# Patient Record
Sex: MALE | Race: BLACK OR AFRICAN AMERICAN | NOT HISPANIC OR LATINO | Employment: OTHER | URBAN - METROPOLITAN AREA
[De-identification: names, ages, dates, MRNs, and addresses within clinical notes are randomized per-mention and may not be internally consistent; named-entity substitution may affect disease eponyms.]

---

## 2017-01-03 ENCOUNTER — HISTORICAL (OUTPATIENT)
Dept: WOUND CARE | Facility: HOSPITAL | Age: 42
End: 2017-01-03

## 2017-01-31 ENCOUNTER — HISTORICAL (OUTPATIENT)
Dept: WOUND CARE | Facility: HOSPITAL | Age: 42
End: 2017-01-31

## 2017-03-01 ENCOUNTER — HISTORICAL (OUTPATIENT)
Dept: WOUND CARE | Facility: HOSPITAL | Age: 42
End: 2017-03-01

## 2017-03-15 ENCOUNTER — HISTORICAL (OUTPATIENT)
Dept: WOUND CARE | Facility: HOSPITAL | Age: 42
End: 2017-03-15

## 2017-04-12 ENCOUNTER — HISTORICAL (OUTPATIENT)
Dept: WOUND CARE | Facility: HOSPITAL | Age: 42
End: 2017-04-12

## 2017-05-17 ENCOUNTER — HISTORICAL (OUTPATIENT)
Dept: WOUND CARE | Facility: HOSPITAL | Age: 42
End: 2017-05-17

## 2017-06-07 ENCOUNTER — HISTORICAL (OUTPATIENT)
Dept: WOUND CARE | Facility: HOSPITAL | Age: 42
End: 2017-06-07

## 2017-06-28 ENCOUNTER — HISTORICAL (OUTPATIENT)
Dept: WOUND CARE | Facility: HOSPITAL | Age: 42
End: 2017-06-28

## 2017-07-08 ENCOUNTER — HISTORICAL (OUTPATIENT)
Dept: LAB | Facility: HOSPITAL | Age: 42
End: 2017-07-08

## 2017-07-08 LAB
ABS NEUT (OLG): 6.56 X10(3)/MCL (ref 2.1–9.2)
ALBUMIN SERPL-MCNC: 3 GM/DL (ref 3.4–5)
ALBUMIN/GLOB SERPL: 0.8 RATIO (ref 1.1–2)
ALP SERPL-CCNC: 80 UNIT/L (ref 50–136)
ALT SERPL-CCNC: 16 UNIT/L (ref 12–78)
APPEARANCE, UA: ABNORMAL
AST SERPL-CCNC: 11 UNIT/L (ref 15–37)
BACTERIA #/AREA URNS AUTO: ABNORMAL /HPF
BASOPHILS # BLD AUTO: 0 X10(3)/MCL (ref 0–0.2)
BASOPHILS NFR BLD AUTO: 0 %
BILIRUB SERPL-MCNC: 0.5 MG/DL (ref 0.2–1)
BILIRUB UR QL STRIP: NEGATIVE
BILIRUBIN DIRECT+TOT PNL SERPL-MCNC: 0.2 MG/DL (ref 0–0.5)
BILIRUBIN DIRECT+TOT PNL SERPL-MCNC: 0.3 MG/DL (ref 0–0.8)
BUN SERPL-MCNC: 12 MG/DL (ref 7–18)
CALCIUM SERPL-MCNC: 8 MG/DL (ref 8.5–10.1)
CHLORIDE SERPL-SCNC: 105 MMOL/L (ref 98–107)
CHOLEST SERPL-MCNC: 109 MG/DL (ref 0–200)
CHOLEST/HDLC SERPL: 2.7 {RATIO} (ref 0–5)
CO2 SERPL-SCNC: 23 MMOL/L (ref 21–32)
COLOR UR: YELLOW
CREAT SERPL-MCNC: 0.62 MG/DL (ref 0.7–1.3)
DEPRECATED CALCIDIOL+CALCIFEROL SERPL-MC: 18.95 NG/ML (ref 30–80)
EOSINOPHIL # BLD AUTO: 0.2 X10(3)/MCL (ref 0–0.9)
EOSINOPHIL NFR BLD AUTO: 2 %
ERYTHROCYTE [DISTWIDTH] IN BLOOD BY AUTOMATED COUNT: 13.1 % (ref 11.5–17)
GLOBULIN SER-MCNC: 3.6 GM/DL (ref 2.4–3.5)
GLUCOSE (UA): NEGATIVE
GLUCOSE SERPL-MCNC: 109 MG/DL (ref 74–106)
HCT VFR BLD AUTO: 36.4 % (ref 42–52)
HDLC SERPL-MCNC: 41 MG/DL (ref 35–60)
HGB BLD-MCNC: 12 GM/DL (ref 14–18)
HGB UR QL STRIP: ABNORMAL
KETONES UR QL STRIP: NEGATIVE
LDLC SERPL CALC-MCNC: 47 MG/DL (ref 0–129)
LEUKOCYTE ESTERASE UR QL STRIP: ABNORMAL
LYMPHOCYTES # BLD AUTO: 1.7 X10(3)/MCL (ref 0.6–4.6)
LYMPHOCYTES NFR BLD AUTO: 18 %
MAGNESIUM SERPL-MCNC: 2.2 MG/DL (ref 1.8–2.4)
MCH RBC QN AUTO: 30.2 PG (ref 27–31)
MCHC RBC AUTO-ENTMCNC: 33 GM/DL (ref 33–36)
MCV RBC AUTO: 91.5 FL (ref 80–94)
MONOCYTES # BLD AUTO: 0.7 X10(3)/MCL (ref 0.1–1.3)
MONOCYTES NFR BLD AUTO: 8 %
NEUTROPHILS # BLD AUTO: 6.56 X10(3)/MCL (ref 2.1–9.2)
NEUTROPHILS NFR BLD AUTO: 71 %
NITRITE UR QL STRIP.AUTO: NEGATIVE
PH UR STRIP: 7.5 [PH] (ref 5–9)
PLATELET # BLD AUTO: 299 X10(3)/MCL (ref 130–400)
PMV BLD AUTO: 10.2 FL (ref 9.4–12.4)
POTASSIUM SERPL-SCNC: 4.1 MMOL/L (ref 3.5–5.1)
PROT SERPL-MCNC: 6.6 GM/DL (ref 6.4–8.2)
PROT UR QL STRIP: ABNORMAL
PSA SERPL-MCNC: 2.79 NG/ML (ref 0–4)
RBC # BLD AUTO: 3.98 X10(6)/MCL (ref 4.7–6.1)
RBC #/AREA URNS HPF: ABNORMAL /[HPF]
SODIUM SERPL-SCNC: 139 MMOL/L (ref 136–145)
SP GR UR STRIP: 1 (ref 1–1.03)
SQUAMOUS EPITHELIAL, UA: ABNORMAL
TRIGL SERPL-MCNC: 105 MG/DL (ref 30–150)
TSH SERPL-ACNC: 0.29 MIU/ML (ref 0.36–3.74)
UROBILINOGEN UR STRIP-ACNC: 2
VLDLC SERPL CALC-MCNC: 21 MG/DL
WBC # SPEC AUTO: 9.2 X10(3)/MCL (ref 4.5–11.5)
WBC #/AREA URNS AUTO: 131 /HPF (ref 0–3)

## 2017-07-12 ENCOUNTER — HISTORICAL (OUTPATIENT)
Dept: RADIOLOGY | Facility: HOSPITAL | Age: 42
End: 2017-07-12

## 2017-07-19 ENCOUNTER — HISTORICAL (OUTPATIENT)
Dept: WOUND CARE | Facility: HOSPITAL | Age: 42
End: 2017-07-19

## 2017-08-02 ENCOUNTER — HISTORICAL (OUTPATIENT)
Dept: WOUND CARE | Facility: HOSPITAL | Age: 42
End: 2017-08-02

## 2017-08-03 ENCOUNTER — HISTORICAL (OUTPATIENT)
Dept: RADIOLOGY | Facility: HOSPITAL | Age: 42
End: 2017-08-03

## 2017-08-03 LAB
BUN SERPL-MCNC: 11 MG/DL (ref 10–20)
CALCIUM SERPL-MCNC: 8.7 MG/DL (ref 8–10.5)
CHLORIDE SERPL-SCNC: 106 MMOL/L (ref 100–108)
CO2 SERPL-SCNC: 24 MMOL/L (ref 21–35)
CREAT SERPL-MCNC: 0.75 MG/DL (ref 0.7–1.3)
GLUCOSE SERPL-MCNC: 76 MG/DL (ref 75–116)
POTASSIUM SERPL-SCNC: 3.9 MMOL/L (ref 3.6–5.2)
PSA SERPL-MCNC: 1.08 NG/ML (ref 0–4)
SODIUM SERPL-SCNC: 139 MMOL/L (ref 135–145)

## 2017-08-16 ENCOUNTER — HISTORICAL (OUTPATIENT)
Dept: WOUND CARE | Facility: HOSPITAL | Age: 42
End: 2017-08-16

## 2017-09-06 ENCOUNTER — HISTORICAL (OUTPATIENT)
Dept: WOUND CARE | Facility: HOSPITAL | Age: 42
End: 2017-09-06

## 2017-09-12 ENCOUNTER — HISTORICAL (OUTPATIENT)
Dept: LAB | Facility: HOSPITAL | Age: 42
End: 2017-09-12

## 2017-09-12 LAB
APPEARANCE, UA: ABNORMAL
BACTERIA SPEC CULT: ABNORMAL /HPF
BILIRUB UR QL STRIP: NEGATIVE
COLOR UR: ABNORMAL
GLUCOSE (UA): NEGATIVE
HGB UR QL STRIP: ABNORMAL
KETONES UR QL STRIP: 15 MG/DL
LEUKOCYTE ESTERASE UR QL STRIP: ABNORMAL
NITRITE UR QL STRIP: POSITIVE
PH UR STRIP: 7 [PH]
PROT UR QL STRIP: ABNORMAL
RBC #/AREA URNS HPF: ABNORMAL /HPF
SP GR UR STRIP: 1.02
SQUAMOUS EPITHELIAL, UA: ABNORMAL
UROBILINOGEN UR STRIP-ACNC: 1 EU/DL
WBC #/AREA URNS HPF: ABNORMAL /HPF

## 2017-10-11 ENCOUNTER — HISTORICAL (OUTPATIENT)
Dept: WOUND CARE | Facility: HOSPITAL | Age: 42
End: 2017-10-11

## 2017-11-08 ENCOUNTER — HISTORICAL (OUTPATIENT)
Dept: WOUND CARE | Facility: HOSPITAL | Age: 42
End: 2017-11-08

## 2018-03-27 ENCOUNTER — HISTORICAL (OUTPATIENT)
Dept: WOUND CARE | Facility: HOSPITAL | Age: 43
End: 2018-03-27

## 2018-04-02 ENCOUNTER — HISTORICAL (OUTPATIENT)
Dept: LAB | Facility: HOSPITAL | Age: 43
End: 2018-04-02

## 2018-04-02 LAB
ABS NEUT (OLG): 7 X10(3)/MCL (ref 1.5–6.9)
ALBUMIN SERPL-MCNC: 1.8 GM/DL (ref 3.4–5)
ALBUMIN/GLOB SERPL: 0.5 RATIO
ALP SERPL-CCNC: 135 UNIT/L (ref 30–113)
ALT SERPL-CCNC: 32 UNIT/L (ref 10–45)
AST SERPL-CCNC: 25 UNIT/L (ref 15–37)
BASOPHILS # BLD AUTO: 0 X10(3)/MCL (ref 0–0.1)
BASOPHILS NFR BLD AUTO: 0 % (ref 0–1)
BILIRUB SERPL-MCNC: 0.6 MG/DL (ref 0.1–0.9)
BILIRUBIN DIRECT+TOT PNL SERPL-MCNC: 0.2 MG/DL (ref 0–0.3)
BILIRUBIN DIRECT+TOT PNL SERPL-MCNC: 0.4 MG/DL
BUN SERPL-MCNC: 8 MG/DL (ref 10–20)
CALCIUM SERPL-MCNC: 7.6 MG/DL (ref 8–10.5)
CHLORIDE SERPL-SCNC: 101 MMOL/L (ref 100–108)
CHOLEST SERPL-MCNC: 100 MG/DL (ref 140–200)
CHOLEST/HDLC SERPL: 4 MG/DL (ref 0–8)
CO2 SERPL-SCNC: 20 MMOL/L (ref 21–35)
CREAT SERPL-MCNC: 0.72 MG/DL (ref 0.7–1.3)
DEPRECATED CALCIDIOL+CALCIFEROL SERPL-MC: 13 NG/ML (ref 30–80)
EOSINOPHIL # BLD AUTO: 0.1 X10(3)/MCL (ref 0–0.6)
EOSINOPHIL NFR BLD AUTO: 1 % (ref 0–5)
ERYTHROCYTE [DISTWIDTH] IN BLOOD BY AUTOMATED COUNT: 14.1 % (ref 11.5–17)
GLOBULIN SER-MCNC: 3.8 GM/DL
GLUCOSE SERPL-MCNC: 67 MG/DL (ref 75–116)
HCT VFR BLD AUTO: 31.3 % (ref 42–52)
HDLC SERPL-MCNC: 25 MG/DL (ref 35–59)
HGB BLD-MCNC: 10.8 GM/DL (ref 14–18)
LDLC SERPL CALC-MCNC: 62 MG/DL (ref 100–129)
LYMPHOCYTES # BLD AUTO: 1.7 X10(3)/MCL (ref 0.5–4.1)
LYMPHOCYTES NFR BLD AUTO: 17.5 % (ref 15–40)
MCH RBC QN AUTO: 31 PG (ref 27–34)
MCHC RBC AUTO-ENTMCNC: 34 GM/DL (ref 31–36)
MCV RBC AUTO: 89 FL (ref 80–99)
MONOCYTES # BLD AUTO: 1 X10(3)/MCL (ref 0–1.1)
MONOCYTES NFR BLD AUTO: 10 % (ref 4–12)
NEUTROPHILS # BLD AUTO: 7 X10(3)/MCL (ref 1.5–6.9)
NEUTROPHILS NFR BLD AUTO: 71 % (ref 43–75)
PLATELET # BLD AUTO: 365 X10(3)/MCL (ref 140–400)
PMV BLD AUTO: 9.6 FL (ref 6.8–10)
POTASSIUM SERPL-SCNC: 2.7 MMOL/L (ref 3.6–5.2)
PROT SERPL-MCNC: 5.6 GM/DL (ref 6.4–8.2)
RBC # BLD AUTO: 3.5 X10(6)/MCL (ref 4.7–6.1)
SODIUM SERPL-SCNC: 138 MMOL/L (ref 135–145)
TRIGL SERPL-MCNC: 66 MG/DL (ref 35–150)
TSH SERPL-ACNC: 0.79 MIU/ML (ref 0.36–3.74)
VLDLC SERPL CALC-MCNC: 13 MG/DL
WBC # SPEC AUTO: 9.8 X10(3)/MCL (ref 4.5–11.5)

## 2018-05-03 ENCOUNTER — HISTORICAL (OUTPATIENT)
Dept: WOUND CARE | Facility: HOSPITAL | Age: 43
End: 2018-05-03

## 2018-05-10 ENCOUNTER — HISTORICAL (OUTPATIENT)
Dept: WOUND CARE | Facility: HOSPITAL | Age: 43
End: 2018-05-10

## 2018-05-24 ENCOUNTER — HISTORICAL (OUTPATIENT)
Dept: WOUND CARE | Facility: HOSPITAL | Age: 43
End: 2018-05-24

## 2018-05-31 ENCOUNTER — HISTORICAL (OUTPATIENT)
Dept: WOUND CARE | Facility: HOSPITAL | Age: 43
End: 2018-05-31

## 2018-06-04 ENCOUNTER — HISTORICAL (OUTPATIENT)
Dept: LAB | Facility: HOSPITAL | Age: 43
End: 2018-06-04

## 2018-06-04 LAB
APPEARANCE, UA: CLEAR
BACTERIA SPEC CULT: ABNORMAL /HPF
BILIRUB UR QL STRIP: NEGATIVE
COLOR UR: ABNORMAL
GLUCOSE (UA): NEGATIVE
HGB UR QL STRIP: ABNORMAL
KETONES UR QL STRIP: NEGATIVE
LEUKOCYTE ESTERASE UR QL STRIP: ABNORMAL
NITRITE UR QL STRIP: POSITIVE
PH UR STRIP: 8.5 [PH]
PROT UR QL STRIP: NEGATIVE
RBC #/AREA URNS HPF: ABNORMAL /HPF
SP GR UR STRIP: <=1.005
SQUAMOUS EPITHELIAL, UA: ABNORMAL
UROBILINOGEN UR STRIP-ACNC: 0.2 EU/DL
WBC #/AREA URNS HPF: ABNORMAL /HPF

## 2018-06-07 ENCOUNTER — HISTORICAL (OUTPATIENT)
Dept: WOUND CARE | Facility: HOSPITAL | Age: 43
End: 2018-06-07

## 2018-06-14 ENCOUNTER — HISTORICAL (OUTPATIENT)
Dept: WOUND CARE | Facility: HOSPITAL | Age: 43
End: 2018-06-14

## 2018-06-22 ENCOUNTER — HISTORICAL (OUTPATIENT)
Dept: WOUND CARE | Facility: HOSPITAL | Age: 43
End: 2018-06-22

## 2018-06-29 ENCOUNTER — HISTORICAL (OUTPATIENT)
Dept: WOUND CARE | Facility: HOSPITAL | Age: 43
End: 2018-06-29

## 2018-07-06 ENCOUNTER — HISTORICAL (OUTPATIENT)
Dept: WOUND CARE | Facility: HOSPITAL | Age: 43
End: 2018-07-06

## 2018-07-13 ENCOUNTER — HISTORICAL (OUTPATIENT)
Dept: WOUND CARE | Facility: HOSPITAL | Age: 43
End: 2018-07-13

## 2018-07-18 ENCOUNTER — HISTORICAL (OUTPATIENT)
Dept: LAB | Facility: HOSPITAL | Age: 43
End: 2018-07-18

## 2018-07-18 LAB
APPEARANCE, UA: ABNORMAL
BACTERIA SPEC CULT: ABNORMAL /HPF
BILIRUB UR QL STRIP: NEGATIVE
COLOR UR: YELLOW
GLUCOSE (UA): NEGATIVE
HGB UR QL STRIP: ABNORMAL
KETONES UR QL STRIP: NEGATIVE
LEUKOCYTE ESTERASE UR QL STRIP: ABNORMAL
NITRITE UR QL STRIP: NEGATIVE
PH UR STRIP: 6.5 [PH]
PROT UR QL STRIP: ABNORMAL
RBC #/AREA URNS HPF: ABNORMAL /HPF
SP GR UR STRIP: <=1.005
SQUAMOUS EPITHELIAL, UA: ABNORMAL
UROBILINOGEN UR STRIP-ACNC: 0.2 EU/DL
WBC #/AREA URNS HPF: ABNORMAL /HPF

## 2018-07-20 ENCOUNTER — HISTORICAL (OUTPATIENT)
Dept: WOUND CARE | Facility: HOSPITAL | Age: 43
End: 2018-07-20

## 2018-08-03 ENCOUNTER — HISTORICAL (OUTPATIENT)
Dept: WOUND CARE | Facility: HOSPITAL | Age: 43
End: 2018-08-03

## 2018-08-10 ENCOUNTER — HISTORICAL (OUTPATIENT)
Dept: WOUND CARE | Facility: HOSPITAL | Age: 43
End: 2018-08-10

## 2018-08-17 ENCOUNTER — HISTORICAL (OUTPATIENT)
Dept: WOUND CARE | Facility: HOSPITAL | Age: 43
End: 2018-08-17

## 2018-08-24 ENCOUNTER — HISTORICAL (OUTPATIENT)
Dept: WOUND CARE | Facility: HOSPITAL | Age: 43
End: 2018-08-24

## 2018-08-31 ENCOUNTER — HISTORICAL (OUTPATIENT)
Dept: WOUND CARE | Facility: HOSPITAL | Age: 43
End: 2018-08-31

## 2018-09-04 ENCOUNTER — HISTORICAL (OUTPATIENT)
Dept: ADMINISTRATIVE | Facility: HOSPITAL | Age: 43
End: 2018-09-04

## 2018-09-04 LAB
APPEARANCE, UA: ABNORMAL
BACTERIA SPEC CULT: ABNORMAL /HPF
BILIRUB UR QL STRIP: NEGATIVE
COLOR UR: YELLOW
GLUCOSE (UA): NEGATIVE
HGB UR QL STRIP: ABNORMAL
KETONES UR QL STRIP: NEGATIVE
LEUKOCYTE ESTERASE UR QL STRIP: ABNORMAL
NITRITE UR QL STRIP: NEGATIVE
PH UR STRIP: >=9 [PH]
PROT UR QL STRIP: 100 MG/DL
RBC #/AREA URNS HPF: ABNORMAL /HPF
SP GR UR STRIP: <=1.005
SQUAMOUS EPITHELIAL, UA: ABNORMAL /LPF
UROBILINOGEN UR STRIP-ACNC: 1 EU/DL
WBC #/AREA URNS HPF: ABNORMAL /HPF

## 2018-09-07 ENCOUNTER — HISTORICAL (OUTPATIENT)
Dept: WOUND CARE | Facility: HOSPITAL | Age: 43
End: 2018-09-07

## 2018-09-14 ENCOUNTER — HISTORICAL (OUTPATIENT)
Dept: WOUND CARE | Facility: HOSPITAL | Age: 43
End: 2018-09-14

## 2018-10-05 ENCOUNTER — HISTORICAL (OUTPATIENT)
Dept: WOUND CARE | Facility: HOSPITAL | Age: 43
End: 2018-10-05

## 2018-10-10 ENCOUNTER — HISTORICAL (OUTPATIENT)
Dept: WOUND CARE | Facility: HOSPITAL | Age: 43
End: 2018-10-10

## 2018-10-14 LAB — FINAL CULTURE: NORMAL

## 2018-10-24 ENCOUNTER — HISTORICAL (OUTPATIENT)
Dept: WOUND CARE | Facility: HOSPITAL | Age: 43
End: 2018-10-24

## 2018-11-14 ENCOUNTER — HISTORICAL (OUTPATIENT)
Dept: WOUND CARE | Facility: HOSPITAL | Age: 43
End: 2018-11-14

## 2018-11-21 ENCOUNTER — HISTORICAL (OUTPATIENT)
Dept: WOUND CARE | Facility: HOSPITAL | Age: 43
End: 2018-11-21

## 2018-11-28 ENCOUNTER — HISTORICAL (OUTPATIENT)
Dept: WOUND CARE | Facility: HOSPITAL | Age: 43
End: 2018-11-28

## 2018-12-05 ENCOUNTER — HISTORICAL (OUTPATIENT)
Dept: WOUND CARE | Facility: HOSPITAL | Age: 43
End: 2018-12-05

## 2018-12-07 LAB — FINAL CULTURE: NORMAL

## 2018-12-12 ENCOUNTER — HISTORICAL (OUTPATIENT)
Dept: WOUND CARE | Facility: HOSPITAL | Age: 43
End: 2018-12-12

## 2018-12-19 ENCOUNTER — HISTORICAL (OUTPATIENT)
Dept: WOUND CARE | Facility: HOSPITAL | Age: 43
End: 2018-12-19

## 2018-12-26 ENCOUNTER — HISTORICAL (OUTPATIENT)
Dept: WOUND CARE | Facility: HOSPITAL | Age: 43
End: 2018-12-26

## 2019-01-02 ENCOUNTER — HISTORICAL (OUTPATIENT)
Dept: WOUND CARE | Facility: HOSPITAL | Age: 44
End: 2019-01-02

## 2019-01-08 ENCOUNTER — HISTORICAL (OUTPATIENT)
Dept: ADMINISTRATIVE | Facility: HOSPITAL | Age: 44
End: 2019-01-08

## 2019-01-08 LAB
APPEARANCE, UA: ABNORMAL
BACTERIA SPEC CULT: ABNORMAL /HPF
BILIRUB UR QL STRIP: NEGATIVE
COLOR UR: YELLOW
GLUCOSE (UA): NEGATIVE
HGB UR QL STRIP: ABNORMAL
KETONES UR QL STRIP: NEGATIVE
LEUKOCYTE ESTERASE UR QL STRIP: ABNORMAL
MUCOUS THREADS URNS QL MICRO: ABNORMAL
NITRITE UR QL STRIP: POSITIVE
PH UR STRIP: 6 [PH]
PROT UR QL STRIP: ABNORMAL
RBC #/AREA URNS HPF: ABNORMAL /HPF
SP GR UR STRIP: 1.02
SQUAMOUS EPITHELIAL, UA: ABNORMAL
UROBILINOGEN UR STRIP-ACNC: 1 EU/DL
WBC #/AREA URNS HPF: ABNORMAL /HPF

## 2019-01-09 ENCOUNTER — HISTORICAL (OUTPATIENT)
Dept: WOUND CARE | Facility: HOSPITAL | Age: 44
End: 2019-01-09

## 2019-01-16 ENCOUNTER — HISTORICAL (OUTPATIENT)
Dept: WOUND CARE | Facility: HOSPITAL | Age: 44
End: 2019-01-16

## 2019-01-23 ENCOUNTER — HISTORICAL (OUTPATIENT)
Dept: WOUND CARE | Facility: HOSPITAL | Age: 44
End: 2019-01-23

## 2019-01-30 ENCOUNTER — HISTORICAL (OUTPATIENT)
Dept: WOUND CARE | Facility: HOSPITAL | Age: 44
End: 2019-01-30

## 2019-02-06 ENCOUNTER — HISTORICAL (OUTPATIENT)
Dept: WOUND CARE | Facility: HOSPITAL | Age: 44
End: 2019-02-06

## 2019-02-20 ENCOUNTER — HISTORICAL (OUTPATIENT)
Dept: WOUND CARE | Facility: HOSPITAL | Age: 44
End: 2019-02-20

## 2019-02-27 ENCOUNTER — HISTORICAL (OUTPATIENT)
Dept: WOUND CARE | Facility: HOSPITAL | Age: 44
End: 2019-02-27

## 2019-03-06 ENCOUNTER — HISTORICAL (OUTPATIENT)
Dept: WOUND CARE | Facility: HOSPITAL | Age: 44
End: 2019-03-06

## 2019-03-08 ENCOUNTER — HISTORICAL (OUTPATIENT)
Dept: LAB | Facility: HOSPITAL | Age: 44
End: 2019-03-08

## 2019-03-08 LAB
ALBUMIN SERPL-MCNC: 2.8 GM/DL (ref 3.4–5)
ALBUMIN/GLOB SERPL: 0.7 RATIO
ALP SERPL-CCNC: 141 UNIT/L (ref 30–113)
ALT SERPL-CCNC: 21 UNIT/L (ref 10–45)
AST SERPL-CCNC: 22 UNIT/L (ref 15–37)
BILIRUB SERPL-MCNC: 0.6 MG/DL (ref 0.1–0.9)
BILIRUBIN DIRECT+TOT PNL SERPL-MCNC: 0.1 MG/DL (ref 0–0.3)
BILIRUBIN DIRECT+TOT PNL SERPL-MCNC: 0.5 MG/DL
BUN SERPL-MCNC: 16 MG/DL (ref 10–20)
CALCIUM SERPL-MCNC: 8.8 MG/DL (ref 8–10.5)
CHLORIDE SERPL-SCNC: 103 MMOL/L (ref 100–108)
CO2 SERPL-SCNC: 24 MMOL/L (ref 21–35)
CREAT SERPL-MCNC: 0.89 MG/DL (ref 0.7–1.3)
GLOBULIN SER-MCNC: 3.8 GM/DL
GLUCOSE SERPL-MCNC: 135 MG/DL (ref 75–116)
POTASSIUM SERPL-SCNC: 4.1 MMOL/L (ref 3.6–5.2)
PREALB SERPL-MCNC: 13.7 MG/DL (ref 18–38)
PROT SERPL-MCNC: 6.6 GM/DL (ref 6.4–8.2)
SODIUM SERPL-SCNC: 139 MMOL/L (ref 135–145)

## 2019-03-22 ENCOUNTER — HISTORICAL (OUTPATIENT)
Dept: ADMINISTRATIVE | Facility: HOSPITAL | Age: 44
End: 2019-03-22

## 2019-03-23 LAB
ABS NEUT (OLG): 6.29
ALBUMIN SERPL-MCNC: 2.5 GM/DL (ref 3.4–5)
ALBUMIN/GLOB SERPL: 0.5 RATIO (ref 1.1–2)
ALP SERPL-CCNC: 109 UNIT/L (ref 46–116)
ALT SERPL-CCNC: 12 UNIT/L (ref 12–78)
AST SERPL-CCNC: 16 UNIT/L (ref 10–37)
BASOPHILS # BLD AUTO: 0.03 X10(3)/MCL
BASOPHILS NFR BLD AUTO: 0.3 %
BILIRUB SERPL-MCNC: 0.3 MG/DL (ref 0.2–1)
BILIRUBIN DIRECT+TOT PNL SERPL-MCNC: 0.13 MG/DL (ref 0–0.2)
BILIRUBIN DIRECT+TOT PNL SERPL-MCNC: 0.17 MG/DL
BUN SERPL-MCNC: 16 MG/DL (ref 7–18)
CALCIUM SERPL-MCNC: 8.6 MG/DL (ref 8.5–10.1)
CHLORIDE SERPL-SCNC: 101 MMOL/L (ref 98–107)
CO2 SERPL-SCNC: 22.6 MMOL/L (ref 21–32)
CREAT SERPL-MCNC: 0.73 MG/DL (ref 0.7–1.3)
EOSINOPHIL # BLD AUTO: 0.33 X10(3)/MCL
EOSINOPHIL NFR BLD AUTO: 3.1 %
ERYTHROCYTE [DISTWIDTH] IN BLOOD BY AUTOMATED COUNT: 15 %
GLOBULIN SER-MCNC: 4.6 GM/DL (ref 2.4–3.5)
GLUCOSE SERPL-MCNC: 136 MG/DL (ref 74–106)
HCT VFR BLD AUTO: 33.6 % (ref 39–49)
HGB BLD-MCNC: 10.6 GM/DL (ref 12.6–16.6)
IMM GRANULOCYTES # BLD AUTO: 0.32 10*3/UL (ref 0–0.1)
IMM GRANULOCYTES NFR BLD AUTO: 3 % (ref 0–1)
LYMPHOCYTES # BLD AUTO: 2.46 X10(3)/MCL
LYMPHOCYTES NFR BLD AUTO: 23.3 %
MAGNESIUM SERPL-MCNC: 1.9 MG/DL (ref 1.8–2.4)
MCH RBC QN AUTO: 30.7 PG (ref 27–33)
MCHC RBC AUTO-ENTMCNC: 31.5 GM/DL (ref 32–35)
MCV RBC AUTO: 97.4 FL (ref 84–97)
MONOCYTES # BLD AUTO: 1.11 X10(3)/MCL
MONOCYTES NFR BLD AUTO: 10.5 %
NEUTROPHILS # BLD AUTO: 6.29 X10(3)/MCL
NEUTROPHILS NFR BLD AUTO: 59.8 %
PLATELET # BLD AUTO: 646 X10(3)/MCL (ref 151–368)
PMV BLD AUTO: 9 FL
POTASSIUM SERPL-SCNC: 3.9 MMOL/L (ref 3.5–5.1)
PROT SERPL-MCNC: 7.1 GM/DL (ref 6.4–8.2)
RBC # BLD AUTO: 3.45 X10(6)/MCL (ref 4.3–5.6)
SODIUM SERPL-SCNC: 134 MMOL/L (ref 136–145)
WBC # SPEC AUTO: 10.54 X10(3)/MCL (ref 3.4–9.2)

## 2019-03-24 LAB
ABS NEUT (OLG): 5.82
ALBUMIN SERPL-MCNC: 2.3 GM/DL (ref 3.4–5)
ALBUMIN/GLOB SERPL: 0.5 RATIO (ref 1.1–2)
ALP SERPL-CCNC: 92 UNIT/L (ref 46–116)
ALT SERPL-CCNC: 13 UNIT/L (ref 12–78)
AST SERPL-CCNC: 16 UNIT/L (ref 10–37)
BASOPHILS # BLD AUTO: 0.03 X10(3)/MCL
BASOPHILS NFR BLD AUTO: 0.3 %
BILIRUB SERPL-MCNC: 0.4 MG/DL (ref 0.2–1)
BILIRUBIN DIRECT+TOT PNL SERPL-MCNC: 0.13 MG/DL (ref 0–0.2)
BILIRUBIN DIRECT+TOT PNL SERPL-MCNC: 0.27 MG/DL
BUN SERPL-MCNC: 13 MG/DL (ref 7–18)
CALCIUM SERPL-MCNC: 8.3 MG/DL (ref 8.5–10.1)
CHLORIDE SERPL-SCNC: 105 MMOL/L (ref 98–107)
CO2 SERPL-SCNC: 23.8 MMOL/L (ref 21–32)
CREAT SERPL-MCNC: 0.64 MG/DL (ref 0.7–1.3)
EOSINOPHIL # BLD AUTO: 0.29 X10(3)/MCL
EOSINOPHIL NFR BLD AUTO: 2.8 %
ERYTHROCYTE [DISTWIDTH] IN BLOOD BY AUTOMATED COUNT: 14 %
GLOBULIN SER-MCNC: 4.2 GM/DL (ref 2.4–3.5)
GLUCOSE SERPL-MCNC: 73 MG/DL (ref 74–106)
HCT VFR BLD AUTO: 32.2 % (ref 39–49)
HGB BLD-MCNC: 10.1 GM/DL (ref 12.6–16.6)
IMM GRANULOCYTES # BLD AUTO: 0.28 10*3/UL (ref 0–0.1)
IMM GRANULOCYTES NFR BLD AUTO: 2.7 % (ref 0–1)
LYMPHOCYTES # BLD AUTO: 2.51 X10(3)/MCL
LYMPHOCYTES NFR BLD AUTO: 24.5 %
MAGNESIUM SERPL-MCNC: 1.8 MG/DL (ref 1.8–2.4)
MCH RBC QN AUTO: 30.7 PG (ref 27–33)
MCHC RBC AUTO-ENTMCNC: 31.4 GM/DL (ref 32–35)
MCV RBC AUTO: 97.9 FL (ref 84–97)
MONOCYTES # BLD AUTO: 1.33 X10(3)/MCL
MONOCYTES NFR BLD AUTO: 13 %
NEUTROPHILS # BLD AUTO: 5.82 X10(3)/MCL
NEUTROPHILS NFR BLD AUTO: 56.7 %
PLATELET # BLD AUTO: 644 X10(3)/MCL (ref 151–368)
PMV BLD AUTO: 9 FL
POTASSIUM SERPL-SCNC: 4.4 MMOL/L (ref 3.5–5.1)
PROT SERPL-MCNC: 6.5 GM/DL (ref 6.4–8.2)
RBC # BLD AUTO: 3.29 X10(6)/MCL (ref 4.3–5.6)
SODIUM SERPL-SCNC: 137 MMOL/L (ref 136–145)
WBC # SPEC AUTO: 10.26 X10(3)/MCL (ref 3.4–9.2)

## 2019-03-26 LAB
ABS NEUT (OLG): 4.66
BASOPHILS # BLD AUTO: 0.04 X10(3)/MCL
BASOPHILS NFR BLD AUTO: 0.5 %
BUN SERPL-MCNC: 13 MG/DL (ref 7–18)
CALCIUM SERPL-MCNC: 7.3 MG/DL (ref 8.5–10.1)
CHLORIDE SERPL-SCNC: 108 MMOL/L (ref 98–107)
CO2 SERPL-SCNC: 22.5 MMOL/L (ref 21–32)
CREAT SERPL-MCNC: 0.54 MG/DL (ref 0.7–1.3)
CREAT/UREA NIT SERPL: 24
EOSINOPHIL # BLD AUTO: 0.21 X10(3)/MCL
EOSINOPHIL NFR BLD AUTO: 2.5 %
ERYTHROCYTE [DISTWIDTH] IN BLOOD BY AUTOMATED COUNT: 14 %
GLUCOSE SERPL-MCNC: 67 MG/DL (ref 74–106)
HCT VFR BLD AUTO: 29.7 % (ref 39–49)
HGB BLD-MCNC: 9.3 GM/DL (ref 12.6–16.6)
IMM GRANULOCYTES # BLD AUTO: 0.1 10*3/UL (ref 0–0.1)
IMM GRANULOCYTES NFR BLD AUTO: 1.2 % (ref 0–1)
LYMPHOCYTES # BLD AUTO: 2.4 X10(3)/MCL
LYMPHOCYTES NFR BLD AUTO: 28.3 %
MAGNESIUM SERPL-MCNC: 1.6 MG/DL (ref 1.8–2.4)
MCH RBC QN AUTO: 30.7 PG (ref 27–33)
MCHC RBC AUTO-ENTMCNC: 31.3 GM/DL (ref 32–35)
MCV RBC AUTO: 98 FL (ref 84–97)
MONOCYTES # BLD AUTO: 1.06 X10(3)/MCL
MONOCYTES NFR BLD AUTO: 12.5 %
NEUTROPHILS # BLD AUTO: 4.66 X10(3)/MCL
NEUTROPHILS NFR BLD AUTO: 55 %
PLATELET # BLD AUTO: 525 X10(3)/MCL (ref 151–368)
PMV BLD AUTO: 10 FL
POTASSIUM SERPL-SCNC: 3.4 MMOL/L (ref 3.5–5.1)
RBC # BLD AUTO: 3.03 X10(6)/MCL (ref 4.3–5.6)
SODIUM SERPL-SCNC: 139 MMOL/L (ref 136–145)
WBC # SPEC AUTO: 8.47 X10(3)/MCL (ref 3.4–9.2)

## 2019-03-27 LAB
ABS NEUT (OLG): 4.12
BASOPHILS # BLD AUTO: 0.06 X10(3)/MCL
BASOPHILS NFR BLD AUTO: 0.7 %
BUN SERPL-MCNC: 17 MG/DL (ref 7–18)
CALCIUM SERPL-MCNC: 8.2 MG/DL (ref 8.5–10.1)
CHLORIDE SERPL-SCNC: 105 MMOL/L (ref 98–107)
CO2 SERPL-SCNC: 26.3 MMOL/L (ref 21–32)
CREAT SERPL-MCNC: 0.67 MG/DL (ref 0.7–1.3)
CREAT/UREA NIT SERPL: 25
EOSINOPHIL # BLD AUTO: 0.32 X10(3)/MCL
EOSINOPHIL NFR BLD AUTO: 3.9 %
ERYTHROCYTE [DISTWIDTH] IN BLOOD BY AUTOMATED COUNT: 14 %
FINAL CULTURE: NORMAL
GLUCOSE SERPL-MCNC: 71 MG/DL (ref 74–106)
HCT VFR BLD AUTO: 32.1 % (ref 39–49)
HGB BLD-MCNC: 10.1 GM/DL (ref 12.6–16.6)
IMM GRANULOCYTES # BLD AUTO: 0.08 10*3/UL (ref 0–0.1)
IMM GRANULOCYTES NFR BLD AUTO: 1 % (ref 0–1)
LYMPHOCYTES # BLD AUTO: 2.82 X10(3)/MCL
LYMPHOCYTES NFR BLD AUTO: 34 %
MAGNESIUM SERPL-MCNC: 2.1 MG/DL (ref 1.8–2.4)
MCH RBC QN AUTO: 30.7 PG (ref 27–33)
MCHC RBC AUTO-ENTMCNC: 31.5 GM/DL (ref 32–35)
MCV RBC AUTO: 97.6 FL (ref 84–97)
MONOCYTES # BLD AUTO: 0.89 X10(3)/MCL
MONOCYTES NFR BLD AUTO: 10.7 %
NEUTROPHILS # BLD AUTO: 4.12 X10(3)/MCL
NEUTROPHILS NFR BLD AUTO: 49.7 %
PLATELET # BLD AUTO: 521 X10(3)/MCL (ref 151–368)
PMV BLD AUTO: 9 FL
POTASSIUM SERPL-SCNC: 4.3 MMOL/L (ref 3.5–5.1)
RBC # BLD AUTO: 3.29 X10(6)/MCL (ref 4.3–5.6)
SODIUM SERPL-SCNC: 137 MMOL/L (ref 136–145)
WBC # SPEC AUTO: 8.29 X10(3)/MCL (ref 3.4–9.2)

## 2019-03-28 LAB — TOBRAMYCIN TROUGH SERPL-MCNC: 3.7 MCG/ML (ref 0–2)

## 2019-03-29 LAB
ABS NEUT (OLG): 3.94
BUN SERPL-MCNC: 26 MG/DL (ref 10–20)
CALCIUM SERPL-MCNC: 9 MG/DL (ref 8–10.5)
CHLORIDE SERPL-SCNC: 103 MMOL/L (ref 100–108)
CO2 SERPL-SCNC: 24 MMOL/L (ref 21–35)
CREAT SERPL-MCNC: 0.57 MG/DL (ref 0.7–1.3)
CREAT/UREA NIT SERPL: 46
EOSINOPHIL NFR BLD MANUAL: 3 % (ref 0–8)
ERYTHROCYTE [DISTWIDTH] IN BLOOD BY AUTOMATED COUNT: 14 %
GLUCOSE SERPL-MCNC: 77 MG/DL (ref 75–116)
GRANULOCYTES NFR BLD MANUAL: 54 % (ref 47–80)
HCT VFR BLD AUTO: 33.7 % (ref 39–49)
HGB BLD-MCNC: 10.5 GM/DL (ref 12.6–16.6)
HYPOCHROMIA BLD QL SMEAR: ABNORMAL
LYMPHOCYTES NFR BLD MANUAL: 32 % (ref 13–40)
MCH RBC QN AUTO: 30.6 PG (ref 27–33)
MCHC RBC AUTO-ENTMCNC: 31.2 GM/DL (ref 32–35)
MCV RBC AUTO: 98.3 FL (ref 84–97)
MONOCYTES NFR BLD MANUAL: 10 % (ref 2–11)
NEUTS BAND NFR BLD MANUAL: 1 % (ref 0–11)
PLATELET # BLD AUTO: 442 X10(3)/MCL (ref 151–368)
PLATELET # BLD EST: ABNORMAL 10*3/UL
PMV BLD AUTO: 10 FL
POTASSIUM SERPL-SCNC: 4.6 MMOL/L (ref 3.6–5.2)
RBC # BLD AUTO: 3.43 X10(6)/MCL (ref 4.3–5.6)
SODIUM SERPL-SCNC: 136 MMOL/L (ref 135–145)
TOBRAMYCIN TROUGH SERPL-MCNC: 0.3 MCG/ML (ref 0–2)
WBC # SPEC AUTO: 7.85 X10(3)/MCL (ref 3.4–9.2)

## 2019-03-30 LAB
ABS NEUT (OLG): 4.59
BASOPHILS # BLD AUTO: 0.05 X10(3)/MCL
BASOPHILS NFR BLD AUTO: 0.6 %
BUN SERPL-MCNC: 31 MG/DL (ref 7–18)
CALCIUM SERPL-MCNC: 8.4 MG/DL (ref 8.5–10.1)
CHLORIDE SERPL-SCNC: 104 MMOL/L (ref 98–107)
CO2 SERPL-SCNC: 26.1 MMOL/L (ref 21–32)
CREAT SERPL-MCNC: 0.69 MG/DL (ref 0.7–1.3)
CREAT/UREA NIT SERPL: 45
EOSINOPHIL # BLD AUTO: 0.26 X10(3)/MCL
EOSINOPHIL NFR BLD AUTO: 3.3 %
ERYTHROCYTE [DISTWIDTH] IN BLOOD BY AUTOMATED COUNT: 14 %
GLUCOSE SERPL-MCNC: 114 MG/DL (ref 74–106)
HCT VFR BLD AUTO: 34.2 % (ref 39–49)
HGB BLD-MCNC: 10.8 GM/DL (ref 12.6–16.6)
IMM GRANULOCYTES # BLD AUTO: 0.05 10*3/UL (ref 0–0.1)
IMM GRANULOCYTES NFR BLD AUTO: 0.6 % (ref 0–1)
LYMPHOCYTES # BLD AUTO: 2.45 X10(3)/MCL
LYMPHOCYTES NFR BLD AUTO: 30.7 %
MAGNESIUM SERPL-MCNC: 2 MG/DL (ref 1.8–2.4)
MCH RBC QN AUTO: 31 PG (ref 27–33)
MCHC RBC AUTO-ENTMCNC: 31.6 GM/DL (ref 32–35)
MCV RBC AUTO: 98.3 FL (ref 84–97)
MONOCYTES # BLD AUTO: 0.59 X10(3)/MCL
MONOCYTES NFR BLD AUTO: 7.4 %
NEUTROPHILS # BLD AUTO: 4.59 X10(3)/MCL
NEUTROPHILS NFR BLD AUTO: 57.4 %
PLATELET # BLD AUTO: 378 X10(3)/MCL (ref 151–368)
PMV BLD AUTO: 10 FL
POTASSIUM SERPL-SCNC: 4.2 MMOL/L (ref 3.5–5.1)
RBC # BLD AUTO: 3.48 X10(6)/MCL (ref 4.3–5.6)
SODIUM SERPL-SCNC: 136 MMOL/L (ref 136–145)
WBC # SPEC AUTO: 7.99 X10(3)/MCL (ref 3.4–9.2)

## 2019-04-02 ENCOUNTER — HISTORICAL (OUTPATIENT)
Dept: ADMINISTRATIVE | Facility: HOSPITAL | Age: 44
End: 2019-04-02

## 2019-04-03 LAB — TOBRAMYCIN TROUGH SERPL-MCNC: 0.9 MCG/ML (ref 0–2)

## 2019-04-04 LAB
ABS NEUT (OLG): 3.64
ALBUMIN SERPL-MCNC: 2.5 GM/DL (ref 3.4–5)
ALBUMIN/GLOB SERPL: 0.7 RATIO (ref 1.1–2)
ALP SERPL-CCNC: 107 UNIT/L (ref 46–116)
ALT SERPL-CCNC: 21 UNIT/L (ref 12–78)
AST SERPL-CCNC: 24 UNIT/L (ref 10–37)
BASOPHILS # BLD AUTO: 0.05 X10(3)/MCL
BASOPHILS NFR BLD AUTO: 0.7 %
BILIRUB SERPL-MCNC: 0.5 MG/DL (ref 0.2–1)
BILIRUBIN DIRECT+TOT PNL SERPL-MCNC: 0.15 MG/DL (ref 0–0.2)
BILIRUBIN DIRECT+TOT PNL SERPL-MCNC: 0.35 MG/DL
BUN SERPL-MCNC: 19 MG/DL (ref 7–18)
CALCIUM SERPL-MCNC: 8.2 MG/DL (ref 8.5–10.1)
CHLORIDE SERPL-SCNC: 107 MMOL/L (ref 98–107)
CO2 SERPL-SCNC: 23.8 MMOL/L (ref 21–32)
CREAT SERPL-MCNC: 0.57 MG/DL (ref 0.7–1.3)
EOSINOPHIL # BLD AUTO: 0.28 X10(3)/MCL
EOSINOPHIL NFR BLD AUTO: 3.9 %
ERYTHROCYTE [DISTWIDTH] IN BLOOD BY AUTOMATED COUNT: 14 %
GLOBULIN SER-MCNC: 3.7 GM/DL (ref 2.4–3.5)
GLUCOSE SERPL-MCNC: 72 MG/DL (ref 74–106)
HCT VFR BLD AUTO: 32.1 % (ref 39–49)
HGB BLD-MCNC: 9.9 GM/DL (ref 12.6–16.6)
IMM GRANULOCYTES # BLD AUTO: 0.03 10*3/UL (ref 0–0.1)
IMM GRANULOCYTES NFR BLD AUTO: 0.4 % (ref 0–1)
LYMPHOCYTES # BLD AUTO: 2.48 X10(3)/MCL
LYMPHOCYTES NFR BLD AUTO: 34.8 %
MAGNESIUM SERPL-MCNC: 1.8 MG/DL (ref 1.8–2.4)
MCH RBC QN AUTO: 30.1 PG (ref 27–33)
MCHC RBC AUTO-ENTMCNC: 30.8 GM/DL (ref 32–35)
MCV RBC AUTO: 97.6 FL (ref 84–97)
MONOCYTES # BLD AUTO: 0.65 X10(3)/MCL
MONOCYTES NFR BLD AUTO: 9.1 %
NEUTROPHILS # BLD AUTO: 3.64 X10(3)/MCL
NEUTROPHILS NFR BLD AUTO: 51.1 %
PLATELET # BLD AUTO: 232 X10(3)/MCL (ref 151–368)
PMV BLD AUTO: 10 FL
POTASSIUM SERPL-SCNC: 4.4 MMOL/L (ref 3.5–5.1)
PROT SERPL-MCNC: 6.2 GM/DL (ref 6.4–8.2)
RBC # BLD AUTO: 3.29 X10(6)/MCL (ref 4.3–5.6)
SODIUM SERPL-SCNC: 137 MMOL/L (ref 136–145)
WBC # SPEC AUTO: 7.13 X10(3)/MCL (ref 3.4–9.2)

## 2019-04-06 LAB
ALBUMIN SERPL-MCNC: 2.7 GM/DL (ref 3.4–5)
ALBUMIN/GLOB SERPL: 0.8 RATIO (ref 1.1–2)
ALP SERPL-CCNC: 110 UNIT/L (ref 46–116)
ALT SERPL-CCNC: 21 UNIT/L (ref 12–78)
AST SERPL-CCNC: 25 UNIT/L (ref 10–37)
BILIRUB SERPL-MCNC: 0.5 MG/DL (ref 0.2–1)
BILIRUBIN DIRECT+TOT PNL SERPL-MCNC: 0.15 MG/DL (ref 0–0.2)
BILIRUBIN DIRECT+TOT PNL SERPL-MCNC: 0.35 MG/DL
BUN SERPL-MCNC: 19 MG/DL (ref 7–18)
CALCIUM SERPL-MCNC: 8.3 MG/DL (ref 8.5–10.1)
CHLORIDE SERPL-SCNC: 105 MMOL/L (ref 98–107)
CO2 SERPL-SCNC: 23.8 MMOL/L (ref 21–32)
CREAT SERPL-MCNC: 0.73 MG/DL (ref 0.7–1.3)
GLOBULIN SER-MCNC: 3.3 GM/DL (ref 2.4–3.5)
GLUCOSE SERPL-MCNC: 94 MG/DL (ref 74–106)
POTASSIUM SERPL-SCNC: 4.4 MMOL/L (ref 3.5–5.1)
PROT SERPL-MCNC: 6 GM/DL (ref 6.4–8.2)
SODIUM SERPL-SCNC: 136 MMOL/L (ref 136–145)
TOBRAMYCIN TROUGH SERPL-MCNC: 1 MCG/ML (ref 0–2)

## 2019-04-08 LAB
FINAL CULTURE: NORMAL
FINAL CULTURE: NORMAL
TOBRAMYCIN TROUGH SERPL-MCNC: 0.5 MCG/ML (ref 0–2)

## 2019-04-10 LAB
ABS NEUT (OLG): 2.96
BASOPHILS # BLD AUTO: 0.03 X10(3)/MCL
BASOPHILS NFR BLD AUTO: 0.4 %
BUN SERPL-MCNC: 12 MG/DL (ref 7–18)
CALCIUM SERPL-MCNC: 8.6 MG/DL (ref 8.5–10.1)
CHLORIDE SERPL-SCNC: 107 MMOL/L (ref 98–107)
CO2 SERPL-SCNC: 22.9 MMOL/L (ref 21–32)
CREAT SERPL-MCNC: 0.61 MG/DL (ref 0.7–1.3)
CREAT/UREA NIT SERPL: 20
EOSINOPHIL # BLD AUTO: 0.58 X10(3)/MCL
EOSINOPHIL NFR BLD AUTO: 8.3 %
ERYTHROCYTE [DISTWIDTH] IN BLOOD BY AUTOMATED COUNT: 14 %
GLUCOSE SERPL-MCNC: 73 MG/DL (ref 74–106)
HCT VFR BLD AUTO: 36 % (ref 39–49)
HGB BLD-MCNC: 11.4 GM/DL (ref 12.6–16.6)
IMM GRANULOCYTES # BLD AUTO: 0.01 10*3/UL (ref 0–0.1)
IMM GRANULOCYTES NFR BLD AUTO: 0.1 % (ref 0–1)
LYMPHOCYTES # BLD AUTO: 2.77 X10(3)/MCL
LYMPHOCYTES NFR BLD AUTO: 39.6 %
MAGNESIUM SERPL-MCNC: 1.9 MG/DL (ref 1.8–2.4)
MCH RBC QN AUTO: 30.7 PG (ref 27–33)
MCHC RBC AUTO-ENTMCNC: 31.7 GM/DL (ref 32–35)
MCV RBC AUTO: 97 FL (ref 84–97)
MONOCYTES # BLD AUTO: 0.64 X10(3)/MCL
MONOCYTES NFR BLD AUTO: 9.2 %
NEUTROPHILS # BLD AUTO: 2.96 X10(3)/MCL
NEUTROPHILS NFR BLD AUTO: 42.4 %
PLATELET # BLD AUTO: 185 X10(3)/MCL (ref 151–368)
PMV BLD AUTO: 11 FL
POTASSIUM SERPL-SCNC: 4.6 MMOL/L (ref 3.5–5.1)
RBC # BLD AUTO: 3.71 X10(6)/MCL (ref 4.3–5.6)
SODIUM SERPL-SCNC: 137 MMOL/L (ref 136–145)
WBC # SPEC AUTO: 6.99 X10(3)/MCL (ref 3.4–9.2)

## 2019-04-17 ENCOUNTER — HISTORICAL (OUTPATIENT)
Dept: WOUND CARE | Facility: HOSPITAL | Age: 44
End: 2019-04-17

## 2019-04-17 ENCOUNTER — HISTORICAL (OUTPATIENT)
Dept: RADIOLOGY | Facility: HOSPITAL | Age: 44
End: 2019-04-17

## 2019-04-24 ENCOUNTER — HISTORICAL (OUTPATIENT)
Dept: WOUND CARE | Facility: HOSPITAL | Age: 44
End: 2019-04-24

## 2019-05-01 ENCOUNTER — HISTORICAL (OUTPATIENT)
Dept: WOUND CARE | Facility: HOSPITAL | Age: 44
End: 2019-05-01

## 2019-05-13 ENCOUNTER — HISTORICAL (OUTPATIENT)
Dept: WOUND CARE | Facility: HOSPITAL | Age: 44
End: 2019-05-13

## 2019-05-20 ENCOUNTER — HISTORICAL (OUTPATIENT)
Dept: WOUND CARE | Facility: HOSPITAL | Age: 44
End: 2019-05-20

## 2019-05-28 ENCOUNTER — HISTORICAL (OUTPATIENT)
Dept: WOUND CARE | Facility: HOSPITAL | Age: 44
End: 2019-05-28

## 2019-06-03 ENCOUNTER — HISTORICAL (OUTPATIENT)
Dept: WOUND CARE | Facility: HOSPITAL | Age: 44
End: 2019-06-03

## 2019-06-10 ENCOUNTER — HISTORICAL (OUTPATIENT)
Dept: WOUND CARE | Facility: HOSPITAL | Age: 44
End: 2019-06-10

## 2019-06-17 ENCOUNTER — HISTORICAL (OUTPATIENT)
Dept: WOUND CARE | Facility: HOSPITAL | Age: 44
End: 2019-06-17

## 2019-06-26 ENCOUNTER — HISTORICAL (OUTPATIENT)
Dept: WOUND CARE | Facility: HOSPITAL | Age: 44
End: 2019-06-26

## 2019-06-27 LAB — GRAM STN SPEC: NORMAL

## 2019-06-30 ENCOUNTER — HISTORICAL (OUTPATIENT)
Dept: LAB | Facility: HOSPITAL | Age: 44
End: 2019-06-30

## 2019-06-30 LAB
APPEARANCE, UA: ABNORMAL
BACTERIA SPEC CULT: ABNORMAL /HPF
BILIRUB UR QL STRIP: NEGATIVE
COLOR UR: YELLOW
GLUCOSE (UA): NEGATIVE
HGB UR QL STRIP: ABNORMAL
KETONES UR QL STRIP: 15 MG/DL
LEUKOCYTE ESTERASE UR QL STRIP: ABNORMAL
NITRITE UR QL STRIP: POSITIVE
PH UR STRIP: 8 [PH]
PROT UR QL STRIP: 30 MG/DL
RBC #/AREA URNS HPF: ABNORMAL /HPF
SP GR UR STRIP: 1.01
SQUAMOUS EPITHELIAL, UA: ABNORMAL /LPF
UROBILINOGEN UR STRIP-ACNC: >=8 EU/DL
WBC #/AREA URNS HPF: ABNORMAL /HPF

## 2019-07-31 ENCOUNTER — HISTORICAL (OUTPATIENT)
Dept: WOUND CARE | Facility: HOSPITAL | Age: 44
End: 2019-07-31

## 2019-08-27 LAB — GRAM STN SPEC: NORMAL

## 2019-08-30 LAB — FINAL CULTURE: NORMAL

## 2019-09-09 ENCOUNTER — HISTORICAL (OUTPATIENT)
Dept: RADIOLOGY | Facility: HOSPITAL | Age: 44
End: 2019-09-09

## 2019-11-04 ENCOUNTER — HISTORICAL (OUTPATIENT)
Dept: ADMINISTRATIVE | Facility: HOSPITAL | Age: 44
End: 2019-11-04

## 2019-11-04 LAB
ABS NEUT (OLG): 6.3 X10(3)/MCL (ref 1.5–6.9)
ALBUMIN SERPL-MCNC: 3.3 GM/DL (ref 3.4–5)
ALBUMIN/GLOB SERPL: 0.8 RATIO
ALP SERPL-CCNC: 147 UNIT/L (ref 30–113)
ALT SERPL-CCNC: 46 UNIT/L (ref 10–45)
APPEARANCE, UA: ABNORMAL
AST SERPL-CCNC: 27 UNIT/L (ref 15–37)
BACTERIA SPEC CULT: ABNORMAL /HPF
BASOPHILS # BLD AUTO: 0 X10(3)/MCL (ref 0–0.1)
BASOPHILS NFR BLD AUTO: 0 % (ref 0–1)
BILIRUB SERPL-MCNC: 0.3 MG/DL (ref 0.1–0.9)
BILIRUB UR QL STRIP: NEGATIVE
BILIRUBIN DIRECT+TOT PNL SERPL-MCNC: 0.1 MG/DL (ref 0–0.3)
BILIRUBIN DIRECT+TOT PNL SERPL-MCNC: 0.2 MG/DL
BUN SERPL-MCNC: 18 MG/DL (ref 10–20)
CALCIUM SERPL-MCNC: 8.7 MG/DL (ref 8–10.5)
CHLORIDE SERPL-SCNC: 105 MMOL/L (ref 100–108)
CO2 SERPL-SCNC: 23 MMOL/L (ref 21–35)
COLOR UR: ABNORMAL
CREAT SERPL-MCNC: 0.99 MG/DL (ref 0.7–1.3)
EOSINOPHIL # BLD AUTO: 0.1 X10(3)/MCL (ref 0–0.6)
EOSINOPHIL NFR BLD AUTO: 1 % (ref 0–5)
ERYTHROCYTE [DISTWIDTH] IN BLOOD BY AUTOMATED COUNT: 13 % (ref 11.5–17)
GLOBULIN SER-MCNC: 3.9 GM/DL
GLUCOSE (UA): NEGATIVE
GLUCOSE SERPL-MCNC: 104 MG/DL (ref 75–116)
HCT VFR BLD AUTO: 39.4 % (ref 42–52)
HGB BLD-MCNC: 12.7 GM/DL (ref 14–18)
HGB UR QL STRIP: ABNORMAL
IMM GRANULOCYTES # BLD AUTO: 0 10*3/UL (ref 0–0.02)
IMM GRANULOCYTES NFR BLD AUTO: 0 % (ref 0–0.43)
KETONES UR QL STRIP: NEGATIVE
LEUKOCYTE ESTERASE UR QL STRIP: ABNORMAL
LYMPHOCYTES # BLD AUTO: 2.8 X10(3)/MCL (ref 0.5–4.1)
LYMPHOCYTES NFR BLD AUTO: 28 % (ref 15–40)
MCH RBC QN AUTO: 31 PG (ref 27–34)
MCHC RBC AUTO-ENTMCNC: 32 GM/DL (ref 31–36)
MCV RBC AUTO: 96 FL (ref 80–99)
MONOCYTES # BLD AUTO: 0.9 X10(3)/MCL (ref 0–1.1)
MONOCYTES NFR BLD AUTO: 9 % (ref 4–12)
NEUTROPHILS # BLD AUTO: 6.3 X10(3)/MCL (ref 1.5–6.9)
NEUTROPHILS NFR BLD AUTO: 62 % (ref 43–75)
NITRITE UR QL STRIP: NEGATIVE
PH UR STRIP: 7 [PH]
PLATELET # BLD AUTO: 356 X10(3)/MCL (ref 140–400)
PMV BLD AUTO: 9.4 FL (ref 6.8–10)
POTASSIUM SERPL-SCNC: 4.4 MMOL/L (ref 3.6–5.2)
PROT SERPL-MCNC: 7.2 GM/DL (ref 6.4–8.2)
PROT UR QL STRIP: 100 MG/DL
RBC # BLD AUTO: 4.09 X10(6)/MCL (ref 4.7–6.1)
RBC #/AREA URNS HPF: ABNORMAL /HPF
SODIUM SERPL-SCNC: 140 MMOL/L (ref 135–145)
SP GR UR STRIP: 1.02
SQUAMOUS EPITHELIAL, UA: ABNORMAL /LPF
UROBILINOGEN UR STRIP-ACNC: 1 EU/DL
WBC # SPEC AUTO: 10.2 X10(3)/MCL (ref 4.5–11.5)
WBC #/AREA URNS HPF: ABNORMAL /HPF

## 2019-11-11 ENCOUNTER — HISTORICAL (OUTPATIENT)
Dept: LAB | Facility: HOSPITAL | Age: 44
End: 2019-11-11

## 2019-11-11 LAB
APPEARANCE, UA: ABNORMAL
BACTERIA SPEC CULT: ABNORMAL /HPF
BILIRUB UR QL STRIP: NEGATIVE
COLOR UR: YELLOW
GLUCOSE (UA): NEGATIVE
HGB UR QL STRIP: ABNORMAL
KETONES UR QL STRIP: NEGATIVE
LEUKOCYTE ESTERASE UR QL STRIP: ABNORMAL
NITRITE UR QL STRIP: POSITIVE
PH UR STRIP: 6.5 [PH] (ref 5–9)
PROT UR QL STRIP: ABNORMAL
RBC #/AREA URNS HPF: ABNORMAL /HPF
SP GR UR STRIP: 1.01 (ref 1–1.03)
SQUAMOUS EPITHELIAL, UA: ABNORMAL
UROBILINOGEN UR STRIP-ACNC: 1
WBC #/AREA URNS HPF: 876 /HPF (ref 0–3)

## 2019-11-18 ENCOUNTER — HISTORICAL (OUTPATIENT)
Dept: LAB | Facility: HOSPITAL | Age: 44
End: 2019-11-18

## 2019-11-18 LAB
APPEARANCE, UA: ABNORMAL
BACTERIA SPEC CULT: ABNORMAL /HPF
BILIRUB UR QL STRIP: NEGATIVE
COLOR UR: YELLOW
GLUCOSE (UA): NEGATIVE
HGB UR QL STRIP: ABNORMAL
KETONES UR QL STRIP: NEGATIVE
LEUKOCYTE ESTERASE UR QL STRIP: ABNORMAL
NITRITE UR QL STRIP: POSITIVE
PH UR STRIP: 8 [PH] (ref 5–9)
PROT UR QL STRIP: ABNORMAL
RBC #/AREA URNS HPF: 75 /HPF (ref 0–2)
SP GR UR STRIP: 1.01 (ref 1–1.03)
SQUAMOUS EPITHELIAL, UA: ABNORMAL
UROBILINOGEN UR STRIP-ACNC: 0.2
WBC #/AREA URNS HPF: 89 /HPF (ref 0–3)

## 2019-11-28 LAB — GRAM STN SPEC: NORMAL

## 2019-12-09 ENCOUNTER — HISTORICAL (OUTPATIENT)
Dept: LAB | Facility: HOSPITAL | Age: 44
End: 2019-12-09

## 2019-12-09 LAB
APPEARANCE, UA: ABNORMAL
BACTERIA SPEC CULT: ABNORMAL /HPF
BILIRUB UR QL STRIP: NEGATIVE
COLOR UR: YELLOW
GLUCOSE (UA): NEGATIVE
HGB UR QL STRIP: ABNORMAL
KETONES UR QL STRIP: NEGATIVE
LEUKOCYTE ESTERASE UR QL STRIP: ABNORMAL
NITRITE UR QL STRIP: POSITIVE
PH UR STRIP: 8.5 [PH] (ref 5–9)
PROT UR QL STRIP: ABNORMAL
RBC #/AREA URNS HPF: ABNORMAL /[HPF]
SP GR UR STRIP: 1.01 (ref 1–1.03)
SQUAMOUS EPITHELIAL, UA: ABNORMAL
UROBILINOGEN UR STRIP-ACNC: 0.2
WBC #/AREA URNS HPF: 76 /HPF (ref 0–3)

## 2019-12-16 ENCOUNTER — HISTORICAL (OUTPATIENT)
Dept: LAB | Facility: HOSPITAL | Age: 44
End: 2019-12-16

## 2019-12-16 LAB
APPEARANCE, UA: ABNORMAL
BACTERIA #/AREA URNS AUTO: ABNORMAL /HPF
BILIRUB UR QL STRIP: NEGATIVE
COLOR UR: YELLOW
GLUCOSE (UA): NEGATIVE
HGB UR QL STRIP: ABNORMAL
KETONES UR QL STRIP: NEGATIVE
LEUKOCYTE ESTERASE UR QL STRIP: ABNORMAL
NITRITE UR QL STRIP.AUTO: NEGATIVE
PH UR STRIP: >9 [PH] (ref 5–9)
PROT UR QL STRIP: ABNORMAL
RBC #/AREA URNS HPF: ABNORMAL /[HPF]
SP GR UR STRIP: 1.01 (ref 1–1.03)
SQUAMOUS EPITHELIAL, UA: ABNORMAL
UROBILINOGEN UR STRIP-ACNC: 0.2
WBC #/AREA URNS AUTO: ABNORMAL /HPF

## 2020-01-03 ENCOUNTER — HISTORICAL (OUTPATIENT)
Dept: LAB | Facility: HOSPITAL | Age: 45
End: 2020-01-03

## 2020-01-03 LAB
APPEARANCE, UA: ABNORMAL
BACTERIA SPEC CULT: ABNORMAL /HPF
BILIRUB UR QL STRIP: NEGATIVE
COLOR UR: YELLOW
GLUCOSE (UA): NEGATIVE
HGB UR QL STRIP: ABNORMAL
KETONES UR QL STRIP: NEGATIVE
LEUKOCYTE ESTERASE UR QL STRIP: ABNORMAL
NITRITE UR QL STRIP: POSITIVE
PH UR STRIP: 6.5 [PH] (ref 5–9)
PROT UR QL STRIP: ABNORMAL
RBC #/AREA URNS HPF: ABNORMAL /HPF
SP GR UR STRIP: 1.01 (ref 1–1.03)
SQUAMOUS EPITHELIAL, UA: ABNORMAL
UROBILINOGEN UR STRIP-ACNC: 0.2
WBC #/AREA URNS HPF: ABNORMAL /HPF

## 2020-01-06 ENCOUNTER — HISTORICAL (OUTPATIENT)
Dept: RADIOLOGY | Facility: HOSPITAL | Age: 45
End: 2020-01-06

## 2020-02-03 ENCOUNTER — HISTORICAL (OUTPATIENT)
Dept: ADMINISTRATIVE | Facility: HOSPITAL | Age: 45
End: 2020-02-03

## 2020-02-03 LAB
APPEARANCE, UA: ABNORMAL
BACTERIA SPEC CULT: ABNORMAL /HPF
BILIRUB UR QL STRIP: NEGATIVE
COLOR UR: YELLOW
GLUCOSE (UA): NEGATIVE
HGB UR QL STRIP: ABNORMAL
KETONES UR QL STRIP: NEGATIVE
LEUKOCYTE ESTERASE UR QL STRIP: ABNORMAL
NITRITE UR QL STRIP: NEGATIVE
PH UR STRIP: 6 [PH]
PROT UR QL STRIP: 100 MG/DL
RBC #/AREA URNS HPF: ABNORMAL /HPF
SP GR UR STRIP: 1.02
SQUAMOUS EPITHELIAL, UA: ABNORMAL /LPF
UROBILINOGEN UR STRIP-ACNC: 1 EU/DL
WBC #/AREA URNS HPF: ABNORMAL /HPF

## 2020-02-12 ENCOUNTER — HISTORICAL (OUTPATIENT)
Dept: LAB | Facility: HOSPITAL | Age: 45
End: 2020-02-12

## 2020-02-12 LAB
APPEARANCE, UA: ABNORMAL
BACTERIA SPEC CULT: ABNORMAL /HPF
BILIRUB UR QL STRIP: NEGATIVE
COLOR UR: YELLOW
GLUCOSE (UA): NEGATIVE
HGB UR QL STRIP: ABNORMAL
KETONES UR QL STRIP: NEGATIVE
LEUKOCYTE ESTERASE UR QL STRIP: ABNORMAL
NITRITE UR QL STRIP: POSITIVE
PH UR STRIP: 6.5 [PH] (ref 5–9)
PROT UR QL STRIP: ABNORMAL
RBC #/AREA URNS HPF: ABNORMAL /HPF
SP GR UR STRIP: 1.02 (ref 1–1.03)
SQUAMOUS EPITHELIAL, UA: ABNORMAL
UA WBC MAN: ABNORMAL /HPF
UROBILINOGEN UR STRIP-ACNC: 1

## 2020-04-14 ENCOUNTER — HISTORICAL (OUTPATIENT)
Dept: ADMINISTRATIVE | Facility: HOSPITAL | Age: 45
End: 2020-04-14

## 2020-04-14 LAB
APPEARANCE, UA: ABNORMAL
BACTERIA SPEC CULT: ABNORMAL /HPF
BILIRUB UR QL STRIP: NEGATIVE
COLOR UR: YELLOW
GLUCOSE (UA): NEGATIVE
HGB UR QL STRIP: ABNORMAL
KETONES UR QL STRIP: NEGATIVE
LEUKOCYTE ESTERASE UR QL STRIP: ABNORMAL
NITRITE UR QL STRIP: NEGATIVE
PH UR STRIP: 8 [PH]
PROT UR QL STRIP: 100 MG/DL
RBC #/AREA URNS HPF: ABNORMAL /HPF
SP GR UR STRIP: 1.02
SQUAMOUS EPITHELIAL, UA: ABNORMAL /LPF
UROBILINOGEN UR STRIP-ACNC: 2 EU/DL
WBC #/AREA URNS HPF: ABNORMAL /HPF

## 2020-05-14 LAB — GRAM STN SPEC: NORMAL

## 2020-05-16 LAB — FINAL CULTURE: NORMAL

## 2020-05-19 ENCOUNTER — HISTORICAL (OUTPATIENT)
Dept: ADMINISTRATIVE | Facility: HOSPITAL | Age: 45
End: 2020-05-19

## 2020-05-19 LAB
APPEARANCE, UA: CLEAR
BACTERIA SPEC CULT: ABNORMAL /HPF
BILIRUB UR QL STRIP: NEGATIVE
COLOR UR: ABNORMAL
GLUCOSE (UA): NEGATIVE
HGB UR QL STRIP: ABNORMAL
KETONES UR QL STRIP: NEGATIVE
LEUKOCYTE ESTERASE UR QL STRIP: ABNORMAL
NITRITE UR QL STRIP: NEGATIVE
PH UR STRIP: 7.5 [PH] (ref 5–9)
PROT UR QL STRIP: ABNORMAL
RBC #/AREA URNS HPF: >200 /HPF
SP GR UR STRIP: 1 (ref 1–1.03)
SQUAMOUS EPITHELIAL, UA: ABNORMAL
UA WBC MAN: ABNORMAL /HPF
UROBILINOGEN UR STRIP-ACNC: 0.2

## 2020-10-28 ENCOUNTER — HISTORICAL (OUTPATIENT)
Dept: ADMINISTRATIVE | Facility: HOSPITAL | Age: 45
End: 2020-10-28

## 2020-10-28 LAB
APPEARANCE, UA: CLEAR
BACTERIA SPEC CULT: ABNORMAL /HPF
BILIRUB UR QL STRIP: NEGATIVE
COLOR UR: YELLOW
GLUCOSE (UA): NEGATIVE
HGB UR QL STRIP: ABNORMAL
KETONES UR QL STRIP: NEGATIVE
LEUKOCYTE ESTERASE UR QL STRIP: ABNORMAL
NITRITE UR QL STRIP: POSITIVE
PH UR STRIP: 8 [PH] (ref 5–9)
PROT UR QL STRIP: ABNORMAL
RBC #/AREA URNS HPF: ABNORMAL /[HPF]
SP GR UR STRIP: 1.01 (ref 1–1.03)
SQUAMOUS EPITHELIAL, UA: ABNORMAL
UROBILINOGEN UR STRIP-ACNC: 1
WBC #/AREA URNS HPF: 43 /HPF (ref 0–3)

## 2020-12-14 ENCOUNTER — HISTORICAL (OUTPATIENT)
Dept: ADMINISTRATIVE | Facility: HOSPITAL | Age: 45
End: 2020-12-14

## 2020-12-14 LAB
APPEARANCE, UA: ABNORMAL
BACTERIA #/AREA URNS AUTO: ABNORMAL /HPF
BILIRUB UR QL STRIP: ABNORMAL
COLOR UR: ABNORMAL
GLUCOSE (UA): NEGATIVE
HGB UR QL STRIP: ABNORMAL
KETONES UR QL STRIP: ABNORMAL
LEUKOCYTE ESTERASE UR QL STRIP: ABNORMAL
NITRITE UR QL STRIP.AUTO: POSITIVE
PH UR STRIP: >=9 [PH] (ref 5–9)
PROT UR QL STRIP: ABNORMAL
RBC #/AREA URNS HPF: ABNORMAL /HPF
SP GR UR STRIP: <=1.005 (ref 1–1.03)
SQUAMOUS EPITHELIAL, UA: ABNORMAL
UROBILINOGEN UR STRIP-ACNC: 1
WBC #/AREA URNS AUTO: ABNORMAL /HPF

## 2021-01-08 LAB
BUN SERPL-MCNC: 7 MG/DL (ref 8.9–20.6)
CALCIUM SERPL-MCNC: 9.3 MG/DL (ref 8.4–10.2)
CHLORIDE SERPL-SCNC: 103 MMOL/L (ref 98–107)
CO2 SERPL-SCNC: 24 MMOL/L (ref 22–29)
CREAT SERPL-MCNC: 0.7 MG/DL (ref 0.73–1.18)
CREAT/UREA NIT SERPL: 10
GLUCOSE SERPL-MCNC: 96 MG/DL (ref 74–100)
POTASSIUM SERPL-SCNC: 3.8 MMOL/L (ref 3.5–5.1)
SODIUM SERPL-SCNC: 139 MMOL/L (ref 136–145)

## 2021-01-13 ENCOUNTER — HISTORICAL (OUTPATIENT)
Dept: INFUSION THERAPY | Facility: HOSPITAL | Age: 46
End: 2021-01-13

## 2021-02-02 ENCOUNTER — HISTORICAL (OUTPATIENT)
Dept: WOUND CARE | Facility: HOSPITAL | Age: 46
End: 2021-02-02

## 2021-03-02 ENCOUNTER — HISTORICAL (OUTPATIENT)
Dept: ADMINISTRATIVE | Facility: HOSPITAL | Age: 46
End: 2021-03-02

## 2021-03-02 LAB
APPEARANCE, UA: ABNORMAL
BACTERIA SPEC CULT: ABNORMAL /HPF
BILIRUB UR QL STRIP: NEGATIVE
COLOR UR: YELLOW
GLUCOSE (UA): NEGATIVE MG/DL
HGB UR QL STRIP: ABNORMAL
KETONES UR QL STRIP: NEGATIVE MG/DL
LEUKOCYTE ESTERASE UR QL STRIP: ABNORMAL
NITRITE UR QL STRIP: POSITIVE
PH UR STRIP: 7.5 [PH] (ref 4.6–8)
PROT UR QL STRIP: ABNORMAL
RBC #/AREA URNS HPF: ABNORMAL /HPF
SP GR UR STRIP: 1.02 (ref 1–1.03)
SQUAMOUS EPITHELIAL, UA: ABNORMAL /LPF
UROBILINOGEN UR STRIP-ACNC: 1 EU/DL
WBC #/AREA URNS HPF: ABNORMAL /HPF

## 2021-03-04 LAB — FINAL CULTURE: NORMAL

## 2021-05-27 ENCOUNTER — HISTORICAL (OUTPATIENT)
Dept: ADMINISTRATIVE | Facility: HOSPITAL | Age: 46
End: 2021-05-27

## 2021-05-27 LAB
APPEARANCE, UA: CLEAR
BACTERIA #/AREA URNS AUTO: ABNORMAL /HPF
BILIRUB UR QL STRIP: NEGATIVE
COLOR UR: ABNORMAL
GLUCOSE (UA): NEGATIVE
HGB UR QL STRIP: ABNORMAL
KETONES UR QL STRIP: NEGATIVE
LEUKOCYTE ESTERASE UR QL STRIP: ABNORMAL
NITRITE UR QL STRIP.AUTO: NEGATIVE
PH UR STRIP: 6.5 [PH] (ref 5–9)
PROT UR QL STRIP: ABNORMAL
RBC #/AREA URNS HPF: 641 /HPF (ref 0–2)
SP GR UR STRIP: 1.01 (ref 1–1.03)
SQUAMOUS EPITHELIAL, UA: ABNORMAL /HPF (ref 0–4)
UROBILINOGEN UR STRIP-ACNC: 1
WBC #/AREA URNS AUTO: 37 /HPF (ref 0–3)

## 2021-06-11 ENCOUNTER — HISTORICAL (OUTPATIENT)
Dept: LAB | Facility: HOSPITAL | Age: 46
End: 2021-06-11

## 2021-06-11 LAB
APPEARANCE, UA: ABNORMAL
BACTERIA SPEC CULT: ABNORMAL /HPF
BILIRUB UR QL STRIP: NEGATIVE
COLOR UR: YELLOW
GLUCOSE (UA): NEGATIVE MG/DL
HGB UR QL STRIP: ABNORMAL
KETONES UR QL STRIP: NEGATIVE MG/DL
LEUKOCYTE ESTERASE UR QL STRIP: ABNORMAL
NITRITE UR QL STRIP: NEGATIVE
PH UR STRIP: 7.5 [PH] (ref 4.6–8)
PROT UR QL STRIP: 100 MG/DL
RBC #/AREA URNS HPF: ABNORMAL /HPF
SP GR UR STRIP: 1.02 (ref 1–1.03)
SQUAMOUS EPITHELIAL, UA: ABNORMAL /LPF
UROBILINOGEN UR STRIP-ACNC: 2 EU/DL
WBC #/AREA URNS HPF: ABNORMAL /HPF

## 2021-06-25 ENCOUNTER — HISTORICAL (OUTPATIENT)
Dept: LAB | Facility: HOSPITAL | Age: 46
End: 2021-06-25

## 2021-06-27 LAB
GRAM STN SPEC: NORMAL
GRAM STN SPEC: NORMAL

## 2021-06-29 LAB — FINAL CULTURE: NORMAL

## 2021-09-15 ENCOUNTER — HISTORICAL (OUTPATIENT)
Dept: ADMINISTRATIVE | Facility: HOSPITAL | Age: 46
End: 2021-09-15

## 2021-09-15 LAB
ABS NEUT (OLG): 4.03 X10(3)/MCL (ref 2.1–9.2)
ALBUMIN SERPL-MCNC: 3.7 GM/DL (ref 3.5–5)
ALBUMIN/GLOB SERPL: 1.1 RATIO (ref 1.1–2)
ALP SERPL-CCNC: 89 UNIT/L (ref 40–150)
ALT SERPL-CCNC: 15 UNIT/L (ref 0–55)
APPEARANCE, UA: CLEAR
AST SERPL-CCNC: 19 UNIT/L (ref 5–34)
BACTERIA SPEC CULT: ABNORMAL /HPF
BASOPHILS # BLD AUTO: 0 X10(3)/MCL (ref 0–0.2)
BASOPHILS NFR BLD AUTO: 1 %
BILIRUB SERPL-MCNC: 0.8 MG/DL
BILIRUB UR QL STRIP: NEGATIVE
BILIRUBIN DIRECT+TOT PNL SERPL-MCNC: 0.3 MG/DL (ref 0–0.5)
BILIRUBIN DIRECT+TOT PNL SERPL-MCNC: 0.5 MG/DL (ref 0–0.8)
BUN SERPL-MCNC: 7.5 MG/DL (ref 8.9–20.6)
CALCIUM SERPL-MCNC: 9 MG/DL (ref 8.4–10.2)
CHLORIDE SERPL-SCNC: 105 MMOL/L (ref 98–107)
CHOLEST SERPL-MCNC: 130 MG/DL
CHOLEST/HDLC SERPL: 3 {RATIO} (ref 0–5)
CO2 SERPL-SCNC: 21 MMOL/L (ref 22–29)
COLOR UR: YELLOW
CREAT SERPL-MCNC: 0.7 MG/DL (ref 0.73–1.18)
DEPRECATED CALCIDIOL+CALCIFEROL SERPL-MC: 25.3 NG/ML (ref 30–80)
EOSINOPHIL # BLD AUTO: 0.2 X10(3)/MCL (ref 0–0.9)
EOSINOPHIL NFR BLD AUTO: 2 %
ERYTHROCYTE [DISTWIDTH] IN BLOOD BY AUTOMATED COUNT: 12.1 % (ref 11.5–17)
GLOBULIN SER-MCNC: 3.5 GM/DL (ref 2.4–3.5)
GLUCOSE (UA): NEGATIVE
GLUCOSE SERPL-MCNC: 105 MG/DL (ref 74–100)
HCT VFR BLD AUTO: 38.5 % (ref 42–52)
HDLC SERPL-MCNC: 39 MG/DL (ref 35–60)
HGB BLD-MCNC: 12.8 GM/DL (ref 14–18)
HGB UR QL STRIP: ABNORMAL
KETONES UR QL STRIP: NEGATIVE
LDH SERPL-CCNC: 200 UNIT/L (ref 140–271)
LDLC SERPL CALC-MCNC: 81 MG/DL (ref 50–140)
LEUKOCYTE ESTERASE UR QL STRIP: ABNORMAL
LYMPHOCYTES # BLD AUTO: 2.3 X10(3)/MCL (ref 0.6–4.6)
LYMPHOCYTES NFR BLD AUTO: 32 %
MAGNESIUM SERPL-MCNC: 2.2 MG/DL (ref 1.6–2.6)
MCH RBC QN AUTO: 30.9 PG (ref 27–31)
MCHC RBC AUTO-ENTMCNC: 33.2 GM/DL (ref 33–36)
MCV RBC AUTO: 93 FL (ref 80–94)
MONOCYTES # BLD AUTO: 0.5 X10(3)/MCL (ref 0.1–1.3)
MONOCYTES NFR BLD AUTO: 7 %
MUCOUS THREADS URNS QL MICRO: ABNORMAL
NEUTROPHILS # BLD AUTO: 4.03 X10(3)/MCL (ref 2.1–9.2)
NEUTROPHILS NFR BLD AUTO: 57 %
NITRITE UR QL STRIP: POSITIVE
PH UR STRIP: 7.5 [PH] (ref 5–9)
PHOSPHATE SERPL-MCNC: 3.4 MG/DL (ref 2.3–4.7)
PLATELET # BLD AUTO: 302 X10(3)/MCL (ref 130–400)
PMV BLD AUTO: 9.9 FL (ref 9.4–12.4)
POTASSIUM SERPL-SCNC: 4.4 MMOL/L (ref 3.5–5.1)
PROT SERPL-MCNC: 7.2 GM/DL (ref 6.4–8.3)
PROT UR QL STRIP: ABNORMAL
RBC # BLD AUTO: 4.14 X10(6)/MCL (ref 4.7–6.1)
RBC #/AREA URNS HPF: ABNORMAL /[HPF]
SODIUM SERPL-SCNC: 137 MMOL/L (ref 136–145)
SP GR UR STRIP: 1.02 (ref 1–1.03)
SQUAMOUS EPITHELIAL, UA: ABNORMAL /HPF
TRIGL SERPL-MCNC: 48 MG/DL (ref 34–140)
TSH SERPL-ACNC: 0.63 UIU/ML (ref 0.35–4.94)
UA WBC MAN: >200 /HPF
UROBILINOGEN UR STRIP-ACNC: 1
VLDLC SERPL CALC-MCNC: 10 MG/DL
WBC # SPEC AUTO: 7.1 X10(3)/MCL (ref 4.5–11.5)

## 2021-10-13 ENCOUNTER — HISTORICAL (OUTPATIENT)
Dept: ADMINISTRATIVE | Facility: HOSPITAL | Age: 46
End: 2021-10-13

## 2021-10-13 LAB
APPEARANCE, UA: ABNORMAL
BACTERIA SPEC CULT: ABNORMAL /HPF
BILIRUB UR QL STRIP: NEGATIVE
COLOR UR: YELLOW
GLUCOSE (UA): NEGATIVE
HGB UR QL STRIP: ABNORMAL
KETONES UR QL STRIP: NEGATIVE
LEUKOCYTE ESTERASE UR QL STRIP: ABNORMAL
NITRITE UR QL STRIP: NEGATIVE
PH UR STRIP: 7.5 [PH] (ref 5–9)
PROT UR QL STRIP: NEGATIVE
RBC #/AREA URNS HPF: 14 /HPF (ref 0–2)
SP GR UR STRIP: 1.01 (ref 1–1.03)
SQUAMOUS EPITHELIAL, UA: ABNORMAL /HPF (ref 0–4)
UROBILINOGEN UR STRIP-ACNC: 0.2
WBC #/AREA URNS HPF: 34 /HPF (ref 0–3)

## 2022-05-03 NOTE — HISTORICAL [facility]
This is a historical note converted from [place]. Formatting and pictures may have been removed.  Please reference [facility] for original formatting and attached multimedia. Admit and Discharge Dates  Admit Date: 03/22/2019  Discharge Date: 03/30/2019  ?  Physicians  Attending Physician - Ta BROWN, Regulo GALLO  Attending Physician - Ta BROWN, Regulo GALLO  Attending Physician - Laurel BROWN, Cr SMITH  Admitting Physician - Ta BROWN, Regulo GALLO  Primary Care Physician - Sharonda BROWN, Ciaran VALERIO  ?  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  Immunizations  No immunizations recorded for this visit.  ?  Hospital Course  Patient was admitted to the swing bed unit?where he was started on?meropenem?for his ESBL Klebsiella?that grew from his wound and?in his urine. ?Patient did have episodes of?hypotension?during his admission?and he was placed on?midodrine?that did help?alleviate his blood pressures minimally.??His wound?culture grew back a second bug which was a multi-drug-resistant form of Acinetobacter only sensitive to tobramycin. Due to concern?of?hypotension?with 2?MDR bacteria and?having to hold?his tobramycin due to his?troughs,?it was decided that the patient should be transferred for higher level of care?as infectious disease?consult would be warranted?as well as?a potential for?pressor use which would require ICU level of care. ?Dr. Abbasi?at [facility]?was contacted and accepted the patient.? He was transferred in stable condition by?EMS.  ?  D/C Time: 36 minutes  Objective  Vitals & Measurements  BP: 130/82?temp: 36.8 ?C?pulse: 55?RR: 18?SpO2: 9 9% on room air  Physical Exam  Gen: Alert, in NAD.  Eyes: EOMI, No scleral  icterus  HENT: NCAT, OMM  Neck: Supple, no thyromegaly  CV: RRR, peripheral pulses palpable  RESP: CTA bilaterally.  GI:? +BS, soft, NT, ND, Ostomy in place with stool in the bag  : No suprapubic tenderness to palpation  MSK/Extremities: No pitting edema, cyanosis or clubbing  Neuro: GCS 15.  Psych: Mood and affect appropriate, calm, cooperative with exam. Good judgement  Integument:?Sacral ulcer with wound VAC in place, right and left hip dressings?removed off wound today with no drainage on left decubitus but minimal drainage on the R decubitus  Patient Discharge Condition  Stable  Discharge Disposition  External transfer to [facility]   Discharge Medication Reconciliation  Discharge Med Rec is not complete
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
## 2022-05-03 NOTE — HISTORICAL [facility]
This is a historical note converted from [place]. Formatting and pictures may have been removed.  Please reference [facility] for original formatting and attached multimedia. Chief Complaint  right hip wound, sacral wound, left ischial wound  History of Present Illness  42 yo male with a left ischial, sacral, and right hip wounds.  Review of Systems  Constitutional:?no fever, fatigue, weakness  ENMT: no?nasal congestion/drainage  Respiratory:?no shortness of breath  Cardiovascular:?no chest pain, no edema  Gastrointestinal:?no nausea, vomiting  Hema/Lymph:?no abnormal bruising or bleeding  Musculoskeletal:?no muscle or joint pain, no joint swelling  Integumentary: left ischial, sacral, right hip wound  ?  ?  Physical Exam  Vitals & Measurements  T:?36.8? ?C (Oral)? HR:?79(Peripheral)? RR:?20? BP:?153/76?  HT:?175.36?cm? WT:?73.6?kg?  Incision/Wounds  ?1. Hip Right Pressure ulcer?- last charted: 04/17/2019 12:00  ?? ??Assessment Done By?- Wound Care Team  ?? ??Pressure Point?- Bony prominence  ?? ??Dressing Type?- Gauze dressing  ?? ??Dressing Assessment?- Drainage present, Intact  ?? ??Dressing Activity?- Removed  ?? ??Cleansing?- Cleaned with normal saline  ?? ??Length?- 2.2 cm  ?? ??Width?- 2.0 cm  ?? ??Depth?- 1.0 cm  ?? ??Wound Bed Tissue Type?- Granulating  ?? ??Percent Granulated?- 100 %  ?? ??Pressure Ulcer Stage?- IV  ?? ??Undermining Location?- 4-5 oclock  ?? ??Undermining Depth?- 3.2  ?? ??Exudate Amount?- Moderate  ?? ??Exudate Type?- Serous  ?? ??Exudate Odor?- None  ?? ??Edge?- Attached to wound bed  ?? ??Status?- Improving  ?  ?2. Sacrum Pressure ulcer?- last charted: 04/17/2019 12:00  ?? ??Assessment Done By?- Wound Care Team  ?? ??Pressure Point?- Bony prominence  ?? ??Dressing Type?- Gauze dressing  ?? ??Dressing Assessment?- Drainage present, Intact  ?? ??Dressing Activity?- Removed  ?? ??Cleansing?- Cleaned with normal saline  ?? ??Length?- 2.8 cm  ?? ??Width?- 4.3 cm  ?? ??Depth?- 0.7 cm  ?? ??Wound  Bed Tissue Type?- Granulating  ?? ??Percent Granulated?- 100 %  ?? ??Pressure Ulcer Stage?- IV  ?? ??Exudate Amount?- Moderate  ?? ??Exudate Type?- Serous  ?? ??Exudate Odor?- None  ?? ??Edge?- Attached to wound bed  ?? ??Status?- Improving  ?  ?3.?Ischial Left Pressure ulcer?- last charted: 04/17/2019 12:00  ?? ??Assessment Done By?- Wound Care Team  ?? ??Pressure Point?- Bony prominence  ?? ??Dressing Type?- Gauze dressing  ?? ??Dressing Assessment?- Drainage present, Intact  ?? ??Dressing Activity?- Removed  ?? ??Cleansing?- Cleaned with normal saline  ?? ??Length?- 1.5 cm  ?? ??Width?- 2.0 cm  ?? ??Depth?- 0.3 cm  ?? ??Wound Bed Tissue Type?- Granulating  ?? ??Percent Granulated?- 100 %  ?? ??Pressure Ulcer Stage?- IV  ?? ??Exudate Amount?- Moderate  ?? ??Exudate Type?- Serous  ?? ??Exudate Odor?- None  ?? ??Edge?- Attached to wound bed  ?? ??Status?- Improving  ?  ?  Assessment/Plan  1.?quadraplegia  2.?Pressure ulcer of right hip, stage 4  3.?Pressure ulcer of sacral region, stage 4  4.?Pressure injury of left ischium, stage 4  Wound vac with black foam to right hip, sacrum 125mmhg continuous to be changed Friday and Monday by home health. Dry gauze dressing to left ischial QOD and PRN soiling. RTC 1 week   Problem List/Past Medical History  Ongoing  Acute disease or injury-related malnutrition  Adrenal hypofunction  Anemia  Blocked suprapubic catheter  Decubitus pressure sore  Decubitus ulcer of right perineal ischial region, stage 2  Elevated liver enzymes level  Hypokalemia  Hypomagnesemia  Hypotension  Metabolic acidosis  Pressure ulcer of right hip, stage 4  quadraplegia  Severe malnutrition  Tachycardia  Historical  Cholecystectomy  Colostomy  debridement of sacral wound  Urinary retention  Procedure/Surgical History  Drainage of Right Large Intestine with Drainage Device, Percutaneous Endoscopic Approach (03/16/2019)  Resection of Appendix, Percutaneous Endoscopic Approach (03/16/2019)  Appendectomy  Laparoscopic (03/15/2019)  Drainage of Bladder with Drainage Device, Percutaneous Endoscopic Approach (03/14/2019)  Suprapubic Catheter Placement (03/14/2019)  Change Pressure Dressing on Back (03/06/2019)  Negative pressure wound therapy (eg, vacuum assisted drainage collection), utilizing durable medical equipment (DME), including topical application(s), wound assessment, and instruction(s) for ongoing care, per session; total wound(s) surface area less th (03/06/2019)  Change Pressure Dressing on Back (02/27/2019)  Negative pressure wound therapy (eg, vacuum assisted drainage collection), utilizing durable medical equipment (DME), including topical application(s), wound assessment, and instruction(s) for ongoing care, per session; total wound(s) surface area less th (02/27/2019)  Change Pressure Dressing on Back (02/20/2019)  Negative pressure wound therapy (eg, vacuum assisted drainage collection), utilizing durable medical equipment (DME), including topical application(s), wound assessment, and instruction(s) for ongoing care, per session; total wound(s) surface area less th (02/20/2019)  Change Pressure Dressing on Back (02/06/2019)  Negative pressure wound therapy (eg, vacuum assisted drainage collection), utilizing durable medical equipment (DME), including topical application(s), wound assessment, and instruction(s) for ongoing care, per session; total wound(s) surface area less th (02/06/2019)  Change Pressure Dressing on Back (01/30/2019)  Negative pressure wound therapy (eg, vacuum assisted drainage collection), utilizing durable medical equipment (DME), including topical application(s), wound assessment, and instruction(s) for ongoing care, per session; total wound(s) surface area less th (01/30/2019)  Change Pressure Dressing on Back (01/23/2019)  Negative pressure wound therapy (eg, vacuum assisted drainage collection), utilizing durable medical equipment (DME), including topical application(s), wound  assessment, and instruction(s) for ongoing care, per session; total wound(s) surface area less th (01/23/2019)  Change Pressure Dressing on Back (01/16/2019)  Negative pressure wound therapy (eg, vacuum assisted drainage collection), utilizing durable medical equipment (DME), including topical application(s), wound assessment, and instruction(s) for ongoing care, per session; total wound(s) surface area less th (01/16/2019)  Change Pressure Dressing on Back (01/09/2019)  Negative pressure wound therapy (eg, vacuum assisted drainage collection), utilizing durable medical equipment (DME), including topical application(s), wound assessment, and instruction(s) for ongoing care, per session; total wound(s) surface area less th (01/09/2019)  Change Pressure Dressing on Back (01/02/2019)  Negative pressure wound therapy (eg, vacuum assisted drainage collection), utilizing durable medical equipment (DME), including topical application(s), wound assessment, and instruction(s) for ongoing care, per session; total wound(s) surface area less th (01/02/2019)  Change Pressure Dressing on Back (12/26/2018)  Negative pressure wound therapy (eg, vacuum assisted drainage collection), utilizing durable medical equipment (DME), including topical application(s), wound assessment, and instruction(s) for ongoing care, per session; total wound(s) surface area less th (12/26/2018)  Change Pressure Dressing on Back (12/19/2018)  Negative pressure wound therapy (eg, vacuum assisted drainage collection), utilizing durable medical equipment (DME), including topical application(s), wound assessment, and instruction(s) for ongoing care, per session; total wound(s) surface area less th (12/19/2018)  Change Pressure Dressing on Back (12/12/2018)  Negative pressure wound therapy (eg, vacuum assisted drainage collection), utilizing durable medical equipment (DME), including topical application(s), wound assessment, and instruction(s) for ongoing  care, per session; total wound(s) surface area less th (12/12/2018)  Change Pressure Dressing on Back (12/05/2018)  Debridement (eg, high pressure waterjet with/without suction, sharp selective debridement with scissors, scalpel and forceps), open wound, (eg, fibrin, devitalized epidermis and/or dermis, exudate, debris, biofilm), including topical application(s), wound (12/05/2018)  Debridement, bone (includes epidermis, dermis, subcutaneous tissue, muscle and/or fascia, if performed); first 20 sq cm or less (12/05/2018)  Excision of Sacrum, Open Approach (12/05/2018)  Extraction of Buttock Skin, External Approach (12/05/2018)  Negative pressure wound therapy (eg, vacuum assisted drainage collection), utilizing durable medical equipment (DME), including topical application(s), wound assessment, and instruction(s) for ongoing care, per session; total wound(s) surface area less th (12/05/2018)  Debridement (eg, high pressure waterjet with/without suction, sharp selective debridement with scissors, scalpel and forceps), open wound, (eg, fibrin, devitalized epidermis and/or dermis, exudate, debris, biofilm), including topical application(s), wound (11/28/2018)  Extraction of Back Skin, External Approach (11/28/2018)  Extraction of Buttock Skin, External Approach (11/28/2018)  Debridement (eg, high pressure waterjet with/without suction, sharp selective debridement with scissors, scalpel and forceps), open wound, (eg, fibrin, devitalized epidermis and/or dermis, exudate, debris, biofilm), including topical application(s), wound (11/21/2018)  Extraction of Back Skin, External Approach (11/21/2018)  Extraction of Buttock Skin, External Approach (11/21/2018)  Change Pressure Dressing on Back (11/14/2018)  Negative pressure wound therapy (eg, vacuum assisted drainage collection), utilizing durable medical equipment (DME), including topical application(s), wound assessment, and instruction(s) for ongoing care, per session;  total wound(s) surface area less th (11/14/2018)  Change Drainage Device in Bladder, External Approach (11/01/2018)  Cystoscopy (11/01/2018)  Inspection of Bladder, Via Natural or Artificial Opening Endoscopic (11/01/2018)  Suprapubic Catheter Placement (11/01/2018)  Change Pressure Dressing on Back (10/24/2018)  Negative pressure wound therapy (eg, vacuum assisted drainage collection), utilizing durable medical equipment (DME), including topical application(s), wound assessment, and instruction(s) for ongoing care, per session; total wound(s) surface area greater (10/24/2018)  Change Drainage Device in Bladder, External Approach (10/18/2018)  Transfusion of Nonautologous Red Blood Cells into Peripheral Vein, Percutaneous Approach (10/18/2018)  Change Pressure Dressing on Back (10/10/2018)  Change Pressure Dressing on Right Upper Leg (10/10/2018)  Negative pressure wound therapy (eg, vacuum assisted drainage collection), utilizing durable medical equipment (DME), including topical application(s), wound assessment, and instruction(s) for ongoing care, per session; total wound(s) surface area less th (10/10/2018)  Insertion of Infusion Device into Superior Vena Cava, Percutaneous Approach (09/21/2018)  Ultrasonography of Superior Vena Cava, Guidance (09/21/2018)  Change Pressure Dressing on Back (09/14/2018)  Negative pressure wound therapy (eg, vacuum assisted drainage collection), utilizing durable medical equipment (DME), including topical application(s), wound assessment, and instruction(s) for ongoing care, per session; total wound(s) surface area less th (09/14/2018)  Change Pressure Dressing on Back (09/07/2018)  Negative pressure wound therapy (eg, vacuum assisted drainage collection), utilizing durable medical equipment (DME), including topical application(s), wound assessment, and instruction(s) for ongoing care, per session; total wound(s) surface area less th (09/07/2018)  Change Pressure Dressing on Back  (08/31/2018)  Debridement (eg, high pressure waterjet with/without suction, sharp selective debridement with scissors, scalpel and forceps), open wound, (eg, fibrin, devitalized epidermis and/or dermis, exudate, debris, biofilm), including topical application(s), wound (08/31/2018)  Extraction of Buttock Skin, External Approach (08/31/2018)  Negative pressure wound therapy (eg, vacuum assisted drainage collection), utilizing durable medical equipment (DME), including topical application(s), wound assessment, and instruction(s) for ongoing care, per session; total wound(s) surface area less th (08/31/2018)  Change Pressure Dressing on Back (08/24/2018)  Debridement, subcutaneous tissue (includes epidermis and dermis, if performed); first 20 sq cm or less (08/24/2018)  Excision of Back Subcutaneous Tissue and Fascia, Open Approach (08/24/2018)  Negative pressure wound therapy (eg, vacuum assisted drainage collection), utilizing durable medical equipment (DME), including topical application(s), wound assessment, and instruction(s) for ongoing care, per session; total wound(s) surface area less th (08/24/2018)  Change Pressure Dressing on Back (08/17/2018)  Negative pressure wound therapy (eg, vacuum assisted drainage collection), utilizing durable medical equipment (DME), including topical application(s), wound assessment, and instruction(s) for ongoing care, per session; total wound(s) surface area less th (08/17/2018)  Compression of Back using Pressure Dressing (08/10/2018)  Negative pressure wound therapy (eg, vacuum assisted drainage collection), utilizing durable medical equipment (DME), including topical application(s), wound assessment, and instruction(s) for ongoing care, per session; total wound(s) surface area less th (08/10/2018)  Change Pressure Dressing on Back (08/03/2018)  Negative pressure wound therapy (eg, vacuum assisted drainage collection), utilizing durable medical equipment (DME), including  topical application(s), wound assessment, and instruction(s) for ongoing care, per session; total wound(s) surface area less th (08/03/2018)  Change Pressure Dressing on Back (07/20/2018)  Negative pressure wound therapy (eg, vacuum assisted drainage collection), utilizing durable medical equipment (DME), including topical application(s), wound assessment, and instruction(s) for ongoing care, per session; total wound(s) surface area less th (07/20/2018)  Change Pressure Dressing on Back (07/13/2018)  Negative pressure wound therapy (eg, vacuum assisted drainage collection), utilizing durable medical equipment (DME), including topical application(s), wound assessment, and instruction(s) for ongoing care, per session; total wound(s) surface area less th (07/13/2018)  Change Pressure Dressing on Back (07/06/2018)  Negative pressure wound therapy (eg, vacuum assisted drainage collection), utilizing durable medical equipment (DME), including topical application(s), wound assessment, and instruction(s) for ongoing care, per session; total wound(s) surface area greater (07/06/2018)  Change Pressure Dressing on Back (06/29/2018)  Negative pressure wound therapy (eg, vacuum assisted drainage collection), utilizing durable medical equipment (DME), including topical application(s), wound assessment, and instruction(s) for ongoing care, per session; total wound(s) surface area greater (06/29/2018)  Change Pressure Dressing on Back (06/22/2018)  Negative pressure wound therapy (eg, vacuum assisted drainage collection), utilizing durable medical equipment (DME), including topical application(s), wound assessment, and instruction(s) for ongoing care, per session; total wound(s) surface area greater (06/22/2018)  Compression of Back using Pressure Dressing (06/14/2018)  Negative pressure wound therapy (eg, vacuum assisted drainage collection), utilizing durable medical equipment (DME), including topical application(s), wound  assessment, and instruction(s) for ongoing care, per session; total wound(s) surface area greater (06/14/2018)  Compression of Back using Pressure Dressing (06/07/2018)  Negative pressure wound therapy (eg, vacuum assisted drainage collection), utilizing durable medical equipment (DME), including topical application(s), wound assessment, and instruction(s) for ongoing care, per session; total wound(s) surface area greater (06/07/2018)  Compression of Back using Pressure Dressing (05/31/2018)  Negative pressure wound therapy (eg, vacuum assisted drainage collection), utilizing durable medical equipment (DME), including topical application(s), wound assessment, and instruction(s) for ongoing care, per session; total wound(s) surface area greater (05/31/2018)  Compression of Back using Pressure Dressing (05/24/2018)  Negative pressure wound therapy (eg, vacuum assisted drainage collection), utilizing durable medical equipment (DME), including topical application(s), wound assessment, and instruction(s) for ongoing care, per session; total wound(s) surface area greater (05/24/2018)  Compression of Back using Pressure Dressing (05/10/2018)  Negative pressure wound therapy (eg, vacuum assisted drainage collection), utilizing durable medical equipment (DME), including topical application(s), wound assessment, and instruction(s) for ongoing care, per session; total wound(s) surface area greater (05/10/2018)  Compression of Back using Pressure Dressing (05/03/2018)  Negative pressure wound therapy (eg, vacuum assisted drainage collection), utilizing durable medical equipment (DME), including topical application(s), wound assessment, and instruction(s) for ongoing care, per session; total wound(s) surface area greater (05/03/2018)  Compression of Back using Pressure Dressing (03/27/2018)  Negative pressure wound therapy (eg, vacuum assisted drainage collection), utilizing durable medical equipment (DME), including topical  application(s), wound assessment, and instruction(s) for ongoing care, per session; total wound(s) surface area greater (03/27/2018)  Debridement Wound (Left) (02/02/2018)  Excision of Left Hip Muscle, Open Approach (02/02/2018)  Debridement Wound (Right) (02/01/2018)  Excision of Left Lower Extremity Bursa and Ligament, Open Approach (02/01/2018)  Central Line Insertion (Right, Upper, Torso) (01/25/2018)  Debridement Wound (Right) (01/25/2018)  Excision of Left Hip Muscle, Open Approach (01/25/2018)  Insertion of Infusion Device into Right Atrium, Percutaneous Approach (01/25/2018)  Detachment at Left Upper Leg, High, Open Approach (01/11/2018)  Disarticulation (Left) (01/11/2018)  Excision of Left Upper Femur, Open Approach (01/11/2018)  Debridement Wound (Right) (11/27/2017)  Excision of Right Upper Leg Subcutaneous Tissue and Fascia, Open Approach (11/27/2017)  Debridement (eg, high pressure waterjet with/without suction, sharp selective debridement with scissors, scalpel and forceps), open wound, (eg, fibrin, devitalized epidermis and/or dermis, exudate, debris, biofilm), including topical application(s), wound (10/11/2017)  Extraction of Buttock Skin, External Approach (10/11/2017)  Debridement (eg, high pressure waterjet with/without suction, sharp selective debridement with scissors, scalpel and forceps), open wound, (eg, fibrin, devitalized epidermis and/or dermis, exudate, debris, biofilm), including topical application(s), wound (09/06/2017)  Extraction of Buttock Skin, External Approach (09/06/2017)  Debridement (eg, high pressure waterjet with/without suction, sharp selective debridement with scissors, scalpel and forceps), open wound, (eg, fibrin, devitalized epidermis and/or dermis, exudate, debris, biofilm), including topical application(s), wound (07/19/2017)  Extraction of Buttock Skin, External Approach (07/19/2017)  Debridement (eg, high pressure waterjet with/without suction, sharp selective  debridement with scissors, scalpel and forceps), open wound, (eg, fibrin, devitalized epidermis and/or dermis, exudate, debris, biofilm), including topical application(s), wound (04/12/2017)  Extraction of Buttock Skin, External Approach (04/12/2017)  Debridement (eg, high pressure waterjet with/without suction, sharp selective debridement with scissors, scalpel and forceps), open wound, (eg, fibrin, devitalized epidermis and/or dermis, exudate, debris, biofilm), including topical application(s), wound (01/31/2017)  Extraction of Buttock Skin, External Approach (01/31/2017)  Debridement (eg, high pressure waterjet with/without suction, sharp selective debridement with scissors, scalpel and forceps), open wound, (eg, fibrin, devitalized epidermis and/or dermis, exudate, debris, biofilm), including topical application(s), wound (12/21/2016)  Extraction of Buttock Skin, External Approach (12/21/2016)  Debridement (eg, high pressure waterjet with/without suction, sharp selective debridement with scissors, scalpel and forceps), open wound, (eg, fibrin, devitalized epidermis and/or dermis, exudate, debris, biofilm), including topical application(s), wound (11/02/2016)  Extraction of Buttock Skin, External Approach (11/02/2016)  Debridement (eg, high pressure waterjet with/without suction, sharp selective debridement with scissors, scalpel and forceps), open wound, (eg, fibrin, devitalized epidermis and/or dermis, exudate, debris, biofilm), including topical application(s), wound (10/19/2016)  Extraction of Buttock Skin, External Approach (10/19/2016)  Debridement (eg, high pressure waterjet with/without suction, sharp selective debridement with scissors, scalpel and forceps), open wound, (eg, fibrin, devitalized epidermis and/or dermis, exudate, debris, biofilm), including topical application(s), wound (09/21/2016)  Extraction of Left Upper Leg Skin, External Approach (09/21/2016)  Extraction of Right Foot Skin, External  Approach (09/21/2016)  Debridement (eg, high pressure waterjet with/without suction, sharp selective debridement with scissors, scalpel and forceps), open wound, (eg, fibrin, devitalized epidermis and/or dermis, exudate, debris, biofilm), including topical application(s), wound (08/24/2016)  Extraction of Buttock Skin, External Approach (08/24/2016)  Debridement (eg, high pressure waterjet with/without suction, sharp selective debridement with scissors, scalpel and forceps), open wound, (eg, fibrin, devitalized epidermis and/or dermis, exudate, debris, biofilm), including topical application(s), wound (07/27/2016)  Extraction of Buttock Skin, External Approach (07/27/2016)  Extraction of Buttock Subcutaneous Tissue and Fascia, Percutaneous Approach (06/22/2016)  Removal of devitalized tissue from wound(s), non-selective debridement, without anesthesia (eg, wet-to-moist dressings, enzymatic, abrasion), including topical application(s), wound assessment, and instruction(s) for ongoing care, per session (06/22/2016)  Debridement (eg, high pressure waterjet with/without suction, sharp selective debridement with scissors, scalpel and forceps), open wound, (eg, fibrin, devitalized epidermis and/or dermis, exudate, debris, biofilm), including topical application(s), wound (06/01/2016)  Excision of Left Upper Leg Skin, External Approach (06/01/2016)  Insertion of indwelling urinary catheter (07/22/2014)  Other cystoscopy (07/22/2014)  Venous catheterization, not elsewhere classified (05/10/2012)  Continuous invasive mechanical ventilation for less than 96 consecutive hours (05/08/2012)  Cholecystectomy  Colostomy  Suprapubic catheter procedure   Medications  enoxaparin, 40 mg= 0.4 mL, Subcutaneous, Daily,? ?Not taking  imipenem-cilastatin 500 mg injection, IV Piggyback, [id number],? ?Not taking  lactobacillus acidophilus oral capsule, Oral, Daily  lactulose 10 g/15 mL oral syrup, 20 gm= 30 mL, Oral, BID, 3  refills  metoclopramide 10 mg oral tablet, 5 mg= 0.5 tab(s), Oral, QID  midodrine 5 mg oral tablet, 10 mg= 2 tab(s), Oral, TID  Allergies  No Known Allergies  Social History  Alcohol - No Risk, 07/23/2014  Past, 10/16/2018  Home/Environment  Lives with Significant other. Home equipment: Wheelchair. Family/Friends available for support: Yes., 06/22/2016  Substance Abuse  Never, 05/29/2016  Tobacco - No Risk, 05/08/2012  Never (less than 100 in lifetime), N/A, 04/02/2019  Never (less than 100 in lifetime), N/A, 03/30/2019  Never (less than 100 in lifetime), N/A, 03/22/2019  Never (less than 100 in lifetime), N/A, 03/11/2019  Never (less than 100 in lifetime), No, 03/07/2019  Never (less than 100 in lifetime), N/A, 02/06/2019  Never smoker, N/A, 10/29/2018  Never smoker, 05/29/2016  Family History  Acute myocardial infarction.: Mother.  Diabetes mellitus type 2: Mother.  Heart failure.: Mother.  Father: History is unknown  Health Maintenance  Health Maintenance  ???Pending?(in the next year)  ??? ??Due?  ??? ? ? ?Alcohol Misuse Screening due??04/17/19??and every 1??year(s)  ??? ? ? ?Tetanus Vaccine due??04/17/19??and every 10??year(s)  ??? ??Due In Future?  ??? ? ? ?ADL Screening not due until??03/24/20??and every 1??year(s)  ??? ? ? ?Obesity Screening not due until??04/01/20??and every 1??year(s)  ??? ? ? ?Body Mass Index Check not due until??04/07/20??and every 1??year(s)  ??? ? ? ?Blood Pressure Screening not due until??04/09/20??and every 1??year(s)  ??? ? ? ?Hypertension Management-Blood Pressure not due until??04/09/20??and every 1??year(s)  ??? ? ? ?Hypertension Management-BMP not due until??04/09/20??and every 1??year(s)  ???Satisfied?(in the past 1 year)  ??? ??Satisfied?  ??? ? ? ?ADL Screening on??03/24/19.??Satisfied by Nimco Byrnes LPN.  ??? ? ? ?Blood Pressure Screening on??04/17/19.??Satisfied by Isha Branch RN  ??? ? ? ?Diabetes Screening on??04/10/19.??Satisfied by Erin Robertson  Sarina  ??? ? ? ?Hypertension Management-Blood Pressure on??04/17/19.??Satisfied by Isha Branch RN  ??? ? ? ?Influenza Vaccine on??03/22/19.??Satisfied by Sanaz Jackson RN  ??? ? ? ?Obesity Screening on??04/01/19.??Satisfied by Yas Burnett  ?  ?

## 2022-05-03 NOTE — HISTORICAL [facility]
This is a historical note converted from [place]. Formatting and pictures may have been removed.  Please reference [facility] for original formatting and attached multimedia. Chief Complaint  sacral, right hip, left ischial  History of Present Illness  42yo male with sacral, right hip, and left ischial wound. patient currently using silver Ag to left ischial and wound vac to others  Review of Systems  Constitutional:?no fever, fatigue, weakness  ENMT:?no nasal congestion/drainage  Respiratory:?no shortness of breath  Cardiovascular:?no chest pain, no edema  Gastrointestinal:?no nausea, vomiting  Hema/Lymph:?no abnormal bruising or bleeding  Musculoskeletal:?no muscle or joint pain, no joint swelling  Integumentary:?sacral, right hip, and left ischial wound  ?  ?  Physical Exam  Vitals & Measurements  HT:?175.26?cm? WT:?73.6?kg?  Incision/Wounds  ?1. Hip Right Pressure ulcer?- last charted: 05/13/2019 12:22  ?? ??Assessment Done By?- Nurse  ?? ??Dressing Type?- Wound vac foam, black  ?? ??Dressing Assessment?- Drainage present  ?? ??Dressing Activity?- Changed  ?? ??Cleansing?- Commercial cleansing solution  ?? ??Length?- 2.5 cm  ?? ??Width?- 2 cm  ?? ??Depth?- 0.5 cm  ?? ??Pressure Ulcer Stage?- IV  ?? ??Tunneling Location?- 1 oclock  ?? ??Tunneling Depth?- 4.0 cm  ?? ??Exudate Amount?- Moderate  ?? ??Exudate Type?- Serous  ?? ??Status?- Improving  ?   ?????This wound is curette-debrided of fibrinous slough.  ?  ?2. Sacrum Pressure ulcer?- last charted: 05/13/2019 12:22  ?? ??Assessment Done By?- Nurse  ?? ??Dressing Type?- Wound vac foam, black  ?? ??Dressing Assessment?- Drainage present  ?? ??Dressing Activity?- Changed  ?? ??Cleansing?- Commercial cleansing solution  ?? ??Length?- 3.0 cm  ?? ??Width?- 3.0 cm  ?? ??Depth?- 0.7 cm  ?? ??Pressure Ulcer Stage?- IV  ?? ??Exudate Amount?- Moderate  ?? ??Exudate Type?- Serous  ?? ??Status?- Improving  ?   ???? This wound is curette-debrided of fibrinous slough.  ?  ?3. Hip  Left Pressure ulcer?- last charted: 05/13/2019 12:22  ?? ??Assessment Done By?- Nurse  ?? ??Dressing Type?- Gauze dressing, Tape  ?? ??Dressing Assessment?- Drainage present  ?? ??Dressing Activity?- Changed  ?? ??Cleansing?- Commercial cleansing solution  ?? ??Length?- 2.0 cm  ?? ??Width?- 0.5 cm  ?? ??Depth?- 0.5 cm  ?? ??Pressure Ulcer Stage?- IV  ?? ??Exudate Amount?- Small  ?? ??Exudate Type?- Serous  ?? ??Status?- Improving  ?   ???? This wound?is curette-debrided of fibrinous slough.  ?  ?  Assessment/Plan  1.?Pressure ulcer of right hip, stage 4?L89.214  2.?Tetraplegia?G82.50  3.?Stage IV pressure ulcer of buttock?L89.304  4.?Sacral decubitus ulcer, stage IV?L89.154  continue dressing sacral and right hip with wound vac black foam 125mmhg HH to change on Wednesday and Friday;? continue dressing left ischial with silver Ag gauze tape daily. follow up in one week   Problem List/Past Medical History  Ongoing  Acute disease or injury-related malnutrition  Adrenal hypofunction  Anemia  Blocked suprapubic catheter  Decubitus pressure sore  Decubitus ulcer of right perineal ischial region, stage 2  Elevated liver enzymes level  Hypokalemia  Hypomagnesemia  Hypotension  Metabolic acidosis  Pressure ulcer of right hip, stage 4  quadraplegia  Severe malnutrition  Tachycardia  Historical  Cholecystectomy  Colostomy  debridement of sacral wound  Urinary retention  Procedure/Surgical History  Change Pressure Dressing on Back (05/01/2019)  Change Pressure Dressing on Right Upper Leg (05/01/2019)  Negative pressure wound therapy (eg, vacuum assisted drainage collection), utilizing durable medical equipment (DME), including topical application(s), wound assessment, and instruction(s) for ongoing care, per session; total wound(s) surface area less th (05/01/2019)  Compression of Right Upper Leg using Pressure Dressing (04/17/2019)  Negative pressure wound therapy (eg, vacuum assisted drainage collection), utilizing durable  medical equipment (DME), including topical application(s), wound assessment, and instruction(s) for ongoing care, per session; total wound(s) surface area less th (04/17/2019)  Drainage of Right Large Intestine with Drainage Device, Percutaneous Endoscopic Approach (03/16/2019)  Resection of Appendix, Percutaneous Endoscopic Approach (03/16/2019)  Appendectomy Laparoscopic (03/15/2019)  Drainage of Bladder with Drainage Device, Percutaneous Endoscopic Approach (03/14/2019)  Suprapubic Catheter Placement (03/14/2019)  Change Pressure Dressing on Back (03/06/2019)  Negative pressure wound therapy (eg, vacuum assisted drainage collection), utilizing durable medical equipment (DME), including topical application(s), wound assessment, and instruction(s) for ongoing care, per session; total wound(s) surface area less th (03/06/2019)  Change Pressure Dressing on Back (02/27/2019)  Negative pressure wound therapy (eg, vacuum assisted drainage collection), utilizing durable medical equipment (DME), including topical application(s), wound assessment, and instruction(s) for ongoing care, per session; total wound(s) surface area less th (02/27/2019)  Change Pressure Dressing on Back (02/20/2019)  Negative pressure wound therapy (eg, vacuum assisted drainage collection), utilizing durable medical equipment (DME), including topical application(s), wound assessment, and instruction(s) for ongoing care, per session; total wound(s) surface area less th (02/20/2019)  Change Pressure Dressing on Back (02/06/2019)  Negative pressure wound therapy (eg, vacuum assisted drainage collection), utilizing durable medical equipment (DME), including topical application(s), wound assessment, and instruction(s) for ongoing care, per session; total wound(s) surface area less th (02/06/2019)  Change Pressure Dressing on Back (01/30/2019)  Negative pressure wound therapy (eg, vacuum assisted drainage collection), utilizing durable medical equipment  (DME), including topical application(s), wound assessment, and instruction(s) for ongoing care, per session; total wound(s) surface area less th (01/30/2019)  Change Pressure Dressing on Back (01/23/2019)  Negative pressure wound therapy (eg, vacuum assisted drainage collection), utilizing durable medical equipment (DME), including topical application(s), wound assessment, and instruction(s) for ongoing care, per session; total wound(s) surface area less th (01/23/2019)  Change Pressure Dressing on Back (01/16/2019)  Negative pressure wound therapy (eg, vacuum assisted drainage collection), utilizing durable medical equipment (DME), including topical application(s), wound assessment, and instruction(s) for ongoing care, per session; total wound(s) surface area less th (01/16/2019)  Change Pressure Dressing on Back (01/09/2019)  Negative pressure wound therapy (eg, vacuum assisted drainage collection), utilizing durable medical equipment (DME), including topical application(s), wound assessment, and instruction(s) for ongoing care, per session; total wound(s) surface area less th (01/09/2019)  Change Pressure Dressing on Back (01/02/2019)  Negative pressure wound therapy (eg, vacuum assisted drainage collection), utilizing durable medical equipment (DME), including topical application(s), wound assessment, and instruction(s) for ongoing care, per session; total wound(s) surface area less th (01/02/2019)  Change Pressure Dressing on Back (12/26/2018)  Negative pressure wound therapy (eg, vacuum assisted drainage collection), utilizing durable medical equipment (DME), including topical application(s), wound assessment, and instruction(s) for ongoing care, per session; total wound(s) surface area less th (12/26/2018)  Change Pressure Dressing on Back (12/19/2018)  Negative pressure wound therapy (eg, vacuum assisted drainage collection), utilizing durable medical equipment (DME), including topical application(s), wound  assessment, and instruction(s) for ongoing care, per session; total wound(s) surface area less th (12/19/2018)  Change Pressure Dressing on Back (12/12/2018)  Negative pressure wound therapy (eg, vacuum assisted drainage collection), utilizing durable medical equipment (DME), including topical application(s), wound assessment, and instruction(s) for ongoing care, per session; total wound(s) surface area less th (12/12/2018)  Change Pressure Dressing on Back (12/05/2018)  Debridement (eg, high pressure waterjet with/without suction, sharp selective debridement with scissors, scalpel and forceps), open wound, (eg, fibrin, devitalized epidermis and/or dermis, exudate, debris, biofilm), including topical application(s), wound (12/05/2018)  Debridement, bone (includes epidermis, dermis, subcutaneous tissue, muscle and/or fascia, if performed); first 20 sq cm or less (12/05/2018)  Excision of Sacrum, Open Approach (12/05/2018)  Extraction of Buttock Skin, External Approach (12/05/2018)  Negative pressure wound therapy (eg, vacuum assisted drainage collection), utilizing durable medical equipment (DME), including topical application(s), wound assessment, and instruction(s) for ongoing care, per session; total wound(s) surface area less th (12/05/2018)  Debridement (eg, high pressure waterjet with/without suction, sharp selective debridement with scissors, scalpel and forceps), open wound, (eg, fibrin, devitalized epidermis and/or dermis, exudate, debris, biofilm), including topical application(s), wound (11/28/2018)  Extraction of Back Skin, External Approach (11/28/2018)  Extraction of Buttock Skin, External Approach (11/28/2018)  Debridement (eg, high pressure waterjet with/without suction, sharp selective debridement with scissors, scalpel and forceps), open wound, (eg, fibrin, devitalized epidermis and/or dermis, exudate, debris, biofilm), including topical application(s), wound (11/21/2018)  Extraction of Back Skin,  External Approach (11/21/2018)  Extraction of Buttock Skin, External Approach (11/21/2018)  Change Pressure Dressing on Back (11/14/2018)  Negative pressure wound therapy (eg, vacuum assisted drainage collection), utilizing durable medical equipment (DME), including topical application(s), wound assessment, and instruction(s) for ongoing care, per session; total wound(s) surface area less th (11/14/2018)  Change Drainage Device in Bladder, External Approach (11/01/2018)  Cystoscopy (11/01/2018)  Inspection of Bladder, Via Natural or Artificial Opening Endoscopic (11/01/2018)  Suprapubic Catheter Placement (11/01/2018)  Change Pressure Dressing on Back (10/24/2018)  Negative pressure wound therapy (eg, vacuum assisted drainage collection), utilizing durable medical equipment (DME), including topical application(s), wound assessment, and instruction(s) for ongoing care, per session; total wound(s) surface area greater (10/24/2018)  Change Drainage Device in Bladder, External Approach (10/18/2018)  Transfusion of Nonautologous Red Blood Cells into Peripheral Vein, Percutaneous Approach (10/18/2018)  Change Pressure Dressing on Back (10/10/2018)  Change Pressure Dressing on Right Upper Leg (10/10/2018)  Negative pressure wound therapy (eg, vacuum assisted drainage collection), utilizing durable medical equipment (DME), including topical application(s), wound assessment, and instruction(s) for ongoing care, per session; total wound(s) surface area less th (10/10/2018)  Insertion of Infusion Device into Superior Vena Cava, Percutaneous Approach (09/21/2018)  Ultrasonography of Superior Vena Cava, Guidance (09/21/2018)  Change Pressure Dressing on Back (09/14/2018)  Negative pressure wound therapy (eg, vacuum assisted drainage collection), utilizing durable medical equipment (DME), including topical application(s), wound assessment, and instruction(s) for ongoing care, per session; total wound(s) surface area less th  (09/14/2018)  Change Pressure Dressing on Back (09/07/2018)  Negative pressure wound therapy (eg, vacuum assisted drainage collection), utilizing durable medical equipment (DME), including topical application(s), wound assessment, and instruction(s) for ongoing care, per session; total wound(s) surface area less th (09/07/2018)  Change Pressure Dressing on Back (08/31/2018)  Debridement (eg, high pressure waterjet with/without suction, sharp selective debridement with scissors, scalpel and forceps), open wound, (eg, fibrin, devitalized epidermis and/or dermis, exudate, debris, biofilm), including topical application(s), wound (08/31/2018)  Extraction of Buttock Skin, External Approach (08/31/2018)  Negative pressure wound therapy (eg, vacuum assisted drainage collection), utilizing durable medical equipment (DME), including topical application(s), wound assessment, and instruction(s) for ongoing care, per session; total wound(s) surface area less th (08/31/2018)  Change Pressure Dressing on Back (08/24/2018)  Debridement, subcutaneous tissue (includes epidermis and dermis, if performed); first 20 sq cm or less (08/24/2018)  Excision of Back Subcutaneous Tissue and Fascia, Open Approach (08/24/2018)  Negative pressure wound therapy (eg, vacuum assisted drainage collection), utilizing durable medical equipment (DME), including topical application(s), wound assessment, and instruction(s) for ongoing care, per session; total wound(s) surface area less th (08/24/2018)  Change Pressure Dressing on Back (08/17/2018)  Negative pressure wound therapy (eg, vacuum assisted drainage collection), utilizing durable medical equipment (DME), including topical application(s), wound assessment, and instruction(s) for ongoing care, per session; total wound(s) surface area less th (08/17/2018)  Compression of Back using Pressure Dressing (08/10/2018)  Negative pressure wound therapy (eg, vacuum assisted drainage collection), utilizing  durable medical equipment (DME), including topical application(s), wound assessment, and instruction(s) for ongoing care, per session; total wound(s) surface area less th (08/10/2018)  Change Pressure Dressing on Back (08/03/2018)  Negative pressure wound therapy (eg, vacuum assisted drainage collection), utilizing durable medical equipment (DME), including topical application(s), wound assessment, and instruction(s) for ongoing care, per session; total wound(s) surface area less th (08/03/2018)  Change Pressure Dressing on Back (07/20/2018)  Negative pressure wound therapy (eg, vacuum assisted drainage collection), utilizing durable medical equipment (DME), including topical application(s), wound assessment, and instruction(s) for ongoing care, per session; total wound(s) surface area less th (07/20/2018)  Change Pressure Dressing on Back (07/13/2018)  Negative pressure wound therapy (eg, vacuum assisted drainage collection), utilizing durable medical equipment (DME), including topical application(s), wound assessment, and instruction(s) for ongoing care, per session; total wound(s) surface area less th (07/13/2018)  Change Pressure Dressing on Back (07/06/2018)  Negative pressure wound therapy (eg, vacuum assisted drainage collection), utilizing durable medical equipment (DME), including topical application(s), wound assessment, and instruction(s) for ongoing care, per session; total wound(s) surface area greater (07/06/2018)  Change Pressure Dressing on Back (06/29/2018)  Negative pressure wound therapy (eg, vacuum assisted drainage collection), utilizing durable medical equipment (DME), including topical application(s), wound assessment, and instruction(s) for ongoing care, per session; total wound(s) surface area greater (06/29/2018)  Change Pressure Dressing on Back (06/22/2018)  Negative pressure wound therapy (eg, vacuum assisted drainage collection), utilizing durable medical equipment (DME), including  topical application(s), wound assessment, and instruction(s) for ongoing care, per session; total wound(s) surface area greater (06/22/2018)  Compression of Back using Pressure Dressing (06/14/2018)  Negative pressure wound therapy (eg, vacuum assisted drainage collection), utilizing durable medical equipment (DME), including topical application(s), wound assessment, and instruction(s) for ongoing care, per session; total wound(s) surface area greater (06/14/2018)  Compression of Back using Pressure Dressing (06/07/2018)  Negative pressure wound therapy (eg, vacuum assisted drainage collection), utilizing durable medical equipment (DME), including topical application(s), wound assessment, and instruction(s) for ongoing care, per session; total wound(s) surface area greater (06/07/2018)  Compression of Back using Pressure Dressing (05/31/2018)  Negative pressure wound therapy (eg, vacuum assisted drainage collection), utilizing durable medical equipment (DME), including topical application(s), wound assessment, and instruction(s) for ongoing care, per session; total wound(s) surface area greater (05/31/2018)  Compression of Back using Pressure Dressing (05/24/2018)  Negative pressure wound therapy (eg, vacuum assisted drainage collection), utilizing durable medical equipment (DME), including topical application(s), wound assessment, and instruction(s) for ongoing care, per session; total wound(s) surface area greater (05/24/2018)  Compression of Back using Pressure Dressing (05/10/2018)  Negative pressure wound therapy (eg, vacuum assisted drainage collection), utilizing durable medical equipment (DME), including topical application(s), wound assessment, and instruction(s) for ongoing care, per session; total wound(s) surface area greater (05/10/2018)  Compression of Back using Pressure Dressing (05/03/2018)  Negative pressure wound therapy (eg, vacuum assisted drainage collection), utilizing durable medical  equipment (DME), including topical application(s), wound assessment, and instruction(s) for ongoing care, per session; total wound(s) surface area greater (05/03/2018)  Compression of Back using Pressure Dressing (03/27/2018)  Negative pressure wound therapy (eg, vacuum assisted drainage collection), utilizing durable medical equipment (DME), including topical application(s), wound assessment, and instruction(s) for ongoing care, per session; total wound(s) surface area greater (03/27/2018)  Debridement Wound (Left) (02/02/2018)  Excision of Left Hip Muscle, Open Approach (02/02/2018)  Debridement Wound (Right) (02/01/2018)  Excision of Left Lower Extremity Bursa and Ligament, Open Approach (02/01/2018)  Central Line Insertion (Right, Upper, Torso) (01/25/2018)  Debridement Wound (Right) (01/25/2018)  Excision of Left Hip Muscle, Open Approach (01/25/2018)  Insertion of Infusion Device into Right Atrium, Percutaneous Approach (01/25/2018)  Detachment at Left Upper Leg, High, Open Approach (01/11/2018)  Disarticulation (Left) (01/11/2018)  Excision of Left Upper Femur, Open Approach (01/11/2018)  Debridement Wound (Right) (11/27/2017)  Excision of Right Upper Leg Subcutaneous Tissue and Fascia, Open Approach (11/27/2017)  Debridement (eg, high pressure waterjet with/without suction, sharp selective debridement with scissors, scalpel and forceps), open wound, (eg, fibrin, devitalized epidermis and/or dermis, exudate, debris, biofilm), including topical application(s), wound (10/11/2017)  Extraction of Buttock Skin, External Approach (10/11/2017)  Debridement (eg, high pressure waterjet with/without suction, sharp selective debridement with scissors, scalpel and forceps), open wound, (eg, fibrin, devitalized epidermis and/or dermis, exudate, debris, biofilm), including topical application(s), wound (09/06/2017)  Extraction of Buttock Skin, External Approach (09/06/2017)  Debridement (eg, high pressure waterjet  with/without suction, sharp selective debridement with scissors, scalpel and forceps), open wound, (eg, fibrin, devitalized epidermis and/or dermis, exudate, debris, biofilm), including topical application(s), wound (07/19/2017)  Extraction of Buttock Skin, External Approach (07/19/2017)  Debridement (eg, high pressure waterjet with/without suction, sharp selective debridement with scissors, scalpel and forceps), open wound, (eg, fibrin, devitalized epidermis and/or dermis, exudate, debris, biofilm), including topical application(s), wound (04/12/2017)  Extraction of Buttock Skin, External Approach (04/12/2017)  Debridement (eg, high pressure waterjet with/without suction, sharp selective debridement with scissors, scalpel and forceps), open wound, (eg, fibrin, devitalized epidermis and/or dermis, exudate, debris, biofilm), including topical application(s), wound (01/31/2017)  Extraction of Buttock Skin, External Approach (01/31/2017)  Debridement (eg, high pressure waterjet with/without suction, sharp selective debridement with scissors, scalpel and forceps), open wound, (eg, fibrin, devitalized epidermis and/or dermis, exudate, debris, biofilm), including topical application(s), wound (12/21/2016)  Extraction of Buttock Skin, External Approach (12/21/2016)  Debridement (eg, high pressure waterjet with/without suction, sharp selective debridement with scissors, scalpel and forceps), open wound, (eg, fibrin, devitalized epidermis and/or dermis, exudate, debris, biofilm), including topical application(s), wound (11/02/2016)  Extraction of Buttock Skin, External Approach (11/02/2016)  Debridement (eg, high pressure waterjet with/without suction, sharp selective debridement with scissors, scalpel and forceps), open wound, (eg, fibrin, devitalized epidermis and/or dermis, exudate, debris, biofilm), including topical application(s), wound (10/19/2016)  Extraction of Buttock Skin, External Approach  (10/19/2016)  Debridement (eg, high pressure waterjet with/without suction, sharp selective debridement with scissors, scalpel and forceps), open wound, (eg, fibrin, devitalized epidermis and/or dermis, exudate, debris, biofilm), including topical application(s), wound (09/21/2016)  Extraction of Left Upper Leg Skin, External Approach (09/21/2016)  Extraction of Right Foot Skin, External Approach (09/21/2016)  Debridement (eg, high pressure waterjet with/without suction, sharp selective debridement with scissors, scalpel and forceps), open wound, (eg, fibrin, devitalized epidermis and/or dermis, exudate, debris, biofilm), including topical application(s), wound (08/24/2016)  Extraction of Buttock Skin, External Approach (08/24/2016)  Debridement (eg, high pressure waterjet with/without suction, sharp selective debridement with scissors, scalpel and forceps), open wound, (eg, fibrin, devitalized epidermis and/or dermis, exudate, debris, biofilm), including topical application(s), wound (07/27/2016)  Extraction of Buttock Skin, External Approach (07/27/2016)  Extraction of Buttock Subcutaneous Tissue and Fascia, Percutaneous Approach (06/22/2016)  Removal of devitalized tissue from wound(s), non-selective debridement, without anesthesia (eg, wet-to-moist dressings, enzymatic, abrasion), including topical application(s), wound assessment, and instruction(s) for ongoing care, per session (06/22/2016)  Debridement (eg, high pressure waterjet with/without suction, sharp selective debridement with scissors, scalpel and forceps), open wound, (eg, fibrin, devitalized epidermis and/or dermis, exudate, debris, biofilm), including topical application(s), wound (06/01/2016)  Excision of Left Upper Leg Skin, External Approach (06/01/2016)  Insertion of indwelling urinary catheter (07/22/2014)  Other cystoscopy (07/22/2014)  Venous catheterization, not elsewhere classified (05/10/2012)  Continuous invasive mechanical ventilation  for less than 96 consecutive hours (05/08/2012)  Cholecystectomy  Colostomy  Suprapubic catheter procedure   Medications  enoxaparin, 40 mg= 0.4 mL, Subcutaneous, Daily,? ?Not taking  imipenem-cilastatin 500 mg injection, IV Piggyback, [id number],? ?Not taking  lactobacillus acidophilus oral capsule, Oral, Daily  lactulose 10 g/15 mL oral syrup, 20 gm= 30 mL, Oral, BID, 3 refills  metoclopramide 10 mg oral tablet, 5 mg= 0.5 tab(s), Oral, QID  midodrine 5 mg oral tablet, 10 mg= 2 tab(s), Oral, TID  Allergies  No Known Allergies  Social History  Alcohol - No Risk, 07/23/2014  Past, 10/16/2018  Home/Environment  Lives with Significant other. Home equipment: Wheelchair. Family/Friends available for support: Yes., 06/22/2016  Substance Abuse  Never, 05/29/2016  Tobacco - No Risk, 05/08/2012  Never (less than 100 in lifetime), N/A, 04/02/2019  Never (less than 100 in lifetime), N/A, 03/30/2019  Never (less than 100 in lifetime), N/A, 03/22/2019  Never (less than 100 in lifetime), N/A, 03/11/2019  Never (less than 100 in lifetime), No, 03/07/2019  Never (less than 100 in lifetime), N/A, 02/06/2019  Never smoker, N/A, 10/29/2018  Never smoker, 05/29/2016  Family History  Acute myocardial infarction.: Mother.  Diabetes mellitus type 2: Mother.  Heart failure.: Mother.  Father: History is unknown  Health Maintenance  Health Maintenance  ???Pending?(in the next year)  ??? ??OverDue  ??? ? ? ?Alcohol Misuse Screening due??01/01/19??and every 1??year(s)  ??? ??Due?  ??? ? ? ?Coronary Artery Disease Maintenance-Lipid Lowering Therapy due??05/13/19??and every?  ??? ? ? ?Tetanus Vaccine due??05/13/19??and every 10??year(s)  ??? ??Due In Future?  ??? ? ? ?Obesity Screening not due until??01/01/20??and every 1??year(s)  ??? ? ? ?ADL Screening not due until??03/24/20??and every 1??year(s)  ??? ? ? ?Hypertension Management-BMP not due until??04/09/20??and every 1??year(s)  ??? ? ? ?Blood Pressure Screening not due until??04/30/20??and  every 1??year(s)  ??? ? ? ?Body Mass Index Check not due until??04/30/20??and every 1??year(s)  ??? ? ? ?Hypertension Management-Blood Pressure not due until??04/30/20??and every 1??year(s)  ???Satisfied?(in the past 1 year)  ??? ??Satisfied?  ??? ? ? ?ADL Screening on??03/24/19.??Satisfied by Nimco Byrnes LPN.  ??? ? ? ?Blood Pressure Screening on??05/01/19.??Satisfied by Isha Branch RN  ??? ? ? ?Diabetes Screening on??04/10/19.??Satisfied by Erin Robertson  ??? ? ? ?Hypertension Management-Blood Pressure on??05/01/19.??Satisfied by Isha Branch RN  ??? ? ? ?Influenza Vaccine on??03/22/19.??Satisfied by Sanaz Jackson RN  ??? ? ? ?Obesity Screening on??04/01/19.??Satisfied by Yas Burnett  ?  ?

## 2022-05-03 NOTE — HISTORICAL [facility]
This is a historical note converted from [place]. Formatting and pictures may have been removed.  Please reference [facility] for original formatting and attached multimedia. Chief Complaint  right hip, left hip sacral wound  History of Present Illness  42yo male with sacral, right hip, and left ischial wound. Patient currently using silver Ag to left ischial and wound vac to others .  Review of Systems  Constitutional:?no fever, fatigue, weakness  ENMT:?no?nasal congestion/drainage  Respiratory:?no couch, no shortness of breath  Cardiovascular:?no chest pain, no palpitations, no edema  Gastrointestinal:?no nausea, vomiting  Hema/Lymph:?no abnormal bruising or bleeding  Musculoskeletal:?no muscle or joint pain, no joint swelling  Integumentary:?sacral, right hip, left hip wound  ?  ?  Physical Exam  Vitals & Measurements  T:?36.4? ?C (Oral)? HR:?72(Peripheral)? RR:?20? BP:?142/82?  HT:?175.26?cm? WT:?73.6?kg?  Incision/Wounds  ?1. Hip Right Pressure ulcer?- last charted: 05/20/2019 12:51  ?? ??Assessment Done By?- Wound Care Team  ?? ??Abnormality Color?- Red  ?? ??Pressure Point?- Bony prominence  ?? ??Dressing Type?- Wound vac foam, black  ?? ??Dressing Assessment?- Drainage present  ?? ??Dressing Activity?- Changed  ?? ??Length?- 2.3 cm  ?? ??Width?- 2.3 cm  ?? ??Depth?- 0.5 cm  ?? ??Wound Bed Tissue Type?- Granulating, Moist  ?? ??Percent Granulated?- 50 %  ?? ??Percent Other Tissue?- 50 %  ?? ??Pressure Ulcer Stage?- IV  ?? ??Tunneling Location?- 1 oclock  ?? ??Tunneling Depth?- 1.7 cm  ?? ??Exudate Amount?- Moderate  ?? ??Exudate Type?- Serosanguineous  ?? ??Exudate Odor?- Moderate  ?? ??Edge?- Unattached to wound bed  ?? ??Surrounding Tissue Color?- Normal  ?? ??Surrounding Tissue Condition?- Moist  ?? ??Status?- Improving  ?  ?2. Sacrum Pressure ulcer?- last charted: 05/20/2019 12:51  ?? ??Assessment Done By?- Wound Care Team  ?? ??Abnormality Pattern?- Gaping  ?? ??Pressure Point?- Bony prominence  ?? ??Dressing  Type?- Wound vac foam, black  ?? ??Dressing Assessment?- Drainage present  ?? ??Dressing Activity?- Changed  ?? ??Length?- 2.5 cm  ?? ??Width?- 2.5 cm  ?? ??Depth?- 0.5 cm  ?? ??Wound Bed Tissue Type?- Granulating, Necrotic tissue, slough  ?? ??Percent Granulated?- 50 %  ?? ??Percent Other Tissue?- 50 %  ?? ??Pressure Ulcer Stage?- IV  ?? ??Exudate Amount?- Moderate  ?? ??Exudate Type?- Serosanguineous  ?? ??Exudate Odor?- Moderate  ?? ??Edge?- Unattached to wound bed  ?? ??Surrounding Tissue Color?- Normal  ?? ??Surrounding Tissue Condition?- Moist  ?? ??Status?- Improving  ?  ?3. Hip Left Pressure ulcer?- last charted: 05/20/2019 12:51  ?? ??Assessment Done By?- Wound Care Team  ?? ??Dressing Type?- Silver  ?? ??Dressing Assessment?- Drainage present  ?? ??Dressing Activity?- Changed  ?? ??Length?- 0.5 cm  ?? ??Width?- 1.5 cm  ?? ??Depth?- 1.3 cm  ?? ??Wound Bed Tissue Type?- Necrotic tissue, slough  ?? ??Exudate Amount?- Moderate  ?? ??Exudate Type?- Serosanguineous  ?? ??Exudate Odor?- Moderate  ?? ??Edge?- Unattached to wound bed  ?? ??Surrounding Tissue Color?- Normal  ?? ??Status?-?Unchanged.  ?  ?  Assessment/Plan  1.?Pressure ulcer of right hip, stage 4?L89.214  2.?Tetraplegia?G82.50  3.?Stage IV pressure ulcer of buttock?L89.304  4.?Sacral decubitus ulcer, stage IV?L89.154  Continue dressing sacral and right hip with wound vac black foam 125mmhg HH to change on Wednesday and Friday;? continue dressing left ischial with silver Ag gauze tape daily. follow up in one week   Problem List/Past Medical History  Ongoing  Acute disease or injury-related malnutrition  Adrenal hypofunction  Anemia  Blocked suprapubic catheter  Decubitus pressure sore  Decubitus ulcer of right perineal ischial region, stage 2  Elevated liver enzymes level  Hypokalemia  Hypomagnesemia  Hypotension  Metabolic acidosis  Pressure ulcer of right hip, stage 4  quadraplegia  Severe  malnutrition  Tachycardia  Historical  Cholecystectomy  Colostomy  debridement of sacral wound  Urinary retention  Procedure/Surgical History  Change Pressure Dressing on Back (05/13/2019)  Debridement (eg, high pressure waterjet with/without suction, sharp selective debridement with scissors, scalpel and forceps), open wound, (eg, fibrin, devitalized epidermis and/or dermis, exudate, debris, biofilm), including topical application(s), wound (05/13/2019)  Extraction of Back Skin, External Approach (05/13/2019)  Extraction of Buttock Skin, External Approach (05/13/2019)  Extraction of Right Lower Leg Skin, External Approach (05/13/2019)  Negative pressure wound therapy, (eg, vacuum assisted drainage collection), utilizing disposable, non-durable medical equipment including provision of exudate management collection system, topical application(s), wound assessment, and instructions for [place] (05/13/2019)  Change Pressure Dressing on Back (05/01/2019)  Change Pressure Dressing on Right Upper Leg (05/01/2019)  Negative pressure wound therapy (eg, vacuum assisted drainage collection), utilizing durable medical equipment (DME), including topical application(s), wound assessment, and instruction(s) for ongoing care, per session; total wound(s) surface area less th (05/01/2019)  Compression of Right Upper Leg using Pressure Dressing (04/17/2019)  Negative pressure wound therapy (eg, vacuum assisted drainage collection), utilizing durable medical equipment (DME), including topical application(s), wound assessment, and instruction(s) for ongoing care, per session; total wound(s) surface area less th (04/17/2019)  Drainage of Right Large Intestine with Drainage Device, Percutaneous Endoscopic Approach (03/16/2019)  Resection of Appendix, Percutaneous Endoscopic Approach (03/16/2019)  Appendectomy Laparoscopic (03/15/2019)  Drainage of Bladder with Drainage Device, Percutaneous Endoscopic Approach (03/14/2019)  Suprapubic  Catheter Placement (03/14/2019)  Change Pressure Dressing on Back (03/06/2019)  Negative pressure wound therapy (eg, vacuum assisted drainage collection), utilizing durable medical equipment (DME), including topical application(s), wound assessment, and instruction(s) for ongoing care, per session; total wound(s) surface area less th (03/06/2019)  Change Pressure Dressing on Back (02/27/2019)  Negative pressure wound therapy (eg, vacuum assisted drainage collection), utilizing durable medical equipment (DME), including topical application(s), wound assessment, and instruction(s) for ongoing care, per session; total wound(s) surface area less th (02/27/2019)  Change Pressure Dressing on Back (02/20/2019)  Negative pressure wound therapy (eg, vacuum assisted drainage collection), utilizing durable medical equipment (DME), including topical application(s), wound assessment, and instruction(s) for ongoing care, per session; total wound(s) surface area less th (02/20/2019)  Change Pressure Dressing on Back (02/06/2019)  Negative pressure wound therapy (eg, vacuum assisted drainage collection), utilizing durable medical equipment (DME), including topical application(s), wound assessment, and instruction(s) for ongoing care, per session; total wound(s) surface area less th (02/06/2019)  Change Pressure Dressing on Back (01/30/2019)  Negative pressure wound therapy (eg, vacuum assisted drainage collection), utilizing durable medical equipment (DME), including topical application(s), wound assessment, and instruction(s) for ongoing care, per session; total wound(s) surface area less th (01/30/2019)  Change Pressure Dressing on Back (01/23/2019)  Negative pressure wound therapy (eg, vacuum assisted drainage collection), utilizing durable medical equipment (DME), including topical application(s), wound assessment, and instruction(s) for ongoing care, per session; total wound(s) surface area less th (01/23/2019)  Change  Pressure Dressing on Back (01/16/2019)  Negative pressure wound therapy (eg, vacuum assisted drainage collection), utilizing durable medical equipment (DME), including topical application(s), wound assessment, and instruction(s) for ongoing care, per session; total wound(s) surface area less th (01/16/2019)  Change Pressure Dressing on Back (01/09/2019)  Negative pressure wound therapy (eg, vacuum assisted drainage collection), utilizing durable medical equipment (DME), including topical application(s), wound assessment, and instruction(s) for ongoing care, per session; total wound(s) surface area less th (01/09/2019)  Change Pressure Dressing on Back (01/02/2019)  Negative pressure wound therapy (eg, vacuum assisted drainage collection), utilizing durable medical equipment (DME), including topical application(s), wound assessment, and instruction(s) for ongoing care, per session; total wound(s) surface area less th (01/02/2019)  Change Pressure Dressing on Back (12/26/2018)  Negative pressure wound therapy (eg, vacuum assisted drainage collection), utilizing durable medical equipment (DME), including topical application(s), wound assessment, and instruction(s) for ongoing care, per session; total wound(s) surface area less th (12/26/2018)  Change Pressure Dressing on Back (12/19/2018)  Negative pressure wound therapy (eg, vacuum assisted drainage collection), utilizing durable medical equipment (DME), including topical application(s), wound assessment, and instruction(s) for ongoing care, per session; total wound(s) surface area less th (12/19/2018)  Change Pressure Dressing on Back (12/12/2018)  Negative pressure wound therapy (eg, vacuum assisted drainage collection), utilizing durable medical equipment (DME), including topical application(s), wound assessment, and instruction(s) for ongoing care, per session; total wound(s) surface area less th (12/12/2018)  Change Pressure Dressing on Back  (12/05/2018)  Debridement (eg, high pressure waterjet with/without suction, sharp selective debridement with scissors, scalpel and forceps), open wound, (eg, fibrin, devitalized epidermis and/or dermis, exudate, debris, biofilm), including topical application(s), wound (12/05/2018)  Debridement, bone (includes epidermis, dermis, subcutaneous tissue, muscle and/or fascia, if performed); first 20 sq cm or less (12/05/2018)  Excision of Sacrum, Open Approach (12/05/2018)  Extraction of Buttock Skin, External Approach (12/05/2018)  Negative pressure wound therapy (eg, vacuum assisted drainage collection), utilizing durable medical equipment (DME), including topical application(s), wound assessment, and instruction(s) for ongoing care, per session; total wound(s) surface area less th (12/05/2018)  Debridement (eg, high pressure waterjet with/without suction, sharp selective debridement with scissors, scalpel and forceps), open wound, (eg, fibrin, devitalized epidermis and/or dermis, exudate, debris, biofilm), including topical application(s), wound (11/28/2018)  Extraction of Back Skin, External Approach (11/28/2018)  Extraction of Buttock Skin, External Approach (11/28/2018)  Debridement (eg, high pressure waterjet with/without suction, sharp selective debridement with scissors, scalpel and forceps), open wound, (eg, fibrin, devitalized epidermis and/or dermis, exudate, debris, biofilm), including topical application(s), wound (11/21/2018)  Extraction of Back Skin, External Approach (11/21/2018)  Extraction of Buttock Skin, External Approach (11/21/2018)  Change Pressure Dressing on Back (11/14/2018)  Negative pressure wound therapy (eg, vacuum assisted drainage collection), utilizing durable medical equipment (DME), including topical application(s), wound assessment, and instruction(s) for ongoing care, per session; total wound(s) surface area less th (11/14/2018)  Change Drainage Device in Bladder, External Approach  (11/01/2018)  Cystoscopy (11/01/2018)  Inspection of Bladder, Via Natural or Artificial Opening Endoscopic (11/01/2018)  Suprapubic Catheter Placement (11/01/2018)  Change Pressure Dressing on Back (10/24/2018)  Negative pressure wound therapy (eg, vacuum assisted drainage collection), utilizing durable medical equipment (DME), including topical application(s), wound assessment, and instruction(s) for ongoing care, per session; total wound(s) surface area greater (10/24/2018)  Change Drainage Device in Bladder, External Approach (10/18/2018)  Transfusion of Nonautologous Red Blood Cells into Peripheral Vein, Percutaneous Approach (10/18/2018)  Change Pressure Dressing on Back (10/10/2018)  Change Pressure Dressing on Right Upper Leg (10/10/2018)  Negative pressure wound therapy (eg, vacuum assisted drainage collection), utilizing durable medical equipment (DME), including topical application(s), wound assessment, and instruction(s) for ongoing care, per session; total wound(s) surface area less th (10/10/2018)  Insertion of Infusion Device into Superior Vena Cava, Percutaneous Approach (09/21/2018)  Ultrasonography of Superior Vena Cava, Guidance (09/21/2018)  Change Pressure Dressing on Back (09/14/2018)  Negative pressure wound therapy (eg, vacuum assisted drainage collection), utilizing durable medical equipment (DME), including topical application(s), wound assessment, and instruction(s) for ongoing care, per session; total wound(s) surface area less th (09/14/2018)  Change Pressure Dressing on Back (09/07/2018)  Negative pressure wound therapy (eg, vacuum assisted drainage collection), utilizing durable medical equipment (DME), including topical application(s), wound assessment, and instruction(s) for ongoing care, per session; total wound(s) surface area less th (09/07/2018)  Change Pressure Dressing on Back (08/31/2018)  Debridement (eg, high pressure waterjet with/without suction, sharp selective debridement  with scissors, scalpel and forceps), open wound, (eg, fibrin, devitalized epidermis and/or dermis, exudate, debris, biofilm), including topical application(s), wound (08/31/2018)  Extraction of Buttock Skin, External Approach (08/31/2018)  Negative pressure wound therapy (eg, vacuum assisted drainage collection), utilizing durable medical equipment (DME), including topical application(s), wound assessment, and instruction(s) for ongoing care, per session; total wound(s) surface area less th (08/31/2018)  Change Pressure Dressing on Back (08/24/2018)  Debridement, subcutaneous tissue (includes epidermis and dermis, if performed); first 20 sq cm or less (08/24/2018)  Excision of Back Subcutaneous Tissue and Fascia, Open Approach (08/24/2018)  Negative pressure wound therapy (eg, vacuum assisted drainage collection), utilizing durable medical equipment (DME), including topical application(s), wound assessment, and instruction(s) for ongoing care, per session; total wound(s) surface area less th (08/24/2018)  Change Pressure Dressing on Back (08/17/2018)  Negative pressure wound therapy (eg, vacuum assisted drainage collection), utilizing durable medical equipment (DME), including topical application(s), wound assessment, and instruction(s) for ongoing care, per session; total wound(s) surface area less th (08/17/2018)  Compression of Back using Pressure Dressing (08/10/2018)  Negative pressure wound therapy (eg, vacuum assisted drainage collection), utilizing durable medical equipment (DME), including topical application(s), wound assessment, and instruction(s) for ongoing care, per session; total wound(s) surface area less th (08/10/2018)  Change Pressure Dressing on Back (08/03/2018)  Negative pressure wound therapy (eg, vacuum assisted drainage collection), utilizing durable medical equipment (DME), including topical application(s), wound assessment, and instruction(s) for ongoing care, per session; total wound(s)  surface area less th (08/03/2018)  Change Pressure Dressing on Back (07/20/2018)  Negative pressure wound therapy (eg, vacuum assisted drainage collection), utilizing durable medical equipment (DME), including topical application(s), wound assessment, and instruction(s) for ongoing care, per session; total wound(s) surface area less th (07/20/2018)  Change Pressure Dressing on Back (07/13/2018)  Negative pressure wound therapy (eg, vacuum assisted drainage collection), utilizing durable medical equipment (DME), including topical application(s), wound assessment, and instruction(s) for ongoing care, per session; total wound(s) surface area less th (07/13/2018)  Change Pressure Dressing on Back (07/06/2018)  Negative pressure wound therapy (eg, vacuum assisted drainage collection), utilizing durable medical equipment (DME), including topical application(s), wound assessment, and instruction(s) for ongoing care, per session; total wound(s) surface area greater (07/06/2018)  Change Pressure Dressing on Back (06/29/2018)  Negative pressure wound therapy (eg, vacuum assisted drainage collection), utilizing durable medical equipment (DME), including topical application(s), wound assessment, and instruction(s) for ongoing care, per session; total wound(s) surface area greater (06/29/2018)  Change Pressure Dressing on Back (06/22/2018)  Negative pressure wound therapy (eg, vacuum assisted drainage collection), utilizing durable medical equipment (DME), including topical application(s), wound assessment, and instruction(s) for ongoing care, per session; total wound(s) surface area greater (06/22/2018)  Compression of Back using Pressure Dressing (06/14/2018)  Negative pressure wound therapy (eg, vacuum assisted drainage collection), utilizing durable medical equipment (DME), including topical application(s), wound assessment, and instruction(s) for ongoing care, per session; total wound(s) surface area greater  (06/14/2018)  Compression of Back using Pressure Dressing (06/07/2018)  Negative pressure wound therapy (eg, vacuum assisted drainage collection), utilizing durable medical equipment (DME), including topical application(s), wound assessment, and instruction(s) for ongoing care, per session; total wound(s) surface area greater (06/07/2018)  Compression of Back using Pressure Dressing (05/31/2018)  Negative pressure wound therapy (eg, vacuum assisted drainage collection), utilizing durable medical equipment (DME), including topical application(s), wound assessment, and instruction(s) for ongoing care, per session; total wound(s) surface area greater (05/31/2018)  Compression of Back using Pressure Dressing (05/24/2018)  Negative pressure wound therapy (eg, vacuum assisted drainage collection), utilizing durable medical equipment (DME), including topical application(s), wound assessment, and instruction(s) for ongoing care, per session; total wound(s) surface area greater (05/24/2018)  Compression of Back using Pressure Dressing (05/10/2018)  Negative pressure wound therapy (eg, vacuum assisted drainage collection), utilizing durable medical equipment (DME), including topical application(s), wound assessment, and instruction(s) for ongoing care, per session; total wound(s) surface area greater (05/10/2018)  Compression of Back using Pressure Dressing (05/03/2018)  Negative pressure wound therapy (eg, vacuum assisted drainage collection), utilizing durable medical equipment (DME), including topical application(s), wound assessment, and instruction(s) for ongoing care, per session; total wound(s) surface area greater (05/03/2018)  Compression of Back using Pressure Dressing (03/27/2018)  Negative pressure wound therapy (eg, vacuum assisted drainage collection), utilizing durable medical equipment (DME), including topical application(s), wound assessment, and instruction(s) for ongoing care, per session; total wound(s)  surface area greater (03/27/2018)  Debridement Wound (Left) (02/02/2018)  Excision of Left Hip Muscle, Open Approach (02/02/2018)  Debridement Wound (Right) (02/01/2018)  Excision of Left Lower Extremity Bursa and Ligament, Open Approach (02/01/2018)  Central Line Insertion (Right, Upper, Torso) (01/25/2018)  Debridement Wound (Right) (01/25/2018)  Excision of Left Hip Muscle, Open Approach (01/25/2018)  Insertion of Infusion Device into Right Atrium, Percutaneous Approach (01/25/2018)  Detachment at Left Upper Leg, High, Open Approach (01/11/2018)  Disarticulation (Left) (01/11/2018)  Excision of Left Upper Femur, Open Approach (01/11/2018)  Debridement Wound (Right) (11/27/2017)  Excision of Right Upper Leg Subcutaneous Tissue and Fascia, Open Approach (11/27/2017)  Debridement (eg, high pressure waterjet with/without suction, sharp selective debridement with scissors, scalpel and forceps), open wound, (eg, fibrin, devitalized epidermis and/or dermis, exudate, debris, biofilm), including topical application(s), wound (10/11/2017)  Extraction of Buttock Skin, External Approach (10/11/2017)  Debridement (eg, high pressure waterjet with/without suction, sharp selective debridement with scissors, scalpel and forceps), open wound, (eg, fibrin, devitalized epidermis and/or dermis, exudate, debris, biofilm), including topical application(s), wound (09/06/2017)  Extraction of Buttock Skin, External Approach (09/06/2017)  Debridement (eg, high pressure waterjet with/without suction, sharp selective debridement with scissors, scalpel and forceps), open wound, (eg, fibrin, devitalized epidermis and/or dermis, exudate, debris, biofilm), including topical application(s), wound (07/19/2017)  Extraction of Buttock Skin, External Approach (07/19/2017)  Debridement (eg, high pressure waterjet with/without suction, sharp selective debridement with scissors, scalpel and forceps), open wound, (eg, fibrin, devitalized epidermis and/or  dermis, exudate, debris, biofilm), including topical application(s), wound (04/12/2017)  Extraction of Buttock Skin, External Approach (04/12/2017)  Debridement (eg, high pressure waterjet with/without suction, sharp selective debridement with scissors, scalpel and forceps), open wound, (eg, fibrin, devitalized epidermis and/or dermis, exudate, debris, biofilm), including topical application(s), wound (01/31/2017)  Extraction of Buttock Skin, External Approach (01/31/2017)  Debridement (eg, high pressure waterjet with/without suction, sharp selective debridement with scissors, scalpel and forceps), open wound, (eg, fibrin, devitalized epidermis and/or dermis, exudate, debris, biofilm), including topical application(s), wound (12/21/2016)  Extraction of Buttock Skin, External Approach (12/21/2016)  Debridement (eg, high pressure waterjet with/without suction, sharp selective debridement with scissors, scalpel and forceps), open wound, (eg, fibrin, devitalized epidermis and/or dermis, exudate, debris, biofilm), including topical application(s), wound (11/02/2016)  Extraction of Buttock Skin, External Approach (11/02/2016)  Debridement (eg, high pressure waterjet with/without suction, sharp selective debridement with scissors, scalpel and forceps), open wound, (eg, fibrin, devitalized epidermis and/or dermis, exudate, debris, biofilm), including topical application(s), wound (10/19/2016)  Extraction of Buttock Skin, External Approach (10/19/2016)  Debridement (eg, high pressure waterjet with/without suction, sharp selective debridement with scissors, scalpel and forceps), open wound, (eg, fibrin, devitalized epidermis and/or dermis, exudate, debris, biofilm), including topical application(s), wound (09/21/2016)  Extraction of Left Upper Leg Skin, External Approach (09/21/2016)  Extraction of Right Foot Skin, External Approach (09/21/2016)  Debridement (eg, high pressure waterjet with/without suction, sharp selective  debridement with scissors, scalpel and forceps), open wound, (eg, fibrin, devitalized epidermis and/or dermis, exudate, debris, biofilm), including topical application(s), wound (08/24/2016)  Extraction of Buttock Skin, External Approach (08/24/2016)  Debridement (eg, high pressure waterjet with/without suction, sharp selective debridement with scissors, scalpel and forceps), open wound, (eg, fibrin, devitalized epidermis and/or dermis, exudate, debris, biofilm), including topical application(s), wound (07/27/2016)  Extraction of Buttock Skin, External Approach (07/27/2016)  Extraction of Buttock Subcutaneous Tissue and Fascia, Percutaneous Approach (06/22/2016)  Removal of devitalized tissue from wound(s), non-selective debridement, without anesthesia (eg, wet-to-moist dressings, enzymatic, abrasion), including topical application(s), wound assessment, and instruction(s) for ongoing care, per session (06/22/2016)  Debridement (eg, high pressure waterjet with/without suction, sharp selective debridement with scissors, scalpel and forceps), open wound, (eg, fibrin, devitalized epidermis and/or dermis, exudate, debris, biofilm), including topical application(s), wound (06/01/2016)  Excision of Left Upper Leg Skin, External Approach (06/01/2016)  Insertion of indwelling urinary catheter (07/22/2014)  Other cystoscopy (07/22/2014)  Venous catheterization, not elsewhere classified (05/10/2012)  Continuous invasive mechanical ventilation for less than 96 consecutive hours (05/08/2012)  Cholecystectomy  Colostomy  Suprapubic catheter procedure   Medications  enoxaparin, 40 mg= 0.4 mL, Subcutaneous, Daily,? ?Not taking  imipenem-cilastatin 500 mg injection, IV Piggyback, [id number],? ?Not taking  lactobacillus acidophilus oral capsule, Oral, Daily  lactulose 10 g/15 mL oral syrup, 20 gm= 30 mL, Oral, BID, 3 refills  metoclopramide 10 mg oral tablet, 5 mg= 0.5 tab(s), Oral, QID  midodrine 5 mg oral tablet, 10 mg= 2 tab(s),  Oral, TID  Allergies  No Known Allergies  Social History  Alcohol - No Risk, 07/23/2014  Past, 10/16/2018  Home/Environment  Lives with Significant other. Home equipment: Wheelchair. Family/Friends available for support: Yes., 06/22/2016  Substance Abuse  Never, 05/29/2016  Tobacco - No Risk, 05/08/2012  Never (less than 100 in lifetime), N/A, 04/02/2019  Never (less than 100 in lifetime), N/A, 03/30/2019  Never (less than 100 in lifetime), N/A, 03/22/2019  Never (less than 100 in lifetime), N/A, 03/11/2019  Never (less than 100 in lifetime), No, 03/07/2019  Never (less than 100 in lifetime), N/A, 02/06/2019  Never smoker, N/A, 10/29/2018  Never smoker, 05/29/2016  Family History  Acute myocardial infarction.: Mother.  Diabetes mellitus type 2: Mother.  Heart failure.: Mother.  Father: History is unknown  Health Maintenance  Health Maintenance  ???Pending?(in the next year)  ??? ??OverDue  ??? ? ? ?Alcohol Misuse Screening due??01/01/19??and every 1??year(s)  ??? ??Due?  ??? ? ? ?Coronary Artery Disease Maintenance-Lipid Lowering Therapy due??05/20/19??and every?  ??? ? ? ?Tetanus Vaccine due??05/20/19??and every 10??year(s)  ??? ??Due In Future?  ??? ? ? ?Obesity Screening not due until??01/01/20??and every 1??year(s)  ??? ? ? ?ADL Screening not due until??03/24/20??and every 1??year(s)  ??? ? ? ?Hypertension Management-BMP not due until??04/09/20??and every 1??year(s)  ??? ? ? ?Blood Pressure Screening not due until??04/30/20??and every 1??year(s)  ??? ? ? ?Hypertension Management-Blood Pressure not due until??04/30/20??and every 1??year(s)  ??? ? ? ?Body Mass Index Check not due until??05/12/20??and every 1??year(s)  ???Satisfied?(in the past 1 year)  ??? ??Satisfied?  ??? ? ? ?ADL Screening on??03/24/19.??Satisfied by Nimco Byrnes LPN  ??? ? ? ?Blood Pressure Screening on??05/20/19.??Satisfied by Shravan Boone  ??? ? ? ?Diabetes Screening on??04/10/19.??Satisfied by Erin Robertson  ??? ? ?  ?Hypertension Management-Blood Pressure on??05/20/19.??Satisfied by Shravan Boone.  ??? ? ? ?Influenza Vaccine on??03/22/19.??Satisfied by Sanaz Jackson RN  ??? ? ? ?Obesity Screening on??04/01/19.??Satisfied by Yas Burnett  ?  ?

## 2022-05-03 NOTE — HISTORICAL [facility]
This is a historical note converted from [facility]. Formatting and pictures may have been removed.  Please reference [facility] for original formatting and attached multimedia. Admit and Discharge Dates  Admit Date: 04/02/2019  Discharge Date: 04/10/2019  ?  Physicians  Attending Physician - Laurel BROWN, Cr SMITH  Admitting Physician - Laurel BROWN, Cr SMITH  Primary Care Physician - Sharonda BROWN, Ciaran VALERIO  ?  Discharge Diagnosis  1.?Decubitus ulcers?L89.90  2.?Acute disease or injury-related malnutrition?E46  3.?Anemia?D64.9  4.?Hypotension?I95.9  Surgical Procedures  No procedures recorded for this visit.  Immunizations  No immunizations recorded for this visit.  ?  Hospital Course  43-year-old male?was admitted to swing bed unit for IV antibiotics and wound care?of his decubitus ulcer.? His course?was significant for?MDR Acinetobacter. ?Due to that and his?worsening?hypotension?he was sent to?[facility]?to be evaluated by ID. ?He was started on tobramycin?and returned?[facility] to complete his IV antibiotic course and wound care.? He did have some constipation which improved?with laxatives?and otherwise his clinical course?was uneventful.? He was discharged in stable condition?upon completion of his IV antibiotic course?and clearance for wound care.? He was discharged with follow-up instructions and prescriptions.  ?  D/C Time: 32 minutes  Objective  Physical Exam  General: Alert, laying in bed, in no acute distress  Eyes: EOMI, no scleral icterus  HEENT: NCAT, oral mucosa moist, neck supple  CV: RRR  Respiratory: CTA bilaterally  GI: + BS, soft, nontender, nondistended. ?Colostomy in place with brown stool in bag  : No CVA or suprapubic tenderness palpation  MSK/extremities: No pitting edema, cyanosis, or clubbing.? MAEW  Neuro: GCS 15  Psych: Calm, cooperative with exam.? Mood and affect appropriate.  Integument: Dressing on?decubitus ulcers C/D/I.  Patient Discharge Condition  Stable  Discharge Disposition  Home with home  health   Discharge Medication Reconciliation  Prescribed  lactulose (lactulose 10 g/15 mL oral syrup)?20 gm, Oral, BID  Continue  enoxaparin?40 mg, Subcutaneous, Daily  imipenem-cilastatin (imipenem-cilastatin 500 mg injection), IV Piggyback, [id number]  lactobacillus acidophilus (lactobacillus acidophilus oral capsule), Oral, Daily  metoclopramide (metoclopramide 10 mg oral tablet)?5 mg, Oral, QID  midodrine (midodrine 5 mg oral tablet)?10 mg, Oral, TID  Discontinue  tobramycin, IV Piggyback, q24hr  ?  Education and Orders Provided  Wound Infection, Easy-to-Read  Hand Washing, Easy-to-Read  Infection Control in the Home  Wound Infection, Easy-to-Read  Discharge - 04/10/19 8:45:00 CDT, Home?  Discharge Activity - Activity as Tolerated?  Discharge Diet - Regular?  Discharge - 04/10/19 8:50:00 CDT, Dis/Trn Home Health?  ?  Follow up  Follow-up with PCP in 3 to 5 days  [facility], on 04/17/2019  ?

## 2022-05-03 NOTE — HISTORICAL [facility]
This is a historical note converted from [place]. Formatting and pictures may have been removed.  Please reference [facility] for original formatting and attached multimedia. Chief Complaint  sacral, right hip wound, left ischial wound  History of Present Illness  42 yo male?quadriplegic?with sacral, right hip, and left ischial wound.? Some upper extremity function remains.  Review of Systems  Constitutional:?no fever, fatigue, weakness  ENMT: no?nasal congestion/drainage  Respiratory:?no shortness of breath  Cardiovascular:?no chest pain, no edema  Gastrointestinal:?no nausea, vomiting  Hema/Lymph:?no abnormal bruising or bleeding  Musculoskeletal:?no muscle or joint pain, no joint swelling  Integumentary: Sacral, left ischial wound, right hip wound  ?  ?  Physical Exam  Vitals & Measurements  T:?36.8? ?C (Oral)? HR:?82(Peripheral)? RR:?20? BP:?135/76?  HT:?175.26?cm? WT:?73.6?kg?  Incision/Wounds  ?1. Hip Right Pressure ulcer?- last charted: 05/01/2019 10:00  ?? ??Assessment Done By?- Wound Care Team  ?? ??Pressure Point?- Bony prominence  ?? ??Dressing Type?- Vacuum assisted closure, Wound vac foam, black  ?? ??Dressing Assessment?- Drainage present, Intact  ?? ??Dressing Activity?- Removed  ?? ??Cleansing?- Cleaned with normal saline  ?? ??Length?- 2.5 cm  ?? ??Width?- 2.1 cm  ?? ??Depth?- 0.9 cm  ?? ??Wound Bed Tissue Type?- Granulating  ?? ??Percent Granulated?- 100 %  ?? ??Pressure Ulcer Stage?- IV  ?? ??Undermining Location?- 1-4  ?? ??Undermining Depth?- 1.0  ?? ??Exudate Amount?- Moderate  ?? ??Exudate Type?- Serous  ?? ??Exudate Odor?- None  ?? ??Edge?- Attached to wound bed  ?? ??Status?- Improving  ?  ?2. Sacrum Pressure ulcer?- last charted: 05/01/2019 10:00  ?? ??Assessment Done By?- Wound Care Team  ?? ??Pressure Point?- Bony prominence  ?? ??Dressing Type?- Vacuum assisted closure, Wound vac foam, black  ?? ??Dressing Assessment?- Drainage present, Intact  ?? ??Dressing Activity?- Removed  ??  ??Cleansing?- Cleaned with soap and water  ?? ??Length?- 2.5 cm  ?? ??Width?- 3.6 cm  ?? ??Depth?- 1.2 cm  ?? ??Wound Bed Tissue Type?- Granulating  ?? ??Percent Granulated?- 100 %  ?? ??Pressure Ulcer Stage?- IV  ?? ??Undermining Location?- 1-2  ?? ??Undermining Depth?- 0.8  ?? ??Exudate Amount?- Moderate  ?? ??Exudate Type?- Serous  ?? ??Exudate Odor?- None  ?? ??Edge?- Attached to wound bed  ?? ??Status?- Improving  ?  ?3. Hip Left Pressure ulcer?- last charted: 05/01/2019 10:00  ?? ??Assessment Done By?- Wound Care Team  ?? ??Pressure Point?- Bony prominence  ?? ??Dressing Type?- Alginate, Gauze dressing, Silver  ?? ??Dressing Assessment?- Drainage present, Intact  ?? ??Dressing Activity?- Removed  ?? ??Cleansing?- Cleaned with normal saline  ?? ??Length?- 2.5 cm  ?? ??Width?- 0.5 cm  ?? ??Depth?- 0.3 cm  ?? ??Wound Bed Tissue Type?- Granulating  ?? ??Percent Granulated?- 100 %  ?? ??Pressure Ulcer Stage?- IV  ?? ??Exudate Amount?- Small  ?? ??Exudate Type?- Serous  ?? ??Exudate Odor?- None  ?? ??Edge?- Attached to wound bed  ?? ??Status?- Improving  ?   ???? All wounds are clean and decreasing in size. No debridement needed.  ?  ?  Assessment/Plan  1.?Pressure ulcer of right hip, stage 4?L89.214  2.?quadraplegia?G82.50  3.?Pressure ulcer of ischial area, stage 4?L89.304  4.?Pressure ulcer of sacral region, stage 4?L89.154  Wound vac with black foam to right hip and sacral wound 125mmhg continuous change Friday and Monday by home health. Silver alginate with dry dressing to left ischial wound QOD. RTC 1 week   Problem List/Past Medical History  Ongoing  Acute disease or injury-related malnutrition  Adrenal hypofunction  Anemia  Blocked suprapubic catheter  Decubitus pressure sore  Decubitus ulcer of right perineal ischial region, stage 2  Elevated liver enzymes level  Hypokalemia  Hypomagnesemia  Hypotension  Metabolic acidosis  Pressure ulcer of right hip, stage 4  quadraplegia  Severe  malnutrition  Tachycardia  Historical  Cholecystectomy  Colostomy  debridement of sacral wound  Urinary retention  Procedure/Surgical History  Compression of Right Upper Leg using Pressure Dressing (04/17/2019)  Negative pressure wound therapy (eg, vacuum assisted drainage collection), utilizing durable medical equipment (DME), including topical application(s), wound assessment, and instruction(s) for ongoing care, per session; total wound(s) surface area less th (04/17/2019)  Drainage of Right Large Intestine with Drainage Device, Percutaneous Endoscopic Approach (03/16/2019)  Resection of Appendix, Percutaneous Endoscopic Approach (03/16/2019)  Appendectomy Laparoscopic (03/15/2019)  Drainage of Bladder with Drainage Device, Percutaneous Endoscopic Approach (03/14/2019)  Suprapubic Catheter Placement (03/14/2019)  Change Pressure Dressing on Back (03/06/2019)  Negative pressure wound therapy (eg, vacuum assisted drainage collection), utilizing durable medical equipment (DME), including topical application(s), wound assessment, and instruction(s) for ongoing care, per session; total wound(s) surface area less th (03/06/2019)  Change Pressure Dressing on Back (02/27/2019)  Negative pressure wound therapy (eg, vacuum assisted drainage collection), utilizing durable medical equipment (DME), including topical application(s), wound assessment, and instruction(s) for ongoing care, per session; total wound(s) surface area less th (02/27/2019)  Change Pressure Dressing on Back (02/20/2019)  Negative pressure wound therapy (eg, vacuum assisted drainage collection), utilizing durable medical equipment (DME), including topical application(s), wound assessment, and instruction(s) for ongoing care, per session; total wound(s) surface area less th (02/20/2019)  Change Pressure Dressing on Back (02/06/2019)  Negative pressure wound therapy (eg, vacuum assisted drainage collection), utilizing durable medical equipment (DME),  including topical application(s), wound assessment, and instruction(s) for ongoing care, per session; total wound(s) surface area less th (02/06/2019)  Change Pressure Dressing on Back (01/30/2019)  Negative pressure wound therapy (eg, vacuum assisted drainage collection), utilizing durable medical equipment (DME), including topical application(s), wound assessment, and instruction(s) for ongoing care, per session; total wound(s) surface area less th (01/30/2019)  Change Pressure Dressing on Back (01/23/2019)  Negative pressure wound therapy (eg, vacuum assisted drainage collection), utilizing durable medical equipment (DME), including topical application(s), wound assessment, and instruction(s) for ongoing care, per session; total wound(s) surface area less th (01/23/2019)  Change Pressure Dressing on Back (01/16/2019)  Negative pressure wound therapy (eg, vacuum assisted drainage collection), utilizing durable medical equipment (DME), including topical application(s), wound assessment, and instruction(s) for ongoing care, per session; total wound(s) surface area less th (01/16/2019)  Change Pressure Dressing on Back (01/09/2019)  Negative pressure wound therapy (eg, vacuum assisted drainage collection), utilizing durable medical equipment (DME), including topical application(s), wound assessment, and instruction(s) for ongoing care, per session; total wound(s) surface area less th (01/09/2019)  Change Pressure Dressing on Back (01/02/2019)  Negative pressure wound therapy (eg, vacuum assisted drainage collection), utilizing durable medical equipment (DME), including topical application(s), wound assessment, and instruction(s) for ongoing care, per session; total wound(s) surface area less th (01/02/2019)  Change Pressure Dressing on Back (12/26/2018)  Negative pressure wound therapy (eg, vacuum assisted drainage collection), utilizing durable medical equipment (DME), including topical application(s), wound  assessment, and instruction(s) for ongoing care, per session; total wound(s) surface area less th (12/26/2018)  Change Pressure Dressing on Back (12/19/2018)  Negative pressure wound therapy (eg, vacuum assisted drainage collection), utilizing durable medical equipment (DME), including topical application(s), wound assessment, and instruction(s) for ongoing care, per session; total wound(s) surface area less th (12/19/2018)  Change Pressure Dressing on Back (12/12/2018)  Negative pressure wound therapy (eg, vacuum assisted drainage collection), utilizing durable medical equipment (DME), including topical application(s), wound assessment, and instruction(s) for ongoing care, per session; total wound(s) surface area less th (12/12/2018)  Change Pressure Dressing on Back (12/05/2018)  Debridement (eg, high pressure waterjet with/without suction, sharp selective debridement with scissors, scalpel and forceps), open wound, (eg, fibrin, devitalized epidermis and/or dermis, exudate, debris, biofilm), including topical application(s), wound (12/05/2018)  Debridement, bone (includes epidermis, dermis, subcutaneous tissue, muscle and/or fascia, if performed); first 20 sq cm or less (12/05/2018)  Excision of Sacrum, Open Approach (12/05/2018)  Extraction of Buttock Skin, External Approach (12/05/2018)  Negative pressure wound therapy (eg, vacuum assisted drainage collection), utilizing durable medical equipment (DME), including topical application(s), wound assessment, and instruction(s) for ongoing care, per session; total wound(s) surface area less th (12/05/2018)  Debridement (eg, high pressure waterjet with/without suction, sharp selective debridement with scissors, scalpel and forceps), open wound, (eg, fibrin, devitalized epidermis and/or dermis, exudate, debris, biofilm), including topical application(s), wound (11/28/2018)  Extraction of Back Skin, External Approach (11/28/2018)  Extraction of Buttock Skin, External  Approach (11/28/2018)  Debridement (eg, high pressure waterjet with/without suction, sharp selective debridement with scissors, scalpel and forceps), open wound, (eg, fibrin, devitalized epidermis and/or dermis, exudate, debris, biofilm), including topical application(s), wound (11/21/2018)  Extraction of Back Skin, External Approach (11/21/2018)  Extraction of Buttock Skin, External Approach (11/21/2018)  Change Pressure Dressing on Back (11/14/2018)  Negative pressure wound therapy (eg, vacuum assisted drainage collection), utilizing durable medical equipment (DME), including topical application(s), wound assessment, and instruction(s) for ongoing care, per session; total wound(s) surface area less th (11/14/2018)  Change Drainage Device in Bladder, External Approach (11/01/2018)  Cystoscopy (11/01/2018)  Inspection of Bladder, Via Natural or Artificial Opening Endoscopic (11/01/2018)  Suprapubic Catheter Placement (11/01/2018)  Change Pressure Dressing on Back (10/24/2018)  Negative pressure wound therapy (eg, vacuum assisted drainage collection), utilizing durable medical equipment (DME), including topical application(s), wound assessment, and instruction(s) for ongoing care, per session; total wound(s) surface area greater (10/24/2018)  Change Drainage Device in Bladder, External Approach (10/18/2018)  Transfusion of Nonautologous Red Blood Cells into Peripheral Vein, Percutaneous Approach (10/18/2018)  Change Pressure Dressing on Back (10/10/2018)  Change Pressure Dressing on Right Upper Leg (10/10/2018)  Negative pressure wound therapy (eg, vacuum assisted drainage collection), utilizing durable medical equipment (DME), including topical application(s), wound assessment, and instruction(s) for ongoing care, per session; total wound(s) surface area less th (10/10/2018)  Insertion of Infusion Device into Superior Vena Cava, Percutaneous Approach (09/21/2018)  Ultrasonography of Superior Vena Cava, Guidance  (09/21/2018)  Change Pressure Dressing on Back (09/14/2018)  Negative pressure wound therapy (eg, vacuum assisted drainage collection), utilizing durable medical equipment (DME), including topical application(s), wound assessment, and instruction(s) for ongoing care, per session; total wound(s) surface area less th (09/14/2018)  Change Pressure Dressing on Back (09/07/2018)  Negative pressure wound therapy (eg, vacuum assisted drainage collection), utilizing durable medical equipment (DME), including topical application(s), wound assessment, and instruction(s) for ongoing care, per session; total wound(s) surface area less th (09/07/2018)  Change Pressure Dressing on Back (08/31/2018)  Debridement (eg, high pressure waterjet with/without suction, sharp selective debridement with scissors, scalpel and forceps), open wound, (eg, fibrin, devitalized epidermis and/or dermis, exudate, debris, biofilm), including topical application(s), wound (08/31/2018)  Extraction of Buttock Skin, External Approach (08/31/2018)  Negative pressure wound therapy (eg, vacuum assisted drainage collection), utilizing durable medical equipment (DME), including topical application(s), wound assessment, and instruction(s) for ongoing care, per session; total wound(s) surface area less th (08/31/2018)  Change Pressure Dressing on Back (08/24/2018)  Debridement, subcutaneous tissue (includes epidermis and dermis, if performed); first 20 sq cm or less (08/24/2018)  Excision of Back Subcutaneous Tissue and Fascia, Open Approach (08/24/2018)  Negative pressure wound therapy (eg, vacuum assisted drainage collection), utilizing durable medical equipment (DME), including topical application(s), wound assessment, and instruction(s) for ongoing care, per session; total wound(s) surface area less th (08/24/2018)  Change Pressure Dressing on Back (08/17/2018)  Negative pressure wound therapy (eg, vacuum assisted drainage collection), utilizing durable  medical equipment (DME), including topical application(s), wound assessment, and instruction(s) for ongoing care, per session; total wound(s) surface area less th (08/17/2018)  Compression of Back using Pressure Dressing (08/10/2018)  Negative pressure wound therapy (eg, vacuum assisted drainage collection), utilizing durable medical equipment (DME), including topical application(s), wound assessment, and instruction(s) for ongoing care, per session; total wound(s) surface area less th (08/10/2018)  Change Pressure Dressing on Back (08/03/2018)  Negative pressure wound therapy (eg, vacuum assisted drainage collection), utilizing durable medical equipment (DME), including topical application(s), wound assessment, and instruction(s) for ongoing care, per session; total wound(s) surface area less th (08/03/2018)  Change Pressure Dressing on Back (07/20/2018)  Negative pressure wound therapy (eg, vacuum assisted drainage collection), utilizing durable medical equipment (DME), including topical application(s), wound assessment, and instruction(s) for ongoing care, per session; total wound(s) surface area less th (07/20/2018)  Change Pressure Dressing on Back (07/13/2018)  Negative pressure wound therapy (eg, vacuum assisted drainage collection), utilizing durable medical equipment (DME), including topical application(s), wound assessment, and instruction(s) for ongoing care, per session; total wound(s) surface area less th (07/13/2018)  Change Pressure Dressing on Back (07/06/2018)  Negative pressure wound therapy (eg, vacuum assisted drainage collection), utilizing durable medical equipment (DME), including topical application(s), wound assessment, and instruction(s) for ongoing care, per session; total wound(s) surface area greater (07/06/2018)  Change Pressure Dressing on Back (06/29/2018)  Negative pressure wound therapy (eg, vacuum assisted drainage collection), utilizing durable medical equipment (DME), including  topical application(s), wound assessment, and instruction(s) for ongoing care, per session; total wound(s) surface area greater (06/29/2018)  Change Pressure Dressing on Back (06/22/2018)  Negative pressure wound therapy (eg, vacuum assisted drainage collection), utilizing durable medical equipment (DME), including topical application(s), wound assessment, and instruction(s) for ongoing care, per session; total wound(s) surface area greater (06/22/2018)  Compression of Back using Pressure Dressing (06/14/2018)  Negative pressure wound therapy (eg, vacuum assisted drainage collection), utilizing durable medical equipment (DME), including topical application(s), wound assessment, and instruction(s) for ongoing care, per session; total wound(s) surface area greater (06/14/2018)  Compression of Back using Pressure Dressing (06/07/2018)  Negative pressure wound therapy (eg, vacuum assisted drainage collection), utilizing durable medical equipment (DME), including topical application(s), wound assessment, and instruction(s) for ongoing care, per session; total wound(s) surface area greater (06/07/2018)  Compression of Back using Pressure Dressing (05/31/2018)  Negative pressure wound therapy (eg, vacuum assisted drainage collection), utilizing durable medical equipment (DME), including topical application(s), wound assessment, and instruction(s) for ongoing care, per session; total wound(s) surface area greater (05/31/2018)  Compression of Back using Pressure Dressing (05/24/2018)  Negative pressure wound therapy (eg, vacuum assisted drainage collection), utilizing durable medical equipment (DME), including topical application(s), wound assessment, and instruction(s) for ongoing care, per session; total wound(s) surface area greater (05/24/2018)  Compression of Back using Pressure Dressing (05/10/2018)  Negative pressure wound therapy (eg, vacuum assisted drainage collection), utilizing durable medical equipment (DME),  including topical application(s), wound assessment, and instruction(s) for ongoing care, per session; total wound(s) surface area greater (05/10/2018)  Compression of Back using Pressure Dressing (05/03/2018)  Negative pressure wound therapy (eg, vacuum assisted drainage collection), utilizing durable medical equipment (DME), including topical application(s), wound assessment, and instruction(s) for ongoing care, per session; total wound(s) surface area greater (05/03/2018)  Compression of Back using Pressure Dressing (03/27/2018)  Negative pressure wound therapy (eg, vacuum assisted drainage collection), utilizing durable medical equipment (DME), including topical application(s), wound assessment, and instruction(s) for ongoing care, per session; total wound(s) surface area greater (03/27/2018)  Debridement Wound (Left) (02/02/2018)  Excision of Left Hip Muscle, Open Approach (02/02/2018)  Debridement Wound (Right) (02/01/2018)  Excision of Left Lower Extremity Bursa and Ligament, Open Approach (02/01/2018)  Central Line Insertion (Right, Upper, Torso) (01/25/2018)  Debridement Wound (Right) (01/25/2018)  Excision of Left Hip Muscle, Open Approach (01/25/2018)  Insertion of Infusion Device into Right Atrium, Percutaneous Approach (01/25/2018)  Detachment at Left Upper Leg, High, Open Approach (01/11/2018)  Disarticulation (Left) (01/11/2018)  Excision of Left Upper Femur, Open Approach (01/11/2018)  Debridement Wound (Right) (11/27/2017)  Excision of Right Upper Leg Subcutaneous Tissue and Fascia, Open Approach (11/27/2017)  Debridement (eg, high pressure waterjet with/without suction, sharp selective debridement with scissors, scalpel and forceps), open wound, (eg, fibrin, devitalized epidermis and/or dermis, exudate, debris, biofilm), including topical application(s), wound (10/11/2017)  Extraction of Buttock Skin, External Approach (10/11/2017)  Debridement (eg, high pressure waterjet with/without suction, sharp  selective debridement with scissors, scalpel and forceps), open wound, (eg, fibrin, devitalized epidermis and/or dermis, exudate, debris, biofilm), including topical application(s), wound (09/06/2017)  Extraction of Buttock Skin, External Approach (09/06/2017)  Debridement (eg, high pressure waterjet with/without suction, sharp selective debridement with scissors, scalpel and forceps), open wound, (eg, fibrin, devitalized epidermis and/or dermis, exudate, debris, biofilm), including topical application(s), wound (07/19/2017)  Extraction of Buttock Skin, External Approach (07/19/2017)  Debridement (eg, high pressure waterjet with/without suction, sharp selective debridement with scissors, scalpel and forceps), open wound, (eg, fibrin, devitalized epidermis and/or dermis, exudate, debris, biofilm), including topical application(s), wound (04/12/2017)  Extraction of Buttock Skin, External Approach (04/12/2017)  Debridement (eg, high pressure waterjet with/without suction, sharp selective debridement with scissors, scalpel and forceps), open wound, (eg, fibrin, devitalized epidermis and/or dermis, exudate, debris, biofilm), including topical application(s), wound (01/31/2017)  Extraction of Buttock Skin, External Approach (01/31/2017)  Debridement (eg, high pressure waterjet with/without suction, sharp selective debridement with scissors, scalpel and forceps), open wound, (eg, fibrin, devitalized epidermis and/or dermis, exudate, debris, biofilm), including topical application(s), wound (12/21/2016)  Extraction of Buttock Skin, External Approach (12/21/2016)  Debridement (eg, high pressure waterjet with/without suction, sharp selective debridement with scissors, scalpel and forceps), open wound, (eg, fibrin, devitalized epidermis and/or dermis, exudate, debris, biofilm), including topical application(s), wound (11/02/2016)  Extraction of Buttock Skin, External Approach (11/02/2016)  Debridement (eg, high pressure  waterjet with/without suction, sharp selective debridement with scissors, scalpel and forceps), open wound, (eg, fibrin, devitalized epidermis and/or dermis, exudate, debris, biofilm), including topical application(s), wound (10/19/2016)  Extraction of Buttock Skin, External Approach (10/19/2016)  Debridement (eg, high pressure waterjet with/without suction, sharp selective debridement with scissors, scalpel and forceps), open wound, (eg, fibrin, devitalized epidermis and/or dermis, exudate, debris, biofilm), including topical application(s), wound (09/21/2016)  Extraction of Left Upper Leg Skin, External Approach (09/21/2016)  Extraction of Right Foot Skin, External Approach (09/21/2016)  Debridement (eg, high pressure waterjet with/without suction, sharp selective debridement with scissors, scalpel and forceps), open wound, (eg, fibrin, devitalized epidermis and/or dermis, exudate, debris, biofilm), including topical application(s), wound (08/24/2016)  Extraction of Buttock Skin, External Approach (08/24/2016)  Debridement (eg, high pressure waterjet with/without suction, sharp selective debridement with scissors, scalpel and forceps), open wound, (eg, fibrin, devitalized epidermis and/or dermis, exudate, debris, biofilm), including topical application(s), wound (07/27/2016)  Extraction of Buttock Skin, External Approach (07/27/2016)  Extraction of Buttock Subcutaneous Tissue and Fascia, Percutaneous Approach (06/22/2016)  Removal of devitalized tissue from wound(s), non-selective debridement, without anesthesia (eg, wet-to-moist dressings, enzymatic, abrasion), including topical application(s), wound assessment, and instruction(s) for ongoing care, per session (06/22/2016)  Debridement (eg, high pressure waterjet with/without suction, sharp selective debridement with scissors, scalpel and forceps), open wound, (eg, fibrin, devitalized epidermis and/or dermis, exudate, debris, biofilm), including topical  application(s), wound (06/01/2016)  Excision of Left Upper Leg Skin, External Approach (06/01/2016)  Insertion of indwelling urinary catheter (07/22/2014)  Other cystoscopy (07/22/2014)  Venous catheterization, not elsewhere classified (05/10/2012)  Continuous invasive mechanical ventilation for less than 96 consecutive hours (05/08/2012)  Cholecystectomy  Colostomy  Suprapubic catheter procedure   Medications  enoxaparin, 40 mg= 0.4 mL, Subcutaneous, Daily,? ?Not taking  imipenem-cilastatin 500 mg injection, IV Piggyback, [id number],? ?Not taking  lactobacillus acidophilus oral capsule, Oral, Daily  lactulose 10 g/15 mL oral syrup, 20 gm= 30 mL, Oral, BID, 3 refills  metoclopramide 10 mg oral tablet, 5 mg= 0.5 tab(s), Oral, QID  midodrine 5 mg oral tablet, 10 mg= 2 tab(s), Oral, TID  Allergies  No Known Allergies  Social History  Alcohol - No Risk, 07/23/2014  Past, 10/16/2018  Home/Environment  Lives with Significant other. Home equipment: Wheelchair. Family/Friends available for support: Yes., 06/22/2016  Substance Abuse  Never, 05/29/2016  Tobacco - No Risk, 05/08/2012  Never (less than 100 in lifetime), N/A, 04/02/2019  Never (less than 100 in lifetime), N/A, 03/30/2019  Never (less than 100 in lifetime), N/A, 03/22/2019  Never (less than 100 in lifetime), N/A, 03/11/2019  Never (less than 100 in lifetime), No, 03/07/2019  Never (less than 100 in lifetime), N/A, 02/06/2019  Never smoker, N/A, 10/29/2018  Never smoker, 05/29/2016  Family History  Acute myocardial infarction.: Mother.  Diabetes mellitus type 2: Mother.  Heart failure.: Mother.  Father: History is unknown  Health Maintenance  Health Maintenance  ???Pending?(in the next year)  ??? ??OverDue  ??? ? ? ?Alcohol Misuse Screening due??01/01/19??and every 1??year(s)  ??? ??Due?  ??? ? ? ?Coronary Artery Disease Maintenance-Lipid Lowering Therapy due??05/01/19??and every?  ??? ? ? ?Tetanus Vaccine due??05/01/19??and every 10??year(s)  ??? ??Due In  Future?  ??? ? ? ?Obesity Screening not due until??01/01/20??and every 1??year(s)  ??? ? ? ?ADL Screening not due until??03/24/20??and every 1??year(s)  ??? ? ? ?Hypertension Management-BMP not due until??04/09/20??and every 1??year(s)  ??? ? ? ?Blood Pressure Screening not due until??04/23/20??and every 1??year(s)  ??? ? ? ?Body Mass Index Check not due until??04/23/20??and every 1??year(s)  ??? ? ? ?Hypertension Management-Blood Pressure not due until??04/23/20??and every 1??year(s)  ???Satisfied?(in the past 1 year)  ??? ??Satisfied?  ??? ? ? ?ADL Screening on??03/24/19.??Satisfied by Nimco Byrnes LPN.  ??? ? ? ?Blood Pressure Screening on??05/01/19.??Satisfied by Isha Branch RN  ??? ? ? ?Diabetes Screening on??04/10/19.??Satisfied by Erin Robertson  ??? ? ? ?Hypertension Management-Blood Pressure on??05/01/19.??Satisfied by Isha Branch RN  ??? ? ? ?Influenza Vaccine on??03/22/19.??Satisfied by Sanaz Jackson RN  ??? ? ? ?Obesity Screening on??04/01/19.??Satisfied by Yas Burnett  ?  ?

## 2022-05-03 NOTE — HISTORICAL [facility]
This is a historical note converted from [place]. Formatting and pictures may have been removed.  Please reference [facility] for original formatting and attached multimedia. Chief Complaint  Right hip, left ishcial, saral wound  History of Present Illness  42 yo male with right hip, sacral and left ischial wound.  Review of Systems  Constitutional:?no fever, fatigue, weakness  ENMT: no?nasal congestion/drainage  Respiratory:?no shortness of breath  Cardiovascular:?no chest pain, no edema  Gastrointestinal:?no nausea, vomiting  Hema/Lymph:?no abnormal bruising or bleeding  Musculoskeletal:?no muscle or joint pain, no joint swelling  Integumentary: right hip, left ischial, sacral wound  ?  ?  Physical Exam  Vitals & Measurements  T:?37.0? ?C (Oral)? HR:?83(Peripheral)? RR:?20? BP:?135/76?  HT:?175.26?cm? WT:?73.6?kg?  Incision/Wounds  ?1. Hip Right Pressure ulcer?- last charted: 04/24/2019 14:00  ?? ??Assessment Done By?- Wound Care Team  ?? ??Pressure Point?- Bony prominence  ?? ??Dressing Type?- Vacuum assisted closure, Wound vac foam, black  ?? ??Dressing Assessment?- Drainage present, Intact  ?? ??Dressing Activity?- Removed  ?? ??Cleansing?- Cleaned with normal saline  ?? ??Length?- 2.6 cm  ?? ??Width?- 2.2 cm  ?? ??Depth?- 1.0 cm  ?? ??Wound Bed Tissue Type?- Granulating  ?? ??Percent Granulated?- 100 %  ?? ??Pressure Ulcer Stage?- IV  ?? ??Undermining Location?- 3-5  ?? ??Undermining Depth?- oclock  ?? ??Tunneling Location?- 1.0  ?? ??Exudate Amount?- Moderate  ?? ??Exudate Type?- Serous  ?? ??Exudate Odor?- None  ?? ??Edge?- Attached to wound bed  ?? ??Status?- Improving  ?  ?2. Sacrum Pressure ulcer?- last charted: 04/24/2019 14:00  ?? ??Assessment Done By?- Wound Care Team  ?? ??Pressure Point?- Bony prominence  ?? ??Dressing Type?- Vacuum assisted closure, Wound vac foam, black  ?? ??Dressing Assessment?- Drainage present, Intact  ?? ??Dressing Activity?- Removed  ?? ??Length?- 3.2 cm  ?? ??Width?- 4.0 cm  ??  ??Depth?- 0.8 cm  ?? ??Wound Bed Tissue Type?- Granulating  ?? ??Percent Granulated?- 100 %  ?? ??Pressure Ulcer Stage?- IV  ?? ??Exudate Amount?- Moderate  ?? ??Exudate Type?- Serous  ?? ??Exudate Odor?- None  ?? ??Edge?- Attached to wound bed  ?? ??Status?- Improving  ?  ?3. Hip Left Pressure ulcer?- last charted: 04/24/2019 14:00  ?? ??Assessment Done By?- Wound Care Team  ?? ??Pressure Point?- Bony prominence  ?? ??Dressing Type?- Vacuum assisted closure, Wound vac foam, black  ?? ??Dressing Assessment?- Drainage present, Intact  ?? ??Dressing Activity?- Removed  ?? ??Cleansing?- Cleaned with normal saline  ?? ??Length?- 1.0 cm  ?? ??Width?- 0.5 cm  ?? ??Depth?- 0.3 cm  ?? ??Wound Bed Tissue Type?- Granulating  ?? ??Percent Granulated?- 100 %  ?? ??Pressure Ulcer Stage?- IV  ?? ??Exudate Amount?- Moderate  ?? ??Exudate Type?- Serous  ?? ??Exudate Odor?- None  ?? ??Edge?- Attached to wound bed  ?? ??Status?- Improving  ?   ???? All wounds are improved.? No debridement performed.  ?  ?  Assessment/Plan  1.?Pressure ulcer of right hip, stage 4?L89.214  2.?Pressure ulcer of sacral region, stage 4?L89.154  3.?Pressure ulcer of ischial area, stage 4?L89.304  4.?quadraplegia?G82.50  Wound vac with black foam to right hip and sacal wounds 125mmhg continuous to be changed friday and monday by home health. Silver alginate to left ischial wound with dry dressing QOD. RTC 1 week.   Problem List/Past Medical History  Ongoing  Acute disease or injury-related malnutrition  Adrenal hypofunction  Anemia  Blocked suprapubic catheter  Decubitus pressure sore  Decubitus ulcer of right perineal ischial region, stage 2  Elevated liver enzymes level  Hypokalemia  Hypomagnesemia  Hypotension  Metabolic acidosis  Pressure ulcer of right hip, stage 4  quadraplegia  Severe malnutrition  Tachycardia  Historical  Cholecystectomy  Colostomy  debridement of sacral wound  Urinary retention  Procedure/Surgical History  Drainage of Right Large  Intestine with Drainage Device, Percutaneous Endoscopic Approach (03/16/2019)  Resection of Appendix, Percutaneous Endoscopic Approach (03/16/2019)  Appendectomy Laparoscopic (03/15/2019)  Drainage of Bladder with Drainage Device, Percutaneous Endoscopic Approach (03/14/2019)  Suprapubic Catheter Placement (03/14/2019)  Change Pressure Dressing on Back (03/06/2019)  Negative pressure wound therapy (eg, vacuum assisted drainage collection), utilizing durable medical equipment (DME), including topical application(s), wound assessment, and instruction(s) for ongoing care, per session; total wound(s) surface area less th (03/06/2019)  Change Pressure Dressing on Back (02/27/2019)  Negative pressure wound therapy (eg, vacuum assisted drainage collection), utilizing durable medical equipment (DME), including topical application(s), wound assessment, and instruction(s) for ongoing care, per session; total wound(s) surface area less th (02/27/2019)  Change Pressure Dressing on Back (02/20/2019)  Negative pressure wound therapy (eg, vacuum assisted drainage collection), utilizing durable medical equipment (DME), including topical application(s), wound assessment, and instruction(s) for ongoing care, per session; total wound(s) surface area less th (02/20/2019)  Change Pressure Dressing on Back (02/06/2019)  Negative pressure wound therapy (eg, vacuum assisted drainage collection), utilizing durable medical equipment (DME), including topical application(s), wound assessment, and instruction(s) for ongoing care, per session; total wound(s) surface area less th (02/06/2019)  Change Pressure Dressing on Back (01/30/2019)  Negative pressure wound therapy (eg, vacuum assisted drainage collection), utilizing durable medical equipment (DME), including topical application(s), wound assessment, and instruction(s) for ongoing care, per session; total wound(s) surface area less th (01/30/2019)  Change Pressure Dressing on Back  (01/23/2019)  Negative pressure wound therapy (eg, vacuum assisted drainage collection), utilizing durable medical equipment (DME), including topical application(s), wound assessment, and instruction(s) for ongoing care, per session; total wound(s) surface area less th (01/23/2019)  Change Pressure Dressing on Back (01/16/2019)  Negative pressure wound therapy (eg, vacuum assisted drainage collection), utilizing durable medical equipment (DME), including topical application(s), wound assessment, and instruction(s) for ongoing care, per session; total wound(s) surface area less th (01/16/2019)  Change Pressure Dressing on Back (01/09/2019)  Negative pressure wound therapy (eg, vacuum assisted drainage collection), utilizing durable medical equipment (DME), including topical application(s), wound assessment, and instruction(s) for ongoing care, per session; total wound(s) surface area less th (01/09/2019)  Change Pressure Dressing on Back (01/02/2019)  Negative pressure wound therapy (eg, vacuum assisted drainage collection), utilizing durable medical equipment (DME), including topical application(s), wound assessment, and instruction(s) for ongoing care, per session; total wound(s) surface area less th (01/02/2019)  Change Pressure Dressing on Back (12/26/2018)  Negative pressure wound therapy (eg, vacuum assisted drainage collection), utilizing durable medical equipment (DME), including topical application(s), wound assessment, and instruction(s) for ongoing care, per session; total wound(s) surface area less th (12/26/2018)  Change Pressure Dressing on Back (12/19/2018)  Negative pressure wound therapy (eg, vacuum assisted drainage collection), utilizing durable medical equipment (DME), including topical application(s), wound assessment, and instruction(s) for ongoing care, per session; total wound(s) surface area less th (12/19/2018)  Change Pressure Dressing on Back (12/12/2018)  Negative pressure wound therapy  (eg, vacuum assisted drainage collection), utilizing durable medical equipment (DME), including topical application(s), wound assessment, and instruction(s) for ongoing care, per session; total wound(s) surface area less th (12/12/2018)  Change Pressure Dressing on Back (12/05/2018)  Debridement (eg, high pressure waterjet with/without suction, sharp selective debridement with scissors, scalpel and forceps), open wound, (eg, fibrin, devitalized epidermis and/or dermis, exudate, debris, biofilm), including topical application(s), wound (12/05/2018)  Debridement, bone (includes epidermis, dermis, subcutaneous tissue, muscle and/or fascia, if performed); first 20 sq cm or less (12/05/2018)  Excision of Sacrum, Open Approach (12/05/2018)  Extraction of Buttock Skin, External Approach (12/05/2018)  Negative pressure wound therapy (eg, vacuum assisted drainage collection), utilizing durable medical equipment (DME), including topical application(s), wound assessment, and instruction(s) for ongoing care, per session; total wound(s) surface area less th (12/05/2018)  Debridement (eg, high pressure waterjet with/without suction, sharp selective debridement with scissors, scalpel and forceps), open wound, (eg, fibrin, devitalized epidermis and/or dermis, exudate, debris, biofilm), including topical application(s), wound (11/28/2018)  Extraction of Back Skin, External Approach (11/28/2018)  Extraction of Buttock Skin, External Approach (11/28/2018)  Debridement (eg, high pressure waterjet with/without suction, sharp selective debridement with scissors, scalpel and forceps), open wound, (eg, fibrin, devitalized epidermis and/or dermis, exudate, debris, biofilm), including topical application(s), wound (11/21/2018)  Extraction of Back Skin, External Approach (11/21/2018)  Extraction of Buttock Skin, External Approach (11/21/2018)  Change Pressure Dressing on Back (11/14/2018)  Negative pressure wound therapy (eg, vacuum assisted  drainage collection), utilizing durable medical equipment (DME), including topical application(s), wound assessment, and instruction(s) for ongoing care, per session; total wound(s) surface area less th (11/14/2018)  Change Drainage Device in Bladder, External Approach (11/01/2018)  Cystoscopy (11/01/2018)  Inspection of Bladder, Via Natural or Artificial Opening Endoscopic (11/01/2018)  Suprapubic Catheter Placement (11/01/2018)  Change Pressure Dressing on Back (10/24/2018)  Negative pressure wound therapy (eg, vacuum assisted drainage collection), utilizing durable medical equipment (DME), including topical application(s), wound assessment, and instruction(s) for ongoing care, per session; total wound(s) surface area greater (10/24/2018)  Change Drainage Device in Bladder, External Approach (10/18/2018)  Transfusion of Nonautologous Red Blood Cells into Peripheral Vein, Percutaneous Approach (10/18/2018)  Change Pressure Dressing on Back (10/10/2018)  Change Pressure Dressing on Right Upper Leg (10/10/2018)  Negative pressure wound therapy (eg, vacuum assisted drainage collection), utilizing durable medical equipment (DME), including topical application(s), wound assessment, and instruction(s) for ongoing care, per session; total wound(s) surface area less th (10/10/2018)  Insertion of Infusion Device into Superior Vena Cava, Percutaneous Approach (09/21/2018)  Ultrasonography of Superior Vena Cava, Guidance (09/21/2018)  Change Pressure Dressing on Back (09/14/2018)  Negative pressure wound therapy (eg, vacuum assisted drainage collection), utilizing durable medical equipment (DME), including topical application(s), wound assessment, and instruction(s) for ongoing care, per session; total wound(s) surface area less th (09/14/2018)  Change Pressure Dressing on Back (09/07/2018)  Negative pressure wound therapy (eg, vacuum assisted drainage collection), utilizing durable medical equipment (DME), including topical  application(s), wound assessment, and instruction(s) for ongoing care, per session; total wound(s) surface area less th (09/07/2018)  Change Pressure Dressing on Back (08/31/2018)  Debridement (eg, high pressure waterjet with/without suction, sharp selective debridement with scissors, scalpel and forceps), open wound, (eg, fibrin, devitalized epidermis and/or dermis, exudate, debris, biofilm), including topical application(s), wound (08/31/2018)  Extraction of Buttock Skin, External Approach (08/31/2018)  Negative pressure wound therapy (eg, vacuum assisted drainage collection), utilizing durable medical equipment (DME), including topical application(s), wound assessment, and instruction(s) for ongoing care, per session; total wound(s) surface area less th (08/31/2018)  Change Pressure Dressing on Back (08/24/2018)  Debridement, subcutaneous tissue (includes epidermis and dermis, if performed); first 20 sq cm or less (08/24/2018)  Excision of Back Subcutaneous Tissue and Fascia, Open Approach (08/24/2018)  Negative pressure wound therapy (eg, vacuum assisted drainage collection), utilizing durable medical equipment (DME), including topical application(s), wound assessment, and instruction(s) for ongoing care, per session; total wound(s) surface area less th (08/24/2018)  Change Pressure Dressing on Back (08/17/2018)  Negative pressure wound therapy (eg, vacuum assisted drainage collection), utilizing durable medical equipment (DME), including topical application(s), wound assessment, and instruction(s) for ongoing care, per session; total wound(s) surface area less th (08/17/2018)  Compression of Back using Pressure Dressing (08/10/2018)  Negative pressure wound therapy (eg, vacuum assisted drainage collection), utilizing durable medical equipment (DME), including topical application(s), wound assessment, and instruction(s) for ongoing care, per session; total wound(s) surface area less th (08/10/2018)  Change  Pressure Dressing on Back (08/03/2018)  Negative pressure wound therapy (eg, vacuum assisted drainage collection), utilizing durable medical equipment (DME), including topical application(s), wound assessment, and instruction(s) for ongoing care, per session; total wound(s) surface area less th (08/03/2018)  Change Pressure Dressing on Back (07/20/2018)  Negative pressure wound therapy (eg, vacuum assisted drainage collection), utilizing durable medical equipment (DME), including topical application(s), wound assessment, and instruction(s) for ongoing care, per session; total wound(s) surface area less th (07/20/2018)  Change Pressure Dressing on Back (07/13/2018)  Negative pressure wound therapy (eg, vacuum assisted drainage collection), utilizing durable medical equipment (DME), including topical application(s), wound assessment, and instruction(s) for ongoing care, per session; total wound(s) surface area less th (07/13/2018)  Change Pressure Dressing on Back (07/06/2018)  Negative pressure wound therapy (eg, vacuum assisted drainage collection), utilizing durable medical equipment (DME), including topical application(s), wound assessment, and instruction(s) for ongoing care, per session; total wound(s) surface area greater (07/06/2018)  Change Pressure Dressing on Back (06/29/2018)  Negative pressure wound therapy (eg, vacuum assisted drainage collection), utilizing durable medical equipment (DME), including topical application(s), wound assessment, and instruction(s) for ongoing care, per session; total wound(s) surface area greater (06/29/2018)  Change Pressure Dressing on Back (06/22/2018)  Negative pressure wound therapy (eg, vacuum assisted drainage collection), utilizing durable medical equipment (DME), including topical application(s), wound assessment, and instruction(s) for ongoing care, per session; total wound(s) surface area greater (06/22/2018)  Compression of Back using Pressure Dressing  (06/14/2018)  Negative pressure wound therapy (eg, vacuum assisted drainage collection), utilizing durable medical equipment (DME), including topical application(s), wound assessment, and instruction(s) for ongoing care, per session; total wound(s) surface area greater (06/14/2018)  Compression of Back using Pressure Dressing (06/07/2018)  Negative pressure wound therapy (eg, vacuum assisted drainage collection), utilizing durable medical equipment (DME), including topical application(s), wound assessment, and instruction(s) for ongoing care, per session; total wound(s) surface area greater (06/07/2018)  Compression of Back using Pressure Dressing (05/31/2018)  Negative pressure wound therapy (eg, vacuum assisted drainage collection), utilizing durable medical equipment (DME), including topical application(s), wound assessment, and instruction(s) for ongoing care, per session; total wound(s) surface area greater (05/31/2018)  Compression of Back using Pressure Dressing (05/24/2018)  Negative pressure wound therapy (eg, vacuum assisted drainage collection), utilizing durable medical equipment (DME), including topical application(s), wound assessment, and instruction(s) for ongoing care, per session; total wound(s) surface area greater (05/24/2018)  Compression of Back using Pressure Dressing (05/10/2018)  Negative pressure wound therapy (eg, vacuum assisted drainage collection), utilizing durable medical equipment (DME), including topical application(s), wound assessment, and instruction(s) for ongoing care, per session; total wound(s) surface area greater (05/10/2018)  Compression of Back using Pressure Dressing (05/03/2018)  Negative pressure wound therapy (eg, vacuum assisted drainage collection), utilizing durable medical equipment (DME), including topical application(s), wound assessment, and instruction(s) for ongoing care, per session; total wound(s) surface area greater (05/03/2018)  Compression of Back using  Pressure Dressing (03/27/2018)  Negative pressure wound therapy (eg, vacuum assisted drainage collection), utilizing durable medical equipment (DME), including topical application(s), wound assessment, and instruction(s) for ongoing care, per session; total wound(s) surface area greater (03/27/2018)  Debridement Wound (Left) (02/02/2018)  Excision of Left Hip Muscle, Open Approach (02/02/2018)  Debridement Wound (Right) (02/01/2018)  Excision of Left Lower Extremity Bursa and Ligament, Open Approach (02/01/2018)  Central Line Insertion (Right, Upper, Torso) (01/25/2018)  Debridement Wound (Right) (01/25/2018)  Excision of Left Hip Muscle, Open Approach (01/25/2018)  Insertion of Infusion Device into Right Atrium, Percutaneous Approach (01/25/2018)  Detachment at Left Upper Leg, High, Open Approach (01/11/2018)  Disarticulation (Left) (01/11/2018)  Excision of Left Upper Femur, Open Approach (01/11/2018)  Debridement Wound (Right) (11/27/2017)  Excision of Right Upper Leg Subcutaneous Tissue and Fascia, Open Approach (11/27/2017)  Debridement (eg, high pressure waterjet with/without suction, sharp selective debridement with scissors, scalpel and forceps), open wound, (eg, fibrin, devitalized epidermis and/or dermis, exudate, debris, biofilm), including topical application(s), wound (10/11/2017)  Extraction of Buttock Skin, External Approach (10/11/2017)  Debridement (eg, high pressure waterjet with/without suction, sharp selective debridement with scissors, scalpel and forceps), open wound, (eg, fibrin, devitalized epidermis and/or dermis, exudate, debris, biofilm), including topical application(s), wound (09/06/2017)  Extraction of Buttock Skin, External Approach (09/06/2017)  Debridement (eg, high pressure waterjet with/without suction, sharp selective debridement with scissors, scalpel and forceps), open wound, (eg, fibrin, devitalized epidermis and/or dermis, exudate, debris, biofilm), including topical  application(s), wound (07/19/2017)  Extraction of Buttock Skin, External Approach (07/19/2017)  Debridement (eg, high pressure waterjet with/without suction, sharp selective debridement with scissors, scalpel and forceps), open wound, (eg, fibrin, devitalized epidermis and/or dermis, exudate, debris, biofilm), including topical application(s), wound (04/12/2017)  Extraction of Buttock Skin, External Approach (04/12/2017)  Debridement (eg, high pressure waterjet with/without suction, sharp selective debridement with scissors, scalpel and forceps), open wound, (eg, fibrin, devitalized epidermis and/or dermis, exudate, debris, biofilm), including topical application(s), wound (01/31/2017)  Extraction of Buttock Skin, External Approach (01/31/2017)  Debridement (eg, high pressure waterjet with/without suction, sharp selective debridement with scissors, scalpel and forceps), open wound, (eg, fibrin, devitalized epidermis and/or dermis, exudate, debris, biofilm), including topical application(s), wound (12/21/2016)  Extraction of Buttock Skin, External Approach (12/21/2016)  Debridement (eg, high pressure waterjet with/without suction, sharp selective debridement with scissors, scalpel and forceps), open wound, (eg, fibrin, devitalized epidermis and/or dermis, exudate, debris, biofilm), including topical application(s), wound (11/02/2016)  Extraction of Buttock Skin, External Approach (11/02/2016)  Debridement (eg, high pressure waterjet with/without suction, sharp selective debridement with scissors, scalpel and forceps), open wound, (eg, fibrin, devitalized epidermis and/or dermis, exudate, debris, biofilm), including topical application(s), wound (10/19/2016)  Extraction of Buttock Skin, External Approach (10/19/2016)  Debridement (eg, high pressure waterjet with/without suction, sharp selective debridement with scissors, scalpel and forceps), open wound, (eg, fibrin, devitalized epidermis and/or dermis, exudate,  debris, biofilm), including topical application(s), wound (09/21/2016)  Extraction of Left Upper Leg Skin, External Approach (09/21/2016)  Extraction of Right Foot Skin, External Approach (09/21/2016)  Debridement (eg, high pressure waterjet with/without suction, sharp selective debridement with scissors, scalpel and forceps), open wound, (eg, fibrin, devitalized epidermis and/or dermis, exudate, debris, biofilm), including topical application(s), wound (08/24/2016)  Extraction of Buttock Skin, External Approach (08/24/2016)  Debridement (eg, high pressure waterjet with/without suction, sharp selective debridement with scissors, scalpel and forceps), open wound, (eg, fibrin, devitalized epidermis and/or dermis, exudate, debris, biofilm), including topical application(s), wound (07/27/2016)  Extraction of Buttock Skin, External Approach (07/27/2016)  Extraction of Buttock Subcutaneous Tissue and Fascia, Percutaneous Approach (06/22/2016)  Removal of devitalized tissue from wound(s), non-selective debridement, without anesthesia (eg, wet-to-moist dressings, enzymatic, abrasion), including topical application(s), wound assessment, and instruction(s) for ongoing care, per session (06/22/2016)  Debridement (eg, high pressure waterjet with/without suction, sharp selective debridement with scissors, scalpel and forceps), open wound, (eg, fibrin, devitalized epidermis and/or dermis, exudate, debris, biofilm), including topical application(s), wound (06/01/2016)  Excision of Left Upper Leg Skin, External Approach (06/01/2016)  Insertion of indwelling urinary catheter (07/22/2014)  Other cystoscopy (07/22/2014)  Venous catheterization, not elsewhere classified (05/10/2012)  Continuous invasive mechanical ventilation for less than 96 consecutive hours (05/08/2012)  Cholecystectomy  Colostomy  Suprapubic catheter procedure   Medications  enoxaparin, 40 mg= 0.4 mL, Subcutaneous, Daily,? ?Not taking  imipenem-cilastatin 500 mg  injection, IV Piggyback, [id number],? ?Not taking  lactobacillus acidophilus oral capsule, Oral, Daily  lactulose 10 g/15 mL oral syrup, 20 gm= 30 mL, Oral, BID, 3 refills  metoclopramide 10 mg oral tablet, 5 mg= 0.5 tab(s), Oral, QID  midodrine 5 mg oral tablet, 10 mg= 2 tab(s), Oral, TID  Allergies  No Known Allergies  Social History  Alcohol - No Risk, 07/23/2014  Past, 10/16/2018  Home/Environment  Lives with Significant other. Home equipment: Wheelchair. Family/Friends available for support: Yes., 06/22/2016  Substance Abuse  Never, 05/29/2016  Tobacco - No Risk, 05/08/2012  Never (less than 100 in lifetime), N/A, 04/02/2019  Never (less than 100 in lifetime), N/A, 03/30/2019  Never (less than 100 in lifetime), N/A, 03/22/2019  Never (less than 100 in lifetime), N/A, 03/11/2019  Never (less than 100 in lifetime), No, 03/07/2019  Never (less than 100 in lifetime), N/A, 02/06/2019  Never smoker, N/A, 10/29/2018  Never smoker, 05/29/2016  Family History  Acute myocardial infarction.: Mother.  Diabetes mellitus type 2: Mother.  Heart failure.: Mother.  Father: History is unknown  Health Maintenance  Health Maintenance  ???Pending?(in the next year)  ??? ??Due?  ??? ? ? ?Alcohol Misuse Screening due??04/24/19??and every 1??year(s)  ??? ? ? ?Coronary Artery Disease Maintenance-Lipid Lowering Therapy due??04/24/19??and every?  ??? ? ? ?Tetanus Vaccine due??04/24/19??and every 10??year(s)  ??? ??Due In Future?  ??? ? ? ?ADL Screening not due until??03/24/20??and every 1??year(s)  ??? ? ? ?Obesity Screening not due until??04/01/20??and every 1??year(s)  ??? ? ? ?Hypertension Management-BMP not due until??04/09/20??and every 1??year(s)  ??? ? ? ?Blood Pressure Screening not due until??04/16/20??and every 1??year(s)  ??? ? ? ?Body Mass Index Check not due until??04/16/20??and every 1??year(s)  ??? ? ? ?Hypertension Management-Blood Pressure not due until??04/16/20??and every 1??year(s)  ???Satisfied?(in the past 1  year)  ??? ??Satisfied?  ??? ? ? ?ADL Screening on??03/24/19.??Satisfied by Nimco Byrnes LPN.  ??? ? ? ?Blood Pressure Screening on??04/24/19.??Satisfied by Isha Branch RN  ??? ? ? ?Diabetes Screening on??04/10/19.??Satisfied by Erin Robertson  ??? ? ? ?Hypertension Management-Blood Pressure on??04/24/19.??Satisfied by Isha Branch RN  ??? ? ? ?Influenza Vaccine on??03/22/19.??Satisfied by Sanaz Jackson RN  ??? ? ? ?Obesity Screening on??04/01/19.??Satisfied by Yas Burnett  ?  ?

## 2022-05-03 NOTE — HISTORICAL [facility]
This is a historical note converted from [place]. Formatting and pictures may have been removed.  Please reference [place] for original formatting and attached multimedia. History of Present Illness  44yo male who is known to our service from previous admission for his decubitus ulcers. He was sent to [facility] for his hypotension and for ID consultation for his Acinetobacter.? He states no complaints at this time.? He was placed on Tobramycin for his Acinetobacter.? He has been transferred back to swing bed for continuation of his wound care and IV antibiotics.  Review of Systems  ?  ?????Constitutional: ?No fever, No chills, No sweats, No weakness, No fatigue. ?  ?????Eye: ?No double vision, No dry eyes, No photophobia. ?  ?????Ear/Nose/Mouth/Throat: ?No ear pain, No nasal congestion, No sore throat. ?  ?????Respiratory: ?No shortness of breath, No cough, No sputum production, No hemoptysis, No wheezing, No pleuritic pain. ?  ?????Cardiovascular: ?No chest pain, No palpitations, No peripheral edema, No syncope. ?  ?????Gastrointestinal: ?No nausea, No vomiting, No diarrhea, No constipation, No heartburn, No abdominal pain. ?  ?????Genitourinary: ?No dysuria, No hematuria, No change in urine stream. ?  ?????Hematology/Lymphatics: ?No anemia, No bruising tendency, No bruise, No hemorrhage, No petechiae. ?  ?????Endocrine: ?No excessive thirst, No polyuria, No cold intolerance. ?  ?????Immunologic: ?No recurrent fevers, No recurrent infections. ?  ?????Musculoskeletal: ?Joint pain, No back pain, No muscle pain, No decreased range of motion. ?Quadriplegia  ?????Integumentary: ?No rash, No pruritus, No petechiae,?+ skin lesion. ?  ?????Neurologic: ?Alert and oriented X4, No abnormal balance, No confusion, No tingling. ?  ?????Psychiatric: ?No anxiety, No depression. ?  Physical Exam  Vitals & Measurements  T:?36.7? ?C (Oral)? HR:?78(Peripheral)? RR:?18? BP:?98/49? SpO2:?99%? WT:?73.8?kg?  General: ?Alert and oriented, No  acute distress. ?  ??????? ? Appearance: Well nourished. ?  ??????? ? Behavior: Appropriate. ?  ??????? ? Skin: Normal for ethnicity. ?  ?????Eye: ?Pupils are equal, round and reactive to light, Extraocular movements are intact. ?  ?????HENT: ?Normocephalic, Normal hearing, Oral mucosa is moist, No pharyngeal erythema. ?  ?????Neck: ?Supple, Non-tender, No carotid bruit, No jugular venous distention, No lymphadenopathy, No thyromegaly. ?  ?????Respiratory: ?Lungs are clear to auscultation, Breath sounds are equal, No chest wall tenderness. ?  ?????Cardiovascular: ?Normal rate, Regular rhythm, No murmur, No gallop, Good pulses equal in all extremities, Normal peripheral perfusion, No edema. ?  ?????Gastrointestinal: ?Soft, Non-tender, Non-distended, Normal bowel sounds, No organomegaly. ?colostomy  ?????Genitourinary: ?No costovertebral angle tenderness, No urethral discharge. ?  ?????Lymphatics: ?No lymphadenopathy neck, axilla, groin. ?  ?????Musculoskeletal: ?Normal range of motion, Normal strength, No tenderness, No swelling,?quadriplegia?  ?????Integumentary: ?Warm, Dry, Pink, Intact, No rash. ?decubitus?ulcers  ?????Neurologic: ?Alert, Oriented, Normal sensory, Normal motor function, No focal deficits, Cranial Nerves II-XII are grossly intact. ?  ?????Psychiatric: ?Cooperative, Appropriate mood & affect, Normal judgment. ?  Assessment/Plan  1.?Decubitus ulcers  2.?Acute disease or injury-related malnutrition  3.?Anemia  4.?Hypotension  Orders:  enoxaparin, 40 mg = 0.4 mL, Subcutaneous, Daily, 0 Refill(s)  imipenem-cilastatin, IV Piggyback, [id number] (6-12-6-12), 0 Refill(s)  lactobacillus acidophilus, Oral, Daily, 0 Refill(s)  metoclopramide, 5 mg = 0.5 tab(s), Oral, QID, 0 Refill(s)  midodrine, 10 mg = 2 tab(s), Oral, TID, 0 Refill(s)  tobramycin, IV Piggyback, q24hr, 0 Refill(s)  Bed [facility] Fluidized [place] [id number], 1  CBC w/ Auto Diff, AM Next collect, 04/04/19 5:00:00 CDT, Blood, Stop date  04/04/19 5:00:00 CDT, Lab Collect, 04/04/19 5:00:00 CDT  Comprehensive Metabolic Panel, Routine collect, 04/04/19 5:00:00 CDT, Blood, Stop date 04/04/19 5:00:00 CDT, Lab Collect, in AM, 04/04/19 5:00:00 CDT  Discharge, 04/02/19 14:30:00 CDT, Dis/Trn to Swing Bed, if OK with ID  Discharge Activity, Activity as Tolerated  Discharge Diet, Regular  Document Interdisciplinary Team Conference Note SNF, 04/03/19 8:10:00 CDT, Once-NOW, Stop date 04/03/19 8:10:00 CDT  Magnesium Level, AM Next collect, 04/04/19 5:00:00 CDT, Blood, Stop date 04/04/19 5:00:00 CDT, Lab Collect, 04/04/19 5:00:00 CDT  MD to Nurse, 04/03/19 12:38:00 CDT, Change suprapubic catheter on 4/12/19  Occupational Therapy Eval and Treat, 04/03/19 7:09:00 CDT, ADL, Stop date 04/03/19 7:09:00 CDT, Stretcher, Patient has IV, Standard Precautions  Regular Diet, 04/03/19 8:45:00 CDT, Start Meal: Next Meal  Wound Care, 04/03/19 12:39:00 CDT, Daily, Left hip wound- Clean daily with normal saline, pack with calcium alginate with silver, cover with gauze and secure with medipore tape. Apply A & D ointment to surrounding skin and on crusted area of older wound  Wound Care, 04/03/19 12:40:00 CDT, qMWF, Wound vac to sacrum. Cleanse with normal saline and apply wound vac  Wound Care, 04/03/19 12:44:00 CDT, Daily, Right hip wound- cleanse with normal saline, pack with fluffed measalt, cover with abd pad, secure with medipore tape  follow labs  DVt prophylaxis  wound care  IV antibiotics  resume current meds   Problem List/Past Medical History  Ongoing  Acute disease or injury-related malnutrition  Adrenal hypofunction  Anemia  Blocked suprapubic catheter  Decubitus pressure sore  Decubitus ulcer of right perineal ischial region, stage 2  Elevated liver enzymes level  Hypokalemia  Hypomagnesemia  Hypotension  Metabolic acidosis  quadraplegia  Severe malnutrition  Tachycardia  Historical  Cholecystectomy  Colostomy  debridement of sacral wound  Urinary  retention  Procedure/Surgical History  Drainage of Right Large Intestine with Drainage Device, Percutaneous Endoscopic Approach (03/16/2019)  Resection of Appendix, Percutaneous Endoscopic Approach (03/16/2019)  Appendectomy Laparoscopic (03/15/2019)  Drainage of Bladder with Drainage Device, Percutaneous Endoscopic Approach (03/14/2019)  Suprapubic Catheter Placement (03/14/2019)  Change Pressure Dressing on Back (03/06/2019)  Negative pressure wound therapy (eg, vacuum assisted drainage collection), utilizing durable medical equipment (DME), including topical application(s), wound assessment, and instruction(s) for ongoing care, per session; total wound(s) surface area less th (03/06/2019)  Change Pressure Dressing on Back (02/27/2019)  Negative pressure wound therapy (eg, vacuum assisted drainage collection), utilizing durable medical equipment (DME), including topical application(s), wound assessment, and instruction(s) for ongoing care, per session; total wound(s) surface area less th (02/27/2019)  Change Pressure Dressing on Back (02/20/2019)  Negative pressure wound therapy (eg, vacuum assisted drainage collection), utilizing durable medical equipment (DME), including topical application(s), wound assessment, and instruction(s) for ongoing care, per session; total wound(s) surface area less th (02/20/2019)  Change Pressure Dressing on Back (02/06/2019)  Negative pressure wound therapy (eg, vacuum assisted drainage collection), utilizing durable medical equipment (DME), including topical application(s), wound assessment, and instruction(s) for ongoing care, per session; total wound(s) surface area less th (02/06/2019)  Change Pressure Dressing on Back (01/30/2019)  Negative pressure wound therapy (eg, vacuum assisted drainage collection), utilizing durable medical equipment (DME), including topical application(s), wound assessment, and instruction(s) for ongoing care, per session; total wound(s) surface area  less th (01/30/2019)  Change Pressure Dressing on Back (01/23/2019)  Negative pressure wound therapy (eg, vacuum assisted drainage collection), utilizing durable medical equipment (DME), including topical application(s), wound assessment, and instruction(s) for ongoing care, per session; total wound(s) surface area less th (01/23/2019)  Change Pressure Dressing on Back (01/16/2019)  Negative pressure wound therapy (eg, vacuum assisted drainage collection), utilizing durable medical equipment (DME), including topical application(s), wound assessment, and instruction(s) for ongoing care, per session; total wound(s) surface area less th (01/16/2019)  Change Pressure Dressing on Back (01/09/2019)  Negative pressure wound therapy (eg, vacuum assisted drainage collection), utilizing durable medical equipment (DME), including topical application(s), wound assessment, and instruction(s) for ongoing care, per session; total wound(s) surface area less th (01/09/2019)  Change Pressure Dressing on Back (01/02/2019)  Negative pressure wound therapy (eg, vacuum assisted drainage collection), utilizing durable medical equipment (DME), including topical application(s), wound assessment, and instruction(s) for ongoing care, per session; total wound(s) surface area less th (01/02/2019)  Change Pressure Dressing on Back (12/26/2018)  Negative pressure wound therapy (eg, vacuum assisted drainage collection), utilizing durable medical equipment (DME), including topical application(s), wound assessment, and instruction(s) for ongoing care, per session; total wound(s) surface area less th (12/26/2018)  Change Pressure Dressing on Back (12/19/2018)  Negative pressure wound therapy (eg, vacuum assisted drainage collection), utilizing durable medical equipment (DME), including topical application(s), wound assessment, and instruction(s) for ongoing care, per session; total wound(s) surface area less th (12/19/2018)  Change Pressure Dressing  on Back (12/12/2018)  Negative pressure wound therapy (eg, vacuum assisted drainage collection), utilizing durable medical equipment (DME), including topical application(s), wound assessment, and instruction(s) for ongoing care, per session; total wound(s) surface area less th (12/12/2018)  Change Pressure Dressing on Back (12/05/2018)  Debridement (eg, high pressure waterjet with/without suction, sharp selective debridement with scissors, scalpel and forceps), open wound, (eg, fibrin, devitalized epidermis and/or dermis, exudate, debris, biofilm), including topical application(s), wound (12/05/2018)  Debridement, bone (includes epidermis, dermis, subcutaneous tissue, muscle and/or fascia, if performed); first 20 sq cm or less (12/05/2018)  Excision of Sacrum, Open Approach (12/05/2018)  Extraction of Buttock Skin, External Approach (12/05/2018)  Negative pressure wound therapy (eg, vacuum assisted drainage collection), utilizing durable medical equipment (DME), including topical application(s), wound assessment, and instruction(s) for ongoing care, per session; total wound(s) surface area less th (12/05/2018)  Debridement (eg, high pressure waterjet with/without suction, sharp selective debridement with scissors, scalpel and forceps), open wound, (eg, fibrin, devitalized epidermis and/or dermis, exudate, debris, biofilm), including topical application(s), wound (11/28/2018)  Extraction of Back Skin, External Approach (11/28/2018)  Extraction of Buttock Skin, External Approach (11/28/2018)  Debridement (eg, high pressure waterjet with/without suction, sharp selective debridement with scissors, scalpel and forceps), open wound, (eg, fibrin, devitalized epidermis and/or dermis, exudate, debris, biofilm), including topical application(s), wound (11/21/2018)  Extraction of Back Skin, External Approach (11/21/2018)  Extraction of Buttock Skin, External Approach (11/21/2018)  Change Pressure Dressing on Back  (11/14/2018)  Negative pressure wound therapy (eg, vacuum assisted drainage collection), utilizing durable medical equipment (DME), including topical application(s), wound assessment, and instruction(s) for ongoing care, per session; total wound(s) surface area less th (11/14/2018)  Change Drainage Device in Bladder, External Approach (11/01/2018)  Cystoscopy (11/01/2018)  Inspection of Bladder, Via Natural or Artificial Opening Endoscopic (11/01/2018)  Suprapubic Catheter Placement (11/01/2018)  Change Pressure Dressing on Back (10/24/2018)  Negative pressure wound therapy (eg, vacuum assisted drainage collection), utilizing durable medical equipment (DME), including topical application(s), wound assessment, and instruction(s) for ongoing care, per session; total wound(s) surface area greater (10/24/2018)  Change Drainage Device in Bladder, External Approach (10/18/2018)  Transfusion of Nonautologous Red Blood Cells into Peripheral Vein, Percutaneous Approach (10/18/2018)  Change Pressure Dressing on Back (10/10/2018)  Change Pressure Dressing on Right Upper Leg (10/10/2018)  Negative pressure wound therapy (eg, vacuum assisted drainage collection), utilizing durable medical equipment (DME), including topical application(s), wound assessment, and instruction(s) for ongoing care, per session; total wound(s) surface area less th (10/10/2018)  Insertion of Infusion Device into Superior Vena Cava, Percutaneous Approach (09/21/2018)  Ultrasonography of Superior Vena Cava, Guidance (09/21/2018)  Change Pressure Dressing on Back (09/14/2018)  Negative pressure wound therapy (eg, vacuum assisted drainage collection), utilizing durable medical equipment (DME), including topical application(s), wound assessment, and instruction(s) for ongoing care, per session; total wound(s) surface area less th (09/14/2018)  Change Pressure Dressing on Back (09/07/2018)  Negative pressure wound therapy (eg, vacuum assisted drainage  collection), utilizing durable medical equipment (DME), including topical application(s), wound assessment, and instruction(s) for ongoing care, per session; total wound(s) surface area less th (09/07/2018)  Change Pressure Dressing on Back (08/31/2018)  Debridement (eg, high pressure waterjet with/without suction, sharp selective debridement with scissors, scalpel and forceps), open wound, (eg, fibrin, devitalized epidermis and/or dermis, exudate, debris, biofilm), including topical application(s), wound (08/31/2018)  Extraction of Buttock Skin, External Approach (08/31/2018)  Negative pressure wound therapy (eg, vacuum assisted drainage collection), utilizing durable medical equipment (DME), including topical application(s), wound assessment, and instruction(s) for ongoing care, per session; total wound(s) surface area less th (08/31/2018)  Change Pressure Dressing on Back (08/24/2018)  Debridement, subcutaneous tissue (includes epidermis and dermis, if performed); first 20 sq cm or less (08/24/2018)  Excision of Back Subcutaneous Tissue and Fascia, Open Approach (08/24/2018)  Negative pressure wound therapy (eg, vacuum assisted drainage collection), utilizing durable medical equipment (DME), including topical application(s), wound assessment, and instruction(s) for ongoing care, per session; total wound(s) surface area less th (08/24/2018)  Change Pressure Dressing on Back (08/17/2018)  Negative pressure wound therapy (eg, vacuum assisted drainage collection), utilizing durable medical equipment (DME), including topical application(s), wound assessment, and instruction(s) for ongoing care, per session; total wound(s) surface area less th (08/17/2018)  Compression of Back using Pressure Dressing (08/10/2018)  Negative pressure wound therapy (eg, vacuum assisted drainage collection), utilizing durable medical equipment (DME), including topical application(s), wound assessment, and instruction(s) for ongoing care,  per session; total wound(s) surface area less th (08/10/2018)  Change Pressure Dressing on Back (08/03/2018)  Negative pressure wound therapy (eg, vacuum assisted drainage collection), utilizing durable medical equipment (DME), including topical application(s), wound assessment, and instruction(s) for ongoing care, per session; total wound(s) surface area less th (08/03/2018)  Change Pressure Dressing on Back (07/20/2018)  Negative pressure wound therapy (eg, vacuum assisted drainage collection), utilizing durable medical equipment (DME), including topical application(s), wound assessment, and instruction(s) for ongoing care, per session; total wound(s) surface area less th (07/20/2018)  Change Pressure Dressing on Back (07/13/2018)  Negative pressure wound therapy (eg, vacuum assisted drainage collection), utilizing durable medical equipment (DME), including topical application(s), wound assessment, and instruction(s) for ongoing care, per session; total wound(s) surface area less th (07/13/2018)  Change Pressure Dressing on Back (07/06/2018)  Negative pressure wound therapy (eg, vacuum assisted drainage collection), utilizing durable medical equipment (DME), including topical application(s), wound assessment, and instruction(s) for ongoing care, per session; total wound(s) surface area greater (07/06/2018)  Change Pressure Dressing on Back (06/29/2018)  Negative pressure wound therapy (eg, vacuum assisted drainage collection), utilizing durable medical equipment (DME), including topical application(s), wound assessment, and instruction(s) for ongoing care, per session; total wound(s) surface area greater (06/29/2018)  Change Pressure Dressing on Back (06/22/2018)  Negative pressure wound therapy (eg, vacuum assisted drainage collection), utilizing durable medical equipment (DME), including topical application(s), wound assessment, and instruction(s) for ongoing care, per session; total wound(s) surface area  greater (06/22/2018)  Compression of Back using Pressure Dressing (06/14/2018)  Negative pressure wound therapy (eg, vacuum assisted drainage collection), utilizing durable medical equipment (DME), including topical application(s), wound assessment, and instruction(s) for ongoing care, per session; total wound(s) surface area greater (06/14/2018)  Compression of Back using Pressure Dressing (06/07/2018)  Negative pressure wound therapy (eg, vacuum assisted drainage collection), utilizing durable medical equipment (DME), including topical application(s), wound assessment, and instruction(s) for ongoing care, per session; total wound(s) surface area greater (06/07/2018)  Compression of Back using Pressure Dressing (05/31/2018)  Negative pressure wound therapy (eg, vacuum assisted drainage collection), utilizing durable medical equipment (DME), including topical application(s), wound assessment, and instruction(s) for ongoing care, per session; total wound(s) surface area greater (05/31/2018)  Compression of Back using Pressure Dressing (05/24/2018)  Negative pressure wound therapy (eg, vacuum assisted drainage collection), utilizing durable medical equipment (DME), including topical application(s), wound assessment, and instruction(s) for ongoing care, per session; total wound(s) surface area greater (05/24/2018)  Compression of Back using Pressure Dressing (05/10/2018)  Negative pressure wound therapy (eg, vacuum assisted drainage collection), utilizing durable medical equipment (DME), including topical application(s), wound assessment, and instruction(s) for ongoing care, per session; total wound(s) surface area greater (05/10/2018)  Compression of Back using Pressure Dressing (05/03/2018)  Negative pressure wound therapy (eg, vacuum assisted drainage collection), utilizing durable medical equipment (DME), including topical application(s), wound assessment, and instruction(s) for ongoing care, per session; total  wound(s) surface area greater (05/03/2018)  Compression of Back using Pressure Dressing (03/27/2018)  Negative pressure wound therapy (eg, vacuum assisted drainage collection), utilizing durable medical equipment (DME), including topical application(s), wound assessment, and instruction(s) for ongoing care, per session; total wound(s) surface area greater (03/27/2018)  Debridement Wound (Left) (02/02/2018)  Excision of Left Hip Muscle, Open Approach (02/02/2018)  Debridement Wound (Right) (02/01/2018)  Excision of Left Lower Extremity Bursa and Ligament, Open Approach (02/01/2018)  Central Line Insertion (Right, Upper, Torso) (01/25/2018)  Debridement Wound (Right) (01/25/2018)  Excision of Left Hip Muscle, Open Approach (01/25/2018)  Insertion of Infusion Device into Right Atrium, Percutaneous Approach (01/25/2018)  Detachment at Left Upper Leg, High, Open Approach (01/11/2018)  Disarticulation (Left) (01/11/2018)  Excision of Left Upper Femur, Open Approach (01/11/2018)  Debridement Wound (Right) (11/27/2017)  Excision of Right Upper Leg Subcutaneous Tissue and Fascia, Open Approach (11/27/2017)  Debridement (eg, high pressure waterjet with/without suction, sharp selective debridement with scissors, scalpel and forceps), open wound, (eg, fibrin, devitalized epidermis and/or dermis, exudate, debris, biofilm), including topical application(s), wound (10/11/2017)  Extraction of Buttock Skin, External Approach (10/11/2017)  Debridement (eg, high pressure waterjet with/without suction, sharp selective debridement with scissors, scalpel and forceps), open wound, (eg, fibrin, devitalized epidermis and/or dermis, exudate, debris, biofilm), including topical application(s), wound (09/06/2017)  Extraction of Buttock Skin, External Approach (09/06/2017)  Debridement (eg, high pressure waterjet with/without suction, sharp selective debridement with scissors, scalpel and forceps), open wound, (eg, fibrin, devitalized epidermis  and/or dermis, exudate, debris, biofilm), including topical application(s), wound (07/19/2017)  Extraction of Buttock Skin, External Approach (07/19/2017)  Debridement (eg, high pressure waterjet with/without suction, sharp selective debridement with scissors, scalpel and forceps), open wound, (eg, fibrin, devitalized epidermis and/or dermis, exudate, debris, biofilm), including topical application(s), wound (04/12/2017)  Extraction of Buttock Skin, External Approach (04/12/2017)  Debridement (eg, high pressure waterjet with/without suction, sharp selective debridement with scissors, scalpel and forceps), open wound, (eg, fibrin, devitalized epidermis and/or dermis, exudate, debris, biofilm), including topical application(s), wound (01/31/2017)  Extraction of Buttock Skin, External Approach (01/31/2017)  Debridement (eg, high pressure waterjet with/without suction, sharp selective debridement with scissors, scalpel and forceps), open wound, (eg, fibrin, devitalized epidermis and/or dermis, exudate, debris, biofilm), including topical application(s), wound (12/21/2016)  Extraction of Buttock Skin, External Approach (12/21/2016)  Debridement (eg, high pressure waterjet with/without suction, sharp selective debridement with scissors, scalpel and forceps), open wound, (eg, fibrin, devitalized epidermis and/or dermis, exudate, debris, biofilm), including topical application(s), wound (11/02/2016)  Extraction of Buttock Skin, External Approach (11/02/2016)  Debridement (eg, high pressure waterjet with/without suction, sharp selective debridement with scissors, scalpel and forceps), open wound, (eg, fibrin, devitalized epidermis and/or dermis, exudate, debris, biofilm), including topical application(s), wound (10/19/2016)  Extraction of Buttock Skin, External Approach (10/19/2016)  Debridement (eg, high pressure waterjet with/without suction, sharp selective debridement with scissors, scalpel and forceps), open wound, (eg,  fibrin, devitalized epidermis and/or dermis, exudate, debris, biofilm), including topical application(s), wound (09/21/2016)  Extraction of Left Upper Leg Skin, External Approach (09/21/2016)  Extraction of Right Foot Skin, External Approach (09/21/2016)  Debridement (eg, high pressure waterjet with/without suction, sharp selective debridement with scissors, scalpel and forceps), open wound, (eg, fibrin, devitalized epidermis and/or dermis, exudate, debris, biofilm), including topical application(s), wound (08/24/2016)  Extraction of Buttock Skin, External Approach (08/24/2016)  Debridement (eg, high pressure waterjet with/without suction, sharp selective debridement with scissors, scalpel and forceps), open wound, (eg, fibrin, devitalized epidermis and/or dermis, exudate, debris, biofilm), including topical application(s), wound (07/27/2016)  Extraction of Buttock Skin, External Approach (07/27/2016)  Extraction of Buttock Subcutaneous Tissue and Fascia, Percutaneous Approach (06/22/2016)  Removal of devitalized tissue from wound(s), non-selective debridement, without anesthesia (eg, wet-to-moist dressings, enzymatic, abrasion), including topical application(s), wound assessment, and instruction(s) for ongoing care, per session (06/22/2016)  Debridement (eg, high pressure waterjet with/without suction, sharp selective debridement with scissors, scalpel and forceps), open wound, (eg, fibrin, devitalized epidermis and/or dermis, exudate, debris, biofilm), including topical application(s), wound (06/01/2016)  Excision of Left Upper Leg Skin, External Approach (06/01/2016)  Insertion of indwelling urinary catheter (07/22/2014)  Other cystoscopy (07/22/2014)  Venous catheterization, not elsewhere classified (05/10/2012)  Continuous invasive mechanical ventilation for less than 96 consecutive hours (05/08/2012)  Cholecystectomy  Colostomy  Suprapubic catheter procedure   Medications  Inpatient  Florastor 250 mg oral  capsule, 250 mg= 1 cap(s), Oral, Daily  imipenem-cilastatin 500 mg injection  Lactated Ringers Injection intravenous solution 1,000 mL, 1000 mL, IV  Lovenox, 40 mg= 0.4 mL, Subcutaneous, Daily  metoclopramide, 5 mg= 0.5 tab(s), Oral, QID  midodrine 5 mg oral tablet, 10 mg= 2 tab(s), Oral, TID  Sodium Chloride 0.9% Normal Saline Flush, 10 mL, IV Push, As Directed, PRN  Sodium Chloride 0.9% Normal Saline Flush, 20 mL, IV Push, As Directed, PRN  Sodium Chloride 0.9% Normal Saline Flush, 10 mL, IV Push, q12hr  tobramycin (for IVPB)  Home  enoxaparin, 40 mg= 0.4 mL, Subcutaneous, Daily  imipenem-cilastatin 500 mg injection, IV Piggyback, [id number]  lactobacillus acidophilus oral capsule, Oral, Daily  metoclopramide 10 mg oral tablet, 5 mg= 0.5 tab(s), Oral, QID  midodrine 5 mg oral tablet, 10 mg= 2 tab(s), Oral, TID  tobramycin, IV Piggyback, q24hr  Allergies  No Known Allergies  Social History  Alcohol - No Risk, 07/23/2014  Past, 10/16/2018  Home/Environment  Lives with Significant other. Home equipment: Wheelchair. Family/Friends available for support: Yes., 06/22/2016  Substance Abuse  Never, 05/29/2016  Tobacco - No Risk, 05/08/2012  Never (less than 100 in lifetime), N/A, 04/02/2019  Never (less than 100 in lifetime), N/A, 03/30/2019  Never (less than 100 in lifetime), N/A, 03/22/2019  Never (less than 100 in lifetime), N/A, 03/11/2019  Never (less than 100 in lifetime), No, 03/07/2019  Never (less than 100 in lifetime), N/A, 02/06/2019  Never smoker, N/A, 10/29/2018  Never smoker, 05/29/2016  Family History  Acute myocardial infarction.: Mother.  Diabetes mellitus type 2: Mother.  Heart failure.: Mother.  Father: History is unknown

## 2022-05-23 ENCOUNTER — HOSPITAL ENCOUNTER (OUTPATIENT)
Dept: WOUND CARE | Facility: HOSPITAL | Age: 47
Discharge: HOME | End: 2022-05-23
Attending: NURSE PRACTITIONER
Payer: MEDICARE

## 2022-05-23 VITALS
HEIGHT: 65 IN | TEMPERATURE: 98 F | RESPIRATION RATE: 18 BRPM | HEART RATE: 63 BPM | WEIGHT: 164 LBS | DIASTOLIC BLOOD PRESSURE: 74 MMHG | BODY MASS INDEX: 27.32 KG/M2 | SYSTOLIC BLOOD PRESSURE: 130 MMHG

## 2022-05-23 DIAGNOSIS — G82.50 QUADRIPLEGIA, UNSPECIFIED: ICD-10-CM

## 2022-05-23 DIAGNOSIS — M86.559: ICD-10-CM

## 2022-05-23 DIAGNOSIS — L89.214 STAGE IV PRESSURE ULCER OF RIGHT HIP: ICD-10-CM

## 2022-05-23 DIAGNOSIS — L89.324 PRESSURE ULCER OF LEFT BUTTOCK, STAGE 4: Primary | ICD-10-CM

## 2022-05-23 PROBLEM — L89.159 PRESSURE INJURY OF SKIN OF SACRAL REGION: Status: ACTIVE | Noted: 2022-05-23

## 2022-05-23 PROBLEM — I82.401 ACUTE EMBOLISM AND THROMBOSIS OF UNSPECIFIED DEEP VEINS OF RIGHT LOWER EXTREMITY: Status: ACTIVE | Noted: 2022-05-23

## 2022-05-23 PROBLEM — D64.9 ANEMIA: Status: ACTIVE | Noted: 2022-05-23

## 2022-05-23 PROBLEM — Z91.81 HISTORY OF FALL: Status: ACTIVE | Noted: 2022-05-23

## 2022-05-23 PROBLEM — N39.0 ACUTE URINARY TRACT INFECTION: Status: ACTIVE | Noted: 2022-05-23
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
## 2022-05-23 PROBLEM — G82.53: Status: ACTIVE | Noted: 2022-05-23

## 2022-05-23 PROBLEM — I10 BENIGN ESSENTIAL HYPERTENSION: Status: ACTIVE | Noted: 2022-05-23

## 2022-05-23 PROBLEM — M81.0 AGE-RELATED OSTEOPOROSIS WITHOUT CURRENT PATHOLOGICAL FRACTURE: Status: ACTIVE | Noted: 2021-09-02

## 2022-05-23 PROBLEM — Z43.3 ENCOUNTER FOR ATTENTION TO COLOSTOMY: Status: ACTIVE | Noted: 2021-09-02

## 2022-05-23 PROBLEM — T83.9XXA COMPLICATIONS DUE TO GENITOURINARY DEVICE, IMPLANT, AND GRAFT: Status: ACTIVE | Noted: 2022-05-23

## 2022-05-23 PROBLEM — R74.8 ELEVATED LIVER ENZYMES: Status: ACTIVE | Noted: 2022-05-23

## 2022-05-23 PROBLEM — I70.90 ATHEROSCLEROSIS OF ARTERY: Status: ACTIVE | Noted: 2022-05-23

## 2022-05-23 PROBLEM — E27.40 ADRENAL HYPOFUNCTION: Status: ACTIVE | Noted: 2022-05-23

## 2022-05-23 PROBLEM — R53.81 DEBILITY: Status: ACTIVE | Noted: 2022-05-23

## 2022-05-23 PROBLEM — M81.0 OSTEOPOROSIS: Status: ACTIVE | Noted: 2022-05-23

## 2022-05-23 PROBLEM — I82.409 DEEP VEIN THROMBOSIS (DVT): Status: ACTIVE | Noted: 2022-05-23

## 2022-05-23 PROBLEM — N52.9 IMPOTENCE: Status: ACTIVE | Noted: 2017-08-10

## 2022-05-23 PROBLEM — G90.9 DISORDER OF AUTONOMIC NERVOUS SYSTEM: Status: ACTIVE | Noted: 2022-05-23

## 2022-05-23 PROBLEM — M20.41 ACQUIRED HAMMER TOE OF RIGHT FOOT: Status: ACTIVE | Noted: 2022-05-23

## 2022-05-23 PROBLEM — K59.00 CONSTIPATION, UNSPECIFIED: Status: ACTIVE | Noted: 2021-09-02

## 2022-05-23 PROBLEM — Z99.3 DEPENDENCE ON WHEELCHAIR: Status: ACTIVE | Noted: 2021-09-02

## 2022-05-23 PROBLEM — M86.69: Status: ACTIVE | Noted: 2022-05-23

## 2022-05-23 PROCEDURE — 97597 DBRDMT OPN WND 1ST 20 CM/<: CPT

## 2022-05-23 PROCEDURE — 99213 OFFICE O/P EST LOW 20 MIN: CPT | Mod: 25

## 2022-05-23 RX ORDER — LINACLOTIDE 145 UG/1
145 CAPSULE, GELATIN COATED ORAL DAILY
COMMUNITY
Start: 2022-03-08

## 2022-05-23 NOTE — PROGRESS NOTES
Subjective:       Patient ID: Diego Martinez Jr. is a 46 y.o. male.    Chief Complaint: Pressure Ulcer (Left ischial and right hip wounds)    Mr. Martinez is a long time patient of [facility].    He comes via wheelchair with a care attendant present with him at all times.    He does have a history of quadriplegia.  He has home health who assist him with his wound care daily and care attendants to provide care at other times.  He has a very pleasant demeanor and added to an is usually jovial in spite of his challenges.  This visit, both wounds appear to be improving.  He reports that he recently changed home health agencies and is much more satisfied with a [facility] than he was with [facility].  The outcome is obvious in his wound.  This visit, we applied powdered collagen to the left ischial wound and we applied Omnistat into the wound on the right hip as it was bleeding moderately post mechanical debridement.  Both of these dressings were instructed to leave intact until Wednesday.      HPI  Review of Systems   Constitutional:        Quadraplegic   HENT: Negative.    Respiratory: Negative.    Cardiovascular: Negative.    Gastrointestinal:        Colostomy   Genitourinary:        Suprapubic catheter   Musculoskeletal: Positive for gait problem.        Unable to ambulate         Objective:        Physical Exam  Constitutional:       Appearance: He is obese.   Genitourinary:     Comments: Urine cloudy  Musculoskeletal:      Comments: Paraplegic; spastic upper limbs; arrives via w/c; contractures   Skin:     General: Skin is warm.      Comments: 1.  Stage IV PU right posterior hip  2.  Stage IV PU left ischial tuberosity   Neurological:      Mental Status: He is oriented to person, place, and time.      Gait: Gait abnormal.   Psychiatric:         Mood and Affect: Mood normal.         Behavior: Behavior normal.         Thought Content: Thought content normal.         Judgment: Judgment  normal.            Altered Skin Integrity 05/23/22 1337 Right posterior Hip Full thickness tissue loss with exposed bone, tendon, or muscle. Often includes undermining and tunneling. May extend into muscle and/or supporting structures. (Active)   05/23/22 1337   Altered Skin Integrity Present on Admission: yes   Side: Right   Orientation: posterior   Location: Hip   Wound Number:    Is this injury device related?: No   Primary Wound Type:    Description of Altered Skin Integrity: Full thickness tissue loss with exposed bone, tendon, or muscle. Often includes undermining and tunneling. May extend into muscle and/or supporting structures.   Removal Indication and Assessment:    Wound Outcome:    (Retired) Wound Length (cm):    (Retired) Wound Width (cm):    (Retired) Depth (cm):    Wound Description (Comments):    Removal Indications:    Description of Altered Skin Integrity Full thickness tissue loss with exposed bone, tendon, or muscle. Often includes undermining and tunneling. May extend into muscle and/or supporting structures. 05/23/22 1343   Dressing Appearance Intact 05/23/22 1343   Drainage Amount Moderate 05/23/22 1343   Drainage Characteristics/Odor Clear;Serous 05/23/22 1343   Appearance Slough;Granulating 05/23/22 1343   Tissue loss description Full thickness 05/23/22 1343   Red (%), Wound Tissue Color 75 % 05/23/22 1343   Yellow (%), Wound Tissue Color 25 % 05/23/22 1343   Periwound Area Intact;Scar tissue 05/23/22 1343   Wound Edges Defined 05/23/22 1343   Wound Length (cm) 1.2 cm 05/23/22 1343   Wound Width (cm) 1 cm 05/23/22 1343   Wound Depth (cm) 2 cm 05/23/22 1343   Wound Volume (cm^3) 2.4 cm^3 05/23/22 1343   Wound Surface Area (cm^2) 1.2 cm^2 05/23/22 1343   Care Wound cleanser 05/23/22 1343   Dressing Applied 05/23/22 1343            Altered Skin Integrity 05/23/22 1353 Left Ischial tuberosity #2 Full thickness tissue loss with exposed bone, tendon, or muscle. Often includes undermining and  "tunneling. May extend into muscle and/or supporting structures. (Active)   05/23/22 1353   Altered Skin Integrity Present on Admission: yes   Side: Left   Orientation:    Location: Ischial tuberosity   Wound Number: #2   Is this injury device related?: No   Primary Wound Type:    Description of Altered Skin Integrity: Full thickness tissue loss with exposed bone, tendon, or muscle. Often includes undermining and tunneling. May extend into muscle and/or supporting structures.   Removal Indication and Assessment:    Wound Outcome:    (Retired) Wound Length (cm):    (Retired) Wound Width (cm):    (Retired) Depth (cm):    Wound Description (Comments):    Removal Indications:    Dressing Appearance Intact 05/23/22 1343   Drainage Amount Moderate 05/23/22 1343   Drainage Characteristics/Odor Serosanguineous 05/23/22 1343   Appearance Granulating 05/23/22 1343   Red (%), Wound Tissue Color 100 % 05/23/22 1343   Periwound Area Intact;Scar tissue 05/23/22 1343   Wound Edges Defined;Rolled/closed 05/23/22 1343   Wound Length (cm) 1 cm 05/23/22 1343   Wound Width (cm) 6.5 cm 05/23/22 1343   Wound Depth (cm) 1.7 cm 05/23/22 1343   Wound Volume (cm^3) 11.05 cm^3 05/23/22 1343   Wound Surface Area (cm^2) 6.5 cm^2 05/23/22 1343   Care Wound cleanser 05/23/22 1343   Dressing Applied 05/23/22 1343         Assessment:         ICD-10-CM ICD-9-CM   1. Pressure ulcer of left buttock, stage 4  L89.324 707.05     707.24   2. Quadriplegia, unspecified  G82.50 344.00   3. Stage IV pressure ulcer of right hip  L89.214 707.04     707.24   4. Other chronic hematogenous osteomyelitis, unspecified femur  M86.559 730.15     PROCEDURES:  Debridement     Date/Time: 5/23/2022 1:00 PM  Performed by: Jessie Bah NP  Authorized by: Jessie Bah NP     Time out: Immediately prior to procedure a "time out" was called to verify the correct patient, procedure, equipment, support staff and site/side marked as required.   Consent Done?:  Yes " (Verbal)  Local anesthesia used?: No       Wound Details:    Location:  Right hip    Type of Debridement:  Excisional       Length (cm):  1.2       Area (sq cm):  1.2       Width (cm):  1       Percent Debrided (%):  100       Depth (cm):  2       Total Area Debrided (sq cm):  1.2    Depth of debridement:  Subcutaneous tissue    Tissue debrided:  Subcutaneous, Adipose and Dermis    Devitalized tissue debrided:  Biofilm, Exudate and Fibrin    Instruments:  Curette      LR      Plan:   Tissue pathology and/or culture taken:  [] Yes [x] No   Sharp debridement performed:   [] Yes [x] No   Labs ordered this visit:   [] Yes [x] No   Imaging ordered this visit:   [] Yes [x] No       Home Health:  [facility]  W/C Orders:    Left ischial-Leave current dressing with Omnistat in place until Wednesday, on Wednesday, cleanse wound bed with wound cleanser, apply silver alginate to wound bed, cover with bordered foam. Change daily.  Right hip-Leave current dressing with collagen powder in place until Wednesday, on Wednesday, cleanse wound bed with wound cleanser, apply silver alginate to wound bed, cover with bordered foam. Change daily.    Follow up in about 2 weeks (around 6/6/2022) for Right hip and left ischial.

## 2022-06-13 ENCOUNTER — HOSPITAL ENCOUNTER (OUTPATIENT)
Dept: WOUND CARE | Facility: HOSPITAL | Age: 47
Discharge: HOME | End: 2022-06-13
Attending: NURSE PRACTITIONER
Payer: MEDICARE

## 2022-06-13 VITALS
WEIGHT: 164 LBS | HEIGHT: 65 IN | RESPIRATION RATE: 19 BRPM | DIASTOLIC BLOOD PRESSURE: 64 MMHG | SYSTOLIC BLOOD PRESSURE: 124 MMHG | BODY MASS INDEX: 27.32 KG/M2 | HEART RATE: 71 BPM

## 2022-06-13 DIAGNOSIS — M86.559: ICD-10-CM

## 2022-06-13 DIAGNOSIS — L89.324 PRESSURE ULCER OF LEFT BUTTOCK, STAGE 4: Primary | ICD-10-CM

## 2022-06-13 DIAGNOSIS — L89.214 STAGE IV PRESSURE ULCER OF RIGHT HIP: ICD-10-CM

## 2022-06-13 DIAGNOSIS — G82.50 QUADRIPLEGIA, UNSPECIFIED: ICD-10-CM

## 2022-06-13 PROCEDURE — 99213 OFFICE O/P EST LOW 20 MIN: CPT

## 2022-06-13 RX ORDER — LACTULOSE 10 G/15ML
SOLUTION ORAL; RECTAL
COMMUNITY
Start: 2022-06-06 | End: 2023-07-12

## 2022-06-13 NOTE — PROGRESS NOTES
Subjective:       Patient ID: Diego Martinez Jr. is a 46 y.o. male.    Chief Complaint: Pressure Ulcer (Right ischial and left hip wounds )    HPI  Mr. Martinez is a long time patient of [facility].    He comes via wheelchair with a care attendant present with him at all times.    He does have a history of quadriplegia.  He has home health who assist him with his wound care daily and care attendants to provide care at other times.  He has a very pleasant demeanor and added to an is usually jovial in spite of his challenges.  This visit, both wounds appear to be improving.  He reports that he recently changed home health agencies and is much more satisfied with a [facility] than he was with [facility].  The wound continues to improve.  We again applied powdered collagen to the left ischial wound and we applied Omnistat into the wound on the right hip. Both of these dressings were instructed to leave intact until Thursday.      Review of Systems   Musculoskeletal: Positive for gait problem.   Skin: Positive for wound.   All other systems reviewed and are negative.        Objective:      Vitals:    06/13/22 1412   BP: 124/64   Pulse: 71   Resp: 19       Physical Exam  Vitals reviewed.   Constitutional:       Appearance: Normal appearance. He is normal weight.   HENT:      Head: Normocephalic.   Cardiovascular:      Rate and Rhythm: Normal rate.      Pulses: Normal pulses.   Pulmonary:      Effort: Pulmonary effort is normal.   Musculoskeletal:      Comments: quadraplegia   Skin:     General: Skin is warm and dry.   Neurological:      General: No focal deficit present.      Mental Status: He is alert and oriented to person, place, and time.   Psychiatric:         Mood and Affect: Mood normal.            Altered Skin Integrity 05/23/22 1337 Right posterior Hip Full thickness tissue loss with exposed bone, tendon, or muscle. Often includes undermining and tunneling. May extend into muscle and/or  supporting structures. (Active)   05/23/22 1337   Altered Skin Integrity Present on Admission: yes   Side: Right   Orientation: posterior   Location: Hip   Wound Number:    Is this injury device related?: No   Primary Wound Type:    Description of Altered Skin Integrity: Full thickness tissue loss with exposed bone, tendon, or muscle. Often includes undermining and tunneling. May extend into muscle and/or supporting structures.   Removal Indication and Assessment:    Wound Outcome:    (Retired) Wound Length (cm):    (Retired) Wound Width (cm):    (Retired) Depth (cm):    Wound Description (Comments):    Removal Indications:    Description of Altered Skin Integrity Full thickness tissue loss with exposed bone, tendon, or muscle. Often includes undermining and tunneling. May extend into muscle and/or supporting structures. 06/13/22 1414   Dressing Appearance Moist drainage 06/13/22 1414   Drainage Amount Moderate 06/13/22 1414   Drainage Characteristics/Odor Serosanguineous 06/13/22 1414   Appearance Slough;Fibrin 06/13/22 1414   Tissue loss description Full thickness 06/13/22 1414   Periwound Area Intact;Moist 06/13/22 1414   Wound Edges Rolled/closed 06/13/22 1414   Wound Length (cm) 1.2 cm 06/13/22 1414   Wound Width (cm) 1 cm 06/13/22 1414   Wound Depth (cm) 2.4 cm 06/13/22 1414   Wound Volume (cm^3) 2.88 cm^3 06/13/22 1414   Wound Surface Area (cm^2) 1.2 cm^2 06/13/22 1414   Care Cleansed with:;Wound cleanser 06/13/22 1414   Dressing Applied 06/13/22 1414   Dressing Change Due 06/16/22 06/13/22 1414            Altered Skin Integrity 05/23/22 1353 Left Ischial tuberosity #2 Full thickness tissue loss with exposed bone, tendon, or muscle. Often includes undermining and tunneling. May extend into muscle and/or supporting structures. (Active)   05/23/22 1353   Altered Skin Integrity Present on Admission: yes   Side: Left   Orientation:    Location: Ischial tuberosity   Wound Number: #2   Is this injury device  related?: No   Primary Wound Type:    Description of Altered Skin Integrity: Full thickness tissue loss with exposed bone, tendon, or muscle. Often includes undermining and tunneling. May extend into muscle and/or supporting structures.   Removal Indication and Assessment:    Wound Outcome:    (Retired) Wound Length (cm):    (Retired) Wound Width (cm):    (Retired) Depth (cm):    Wound Description (Comments):    Removal Indications:    Description of Altered Skin Integrity Full thickness tissue loss with exposed bone, tendon, or muscle. Often includes undermining and tunneling. May extend into muscle and/or supporting structures. 06/13/22 1414   Dressing Appearance Moist drainage 06/13/22 1414   Drainage Amount Moderate 06/13/22 1414   Drainage Characteristics/Odor Serosanguineous 06/13/22 1414   Appearance Slough;Moist;Epithelialization 06/13/22 1414   Tissue loss description Full thickness 06/13/22 1414   Periwound Area Intact;Moist 06/13/22 1414   Wound Edges Rolled/closed 06/13/22 1414   Wound Length (cm) 1 cm 06/13/22 1414   Wound Width (cm) 6 cm 06/13/22 1414   Wound Depth (cm) 1.5 cm 06/13/22 1414   Wound Volume (cm^3) 9 cm^3 06/13/22 1414   Wound Surface Area (cm^2) 6 cm^2 06/13/22 1414   Care Cleansed with:;Wound cleanser 06/13/22 1414   Dressing Applied 06/13/22 1414   Dressing Change Due 06/16/22 06/13/22 1414            Left Hip   Right hip  Lt Hip - omnistat, 4x4 gauze, 6x6 gentle foam border  Rt hip - powdered collagen, 4x4 gauze, 4x4 mepilex      Assessment:         ICD-10-CM ICD-9-CM   1. Pressure ulcer of left buttock, stage 4  L89.324 707.05     707.24   2. Quadriplegia, unspecified  G82.50 344.00   3. Stage IV pressure ulcer of right hip  L89.214 707.04     707.24   4. Other chronic hematogenous osteomyelitis, unspecified femur  M86.559 730.15         Procedures:   Excisional debridement performed:  [] Yes [x] No   Selective debridement performed:  [] Yes [x] No   Mechanical debridement performed:   [x] Yes [] No   Silver nitrate applied:    [] Yes [x] No   Labs ordered this visit:   [] Yes [x] No   Imaging ordered this visit:   [] Yes [x] No   Tissue pathology and/or culture taken:  [] Yes [x] No     Procedures:  No procedures (mechanical)      HOME HEALTH AGENCY:  [email or web address]  TIMES PER WEEK/DAYS:  1 x weekly, Thursday  ORDERS:    Left ischial-Leave current dressing with granules in place until Thursday, on Thursday, cleanse wound bed with wound cleanser, apply silver alginate, cover with bordered foam. Change daily.  Right hip-Leave current dressing with collagen powder in place until Thursday, on Thursday, cleanse wound bed with wound cleanser, apply silver alginate to wound bed, cover with bordered foam. Change daily.    Follow up in 1 week (on 6/20/2022) for right/left hip wounds .

## 2022-06-20 ENCOUNTER — HOSPITAL ENCOUNTER (OUTPATIENT)
Dept: WOUND CARE | Facility: HOSPITAL | Age: 47
Discharge: HOME | End: 2022-06-20
Attending: NURSE PRACTITIONER
Payer: MEDICARE

## 2022-06-20 VITALS
HEIGHT: 65 IN | HEART RATE: 73 BPM | RESPIRATION RATE: 18 BRPM | DIASTOLIC BLOOD PRESSURE: 75 MMHG | WEIGHT: 164 LBS | SYSTOLIC BLOOD PRESSURE: 126 MMHG | BODY MASS INDEX: 27.32 KG/M2

## 2022-06-20 DIAGNOSIS — L89.214 STAGE IV PRESSURE ULCER OF RIGHT HIP: ICD-10-CM

## 2022-06-20 DIAGNOSIS — L89.324 PRESSURE ULCER OF LEFT BUTTOCK, STAGE 4: Primary | ICD-10-CM

## 2022-06-20 PROCEDURE — 99213 OFFICE O/P EST LOW 20 MIN: CPT

## 2022-06-20 NOTE — PROGRESS NOTES
Subjective:       Patient ID: Diego Martinez Jr. is a 46 y.o. male.    Chief Complaint: Pressure Ulcer (#1) Right hip (stage 4)/#2) Left ischium (stage 4))    HPI  Mr. Martinez is a long time patient of [facility].    He comes via wheelchair with a care attendant present with him at all times.    He does have a history of quadriplegia.  He has home health who assist him with his wound care daily and care attendants to provide care at other times.  He has a very pleasant demeanor and added to an is usually jovial in spite of his challenges.  This visit, both wounds remain stable.  [facility]  The wound continues to improve.  We applied powdered collagen to the left ischial wound and we applied powdered collagen into the wound on the right hip.   Both of these dressings were instructed to leave intact until Thursday.    Review of Systems   Musculoskeletal: Positive for gait problem.   Skin: Positive for wound.   All other systems reviewed and are negative.        Objective:      Vitals:    06/20/22 1336   BP: 126/75   Pulse: 73   Resp: 18       Physical Exam  Vitals reviewed.   Constitutional:       Appearance: Normal appearance. He is normal weight.   HENT:      Head: Normocephalic.   Cardiovascular:      Rate and Rhythm: Normal rate.      Pulses: Normal pulses.   Pulmonary:      Effort: Pulmonary effort is normal.   Musculoskeletal:      Comments: quadraplegia   Skin:     General: Skin is warm and dry.   Neurological:      General: No focal deficit present.      Mental Status: He is alert and oriented to person, place, and time.   Psychiatric:         Mood and Affect: Mood normal.            Altered Skin Integrity 05/23/22 1337 Right posterior Hip #1 Full thickness tissue loss with exposed bone, tendon, or muscle. Often includes undermining and tunneling. May extend into muscle and/or supporting structures. (Active)   05/23/22 1337    Altered Skin Integrity Present on Admission: yes   Side: Right   Orientation: posterior   Location: Hip   Wound Number: #1   Is this injury device related?: No   Primary Wound Type:    Description of Altered Skin Integrity: Full thickness tissue loss with exposed bone, tendon, or muscle. Often includes undermining and tunneling. May extend into muscle and/or supporting structures.   Removal Indication and Assessment:    Wound Outcome:    (Retired) Wound Length (cm):    (Retired) Wound Width (cm):    (Retired) Depth (cm):    Wound Description (Comments):    Removal Indications:    Description of Altered Skin Integrity Full thickness tissue loss with exposed bone, tendon, or muscle. Often includes undermining and tunneling. May extend into muscle and/or supporting structures. 06/20/22 1337   Dressing Appearance Moist drainage 06/20/22 1337   Drainage Amount Moderate 06/20/22 1337   Drainage Characteristics/Odor Serosanguineous 06/20/22 1337   Appearance Slough;Moist 06/20/22 1337   Tissue loss description Full thickness 06/20/22 1337   Periwound Area Intact;Moist 06/20/22 1337   Wound Edges Open 06/20/22 1337   Wound Length (cm) 1 cm 06/20/22 1337   Wound Width (cm) 1 cm 06/20/22 1337   Wound Depth (cm) 2.4 cm 06/20/22 1337   Wound Volume (cm^3) 2.4 cm^3 06/20/22 1337   Wound Surface Area (cm^2) 1 cm^2 06/20/22 1337   Care Cleansed with:;Wound cleanser 06/20/22 1337   Dressing Applied;Other (comment) 06/20/22 1337   Dressing Change Due 06/23/22 06/20/22 1337            Altered Skin Integrity 05/23/22 1353 Left Ischial tuberosity #2 Full thickness tissue loss with exposed bone, tendon, or muscle. Often includes undermining and tunneling. May extend into muscle and/or supporting structures. (Active)   05/23/22 1353   Altered Skin Integrity Present on Admission: yes   Side: Left   Orientation:    Location: Ischial tuberosity   Wound Number: #2   Is this injury device related?: No   Primary Wound Type:    Description of  Altered Skin Integrity: Full thickness tissue loss with exposed bone, tendon, or muscle. Often includes undermining and tunneling. May extend into muscle and/or supporting structures.   Removal Indication and Assessment:    Wound Outcome:    (Retired) Wound Length (cm):    (Retired) Wound Width (cm):    (Retired) Depth (cm):    Wound Description (Comments):    Removal Indications:    Description of Altered Skin Integrity Full thickness tissue loss with exposed bone, tendon, or muscle. Often includes undermining and tunneling. May extend into muscle and/or supporting structures. 06/20/22 1337   Dressing Appearance Moist drainage 06/20/22 1337   Drainage Amount Moderate 06/20/22 1337   Drainage Characteristics/Odor Serosanguineous 06/20/22 1337   Appearance Slough 06/20/22 1337   Tissue loss description Full thickness 06/20/22 1337   Periwound Area Intact;Moist 06/20/22 1337   Wound Edges Open 06/20/22 1337   Wound Length (cm) 1 cm 06/20/22 1337   Wound Width (cm) 6 cm 06/20/22 1337   Wound Depth (cm) 1.4 cm 06/20/22 1337   Wound Volume (cm^3) 8.4 cm^3 06/20/22 1337   Wound Surface Area (cm^2) 6 cm^2 06/20/22 1337   Care Cleansed with:;Wound cleanser 06/20/22 1337   Dressing Applied;Other (comment) 06/20/22 1337   Dressing Change Due 06/23/22 06/20/22 1337           Right posterior hip - powdered collagen, 4x4 gente foam border              Left ischial tuberosity - powdered collagen, 6x6 gentle foam border  CT        Assessment:         ICD-10-CM ICD-9-CM   1. Pressure ulcer of left buttock, stage 4  L89.324 707.05     707.24   2. Stage IV pressure ulcer of right hip  L89.214 707.04     707.24         Procedures:   Excisional debridement performed:  [] Yes [x] No   Selective debridement performed:  [] Yes [x] No   Mechanical debridement performed:  [] Yes [x] No   Silver nitrate applied:    [] Yes [x] No   Labs ordered this visit:   [] Yes [x] No   Imaging ordered this visit:   [] Yes [x] No   Tissue pathology and/or  culture taken:  [] Yes [x] No     Procedures:  None    MEDICATIONS    Current Outpatient Medications:     apixaban (ELIQUIS) 5 mg Tab, Take 5 mg by mouth once daily at 6am., Disp: , Rfl:     lactulose (CHRONULAC) 10 gram/15 mL solution, TAKE 15 ML BY MOUTH TWICE DAILY, Disp: , Rfl:     LINZESS 145 mcg Cap capsule, Take 145 mcg by mouth once daily., Disp: , Rfl:    Review of patient's allergies indicates:   Allergen Reactions    Meropenem Anaphylaxis    Penicillins        HOME HEALTH AGENCY:  [facility]   TIMES PER WEEK/DAYS:  1 x weekly, Thursday  ORDERS:    Left ischial-Leave current dressing with collagen powder in place until Thursday, on Thursday, cleanse wound bed with wound cleanser, apply silver alginate, cover with bordered foam. Change Thursday then every other day.  Right hip-Leave current dressing with collagen powder in place until Thursday, on Thursday, cleanse wound bed with wound cleanser, apply silver alginate, cover with bordered foam. Change Thursday then every other day.    Patient Orders:  Follow up in 1 week (on 6/27/2022) for right and left hip .      Electronically signed:  Jessie Bah NP    This note was created using [employer] voice recognition software that occasionally misinterprets phrases or words.

## 2022-07-26 ENCOUNTER — HOSPITAL ENCOUNTER (OUTPATIENT)
Dept: WOUND CARE | Facility: HOSPITAL | Age: 47
Discharge: HOME | End: 2022-07-26
Attending: NURSE PRACTITIONER
Payer: MEDICARE

## 2022-07-26 VITALS
SYSTOLIC BLOOD PRESSURE: 167 MMHG | WEIGHT: 164 LBS | HEART RATE: 64 BPM | RESPIRATION RATE: 18 BRPM | BODY MASS INDEX: 27.32 KG/M2 | TEMPERATURE: 98 F | HEIGHT: 65 IN | DIASTOLIC BLOOD PRESSURE: 78 MMHG

## 2022-07-26 DIAGNOSIS — G82.50 QUADRIPLEGIA, UNSPECIFIED: ICD-10-CM

## 2022-07-26 DIAGNOSIS — M86.559: ICD-10-CM

## 2022-07-26 DIAGNOSIS — L89.214 STAGE IV PRESSURE ULCER OF RIGHT HIP: ICD-10-CM

## 2022-07-26 DIAGNOSIS — L89.324 PRESSURE ULCER OF LEFT BUTTOCK, STAGE 4: Primary | ICD-10-CM

## 2022-07-26 PROCEDURE — 27000999 HC MEDICAL RECORD PHOTO DOCUMENTATION

## 2022-07-26 PROCEDURE — 99213 OFFICE O/P EST LOW 20 MIN: CPT

## 2022-07-26 NOTE — PROGRESS NOTES
Subjective:       Patient ID: Diego Martinez Jr. is a 47 y.o. male.    Chief Complaint: Pressure Ulcer (Right hip stage 4/Left ischial stage 4)    HPI  Mr. Martinez is a long time patient of [facility].    He comes via wheelchair with a care attendant present with him at all times.    He does have a history of quadriplegia.  He has home health who assist him with his wound care daily and care attendants to provide care at other times.  He has a very pleasant demeanor and added to an is usually jovial in spite of his challenges.  This visit, both wounds remain stable.  He now has [facility] and prefers that agency over [facility].  The wounds remain stable and his care attendant reports excellent in home care with [facility].     We applied Puracol collagen to the left ischial wound and we applied powdered collagen into the wound on the right hip.   Both of these dressings were instructed to leave intact until Thursday.    Review of Systems   Musculoskeletal: Positive for gait problem.   Skin: Positive for wound.   All other systems reviewed and are negative.        Objective:      Vitals:    07/26/22 1350   BP: (!) 167/78   Pulse: 64   Resp: 18   Temp: 98.2 °F (36.8 °C)       Physical Exam  Vitals reviewed.   Constitutional:       Appearance: Normal appearance. He is normal weight.   HENT:      Head: Normocephalic.   Cardiovascular:      Rate and Rhythm: Normal rate.      Pulses: Normal pulses.   Pulmonary:      Effort: Pulmonary effort is normal.   Musculoskeletal:      Comments: quadraplegia   Skin:     General: Skin is warm and dry.   Neurological:      General: No focal deficit present.      Mental Status: He is alert and oriented to person, place, and time.   Psychiatric:         Mood and Affect: Mood normal.            Altered Skin Integrity 05/23/22 1337 Right posterior Hip #1 Full thickness tissue loss with exposed bone, tendon, or muscle. Often includes undermining and tunneling.  May extend into muscle and/or supporting structures. (Active)   05/23/22 1337   Altered Skin Integrity Present on Admission: yes   Side: Right   Orientation: posterior   Location: Hip   Wound Number: #1   Is this injury device related?: No   Primary Wound Type:    Description of Altered Skin Integrity: Full thickness tissue loss with exposed bone, tendon, or muscle. Often includes undermining and tunneling. May extend into muscle and/or supporting structures.   Removal Indication and Assessment:    Wound Outcome:    (Retired) Wound Length (cm):    (Retired) Wound Width (cm):    (Retired) Depth (cm):    Wound Description (Comments):    Removal Indications:    Description of Altered Skin Integrity Full thickness tissue loss with exposed bone, tendon, or muscle. Often includes undermining and tunneling. May extend into muscle and/or supporting structures. 07/26/22 1354   Dressing Appearance Intact 07/26/22 1354   Drainage Amount Moderate 07/26/22 1354   Drainage Characteristics/Odor Clear;Serous 07/26/22 1354   Appearance Granulating;Slough 07/26/22 1354   Tissue loss description Full thickness 07/26/22 1354   Periwound Area Intact;Dry 07/26/22 1354   Wound Edges Rolled/closed 07/26/22 1354   Wound Length (cm) 1.4 cm 07/26/22 1354   Wound Width (cm) 1.2 cm 07/26/22 1354   Wound Depth (cm) 2 cm 07/26/22 1354   Wound Volume (cm^3) 3.36 cm^3 07/26/22 1354   Wound Surface Area (cm^2) 1.68 cm^2 07/26/22 1354   Care Wound cleanser 07/26/22 1354   Dressing Applied 07/26/22 1354            Altered Skin Integrity 05/23/22 1353 Left Ischial tuberosity #2 Full thickness tissue loss with exposed bone, tendon, or muscle. Often includes undermining and tunneling. May extend into muscle and/or supporting structures. (Active)   05/23/22 1353   Altered Skin Integrity Present on Admission: yes   Side: Left   Orientation:    Location: Ischial tuberosity   Wound Number: #2   Is this injury device related?: No   Primary Wound Type:     Description of Altered Skin Integrity: Full thickness tissue loss with exposed bone, tendon, or muscle. Often includes undermining and tunneling. May extend into muscle and/or supporting structures.   Removal Indication and Assessment:    Wound Outcome:    (Retired) Wound Length (cm):    (Retired) Wound Width (cm):    (Retired) Depth (cm):    Wound Description (Comments):    Removal Indications:    Description of Altered Skin Integrity Full thickness tissue loss with exposed bone, tendon, or muscle. Often includes undermining and tunneling. May extend into muscle and/or supporting structures. 07/26/22 1354   Dressing Appearance Intact 07/26/22 1354   Drainage Amount Moderate 07/26/22 1354   Drainage Characteristics/Odor Serosanguineous 07/26/22 1354   Appearance Granulating 07/26/22 1354   Tissue loss description Full thickness 07/26/22 1354   Wound Length (cm) 0.5 cm 07/26/22 1354   Wound Width (cm) 4 cm 07/26/22 1354   Wound Depth (cm) 2.4 cm 07/26/22 1354   Wound Volume (cm^3) 4.8 cm^3 07/26/22 1354   Wound Surface Area (cm^2) 2 cm^2 07/26/22 1354   Care Wound cleanser 07/26/22 1354   Dressing Applied 07/26/22 1354          Right posterior hip - powdered collagen, dry gauze, bordered foam           Left ischial tuberosity - powdered collagen, dry gauze, bordered foam  LR        Assessment:         ICD-10-CM ICD-9-CM   1. Pressure ulcer of left buttock, stage 4  L89.324 707.05     707.24   2. Stage IV pressure ulcer of right hip  L89.214 707.04     707.24   3. Quadriplegia, unspecified  G82.50 344.00   4. Other chronic hematogenous osteomyelitis, unspecified femur  M86.559 730.15         Procedures:   Excisional debridement performed:  [] Yes [x] No   Selective debridement performed:  [] Yes [x] No   Mechanical debridement performed:  [] Yes [x] No   Silver nitrate applied:    [] Yes [x] No   Labs ordered this visit:   [] Yes [x] No   Imaging ordered this visit:   [] Yes [x] No   Tissue pathology and/or culture  taken:  [] Yes [x] No     Procedures:  None    MEDICATIONS    Current Outpatient Medications:     apixaban (ELIQUIS) 5 mg Tab, Take 5 mg by mouth once daily at 6am., Disp: , Rfl:     lactulose (CHRONULAC) 10 gram/15 mL solution, TAKE 15 ML BY MOUTH TWICE DAILY, Disp: , Rfl:     LINZESS 145 mcg Cap capsule, Take 145 mcg by mouth once daily., Disp: , Rfl:    Review of patient's allergies indicates:   Allergen Reactions    Meropenem Anaphylaxis    Penicillins        HOME HEALTH AGENCY:  [facility]   TIMES PER WEEK/DAYS:  3 x weekly, M/W/F  ORDERS:    Right hip and Left ischial wounds-Cleanse with wound cleanser. Apply powdered collagen to wound beds, cover with dry gauze and bordered foam dressing. Home health to change every other day and as needed.  Return in 2 weeks    Patient Orders:  Follow up in about 2 weeks (around 8/9/2022).      Electronically signed:  Jessie Bah NP    This note was created using [employer] voice recognition software that occasionally misinterprets phrases or words.

## 2022-08-16 ENCOUNTER — HOSPITAL ENCOUNTER (OUTPATIENT)
Dept: WOUND CARE | Facility: HOSPITAL | Age: 47
Discharge: HOME | End: 2022-08-16
Attending: NURSE PRACTITIONER
Payer: MEDICARE

## 2022-08-16 VITALS
HEIGHT: 65 IN | TEMPERATURE: 98 F | DIASTOLIC BLOOD PRESSURE: 77 MMHG | RESPIRATION RATE: 18 BRPM | BODY MASS INDEX: 27.32 KG/M2 | WEIGHT: 164 LBS | HEART RATE: 62 BPM | SYSTOLIC BLOOD PRESSURE: 137 MMHG

## 2022-08-16 DIAGNOSIS — G82.50 QUADRIPLEGIA, UNSPECIFIED: ICD-10-CM

## 2022-08-16 DIAGNOSIS — L89.324 PRESSURE ULCER OF LEFT BUTTOCK, STAGE 4: Primary | ICD-10-CM

## 2022-08-16 PROCEDURE — 99213 OFFICE O/P EST LOW 20 MIN: CPT

## 2022-08-16 PROCEDURE — 27000999 HC MEDICAL RECORD PHOTO DOCUMENTATION

## 2022-08-16 NOTE — PROGRESS NOTES
Subjective:       Patient ID: Diego Martinez Jr. is a 47 y.o. male.    Chief Complaint: Pressure Ulcer (Pressure injury-Right hip Stage 4/Pressure injury-Left ischial Stage 4)    HPI  Mr. Martinez is a long time patient of [facility].    He comes via wheelchair with a care attendant present with him at all times.    He does have a history of quadriplegia.  He has home health who assist him with his wound care daily and care attendants to provide care at other times.  He has a very pleasant demeanor and added to an is usually jovial in spite of his challenges.  This visit, both wounds remain stable.  He now has [facility] and prefers that agency over [facility].  The wounds remain stable and his care attendant reports excellent in home care with [facility].     We will continue powdered collagen to both the left ischial wound and into the wound on the right hip.    has been instructed to leave intact until Thursday.    Review of Systems   Musculoskeletal: Positive for gait problem.   Skin: Positive for wound.   All other systems reviewed and are negative.        Objective:      Vitals:    08/16/22 1319   BP: 137/77   Pulse: 62   Resp: 18   Temp: 98.1 °F (36.7 °C)       Physical Exam  Vitals reviewed.   Constitutional:       Appearance: Normal appearance. He is normal weight.   HENT:      Head: Normocephalic.   Cardiovascular:      Rate and Rhythm: Normal rate.      Pulses: Normal pulses.   Pulmonary:      Effort: Pulmonary effort is normal.   Musculoskeletal:      Comments: quadraplegia   Skin:     General: Skin is warm and dry.   Neurological:      General: No focal deficit present.      Mental Status: He is alert and oriented to person, place, and time.   Psychiatric:         Mood and Affect: Mood normal.            Altered Skin Integrity 05/23/22 1337 Right posterior Hip #1 Full thickness tissue loss with exposed bone, tendon, or muscle. Often includes undermining and tunneling. May  extend into muscle and/or supporting structures. (Active)   05/23/22 1337   Altered Skin Integrity Present on Admission: yes   Side: Right   Orientation: posterior   Location: Hip   Wound Number: #1   Is this injury device related?: No   Primary Wound Type:    Description of Altered Skin Integrity: Full thickness tissue loss with exposed bone, tendon, or muscle. Often includes undermining and tunneling. May extend into muscle and/or supporting structures.   Removal Indication and Assessment:    Wound Outcome:    (Retired) Wound Length (cm):    (Retired) Wound Width (cm):    (Retired) Depth (cm):    Wound Description (Comments):    Removal Indications:    Description of Altered Skin Integrity Full thickness tissue loss with exposed bone, tendon, or muscle. Often includes undermining and tunneling. May extend into muscle and/or supporting structures. 08/16/22 1321   Dressing Appearance Intact 08/16/22 1321   Drainage Amount Moderate 08/16/22 1321   Drainage Characteristics/Odor Clear;Serous 08/16/22 1321   Tissue loss description Full thickness 08/16/22 1321   Periwound Area Intact 08/16/22 1321   Wound Edges Defined 08/16/22 1321   Wound Length (cm) 0.8 cm 08/16/22 1321   Wound Width (cm) 0.8 cm 08/16/22 1321   Wound Depth (cm) 1.5 cm 08/16/22 1321   Wound Volume (cm^3) 0.96 cm^3 08/16/22 1321   Wound Surface Area (cm^2) 0.64 cm^2 08/16/22 1321   Dressing Applied 08/16/22 1321            Altered Skin Integrity 05/23/22 1353 Left Ischial tuberosity #2 Full thickness tissue loss with exposed bone, tendon, or muscle. Often includes undermining and tunneling. May extend into muscle and/or supporting structures. (Active)   05/23/22 1353   Altered Skin Integrity Present on Admission: yes   Side: Left   Orientation:    Location: Ischial tuberosity   Wound Number: #2   Is this injury device related?: No   Primary Wound Type:    Description of Altered Skin Integrity: Full thickness tissue loss with exposed bone, tendon, or  muscle. Often includes undermining and tunneling. May extend into muscle and/or supporting structures.   Removal Indication and Assessment:    Wound Outcome:    (Retired) Wound Length (cm):    (Retired) Wound Width (cm):    (Retired) Depth (cm):    Wound Description (Comments):    Removal Indications:    Description of Altered Skin Integrity Full thickness tissue loss with exposed bone, tendon, or muscle. Often includes undermining and tunneling. May extend into muscle and/or supporting structures. 08/16/22 1321   Dressing Appearance Intact 08/16/22 1321   Drainage Amount Moderate 08/16/22 1321   Drainage Characteristics/Odor Clear 08/16/22 1321   Appearance Granulating 08/16/22 1321   Periwound Area Intact 08/16/22 1321   Wound Edges Defined 08/16/22 1321   Wound Length (cm) 0.5 cm 08/16/22 1321   Wound Width (cm) 3.3 cm 08/16/22 1321   Wound Depth (cm) 1.8 cm 08/16/22 1321   Wound Volume (cm^3) 2.97 cm^3 08/16/22 1321   Wound Surface Area (cm^2) 1.65 cm^2 08/16/22 1321   Care Wound cleanser 08/16/22 1321   Dressing Applied 08/16/22 1321               Right posterior hip - powdered collagen, dry gauze, bordered foam           Left ischial tuberosity - powdered collagen, dry gauze, bordered foam  LR        Assessment:         ICD-10-CM ICD-9-CM   1. Pressure ulcer of left buttock, stage 4  L89.324 707.05     707.24   2. Quadriplegia, unspecified  G82.50 344.00         Procedures:     No procedures performed    Excisional debridement performed:  [] Yes [x] No   Selective debridement performed:  [] Yes [x] No   Mechanical debridement performed:  [] Yes [x] No   Silver nitrate applied:    [] Yes [x] No   Labs ordered this visit:   [] Yes [x] No   Imaging ordered this visit:   [] Yes [x] No   Tissue pathology and/or culture taken:  [] Yes [x] No         MEDICATIONS    Current Outpatient Medications:     apixaban (ELIQUIS) 5 mg Tab, Take 5 mg by mouth once daily at 6am., Disp: , Rfl:     lactulose (CHRONULAC) 10  gram/15 mL solution, TAKE 15 ML BY MOUTH TWICE DAILY, Disp: , Rfl:     LINZESS 145 mcg Cap capsule, Take 145 mcg by mouth once daily., Disp: , Rfl:    Review of patient's allergies indicates:   Allergen Reactions    Meropenem Anaphylaxis    Penicillins        [facility]ALTH AGENCY:  [facility]   TIMES PER WEEK/DAYS:  3 x weekly, M/W/F  Right hip and Left ischial wounds-Cleanse with wound cleanser. Apply powdered collagen to wound beds, cover with dry gauze and bordered foam dressing. Home health to change every other day and as needed.  Return in 2 weeks    Follow up in about 2 weeks (around 8/30/2022).      Electronically signed:  Jessie Bah NP    This note was created using [employer] voice recognition software that occasionally misinterprets phrases or words.

## 2022-08-30 ENCOUNTER — HOSPITAL ENCOUNTER (OUTPATIENT)
Dept: WOUND CARE | Facility: HOSPITAL | Age: 47
Discharge: HOME | End: 2022-08-30
Attending: NURSE PRACTITIONER
Payer: MEDICARE

## 2022-08-30 VITALS
HEIGHT: 65 IN | BODY MASS INDEX: 27.32 KG/M2 | HEART RATE: 68 BPM | SYSTOLIC BLOOD PRESSURE: 130 MMHG | WEIGHT: 164 LBS | DIASTOLIC BLOOD PRESSURE: 72 MMHG | RESPIRATION RATE: 18 BRPM

## 2022-08-30 DIAGNOSIS — L89.324 PRESSURE ULCER OF LEFT BUTTOCK, STAGE 4: Primary | ICD-10-CM

## 2022-08-30 DIAGNOSIS — L89.214 STAGE IV PRESSURE ULCER OF RIGHT HIP: ICD-10-CM

## 2022-08-30 DIAGNOSIS — M86.559: ICD-10-CM

## 2022-08-30 DIAGNOSIS — G82.50 QUADRIPLEGIA, UNSPECIFIED: ICD-10-CM

## 2022-08-30 PROCEDURE — 99213 OFFICE O/P EST LOW 20 MIN: CPT | Mod: 25

## 2022-08-30 PROCEDURE — 11043 DBRDMT MUSC&/FSCA 1ST 20/<: CPT

## 2022-08-30 PROCEDURE — 27000999 HC MEDICAL RECORD PHOTO DOCUMENTATION

## 2022-08-30 NOTE — PROGRESS NOTES
"   Subjective:       Patient ID: Diego Martinez Jr. is a 47 y.o. male.    Chief Complaint: Pressure Ulcer (Right hip Stage 4/Left ischial Tuberosity- Stage 4)    HPI    Mr. Martinez is a long time patient of [facility].    He comes via wheelchair with a care attendant present with him at all times.    He does have a history of quadriplegia.  He has home health who assist him with his wound care daily and care attendants to provide care at other times.  He has a very pleasant demeanor and added to an is usually jovial in spite of his challenges.  He now has [facility].    This visit, both wounds remain stable.    The right hip is continuing to make excellent progress with the use of the powdered collagen.  There is a small remaining open area at the depth of the wound.  The majority of the "overhang" has now epithelialized.  The left ischial wound was selectively debrided using a curette.  Although it appears the same size the wound bed does feel more firm.  It is possible that the collagen is beginning to develop granular tissue within the wound bed that is not visible.    We will continue powdered collagen to both the left ischial wound and into the wound on the right hip.    has been instructed to add collagen every other day.    Review of Systems   Musculoskeletal:  Positive for gait problem.   Skin:  Positive for wound.   All other systems reviewed and are negative.      Objective:      Vitals:    08/30/22 1243   BP: 130/72   Pulse: 68   Resp: 18       Physical Exam  Vitals reviewed.   Constitutional:       Appearance: Normal appearance. He is normal weight.   HENT:      Head: Normocephalic.   Cardiovascular:      Rate and Rhythm: Normal rate.      Pulses: Normal pulses.   Pulmonary:      Effort: Pulmonary effort is normal.   Musculoskeletal:      Comments: quadraplegia   Skin:     General: Skin is warm and dry.   Neurological:      General: No focal deficit present.      Mental Status: He is " alert and oriented to person, place, and time.   Psychiatric:         Mood and Affect: Mood normal.          Altered Skin Integrity 05/23/22 1337 Right posterior Hip #1 Full thickness tissue loss with exposed bone, tendon, or muscle. Often includes undermining and tunneling. May extend into muscle and/or supporting structures. (Active)   05/23/22 1337   Altered Skin Integrity Present on Admission: yes   Side: Right   Orientation: posterior   Location: Hip   Wound Number: #1   Is this injury device related?: No   Primary Wound Type:    Description of Altered Skin Integrity: Full thickness tissue loss with exposed bone, tendon, or muscle. Often includes undermining and tunneling. May extend into muscle and/or supporting structures.   Ankle-Brachial Index:    Pulses:    Removal Indication and Assessment:    Wound Outcome:    (Retired) Wound Length (cm):    (Retired) Wound Width (cm):    (Retired) Depth (cm):    Wound Description (Comments):    Removal Indications:    Description of Altered Skin Integrity Full thickness tissue loss. Subcutaneous fat may be visible but bone, tendon or muscle are not exposed 08/30/22 1155   Dressing Appearance Moist drainage 08/30/22 1155   Drainage Amount Moderate 08/30/22 1155   Drainage Characteristics/Odor Serosanguineous;Brown 08/30/22 1155   Appearance Intact;Pink;Moist;Slough;Necrotic;Bone 08/30/22 1155   Tissue loss description Full thickness 08/30/22 1155   Periwound Area Intact;Pink;Moist 08/30/22 1155   Wound Edges Undefined 08/30/22 1155   Wound Length (cm) 0.8 cm 08/30/22 1155   Wound Width (cm) 0.8 cm 08/30/22 1155   Wound Depth (cm) 1.3 cm 08/30/22 1155   Wound Volume (cm^3) 0.832 cm^3 08/30/22 1155   Wound Surface Area (cm^2) 0.64 cm^2 08/30/22 1155   Care Cleansed with:;Wound cleanser 08/30/22 1155   Dressing Applied 08/30/22 1155   Packing other (see comment) 08/30/22 1155   Dressing Change Due 09/01/22 08/30/22 1155            Altered Skin Integrity 05/23/22 1353 Left  Ischial tuberosity #2 Full thickness tissue loss with exposed bone, tendon, or muscle. Often includes undermining and tunneling. May extend into muscle and/or supporting structures. (Active)   05/23/22 1353   Altered Skin Integrity Present on Admission: yes   Side: Left   Orientation:    Location: Ischial tuberosity   Wound Number: #2   Is this injury device related?: No   Primary Wound Type:    Description of Altered Skin Integrity: Full thickness tissue loss with exposed bone, tendon, or muscle. Often includes undermining and tunneling. May extend into muscle and/or supporting structures.   Ankle-Brachial Index:    Pulses:    Removal Indication and Assessment:    Wound Outcome:    (Retired) Wound Length (cm):    (Retired) Wound Width (cm):    (Retired) Depth (cm):    Wound Description (Comments):    Removal Indications:    Description of Altered Skin Integrity Full thickness tissue loss with exposed bone, tendon, or muscle. Often includes undermining and tunneling. May extend into muscle and/or supporting structures. 08/30/22 1155   Dressing Appearance Moist drainage 08/30/22 1155   Drainage Amount Moderate 08/30/22 1155   Drainage Characteristics/Odor Brown;Serosanguineous 08/30/22 1155   Appearance Slough;Intact;Pink;Moist 08/30/22 1155   Tissue loss description Full thickness 08/30/22 1155   Periwound Area Intact;Moist;[place] 08/30/22 1155   Wound Edges Defined 08/30/22 1155   Wound Length (cm) 3.9 cm 08/30/22 1155   Wound Width (cm) 0.9 cm 08/30/22 1155   Wound Depth (cm) 1.9 cm 08/30/22 1155   Wound Volume (cm^3) 6.669 cm^3 08/30/22 1155   Wound Surface Area (cm^2) 3.51 cm^2 08/30/22 1155   Care Cleansed with:;Wound cleanser 08/30/22 1155   Dressing Applied 08/30/22 1155   Dressing Change Due 09/01/22 08/30/22 1155            Right posterior hip - powdered collagen, dry gauze, bordered foam    Left ischial tuberosity - NO PHOTO  TR      Assessment:         ICD-10-CM ICD-9-CM   1. Pressure ulcer of left buttock,  "stage 4  L89.324 707.05     707.24   2. Stage IV pressure ulcer of right hip  L89.214 707.04     707.24   3. Quadriplegia, unspecified  G82.50 344.00   4. Other chronic hematogenous osteomyelitis, unspecified femur  M86.559 730.15         Procedures:     Debridement     Date/Time: 8/30/2022 11:00 AM  Performed by: Jessie Bah NP  Authorized by: Jessie Bah NP      Time out: Immediately prior to procedure a "time out" was called to verify the correct patient, procedure, equipment, support staff and site/side marked as required.     Consent Done?:  Yes (Verbal)  Local anesthesia used?: No       Debridement - 1st Wound - General Location: Left ischial tuberosity.    Type of Debridement:  Non-excisional       Length (cm):  3.9       Area (sq cm):  3.5       Width (cm):  0.9       Percent Debrided (%):  100       Depth (cm):  1.9       Total Area Debrided (sq cm):  3.5    Depth of debridement:  Epidermis/Dermis    Devitalized tissue debrided:  Biofilm, Exudate, Fibrin and Slough    Instruments:  Curette      TR    Excisional debridement performed:  [] Yes [x] No   Selective debridement performed:  [x] Yes [] No   Mechanical debridement performed:  [] Yes [x] No   Silver nitrate applied:    [] Yes [x] No   Labs ordered this visit:   [] Yes [x] No   Imaging ordered this visit:   [] Yes [x] No   Tissue pathology and/or culture taken:  [] Yes [x] No       MEDICATIONS    Current Outpatient Medications:     apixaban (ELIQUIS) 5 mg Tab, Take 5 mg by mouth once daily at 6am., Disp: , Rfl:     lactulose (CHRONULAC) 10 gram/15 mL solution, TAKE 15 ML BY MOUTH TWICE DAILY, Disp: , Rfl:     LINZESS 145 mcg Cap capsule, Take 145 mcg by mouth once daily., Disp: , Rfl:    Review of patient's allergies indicates:   Allergen Reactions    Meropenem Anaphylaxis    Penicillins        [facility]   TIMES PER WEEK/DAYS:  3 x weekly, M/W/F  Right hip and Left ischial wounds- Cleanse with wound cleanser. " Apply powdered collagen to wound beds, cover with dry gauze and bordered foam dressing. Home health to change every other day and as needed.    Follow up in about 2 weeks (around 9/13/2022) for [place].      Electronically signed:  Jessie Bah NP    This note was created using [employer] voice recognition software that occasionally misinterprets phrases or words.

## 2022-09-13 ENCOUNTER — HOSPITAL ENCOUNTER (OUTPATIENT)
Dept: WOUND CARE | Facility: HOSPITAL | Age: 47
Discharge: HOME | End: 2022-09-13
Attending: NURSE PRACTITIONER
Payer: MEDICARE

## 2022-09-13 VITALS
RESPIRATION RATE: 18 BRPM | HEART RATE: 78 BPM | SYSTOLIC BLOOD PRESSURE: 165 MMHG | TEMPERATURE: 98 F | DIASTOLIC BLOOD PRESSURE: 75 MMHG

## 2022-09-13 DIAGNOSIS — G82.50 QUADRIPLEGIA, UNSPECIFIED: ICD-10-CM

## 2022-09-13 DIAGNOSIS — L89.214 STAGE IV PRESSURE ULCER OF RIGHT HIP: ICD-10-CM

## 2022-09-13 DIAGNOSIS — M86.559: ICD-10-CM

## 2022-09-13 DIAGNOSIS — L89.324 PRESSURE ULCER OF LEFT BUTTOCK, STAGE 4: Primary | ICD-10-CM

## 2022-09-13 PROCEDURE — 99213 OFFICE O/P EST LOW 20 MIN: CPT

## 2022-09-13 PROCEDURE — 27000999 HC MEDICAL RECORD PHOTO DOCUMENTATION

## 2022-09-13 NOTE — PROGRESS NOTES
"   Subjective:       Patient ID: Diego Martinez Jr. is a 47 y.o. male.    Chief Complaint: Pressure Ulcer (Right hip Stage 4/Left ischial Tuberosity stage 4/ )    HPI    Mr. Martinez is a long time patient of [facility].    He comes via wheelchair with a care attendant present with him at all times.    He does have a history of quadriplegia.  He has home health who assist him with his wound care daily and care attendants to provide care at other times.  He has a very pleasant demeanor and added to an is usually jovial in spite of his challenges.  He now has [facility].    This visit, both wounds remain stable.    The right hip is continuing to make some slow progress with the use of the powdered collagen.  There is a small remaining open area at the depth of the wound.  The majority of the "overhang" has now epithelialized.  The left ischial wound appears the same size and stable.  The wound bed does feel more firm.  It is possible that the collagen is beginning to develop granular tissue within the wound bed that is not visible.    We will continue powdered collagen to both the left ischial wound and into the wound on the right hip.    has been instructed to add collagen every other day.    Review of Systems   Musculoskeletal:  Positive for gait problem.   Skin:  Positive for wound.   All other systems reviewed and are negative.      Objective:      Vitals:    09/13/22 1247   BP: (!) 165/75   Pulse: 78   Resp: 18   Temp: 97.7 °F (36.5 °C)       Physical Exam  Vitals reviewed.   Constitutional:       Appearance: Normal appearance. He is normal weight.   HENT:      Head: Normocephalic.   Cardiovascular:      Rate and Rhythm: Normal rate.      Pulses: Normal pulses.   Pulmonary:      Effort: Pulmonary effort is normal.   Musculoskeletal:      Comments: quadraplegia   Skin:     General: Skin is warm and dry.   Neurological:      General: No focal deficit present.      Mental Status: He is alert and " oriented to person, place, and time.   Psychiatric:         Mood and Affect: Mood normal.          Altered Skin Integrity 05/23/22 1337 Right posterior Hip #1 Full thickness tissue loss with exposed bone, tendon, or muscle. Often includes undermining and tunneling. May extend into muscle and/or supporting structures. (Active)   05/23/22 1337   Altered Skin Integrity Present on Admission: yes   Side: Right   Orientation: posterior   Location: Hip   Wound Number: #1   Is this injury device related?: No   Primary Wound Type:    Description of Altered Skin Integrity: Full thickness tissue loss with exposed bone, tendon, or muscle. Often includes undermining and tunneling. May extend into muscle and/or supporting structures.   Ankle-Brachial Index:    Pulses:    Removal Indication and Assessment:    Wound Outcome:    (Retired) Wound Length (cm):    (Retired) Wound Width (cm):    (Retired) Depth (cm):    Wound Description (Comments):    Removal Indications:    Description of Altered Skin Integrity Full thickness tissue loss with exposed bone, tendon, or muscle. Often includes undermining and tunneling. May extend into muscle and/or supporting structures. 09/13/22 1248   Dressing Appearance Intact 09/13/22 1248   Drainage Amount Moderate 09/13/22 1248   Drainage Characteristics/Odor Clear;Serous 09/13/22 1248   Appearance Granulating;Slough 09/13/22 1248   Tissue loss description Full thickness 09/13/22 1248   Periwound Area Intact;Scar tissue 09/13/22 1248   Wound Edges Defined 09/13/22 1248   Wound Length (cm) 1 cm 09/13/22 1248   Wound Width (cm) 0.5 cm 09/13/22 1248   Wound Depth (cm) 2.2 cm 09/13/22 1248   Wound Volume (cm^3) 1.1 cm^3 09/13/22 1248   Wound Surface Area (cm^2) 0.5 cm^2 09/13/22 1248   Care Wound cleanser 09/13/22 1248   Dressing Applied 09/13/22 1248            Altered Skin Integrity 05/23/22 1353 Left Ischial tuberosity #2 Full thickness tissue loss with exposed bone, tendon, or muscle. Often includes  undermining and tunneling. May extend into muscle and/or supporting structures. (Active)   05/23/22 1353   Altered Skin Integrity Present on Admission: yes   Side: Left   Orientation:    Location: Ischial tuberosity   Wound Number: #2   Is this injury device related?: No   Primary Wound Type:    Description of Altered Skin Integrity: Full thickness tissue loss with exposed bone, tendon, or muscle. Often includes undermining and tunneling. May extend into muscle and/or supporting structures.   Ankle-Brachial Index:    Pulses:    Removal Indication and Assessment:    Wound Outcome:    (Retired) Wound Length (cm):    (Retired) Wound Width (cm):    (Retired) Depth (cm):    Wound Description (Comments):    Removal Indications:    Description of Altered Skin Integrity Full thickness tissue loss with exposed bone, tendon, or muscle. Often includes undermining and tunneling. May extend into muscle and/or supporting structures. 09/13/22 1248   Dressing Appearance Intact 09/13/22 1248   Drainage Amount Moderate 09/13/22 1248   Drainage Characteristics/Odor Serosanguineous 09/13/22 1248   Appearance Granulating 09/13/22 1248   Tissue loss description Full thickness 09/13/22 1248   Wound Length (cm) 3.4 cm 09/13/22 1248   Wound Width (cm) 0.8 cm 09/13/22 1248   Wound Depth (cm) 1.2 cm 09/13/22 1248   Wound Volume (cm^3) 3.264 cm^3 09/13/22 1248   Wound Surface Area (cm^2) 2.72 cm^2 09/13/22 1248   Care Wound cleanser 09/13/22 1248   Dressing Applied 09/13/22 1248           Left ischial tuberosity    Right posterior hip   LR - powdered collagen, dry gauze, bordered foam        Assessment:         ICD-10-CM ICD-9-CM   1. Pressure ulcer of left buttock, stage 4  L89.324 707.05     707.24   2. Stage IV pressure ulcer of right hip  L89.214 707.04     707.24   3. Quadriplegia, unspecified  G82.50 344.00   4. Other chronic hematogenous osteomyelitis, unspecified femur  M86.559 730.15         Procedures:     No procedures  performed    Excisional debridement performed:  [] Yes [x] No   Selective debridement performed:  [] Yes [x] No   Mechanical debridement performed:  [] Yes [x] No   Silver nitrate applied:    [] Yes [x] No   Labs ordered this visit:   [] Yes [x] No   Imaging ordered this visit:   [] Yes [x] No   Tissue pathology and/or culture taken:  [] Yes [x] No       MEDICATIONS    Current Outpatient Medications:     apixaban (ELIQUIS) 5 mg Tab, Take 5 mg by mouth once daily at 6am., Disp: , Rfl:     lactulose (CHRONULAC) 10 gram/15 mL solution, TAKE 15 ML BY MOUTH TWICE DAILY, Disp: , Rfl:     LINZESS 145 mcg Cap capsule, Take 145 mcg by mouth once daily., Disp: , Rfl:    Review of patient's allergies indicates:   Allergen Reactions    Meropenem Anaphylaxis    Penicillins        HOME HEALTH AGENCY:  [facility]   TIMES PER WEEK/DAYS:  3 x weekly, M/W/F  Right hip and Left ischial wounds- Cleanse with wound cleanser. Apply powdered collagen to wound beds, cover with dry gauze and bordered foam dressing. Home health to change every other day and as needed.    Follow up in about 2 weeks (around 9/27/2022).      Electronically signed:  Jessie Bah NP    This note was created using [employer] voice recognition software that occasionally misinterprets phrases or words.

## 2022-09-26 ENCOUNTER — HOSPITAL ENCOUNTER (OUTPATIENT)
Dept: WOUND CARE | Facility: HOSPITAL | Age: 47
Discharge: HOME | End: 2022-09-26
Attending: NURSE PRACTITIONER
Payer: MEDICARE

## 2022-09-26 VITALS
RESPIRATION RATE: 20 BRPM | WEIGHT: 164 LBS | HEIGHT: 65 IN | HEART RATE: 80 BPM | DIASTOLIC BLOOD PRESSURE: 72 MMHG | SYSTOLIC BLOOD PRESSURE: 140 MMHG | BODY MASS INDEX: 27.32 KG/M2

## 2022-09-26 DIAGNOSIS — G82.50 QUADRIPLEGIA, UNSPECIFIED: ICD-10-CM

## 2022-09-26 DIAGNOSIS — L89.324 PRESSURE ULCER OF LEFT BUTTOCK, STAGE 4: Primary | ICD-10-CM

## 2022-09-26 DIAGNOSIS — M86.559: ICD-10-CM

## 2022-09-26 DIAGNOSIS — L89.214 STAGE IV PRESSURE ULCER OF RIGHT HIP: ICD-10-CM

## 2022-09-26 PROCEDURE — 27000999 HC MEDICAL RECORD PHOTO DOCUMENTATION

## 2022-09-26 PROCEDURE — 99212 OFFICE O/P EST SF 10 MIN: CPT

## 2022-09-26 NOTE — PROGRESS NOTES
"   Subjective:       Patient ID: Diego Martinez Jr. is a 47 y.o. male.    Chief Complaint: Pressure Ulcer (Right hip - Stage 4/Left ischial Tuberosity - Stage 4)    HPI    Mr. Martinez is a long time patient of [facility].    He comes via wheelchair with a care attendant present with him at all times.    He does have a history of quadriplegia.  He has home health who assist him with his wound care daily and care attendants to provide care at other times.  He has a very pleasant demeanor and added to an is usually jovial in spite of his challenges.  He now has [facility].    This visit, both wounds remain stable.    The right hip is continuing to make some slow progress with the use of the powdered collagen.  There is a small remaining open area at the depth of the wound.  The majority of the "overhang" has now epithelialized.  The left ischial wound appears the same size and stable.  The wound bed does feel more firm.  It is possible that the collagen is beginning to develop granular tissue within the wound bed that is not visible.    We will continue powdered collagen to both the left ischial wound and into the wound on the right hip.    has been instructed to add collagen every other day.    Rt hip   9/26/22 - 1.0 x 0.4 x 1.0 cm  5/23/22 - 1.2 x 1.0 x 2.0 cm    Left ischial  9/26/22 - 1.0 x 0.4 x 1.0 cm  5/23/22 - 1.0 x 6.5 x 1.7 cm      Review of Systems   Musculoskeletal:  Positive for gait problem.   Skin:  Positive for wound.   All other systems reviewed and are negative.      Objective:      Vitals:    09/26/22 1331   BP: (!) 140/72   Pulse: 80   Resp: 20       Physical Exam  Vitals reviewed.   Constitutional:       Appearance: Normal appearance. He is normal weight.   HENT:      Head: Normocephalic.   Cardiovascular:      Rate and Rhythm: Normal rate.      Pulses: Normal pulses.   Pulmonary:      Effort: Pulmonary effort is normal.   Musculoskeletal:      Comments: quadraplegia   Skin:    "  General: Skin is warm and dry.   Neurological:      General: No focal deficit present.      Mental Status: He is alert and oriented to person, place, and time.   Psychiatric:         Mood and Affect: Mood normal.          Altered Skin Integrity 05/23/22 1337 Right posterior Hip #1 Full thickness tissue loss with exposed bone, tendon, or muscle. Often includes undermining and tunneling. May extend into muscle and/or supporting structures. (Active)   05/23/22 1337   Altered Skin Integrity Present on Admission: yes   Side: Right   Orientation: posterior   Location: Hip   Wound Number: #1   Is this injury device related?: No   Primary Wound Type:    Description of Altered Skin Integrity: Full thickness tissue loss with exposed bone, tendon, or muscle. Often includes undermining and tunneling. May extend into muscle and/or supporting structures.   Ankle-Brachial Index:    Pulses:    Removal Indication and Assessment:    Wound Outcome:    (Retired) Wound Length (cm):    (Retired) Wound Width (cm):    (Retired) Depth (cm):    Wound Description (Comments):    Removal Indications:    Description of Altered Skin Integrity Full thickness tissue loss. Subcutaneous fat may be visible but bone, tendon or muscle are not exposed 09/26/22 1332   Dressing Appearance Intact 09/26/22 1332   Drainage Amount Moderate 09/26/22 1332   Drainage Characteristics/Odor Serosanguineous 09/26/22 1332   Appearance Intact;Slough 09/26/22 1332   Tissue loss description Full thickness 09/26/22 1332   Periwound Area Intact;Moist 09/26/22 1332   Wound Edges Defined 09/26/22 1332   Wound Length (cm) 1 cm 09/26/22 1332   Wound Width (cm) 0.4 cm 09/26/22 1332   Wound Depth (cm) 1 cm 09/26/22 1332   Wound Volume (cm^3) 0.4 cm^3 09/26/22 1332   Wound Surface Area (cm^2) 0.4 cm^2 09/26/22 1332   Care Cleansed with: 09/26/22 1332   Dressing Applied 09/26/22 1332   Dressing Change Due 09/28/22 09/26/22 1332            Altered Skin Integrity 05/23/22 1353 Left  Ischial tuberosity #2 Full thickness tissue loss with exposed bone, tendon, or muscle. Often includes undermining and tunneling. May extend into muscle and/or supporting structures. (Active)   05/23/22 1353   Altered Skin Integrity Present on Admission: yes   Side: Left   Orientation:    Location: Ischial tuberosity   Wound Number: #2   Is this injury device related?: No   Primary Wound Type:    Description of Altered Skin Integrity: Full thickness tissue loss with exposed bone, tendon, or muscle. Often includes undermining and tunneling. May extend into muscle and/or supporting structures.   Ankle-Brachial Index:    Pulses:    Removal Indication and Assessment:    Wound Outcome:    (Retired) Wound Length (cm):    (Retired) Wound Width (cm):    (Retired) Depth (cm):    Wound Description (Comments):    Removal Indications:    Description of Altered Skin Integrity Full thickness tissue loss. Subcutaneous fat may be visible but bone, tendon or muscle are not exposed 09/26/22 1332   Dressing Appearance Intact 09/26/22 1332   Drainage Amount Moderate 09/26/22 1332   Drainage Characteristics/Odor Serosanguineous 09/26/22 1332   Appearance Intact;Pink 09/26/22 1332   Tissue loss description Full thickness 09/26/22 1332   Periwound Area Intact;Moist 09/26/22 1332   Wound Edges Defined 09/26/22 1332   Wound Length (cm) 3.3 cm 09/26/22 1332   Wound Width (cm) 0.7 cm 09/26/22 1332   Wound Depth (cm) 1 cm 09/26/22 1332   Wound Volume (cm^3) 2.31 cm^3 09/26/22 1332   Wound Surface Area (cm^2) 2.31 cm^2 09/26/22 1332   Care Cleansed with:;Wound cleanser 09/26/22 1332   Dressing Applied 09/26/22 1332   Dressing Change Due 09/28/22 09/26/22 1332         Left ischial tuberosity    Right posterior hip   TR - powdered collagen, gentle border dressing        Assessment:         ICD-10-CM ICD-9-CM   1. Pressure ulcer of left buttock, stage 4  L89.324 707.05     707.24   2. Stage IV pressure ulcer of right hip  L89.214 707.04     707.24    3. Quadriplegia, unspecified  G82.50 344.00   4. Other chronic hematogenous osteomyelitis, unspecified femur  M86.559 730.15         Procedures:     No procedures performed    Excisional debridement performed:  [] Yes [x] No   Selective debridement performed:  [] Yes [x] No   Mechanical debridement performed:  [] Yes [x] No   Silver nitrate applied:    [] Yes [x] No   Labs ordered this visit:   [] Yes [x] No   Imaging ordered this visit:   [] Yes [x] No   Tissue pathology and/or culture taken:  [] Yes [x] No       MEDICATIONS    Current Outpatient Medications:     apixaban (ELIQUIS) 5 mg Tab, Take 5 mg by mouth once daily at 6am., Disp: , Rfl:     lactulose (CHRONULAC) 10 gram/15 mL solution, TAKE 15 ML BY MOUTH TWICE DAILY, Disp: , Rfl:     LINZESS 145 mcg Cap capsule, Take 145 mcg by mouth once daily., Disp: , Rfl:    Review of patient's allergies indicates:   Allergen Reactions    Meropenem Anaphylaxis    Penicillins        HOME HEALTH AGENCY:  [facility]   TIMES PER WEEK/DAYS:  3 x weekly, M/W/F  Right hip and Left ischial wounds- Cleanse with wound cleanser. Apply powdered collagen to wound beds, cover with dry gauze and bordered foam dressing. Home health to change every other day and as needed.    Follow up in about 2 weeks (around 10/10/2022) for stretcher.      Electronically signed:  Jessie Bah NP

## 2022-10-10 ENCOUNTER — HOSPITAL ENCOUNTER (OUTPATIENT)
Dept: WOUND CARE | Facility: HOSPITAL | Age: 47
Discharge: HOME | End: 2022-10-10
Attending: NURSE PRACTITIONER
Payer: MEDICARE

## 2022-10-10 VITALS
SYSTOLIC BLOOD PRESSURE: 136 MMHG | WEIGHT: 164 LBS | BODY MASS INDEX: 27.32 KG/M2 | RESPIRATION RATE: 20 BRPM | HEIGHT: 65 IN | DIASTOLIC BLOOD PRESSURE: 77 MMHG | HEART RATE: 76 BPM

## 2022-10-10 DIAGNOSIS — L89.214 STAGE IV PRESSURE ULCER OF RIGHT HIP: ICD-10-CM

## 2022-10-10 DIAGNOSIS — G82.50 QUADRIPLEGIA, UNSPECIFIED: ICD-10-CM

## 2022-10-10 DIAGNOSIS — L89.324 PRESSURE ULCER OF LEFT BUTTOCK, STAGE 4: Primary | ICD-10-CM

## 2022-10-10 PROCEDURE — 27000999 HC MEDICAL RECORD PHOTO DOCUMENTATION

## 2022-10-10 PROCEDURE — 99212 OFFICE O/P EST SF 10 MIN: CPT

## 2022-10-10 NOTE — PROGRESS NOTES
"   Subjective:       Patient ID: Diego Martinez Jr. is a 47 y.o. male.    Chief Complaint: Pressure Ulcer (Right hip - Stage 4/Left ischial Tuberosity - Stage 4)    HPI    Mr. Martinez is a long time patient of [facility].    He comes via wheelchair with a care attendant present with him at all times.    He does have a history of quadriplegia.  He has home health who assist him with his wound care daily and care attendants to provide care at other times.  He has a very pleasant demeanor and added to an is usually jovial in spite of his challenges.  He now has [facility].    This visit, both wounds remain stable.    The right hip is continuing to make some slow progress with the use of the powdered collagen.  There is a small remaining open area at the depth of the wound.  The majority of the "overhang" has now epithelialized.  It does measure slightly larger this visit although it presents well.  The left ischial wound is also slightly larger, but the wound bed does feel more firm.  I    We will continue powdered collagen to both the left ischial wound and into the wound on the right hip.    has been instructed to add collagen every other day.    Rt hip   10/10/22 - 1.5 x 0.5 x 1.6 cm  9/26/22 - 1.0 x 0.4 x 1.0 cm  5/23/22 - 1.2 x 1.0 x 2.0 cm    Left ischial  10/10/22 - 3.0 x 0.7 x 1.4 cm  9/26/22 - 1.0 x 0.4 x 1.0 cm  5/23/22 - 1.0 x 6.5 x 1.7 cm      Review of Systems   Musculoskeletal:  Positive for gait problem.   Skin:  Positive for wound.   All other systems reviewed and are negative.      Objective:      Vitals:    10/10/22 1131   BP: 136/77   Pulse: 76   Resp: 20       Physical Exam  Vitals reviewed.   Constitutional:       Appearance: Normal appearance. He is normal weight.   HENT:      Head: Normocephalic.   Cardiovascular:      Rate and Rhythm: Normal rate.      Pulses: Normal pulses.   Pulmonary:      Effort: Pulmonary effort is normal.   Musculoskeletal:      Comments: quadraplegia "   Skin:     General: Skin is warm and dry.   Neurological:      General: No focal deficit present.      Mental Status: He is alert and oriented to person, place, and time.   Psychiatric:         Mood and Affect: Mood normal.          Altered Skin Integrity 05/23/22 1337 Right posterior Hip #1 Full thickness tissue loss with exposed bone, tendon, or muscle. Often includes undermining and tunneling. May extend into muscle and/or supporting structures. (Active)   05/23/22 1337   Altered Skin Integrity Present on Admission: yes   Side: Right   Orientation: posterior   Location: Hip   Wound Number: #1   Is this injury device related?: No   Primary Wound Type:    Description of Altered Skin Integrity: Full thickness tissue loss with exposed bone, tendon, or muscle. Often includes undermining and tunneling. May extend into muscle and/or supporting structures.   Ankle-Brachial Index:    Pulses:    Removal Indication and Assessment:    Wound Outcome:    (Retired) Wound Length (cm):    (Retired) Wound Width (cm):    (Retired) Depth (cm):    Wound Description (Comments):    Removal Indications:    Description of Altered Skin Integrity Full thickness tissue loss. Subcutaneous fat may be visible but bone, tendon or muscle are not exposed 10/10/22 1132   Dressing Appearance Moist drainage;Intact 10/10/22 1132   Drainage Amount Moderate 10/10/22 1132   Drainage Characteristics/Odor Serosanguineous 10/10/22 1132   Appearance Intact;Pink;Slough;Granulating 10/10/22 1132   Tissue loss description Full thickness 10/10/22 1132   Periwound Area Intact;Moist 10/10/22 1132   Wound Edges Open 10/10/22 1132   Wound Length (cm) 1.5 cm 10/10/22 1132   Wound Width (cm) 0.5 cm 10/10/22 1132   Wound Depth (cm) 1.6 cm 10/10/22 1132   Wound Volume (cm^3) 1.2 cm^3 10/10/22 1132   Wound Surface Area (cm^2) 0.75 cm^2 10/10/22 1132   Care Cleansed with:;Wound cleanser 10/10/22 1132   Dressing Applied 10/10/22 1132   Dressing Change Due 10/12/22  10/10/22 1132            Altered Skin Integrity 05/23/22 1353 Left Ischial tuberosity #2 Full thickness tissue loss with exposed bone, tendon, or muscle. Often includes undermining and tunneling. May extend into muscle and/or supporting structures. (Active)   05/23/22 1353   Altered Skin Integrity Present on Admission: yes   Side: Left   Orientation:    Location: Ischial tuberosity   Wound Number: #2   Is this injury device related?: No   Primary Wound Type:    Description of Altered Skin Integrity: Full thickness tissue loss with exposed bone, tendon, or muscle. Often includes undermining and tunneling. May extend into muscle and/or supporting structures.   Ankle-Brachial Index:    Pulses:    Removal Indication and Assessment:    Wound Outcome:    (Retired) Wound Length (cm):    (Retired) Wound Width (cm):    (Retired) Depth (cm):    Wound Description (Comments):    Removal Indications:    Description of Altered Skin Integrity Full thickness tissue loss. Subcutaneous fat may be visible but bone, tendon or muscle are not exposed 10/10/22 1132   Dressing Appearance Moist drainage;Intact 10/10/22 1132   Drainage Amount Moderate 10/10/22 1132   Drainage Characteristics/Odor Serosanguineous 10/10/22 1132   Appearance Slough;Pink;Intact 10/10/22 1132   Tissue loss description Full thickness 10/10/22 1132   Periwound Area Intact;Moist 10/10/22 1132   Wound Edges Defined 10/10/22 1132   Wound Length (cm) 3 cm 10/10/22 1132   Wound Width (cm) 0.7 cm 10/10/22 1132   Wound Depth (cm) 1.4 cm 10/10/22 1132   Wound Volume (cm^3) 2.94 cm^3 10/10/22 1132   Wound Surface Area (cm^2) 2.1 cm^2 10/10/22 1132   Care Cleansed with:;Wound cleanser 10/10/22 1132   Dressing Applied 10/10/22 1132   Dressing Change Due 10/12/22 10/10/22 1132         Left ischial tuberosity    Right posterior hip   TR - powdered collagen, 4 x 4, gentle border dressing        Assessment:         ICD-10-CM ICD-9-CM   1. Pressure ulcer of left buttock, stage 4   L89.324 707.05     707.24   2. Stage IV pressure ulcer of right hip  L89.214 707.04     707.24   3. Quadriplegia, unspecified  G82.50 344.00         Procedures:     No procedures performed    Excisional debridement performed:  [] Yes [x] No   Selective debridement performed:  [] Yes [x] No   Mechanical debridement performed:  [] Yes [x] No   Silver nitrate applied:    [] Yes [x] No   Labs ordered this visit:   [] Yes [x] No   Imaging ordered this visit:   [] Yes [x] No   Tissue pathology and/or culture taken:  [] Yes [x] No       MEDICATIONS    Current Outpatient Medications:     apixaban (ELIQUIS) 5 mg Tab, Take 5 mg by mouth once daily at 6am., Disp: , Rfl:     lactulose (CHRONULAC) 10 gram/15 mL solution, TAKE 15 ML BY MOUTH TWICE DAILY, Disp: , Rfl:     LINZESS 145 mcg Cap capsule, Take 145 mcg by mouth once daily., Disp: , Rfl:    Review of patient's allergies indicates:   Allergen Reactions    Meropenem Anaphylaxis    Penicillins        HOME HEALTH AGENCY:  [facility]   TIMES PER WEEK/DAYS:  3 x weekly, M/W/F  Right hip and Left ischial wounds- Cleanse with wound cleanser. Apply powdered collagen to wound beds, cover with dry gauze and bordered foam dressing. Home health to change every other day and as needed.    Follow up in about 2 weeks (around 10/24/2022) for stretcher.      Electronically signed:  Jessie Bah NP

## 2022-10-24 ENCOUNTER — HOSPITAL ENCOUNTER (OUTPATIENT)
Dept: WOUND CARE | Facility: HOSPITAL | Age: 47
Discharge: HOME | End: 2022-10-24
Attending: NURSE PRACTITIONER
Payer: MEDICARE

## 2022-10-24 VITALS
HEIGHT: 65 IN | BODY MASS INDEX: 27.32 KG/M2 | WEIGHT: 164 LBS | DIASTOLIC BLOOD PRESSURE: 72 MMHG | RESPIRATION RATE: 18 BRPM | HEART RATE: 75 BPM | SYSTOLIC BLOOD PRESSURE: 131 MMHG

## 2022-10-24 DIAGNOSIS — L89.324 PRESSURE ULCER OF LEFT BUTTOCK, STAGE 4: Primary | ICD-10-CM

## 2022-10-24 DIAGNOSIS — L89.214 STAGE IV PRESSURE ULCER OF RIGHT HIP: ICD-10-CM

## 2022-10-24 DIAGNOSIS — G82.50 QUADRIPLEGIA, UNSPECIFIED: ICD-10-CM

## 2022-10-24 PROCEDURE — 27000999 HC MEDICAL RECORD PHOTO DOCUMENTATION

## 2022-10-24 PROCEDURE — 99213 OFFICE O/P EST LOW 20 MIN: CPT

## 2022-10-26 NOTE — PROGRESS NOTES
Subjective:       Patient ID: Diego Martinez Jr. is a 47 y.o. male.    Chief Complaint: Pressure Ulcer (Right hip - Stage 4/Left ischial - Stage 4)    HPI  Mr. Martinez is a long time patient of [facility].    He comes via wheelchair with a care attendant present with him at all times.    He does have a history of quadriplegia.  He has home health who assist him with his wound care daily and care attendants to provide care at other times.  He has a very pleasant demeanor and added to an is usually jovial in spite of his challenges.  He now has [facility].    This visit, both wounds remain stable.    The right hip is continuing to make some slow progress with the use of the powdered collagen.    The left ischial wound continues to progress slowly.  Today there was a moderate amount of bleeding from the exposed granular tissue.  Omnistat was applied and will be left in place for one week until his next visit to help to control the bleeding.    Today there are two additional areas of concern that he previously closed, and today appear to be reopening.  The sacral area and right ischial areas both appear as though they will possibly re-open.  We will monitor these sites for changes.     Rt hip   10/24/22 - 1.0 x 0.6 x 1.5  10/10/22 - 1.5 x 0.5 x 1.6 cm  9/26/22 - 1.0 x 0.4 x 1.0 cm  5/23/22 - 1.2 x 1.0 x 2.0 cm    Left ischial  10/24/22 - 2.6 x 0.7 x 1.5  10/10/22 - 3.0 x 0.7 x 1.4 cm  9/26/22 - 1.0 x 0.4 x 1.0 cm  5/23/22 - 1.0 x 6.5 x 1.7 cm      Review of Systems   Musculoskeletal:  Positive for gait problem.   Skin:  Positive for wound.   All other systems reviewed and are negative.      Objective:      Vitals:    10/24/22 1120   BP: 131/72   Pulse: 75   Resp: 18       Physical Exam  Vitals reviewed.   Constitutional:       Appearance: Normal appearance. He is normal weight.   HENT:      Head: Normocephalic.   Cardiovascular:      Rate and Rhythm: Normal rate.      Pulses: Normal pulses.    Pulmonary:      Effort: Pulmonary effort is normal.   Musculoskeletal:      Comments: quadraplegia   Skin:     General: Skin is warm and dry.   Neurological:      General: No focal deficit present.      Mental Status: He is alert and oriented to person, place, and time.   Psychiatric:         Mood and Affect: Mood normal.          Altered Skin Integrity 05/23/22 1337 Right posterior Hip #1 (Active)   05/23/22 1337   Altered Skin Integrity Present on Admission: yes   Side: Right   Orientation: posterior   Location: Hip   Wound Number: #1   Is this injury device related?: No   Primary Wound Type:    Description of Altered Skin Integrity:    Ankle-Brachial Index:    Pulses:    Removal Indication and Assessment:    Wound Outcome:    (Retired) Wound Length (cm):    (Retired) Wound Width (cm):    (Retired) Depth (cm):    Wound Description (Comments):    Removal Indications:    Dressing Appearance Moist drainage 10/24/22 1121   Drainage Amount Moderate 10/24/22 1121   Drainage Characteristics/Odor Serosanguineous 10/24/22 1121   Appearance Pink;Slough;Moist 10/24/22 1121   Tissue loss description Full thickness 10/24/22 1121   Periwound Area Intact;Moist 10/24/22 1121   Wound Edges Open 10/24/22 1121   Wound Length (cm) 1 cm 10/24/22 1121   Wound Width (cm) 0.6 cm 10/24/22 1121   Wound Depth (cm) 1.5 cm 10/24/22 1121   Wound Volume (cm^3) 0.9 cm^3 10/24/22 1121   Wound Surface Area (cm^2) 0.6 cm^2 10/24/22 1121   Care Cleansed with:;Wound cleanser 10/24/22 1121   Dressing Applied;Other (comment) 10/24/22 1121   Dressing Change Due 10/26/22 10/24/22 1121            Altered Skin Integrity 05/23/22 1353 Left Ischial tuberosity #2 (Active)   05/23/22 1353   Altered Skin Integrity Present on Admission: yes   Side: Left   Orientation:    Location: Ischial tuberosity   Wound Number: #2   Is this injury device related?: No   Primary Wound Type:    Description of Altered Skin Integrity:    Ankle-Brachial Index:    Pulses:     Removal Indication and Assessment:    Wound Outcome:    (Retired) Wound Length (cm):    (Retired) Wound Width (cm):    (Retired) Depth (cm):    Wound Description (Comments):    Removal Indications:    Dressing Appearance Moist drainage 10/24/22 1121   Drainage Amount Moderate 10/24/22 1121   Drainage Characteristics/Odor Serosanguineous 10/24/22 1121   Appearance Red;Granulating;Slough;Moist 10/24/22 1121   Tissue loss description Full thickness 10/24/22 1121   Periwound Area Intact;Moist 10/24/22 1121   Wound Edges Open 10/24/22 1121   Wound Length (cm) 2.6 cm 10/24/22 1121   Wound Width (cm) 0.7 cm 10/24/22 1121   Wound Depth (cm) 1.5 cm 10/24/22 1121   Wound Volume (cm^3) 2.73 cm^3 10/24/22 1121   Wound Surface Area (cm^2) 1.82 cm^2 10/24/22 1121   Care Cleansed with:;Wound cleanser 10/24/22 1121   Dressing Applied;Other (comment) 10/24/22 1121   Dressing Change Due 10/31/22 10/24/22 1121         Left ischial tuberosity    Right posterior hip   Ischial:  omnistat, 6x6 gentle foam border dressing  Hip:  powdered collagen, 4x4 gentle foam border dressing  CT      Assessment:         ICD-10-CM ICD-9-CM   1. Pressure ulcer of left buttock, stage 4  L89.324 707.05     707.24   2. Stage IV pressure ulcer of right hip  L89.214 707.04     707.24   3. Quadriplegia, unspecified  G82.50 344.00         Procedures:     No procedures performed    Excisional debridement performed:  [] Yes [x] No   Selective debridement performed:  [] Yes [x] No   Mechanical debridement performed:  [] Yes [x] No   Silver nitrate applied:    [] Yes [x] No   Labs ordered this visit:   [] Yes [x] No   Imaging ordered this visit:   [] Yes [x] No   Tissue pathology and/or culture taken:  [] Yes [x] No       MEDICATIONS    Current Outpatient Medications:     apixaban (ELIQUIS) 5 mg Tab, Take 5 mg by mouth once daily at 6am., Disp: , Rfl:     lactulose (CHRONULAC) 10 gram/15 mL solution, TAKE 15 ML BY MOUTH TWICE DAILY, Disp: , Rfl:     LINZESS 145  mcg Cap capsule, Take 145 mcg by mouth once daily., Disp: , Rfl:    Review of patient's allergies indicates:   Allergen Reactions    Meropenem Anaphylaxis    Penicillins        HOME HEALTH AGENCY:  [facility]   TIMES PER WEEK/DAYS:  3 x weekly, M/W/F    Right hip wound: Cleanse with wound cleanser, apply powdered collagen and gentle foam border dressing to be changed every other day by home health  Left ischial wound: Leave current product in place, omnistat. May change outer gentle foam border dressing every other day/PRN soilage. Do not remove product off of wound bed.     Follow up in 1 week (on 10/31/2022).      Electronically signed:  Jessie Bah NP

## 2022-11-14 ENCOUNTER — HOSPITAL ENCOUNTER (OUTPATIENT)
Dept: WOUND CARE | Facility: HOSPITAL | Age: 47
Discharge: HOME | End: 2022-11-14
Attending: NURSE PRACTITIONER
Payer: MEDICARE

## 2022-11-14 VITALS
BODY MASS INDEX: 27.32 KG/M2 | DIASTOLIC BLOOD PRESSURE: 78 MMHG | SYSTOLIC BLOOD PRESSURE: 127 MMHG | WEIGHT: 164 LBS | RESPIRATION RATE: 20 BRPM | HEIGHT: 65 IN | HEART RATE: 80 BPM

## 2022-11-14 DIAGNOSIS — L89.214 STAGE IV PRESSURE ULCER OF RIGHT HIP: ICD-10-CM

## 2022-11-14 DIAGNOSIS — L89.324 PRESSURE ULCER OF LEFT BUTTOCK, STAGE 4: Primary | ICD-10-CM

## 2022-11-14 DIAGNOSIS — G82.50 QUADRIPLEGIA, UNSPECIFIED: ICD-10-CM

## 2022-11-14 PROCEDURE — 99212 OFFICE O/P EST SF 10 MIN: CPT

## 2022-11-14 PROCEDURE — 27000999 HC MEDICAL RECORD PHOTO DOCUMENTATION

## 2022-11-14 NOTE — PROGRESS NOTES
Subjective:       Patient ID: Diego Martinez Jr. is a 47 y.o. male.    Chief Complaint: Pressure Ulcer (Right hip - Stage 4/Left ischial - Stage 4)    HPI  Mr. Martinez is a long time patient of [facility].    He comes via wheelchair with a care attendant present with him at all times.    He does have a history of quadriplegia.  He has home health who assist him with his wound care daily and care attendants to provide care at other times.  He has a very pleasant demeanor and added to an is usually jovial in spite of his challenges.  He now has [facility].    This visit, both wounds remain stable.    The right hip is continuing to make some slow progress with the use of the powdered collagen.  We will continue the use of that product.    The left ischial wound continues to progress slowly.  There has been little change week over week.  We will begin application of powdered collagen into this wound as well.    Today there are two additional areas of concern that he previously closed, and today appear to be reopening.  The sacral area and right ischial areas both appear as though they will possibly re-open.  We will monitor these sites for changes.  This visit, however, there has been no further deterioration of either.    Rt hip   11/14/22 - 1.0 x 0.3 x 1.5  10/24/22 - 1.0 x 0.6 x 1.5  10/10/22 - 1.5 x 0.5 x 1.6 cm  9/26/22 - 1.0 x 0.4 x 1.0 cm  5/23/22 - 1.2 x 1.0 x 2.0 cm    Left ischial  11/14/22 - 2.6 x 0.7 x 1.5  10/24/22 - 2.6 x 0.7 x 1.5  10/10/22 - 3.0 x 0.7 x 1.4 cm  9/26/22 - 1.0 x 0.4 x 1.0 cm  5/23/22 - 1.0 x 6.5 x 1.7 cm      Review of Systems   Musculoskeletal:  Positive for gait problem.   Skin:  Positive for wound.   All other systems reviewed and are negative.      Objective:      Vitals:    11/14/22 1212   BP: 127/78   Pulse: 80   Resp: 20       Physical Exam  Vitals reviewed.   Constitutional:       Appearance: Normal appearance. He is normal weight.   HENT:      Head:  Normocephalic.   Cardiovascular:      Rate and Rhythm: Normal rate.      Pulses: Normal pulses.   Pulmonary:      Effort: Pulmonary effort is normal.   Musculoskeletal:      Comments: quadraplegia   Skin:     General: Skin is warm and dry.   Neurological:      General: No focal deficit present.      Mental Status: He is alert and oriented to person, place, and time.   Psychiatric:         Mood and Affect: Mood normal.          Altered Skin Integrity 05/23/22 1337 Right posterior Hip #1 (Active)   05/23/22 1337   Altered Skin Integrity Present on Admission: yes   Side: Right   Orientation: posterior   Location: Hip   Wound Number: #1   Is this injury device related?: No   Primary Wound Type:    Description of Altered Skin Integrity:    Ankle-Brachial Index:    Pulses:    Removal Indication and Assessment:    Wound Outcome:    (Retired) Wound Length (cm):    (Retired) Wound Width (cm):    (Retired) Depth (cm):    Wound Description (Comments):    Removal Indications:    Description of Altered Skin Integrity Full thickness tissue loss. Subcutaneous fat may be visible but bone, tendon or muscle are not exposed 11/14/22 1220   Dressing Appearance Moist drainage 11/14/22 1220   Drainage Amount Moderate 11/14/22 1220   Drainage Characteristics/Odor Serosanguineous 11/14/22 1220   Appearance Pink;Slough;Granulating;Moist 11/14/22 1220   Tissue loss description Full thickness 11/14/22 1220   Periwound Area Intact 11/14/22 1220   Wound Edges Open 11/14/22 1220   Wound Length (cm) 1 cm 11/14/22 1220   Wound Width (cm) 0.3 cm 11/14/22 1220   Wound Depth (cm) 1.5 cm 11/14/22 1220   Wound Volume (cm^3) 0.45 cm^3 11/14/22 1220   Wound Surface Area (cm^2) 0.3 cm^2 11/14/22 1220   Care Cleansed with:;Wound cleanser 11/14/22 1220   Dressing Applied 11/14/22 1220   Dressing Change Due 11/17/22 11/14/22 1220            Altered Skin Integrity 05/23/22 1353 Left Ischial tuberosity #2 (Active)   05/23/22 1353   Altered Skin Integrity  Present on Admission: yes   Side: Left   Orientation:    Location: Ischial tuberosity   Wound Number: #2   Is this injury device related?: No   Primary Wound Type:    Description of Altered Skin Integrity:    Ankle-Brachial Index:    Pulses:    Removal Indication and Assessment:    Wound Outcome:    (Retired) Wound Length (cm):    (Retired) Wound Width (cm):    (Retired) Depth (cm):    Wound Description (Comments):    Removal Indications:    Description of Altered Skin Integrity Full thickness tissue loss. Subcutaneous fat may be visible but bone, tendon or muscle are not exposed 11/14/22 1220   Dressing Appearance Intact;Moist drainage 11/14/22 1220   Drainage Amount Moderate 11/14/22 1220   Drainage Characteristics/Odor Serosanguineous 11/14/22 1220   Appearance Pink;Slough;Granulating;Intact;Moist 11/14/22 1220   Tissue loss description Full thickness 11/14/22 1220   Periwound Area Intact;Dry 11/14/22 1220   Wound Edges Open 11/14/22 1220   Wound Length (cm) 2.6 cm 11/14/22 1220   Wound Width (cm) 0.7 cm 11/14/22 1220   Wound Depth (cm) 1.5 cm 11/14/22 1220   Wound Volume (cm^3) 2.73 cm^3 11/14/22 1220   Wound Surface Area (cm^2) 1.82 cm^2 11/14/22 1220   Care Cleansed with:;Wound cleanser 11/14/22 1220   Dressing Applied 11/14/22 1220   Dressing Change Due 11/17/22 11/14/22 1220         Left ischial tuberosity    Right posterior hip   Ischial:  Powdered collagen, gentle border  Hip:  Powdered collagen, gentle border  TR      Assessment:         ICD-10-CM ICD-9-CM   1. Pressure ulcer of left buttock, stage 4  L89.324 707.05     707.24   2. Stage IV pressure ulcer of right hip  L89.214 707.04     707.24   3. Quadriplegia, unspecified  G82.50 344.00         Procedures:     Mechanical debridement only    Excisional debridement performed:  [] Yes [x] No   Selective debridement performed:  [] Yes [x] No   Mechanical debridement performed:  [x] Yes [] No   Silver nitrate applied:    [] Yes [x] No   Labs ordered this  visit:   [] Yes [x] No   Imaging ordered this visit:   [] Yes [x] No   Tissue pathology and/or culture taken:  [] Yes [x] No       MEDICATIONS    Current Outpatient Medications:     apixaban (ELIQUIS) 5 mg Tab, Take 5 mg by mouth once daily at 6am., Disp: , Rfl:     lactulose (CHRONULAC) 10 gram/15 mL solution, TAKE 15 ML BY MOUTH TWICE DAILY, Disp: , Rfl:     LINZESS 145 mcg Cap capsule, Take 145 mcg by mouth once daily., Disp: , Rfl:    Review of patient's allergies indicates:   Allergen Reactions    Meropenem Anaphylaxis    Penicillins        HOME HEALTH AGENCY:  [facility]   TIMES PER WEEK/DAYS:  3 x weekly, M/W/F    Right hip wound: **Leave current product in place until Thursday**  Cleanse with wound cleanser, apply silver algainate and cover with a gentle foam border dressing to be changed every other day by home health  **Leave Dressing in place until Thursday**  Left ischial wound: Cleanse and apply silver algainate to wound and cover with gentle foam border dressing every other day/PRN soilage.     Follow up in about 2 weeks (around 11/28/2022) for stretcher.      Electronically signed:  Jessie Bah NP

## 2022-11-28 ENCOUNTER — HOSPITAL ENCOUNTER (OUTPATIENT)
Dept: WOUND CARE | Facility: HOSPITAL | Age: 47
Discharge: HOME | End: 2022-11-28
Attending: NURSE PRACTITIONER
Payer: MEDICARE

## 2022-11-28 VITALS
WEIGHT: 164 LBS | HEART RATE: 88 BPM | SYSTOLIC BLOOD PRESSURE: 68 MMHG | BODY MASS INDEX: 27.32 KG/M2 | RESPIRATION RATE: 20 BRPM | DIASTOLIC BLOOD PRESSURE: 50 MMHG | HEIGHT: 65 IN

## 2022-11-28 DIAGNOSIS — L89.214 STAGE IV PRESSURE ULCER OF RIGHT HIP: ICD-10-CM

## 2022-11-28 DIAGNOSIS — L89.324 PRESSURE ULCER OF LEFT BUTTOCK, STAGE 4: Primary | ICD-10-CM

## 2022-11-28 DIAGNOSIS — G82.50 QUADRIPLEGIA, UNSPECIFIED: ICD-10-CM

## 2022-11-28 PROCEDURE — 99212 OFFICE O/P EST SF 10 MIN: CPT

## 2022-11-28 PROCEDURE — 27000999 HC MEDICAL RECORD PHOTO DOCUMENTATION

## 2022-11-29 NOTE — PROGRESS NOTES
Subjective:       Patient ID: Diego Martinez Jr. is a 47 y.o. male.    Chief Complaint: Pressure Ulcer (Right hip - Stage 4/Left ischial - Stage 4)    HPI  Mr. Martinez is a long time patient of [facility].    He comes via wheelchair with a care attendant present with him at all times.    He does have a history of quadriplegia.  He has home health who assist him with his wound care daily and care attendants to provide care at other times.  He has a very pleasant demeanor and added to an is usually jovial in spite of his challenges.  He now has [facility].    This visit, both wounds continue to remain stable.    The right hip is continuing to make some very slow progress with the use of the powdered collagen.  We will continue the use of that product.    The left ischial wound continues to progress slowly as well.  There has been little change week over week.  We have begun application of powdered collagen into this wound and today the wound bed is pink and firm.    The sacral area remains stable.  We will monitor these sites for changes.  This visit, however, there has been no further deterioration of either.    Rt hip   11/28/22-  1.0 x 0.3 x 1.0 cm  1/14/22 - 1.0 x 0.3 x 1.5  10/24/22 - 1.0 x 0.6 x 1.5  10/10/22 - 1.5 x 0.5 x 1.6 cm  9/26/22 - 1.0 x 0.4 x 1.0 cm  5/23/22 - 1.2 x 1.0 x 2.0 cm    Left ischial  11/28/22 - 2.0 x 4.5 x 4.5 cm  11/14/22 - 2.6 x 0.7 x 1.5  10/24/22 - 2.6 x 0.7 x 1.5  10/10/22 - 3.0 x 0.7 x 1.4 cm  9/26/22 - 1.0 x 0.4 x 1.0 cm  5/23/22 - 1.0 x 6.5 x 1.7 cm      Review of Systems   Musculoskeletal:  Positive for gait problem.   Skin:  Positive for wound.   All other systems reviewed and are negative.      Objective:      Vitals:    11/28/22 1207   BP: (!) 68/50   Pulse: 88   Resp: 20       Physical Exam  Vitals reviewed.   Constitutional:       Appearance: Normal appearance. He is normal weight.   HENT:      Head: Normocephalic.   Cardiovascular:      Rate and Rhythm:  Normal rate.      Pulses: Normal pulses.   Pulmonary:      Effort: Pulmonary effort is normal.   Musculoskeletal:      Comments: quadraplegia   Skin:     General: Skin is warm and dry.   Neurological:      General: No focal deficit present.      Mental Status: He is alert and oriented to person, place, and time.   Psychiatric:         Mood and Affect: Mood normal.          Altered Skin Integrity 05/23/22 1337 Right posterior Hip #1 (Active)   05/23/22 1337   Altered Skin Integrity Present on Admission: yes   Side: Right   Orientation: posterior   Location: Hip   Wound Number: #1   Is this injury device related?: No   Primary Wound Type:    Description of Altered Skin Integrity:    Ankle-Brachial Index:    Pulses:    Removal Indication and Assessment:    Wound Outcome:    (Retired) Wound Length (cm):    (Retired) Wound Width (cm):    (Retired) Depth (cm):    Wound Description (Comments):    Removal Indications:    Description of Altered Skin Integrity Full thickness tissue loss. Subcutaneous fat may be visible but bone, tendon or muscle are not exposed 11/28/22 1222   Dressing Appearance Intact;Moist drainage 11/28/22 1222   Drainage Amount Moderate 11/28/22 1222   Drainage Characteristics/Odor Serosanguineous 11/28/22 1222   Appearance Intact;Slough 11/28/22 1222   Tissue loss description Full thickness 11/28/22 1222   Periwound Area Intact;Moist 11/28/22 1222   Wound Edges Open 11/28/22 1222   Wound Length (cm) 1 cm 11/28/22 1222   Wound Width (cm) 0.3 cm 11/28/22 1222   Wound Depth (cm) 1 cm 11/28/22 1222   Wound Volume (cm^3) 0.3 cm^3 11/28/22 1222   Wound Surface Area (cm^2) 0.3 cm^2 11/28/22 1222   Dressing Applied;Other (comment) 11/28/22 1222   Dressing Change Due 12/01/22 11/28/22 1222            Altered Skin Integrity 05/23/22 1353 Left Ischial tuberosity #2 (Active)   05/23/22 1353   Altered Skin Integrity Present on Admission: yes   Side: Left   Orientation:    Location: Ischial tuberosity   Wound Number:  #2   Is this injury device related?: No   Primary Wound Type:    Description of Altered Skin Integrity:    Ankle-Brachial Index:    Pulses:    Removal Indication and Assessment:    Wound Outcome:    (Retired) Wound Length (cm):    (Retired) Wound Width (cm):    (Retired) Depth (cm):    Wound Description (Comments):    Removal Indications:    Description of Altered Skin Integrity Full thickness tissue loss. Subcutaneous fat may be visible but bone, tendon or muscle are not exposed 11/28/22 1222   Dressing Appearance Intact;Moist drainage 11/28/22 1222   Drainage Amount Moderate 11/28/22 1222   Drainage Characteristics/Odor Serosanguineous 11/28/22 1222   Appearance Intact;Red;Smooth;Moist;Granulating 11/28/22 1222   Tissue loss description Full thickness 11/28/22 1222   Wound Edges Open 11/28/22 1222   Wound Length (cm) 2 cm 11/28/22 1222   Wound Width (cm) 4.5 cm 11/28/22 1222   Wound Depth (cm) 4.5 cm 11/28/22 1222   Wound Volume (cm^3) 40.5 cm^3 11/28/22 1222   Wound Surface Area (cm^2) 9 cm^2 11/28/22 1222   Dressing Applied;Other (comment) 11/28/22 1222   Dressing Change Due 12/01/22 11/28/22 1222            Altered Skin Integrity 11/28/22 1223 Sacral spine #3 Other (comment) Full thickness tissue loss. Subcutaneous fat may be visible but bone, tendon or muscle are not exposed (Active)   11/28/22 1223   Altered Skin Integrity Present on Admission: yes   Side:    Orientation:    Location: Sacral spine   Wound Number: #3   Is this injury device related?: No   Primary Wound Type: Other   Description of Altered Skin Integrity: Full thickness tissue loss. Subcutaneous fat may be visible but bone, tendon or muscle are not exposed   Ankle-Brachial Index:    Pulses:    Removal Indication and Assessment:    Wound Outcome:    (Retired) Wound Length (cm):    (Retired) Wound Width (cm):    (Retired) Depth (cm):    Wound Description (Comments):    Removal Indications:    Description of Altered Skin Integrity Full thickness  tissue loss. Subcutaneous fat may be visible but bone, tendon or muscle are not exposed 11/28/22 1222   Dressing Appearance Moist drainage;Intact 11/28/22 1222   Drainage Amount Moderate 11/28/22 1222   Drainage Characteristics/Odor Serosanguineous 11/28/22 1222   Appearance Intact;Moist 11/28/22 1222   Tissue loss description Full thickness 11/28/22 1222   Periwound Area Intact;Macerated;Pale white 11/28/22 1222   Wound Edges Defined;Open 11/28/22 1222   Wound Length (cm) 0.3 cm 11/28/22 1222   Wound Width (cm) 0.9 cm 11/28/22 1222   Wound Depth (cm) 0.5 cm 11/28/22 1222   Wound Volume (cm^3) 0.135 cm^3 11/28/22 1222   Wound Surface Area (cm^2) 0.27 cm^2 11/28/22 1222   Dressing Applied;Other (comment) 11/28/22 1222   Dressing Change Due 12/01/22 11/28/22 1222         Left ischial tuberosity    Right posterior hip   Ischial:  powdered collagen, gentle border  Hip:  powdered collagen, gentle border      Sacrum  powdered collagen, gentle border  TR      Assessment:         ICD-10-CM ICD-9-CM   1. Pressure ulcer of left buttock, stage 4  L89.324 707.05     707.24   2. Stage IV pressure ulcer of right hip  L89.214 707.04     707.24   3. Quadriplegia, unspecified  G82.50 344.00         Procedures:     Mechanical debridement only    Excisional debridement performed:  [] Yes [x] No   Selective debridement performed:  [] Yes [x] No   Mechanical debridement performed:  [x] Yes [] No   Silver nitrate applied:    [] Yes [x] No   Labs ordered this visit:   [] Yes [x] No   Imaging ordered this visit:   [] Yes [x] No   Tissue pathology and/or culture taken:  [] Yes [x] No       MEDICATIONS    Current Outpatient Medications:     apixaban (ELIQUIS) 5 mg Tab, Take 5 mg by mouth once daily at 6am., Disp: , Rfl:     lactulose (CHRONULAC) 10 gram/15 mL solution, TAKE 15 ML BY MOUTH TWICE DAILY, Disp: , Rfl:     LINZESS 145 mcg Cap capsule, Take 145 mcg by mouth once daily., Disp: , Rfl:    Review of patient's allergies indicates:    Allergen Reactions    Meropenem Anaphylaxis    Penicillins        [facility] HEALTH AGENCY:  [facility]   TIMES PER WEEK/DAYS:  3 x weekly, M/W/F    Right hip wound: **Leave current product in place until Thursday**  Cleanse with wound cleanser, apply silver algainate and cover with a gentle foam border dressing to be changed every other day by home health  **Leave Dressing in place until Thursday**  Left ischial wound: Cleanse and apply silver algainate to wound and cover with gentle foam border dressing every other day/PRN soilage.   Sacrum- **Leave current product in place until Thursday**  Cleanse with wound cleanser, apply silver algainate and cover with a gentle foam border dressing to be changed every other day by home health    Follow up in about 2 weeks (around 12/12/2022) for Stretcher.      Electronically signed:  Jessie Bah NP     Advancement-Rotation Flap Text: The defect edges were debeveled with a #15 scalpel blade.  Given the location of the defect, shape of the defect and the proximity to free margins an advancement-rotation flap was deemed most appropriate.  Using a sterile surgical marker, an appropriate flap was drawn incorporating the defect and placing the expected incisions within the relaxed skin tension lines where possible. The area thus outlined was incised deep to adipose tissue with a #15 scalpel blade.  The skin margins were undermined to an appropriate distance in all directions utilizing iris scissors.

## 2022-12-12 ENCOUNTER — HOSPITAL ENCOUNTER (OUTPATIENT)
Dept: WOUND CARE | Facility: HOSPITAL | Age: 47
Discharge: HOME | End: 2022-12-12
Attending: NURSE PRACTITIONER
Payer: MEDICARE

## 2022-12-12 VITALS
RESPIRATION RATE: 20 BRPM | HEIGHT: 65 IN | WEIGHT: 170 LBS | BODY MASS INDEX: 28.32 KG/M2 | DIASTOLIC BLOOD PRESSURE: 53 MMHG | SYSTOLIC BLOOD PRESSURE: 74 MMHG | HEART RATE: 84 BPM

## 2022-12-12 DIAGNOSIS — M86.559: ICD-10-CM

## 2022-12-12 DIAGNOSIS — L89.214 STAGE IV PRESSURE ULCER OF RIGHT HIP: ICD-10-CM

## 2022-12-12 DIAGNOSIS — L89.324 PRESSURE ULCER OF LEFT BUTTOCK, STAGE 4: Primary | ICD-10-CM

## 2022-12-12 DIAGNOSIS — G82.50 QUADRIPLEGIA, UNSPECIFIED: ICD-10-CM

## 2022-12-12 PROCEDURE — 17250 CHEM CAUT OF GRANLTJ TISSUE: CPT

## 2022-12-12 PROCEDURE — 27000999 HC MEDICAL RECORD PHOTO DOCUMENTATION

## 2022-12-12 PROCEDURE — 99212 OFFICE O/P EST SF 10 MIN: CPT

## 2022-12-12 NOTE — PROGRESS NOTES
Subjective:       Patient ID: Diego Martinez Jr. is a 47 y.o. male.    Chief Complaint: Pressure Ulcer (Right hip - Stage 4/Left ischial - Stage 4)    HPI  Mr. Martinez is a long time patient of [facility].    He comes via wheelchair with a care attendant present with him at all times.    He does have a history of quadriplegia.  He has home health who assist him with his wound care daily and care attendants to provide care at other times.  He has a very pleasant demeanor and added to an is usually jovial in spite of his challenges.  He now has [facility].    This visit, both wounds continue to remain stable.    The right hip is continuing to make some very slow progress with the use of the powdered collagen.  We will continue the use of that product.    The left ischial wound continues to progress slowly as well.  There has been little change week over week.  We have begun application of powdered collagen into this wound and today the wound bed is pink and firm.    The sacral area remains stable.  We will monitor these sites for changes.  This visit, however, there has been no further deterioration of either.    Rt hip   12/12/22 - 1.0 x 0.3 x 0.7 cm  11/28/22-  1.0 x 0.3 x 1.0 cm  1/14/22 - 1.0 x 0.3 x 1.5  10/24/22 - 1.0 x 0.6 x 1.5  10/10/22 - 1.5 x 0.5 x 1.6 cm  9/26/22 - 1.0 x 0.4 x 1.0 cm  5/23/22 - 1.2 x 1.0 x 2.0 cm    Left ischial  12/12/22 - 3.3 x 2.7 x 2.7 cm  11/28/22 - 2.0 x 4.5 x 4.5 cm  11/14/22 - 2.6 x 0.7 x 1.5  10/24/22 - 2.6 x 0.7 x 1.5  10/10/22 - 3.0 x 0.7 x 1.4 cm  9/26/22 - 1.0 x 0.4 x 1.0 cm  5/23/22 - 1.0 x 6.5 x 1.7 cm      Review of Systems   Musculoskeletal:  Positive for gait problem.   Skin:  Positive for wound.   All other systems reviewed and are negative.      Objective:      Vitals:    12/12/22 1230   BP: (!) 74/53   Pulse: 84   Resp: 20       Physical Exam  Vitals reviewed.   Constitutional:       Appearance: Normal appearance. He is normal weight.   HENT:       Head: Normocephalic.   Cardiovascular:      Rate and Rhythm: Normal rate.      Pulses: Normal pulses.   Pulmonary:      Effort: Pulmonary effort is normal.   Musculoskeletal:      Comments: quadraplegia   Skin:     General: Skin is warm and dry.   Neurological:      General: No focal deficit present.      Mental Status: He is alert and oriented to person, place, and time.   Psychiatric:         Mood and Affect: Mood normal.          Altered Skin Integrity 05/23/22 1337 Right posterior Hip #1 (Active)   05/23/22 1337   Altered Skin Integrity Present on Admission: yes   Side: Right   Orientation: posterior   Location: Hip   Wound Number: #1   Is this injury device related?: No   Primary Wound Type:    Description of Altered Skin Integrity:    Ankle-Brachial Index:    Pulses:    Removal Indication and Assessment:    Wound Outcome:    (Retired) Wound Length (cm):    (Retired) Wound Width (cm):    (Retired) Depth (cm):    Wound Description (Comments):    Removal Indications:    Dressing Appearance Intact;Moist drainage 12/12/22 1123   Drainage Amount Moderate 12/12/22 1123   Drainage Characteristics/Odor Serosanguineous 12/12/22 1123   Appearance Intact;Red;Moist;Epithelialization;Granulating 12/12/22 1123   Tissue loss description Full thickness 12/12/22 1123   Periwound Area Intact 12/12/22 1123   Wound Edges Open 12/12/22 1123   Wound Length (cm) 1 cm 12/12/22 1123   Wound Width (cm) 0.3 cm 12/12/22 1123   Wound Depth (cm) 0.07 cm 12/12/22 1123   Wound Volume (cm^3) 0.021 cm^3 12/12/22 1123   Wound Surface Area (cm^2) 0.3 cm^2 12/12/22 1123   Care Cleansed with:;Wound cleanser 12/12/22 1123   Dressing Applied;Other (comment) 12/12/22 1123   Dressing Change Due 12/29/22 12/12/22 1123            Altered Skin Integrity 05/23/22 1353 Left Ischial tuberosity #2 (Active)   05/23/22 1353   Altered Skin Integrity Present on Admission: yes   Side: Left   Orientation:    Location: Ischial tuberosity   Wound Number: #2   Is this  injury device related?: No   Primary Wound Type:    Description of Altered Skin Integrity:    Ankle-Brachial Index:    Pulses:    Removal Indication and Assessment:    Wound Outcome:    (Retired) Wound Length (cm):    (Retired) Wound Width (cm):    (Retired) Depth (cm):    Wound Description (Comments):    Removal Indications:    Description of Altered Skin Integrity Full thickness tissue loss. Subcutaneous fat may be visible but bone, tendon or muscle are not exposed 12/12/22 1123   Dressing Appearance Moist drainage;Intact 12/12/22 1123   Drainage Amount Moderate 12/12/22 1123   Drainage Characteristics/Odor Serosanguineous 12/12/22 1123   Appearance Intact 12/12/22 1123   Tissue loss description Full thickness 12/12/22 1123   Periwound Area Intact 12/12/22 1123   Wound Edges Irregular;Defined 12/12/22 1123   Wound Length (cm) 3.3 cm 12/12/22 1123   Wound Width (cm) 2.7 cm 12/12/22 1123   Wound Depth (cm) 2.7 cm 12/12/22 1123   Wound Volume (cm^3) 24.057 cm^3 12/12/22 1123   Wound Surface Area (cm^2) 8.91 cm^2 12/12/22 1123   Care Cleansed with:;Wound cleanser 12/12/22 1123   Dressing Applied;Other (comment) 12/12/22 1123   Dressing Change Due 12/29/22 12/12/22 1123            Altered Skin Integrity 11/28/22 1223 Sacral spine #3 Other (comment) Full thickness tissue loss. Subcutaneous fat may be visible but bone, tendon or muscle are not exposed (Active)   11/28/22 1223   Altered Skin Integrity Present on Admission: yes   Side:    Orientation:    Location: Sacral spine   Wound Number: #3   Is this injury device related?: No   Primary Wound Type: Other   Description of Altered Skin Integrity: Full thickness tissue loss. Subcutaneous fat may be visible but bone, tendon or muscle are not exposed   Ankle-Brachial Index:    Pulses:    Removal Indication and Assessment:    Wound Outcome:    (Retired) Wound Length (cm):    (Retired) Wound Width (cm):    (Retired) Depth (cm):    Wound Description (Comments):    Removal  Indications:    Description of Altered Skin Integrity Full thickness tissue loss. Subcutaneous fat may be visible but bone, tendon or muscle are not exposed 12/12/22 1123   Dressing Appearance No dressing 12/12/22 1123   Drainage Amount Moderate 12/12/22 1123   Appearance Intact;Moist;Slough;Pink 12/12/22 1123   Tissue loss description Full thickness 12/12/22 1123   Periwound Area Macerated;Pale white 12/12/22 1123   Wound Edges Undefined;Defined 12/12/22 1123   Wound Length (cm) 0.5 cm 12/12/22 1123   Wound Width (cm) 0.4 cm 12/12/22 1123   Wound Depth (cm) 0.2 cm 12/12/22 1123   Wound Volume (cm^3) 0.04 cm^3 12/12/22 1123   Wound Surface Area (cm^2) 0.2 cm^2 12/12/22 1123   Care Cleansed with:;Wound cleanser 12/12/22 1123   Dressing Applied;Other (comment) 12/12/22 1123   Dressing Change Due 12/15/22 12/12/22 1123         Left ischial tuberosity    Right posterior hip   Ischial:  powdered collagen, collagen w/ silver, gentle border  Hip:  powdered collagen, gentle border      Sacrum  powdered collagen, collagen w/ silver, gentle border  TR      Assessment:         ICD-10-CM ICD-9-CM   1. Pressure ulcer of left buttock, stage 4  L89.324 707.05     707.24   2. Stage IV pressure ulcer of right hip  L89.214 707.04     707.24   3. Quadriplegia, unspecified  G82.50 344.00   4. Other chronic hematogenous osteomyelitis, unspecified femur  M86.559 730.15         Procedures:     Mechanical debridement only    Excisional debridement performed:  [] Yes [x] No   Selective debridement performed:  [] Yes [x] No   Mechanical debridement performed:  [x] Yes [] No   Silver nitrate applied:    [] Yes [x] No   Labs ordered this visit:   [] Yes [x] No   Imaging ordered this visit:   [] Yes [x] No   Tissue pathology and/or culture taken:  [] Yes [x] No       MEDICATIONS    Current Outpatient Medications:     apixaban (ELIQUIS) 5 mg Tab, Take 5 mg by mouth once daily at 6am., Disp: , Rfl:     lactulose (CHRONULAC) 10 gram/15 mL  solution, TAKE 15 ML BY MOUTH TWICE DAILY, Disp: , Rfl:     LINZESS 145 mcg Cap capsule, Take 145 mcg by mouth once daily., Disp: , Rfl:    Review of patient's allergies indicates:   Allergen Reactions    Meropenem Anaphylaxis    Penicillins        [facility]   TIMES PER WEEK/DAYS:  3 x weekly, M/W/F  Right hip wound: **Leave current product in place until Thursday**  Cleanse with wound cleanser, apply silver algainate and cover with a gentle foam border dressing to be changed every other day by home health  **Leave Dressing in place until Thursday**  Left ischial wound: Cleanse and apply silver algainate to wound and cover with gentle foam border dressing every other day/PRN soilage.   Sacrum- **Leave current product in place until Thursday**  Cleanse with wound cleanser, apply silver algainate and cover with a gentle foam border dressing to be changed every other day by home health    Follow up in about 2 weeks (around 12/26/2022) for stretcher.      Electronically signed:  Jessie Bah NP

## 2023-01-05 ENCOUNTER — HOSPITAL ENCOUNTER (OUTPATIENT)
Dept: WOUND CARE | Facility: HOSPITAL | Age: 48
Discharge: HOME | End: 2023-01-05
Attending: NURSE PRACTITIONER
Payer: MEDICARE

## 2023-01-05 VITALS
BODY MASS INDEX: 28.32 KG/M2 | DIASTOLIC BLOOD PRESSURE: 76 MMHG | WEIGHT: 170 LBS | SYSTOLIC BLOOD PRESSURE: 81 MMHG | HEART RATE: 81 BPM | HEIGHT: 65 IN | RESPIRATION RATE: 20 BRPM

## 2023-01-05 DIAGNOSIS — G82.50 QUADRIPLEGIA, UNSPECIFIED: ICD-10-CM

## 2023-01-05 DIAGNOSIS — L89.324 PRESSURE ULCER OF LEFT BUTTOCK, STAGE 4: Primary | ICD-10-CM

## 2023-01-05 DIAGNOSIS — L89.214 STAGE IV PRESSURE ULCER OF RIGHT HIP: ICD-10-CM

## 2023-01-05 DIAGNOSIS — M86.559: ICD-10-CM

## 2023-01-05 PROCEDURE — 99212 OFFICE O/P EST SF 10 MIN: CPT

## 2023-01-05 PROCEDURE — 27000999 HC MEDICAL RECORD PHOTO DOCUMENTATION

## 2023-01-06 NOTE — PROGRESS NOTES
Subjective:       Patient ID: Diego Martinez Jr. is a 47 y.o. male.    Chief Complaint: Pressure Ulcer (Right hip - Stage 4/Left ischial - Stage 4/Sacrum- Stage 4/Left medial upper thigh- Stage 3 (NEW))    HPI  Mr. Martinez is a long time patient of [facility].    He comes via wheelchair with a care attendant present with him at all times.    He does have a history of quadriplegia.  He has home health who assist him with his wound care daily and care attendants to provide care at other times.  He has a very pleasant demeanor and added to an is usually jovial in spite of his challenges.  He now has [facility].    This visit, both wounds continue to remain stable.    The right hip is continuing to make some very slow progress with the use of the powdered collagen.  We will continue the use of that product.    The left ischial wound continues to progress slowly as well.  There has been little change week over week.  We have begun application of powdered collagen into this wound and today the wound bed is pink and firm.    The sacral area remains stable.  We will monitor these sites for changes.  This visit, however, there has been no further deterioration of either.    He does, however, have a new area at the Left medial upper thigh which has come to our attention since the last visit.    He also has a small area on the left lateral ankle which is not open at this time, but needs protection.  Mepilex foam will be used at this site.     Rt hip     12/12/22 - 1.0 x 0.3 x 0.7 cm      Left ischial  12/12/22 - 3.3 x 2.7 x 2.7 cm      Review of Systems   Musculoskeletal:  Positive for gait problem.   Skin:  Positive for wound.   All other systems reviewed and are negative.      Objective:      Vitals:    01/05/23 1338   BP: (!) 81/76   Pulse: 81   Resp: 20       Physical Exam  Vitals reviewed.   Constitutional:       Appearance: Normal appearance. He is normal weight.   HENT:      Head: Normocephalic.    Cardiovascular:      Rate and Rhythm: Normal rate.      Pulses: Normal pulses.   Pulmonary:      Effort: Pulmonary effort is normal.   Musculoskeletal:      Comments: quadraplegia   Skin:     General: Skin is warm and dry.   Neurological:      General: No focal deficit present.      Mental Status: He is alert and oriented to person, place, and time.   Psychiatric:         Mood and Affect: Mood normal.          Altered Skin Integrity 05/23/22 1337 Right posterior Hip #1 (Active)   05/23/22 1337   Altered Skin Integrity Present on Admission: yes   Side: Right   Orientation: posterior   Location: Hip   Wound Number: #1   Is this injury device related?: No   Primary Wound Type:    Description of Altered Skin Integrity:    Ankle-Brachial Index:    Pulses:    Removal Indication and Assessment:    Wound Outcome:    (Retired) Wound Length (cm):    (Retired) Wound Width (cm):    (Retired) Depth (cm):    Wound Description (Comments):    Removal Indications:    Description of Altered Skin Integrity Full thickness tissue loss. Subcutaneous fat may be visible but bone, tendon or muscle are not exposed 01/05/23 1240   Dressing Appearance Intact;Moist drainage 01/05/23 1240   Drainage Amount Moderate 01/05/23 1240   Drainage Characteristics/Odor Serosanguineous 01/05/23 1240   Appearance Intact;Slough;Moist;Red;Granulating 01/05/23 1240   Tissue loss description Full thickness 01/05/23 1240   Periwound Area Intact;Moist 01/05/23 1240   Wound Edges Open 01/05/23 1240   Wound Length (cm) 1 cm 01/05/23 1240   Wound Width (cm) 0.3 cm 01/05/23 1240   Wound Depth (cm) 0.7 cm 01/05/23 1240   Wound Volume (cm^3) 0.21 cm^3 01/05/23 1240   Wound Surface Area (cm^2) 0.3 cm^2 01/05/23 1240   Care Cleansed with:;Wound cleanser 01/05/23 1240   Dressing Applied 01/05/23 1240   Dressing Change Due 01/08/23 01/05/23 1240            Altered Skin Integrity 05/23/22 1353 Left Ischial tuberosity #2 (Active)   05/23/22 1353   Altered Skin Integrity  Present on Admission: yes   Side: Left   Orientation:    Location: Ischial tuberosity   Wound Number: #2   Is this injury device related?: No   Primary Wound Type:    Description of Altered Skin Integrity:    Ankle-Brachial Index:    Pulses:    Removal Indication and Assessment:    Wound Outcome:    (Retired) Wound Length (cm):    (Retired) Wound Width (cm):    (Retired) Depth (cm):    Wound Description (Comments):    Removal Indications:    Description of Altered Skin Integrity Full thickness tissue loss. Subcutaneous fat may be visible but bone, tendon or muscle are not exposed 01/05/23 1240   Dressing Appearance Intact;Moist drainage 01/05/23 1240   Drainage Amount Moderate 01/05/23 1240   Drainage Characteristics/Odor Serosanguineous 01/05/23 1240   Appearance Intact;Red;Slough;Granulating 01/05/23 1240   Tissue loss description Full thickness 01/05/23 1240   Periwound Area Intact;Moist 01/05/23 1240   Wound Edges Open 01/05/23 1240   Wound Length (cm) 3.3 cm 01/05/23 1240   Wound Width (cm) 2.7 cm 01/05/23 1240   Wound Depth (cm) 2.7 cm 01/05/23 1240   Wound Volume (cm^3) 24.057 cm^3 01/05/23 1240   Wound Surface Area (cm^2) 8.91 cm^2 01/05/23 1240   Care Cleansed with:;Wound cleanser 01/05/23 1240   Dressing Applied 01/05/23 1240   Dressing Change Due 01/08/23 01/05/23 1240            Altered Skin Integrity 11/28/22 1223 Sacral spine #3 Other (comment) Full thickness tissue loss. Subcutaneous fat may be visible but bone, tendon or muscle are not exposed (Active)   11/28/22 1223   Altered Skin Integrity Present on Admission: yes   Side:    Orientation:    Location: Sacral spine   Wound Number: #3   Is this injury device related?: No   Primary Wound Type: Other   Description of Altered Skin Integrity: Full thickness tissue loss. Subcutaneous fat may be visible but bone, tendon or muscle are not exposed   Ankle-Brachial Index:    Pulses:    Removal Indication and Assessment:    Wound Outcome:    (Retired) Wound  Length (cm):    (Retired) Wound Width (cm):    (Retired) Depth (cm):    Wound Description (Comments):    Removal Indications:    Description of Altered Skin Integrity Full thickness tissue loss. Subcutaneous fat may be visible but bone, tendon or muscle are not exposed 01/05/23 1240   Dressing Appearance Intact;Moist drainage 01/05/23 1240   Drainage Amount Moderate 01/05/23 1240   Drainage Characteristics/Odor Serosanguineous 01/05/23 1240   Appearance Intact;Pink;Slough;Epithelialization;Moist;Granulating 01/05/23 1240   Tissue loss description Full thickness 01/05/23 1240   Periwound Area Intact;Macerated;Pale white;Moist 01/05/23 1240   Wound Edges Defined;Open 01/05/23 1240   Wound Length (cm) 0.2 cm 01/05/23 1240   Wound Width (cm) 0.7 cm 01/05/23 1240   Wound Depth (cm) 0.2 cm 01/05/23 1240   Wound Volume (cm^3) 0.028 cm^3 01/05/23 1240   Wound Surface Area (cm^2) 0.14 cm^2 01/05/23 1240   Care Cleansed with: 01/05/23 1240   Dressing Applied 01/05/23 1240   Dressing Change Due 01/08/23 01/05/23 1240            Altered Skin Integrity 01/05/23 1351 Left upper;medial Thigh #4  Full thickness tissue loss. Subcutaneous fat may be visible but bone, tendon or muscle are not exposed (Active)   01/05/23 1351   Altered Skin Integrity Present on Admission: yes   Side: Left   Orientation: upper;medial   Location: Thigh   Wound Number: #4   Is this injury device related?: No   Primary Wound Type:    Description of Altered Skin Integrity: Full thickness tissue loss. Subcutaneous fat may be visible but bone, tendon or muscle are not exposed   Ankle-Brachial Index:    Pulses:    Removal Indication and Assessment:    Wound Outcome:    (Retired) Wound Length (cm):    (Retired) Wound Width (cm):    (Retired) Depth (cm):    Wound Description (Comments):    Removal Indications:    Description of Altered Skin Integrity Full thickness tissue loss. Subcutaneous fat may be visible but bone, tendon or muscle are not exposed 01/05/23  1240   Dressing Appearance Intact;Moist drainage 01/05/23 1240   Drainage Amount Moderate 01/05/23 1240   Drainage Characteristics/Odor Serosanguineous 01/05/23 1240   Appearance Intact;Moist 01/05/23 1240   Tissue loss description Full thickness 01/05/23 1240   Periwound Area Intact;Moist;Pale white 01/05/23 1240   Wound Edges Defined;Open 01/05/23 1240   Wound Length (cm) 2 cm 01/05/23 1240   Wound Width (cm) 1 cm 01/05/23 1240   Wound Depth (cm) 0.4 cm 01/05/23 1240   Wound Volume (cm^3) 0.8 cm^3 01/05/23 1240   Wound Surface Area (cm^2) 2 cm^2 01/05/23 1240   Care Cleansed with:;Wound cleanser 01/05/23 1240   Dressing Applied 01/05/23 1240   Dressing Change Due 01/08/23 01/05/23 1240         Left ischial tuberosity    Right posterior hip   Ischial:  powdered collagen, collagen w/ silver, gentle border  Hip:  powdered collagen, gentle border        Sacrum      Left medial upper thigh  All wounds:  powdered collagen, silver alginate, gentle border  TR      Assessment:         ICD-10-CM ICD-9-CM   1. Pressure ulcer of left buttock, stage 4  L89.324 707.05     707.24   2. Stage IV pressure ulcer of right hip  L89.214 707.04     707.24   3. Other chronic hematogenous osteomyelitis, unspecified femur  M86.559 730.15   4. Quadriplegia, unspecified  G82.50 344.00           Procedures:     Mechanical debridement only    Excisional debridement performed:  [] Yes [x] No   Selective debridement performed:  [] Yes [x] No   Mechanical debridement performed:  [x] Yes [] No   Silver nitrate applied:    [] Yes [x] No   Labs ordered this visit:   [] Yes [x] No   Imaging ordered this visit:   [] Yes [x] No   Tissue pathology and/or culture taken:  [] Yes [x] No       MEDICATIONS    Current Outpatient Medications:     apixaban (ELIQUIS) 5 mg Tab, Take 5 mg by mouth once daily at 6am., Disp: , Rfl:     lactulose (CHRONULAC) 10 gram/15 mL solution, TAKE 15 ML BY MOUTH TWICE DAILY, Disp: , Rfl:     LINZESS 145 mcg Cap capsule, Take  145 mcg by mouth once daily., Disp: , Rfl:    Review of patient's allergies indicates:   Allergen Reactions    Meropenem Anaphylaxis    Penicillins        HOME HEALTH AGENCY:  [facility]   TIMES PER WEEK/DAYS:  3 x weekly, M/W/F    Right hip wound: **Leave current product in place until Sunday**  Cleanse with wound cleanser, apply silver algainate and cover with a gentle foam border dressing to be changed every other day by home health  **Leave Dressing in place until Sunday**  Left ischial wound: Cleanse and apply silver algainate to wound and cover with gentle foam border dressing every other day/PRN soilage.   Sacrum- **Leave current product in place until Sunday**  Cleanse with wound cleanser, apply silver algainate and cover with a gentle foam border dressing to be changed every other day by home health  Left medial upper thigh- *Leave current product in place until Sunday**  Cleanse with wound cleanser, apply silver algainate and cover with a gentle foam border dressing to be changed every other day by home health    Follow up in about 2 weeks (around 1/19/2023) for [place].      Electronically signed:  Jessie Bah NP

## 2023-01-25 ENCOUNTER — HOSPITAL ENCOUNTER (OUTPATIENT)
Dept: WOUND CARE | Facility: HOSPITAL | Age: 48
Discharge: HOME | End: 2023-01-25
Attending: NURSE PRACTITIONER
Payer: MEDICARE

## 2023-01-25 VITALS
DIASTOLIC BLOOD PRESSURE: 75 MMHG | HEIGHT: 65 IN | RESPIRATION RATE: 18 BRPM | HEART RATE: 62 BPM | BODY MASS INDEX: 28.32 KG/M2 | SYSTOLIC BLOOD PRESSURE: 175 MMHG | WEIGHT: 170 LBS

## 2023-01-25 DIAGNOSIS — L89.214 STAGE IV PRESSURE ULCER OF RIGHT HIP: ICD-10-CM

## 2023-01-25 DIAGNOSIS — L89.324 PRESSURE ULCER OF LEFT BUTTOCK, STAGE 4: Primary | ICD-10-CM

## 2023-01-25 DIAGNOSIS — G82.50 QUADRIPLEGIA, UNSPECIFIED: ICD-10-CM

## 2023-01-25 DIAGNOSIS — M86.559: ICD-10-CM

## 2023-01-25 PROCEDURE — 99213 OFFICE O/P EST LOW 20 MIN: CPT

## 2023-01-25 PROCEDURE — 27000999 HC MEDICAL RECORD PHOTO DOCUMENTATION

## 2023-01-25 NOTE — PROGRESS NOTES
Subjective:       Patient ID: Diego Martinez Jr. is a 47 y.o. male.    Chief Complaint: Pressure Ulcer (Right hip - Stage 4/Left ischial - Stage 4/Sacrum- Stage 4/Left medial upper thigh- Stage 3 )    HPI  Mr. Martinez is a long time patient of [facility].    He comes via wheelchair with a care attendant present with him at all times.    He does have a history of quadriplegia.  He has home health who assist him with his wound care daily and care attendants to provide care at other times.  He has a very pleasant demeanor and added to an is usually jovial in spite of his challenges.  He now has [facility].    We have been using powdered collagen on most of the wounds and they have made in presses progress with that product.    **The sacral area appears to be closed in stable.  We will monitor these sites for changes.  This visit, however, there has been no further deterioration of either.    **The right hip remains stable and clean.    **The left ischial wound continues to progress very well and at this point has a small area remaining open.    **The right upper medial thigh is a new wound that was discovered over the past two weeks by HH.  It appears as though there was a wound there previously although the patient does deny that.      We will begin applying collagen with silver in and on all wounds and return to powdered collagen at his next visit.    Review of Systems   Musculoskeletal:  Positive for gait problem.   Skin:  Positive for wound.   All other systems reviewed and are negative.      Objective:      Vitals:    01/25/23 1155   BP: (!) 175/75   Pulse: 62   Resp: 18       Physical Exam  Vitals reviewed.   Constitutional:       Appearance: Normal appearance. He is normal weight.   HENT:      Head: Normocephalic.   Cardiovascular:      Rate and Rhythm: Normal rate.      Pulses: Normal pulses.   Pulmonary:      Effort: Pulmonary effort is normal.   Musculoskeletal:      Comments:  quadraplegia   Skin:     General: Skin is warm and dry.   Neurological:      General: No focal deficit present.      Mental Status: He is alert and oriented to person, place, and time.   Psychiatric:         Mood and Affect: Mood normal.          Altered Skin Integrity 05/23/22 1337 Right posterior Hip #1 (Active)   05/23/22 1337   Altered Skin Integrity Present on Admission: yes   Side: Right   Orientation: posterior   Location: Hip   Wound Number: #1   Is this injury device related?: No   Primary Wound Type:    Description of Altered Skin Integrity:    Ankle-Brachial Index:    Pulses:    Removal Indication and Assessment:    Wound Outcome:    (Retired) Wound Length (cm):    (Retired) Wound Width (cm):    (Retired) Depth (cm):    Wound Description (Comments):    Removal Indications:    Dressing Appearance Moist drainage 01/25/23 1155   Drainage Amount Moderate 01/25/23 1155   Drainage Characteristics/Odor Serosanguineous 01/25/23 1155   Appearance Moist;Slough 01/25/23 1155   Tissue loss description Full thickness 01/25/23 1155   Periwound Area Intact 01/25/23 1155   Wound Edges Open 01/25/23 1155   Wound Length (cm) 1 cm 01/25/23 1155   Wound Width (cm) 0.5 cm 01/25/23 1155   Wound Depth (cm) 0.7 cm 01/25/23 1155   Wound Volume (cm^3) 0.35 cm^3 01/25/23 1155   Wound Surface Area (cm^2) 0.5 cm^2 01/25/23 1155   Care Cleansed with:;Wound cleanser 01/25/23 1155   Dressing Applied;Other (comment) 01/25/23 1155   Dressing Change Due 01/26/23 01/25/23 1155            Altered Skin Integrity 05/23/22 1353 Left Ischial tuberosity #2 (Active)   05/23/22 1353   Altered Skin Integrity Present on Admission: yes   Side: Left   Orientation:    Location: Ischial tuberosity   Wound Number: #2   Is this injury device related?: No   Primary Wound Type:    Description of Altered Skin Integrity:    Ankle-Brachial Index:    Pulses:    Removal Indication and Assessment:    Wound Outcome:    (Retired) Wound Length (cm):    (Retired)  Wound Width (cm):    (Retired) Depth (cm):    Wound Description (Comments):    Removal Indications:    Dressing Appearance Moist drainage 01/25/23 1155   Drainage Amount Moderate 01/25/23 1155   Drainage Characteristics/Odor Serosanguineous 01/25/23 1155   Appearance Red;Moist;Granulating 01/25/23 1155   Tissue loss description Full thickness 01/25/23 1155   Periwound Area Intact 01/25/23 1155   Wound Edges Open 01/25/23 1155   Wound Length (cm) 3 cm 01/25/23 1155   Wound Width (cm) 2.4 cm 01/25/23 1155   Wound Depth (cm) 2 cm 01/25/23 1155   Wound Volume (cm^3) 14.4 cm^3 01/25/23 1155   Wound Surface Area (cm^2) 7.2 cm^2 01/25/23 1155   Care Cleansed with:;Wound cleanser 01/25/23 1155   Dressing Change Due 01/26/23 01/25/23 1155            Altered Skin Integrity 11/28/22 1223 Sacral spine #3 Other (comment) Full thickness tissue loss. Subcutaneous fat may be visible but bone, tendon or muscle are not exposed (Active)   11/28/22 1223   Altered Skin Integrity Present on Admission: yes   Side:    Orientation:    Location: Sacral spine   Wound Number: #3   Is this injury device related?: No   Primary Wound Type: Other   Description of Altered Skin Integrity: Full thickness tissue loss. Subcutaneous fat may be visible but bone, tendon or muscle are not exposed   Ankle-Brachial Index:    Pulses:    Removal Indication and Assessment:    Wound Outcome:    (Retired) Wound Length (cm):    (Retired) Wound Width (cm):    (Retired) Depth (cm):    Wound Description (Comments):    Removal Indications:    Dressing Appearance Moist drainage 01/25/23 1155   Drainage Amount Moderate 01/25/23 1155   Drainage Characteristics/Odor Serosanguineous 01/25/23 1155   Appearance Dry 01/25/23 1155   Tissue loss description Full thickness 01/25/23 1155   Periwound Area Intact 01/25/23 1155   Wound Edges Open 01/25/23 1155   Wound Length (cm) 0.1 cm 01/25/23 1155   Wound Width (cm) 0.1 cm 01/25/23 1155   Wound Depth (cm) 0.1 cm 01/25/23 1157    Wound Volume (cm^3) 0.001 cm^3 01/25/23 1155   Wound Surface Area (cm^2) 0.01 cm^2 01/25/23 1155   Care Cleansed with:;Wound cleanser 01/25/23 1155   Dressing Applied;Other (comment) 01/25/23 1155   Dressing Change Due 01/26/23 01/25/23 1155            Altered Skin Integrity 01/05/23 1351 Left upper;medial Thigh #4  Full thickness tissue loss. Subcutaneous fat may be visible but bone, tendon or muscle are not exposed (Active)   01/05/23 1351   Altered Skin Integrity Present on Admission: yes   Side: Left   Orientation: upper;medial   Location: Thigh   Wound Number: #4   Is this injury device related?: No   Primary Wound Type:    Description of Altered Skin Integrity: Full thickness tissue loss. Subcutaneous fat may be visible but bone, tendon or muscle are not exposed   Ankle-Brachial Index:    Pulses:    Removal Indication and Assessment:    Wound Outcome:    (Retired) Wound Length (cm):    (Retired) Wound Width (cm):    (Retired) Depth (cm):    Wound Description (Comments):    Removal Indications:    Dressing Appearance Moist drainage 01/25/23 1155   Drainage Amount Moderate 01/25/23 1155   Drainage Characteristics/Odor Serosanguineous 01/25/23 1155   Appearance Moist;Slough 01/25/23 1155   Tissue loss description Full thickness 01/25/23 1155   Periwound Area Intact;Moist 01/25/23 1155   Wound Edges Open 01/25/23 1155   Wound Length (cm) 2.5 cm 01/25/23 1155   Wound Width (cm) 0.8 cm 01/25/23 1155   Wound Depth (cm) 0.4 cm 01/25/23 1155   Wound Volume (cm^3) 0.8 cm^3 01/25/23 1155   Wound Surface Area (cm^2) 2 cm^2 01/25/23 1155   Care Cleansed with:;Wound cleanser 01/25/23 1155   Dressing Change Due 01/26/23 01/25/23 1155         Left ischial tuberosity    Right posterior hip   Ischial:  powdered collagen, collagen w/ silver, gentle border  Hip:  powdered collagen, gentle border        Sacrum      Left medial upper thigh  All wounds:  collagen with silver, 4x4 gentle foam border dressing  CT      Assessment:          ICD-10-CM ICD-9-CM   1. Pressure ulcer of left buttock, stage 4  L89.324 707.05     707.24   2. Stage IV pressure ulcer of right hip  L89.214 707.04     707.24   3. Other chronic hematogenous osteomyelitis, unspecified femur  M86.559 730.15   4. Quadriplegia, unspecified  G82.50 344.00           Procedures:     Mechanical debridement only    Excisional debridement performed:  [] Yes [x] No   Selective debridement performed:  [] Yes [x] No   Mechanical debridement performed:  [x] Yes [] No   Silver nitrate applied:    [] Yes [x] No   Labs ordered this visit:   [] Yes [x] No   Imaging ordered this visit:   [] Yes [x] No   Tissue pathology and/or culture taken:  [] Yes [x] No       MEDICATIONS    Current Outpatient Medications:     apixaban (ELIQUIS) 5 mg Tab, Take 5 mg by mouth once daily at 6am., Disp: , Rfl:     lactulose (CHRONULAC) 10 gram/15 mL solution, TAKE 15 ML BY MOUTH TWICE DAILY, Disp: , Rfl:     LINZESS 145 mcg Cap capsule, Take 145 mcg by mouth once daily., Disp: , Rfl:    Review of patient's allergies indicates:   Allergen Reactions    Meropenem Anaphylaxis    Penicillins        HOME HEALTH AGENCY:  [facility]   TIMES PER WEEK/DAYS:  3 x weekly, M/W/F      Right posterior hip wound: Leave current dressing,  collagen with silver, in place until Frday (1/27); On Friday: Cleanse with wound cleanser, apply collagen and a gentle foam border dressing. On Sunday (01/29): Cleanse with wound cleanser, apply silver alginate and gentle foam border dressing to be changed every other day. On Friday (02/03): Cleanse with wound cleanser, apply collagen and a gentle foam border dressing. On Sunday (02/05): Cleanse with wound cleanser, apply silver alginate and a gentle foam border dressing to be changed every other day     Left ischial wound: Leave current dressing,  collagen with silver, in place until Frday (1/27); On Friday: Cleanse with wound cleanser, apply collagen and a gentle foam border dressing.  On Sunday (01/29): Cleanse with wound cleanser, apply silver alginate and gentle foam border dressing to be changed every other day. On Friday (02/03): Cleanse with wound cleanser, apply collagen and a gentle foam border dressing. On Sunday (02/05): Cleanse with wound cleanser, apply silver alginate and a gentle foam border dressing to be changed every other day     Left upper medial thigh wound: Leave current dressing,  collagen with silver, in place until Frday (1/27); On Friday: Cleanse with wound cleanser, apply collagen and a gentle foam border dressing. On Sunday (01/29): Cleanse with wound cleanser, apply silver alginate and gentle foam border dressing to be changed every other day. On Friday (02/03): Cleanse with wound cleanser, apply collagen and a gentle foam border dressing. On Sunday (02/05): Cleanse with wound cleanser, apply silver alginate and a gentle foam border dressing to be changed every other day     Sacral wound: Cleanse and apply gentle foam border dressing to be changed at times of other wound dressings.     Follow up in 2 weeks (on 2/8/2023).      Electronically signed:  Jessie Bah NP

## 2023-02-08 ENCOUNTER — HOSPITAL ENCOUNTER (OUTPATIENT)
Dept: WOUND CARE | Facility: HOSPITAL | Age: 48
Discharge: HOME | End: 2023-02-08
Attending: NURSE PRACTITIONER
Payer: MEDICARE

## 2023-02-08 VITALS
WEIGHT: 170 LBS | RESPIRATION RATE: 18 BRPM | BODY MASS INDEX: 28.32 KG/M2 | SYSTOLIC BLOOD PRESSURE: 164 MMHG | HEIGHT: 65 IN | HEART RATE: 67 BPM | DIASTOLIC BLOOD PRESSURE: 78 MMHG

## 2023-02-08 DIAGNOSIS — M86.559: ICD-10-CM

## 2023-02-08 DIAGNOSIS — L89.324 PRESSURE ULCER OF LEFT BUTTOCK, STAGE 4: Primary | ICD-10-CM

## 2023-02-08 DIAGNOSIS — G82.50 QUADRIPLEGIA, UNSPECIFIED: ICD-10-CM

## 2023-02-08 DIAGNOSIS — L89.214 STAGE IV PRESSURE ULCER OF RIGHT HIP: ICD-10-CM

## 2023-02-08 PROCEDURE — 99213 OFFICE O/P EST LOW 20 MIN: CPT

## 2023-02-08 PROCEDURE — 27000999 HC MEDICAL RECORD PHOTO DOCUMENTATION

## 2023-02-08 PROCEDURE — 17250 CHEM CAUT OF GRANLTJ TISSUE: CPT

## 2023-02-08 NOTE — PROGRESS NOTES
Subjective:       Patient ID: Diego Martinez Jr. is a 47 y.o. male.    Chief Complaint: Pressure Ulcer (Right hip - Stage 4/Left ischial - Stage 4/Sacrum- Stage 4/Left medial upper thigh- Stage 3)    HPI  Mr. Martinez is a long time patient of [facility].    He comes via wheelchair with a care attendant present with him at all times.    He does have a history of quadriplegia.  He has home health who assist him with his wound care daily and care attendants to provide care at other times.  He has a very pleasant demeanor and added to an is usually jovial in spite of his challenges.  He now has [facility].    We have been using powdered collagen on most of the wounds and they have made impressive progress with that product.   We will begin using collagen with silver is to keep the wound progressing.    **The sacral area appears to be closed and stable.  We are continue to apply silver alginate in this area for protection only.    **The right hip remains stable and clean.  This wound is nearly closed at the base, after several months.    **The left ischial wound continues to progress very well and at this point has a small area remaining open.  Silver nitrate was applied to the small hypergranulated area.  We will progressed to collagen with silver for the time being.    **The right upper medial thigh is a new wound that was discovered over the past two weeks by HH.  It appears as though there was a wound there previously although the patient does deny that.  The area remains stable today we will continue to use collagen with silver on it.      Review of Systems   Musculoskeletal:  Positive for gait problem.   Skin:  Positive for wound.   All other systems reviewed and are negative.      Objective:      Vitals:    02/08/23 1130   BP: (!) 164/78   Pulse: 67   Resp: 18       Physical Exam  Vitals reviewed.   Constitutional:       Appearance: Normal appearance. He is normal weight.   HENT:      Head:  Normocephalic.   Cardiovascular:      Rate and Rhythm: Normal rate.      Pulses: Normal pulses.   Pulmonary:      Effort: Pulmonary effort is normal.   Musculoskeletal:      Comments: quadraplegia   Skin:     General: Skin is warm and dry.   Neurological:      General: No focal deficit present.      Mental Status: He is alert and oriented to person, place, and time.   Psychiatric:         Mood and Affect: Mood normal.          Altered Skin Integrity 05/23/22 1337 Right posterior Hip #1 (Active)   05/23/22 1337   Altered Skin Integrity Present on Admission: yes   Side: Right   Orientation: posterior   Location: Hip   Wound Number: #1   Is this injury device related?: No   Primary Wound Type:    Description of Altered Skin Integrity:    Ankle-Brachial Index:    Pulses:    Removal Indication and Assessment:    Wound Outcome:    (Retired) Wound Length (cm):    (Retired) Wound Width (cm):    (Retired) Depth (cm):    Wound Description (Comments):    Removal Indications:    Dressing Appearance Moist drainage 02/08/23 1234   Drainage Amount Moderate 02/08/23 1234   Drainage Characteristics/Odor Serosanguineous 02/08/23 1234   Appearance Moist 02/08/23 1234   Tissue loss description Full thickness 02/08/23 1234   Periwound Area Intact 02/08/23 1234   Wound Edges Open 02/08/23 1234   Wound Length (cm) 1 cm 02/08/23 1234   Wound Width (cm) 0.5 cm 02/08/23 1234   Wound Depth (cm) 0.7 cm 02/08/23 1234   Wound Volume (cm^3) 0.35 cm^3 02/08/23 1234   Wound Surface Area (cm^2) 0.5 cm^2 02/08/23 1234   Care Cleansed with:;Wound cleanser 02/08/23 1234   Dressing Applied;Other (comment) 02/08/23 1234   Dressing Change Due 02/10/23 02/08/23 1234            Altered Skin Integrity 05/23/22 1353 Left Ischial tuberosity #2 (Active)   05/23/22 1353   Altered Skin Integrity Present on Admission: yes   Side: Left   Orientation:    Location: Ischial tuberosity   Wound Number: #2   Is this injury device related?: No   Primary Wound Type:     Description of Altered Skin Integrity:    Ankle-Brachial Index:    Pulses:    Removal Indication and Assessment:    Wound Outcome:    (Retired) Wound Length (cm):    (Retired) Wound Width (cm):    (Retired) Depth (cm):    Wound Description (Comments):    Removal Indications:    Dressing Appearance Moist drainage 02/08/23 1234   Drainage Amount Moderate 02/08/23 1234   Drainage Characteristics/Odor Serosanguineous 02/08/23 1234   Appearance Moist 02/08/23 1234   Tissue loss description Full thickness 02/08/23 1234   Periwound Area Intact 02/08/23 1234   Wound Edges Open 02/08/23 1234   Wound Length (cm) 1 cm 02/08/23 1234   Wound Width (cm) 2 cm 02/08/23 1234   Wound Depth (cm) 0.2 cm 02/08/23 1234   Wound Volume (cm^3) 0.4 cm^3 02/08/23 1234   Wound Surface Area (cm^2) 2 cm^2 02/08/23 1234   Care Cleansed with:;Wound cleanser 02/08/23 1234   Dressing Applied;Other (comment) 02/08/23 1234   Dressing Change Due 02/10/23 02/08/23 1234            Altered Skin Integrity 11/28/22 1223 Sacral spine #3 Other (comment) Full thickness tissue loss. Subcutaneous fat may be visible but bone, tendon or muscle are not exposed (Active)   11/28/22 1223   Altered Skin Integrity Present on Admission: yes   Side:    Orientation:    Location: Sacral spine   Wound Number: #3   Is this injury device related?: No   Primary Wound Type: Other   Description of Altered Skin Integrity: Full thickness tissue loss. Subcutaneous fat may be visible but bone, tendon or muscle are not exposed   Ankle-Brachial Index:    Pulses:    Removal Indication and Assessment:    Wound Outcome:    (Retired) Wound Length (cm):    (Retired) Wound Width (cm):    (Retired) Depth (cm):    Wound Description (Comments):    Removal Indications:    Dressing Appearance Moist drainage 02/08/23 1234   Drainage Amount Moderate 02/08/23 1234   Drainage Characteristics/Odor Serosanguineous 02/08/23 1234   Appearance Moist 02/08/23 1234   Tissue loss description Full thickness  02/08/23 1234   Periwound Area Intact;Moist 02/08/23 1234   Wound Edges Open 02/08/23 1234   Wound Length (cm) 0.2 cm 02/08/23 1234   Wound Width (cm) 0.2 cm 02/08/23 1234   Wound Depth (cm) 0.2 cm 02/08/23 1234   Wound Volume (cm^3) 0.008 cm^3 02/08/23 1234   Wound Surface Area (cm^2) 0.04 cm^2 02/08/23 1234   Care Cleansed with:;Wound cleanser 02/08/23 1234   Dressing Applied;Other (comment) 02/08/23 1234   Dressing Change Due 02/09/23 02/08/23 1234            Altered Skin Integrity 01/05/23 1351 Left upper;medial Thigh #4  Full thickness tissue loss. Subcutaneous fat may be visible but bone, tendon or muscle are not exposed (Active)   01/05/23 1351   Altered Skin Integrity Present on Admission: yes   Side: Left   Orientation: upper;medial   Location: Thigh   Wound Number: #4   Is this injury device related?: No   Primary Wound Type:    Description of Altered Skin Integrity: Full thickness tissue loss. Subcutaneous fat may be visible but bone, tendon or muscle are not exposed   Ankle-Brachial Index:    Pulses:    Removal Indication and Assessment:    Wound Outcome:    (Retired) Wound Length (cm):    (Retired) Wound Width (cm):    (Retired) Depth (cm):    Wound Description (Comments):    Removal Indications:    Dressing Appearance Moist drainage 02/08/23 1234   Drainage Amount Moderate 02/08/23 1234   Drainage Characteristics/Odor Serosanguineous 02/08/23 1234   Appearance Moist;Slough 02/08/23 1234   Tissue loss description Full thickness 02/08/23 1234   Periwound Area Pale white 02/08/23 1234   Wound Edges Open 02/08/23 1234   Wound Length (cm) 2.2 cm 02/08/23 1234   Wound Width (cm) 1 cm 02/08/23 1234   Wound Depth (cm) 0.4 cm 02/08/23 1234   Wound Volume (cm^3) 0.88 cm^3 02/08/23 1234   Wound Surface Area (cm^2) 2.2 cm^2 02/08/23 1234   Care Cleansed with:;Wound cleanser 02/08/23 1234   Dressing Applied;Other (comment) 02/08/23 1234   Dressing Change Due 02/10/23 02/08/23 1234               Left ischial  tuberosity    Right posterior hip   Ischial:  powdered collagen, collagen w/ silver, gentle border  Hip:  powdered collagen, gentle border              Sacrum      Left medial upper thigh  All wounds:  collagen with silver, 4x4 gentle foam border dressing  CT      Assessment:         ICD-10-CM ICD-9-CM   1. Pressure ulcer of left buttock, stage 4  L89.324 707.05     707.24   2. Stage IV pressure ulcer of right hip  L89.214 707.04     707.24   3. Other chronic hematogenous osteomyelitis, unspecified femur  M86.559 730.15   4. Quadriplegia, unspecified  G82.50 344.00           Procedures:     Mechanical debridement only.  Silver nitrate applied in treatment of hypergranulated tissue.    Excisional debridement performed:  [] Yes [x] No   Selective debridement performed:  [] Yes [x] No   Mechanical debridement performed:  [x] Yes [] No   Silver nitrate applied:    [x] Yes [] No   Labs ordered this visit:   [] Yes [x] No   Imaging ordered this visit:   [] Yes [x] No   Tissue pathology and/or culture taken:  [] Yes [x] No       MEDICATIONS    Current Outpatient Medications:     apixaban (ELIQUIS) 5 mg Tab, Take 5 mg by mouth once daily at 6am., Disp: , Rfl:     lactulose (CHRONULAC) 10 gram/15 mL solution, TAKE 15 ML BY MOUTH TWICE DAILY, Disp: , Rfl:     LINZESS 145 mcg Cap capsule, Take 145 mcg by mouth once daily., Disp: , Rfl:    Review of patient's allergies indicates:   Allergen Reactions    Meropenem Anaphylaxis    Penicillins        [facility]   TIMES PER WEEK/DAYS:  3 x weekly, M/W/F  Right posterior hip wound: Cleanse with wound cleanser, apply silver alginate, and 4x4 gentle foam border dressing to be changed on Fridays and Sundays/ Cleanse with wound cleanser, apply collagen with silver, and 4x4 gentle foam border dressing to be changed on Wednesdays  Left ischial wound: Cleanse with wound cleanser, apply silver alginate, and 4x4 gentle foam border dressing to be changed on  Fridays and Sundays/ Cleanse with wound cleanser, apply collagen with silver, and 4x4 gentle foam border dressing to be changed on Wednesdays  Sacral wound: Cleanse with wound cleanser, apply silver alginate, and 4x4 gentle foam border dressing to be changed on Fridays and Sundays/ Cleanse with wound cleanser, apply collagen with silver, and 4x4 gentle foam border dressing to be changed on Wednesdays  Left upper medial thigh wound: Cleanse with wound cleanser, apply silver alginate, and 4x4 gentle foam border dressing to be changed on Fridays and Sundays/ Cleanse with wound cleanser, apply collagen with silver, and 4x4 gentle foam border dressing to be changed on Wednesdays    Follow up in 3 weeks (on 3/1/2023).      Electronically signed:  Jessie Bah NP

## 2023-03-01 ENCOUNTER — HOSPITAL ENCOUNTER (OUTPATIENT)
Dept: WOUND CARE | Facility: HOSPITAL | Age: 48
Discharge: HOME | End: 2023-03-01
Attending: NURSE PRACTITIONER
Payer: MEDICARE

## 2023-03-01 VITALS
WEIGHT: 170 LBS | RESPIRATION RATE: 18 BRPM | HEART RATE: 81 BPM | SYSTOLIC BLOOD PRESSURE: 148 MMHG | BODY MASS INDEX: 28.32 KG/M2 | HEIGHT: 65 IN | DIASTOLIC BLOOD PRESSURE: 74 MMHG

## 2023-03-01 DIAGNOSIS — L89.223 PRESSURE INJURY OF LEFT THIGH, STAGE 3: ICD-10-CM

## 2023-03-01 DIAGNOSIS — L89.154 PRESSURE INJURY OF SACRAL REGION, STAGE 4: ICD-10-CM

## 2023-03-01 DIAGNOSIS — L89.324 PRESSURE ULCER OF LEFT BUTTOCK, STAGE 4: Primary | ICD-10-CM

## 2023-03-01 DIAGNOSIS — M86.559: ICD-10-CM

## 2023-03-01 DIAGNOSIS — G82.50 QUADRIPLEGIA, UNSPECIFIED: ICD-10-CM

## 2023-03-01 DIAGNOSIS — L89.214 STAGE IV PRESSURE ULCER OF RIGHT HIP: ICD-10-CM

## 2023-03-01 PROCEDURE — 99213 OFFICE O/P EST LOW 20 MIN: CPT

## 2023-03-01 PROCEDURE — 27000999 HC MEDICAL RECORD PHOTO DOCUMENTATION

## 2023-03-01 PROCEDURE — 17250 CHEM CAUT OF GRANLTJ TISSUE: CPT

## 2023-03-01 NOTE — PROGRESS NOTES
Subjective:       Patient ID: Diego Martinez Jr. is a 47 y.o. male.    Chief Complaint: Pressure Ulcer (Right hip - Stage 4/Left ischial - Stage 4/Sacrum- Stage 4/Left medial upper thigh- Stage 3)    HPI  Mr. Martinez is a long time patient of [facility].    He comes via wheelchair with a care attendant present with him at all times.    He does have a history of quadriplegia.  He has home health who assist him with his wound care daily and care attendants to provide care at other times.  He has a very pleasant demeanor and added to an is usually jovial in spite of his challenges.  He now has [facility].    We have been using powdered collagen on most of the wounds and they have made impressive progress with that product.   We will begin using collagen with silver is to keep the wound progressing.    **The sacral area appears to be closed and stable.  We are continue to apply silver alginate in this area for protection only.    **The right hip remains stable and clean.  This wound is nearly closed at the base, after several months.    **The left ischial wound continues to progress very well and at this point has a small area remaining open.  Silver nitrate was applied to the small hypergranulated area.  We will progressed to collagen with silver for the time being.    **The right upper medial thigh is a new wound that was discovered over the past two weeks by HH.  It appears as though there was a wound there previously although the patient does deny that.  The area remains stable today we will continue to use collagen with silver on it.    3/1/23 - Mr. Martinez returns today for followup for his multiple wounds on his lower extremities.  Three of his four wounds remain stable.  However, there is concern over the left ischial wound.  This wound appears to have been pulled wide open during the cleansing process by HH, resulting in deterioration and increase in depth of the wound.  This will be  address with .      Review of Systems   Musculoskeletal:  Positive for gait problem.   Skin:  Positive for wound.   All other systems reviewed and are negative.      Objective:      Vitals:    03/01/23 1138   BP: (!) 148/74   Pulse: 81   Resp: 18       Physical Exam  Vitals reviewed.   Constitutional:       Appearance: Normal appearance. He is normal weight.   HENT:      Head: Normocephalic.   Cardiovascular:      Rate and Rhythm: Normal rate.      Pulses: Normal pulses.   Pulmonary:      Effort: Pulmonary effort is normal.   Musculoskeletal:      Comments: quadraplegia   Skin:     General: Skin is warm and dry.   Neurological:      General: No focal deficit present.      Mental Status: He is alert and oriented to person, place, and time.   Psychiatric:         Mood and Affect: Mood normal.          Altered Skin Integrity 05/23/22 1337 Right posterior Hip #1 (Active)   05/23/22 1337   Altered Skin Integrity Present on Admission: yes   Side: Right   Orientation: posterior   Location: Hip   Wound Number: #1   Is this injury device related?: No   Primary Wound Type:    Description of Altered Skin Integrity:    Ankle-Brachial Index:    Pulses:    Removal Indication and Assessment:    Wound Outcome:    (Retired) Wound Length (cm):    (Retired) Wound Width (cm):    (Retired) Depth (cm):    Wound Description (Comments):    Removal Indications:    Dressing Appearance Moist drainage 03/01/23 1248   Drainage Amount Moderate 03/01/23 1248   Drainage Characteristics/Odor Serosanguineous 03/01/23 1248   Appearance Moist;Slough 03/01/23 1248   Tissue loss description Full thickness 03/01/23 1248   Periwound Area Intact;Moist 03/01/23 1248   Wound Edges Open 03/01/23 1248   Wound Length (cm) 1 cm 03/01/23 1248   Wound Width (cm) 1 cm 03/01/23 1248   Wound Depth (cm) 2 cm 03/01/23 1248   Wound Volume (cm^3) 2 cm^3 03/01/23 1248   Wound Surface Area (cm^2) 1 cm^2 03/01/23 1248   Care Cleansed with:;Wound cleanser 03/01/23 1248    Dressing Applied;Other (comment) 03/01/23 1248   Dressing Change Due 03/03/23 03/01/23 1248            Altered Skin Integrity 05/23/22 1353 Left Ischial tuberosity #2 (Active)   05/23/22 1353   Altered Skin Integrity Present on Admission: yes   Side: Left   Orientation:    Location: Ischial tuberosity   Wound Number: #2   Is this injury device related?: No   Primary Wound Type:    Description of Altered Skin Integrity:    Ankle-Brachial Index:    Pulses:    Removal Indication and Assessment:    Wound Outcome:    (Retired) Wound Length (cm):    (Retired) Wound Width (cm):    (Retired) Depth (cm):    Wound Description (Comments):    Removal Indications:    Dressing Appearance Moist drainage 03/01/23 1248   Drainage Amount Moderate 03/01/23 1248   Drainage Characteristics/Odor Serosanguineous 03/01/23 1248   Appearance Moist;Slough 03/01/23 1248   Tissue loss description Full thickness 03/01/23 1248   Periwound Area Intact;Moist 03/01/23 1248   Wound Edges Open 03/01/23 1248   Wound Length (cm) 0.8 cm 03/01/23 1248   Wound Width (cm) 3.2 cm 03/01/23 1248   Wound Depth (cm) 2.1 cm 03/01/23 1248   Wound Volume (cm^3) 5.376 cm^3 03/01/23 1248   Wound Surface Area (cm^2) 2.56 cm^2 03/01/23 1248   Care Cleansed with:;Wound cleanser 03/01/23 1248   Dressing Applied;Other (comment) 03/01/23 1248   Dressing Change Due 03/02/23 03/01/23 1248            Altered Skin Integrity 11/28/22 1223 Sacral spine #3 Other (comment) Full thickness tissue loss. Subcutaneous fat may be visible but bone, tendon or muscle are not exposed (Active)   11/28/22 1223   Altered Skin Integrity Present on Admission: yes   Side:    Orientation:    Location: Sacral spine   Wound Number: #3   Is this injury device related?: No   Primary Wound Type: Other   Description of Altered Skin Integrity: Full thickness tissue loss. Subcutaneous fat may be visible but bone, tendon or muscle are not exposed   Ankle-Brachial Index:    Pulses:    Removal Indication  and Assessment:    Wound Outcome:    (Retired) Wound Length (cm):    (Retired) Wound Width (cm):    (Retired) Depth (cm):    Wound Description (Comments):    Removal Indications:    Dressing Appearance Moist drainage 03/01/23 1248   Drainage Amount Moderate 03/01/23 1248   Drainage Characteristics/Odor Serosanguineous 03/01/23 1248   Appearance Moist;Slough 03/01/23 1248   Tissue loss description Full thickness 03/01/23 1248   Periwound Area Intact;Moist 03/01/23 1248   Wound Edges Open 03/01/23 1248   Wound Length (cm) 0.2 cm 03/01/23 1248   Wound Width (cm) 0.2 cm 03/01/23 1248   Wound Depth (cm) 0.2 cm 03/01/23 1248   Wound Volume (cm^3) 0.008 cm^3 03/01/23 1248   Wound Surface Area (cm^2) 0.04 cm^2 03/01/23 1248   Care Cleansed with:;Wound cleanser 03/01/23 1248   Dressing Applied;Other (comment) 03/01/23 1248   Dressing Change Due 03/02/23 03/01/23 1248            Altered Skin Integrity 01/05/23 1351 Left upper;medial Thigh #4  Full thickness tissue loss. Subcutaneous fat may be visible but bone, tendon or muscle are not exposed (Active)   01/05/23 1351   Altered Skin Integrity Present on Admission: yes   Side: Left   Orientation: upper;medial   Location: Thigh   Wound Number: #4   Is this injury device related?: No   Primary Wound Type:    Description of Altered Skin Integrity: Full thickness tissue loss. Subcutaneous fat may be visible but bone, tendon or muscle are not exposed   Ankle-Brachial Index:    Pulses:    Removal Indication and Assessment:    Wound Outcome:    (Retired) Wound Length (cm):    (Retired) Wound Width (cm):    (Retired) Depth (cm):    Wound Description (Comments):    Removal Indications:    Dressing Appearance Moist drainage 03/01/23 1248   Drainage Amount Moderate 03/01/23 1248   Drainage Characteristics/Odor Serosanguineous 03/01/23 1248   Appearance Moist;Slough 03/01/23 1248   Tissue loss description Full thickness 03/01/23 1248   Periwound Area Intact;Moist 03/01/23 1248   Wound  Edges Open 03/01/23 1248   Wound Length (cm) 2.2 cm 03/01/23 1248   Wound Width (cm) 1.5 cm 03/01/23 1248   Wound Depth (cm) 0.5 cm 03/01/23 1248   Wound Volume (cm^3) 1.65 cm^3 03/01/23 1248   Wound Surface Area (cm^2) 3.3 cm^2 03/01/23 1248   Care Cleansed with:;Wound cleanser 03/01/23 1248   Dressing Applied;Other (comment) 03/01/23 1248   Dressing Change Due 03/02/23 03/01/23 1248               Left ischial tuberosity    Right posterior hip   Ischial: silver alginate, 4x4 gentle foam border dressing  Hip:  collagen with silver, 4x4 gentle foam border dressing              Sacrum      Left medial upper thigh  Sacrum:  silver alginate, 4x4 gentle foam border dressing  Thigh:  silver alginate, 4x4 gentle foam border dressing  CT      Assessment:         ICD-10-CM ICD-9-CM   1. Pressure ulcer of left buttock, stage 4  L89.324 707.05     707.24   2. Pressure injury of sacral region, stage 4  L89.154 707.03     707.24   3. Pressure injury of left thigh, stage 3  L89.223 707.09     707.23   4. Stage IV pressure ulcer of right hip  L89.214 707.04     707.24   5. Other chronic hematogenous osteomyelitis, unspecified femur  M86.559 730.15   6. Quadriplegia, unspecified  G82.50 344.00           Procedures:     Mechanical debridement only.  Silver nitrate applied in treatment of hypergranulated tissue.    Excisional debridement performed:  [] Yes [x] No   Selective debridement performed:  [] Yes [x] No   Mechanical debridement performed:  [x] Yes [] No   Silver nitrate applied:    [x] Yes [] No   Labs ordered this visit:   [] Yes [x] No   Imaging ordered this visit:   [] Yes [x] No   Tissue pathology and/or culture taken:  [] Yes [x] No       MEDICATIONS    Current Outpatient Medications:     apixaban (ELIQUIS) 5 mg Tab, Take 5 mg by mouth once daily at 6am., Disp: , Rfl:     lactulose (CHRONULAC) 10 gram/15 mL solution, TAKE 15 ML BY MOUTH TWICE DAILY, Disp: , Rfl:     LINZESS 145 mcg Cap capsule, Take 145 mcg by mouth  once daily., Disp: , Rfl:    Review of patient's allergies indicates:   Allergen Reactions    Meropenem Anaphylaxis    Penicillins        HOME HEALTH AGENCY:  [employer]   TIMES PER WEEK/DAYS:  3 x weekly, M/W/F  ORDERS    HH is to use CAUTION when cleansing the patient's wounds.  Excessive stretching of the tissue at the left ischial has contributed to tearing of the tissue and deterioration of the wound.   Use a gentle, cautious approach when cleansing.     Right hip wound: *Leave current dressing, collagen with silver, in place until Friday 03/03.* On Friday, Cleanse with wound cleanser, apply silver alginate, and gentle foam border dressing to be changed daily   Left ischial wound: Cleanse with wound cleanser, apply silver alginate and gentle foam border dressing to be changed daily   Sacral wound: Cleanse with wound cleanser, apply silver alginate and gentle foam border dressing to be changed daily   Left medial upper thigh wound: Cleanse with wound cleanser, apply silver alginate and gentle foam border dressing to be changed daily     Follow up in 1 week (on 3/8/2023).      Electronically signed:  Jessie Bah NP

## 2023-03-08 ENCOUNTER — HOSPITAL ENCOUNTER (OUTPATIENT)
Dept: WOUND CARE | Facility: HOSPITAL | Age: 48
Discharge: HOME | End: 2023-03-08
Attending: NURSE PRACTITIONER
Payer: MEDICARE

## 2023-03-08 VITALS
DIASTOLIC BLOOD PRESSURE: 79 MMHG | SYSTOLIC BLOOD PRESSURE: 161 MMHG | HEART RATE: 53 BPM | BODY MASS INDEX: 28.32 KG/M2 | WEIGHT: 170 LBS | RESPIRATION RATE: 18 BRPM | HEIGHT: 65 IN

## 2023-03-08 DIAGNOSIS — L89.154 PRESSURE INJURY OF SACRAL REGION, STAGE 4: ICD-10-CM

## 2023-03-08 DIAGNOSIS — L89.324 PRESSURE ULCER OF LEFT BUTTOCK, STAGE 4: ICD-10-CM

## 2023-03-08 DIAGNOSIS — L89.223 PRESSURE INJURY OF LEFT THIGH, STAGE 3: ICD-10-CM

## 2023-03-08 DIAGNOSIS — M86.559: ICD-10-CM

## 2023-03-08 DIAGNOSIS — L89.214 STAGE IV PRESSURE ULCER OF RIGHT HIP: Primary | ICD-10-CM

## 2023-03-08 DIAGNOSIS — G82.50 QUADRIPLEGIA, UNSPECIFIED: ICD-10-CM

## 2023-03-08 PROCEDURE — 99213 OFFICE O/P EST LOW 20 MIN: CPT

## 2023-03-08 PROCEDURE — 27000999 HC MEDICAL RECORD PHOTO DOCUMENTATION

## 2023-03-08 NOTE — PROGRESS NOTES
Subjective:       Patient ID: Diego Martinez Jr. is a 47 y.o. male.    Chief Complaint: Pressure Ulcer (Right hip - Stage 4 #1/Left ischial - Stage 4 #2/Sacrum- Stage 4 #3/Left medial upper thigh- Stage 3 #4/ )    HPI  Mr. Martinez is a long time patient of [facility].    He comes via wheelchair with a care attendant present with him at all times.    He does have a history of quadriplegia.  He has home health who assist him with his wound care daily and care attendants to provide care at other times.  He has a very pleasant demeanor and added to an is usually jovial in spite of his challenges.  He now has [facility].    We have been using powdered collagen on most of the wounds and they have made impressive progress with that product.   We will begin using collagen with silver is to keep the wound progressing.    **The sacral area appears to be closed and stable.  We are continue to apply silver alginate in this area for protection only.    **The right hip remains stable and clean.  This wound is nearly closed at the base, after several months.    **The left ischial wound continues to progress very well and at this point has a small area remaining open.  Silver nitrate was applied to the small hypergranulated area.  We will progressed to collagen with silver for the time being.    **The left upper medial thigh is a new wound that was discovered over the past two weeks by HH.  It appears as though there was a wound there previously although the patient does deny that.  The area remains stable today we will continue to use collagen with silver on it.    3/1/23 - Mr. Martinez returns today for followup for his multiple wounds on his lower extremities.  Three of his four wounds remain stable.  However, there is concern over the left ischial wound.  This wound appears to have been pulled wide open during the cleansing process by HH, resulting in deterioration and increase in depth of the wound.  This  will be address with .      3/9/23 - The patient returns today for followup.  All wounds are stable and/or improving at this visit.  ** the sacrum remains closed with no areas of concern at this time.  ** right hip is stable with only a very small remaining opening.  We will continue to put silver alginate over that small opening.  ** the left upper medial thigh is also stable.  There is a large origin present, we will work with that margin once the wound has closed.  We will continue to put silver alginate over the wound.  ** this visit the left ischial has improved over the last visit.  It has again begun to decrease in depth.  We will use collagen with silver daily on this wound.      Review of Systems   Musculoskeletal:  Positive for gait problem.   Skin:  Positive for wound.   All other systems reviewed and are negative.      Objective:      Vitals:    03/08/23 1147   BP: (!) 161/79   Pulse: (!) 53   Resp: 18       Physical Exam  Vitals reviewed.   Constitutional:       Appearance: Normal appearance. He is normal weight.   HENT:      Head: Normocephalic.   Cardiovascular:      Rate and Rhythm: Normal rate.      Pulses: Normal pulses.   Pulmonary:      Effort: Pulmonary effort is normal.   Musculoskeletal:      Comments: quadraplegia   Skin:     General: Skin is warm and dry.   Neurological:      General: No focal deficit present.      Mental Status: He is alert and oriented to person, place, and time.   Psychiatric:         Mood and Affect: Mood normal.          Altered Skin Integrity 05/23/22 1337 Right posterior Hip #1 (Active)   05/23/22 1337   Altered Skin Integrity Present on Admission - Did Patient arrive to the hospital with altered skin?: yes   Side: Right   Orientation: posterior   Location: Hip   Wound Number: #1   Is this injury device related?: No   Primary Wound Type:    Description of Altered Skin Integrity:    Ankle-Brachial Index:    Pulses:    Removal Indication and Assessment:    Wound  Outcome:    (Retired) Wound Length (cm):    (Retired) Wound Width (cm):    (Retired) Depth (cm):    Wound Description (Comments):    Removal Indications:    Dressing Appearance Moist drainage 03/08/23 1548   Drainage Amount Moderate 03/08/23 1548   Drainage Characteristics/Odor Serosanguineous 03/08/23 1548   Appearance Pink;Moist 03/08/23 1548   Tissue loss description Full thickness 03/08/23 1548   Periwound Area Pink;Moist 03/08/23 1548   Wound Edges Open 03/08/23 1548   Wound Length (cm) 1.5 cm 03/08/23 1548   Wound Width (cm) 1.5 cm 03/08/23 1548   Wound Depth (cm) 2.3 cm 03/08/23 1548   Wound Volume (cm^3) 5.175 cm^3 03/08/23 1548   Wound Surface Area (cm^2) 2.25 cm^2 03/08/23 1548   Care Cleansed with:;Wound cleanser 03/08/23 1548   Dressing Applied 03/08/23 1548   Dressing Change Due 03/09/23 03/08/23 1548            Altered Skin Integrity 05/23/22 1353 Left Ischial tuberosity #2 (Active)   05/23/22 1353   Altered Skin Integrity Present on Admission - Did Patient arrive to the hospital with altered skin?: yes   Side: Left   Orientation:    Location: Ischial tuberosity   Wound Number: #2   Is this injury device related?: No   Primary Wound Type:    Description of Altered Skin Integrity:    Ankle-Brachial Index:    Pulses:    Removal Indication and Assessment:    Wound Outcome:    (Retired) Wound Length (cm):    (Retired) Wound Width (cm):    (Retired) Depth (cm):    Wound Description (Comments):    Removal Indications:    Dressing Appearance Moist drainage 03/08/23 1548   Drainage Amount Moderate 03/08/23 1548   Drainage Characteristics/Odor Serosanguineous 03/08/23 1548   Appearance Pink;Moist 03/08/23 1548   Tissue loss description Full thickness 03/08/23 1548   Periwound Area Moist;[place] 03/08/23 1548   Wound Edges Irregular;Open 03/08/23 1548   Wound Length (cm) 0.8 cm 03/08/23 1548   Wound Width (cm) 3.2 cm 03/08/23 1548   Wound Depth (cm) 1 cm 03/08/23 1548   Wound Volume (cm^3) 2.56 cm^3 03/08/23 1548    Wound Surface Area (cm^2) 2.56 cm^2 03/08/23 1548   Care Cleansed with:;Wound cleanser 03/08/23 1548   Dressing Applied 03/08/23 1548   Dressing Change Due 03/09/23 03/08/23 1548            Altered Skin Integrity 11/28/22 1223 Sacral spine #3 Other (comment) Full thickness tissue loss. Subcutaneous fat may be visible but bone, tendon or muscle are not exposed (Active)   11/28/22 1223   Altered Skin Integrity Present on Admission - Did Patient arrive to the hospital with altered skin?: yes   Side:    Orientation:    Location: Sacral spine   Wound Number: #3   Is this injury device related?: No   Primary Wound Type: Other   Description of Altered Skin Integrity: Full thickness tissue loss. Subcutaneous fat may be visible but bone, tendon or muscle are not exposed   Ankle-Brachial Index:    Pulses:    Removal Indication and Assessment:    Wound Outcome:    (Retired) Wound Length (cm):    (Retired) Wound Width (cm):    (Retired) Depth (cm):    Wound Description (Comments):    Removal Indications:    Dressing Appearance Dry 03/08/23 1548   Appearance Pink;Dry 03/08/23 1548   Periwound Area Dry;[place] 03/08/23 1548   Wound Length (cm) 0.1 cm 03/08/23 1548   Wound Width (cm) 0.1 cm 03/08/23 1548   Wound Depth (cm) 0.1 cm 03/08/23 1548   Wound Volume (cm^3) 0.001 cm^3 03/08/23 1548   Wound Surface Area (cm^2) 0.01 cm^2 03/08/23 1548   Care Cleansed with:;Wound cleanser 03/08/23 1548   Dressing Applied 03/08/23 1548   Dressing Change Due 03/09/23 03/08/23 1548            Altered Skin Integrity 01/05/23 1351 Left upper;medial Thigh #4  Full thickness tissue loss. Subcutaneous fat may be visible but bone, tendon or muscle are not exposed (Active)   01/05/23 1351   Altered Skin Integrity Present on Admission - Did Patient arrive to the hospital with altered skin?: yes   Side: Left   Orientation: upper;medial   Location: Thigh   Wound Number: #4   Is this injury device related?: No   Primary Wound Type:    Description of Altered  Skin Integrity: Full thickness tissue loss. Subcutaneous fat may be visible but bone, tendon or muscle are not exposed   Ankle-Brachial Index:    Pulses:    Removal Indication and Assessment:    Wound Outcome:    (Retired) Wound Length (cm):    (Retired) Wound Width (cm):    (Retired) Depth (cm):    Wound Description (Comments):    Removal Indications:    Dressing Appearance Moist drainage 03/08/23 1548   Drainage Amount Moderate 03/08/23 1548   Drainage Characteristics/Odor Serosanguineous 03/08/23 1548   Appearance Red;[place];Moist 03/08/23 1548   Tissue loss description Full thickness 03/08/23 1548   Periwound Area Moist;[place] 03/08/23 1548   Wound Edges Irregular 03/08/23 1548   Wound Length (cm) 2.5 cm 03/08/23 1548   Wound Width (cm) 1 cm 03/08/23 1548   Wound Depth (cm) 0.7 cm 03/08/23 1548   Wound Volume (cm^3) 1.75 cm^3 03/08/23 1548   Wound Surface Area (cm^2) 2.5 cm^2 03/08/23 1548   Care Cleansed with:;Wound cleanser 03/08/23 1548   Dressing Applied 03/08/23 1548   Dressing Change Due 03/09/23 03/08/23 1548                 Left ischial tuberosity    Right posterior hip   Ischial: collagen with silver, silver alginate, gentle border dressing  Hip:  silver alginate, gentle border dressing          Sacrum      Left medial upper thigh  Sacrum:  silver alginate, gentle border dressing  Thigh:  silver alginate, gentle border dressing  RC      Assessment:         ICD-10-CM ICD-9-CM   1. Stage IV pressure ulcer of right hip  L89.214 707.04     707.24   2. Pressure injury of left thigh, stage 3  L89.223 707.09     707.23   3. Pressure ulcer of left buttock, stage 4  L89.324 707.05     707.24   4. Pressure injury of sacral region, stage 4  L89.154 707.03     707.24   5. Quadriplegia, unspecified  G82.50 344.00   6. Other chronic hematogenous osteomyelitis, unspecified femur  M86.559 730.15           Procedures:     Mechanical debridement only.      Excisional debridement performed:  [] Yes [x] No   Selective  debridement performed:  [] Yes [x] No   Mechanical debridement performed:  [x] Yes [] No   Silver nitrate applied:    [x] Yes [] No   Labs ordered this visit:   [] Yes [x] No   Imaging ordered this visit:   [] Yes [x] No   Tissue pathology and/or culture taken:  [] Yes [x] No       MEDICATIONS    Current Outpatient Medications:     apixaban (ELIQUIS) 5 mg Tab, Take 5 mg by mouth once daily at 6am., Disp: , Rfl:     lactulose (CHRONULAC) 10 gram/15 mL solution, TAKE 15 ML BY MOUTH TWICE DAILY, Disp: , Rfl:     LINZESS 145 mcg Cap capsule, Take 145 mcg by mouth once daily., Disp: , Rfl:    Review of patient's allergies indicates:   Allergen Reactions    Meropenem Anaphylaxis    Penicillins        HOME HEALTH AGENCY:  [facility]   TIMES PER WEEK/DAYS:  3 x weekly, M/W/F  ORDERS    Right hip #1: gently cleanse with wound cleanser, apply silver alginate to wound, cover with gentle border dressing, change this dressing daily  Left ischium #2: gently cleanse with wound cleanser, apply collagen with silver into wound, cover with silver alginate and gentle border dressing, change this dressing daily  Sacrum #3: gently cleanse with wound cleanser, apply silver alginate to wound, cover with gentle border dressing, change this dressing daily  Left upper thigh #4: gently cleanse with wound cleanser, apply silver alginate to wound, cover with gentle border dressing, change this dressing daily    Follow up in 1 week (on 3/15/2023) for stretcher.      Electronically signed:  Jessie Bah NP

## 2023-03-15 ENCOUNTER — HOSPITAL ENCOUNTER (OUTPATIENT)
Dept: WOUND CARE | Facility: HOSPITAL | Age: 48
Discharge: HOME | End: 2023-03-15
Attending: NURSE PRACTITIONER
Payer: MEDICARE

## 2023-03-15 VITALS
RESPIRATION RATE: 18 BRPM | WEIGHT: 170 LBS | SYSTOLIC BLOOD PRESSURE: 139 MMHG | BODY MASS INDEX: 28.32 KG/M2 | HEART RATE: 67 BPM | HEIGHT: 65 IN | DIASTOLIC BLOOD PRESSURE: 60 MMHG

## 2023-03-15 DIAGNOSIS — L89.223 PRESSURE INJURY OF LEFT THIGH, STAGE 3: ICD-10-CM

## 2023-03-15 DIAGNOSIS — L89.154 PRESSURE INJURY OF SACRAL REGION, STAGE 4: ICD-10-CM

## 2023-03-15 DIAGNOSIS — G82.50 QUADRIPLEGIA, UNSPECIFIED: ICD-10-CM

## 2023-03-15 DIAGNOSIS — L89.214 STAGE IV PRESSURE ULCER OF RIGHT HIP: Primary | ICD-10-CM

## 2023-03-15 DIAGNOSIS — L89.324 PRESSURE ULCER OF LEFT BUTTOCK, STAGE 4: ICD-10-CM

## 2023-03-15 PROCEDURE — 27000999 HC MEDICAL RECORD PHOTO DOCUMENTATION

## 2023-03-15 PROCEDURE — 99213 OFFICE O/P EST LOW 20 MIN: CPT

## 2023-03-15 NOTE — PROGRESS NOTES
Subjective:       Patient ID: Diego Martinez Jr. is a 47 y.o. male.    Chief Complaint: Pressure Ulcer (Right hip - Stage 4 #1/Left ischial - Stage 4 #2/Sacrum- Stage 4 #3/Left medial upper thigh- Stage 3 #4/ )    HPI   Mr. Martinez is a long time patient of [facility].    He comes via wheelchair with a care attendant present with him at all times.    He does have a history of quadriplegia.  He has home health who assist him with his wound care daily and care attendants to provide care at other times.  He has a very pleasant demeanor and added to an is usually jovial in spite of his challenges.  He now has [facility].    We have been using powdered collagen on most of the wounds and they have made impressive progress with that product.   We will begin using collagen with silver is to keep the wound progressing.    **The sacral area appears to be closed and stable.  We are continue to apply silver alginate in this area for protection only.    **The right hip remains stable and clean.  This wound is nearly closed at the base, after several months.    **The left ischial wound continues to progress very well and at this point has a small area remaining open.  Silver nitrate was applied to the small hypergranulated area.  We will progressed to collagen with silver for the time being.    **The left upper medial thigh is a new wound that was discovered over the past two weeks by HH.  It appears as though there was a wound there previously although the patient does deny that.  The area remains stable today we will continue to use collagen with silver on it.    3/1/23 - Mr. Martinez returns today for followup for his multiple wounds on his lower extremities.  Three of his four wounds remain stable.  However, there is concern over the left ischial wound.  This wound appears to have been pulled wide open during the cleansing process by HH, resulting in deterioration and increase in depth of the wound.   This will be address with .      3/9/23 - The patient returns today for followup.  All wounds are stable and/or improving at this visit.  ** the sacrum remains closed with no areas of concern at this time.  ** right hip is stable with only a very small remaining opening.  We will continue to put silver alginate over that small opening.  ** the left upper medial thigh is also stable.  There is a large origin present, we will work with that margin once the wound has closed.  We will continue to put silver alginate over the wound.  ** this visit the left ischial has improved over the last visit.  It has again begun to decrease in depth.  We will use collagen with silver daily on this wound.    3/15/23 - Mr. Martinez returns today with several wounds.  The majority of his wounds are stable and are improving, however, today the sacrum has reopened.  Although small, it is concerning as this area took a considerable time to close and must be washed carefully so that it does not quickly reopened to this stage IV pressure injury that it was previously.  We will continue to monitor for deterioration.    We will continue the current treatment plan on all wounds, adding silver alginate to the newly open sacrum.      Review of Systems   Musculoskeletal:  Positive for gait problem.   Skin:  Positive for wound.   All other systems reviewed and are negative.      Objective:      Vitals:    03/15/23 1134   BP: 139/60   Pulse: 67   Resp: 18       Physical Exam  Vitals reviewed.   Constitutional:       Appearance: Normal appearance. He is normal weight.   HENT:      Head: Normocephalic.   Cardiovascular:      Rate and Rhythm: Normal rate.      Pulses: Normal pulses.   Pulmonary:      Effort: Pulmonary effort is normal.   Musculoskeletal:      Comments: quadraplegia   Skin:     General: Skin is warm and dry.   Neurological:      General: No focal deficit present.      Mental Status: He is alert and oriented to person, place, and time.    Psychiatric:         Mood and Affect: Mood normal.          Altered Skin Integrity 05/23/22 1337 Right posterior Hip #1 (Active)   05/23/22 1337   Altered Skin Integrity Present on Admission - Did Patient arrive to the hospital with altered skin?: yes   Side: Right   Orientation: posterior   Location: Hip   Wound Number: #1   Is this injury device related?: No   Primary Wound Type:    Description of Altered Skin Integrity:    Ankle-Brachial Index:    Pulses:    Removal Indication and Assessment:    Wound Outcome:    (Retired) Wound Length (cm):    (Retired) Wound Width (cm):    (Retired) Depth (cm):    Wound Description (Comments):    Removal Indications:    Dressing Appearance Moist drainage 03/15/23 1226   Drainage Amount Moderate 03/15/23 1226   Drainage Characteristics/Odor Serosanguineous 03/15/23 1226   Appearance Pink;[place];Moist 03/15/23 1226   Tissue loss description Full thickness 03/15/23 1226   Periwound Area Dry;[place] 03/15/23 1226   Wound Edges Defined 03/15/23 1226   Wound Length (cm) 1.5 cm 03/15/23 1226   Wound Width (cm) 1.2 cm 03/15/23 1226   Wound Depth (cm) 2 cm 03/15/23 1226   Wound Volume (cm^3) 3.6 cm^3 03/15/23 1226   Wound Surface Area (cm^2) 1.8 cm^2 03/15/23 1226   Care Cleansed with:;Wound cleanser 03/15/23 1226   Dressing Applied 03/15/23 1226   Dressing Change Due 03/16/23 03/15/23 1226            Altered Skin Integrity 05/23/22 1353 Left Ischial tuberosity #2 (Active)   05/23/22 1353   Altered Skin Integrity Present on Admission - Did Patient arrive to the hospital with altered skin?: yes   Side: Left   Orientation:    Location: Ischial tuberosity   Wound Number: #2   Is this injury device related?: No   Primary Wound Type:    Description of Altered Skin Integrity:    Ankle-Brachial Index:    Pulses:    Removal Indication and Assessment:    Wound Outcome:    (Retired) Wound Length (cm):    (Retired) Wound Width (cm):    (Retired) Depth (cm):    Wound Description (Comments):    Removal  Indications:    Dressing Appearance Moist drainage 03/15/23 1226   Drainage Amount Moderate 03/15/23 1226   Drainage Characteristics/Odor Serosanguineous 03/15/23 1226   Appearance Pink;Red;Moist 03/15/23 1226   Tissue loss description Full thickness 03/15/23 1226   Periwound Area [place];Dry 03/15/23 1226   Wound Edges Irregular 03/15/23 1226   Wound Length (cm) 0.8 cm 03/15/23 1226   Wound Width (cm) 0.3 cm 03/15/23 1226   Wound Depth (cm) 0.9 cm 03/15/23 1226   Wound Volume (cm^3) 0.216 cm^3 03/15/23 1226   Wound Surface Area (cm^2) 0.24 cm^2 03/15/23 1226   Care Cleansed with:;Wound cleanser 03/15/23 1226   Dressing Applied 03/15/23 1226   Dressing Change Due 03/16/23 03/15/23 1226            Altered Skin Integrity 11/28/22 1223 Sacral spine #3 Other (comment) Full thickness tissue loss. Subcutaneous fat may be visible but bone, tendon or muscle are not exposed (Active)   11/28/22 1223   Altered Skin Integrity Present on Admission - Did Patient arrive to the hospital with altered skin?: yes   Side:    Orientation:    Location: Sacral spine   Wound Number: #3   Is this injury device related?: No   Primary Wound Type: Other   Description of Altered Skin Integrity: Full thickness tissue loss. Subcutaneous fat may be visible but bone, tendon or muscle are not exposed   Ankle-Brachial Index:    Pulses:    Removal Indication and Assessment:    Wound Outcome:    (Retired) Wound Length (cm):    (Retired) Wound Width (cm):    (Retired) Depth (cm):    Wound Description (Comments):    Removal Indications:    Dressing Appearance Moist drainage 03/15/23 1226   Drainage Amount Moderate 03/15/23 1226   Drainage Characteristics/Odor Serosanguineous 03/15/23 1226   Appearance Pink;Red;Moist 03/15/23 1226   Tissue loss description Full thickness 03/15/23 1226   Periwound Area [place];Dry 03/15/23 1226   Wound Edges Open 03/15/23 1226   Wound Length (cm) 0.3 cm 03/15/23 1226   Wound Width (cm) 0.5 cm 03/15/23 1226   Wound Depth (cm) 0.3  cm 03/15/23 1226   Wound Volume (cm^3) 0.045 cm^3 03/15/23 1226   Wound Surface Area (cm^2) 0.15 cm^2 03/15/23 1226   Care Cleansed with:;Wound cleanser 03/15/23 1226   Dressing Applied 03/15/23 1226   Dressing Change Due 03/16/23 03/15/23 1226            Altered Skin Integrity 01/05/23 1351 Left upper;medial Thigh #4  Full thickness tissue loss. Subcutaneous fat may be visible but bone, tendon or muscle are not exposed (Active)   01/05/23 1351   Altered Skin Integrity Present on Admission - Did Patient arrive to the hospital with altered skin?: yes   Side: Left   Orientation: upper;medial   Location: Thigh   Wound Number: #4   Is this injury device related?: No   Primary Wound Type:    Description of Altered Skin Integrity: Full thickness tissue loss. Subcutaneous fat may be visible but bone, tendon or muscle are not exposed   Ankle-Brachial Index:    Pulses:    Removal Indication and Assessment:    Wound Outcome:    (Retired) Wound Length (cm):    (Retired) Wound Width (cm):    (Retired) Depth (cm):    Wound Description (Comments):    Removal Indications:    Dressing Appearance Moist drainage 03/15/23 1226   Drainage Amount Moderate 03/15/23 1226   Drainage Characteristics/Odor Serosanguineous 03/15/23 1226   Appearance Yellow;Red;Moist 03/15/23 1226   Tissue loss description Full thickness 03/15/23 1226   Periwound Area Moist;Pale white 03/15/23 1226   Wound Edges Irregular 03/15/23 1226   Wound Length (cm) 2.3 cm 03/15/23 1226   Wound Width (cm) 0.9 cm 03/15/23 1226   Wound Depth (cm) 0.5 cm 03/15/23 1226   Wound Volume (cm^3) 1.035 cm^3 03/15/23 1226   Wound Surface Area (cm^2) 2.07 cm^2 03/15/23 1226   Care Cleansed with:;Wound cleanser 03/15/23 1226   Dressing Applied 03/15/23 1226   Dressing Change Due 03/16/23 03/15/23 1226         Left ischial tuberosity    Right posterior hip   Ischial: collagen with silver, silver alginate, gentle border dressing  Hip:  silver alginate, gentle border  dressing            Sacrum      Left medial upper thigh  Sacrum:  silver alginate, sacral border dressing  Thigh:  silver alginate, gentle border dressing  RC      Assessment:         ICD-10-CM ICD-9-CM   1. Stage IV pressure ulcer of right hip  L89.214 707.04     707.24   2. Pressure injury of left thigh, stage 3  L89.223 707.09     707.23   3. Pressure injury of sacral region, stage 4  L89.154 707.03     707.24   4. Pressure ulcer of left buttock, stage 4  L89.324 707.05     707.24   5. Quadriplegia, unspecified  G82.50 344.00             Procedures:     Mechanical debridement only.      Excisional debridement performed:  [] Yes [x] No   Selective debridement performed:  [] Yes [x] No   Mechanical debridement performed:  [x] Yes [] No   Silver nitrate applied:    [x] Yes [] No   Labs ordered this visit:   [] Yes [x] No   Imaging ordered this visit:   [] Yes [x] No   Tissue pathology and/or culture taken:  [] Yes [x] No       MEDICATIONS    Current Outpatient Medications:     apixaban (ELIQUIS) 5 mg Tab, Take 5 mg by mouth once daily at 6am., Disp: , Rfl:     lactulose (CHRONULAC) 10 gram/15 mL solution, TAKE 15 ML BY MOUTH TWICE DAILY, Disp: , Rfl:     LINZESS 145 mcg Cap capsule, Take 145 mcg by mouth once daily., Disp: , Rfl:    Review of patient's allergies indicates:   Allergen Reactions    Meropenem Anaphylaxis    Penicillins        HOME HEALTH AGENCY:  [facility]   TIMES PER WEEK/DAYS:  3 x weekly, M/W/F    Right hip #1: gently cleanse with wound cleanser, apply silver alginate to wound, cover with gentle border dressing, change this dressing daily  Left ischium #2: gently cleanse with wound cleanser, apply collagen with silver into wound, cover with silver alginate and gentle border dressing, change this dressing daily  Sacrum #3: gently cleanse with wound cleanser, apply silver alginate to wound, cover with gentle border dressing, change this dressing daily  Left upper thigh #4: gently cleanse  with wound cleanser, apply silver alginate to wound, cover with gentle border dressing, change this dressing daily    Follow up in 2 weeks (on 3/29/2023) for stretcher.      Electronically signed:  Jessie Bah NP

## 2023-03-15 NOTE — PROGRESS NOTES
Subjective:       Patient ID: Diego Martinez Jr. is a 47 y.o. male.    Chief Complaint: Pressure Ulcer (Right hip - Stage 4 #1/Left ischial - Stage 4 #2/Sacrum- Stage 4 #3/Left medial upper thigh- Stage 3 #4/ )    HPI   Mr. Martinez is a long time patient of [facility].    He comes via wheelchair with a care attendant present with him at all times.    He does have a history of quadriplegia.  He has home health who assist him with his wound care daily and care attendants to provide care at other times.  He has a very pleasant demeanor and added to an is usually jovial in spite of his challenges.  He now has [facility].    We have been using powdered collagen on most of the wounds and they have made impressive progress with that product.   We will begin using collagen with silver is to keep the wound progressing.    **The sacral area appears to be closed and stable.  We are continue to apply silver alginate in this area for protection only.    **The right hip remains stable and clean.  This wound is nearly closed at the base, after several months.    **The left ischial wound continues to progress very well and at this point has a small area remaining open.  Silver nitrate was applied to the small hypergranulated area.  We will progressed to collagen with silver for the time being.    **The left upper medial thigh is a new wound that was discovered over the past two weeks by HH.  It appears as though there was a wound there previously although the patient does deny that.  The area remains stable today we will continue to use collagen with silver on it.    3/1/23 - Mr. Martinez returns today for followup for his multiple wounds on his lower extremities.  Three of his four wounds remain stable.  However, there is concern over the left ischial wound.  This wound appears to have been pulled wide open during the cleansing process by HH, resulting in deterioration and increase in depth of the wound.   This will be address with .      3/9/23 - The patient returns today for followup.  All wounds are stable and/or improving at this visit.  ** the sacrum remains closed with no areas of concern at this time.  ** right hip is stable with only a very small remaining opening.  We will continue to put silver alginate over that small opening.  ** the left upper medial thigh is also stable.  There is a large origin present, we will work with that margin once the wound has closed.  We will continue to put silver alginate over the wound.  ** this visit the left ischial has improved over the last visit.  It has again begun to decrease in depth.  We will use collagen with silver daily on this wound.      Review of Systems   Musculoskeletal:  Positive for gait problem.   Skin:  Positive for wound.   All other systems reviewed and are negative.      Objective:      Vitals:    03/15/23 1134   BP: 139/60   Pulse: 67   Resp: 18       Physical Exam  Vitals reviewed.   Constitutional:       Appearance: Normal appearance. He is normal weight.   HENT:      Head: Normocephalic.   Cardiovascular:      Rate and Rhythm: Normal rate.      Pulses: Normal pulses.   Pulmonary:      Effort: Pulmonary effort is normal.   Musculoskeletal:      Comments: quadraplegia   Skin:     General: Skin is warm and dry.   Neurological:      General: No focal deficit present.      Mental Status: He is alert and oriented to person, place, and time.   Psychiatric:         Mood and Affect: Mood normal.                      Left ischial tuberosity    Right posterior hip   Ischial: collagen with silver, silver alginate, gentle border dressing  Hip:  silver alginate, gentle border dressing          Sacrum      Left medial upper thigh  Sacrum:  silver alginate, gentle border dressing  Thigh:  silver alginate, gentle border dressing  RC      Assessment:       No diagnosis found.          Procedures:     Mechanical debridement only.      Excisional debridement  performed:  [] Yes [x] No   Selective debridement performed:  [] Yes [x] No   Mechanical debridement performed:  [x] Yes [] No   Silver nitrate applied:    [x] Yes [] No   Labs ordered this visit:   [] Yes [x] No   Imaging ordered this visit:   [] Yes [x] No   Tissue pathology and/or culture taken:  [] Yes [x] No       MEDICATIONS    Current Outpatient Medications:     apixaban (ELIQUIS) 5 mg Tab, Take 5 mg by mouth once daily at 6am., Disp: , Rfl:     lactulose (CHRONULAC) 10 gram/15 mL solution, TAKE 15 ML BY MOUTH TWICE DAILY, Disp: , Rfl:     LINZESS 145 mcg Cap capsule, Take 145 mcg by mouth once daily., Disp: , Rfl:    Review of patient's allergies indicates:   Allergen Reactions    Meropenem Anaphylaxis    Penicillins        HOME HEALTH AGENCY:  [employer] [facility]   TIMES PER WEEK/DAYS:  3 x weekly, M/W/F  ORDERS    Right hip #1: gently cleanse with wound cleanser, apply silver alginate to wound, cover with gentle border dressing, change this dressing daily  Left ischium #2: gently cleanse with wound cleanser, apply collagen with silver into wound, cover with silver alginate and gentle border dressing, change this dressing daily  Sacrum #3: gently cleanse with wound cleanser, apply silver alginate to wound, cover with gentle border dressing, change this dressing daily  Left upper thigh #4: gently cleanse with wound cleanser, apply silver alginate to wound, cover with gentle border dressing, change this dressing daily    No follow-ups on file.      Electronically signed:  Jessie Bah NP

## 2023-03-29 ENCOUNTER — HOSPITAL ENCOUNTER (OUTPATIENT)
Dept: WOUND CARE | Facility: HOSPITAL | Age: 48
Discharge: HOME | End: 2023-03-29
Attending: NURSE PRACTITIONER
Payer: MEDICARE

## 2023-03-29 VITALS
DIASTOLIC BLOOD PRESSURE: 79 MMHG | HEIGHT: 65 IN | WEIGHT: 170 LBS | BODY MASS INDEX: 28.32 KG/M2 | RESPIRATION RATE: 18 BRPM | SYSTOLIC BLOOD PRESSURE: 181 MMHG | HEART RATE: 53 BPM

## 2023-03-29 DIAGNOSIS — G82.50 QUADRIPLEGIA, UNSPECIFIED: ICD-10-CM

## 2023-03-29 DIAGNOSIS — L89.214 STAGE IV PRESSURE ULCER OF RIGHT HIP: Primary | ICD-10-CM

## 2023-03-29 DIAGNOSIS — L89.154 PRESSURE INJURY OF SACRAL REGION, STAGE 4: ICD-10-CM

## 2023-03-29 DIAGNOSIS — L89.324 PRESSURE ULCER OF LEFT BUTTOCK, STAGE 4: ICD-10-CM

## 2023-03-29 DIAGNOSIS — L89.223 PRESSURE INJURY OF LEFT THIGH, STAGE 3: ICD-10-CM

## 2023-03-29 PROCEDURE — 99213 OFFICE O/P EST LOW 20 MIN: CPT

## 2023-03-29 PROCEDURE — 27000999 HC MEDICAL RECORD PHOTO DOCUMENTATION

## 2023-03-29 NOTE — PROGRESS NOTES
Subjective:       Patient ID: Diego Martinez Jr. is a 47 y.o. male.    Chief Complaint: Pressure Ulcer (Right hip - Stage 4 #1/Left ischial - Stage 4 #2/Sacrum- Stage 4 #3/Left medial upper thigh- Stage 3 #4/ )    HPI   Mr. Martinez is a long time patient of [facility].    He comes via wheelchair with a care attendant present with him at all times.    He does have a history of quadriplegia.  He has home health who assist him with his wound care daily and care attendants to provide care at other times.  He has a very pleasant demeanor and added to an is usually jovial in spite of his challenges.  He now has [facility].    We have been using powdered collagen on most of the wounds and they have made impressive progress with that product.   We will begin using collagen with silver is to keep the wound progressing.    **The sacral area appears to be closed and stable.  We are continue to apply silver alginate in this area for protection only.    **The right hip remains stable and clean.  This wound is nearly closed at the base, after several months.    **The left ischial wound continues to progress very well and at this point has a small area remaining open.  Silver nitrate was applied to the small hypergranulated area.  We will progressed to collagen with silver for the time being.    **The left upper medial thigh is a new wound that was discovered over the past two weeks by HH.  It appears as though there was a wound there previously although the patient does deny that.  The area remains stable today we will continue to use collagen with silver on it.    3/1/23 - Mr. Martinez returns today for followup for his multiple wounds on his lower extremities.  Three of his four wounds remain stable.  However, there is concern over the left ischial wound.  This wound appears to have been pulled wide open during the cleansing process by HH, resulting in deterioration and increase in depth of the wound.   This will be address with .      3/9/23 - The patient returns today for followup.  All wounds are stable and/or improving at this visit.  ** the sacrum remains closed with no areas of concern at this time.  ** right hip is stable with only a very small remaining opening.  We will continue to put silver alginate over that small opening.  ** the left upper medial thigh is also stable.  There is a large origin present, we will work with that margin once the wound has closed.  We will continue to put silver alginate over the wound.  ** this visit the left ischial has improved over the last visit.  It has again begun to decrease in depth.  We will use collagen with silver daily on this wound.    3/15/23 - Mr. Martinez returns today with several wounds.  The majority of his wounds are stable and are improving, however, today the sacrum has reopened.  Although small, it is concerning as this area took a considerable time to close and must be washed carefully so that it does not quickly reopened to this stage IV pressure injury that it was previously.  We will continue to monitor for deterioration.    We will continue the current treatment plan on all wounds, adding silver alginate to the newly open sacrum.    3/29/23 - the patient returns to clinic today for followup on all wounds.  All of his wounds do appear stable with no deterioration.  We applied powdered collagen to the left ischial wound, and collagen with silver to all other wounds.  All dressings will remain in place until Sunday at which time home health will be allowed to change the dressing.  Of note, the patient did receive a jury summons for jury duty and did request a written excuse, which he was given in light of the fact that he is quadriplegia and does have multiple wounds making it impact go for him to be sequestered for jury duty for any number of days which would be the risk.      Review of Systems   Musculoskeletal:  Positive for gait problem.   Skin:   Positive for wound.   All other systems reviewed and are negative.      Objective:      Vitals:    03/29/23 1151   BP: (!) 181/79   Pulse: (!) 53   Resp: 18       Physical Exam  Vitals reviewed.   Constitutional:       Appearance: Normal appearance. He is normal weight.   HENT:      Head: Normocephalic.   Cardiovascular:      Rate and Rhythm: Normal rate.      Pulses: Normal pulses.   Pulmonary:      Effort: Pulmonary effort is normal.   Musculoskeletal:      Comments: quadraplegia   Skin:     General: Skin is warm and dry.   Neurological:      General: No focal deficit present.      Mental Status: He is alert and oriented to person, place, and time.   Psychiatric:         Mood and Affect: Mood normal.          Altered Skin Integrity 05/23/22 1337 Right posterior Hip #1 (Active)   05/23/22 1337   Altered Skin Integrity Present on Admission - Did Patient arrive to the hospital with altered skin?: yes   Side: Right   Orientation: posterior   Location: Hip   Wound Number: #1   Is this injury device related?: No   Primary Wound Type:    Description of Altered Skin Integrity:    Ankle-Brachial Index:    Pulses:    Removal Indication and Assessment:    Wound Outcome:    (Retired) Wound Length (cm):    (Retired) Wound Width (cm):    (Retired) Depth (cm):    Wound Description (Comments):    Removal Indications:    Dressing Appearance Moist drainage 03/29/23 1156   Drainage Amount Moderate 03/29/23 1156   Drainage Characteristics/Odor Serosanguineous 03/29/23 1156   Appearance Pink;Moist 03/29/23 1156   Tissue loss description Full thickness 03/29/23 1156   Periwound Area [place];Moist 03/29/23 1156   Wound Edges Open 03/29/23 1156   Wound Length (cm) 1.5 cm 03/29/23 1156   Wound Width (cm) 0.9 cm 03/29/23 1156   Wound Depth (cm) 1.7 cm 03/29/23 1156   Wound Volume (cm^3) 2.295 cm^3 03/29/23 1156   Wound Surface Area (cm^2) 1.35 cm^2 03/29/23 1156   Care Cleansed with:;Wound cleanser 03/29/23 1156   Dressing Applied 03/29/23  1156   Dressing Change Due 04/02/23 03/29/23 1156            Altered Skin Integrity 05/23/22 1353 Left Ischial tuberosity #2 (Active)   05/23/22 1353   Altered Skin Integrity Present on Admission - Did Patient arrive to the hospital with altered skin?: yes   Side: Left   Orientation:    Location: Ischial tuberosity   Wound Number: #2   Is this injury device related?: No   Primary Wound Type:    Description of Altered Skin Integrity:    Ankle-Brachial Index:    Pulses:    Removal Indication and Assessment:    Wound Outcome:    (Retired) Wound Length (cm):    (Retired) Wound Width (cm):    (Retired) Depth (cm):    Wound Description (Comments):    Removal Indications:    Dressing Appearance Moist drainage 03/29/23 1156   Drainage Amount Moderate 03/29/23 1156   Drainage Characteristics/Odor Serosanguineous 03/29/23 1156   Appearance Red;Moist 03/29/23 1156   Tissue loss description Full thickness 03/29/23 1156   Periwound Area Moist;Institute 03/29/23 1156   Wound Edges Irregular 03/29/23 1156   Wound Length (cm) 0.9 cm 03/29/23 1156   Wound Width (cm) 3 cm 03/29/23 1156   Wound Depth (cm) 0.8 cm 03/29/23 1156   Wound Volume (cm^3) 2.16 cm^3 03/29/23 1156   Wound Surface Area (cm^2) 2.7 cm^2 03/29/23 1156   Care Cleansed with:;Wound cleanser 03/29/23 1156   Dressing Applied 03/29/23 1156   Dressing Change Due 04/02/23 03/29/23 1156            Altered Skin Integrity 11/28/22 1223 Sacral spine #3 Other (comment) Full thickness tissue loss. Subcutaneous fat may be visible but bone, tendon or muscle are not exposed (Active)   11/28/22 1223   Altered Skin Integrity Present on Admission - Did Patient arrive to the hospital with altered skin?: yes   Side:    Orientation:    Location: Sacral spine   Wound Number: #3   Is this injury device related?: No   Primary Wound Type: Other   Description of Altered Skin Integrity: Full thickness tissue loss. Subcutaneous fat may be visible but bone, tendon or muscle are not exposed    Ankle-Brachial Index:    Pulses:    Removal Indication and Assessment:    Wound Outcome:    (Retired) Wound Length (cm):    (Retired) Wound Width (cm):    (Retired) Depth (cm):    Wound Description (Comments):    Removal Indications:    Dressing Appearance Moist drainage 03/29/23 1156   Drainage Amount Moderate 03/29/23 1156   Drainage Characteristics/Odor Serosanguineous 03/29/23 1156   Appearance Pink;Moist 03/29/23 1156   Tissue loss description Full thickness 03/29/23 1156   Periwound Area Moist;Pale white 03/29/23 1156   Wound Edges Open 03/29/23 1156   Wound Length (cm) 0.3 cm 03/29/23 1156   Wound Width (cm) 0.6 cm 03/29/23 1156   Wound Depth (cm) 0.3 cm 03/29/23 1156   Wound Volume (cm^3) 0.054 cm^3 03/29/23 1156   Wound Surface Area (cm^2) 0.18 cm^2 03/29/23 1156   Care Cleansed with:;Wound cleanser 03/29/23 1156   Dressing Applied 03/29/23 1156   Dressing Change Due 04/02/23 03/29/23 1156            Altered Skin Integrity 01/05/23 1351 Left upper;medial Thigh #4  Full thickness tissue loss. Subcutaneous fat may be visible but bone, tendon or muscle are not exposed (Active)   01/05/23 1351   Altered Skin Integrity Present on Admission - Did Patient arrive to the hospital with altered skin?: yes   Side: Left   Orientation: upper;medial   Location: Thigh   Wound Number: #4   Is this injury device related?: No   Primary Wound Type:    Description of Altered Skin Integrity: Full thickness tissue loss. Subcutaneous fat may be visible but bone, tendon or muscle are not exposed   Ankle-Brachial Index:    Pulses:    Removal Indication and Assessment:    Wound Outcome:    (Retired) Wound Length (cm):    (Retired) Wound Width (cm):    (Retired) Depth (cm):    Wound Description (Comments):    Removal Indications:    Dressing Appearance Moist drainage 03/29/23 1156   Drainage Amount Moderate 03/29/23 1156   Drainage Characteristics/Odor Serosanguineous 03/29/23 1156   Appearance Red;White;Moist 03/29/23 1156   Tissue  loss description Full thickness 03/29/23 1156   Periwound Area Pale white;Moist 03/29/23 1156   Wound Edges Irregular 03/29/23 1156   Wound Length (cm) 2.4 cm 03/29/23 1156   Wound Width (cm) 0.7 cm 03/29/23 1156   Wound Depth (cm) 0.5 cm 03/29/23 1156   Wound Volume (cm^3) 0.84 cm^3 03/29/23 1156   Wound Surface Area (cm^2) 1.68 cm^2 03/29/23 1156   Care Cleansed with:;Wound cleanser 03/29/23 1156   Dressing Applied 03/29/23 1156   Dressing Change Due 04/02/23 03/29/23 1156         Left ischial tuberosity    Right posterior hip   Ischial: powdered collagen, silver alginate, gentle border dressing  Hip: collagen with silver, silver alginate, gentle border dressing              Sacrum      Left medial upper thigh  Sacrum:  collagen with silver, silver alginate, gentle border dressing  Thigh:  endoform, silver alginate, gentle border dressing  RC      Assessment:         ICD-10-CM ICD-9-CM   1. Stage IV pressure ulcer of right hip  L89.214 707.04     707.24   2. Pressure injury of left thigh, stage 3  L89.223 707.09     707.23   3. Pressure injury of sacral region, stage 4  L89.154 707.03     707.24   4. Pressure ulcer of left buttock, stage 4  L89.324 707.05     707.24   5. Quadriplegia, unspecified  G82.50 344.00             Procedures:     Mechanical debridement only.      Excisional debridement performed:  [] Yes [x] No   Selective debridement performed:  [] Yes [x] No   Mechanical debridement performed:  [x] Yes [] No   Silver nitrate applied:    [x] Yes [] No   Labs ordered this visit:   [] Yes [x] No   Imaging ordered this visit:   [] Yes [x] No   Tissue pathology and/or culture taken:  [] Yes [x] No       MEDICATIONS    Current Outpatient Medications:     apixaban (ELIQUIS) 5 mg Tab, Take 5 mg by mouth once daily at 6am., Disp: , Rfl:     lactulose (CHRONULAC) 10 gram/15 mL solution, TAKE 15 ML BY MOUTH TWICE DAILY, Disp: , Rfl:     LINZESS 145 mcg Cap capsule, Take 145 mcg by mouth once daily., Disp: , Rfl:     Review of patient's allergies indicates:   Allergen Reactions    Meropenem Anaphylaxis    Penicillins        [facility]   TIMES PER WEEK/DAYS:  3 x weekly, M/W/F    Right hip #1: **leave dressing (collagen with silver) in place until Sunday 4/2/23** On Sunday, remove dressing, cleanse with wound cleanser, replace collagen with silver, cover with silver alginate, and gentle border dressing, leave in place until Wednesday 4/5/23 and repeat this process, leave in place until Sunday 4/9/23 and repeat this process.  Left ischium: **leave dressing (powdered collagen) in place until Sunday 4/2/23** On Sunday, remove dressing, cleanse with wound cleanser, replace collagen with silver, cover with silver alginate, and gentle border dressing, leave in place until Wednesday 4/5/23 and repeat this process, leave in place until Sunday 4/9/23 and repeat this process. (If [facility] does not have powdered collagen, collagen with silver may be used)  Sacrum #3: **leave dressing (collagen with silver) in place until Sunday 4/2/23** On Sunday, remove dressing, cleanse with wound cleanser, replace collagen with silver, cover with silver alginate, and gentle border dressing, leave in place until Wednesday 4/5/23 and repeat this process, leave in place until Sunday 4/9/23 and repeat this process.  Left thigh #4: **leave dressing (endoform) in place until Sunday 4/2/23** On Sunday, remove dressing, cleanse with wound cleanser, and apply silver alginate, cover with gentle border dressing and change this daily    Follow up in 1 week (on 4/5/2023) for stretcher.      Electronically signed:  Jessie Bah NP

## 2023-04-12 ENCOUNTER — HOSPITAL ENCOUNTER (OUTPATIENT)
Dept: WOUND CARE | Facility: HOSPITAL | Age: 48
Discharge: HOME | End: 2023-04-12
Attending: NURSE PRACTITIONER
Payer: MEDICARE

## 2023-04-12 VITALS
RESPIRATION RATE: 18 BRPM | HEART RATE: 61 BPM | BODY MASS INDEX: 28.32 KG/M2 | DIASTOLIC BLOOD PRESSURE: 75 MMHG | SYSTOLIC BLOOD PRESSURE: 140 MMHG | HEIGHT: 65 IN | WEIGHT: 170 LBS

## 2023-04-12 DIAGNOSIS — G82.50 QUADRIPLEGIA, UNSPECIFIED: ICD-10-CM

## 2023-04-12 DIAGNOSIS — L89.214 STAGE IV PRESSURE ULCER OF RIGHT HIP: Primary | ICD-10-CM

## 2023-04-12 DIAGNOSIS — L89.154 PRESSURE INJURY OF SACRAL REGION, STAGE 4: ICD-10-CM

## 2023-04-12 DIAGNOSIS — L89.324 PRESSURE ULCER OF LEFT BUTTOCK, STAGE 4: ICD-10-CM

## 2023-04-12 DIAGNOSIS — L89.223 PRESSURE INJURY OF LEFT THIGH, STAGE 3: ICD-10-CM

## 2023-04-12 PROCEDURE — 27000999 HC MEDICAL RECORD PHOTO DOCUMENTATION

## 2023-04-12 PROCEDURE — 99213 OFFICE O/P EST LOW 20 MIN: CPT

## 2023-04-12 NOTE — PROGRESS NOTES
Subjective:       Patient ID: Diego Martinez Jr. is a 47 y.o. male.    Chief Complaint: Pressure Ulcer (Right hip - Stage 4 #1/Left ischial - Stage 4 #2/Sacrum- Stage 4 #3/Left medial upper thigh- Stage 3 #4// )    HPI   Mr. Martinez is a long time patient of [facility].    He comes via wheelchair with a care attendant present with him at all times.    He does have a history of quadriplegia.  He has home health who assist him with his wound care daily and care attendants to provide care at other times.  He has a very pleasant demeanor and added to an is usually jovial in spite of his challenges.  He now has [facility] the grafter Health.    We have been using powdered collagen on most of the wounds and they have made impressive progress with that product.   We will begin using collagen with silver is to keep the wound progressing.    **The sacral area appears to be closed and stable.  We are continue to apply silver alginate in this area for protection only.    **The right hip remains stable and clean.  This wound is nearly closed at the base, after several months.    **The left ischial wound continues to progress very well and at this point has a small area remaining open.  Silver nitrate was applied to the small hypergranulated area.  We will progressed to collagen with silver for the time being.    **The left upper medial thigh is a new wound that was discovered over the past two weeks by HH.  It appears as though there was a wound there previously although the patient does deny that.  The area remains stable today we will continue to use collagen with silver on it.    3/1/23 - Mr. Martinez returns today for followup for his multiple wounds on his lower extremities.  Three of his four wounds remain stable.  However, there is concern over the left ischial wound.  This wound appears to have been pulled wide open during the cleansing process by HH, resulting in deterioration and increase in depth of the wound.   This will be address with .      3/9/23 - The patient returns today for followup.  All wounds are stable and/or improving at this visit.  ** the sacrum remains closed with no areas of concern at this time.  ** right hip is stable with only a very small remaining opening.  We will continue to put silver alginate over that small opening.  ** the left upper medial thigh is also stable.  There is a large origin present, we will work with that margin once the wound has closed.  We will continue to put silver alginate over the wound.  ** this visit the left ischial has improved over the last visit.  It has again begun to decrease in depth.  We will use collagen with silver daily on this wound.    3/15/23 - Mr. Martinez returns today with several wounds.  The majority of his wounds are stable and are improving, however, today the sacrum has reopened.  Although small, it is concerning as this area took a considerable time to close and must be washed carefully so that it does not quickly reopened to this stage IV pressure injury that it was previously.  We will continue to monitor for deterioration.    We will continue the current treatment plan on all wounds, adding silver alginate to the newly open sacrum.    3/29/23 - the patient returns to clinic today for followup on all wounds.  All of his wounds do appear stable with no deterioration.  We applied powdered collagen to the left ischial wound, and collagen with silver to all other wounds.  All dressings will remain in place until Sunday at which time home health will be allowed to change the dressing.  Of note, the patient did receive a jury summons for jury duty and did request a written excuse, which he was given in light of the fact that he is quadriplegia and does have multiple wounds making it impact go for him to be sequestered for jury duty for any number of days which would be the risk.    4/12/23 - Mr. Martinez returns today with all wounds stable.  He does have  home health who assists with his wound care.  We will switch back to collagen with silver due to the concern of HH regarding variation in dressing changes.       Review of Systems   Musculoskeletal:  Positive for gait problem.   Skin:  Positive for wound.   All other systems reviewed and are negative.      Objective:      Vitals:    04/12/23 1101   BP: (!) 140/75   Pulse: 61   Resp: 18       Physical Exam  Vitals reviewed.   Constitutional:       Appearance: Normal appearance. He is normal weight.   HENT:      Head: Normocephalic.   Cardiovascular:      Rate and Rhythm: Normal rate.      Pulses: Normal pulses.   Pulmonary:      Effort: Pulmonary effort is normal.   Musculoskeletal:      Comments: quadraplegia   Skin:     General: Skin is warm and dry.   Neurological:      General: No focal deficit present.      Mental Status: He is alert and oriented to person, place, and time.   Psychiatric:         Mood and Affect: Mood normal.          Altered Skin Integrity 05/23/22 1337 Right posterior Hip #1 (Active)   05/23/22 1337   Altered Skin Integrity Present on Admission - Did Patient arrive to the hospital with altered skin?: yes   Side: Right   Orientation: posterior   Location: Hip   Wound Number: #1   Is this injury device related?: No   Primary Wound Type:    Description of Altered Skin Integrity:    Ankle-Brachial Index:    Pulses:    Removal Indication and Assessment:    Wound Outcome:    (Retired) Wound Length (cm):    (Retired) Wound Width (cm):    (Retired) Depth (cm):    Wound Description (Comments):    Removal Indications:    Dressing Appearance Moist drainage 04/12/23 1121   Drainage Amount Moderate 04/12/23 1121   Drainage Characteristics/Odor Serosanguineous 04/12/23 1121   Appearance Pink;Red;Moist 04/12/23 1121   Tissue loss description Full thickness 04/12/23 1121   Periwound Area Dry;[place] 04/12/23 1121   Wound Edges Open 04/12/23 1121   Wound Length (cm) 1.5 cm 04/12/23 1121   Wound Width (cm) 0.9 cm  04/12/23 1121   Wound Depth (cm) 1.5 cm 04/12/23 1121   Wound Volume (cm^3) 2.025 cm^3 04/12/23 1121   Wound Surface Area (cm^2) 1.35 cm^2 04/12/23 1121   Care Cleansed with:;Wound cleanser 04/12/23 1121   Dressing Applied 04/12/23 1121   Dressing Change Due 04/15/23 04/12/23 1121            Altered Skin Integrity 05/23/22 1353 Left Ischial tuberosity #2 (Active)   05/23/22 1353   Altered Skin Integrity Present on Admission - Did Patient arrive to the hospital with altered skin?: yes   Side: Left   Orientation:    Location: Ischial tuberosity   Wound Number: #2   Is this injury device related?: No   Primary Wound Type:    Description of Altered Skin Integrity:    Ankle-Brachial Index:    Pulses:    Removal Indication and Assessment:    Wound Outcome:    (Retired) Wound Length (cm):    (Retired) Wound Width (cm):    (Retired) Depth (cm):    Wound Description (Comments):    Removal Indications:    Dressing Appearance Moist drainage 04/12/23 1121   Drainage Amount Moderate 04/12/23 1121   Drainage Characteristics/Odor Serosanguineous 04/12/23 1121   Appearance Red;Moist 04/12/23 1121   Tissue loss description Full thickness 04/12/23 1121   Periwound Area [place];Moist 04/12/23 1121   Wound Edges Open 04/12/23 1121   Wound Length (cm) 0.9 cm 04/12/23 1121   Wound Width (cm) 2.5 cm 04/12/23 1121   Wound Depth (cm) 0.7 cm 04/12/23 1121   Wound Volume (cm^3) 1.575 cm^3 04/12/23 1121   Wound Surface Area (cm^2) 2.25 cm^2 04/12/23 1121   Care Cleansed with:;Wound cleanser 04/12/23 1121   Dressing Applied 04/12/23 1121   Dressing Change Due 04/15/23 04/12/23 1121            Altered Skin Integrity 11/28/22 1223 Sacral spine #3 Other (comment) Full thickness tissue loss. Subcutaneous fat may be visible but bone, tendon or muscle are not exposed (Active)   11/28/22 1223   Altered Skin Integrity Present on Admission - Did Patient arrive to the hospital with altered skin?: yes   Side:    Orientation:    Location: Sacral spine   Wound  Number: #3   Is this injury device related?: No   Primary Wound Type: Other   Description of Altered Skin Integrity: Full thickness tissue loss. Subcutaneous fat may be visible but bone, tendon or muscle are not exposed   Ankle-Brachial Index:    Pulses:    Removal Indication and Assessment:    Wound Outcome:    (Retired) Wound Length (cm):    (Retired) Wound Width (cm):    (Retired) Depth (cm):    Wound Description (Comments):    Removal Indications:    Dressing Appearance Moist drainage 04/12/23 1121   Drainage Amount Moderate 04/12/23 1121   Drainage Characteristics/Odor Serosanguineous 04/12/23 1121   Appearance Red;Moist;Bleeding 04/12/23 1121   Tissue loss description Full thickness 04/12/23 1121   Periwound Area Dry;[place] 04/12/23 1121   Wound Edges Open 04/12/23 1121   Wound Length (cm) 0.4 cm 04/12/23 1121   Wound Width (cm) 0.5 cm 04/12/23 1121   Wound Depth (cm) 0.3 cm 04/12/23 1121   Wound Volume (cm^3) 0.06 cm^3 04/12/23 1121   Wound Surface Area (cm^2) 0.2 cm^2 04/12/23 1121   Care Cleansed with:;Wound cleanser 04/12/23 1121   Dressing Applied 04/12/23 1121   Dressing Change Due 04/15/23 04/12/23 1121            Altered Skin Integrity 01/05/23 1351 Left upper;medial Thigh #4  Full thickness tissue loss. Subcutaneous fat may be visible but bone, tendon or muscle are not exposed (Active)   01/05/23 1351   Altered Skin Integrity Present on Admission - Did Patient arrive to the hospital with altered skin?: yes   Side: Left   Orientation: upper;medial   Location: Thigh   Wound Number: #4   Is this injury device related?: No   Primary Wound Type:    Description of Altered Skin Integrity: Full thickness tissue loss. Subcutaneous fat may be visible but bone, tendon or muscle are not exposed   Ankle-Brachial Index:    Pulses:    Removal Indication and Assessment:    Wound Outcome:    (Retired) Wound Length (cm):    (Retired) Wound Width (cm):    (Retired) Depth (cm):    Wound Description (Comments):    Removal  Indications:    Dressing Appearance Moist drainage 04/12/23 1121   Drainage Amount Moderate 04/12/23 1121   Drainage Characteristics/Odor Serosanguineous 04/12/23 1121   Appearance Pink;Red;Moist;Epithelialization 04/12/23 1121   Tissue loss description Full thickness 04/12/23 1121   Periwound Area Pale white;Moist 04/12/23 1121   Wound Edges Open 04/12/23 1121   Wound Length (cm) 2.4 cm 04/12/23 1121   Wound Width (cm) 0.5 cm 04/12/23 1121   Wound Depth (cm) 0.5 cm 04/12/23 1121   Wound Volume (cm^3) 0.6 cm^3 04/12/23 1121   Wound Surface Area (cm^2) 1.2 cm^2 04/12/23 1121   Care Cleansed with:;Wound cleanser 04/12/23 1121   Dressing Applied 04/12/23 1121   Dressing Change Due 04/15/23 04/12/23 1121         Left ischial tuberosity    Right posterior hip   Ischial: collagen with silver, silver alginate, gentle border dressing  Hip: collagen with silver, silver alginate, gentle border dressing          Sacrum      Left medial upper thigh  Sacrum: collagen with silver, silver alginate, gentle border dressing  Thigh:  collagen with silver, silver alginate, gentle border dressing  RC      Assessment:         ICD-10-CM ICD-9-CM   1. Stage IV pressure ulcer of right hip  L89.214 707.04     707.24   2. Pressure injury of left thigh, stage 3  L89.223 707.09     707.23   3. Pressure injury of sacral region, stage 4  L89.154 707.03     707.24   4. Pressure ulcer of left buttock, stage 4  L89.324 707.05     707.24   5. Quadriplegia, unspecified  G82.50 344.00             Procedures:     Mechanical debridement only.      Excisional debridement performed:  [] Yes [x] No   Selective debridement performed:  [] Yes [x] No   Mechanical debridement performed:  [x] Yes [] No   Silver nitrate applied:    [x] Yes [] No   Labs ordered this visit:   [] Yes [x] No   Imaging ordered this visit:   [] Yes [x] No   Tissue pathology and/or culture taken:  [] Yes [x] No       MEDICATIONS    Current Outpatient Medications:     apixaban (ELIQUIS)  5 mg Tab, Take 5 mg by mouth once daily at 6am., Disp: , Rfl:     lactulose (CHRONULAC) 10 gram/15 mL solution, TAKE 15 ML BY MOUTH TWICE DAILY, Disp: , Rfl:     LINZESS 145 mcg Cap capsule, Take 145 mcg by mouth once daily., Disp: , Rfl:    Review of patient's allergies indicates:   Allergen Reactions    Meropenem Anaphylaxis    Penicillins        HOME HEALTH AGENCY:  [facility]   TIMES PER WEEK/DAYS:  2 x weekly, M/S; also every other Wednesday when patient does not come to wound clinic     Home health is to see patient as scheduled.   Provider is to be notified if patient is not seen on scheduled day.     Right hip #1: **leave dressing (collagen with silver) in place until Saturday 4/15/23** On Saturday, remove dressing, cleanse with wound cleanser, replace collagen with silver, cover with silver alginate, and gentle border dressing, leave in place until Monday 4/17/23 and repeat this process, this process is to be repeated on Mondays, Wednesdays and Saturdays, except for the Wednesday that pt comes to wound care clinic.   Left ischium #2: **leave dressing (collagen with silver) in place until Saturday 4/15/23** On Saturday, remove dressing, cleanse with wound cleanser, replace collagen with silver, cover with silver alginate, and gentle border dressing, leave in place until Monday 4/17/23 and repeat this process, this process is to be repeated on Mondays, Wednesdays and Saturdays, except for the Wednesday that pt comes to wound care clinic.   Sacrum #3;**leave dressing (collagen with silver) in place until Saturday 4/15/23** On Saturday, remove dressing, cleanse with wound cleanser, replace collagen with silver, cover with silver alginate, and gentle border dressing, leave in place until Monday 4/17/23 and repeat this process, this process is to be repeated on Mondays, Wednesdays and Saturdays, except for the Wednesday that pt comes to wound care clinic.   Left upper thigh #4: **leave dressing (collagen  with silver) in place until Saturday 4/15/23** On Saturday, remove dressing, cleanse with wound cleanser, replace collagen with silver, cover with silver alginate, and gentle border dressing, leave in place until Monday 4/17/23 and repeat this process, this process is to be repeated on Mondays, Wednesdays and Saturdays, except for the Wednesday that pt comes to wound care clinic.     Follow up in 2 weeks (on 4/26/2023) for stretcher.      Electronically signed:  Jessie Bah NP

## 2023-04-26 ENCOUNTER — HOSPITAL ENCOUNTER (OUTPATIENT)
Dept: WOUND CARE | Facility: HOSPITAL | Age: 48
Discharge: HOME | End: 2023-04-26
Attending: NURSE PRACTITIONER
Payer: MEDICARE

## 2023-04-26 VITALS
HEART RATE: 90 BPM | SYSTOLIC BLOOD PRESSURE: 63 MMHG | WEIGHT: 170 LBS | DIASTOLIC BLOOD PRESSURE: 44 MMHG | HEIGHT: 65 IN | BODY MASS INDEX: 28.32 KG/M2 | RESPIRATION RATE: 18 BRPM

## 2023-04-26 DIAGNOSIS — L89.223 PRESSURE INJURY OF LEFT THIGH, STAGE 3: ICD-10-CM

## 2023-04-26 DIAGNOSIS — L89.324 PRESSURE ULCER OF LEFT BUTTOCK, STAGE 4: ICD-10-CM

## 2023-04-26 DIAGNOSIS — L89.214 STAGE IV PRESSURE ULCER OF RIGHT HIP: Primary | ICD-10-CM

## 2023-04-26 DIAGNOSIS — G82.50 QUADRIPLEGIA, UNSPECIFIED: ICD-10-CM

## 2023-04-26 DIAGNOSIS — L89.154 PRESSURE INJURY OF SACRAL REGION, STAGE 4: ICD-10-CM

## 2023-04-26 PROCEDURE — 17250 CHEM CAUT OF GRANLTJ TISSUE: CPT

## 2023-04-26 PROCEDURE — 99213 OFFICE O/P EST LOW 20 MIN: CPT

## 2023-04-26 PROCEDURE — 27000999 HC MEDICAL RECORD PHOTO DOCUMENTATION

## 2023-04-27 NOTE — PROGRESS NOTES
Subjective:       Patient ID: Diego Martinez Jr. is a 47 y.o. male.    Chief Complaint: Pressure Ulcer (Right hip - Stage 4 #1/Left ischial - Stage 4 #2/Sacrum- Stage 4 #3/Left medial upper thigh- Stage 3 #4)    HPI   Mr. Martinez is a long time patient of [facility].    He comes via wheelchair with a care attendant present with him at all times.    He does have a history of quadriplegia.  He has home health who assist him with his wound care daily and care attendants to provide care at other times.  He has a very pleasant demeanor and added to an is usually jovial in spite of his challenges.  He now has [facility].    We have been using powdered collagen on most of the wounds and they have made impressive progress with that product.   We will begin using collagen with silver is to keep the wound progressing.    **The sacral area appears to be closed and stable.  We are continue to apply silver alginate in this area for protection only.    **The right hip remains stable and clean.  This wound is nearly closed at the base, after several months.    **The left ischial wound continues to progress very well and at this point has a small area remaining open.  Silver nitrate was applied to the small hypergranulated area.  We will progressed to collagen with silver for the time being.    **The left upper medial thigh is a new wound that was discovered over the past two weeks by HH.  It appears as though there was a wound there previously although the patient does deny that.  The area remains stable today we will continue to use collagen with silver on it.    3/1/23 - Mr. Martinez returns today for followup for his multiple wounds on his lower extremities.  Three of his four wounds remain stable.  However, there is concern over the left ischial wound.  This wound appears to have been pulled wide open during the cleansing process by HH, resulting in deterioration and increase in depth of the wound.  This  will be address with .      3/9/23 - The patient returns today for followup.  All wounds are stable and/or improving at this visit.  ** the sacrum remains closed with no areas of concern at this time.  ** right hip is stable with only a very small remaining opening.  We will continue to put silver alginate over that small opening.  ** the left upper medial thigh is also stable.  There is a large origin present, we will work with that margin once the wound has closed.  We will continue to put silver alginate over the wound.  ** this visit the left ischial has improved over the last visit.  It has again begun to decrease in depth.  We will use collagen with silver daily on this wound.    3/15/23 - Mr. Martinez returns today with several wounds.  The majority of his wounds are stable and are improving, however, today the sacrum has reopened.  Although small, it is concerning as this area took a considerable time to close and must be washed carefully so that it does not quickly reopened to this stage IV pressure injury that it was previously.  We will continue to monitor for deterioration.    We will continue the current treatment plan on all wounds, adding silver alginate to the newly open sacrum.    3/29/23 - the patient returns to clinic today for followup on all wounds.  All of his wounds do appear stable with no deterioration.  We applied powdered collagen to the left ischial wound, and collagen with silver to all other wounds.  All dressings will remain in place until Sunday at which time home health will be allowed to change the dressing.  Of note, the patient did receive a jury summons for jury duty and did request a written excuse, which he was given in light of the fact that he is quadriplegia and does have multiple wounds making it impact go for him to be sequestered for jury duty for any number of days which would be the risk.    4/12/23 - Mr. Martinez returns today with all wounds stable.  He does have home  health who assists with his wound care.  We will switch back to collagen with silver due to the concern of  regarding variation in dressing changes.     4/27/23 - Today all wounds remain stable.  However, the wound to the left ischial was bleeding a moderate amount.  Silver nitrate x 2 and pressure were used to achieve homeostasis, however, the wound bed/granular tissue is very friable.  This may be due to the use of collagen in the wound bed.  He does take Eliquis.  We will change to silver alginate for the time being to allow the wound bed to settle down.  Silver ag will be used on all wounds for consistency.       Review of Systems   Musculoskeletal:  Positive for gait problem.   Skin:  Positive for wound.   All other systems reviewed and are negative.      Objective:      Vitals:    04/26/23 1326   BP: (!) 63/44   Pulse: 90   Resp: 18       Physical Exam  Vitals reviewed.   Constitutional:       Appearance: Normal appearance. He is normal weight.   HENT:      Head: Normocephalic.   Cardiovascular:      Rate and Rhythm: Normal rate.      Pulses: Normal pulses.   Pulmonary:      Effort: Pulmonary effort is normal.   Musculoskeletal:      Comments: quadraplegia   Skin:     General: Skin is warm and dry.   Neurological:      General: No focal deficit present.      Mental Status: He is alert and oriented to person, place, and time.   Psychiatric:         Mood and Affect: Mood normal.          Altered Skin Integrity 05/23/22 1337 Right posterior Hip #1 (Active)   05/23/22 1337   Altered Skin Integrity Present on Admission - Did Patient arrive to the hospital with altered skin?: yes   Side: Right   Orientation: posterior   Location: Hip   Wound Number: #1   Is this injury device related?: No   Primary Wound Type:    Description of Altered Skin Integrity:    Ankle-Brachial Index:    Pulses:    Removal Indication and Assessment:    Wound Outcome:    (Retired) Wound Length (cm):    (Retired) Wound Width (cm):     (Retired) Depth (cm):    Wound Description (Comments):    Removal Indications:    Dressing Appearance Moist drainage 04/27/23 0744   Drainage Amount Moderate 04/27/23 0744   Drainage Characteristics/Odor Serosanguineous 04/27/23 0744   Appearance Pink;Moist 04/27/23 0744   Tissue loss description Full thickness 04/27/23 0744   Periwound Area Intact 04/27/23 0744   Wound Edges Open 04/27/23 0744   Wound Length (cm) 1.5 cm 04/27/23 0744   Wound Width (cm) 1 cm 04/27/23 0744   Wound Depth (cm) 2 cm 04/27/23 0744   Wound Volume (cm^3) 3 cm^3 04/27/23 0744   Wound Surface Area (cm^2) 1.5 cm^2 04/27/23 0744   Care Cleansed with:;Wound cleanser 04/27/23 0744   Dressing Applied 04/27/23 0744   Dressing Change Due 04/28/23 04/27/23 0744            Altered Skin Integrity 05/23/22 1353 Left Ischial tuberosity #2 (Active)   05/23/22 1353   Altered Skin Integrity Present on Admission - Did Patient arrive to the hospital with altered skin?: yes   Side: Left   Orientation:    Location: Ischial tuberosity   Wound Number: #2   Is this injury device related?: No   Primary Wound Type:    Description of Altered Skin Integrity:    Ankle-Brachial Index:    Pulses:    Removal Indication and Assessment:    Wound Outcome:    (Retired) Wound Length (cm):    (Retired) Wound Width (cm):    (Retired) Depth (cm):    Wound Description (Comments):    Removal Indications:    Dressing Appearance Moist drainage 04/27/23 0744   Drainage Amount Moderate 04/27/23 0744   Drainage Characteristics/Odor Serosanguineous 04/27/23 0744   Appearance Pink;Moist 04/27/23 0744   Tissue loss description Full thickness 04/27/23 0744   Periwound Area Intact 04/27/23 0744   Wound Edges Open 04/27/23 0744   Wound Length (cm) 1 cm 04/27/23 0744   Wound Width (cm) 2.3 cm 04/27/23 0744   Wound Depth (cm) 0.5 cm 04/27/23 0744   Wound Volume (cm^3) 1.15 cm^3 04/27/23 0744   Wound Surface Area (cm^2) 2.3 cm^2 04/27/23 0744   Care Cleansed with:;Wound cleanser 04/27/23 0742    Dressing Applied 04/27/23 0744   Dressing Change Due 04/28/23 04/27/23 0744            Altered Skin Integrity 11/28/22 1223 Sacral spine #3 Other (comment) Full thickness tissue loss. Subcutaneous fat may be visible but bone, tendon or muscle are not exposed (Active)   11/28/22 1223   Altered Skin Integrity Present on Admission - Did Patient arrive to the hospital with altered skin?: yes   Side:    Orientation:    Location: Sacral spine   Wound Number: #3   Is this injury device related?: No   Primary Wound Type: Other   Description of Altered Skin Integrity: Full thickness tissue loss. Subcutaneous fat may be visible but bone, tendon or muscle are not exposed   Ankle-Brachial Index:    Pulses:    Removal Indication and Assessment:    Wound Outcome:    (Retired) Wound Length (cm):    (Retired) Wound Width (cm):    (Retired) Depth (cm):    Wound Description (Comments):    Removal Indications:    Dressing Appearance Moist drainage 04/27/23 0744   Drainage Amount Moderate 04/27/23 0744   Drainage Characteristics/Odor Serosanguineous 04/27/23 0744   Appearance Pink;Moist 04/27/23 0744   Tissue loss description Partial thickness 04/27/23 0744   Periwound Area Intact 04/27/23 0744   Wound Edges Open 04/27/23 0744   Wound Length (cm) 0.3 cm 04/27/23 0744   Wound Width (cm) 0.5 cm 04/27/23 0744   Wound Depth (cm) 0.3 cm 04/27/23 0744   Wound Volume (cm^3) 0.045 cm^3 04/27/23 0744   Wound Surface Area (cm^2) 0.15 cm^2 04/27/23 0744   Care Cleansed with:;Wound cleanser 04/27/23 0744   Dressing Applied 04/27/23 0744   Dressing Change Due 04/28/23 04/27/23 0744            Altered Skin Integrity 01/05/23 1351 Left upper;medial Thigh #4  Full thickness tissue loss. Subcutaneous fat may be visible but bone, tendon or muscle are not exposed (Active)   01/05/23 1351   Altered Skin Integrity Present on Admission - Did Patient arrive to the hospital with altered skin?: yes   Side: Left   Orientation: upper;medial   Location: Thigh    Wound Number: #4   Is this injury device related?: No   Primary Wound Type:    Description of Altered Skin Integrity: Full thickness tissue loss. Subcutaneous fat may be visible but bone, tendon or muscle are not exposed   Ankle-Brachial Index:    Pulses:    Removal Indication and Assessment:    Wound Outcome:    (Retired) Wound Length (cm):    (Retired) Wound Width (cm):    (Retired) Depth (cm):    Wound Description (Comments):    Removal Indications:    Dressing Appearance Moist drainage 04/27/23 0744   Drainage Amount Moderate 04/27/23 0744   Drainage Characteristics/Odor Serosanguineous 04/27/23 0744   Appearance Pink;Moist 04/27/23 0744   Tissue loss description Full thickness 04/27/23 0744   Periwound Area Intact 04/27/23 0744   Wound Edges Open 04/27/23 0744   Wound Length (cm) 2 cm 04/27/23 0744   Wound Width (cm) 0.6 cm 04/27/23 0744   Wound Depth (cm) 0.3 cm 04/27/23 0744   Wound Volume (cm^3) 0.36 cm^3 04/27/23 0744   Wound Surface Area (cm^2) 1.2 cm^2 04/27/23 0744   Care Cleansed with:;Wound cleanser 04/27/23 0744   Dressing Applied 04/27/23 0744   Dressing Change Due 04/28/23 04/27/23 0744         Left ischial tuberosity    Right posterior hip   Ischial: silver alginate, gentle border  Hip: silver alginate, gentle border          Sacrum      Left medial upper thigh  Sacrum: silver alginate, gentle border  Thigh:  silver alginate, gentle border  RC      Assessment:         ICD-10-CM ICD-9-CM   1. Stage IV pressure ulcer of right hip  L89.214 707.04     707.24   2. Pressure injury of left thigh, stage 3  L89.223 707.09     707.23   3. Pressure injury of sacral region, stage 4  L89.154 707.03     707.24   4. Pressure ulcer of left buttock, stage 4  L89.324 707.05     707.24   5. Quadriplegia, unspecified  G82.50 344.00             Procedures:     Mechanical debridement only.      Excisional debridement performed:  [] Yes [x] No   Selective debridement performed:  [] Yes [x] No   Mechanical debridement  performed:  [x] Yes [] No   Silver nitrate applied:    [x] Yes [] No   Labs ordered this visit:   [] Yes [x] No   Imaging ordered this visit:   [] Yes [x] No   Tissue pathology and/or culture taken:  [] Yes [x] No       MEDICATIONS    Current Outpatient Medications:     apixaban (ELIQUIS) 5 mg Tab, Take 5 mg by mouth once daily at 6am., Disp: , Rfl:     lactulose (CHRONULAC) 10 gram/15 mL solution, TAKE 15 ML BY MOUTH TWICE DAILY, Disp: , Rfl:     LINZESS 145 mcg Cap capsule, Take 145 mcg by mouth once daily., Disp: , Rfl:    Review of patient's allergies indicates:   Allergen Reactions    Meropenem Anaphylaxis    Penicillins        HOME HEALTH AGENCY:  [facility]   TIMES PER WEEK/DAYS:  2 x weekly, M/S; also every other Wednesday when patient does not come to wound clinic     Home health is to see patient as scheduled.   Provider is to be notified if patient is not seen on scheduled day.     Right hip: Cleanse with wound cleanser and apply silver alginate to the wound bed, cover with a gentle border - to be changed daily.   Left ischial: Cleanse with wound cleanser and apply silver alginate to the wound bed, cover with a gentle border - to be changed daily.   Sacrum:Cleanse with wound cleanser and apply silver alginate to the wound bed, cover with a gentle border - to be changed daily.   Left medial upper thigh: Cleanse with wound cleanser and apply silver alginate to the wound bed, cover with a gentle border - to be changed daily.        Follow up in about 2 weeks (around 5/10/2023).      Electronically signed:  Jessie Bah NP

## 2023-05-10 ENCOUNTER — HOSPITAL ENCOUNTER (OUTPATIENT)
Dept: WOUND CARE | Facility: HOSPITAL | Age: 48
Discharge: HOME | End: 2023-05-10
Attending: NURSE PRACTITIONER
Payer: MEDICARE

## 2023-05-10 VITALS
HEART RATE: 58 BPM | WEIGHT: 170 LBS | HEIGHT: 65 IN | BODY MASS INDEX: 28.32 KG/M2 | DIASTOLIC BLOOD PRESSURE: 66 MMHG | RESPIRATION RATE: 18 BRPM | SYSTOLIC BLOOD PRESSURE: 133 MMHG

## 2023-05-10 DIAGNOSIS — L89.154 PRESSURE INJURY OF SACRAL REGION, STAGE 4: ICD-10-CM

## 2023-05-10 DIAGNOSIS — M86.559: ICD-10-CM

## 2023-05-10 DIAGNOSIS — L89.214 STAGE IV PRESSURE ULCER OF RIGHT HIP: Primary | ICD-10-CM

## 2023-05-10 DIAGNOSIS — G82.50 QUADRIPLEGIA, UNSPECIFIED: ICD-10-CM

## 2023-05-10 DIAGNOSIS — L89.324 PRESSURE ULCER OF LEFT BUTTOCK, STAGE 4: ICD-10-CM

## 2023-05-10 DIAGNOSIS — L89.223 PRESSURE INJURY OF LEFT THIGH, STAGE 3: ICD-10-CM

## 2023-05-10 PROCEDURE — 17250 CHEM CAUT OF GRANLTJ TISSUE: CPT

## 2023-05-10 PROCEDURE — 99213 OFFICE O/P EST LOW 20 MIN: CPT

## 2023-05-10 PROCEDURE — 27000999 HC MEDICAL RECORD PHOTO DOCUMENTATION

## 2023-05-10 NOTE — PROGRESS NOTES
Subjective:       Patient ID: Diego Martinez Jr. is a 47 y.o. male.    Chief Complaint: Pressure Ulcer (Right hip - Stage 4 #1/Left ischial - Stage 4 #2/Sacrum- Stage 4 #3/Left medial upper thigh- Stage 3 #4/ )    HPI   Mr. Martinez is a long time patient of [facility].    He comes via wheelchair with a care attendant present with him at all times.    He does have a history of quadriplegia.  He has home health who assist him with his wound care daily and care attendants to provide care at other times.  He has a very pleasant demeanor and added to an is usually jovial in spite of his challenges.  He now has [facility].    We have been using powdered collagen on most of the wounds and they have made impressive progress with that product.   We will begin using collagen with silver is to keep the wound progressing.    **The sacral area appears to be closed and stable.  We are continue to apply silver alginate in this area for protection only.    **The right hip remains stable and clean.  This wound is nearly closed at the base, after several months.    **The left ischial wound continues to progress very well and at this point has a small area remaining open.  Silver nitrate was applied to the small hypergranulated area.  We will progressed to collagen with silver for the time being.    **The left upper medial thigh is a new wound that was discovered over the past two weeks by HH.  It appears as though there was a wound there previously although the patient does deny that.  The area remains stable today we will continue to use collagen with silver on it.    3/1/23 - Mr. Martinez returns today for followup for his multiple wounds on his lower extremities.  Three of his four wounds remain stable.  However, there is concern over the left ischial wound.  This wound appears to have been pulled wide open during the cleansing process by HH, resulting in deterioration and increase in depth of the wound.   This will be address with .      3/9/23 - The patient returns today for followup.  All wounds are stable and/or improving at this visit.  ** the sacrum remains closed with no areas of concern at this time.  ** right hip is stable with only a very small remaining opening.  We will continue to put silver alginate over that small opening.  ** the left upper medial thigh is also stable.  There is a large origin present, we will work with that margin once the wound has closed.  We will continue to put silver alginate over the wound.  ** this visit the left ischial has improved over the last visit.  It has again begun to decrease in depth.  We will use collagen with silver daily on this wound.    3/15/23 - Mr. Martinez returns today with several wounds.  The majority of his wounds are stable and are improving, however, today the sacrum has reopened.  Although small, it is concerning as this area took a considerable time to close and must be washed carefully so that it does not quickly reopened to this stage IV pressure injury that it was previously.  We will continue to monitor for deterioration.    We will continue the current treatment plan on all wounds, adding silver alginate to the newly open sacrum.    3/29/23 - the patient returns to clinic today for followup on all wounds.  All of his wounds do appear stable with no deterioration.  We applied powdered collagen to the left ischial wound, and collagen with silver to all other wounds.  All dressings will remain in place until Sunday at which time home health will be allowed to change the dressing.  Of note, the patient did receive a jury summons for jury duty and did request a written excuse, which he was given in light of the fact that he is quadriplegia and does have multiple wounds making it impact go for him to be sequestered for jury duty for any number of days which would be the risk.    4/12/23 - Mr. Martinez returns today with all wounds stable.  He does have  home health who assists with his wound care.  We will switch back to collagen with silver due to the concern of  regarding variation in dressing changes.     4/27/23 - Today all wounds remain stable.  However, the wound to the left ischial was bleeding a moderate amount.  Silver nitrate x 2 and pressure were used to achieve homeostasis, however, the wound bed/granular tissue is very friable.  This may be due to the use of collagen in the wound bed.  He does take Eliquis.  We will change to silver alginate for the time being to allow the wound bed to settle down.  Silver ag will be used on all wounds for consistency.     5/10/23 - Mr. Martinez returns today for follow up on several wounds.  All wounds do remain stable today, with slight decreases in size with all wounds with the exception of the sacrum.  The sacrum is measuring slightly larger today although there has been no increase in drainage and no S & S of infection.  We will continue to use silver alginate on all wounds.       Review of Systems   Musculoskeletal:  Positive for gait problem.   Skin:  Positive for wound.   All other systems reviewed and are negative.      Objective:      Vitals:    05/10/23 1319   BP: 133/66   Pulse: (!) 58   Resp: 18       Physical Exam  Vitals reviewed.   Constitutional:       Appearance: Normal appearance. He is normal weight.   HENT:      Head: Normocephalic.   Cardiovascular:      Rate and Rhythm: Normal rate.      Pulses: Normal pulses.   Pulmonary:      Effort: Pulmonary effort is normal.   Musculoskeletal:      Comments: quadraplegia   Skin:     General: Skin is warm and dry.   Neurological:      General: No focal deficit present.      Mental Status: He is alert and oriented to person, place, and time.   Psychiatric:         Mood and Affect: Mood normal.          Altered Skin Integrity 05/23/22 1337 Right posterior Hip #1 (Active)   05/23/22 1337   Altered Skin Integrity Present on Admission - Did Patient arrive to the  hospital with altered skin?: yes   Side: Right   Orientation: posterior   Location: Hip   Wound Number: #1   Is this injury device related?: No   Primary Wound Type:    Description of Altered Skin Integrity:    Ankle-Brachial Index:    Pulses:    Removal Indication and Assessment:    Wound Outcome:    (Retired) Wound Length (cm):    (Retired) Wound Width (cm):    (Retired) Depth (cm):    Wound Description (Comments):    Removal Indications:    Dressing Appearance Moist drainage 05/10/23 1338   Drainage Amount Moderate 05/10/23 1338   Drainage Characteristics/Odor Serosanguineous 05/10/23 1338   Appearance Pink;Red;Moist;Bleeding 05/10/23 1338   Tissue loss description Full thickness 05/10/23 1338   Periwound Area [place];Dry 05/10/23 1338   Wound Edges Open 05/10/23 1338   Wound Length (cm) 1.5 cm 05/10/23 1338   Wound Width (cm) 0.5 cm 05/10/23 1338   Wound Depth (cm) 1 cm 05/10/23 1338   Wound Volume (cm^3) 0.75 cm^3 05/10/23 1338   Wound Surface Area (cm^2) 0.75 cm^2 05/10/23 1338   Care Cleansed with:;Wound cleanser 05/10/23 1338   Dressing Applied 05/10/23 1338   Dressing Change Due 05/11/23 05/10/23 1338            Altered Skin Integrity 05/23/22 1353 Left Ischial tuberosity #2 (Active)   05/23/22 1353   Altered Skin Integrity Present on Admission - Did Patient arrive to the hospital with altered skin?: yes   Side: Left   Orientation:    Location: Ischial tuberosity   Wound Number: #2   Is this injury device related?: No   Primary Wound Type:    Description of Altered Skin Integrity:    Ankle-Brachial Index:    Pulses:    Removal Indication and Assessment:    Wound Outcome:    (Retired) Wound Length (cm):    (Retired) Wound Width (cm):    (Retired) Depth (cm):    Wound Description (Comments):    Removal Indications:    Dressing Appearance Moist drainage 05/10/23 1338   Drainage Amount Moderate 05/10/23 1338   Drainage Characteristics/Odor Serosanguineous 05/10/23 1338   Appearance Red;Moist 05/10/23 1338   Tissue  loss description Full thickness 05/10/23 1338   Periwound Area [place];Dry 05/10/23 1338   Wound Edges Open 05/10/23 1338   Wound Length (cm) 1 cm 05/10/23 1338   Wound Width (cm) 2 cm 05/10/23 1338   Wound Depth (cm) 0.5 cm 05/10/23 1338   Wound Volume (cm^3) 1 cm^3 05/10/23 1338   Wound Surface Area (cm^2) 2 cm^2 05/10/23 1338   Care Cleansed with:;Wound cleanser 05/10/23 1338   Dressing Applied 05/10/23 1338   Dressing Change Due 05/11/23 05/10/23 1338            Altered Skin Integrity 11/28/22 1223 Sacral spine #3 Other (comment) Full thickness tissue loss. Subcutaneous fat may be visible but bone, tendon or muscle are not exposed (Active)   11/28/22 1223   Altered Skin Integrity Present on Admission - Did Patient arrive to the hospital with altered skin?: yes   Side:    Orientation:    Location: Sacral spine   Wound Number: #3   Is this injury device related?: No   Primary Wound Type: Other   Description of Altered Skin Integrity: Full thickness tissue loss. Subcutaneous fat may be visible but bone, tendon or muscle are not exposed   Ankle-Brachial Index:    Pulses:    Removal Indication and Assessment:    Wound Outcome:    (Retired) Wound Length (cm):    (Retired) Wound Width (cm):    (Retired) Depth (cm):    Wound Description (Comments):    Removal Indications:    Dressing Appearance Moist drainage 05/10/23 1338   Drainage Amount Moderate 05/10/23 1338   Drainage Characteristics/Odor Serosanguineous 05/10/23 1338   Appearance White;Slough;Moist 05/10/23 1338   Tissue loss description Full thickness 05/10/23 1338   Periwound Area Moist;[place] 05/10/23 1338   Wound Edges Open 05/10/23 1338   Wound Length (cm) 0.5 cm 05/10/23 1338   Wound Width (cm) 0.6 cm 05/10/23 1338   Wound Depth (cm) 0.3 cm 05/10/23 1338   Wound Volume (cm^3) 0.09 cm^3 05/10/23 1338   Wound Surface Area (cm^2) 0.3 cm^2 05/10/23 1338   Care Cleansed with:;Wound cleanser 05/10/23 1338   Dressing Applied 05/10/23 1338   Dressing Change Due 05/11/23  05/10/23 1338            Altered Skin Integrity 01/05/23 1351 Left upper;medial Thigh #4  Full thickness tissue loss. Subcutaneous fat may be visible but bone, tendon or muscle are not exposed (Active)   01/05/23 1351   Altered Skin Integrity Present on Admission - Did Patient arrive to the hospital with altered skin?: yes   Side: Left   Orientation: upper;medial   Location: Thigh   Wound Number: #4   Is this injury device related?: No   Primary Wound Type:    Description of Altered Skin Integrity: Full thickness tissue loss. Subcutaneous fat may be visible but bone, tendon or muscle are not exposed   Ankle-Brachial Index:    Pulses:    Removal Indication and Assessment:    Wound Outcome:    (Retired) Wound Length (cm):    (Retired) Wound Width (cm):    (Retired) Depth (cm):    Wound Description (Comments):    Removal Indications:    Dressing Appearance Moist drainage 05/10/23 1338   Drainage Amount Moderate 05/10/23 1338   Drainage Characteristics/Odor Serosanguineous 05/10/23 1338   Appearance Red;Moist 05/10/23 1338   Tissue loss description Full thickness 05/10/23 1338   Periwound Area Moist;Pale white 05/10/23 1338   Wound Edges Callused;Open 05/10/23 1338   Wound Length (cm) 1.8 cm 05/10/23 1338   Wound Width (cm) 0.6 cm 05/10/23 1338   Wound Depth (cm) 0.3 cm 05/10/23 1338   Wound Volume (cm^3) 0.324 cm^3 05/10/23 1338   Wound Surface Area (cm^2) 1.08 cm^2 05/10/23 1338   Care Cleansed with:;Wound cleanser 05/10/23 1338   Dressing Applied 05/10/23 1338   Dressing Change Due 05/11/23 05/10/23 1338           Left ischial tuberosity    Right posterior hip   Ischial: silver alginate, gentle border dressing  Hip: silver alginate, gentle border dressing          Sacrum      Left medial upper thigh  Sacrum: silver alginate, gentle border dressing  Thigh:  silver alginate, gentle border dressing  RC      Assessment:         ICD-10-CM ICD-9-CM   1. Stage IV pressure ulcer of right hip  L89.214 707.04     707.24   2.  Pressure injury of left thigh, stage 3  L89.223 707.09     707.23   3. Pressure injury of sacral region, stage 4  L89.154 707.03     707.24   4. Pressure ulcer of left buttock, stage 4  L89.324 707.05     707.24   5. Quadriplegia, unspecified  G82.50 344.00   6. Other chronic hematogenous osteomyelitis, unspecified femur  M86.559 730.15             Procedures:     Mechanical debridement only.      Excisional debridement performed:  [] Yes [x] No   Selective debridement performed:  [] Yes [x] No   Mechanical debridement performed:  [x] Yes [] No   Silver nitrate applied:    [x] Yes [] No   Labs ordered this visit:   [] Yes [x] No   Imaging ordered this visit:   [] Yes [x] No   Tissue pathology and/or culture taken:  [] Yes [x] No       MEDICATIONS    Current Outpatient Medications:     apixaban (ELIQUIS) 5 mg Tab, Take 5 mg by mouth once daily at 6am., Disp: , Rfl:     lactulose (CHRONULAC) 10 gram/15 mL solution, TAKE 15 ML BY MOUTH TWICE DAILY, Disp: , Rfl:     LINZESS 145 mcg Cap capsule, Take 145 mcg by mouth once daily., Disp: , Rfl:    Review of patient's allergies indicates:   Allergen Reactions    Meropenem Anaphylaxis    Penicillins        HOME HEALTH AGENCY:  [facility]   TIMES PER WEEK/DAYS:      Right hip: Cleanse with wound cleanser and apply silver alginate to the wound bed, cover with a gentle border - to be changed daily.   Left ischial: Cleanse with wound cleanser and apply silver alginate to the wound bed, cover with a gentle border - to be changed daily.   Sacrum:Cleanse with wound cleanser and apply silver alginate to the wound bed, cover with a gentle border - to be changed daily.   Left medial upper thigh: Cleanse with wound cleanser and apply silver alginate to the wound bed, cover with a gentle border - to be changed daily.        Follow up in 2 weeks (on 5/24/2023) for stretcher.      Electronically signed:  Jessie Bah NP

## 2023-05-24 ENCOUNTER — HOSPITAL ENCOUNTER (OUTPATIENT)
Dept: WOUND CARE | Facility: HOSPITAL | Age: 48
Discharge: HOME | End: 2023-05-24
Attending: NURSE PRACTITIONER
Payer: MEDICARE

## 2023-05-24 VITALS
DIASTOLIC BLOOD PRESSURE: 81 MMHG | WEIGHT: 170 LBS | HEIGHT: 65 IN | SYSTOLIC BLOOD PRESSURE: 140 MMHG | BODY MASS INDEX: 28.32 KG/M2 | HEART RATE: 60 BPM | RESPIRATION RATE: 18 BRPM

## 2023-05-24 DIAGNOSIS — G82.50 QUADRIPLEGIA, UNSPECIFIED: ICD-10-CM

## 2023-05-24 DIAGNOSIS — L89.324 PRESSURE ULCER OF LEFT BUTTOCK, STAGE 4: ICD-10-CM

## 2023-05-24 DIAGNOSIS — L89.223 PRESSURE INJURY OF LEFT THIGH, STAGE 3: ICD-10-CM

## 2023-05-24 DIAGNOSIS — L89.214 STAGE IV PRESSURE ULCER OF RIGHT HIP: Primary | ICD-10-CM

## 2023-05-24 DIAGNOSIS — L89.154 PRESSURE INJURY OF SACRAL REGION, STAGE 4: ICD-10-CM

## 2023-05-24 PROCEDURE — 27000999 HC MEDICAL RECORD PHOTO DOCUMENTATION

## 2023-05-24 PROCEDURE — 99212 OFFICE O/P EST SF 10 MIN: CPT

## 2023-05-24 NOTE — PROGRESS NOTES
Subjective:       Patient ID: Diego Martinez Jr. is a 47 y.o. male.    Chief Complaint: Pressure Ulcer (Right hip - Stage 4 #1/Left ischial - Stage 4 #2/Sacrum- Stage 4 #3/Left medial upper thigh- Stage 3 #4)    HPI   Mr. Martinez is a long time patient of [facility].    He comes via wheelchair with a care attendant present with him at all times.    He does have a history of quadriplegia.  He has home health who assist him with his wound care daily and care attendants to provide care at other times.  He has a very pleasant demeanor and added to an is usually jovial in spite of his challenges.  He now has [facility].    We have been using powdered collagen on most of the wounds and they have made impressive progress with that product.   We will begin using collagen with silver is to keep the wound progressing.    **The sacral area appears to be closed and stable.  We are continue to apply silver alginate in this area for protection only.    **The right hip remains stable and clean.  This wound is nearly closed at the base, after several months.    **The left ischial wound continues to progress very well and at this point has a small area remaining open.  Silver nitrate was applied to the small hypergranulated area.  We will progressed to collagen with silver for the time being.    **The left upper medial thigh is a new wound that was discovered over the past two weeks by HH.  It appears as though there was a wound there previously although the patient does deny that.  The area remains stable today we will continue to use collagen with silver on it.    3/1/23 - Mr. Martinez returns today for followup for his multiple wounds on his lower extremities.  Three of his four wounds remain stable.  However, there is concern over the left ischial wound.  This wound appears to have been pulled wide open during the cleansing process by HH, resulting in deterioration and increase in depth of the wound.  This  will be address with .      3/9/23 - The patient returns today for followup.  All wounds are stable and/or improving at this visit.  ** the sacrum remains closed with no areas of concern at this time.  ** right hip is stable with only a very small remaining opening.  We will continue to put silver alginate over that small opening.  ** the left upper medial thigh is also stable.  There is a large origin present, we will work with that margin once the wound has closed.  We will continue to put silver alginate over the wound.  ** this visit the left ischial has improved over the last visit.  It has again begun to decrease in depth.  We will use collagen with silver daily on this wound.    3/15/23 - Mr. Martinez returns today with several wounds.  The majority of his wounds are stable and are improving, however, today the sacrum has reopened.  Although small, it is concerning as this area took a considerable time to close and must be washed carefully so that it does not quickly reopened to this stage IV pressure injury that it was previously.  We will continue to monitor for deterioration.    We will continue the current treatment plan on all wounds, adding silver alginate to the newly open sacrum.    3/29/23 - the patient returns to clinic today for followup on all wounds.  All of his wounds do appear stable with no deterioration.  We applied powdered collagen to the left ischial wound, and collagen with silver to all other wounds.  All dressings will remain in place until Sunday at which time home health will be allowed to change the dressing.  Of note, the patient did receive a jury summons for jury duty and did request a written excuse, which he was given in light of the fact that he is quadriplegia and does have multiple wounds making it impact go for him to be sequestered for jury duty for any number of days which would be the risk.    4/12/23 - Mr. Martinez returns today with all wounds stable.  He does have home  health who assists with his wound care.  We will switch back to collagen with silver due to the concern of HH regarding variation in dressing changes.     4/27/23 - Today all wounds remain stable.  However, the wound to the left ischial was bleeding a moderate amount.  Silver nitrate x 2 and pressure were used to achieve homeostasis, however, the wound bed/granular tissue is very friable.  This may be due to the use of collagen in the wound bed.  He does take Eliquis.  We will change to silver alginate for the time being to allow the wound bed to settle down.  Silver ag will be used on all wounds for consistency.     5/10/23 - Mr. Martinez returns today for follow up on several wounds.  All wounds do remain stable today, with slight decreases in size with all wounds with the exception of the sacrum.  The sacrum is measuring slightly larger today although there has been no increase in drainage and no S & S of infection.  We will continue to use silver alginate on all wounds.     5/24/23 - The patient returns today for several wounds.    Wound #1, Right posterior hip, has progressed exceptionally well and today is 0.1 x 0.1 x 0.1.  We will leave this wound open and continue to monitor.  Wound #2, left ischial, has deteriorated considerably since the last visit.  There is an increase in depth and the granular tissue is friable.  A large piece of wadded silver alginate was removed today by staff.  There is concern that this wound is being properly dressed by HH.  Wound #3, Sacrum, remains stable with little change.  It appears to be nearly closed.  Wound $4, left medial upper thigh, is stable with no significant change  We will continue to use silver alginate on all open wounds.     Review of Systems   Musculoskeletal:  Positive for gait problem.   Skin:  Positive for wound.   All other systems reviewed and are negative.      Objective:      Vitals:    05/24/23 1204   BP: (!) 140/81   Pulse: 60   Resp: 18       Physical  Exam  Vitals reviewed.   Constitutional:       Appearance: Normal appearance. He is normal weight.   HENT:      Head: Normocephalic.   Cardiovascular:      Rate and Rhythm: Normal rate.      Pulses: Normal pulses.   Pulmonary:      Effort: Pulmonary effort is normal.   Musculoskeletal:      Comments: quadraplegia   Skin:     General: Skin is warm and dry.   Neurological:      General: No focal deficit present.      Mental Status: He is alert and oriented to person, place, and time.   Psychiatric:         Mood and Affect: Mood normal.          Altered Skin Integrity 05/23/22 1337 Right posterior Hip #1 (Active)   05/23/22 1337   Altered Skin Integrity Present on Admission - Did Patient arrive to the hospital with altered skin?: yes   Side: Right   Orientation: posterior   Location: Hip   Wound Number: #1   Is this injury device related?: No   Primary Wound Type:    Description of Altered Skin Integrity:    Ankle-Brachial Index:    Pulses:    Removal Indication and Assessment:    Wound Outcome:    (Retired) Wound Length (cm):    (Retired) Wound Width (cm):    (Retired) Depth (cm):    Wound Description (Comments):    Removal Indications:    Description of Altered Skin Integrity Full thickness tissue loss. Subcutaneous fat may be visible but bone, tendon or muscle are not exposed 05/24/23 1205   Dressing Appearance Moist drainage;Intact 05/24/23 1205   Drainage Amount Moderate 05/24/23 1205   Drainage Characteristics/Odor Serosanguineous 05/24/23 1205   Appearance Intact;Pink;Slough 05/24/23 1205   Tissue loss description Full thickness 05/24/23 1205   Periwound Area Intact 05/24/23 1205   Wound Edges Open 05/24/23 1205   Wound Length (cm) 0.1 cm 05/24/23 1205   Wound Width (cm) 0.1 cm 05/24/23 1205   Wound Depth (cm) 0.2 cm 05/24/23 1205   Wound Volume (cm^3) 0.002 cm^3 05/24/23 1205   Wound Surface Area (cm^2) 0.01 cm^2 05/24/23 1205   Care Cleansed with:;Wound cleanser 05/24/23 1205   Dressing Applied 05/24/23 1205    Dressing Change Due 05/25/23 05/24/23 1205            Altered Skin Integrity 05/23/22 1353 Left Ischial tuberosity #2 (Active)   05/23/22 1353   Altered Skin Integrity Present on Admission - Did Patient arrive to the hospital with altered skin?: yes   Side: Left   Orientation:    Location: Ischial tuberosity   Wound Number: #2   Is this injury device related?: No   Primary Wound Type:    Description of Altered Skin Integrity:    Ankle-Brachial Index:    Pulses:    Removal Indication and Assessment:    Wound Outcome:    (Retired) Wound Length (cm):    (Retired) Wound Width (cm):    (Retired) Depth (cm):    Wound Description (Comments):    Removal Indications:    Description of Altered Skin Integrity Full thickness tissue loss. Subcutaneous fat may be visible but bone, tendon or muscle are not exposed 05/24/23 1205   Dressing Appearance Intact;Moist drainage 05/24/23 1205   Drainage Amount Moderate 05/24/23 1205   Drainage Characteristics/Odor Serosanguineous 05/24/23 1205   Appearance Intact;Red 05/24/23 1205   Tissue loss description Full thickness 05/24/23 1205   Periwound Area Intact 05/24/23 1205   Wound Edges Open 05/24/23 1205   Wound Length (cm) 1 cm 05/24/23 1205   Wound Width (cm) 3 cm 05/24/23 1205   Wound Depth (cm) 1.3 cm 05/24/23 1205   Wound Volume (cm^3) 3.9 cm^3 05/24/23 1205   Wound Surface Area (cm^2) 3 cm^2 05/24/23 1205   Care Wound cleanser;Cleansed with: 05/24/23 1205   Dressing Applied 05/24/23 1205   Dressing Change Due 05/25/23 05/24/23 1205            Altered Skin Integrity 11/28/22 1223 Sacral spine #3 Other (comment) Full thickness tissue loss. Subcutaneous fat may be visible but bone, tendon or muscle are not exposed (Active)   11/28/22 1223   Altered Skin Integrity Present on Admission - Did Patient arrive to the hospital with altered skin?: yes   Side:    Orientation:    Location: Sacral spine   Wound Number: #3   Is this injury device related?: No   Primary Wound Type: Other    Description of Altered Skin Integrity: Full thickness tissue loss. Subcutaneous fat may be visible but bone, tendon or muscle are not exposed   Ankle-Brachial Index:    Pulses:    Removal Indication and Assessment:    Wound Outcome:    (Retired) Wound Length (cm):    (Retired) Wound Width (cm):    (Retired) Depth (cm):    Wound Description (Comments):    Removal Indications:    Dressing Appearance Intact;Moist drainage 05/24/23 1205   Drainage Amount Moderate 05/24/23 1205   Drainage Characteristics/Odor Serosanguineous 05/24/23 1205   Appearance Intact;Pink 05/24/23 1205   Tissue loss description Full thickness 05/24/23 1205   Periwound Area Intact 05/24/23 1205   Wound Edges Defined;Open 05/24/23 1205   Wound Length (cm) 0.5 cm 05/24/23 1205   Wound Width (cm) 0.6 cm 05/24/23 1205   Wound Depth (cm) 0.2 cm 05/24/23 1205   Wound Volume (cm^3) 0.06 cm^3 05/24/23 1205   Wound Surface Area (cm^2) 0.3 cm^2 05/24/23 1205   Care Cleansed with:;Wound cleanser 05/24/23 1205   Dressing Applied 05/24/23 1205   Dressing Change Due 05/25/23 05/24/23 1205            Altered Skin Integrity 01/05/23 1351 Left upper;medial Thigh #4  Full thickness tissue loss. Subcutaneous fat may be visible but bone, tendon or muscle are not exposed (Active)   01/05/23 1351   Altered Skin Integrity Present on Admission - Did Patient arrive to the hospital with altered skin?: yes   Side: Left   Orientation: upper;medial   Location: Thigh   Wound Number: #4   Is this injury device related?: No   Primary Wound Type:    Description of Altered Skin Integrity: Full thickness tissue loss. Subcutaneous fat may be visible but bone, tendon or muscle are not exposed   Ankle-Brachial Index:    Pulses:    Removal Indication and Assessment:    Wound Outcome:    (Retired) Wound Length (cm):    (Retired) Wound Width (cm):    (Retired) Depth (cm):    Wound Description (Comments):    Removal Indications:    Description of Altered Skin Integrity Full thickness  tissue loss. Subcutaneous fat may be visible but bone, tendon or muscle are not exposed 05/24/23 1205   Dressing Appearance Intact;Moist drainage 05/24/23 1205   Drainage Amount Moderate 05/24/23 1205   Drainage Characteristics/Odor Serosanguineous;Bleeding controlled 05/24/23 1205   Appearance Intact;Red;Slough;Moist 05/24/23 1205   Tissue loss description Full thickness 05/24/23 1205   Periwound Area Intact 05/24/23 1205   Wound Edges Defined;Open 05/24/23 1205   Wound Length (cm) 2 cm 05/24/23 1205   Wound Width (cm) 0.8 cm 05/24/23 1205   Wound Depth (cm) 0.5 cm 05/24/23 1205   Wound Volume (cm^3) 0.8 cm^3 05/24/23 1205   Wound Surface Area (cm^2) 1.6 cm^2 05/24/23 1205   Care Cleansed with:;Wound cleanser 05/24/23 1205   Dressing Applied 05/24/23 1205   Dressing Change Due 05/25/23 05/24/23 1205           Left ischial tuberosity    Right posterior hip   Ischial: silver alginate, gentle border dressing  Hip: silver alginate, gentle border dressing          Sacrum      Left medial upper thigh  Sacrum: silver alginate, gentle border dressing  Thigh:  silver alginate, gentle border dressing  RC      Assessment:         ICD-10-CM ICD-9-CM   1. Stage IV pressure ulcer of right hip  L89.214 707.04     707.24   2. Pressure ulcer of left buttock, stage 4  L89.324 707.05     707.24   3. Pressure injury of left thigh, stage 3  L89.223 707.09     707.23   4. Pressure injury of sacral region, stage 4  L89.154 707.03     707.24   5. Quadriplegia, unspecified  G82.50 344.00             Procedures:     Mechanical debridement only.      Excisional debridement performed:  [] Yes [x] No   Selective debridement performed:  [] Yes [x] No   Mechanical debridement performed:  [x] Yes [] No   Silver nitrate applied:    [x] Yes [] No   Labs ordered this visit:   [] Yes [x] No   Imaging ordered this visit:   [] Yes [x] No   Tissue pathology and/or culture taken:  [] Yes [x] No       MEDICATIONS    Current Outpatient Medications:      apixaban (ELIQUIS) 5 mg Tab, Take 5 mg by mouth once daily at 6am., Disp: , Rfl:     lactulose (CHRONULAC) 10 gram/15 mL solution, TAKE 15 ML BY MOUTH TWICE DAILY, Disp: , Rfl:     LINZESS 145 mcg Cap capsule, Take 145 mcg by mouth once daily., Disp: , Rfl:    Review of patient's allergies indicates:   Allergen Reactions    Meropenem Anaphylaxis    Penicillins        [facility]   TIMES PER WEEK/DAYS:      Right hip: Cleanse with wound cleanser and apply silver alginate to the wound bed, cover with a gentle border - to be changed daily.   Left ischial: Cleanse with wound cleanser and apply silver alginate to the wound bed, cover with a gentle border - to be changed daily.   Sacrum:Cleanse with wound cleanser and apply silver alginate to the wound bed, cover with a gentle border - to be changed daily.   Left medial upper thigh: Cleanse with wound cleanser and apply silver alginate to the wound bed, cover with a gentle border - to be changed daily.        Follow up in about 2 weeks (around 6/7/2023).      Electronically signed:  Jessie Bah NP

## 2023-06-07 ENCOUNTER — HOSPITAL ENCOUNTER (OUTPATIENT)
Dept: WOUND CARE | Facility: HOSPITAL | Age: 48
Discharge: HOME | End: 2023-06-07
Attending: NURSE PRACTITIONER
Payer: MEDICARE

## 2023-06-07 VITALS
HEIGHT: 65 IN | DIASTOLIC BLOOD PRESSURE: 63 MMHG | WEIGHT: 170 LBS | BODY MASS INDEX: 28.32 KG/M2 | HEART RATE: 80 BPM | SYSTOLIC BLOOD PRESSURE: 104 MMHG | RESPIRATION RATE: 18 BRPM

## 2023-06-07 DIAGNOSIS — L89.324 PRESSURE ULCER OF LEFT BUTTOCK, STAGE 4: ICD-10-CM

## 2023-06-07 DIAGNOSIS — L89.154 PRESSURE INJURY OF SACRAL REGION, STAGE 4: ICD-10-CM

## 2023-06-07 DIAGNOSIS — G82.50 QUADRIPLEGIA, UNSPECIFIED: ICD-10-CM

## 2023-06-07 DIAGNOSIS — L89.214 STAGE IV PRESSURE ULCER OF RIGHT HIP: Primary | ICD-10-CM

## 2023-06-07 DIAGNOSIS — L89.223 PRESSURE INJURY OF LEFT THIGH, STAGE 3: ICD-10-CM

## 2023-06-07 PROCEDURE — 99212 OFFICE O/P EST SF 10 MIN: CPT

## 2023-06-07 PROCEDURE — 27000999 HC MEDICAL RECORD PHOTO DOCUMENTATION

## 2023-06-07 NOTE — PROGRESS NOTES
Subjective:       Patient ID: Diego Martinez Jr. is a 47 y.o. male.    Chief Complaint: Pressure Ulcer (Right hip - Stage 4 #1/Left ischial - Stage 4 #2/Sacrum- Stage 4 #3 (HEALED)/Left medial upper thigh- Stage 3 #4)    HPI   Mr. Martinez is a long time patient of [facility].    He comes via wheelchair with a care attendant present with him at all times.    He does have a history of quadriplegia.  He has home health who assist him with his wound care daily and care attendants to provide care at other times.  He has a very pleasant demeanor and added to an is usually jovial in spite of his challenges.  He now has [facility].    We have been using powdered collagen on most of the wounds and they have made impressive progress with that product.   We will begin using collagen with silver is to keep the wound progressing.    **The sacral area appears to be closed and stable.  We are continue to apply silver alginate in this area for protection only.    **The right hip remains stable and clean.  This wound is nearly closed at the base, after several months.    **The left ischial wound continues to progress very well and at this point has a small area remaining open.  Silver nitrate was applied to the small hypergranulated area.  We will progressed to collagen with silver for the time being.    **The left upper medial thigh is a new wound that was discovered over the past two weeks by HH.  It appears as though there was a wound there previously although the patient does deny that.  The area remains stable today we will continue to use collagen with silver on it.    3/1/23 - Mr. Martinez returns today for followup for his multiple wounds on his lower extremities.  Three of his four wounds remain stable.  However, there is concern over the left ischial wound.  This wound appears to have been pulled wide open during the cleansing process by HH, resulting in deterioration and increase in depth of the  wound.  This will be address with .      3/9/23 - The patient returns today for followup.  All wounds are stable and/or improving at this visit.  ** the sacrum remains closed with no areas of concern at this time.  ** right hip is stable with only a very small remaining opening.  We will continue to put silver alginate over that small opening.  ** the left upper medial thigh is also stable.  There is a large origin present, we will work with that margin once the wound has closed.  We will continue to put silver alginate over the wound.  ** this visit the left ischial has improved over the last visit.  It has again begun to decrease in depth.  We will use collagen with silver daily on this wound.    3/15/23 - Mr. Martinez returns today with several wounds.  The majority of his wounds are stable and are improving, however, today the sacrum has reopened.  Although small, it is concerning as this area took a considerable time to close and must be washed carefully so that it does not quickly reopened to this stage IV pressure injury that it was previously.  We will continue to monitor for deterioration.    We will continue the current treatment plan on all wounds, adding silver alginate to the newly open sacrum.    3/29/23 - the patient returns to clinic today for followup on all wounds.  All of his wounds do appear stable with no deterioration.  We applied powdered collagen to the left ischial wound, and collagen with silver to all other wounds.  All dressings will remain in place until Sunday at which time home health will be allowed to change the dressing.  Of note, the patient did receive a jury summons for jury duty and did request a written excuse, which he was given in light of the fact that he is quadriplegia and does have multiple wounds making it impact go for him to be sequestered for jury duty for any number of days which would be the risk.    4/12/23 - Mr. Martinez returns today with all wounds stable.  He  does have home health who assists with his wound care.  We will switch back to collagen with silver due to the concern of HH regarding variation in dressing changes.     4/27/23 - Today all wounds remain stable.  However, the wound to the left ischial was bleeding a moderate amount.  Silver nitrate x 2 and pressure were used to achieve homeostasis, however, the wound bed/granular tissue is very friable.  This may be due to the use of collagen in the wound bed.  He does take Eliquis.  We will change to silver alginate for the time being to allow the wound bed to settle down.  Silver ag will be used on all wounds for consistency.     5/10/23 - Mr. Martinez returns today for follow up on several wounds.  All wounds do remain stable today, with slight decreases in size with all wounds with the exception of the sacrum.  The sacrum is measuring slightly larger today although there has been no increase in drainage and no S & S of infection.  We will continue to use silver alginate on all wounds.     5/24/23 - The patient returns today for several wounds.    Wound #1, Right posterior hip, has progressed exceptionally well and today is 0.1 x 0.1 x 0.1.  We will leave this wound open and continue to monitor.  Wound #2, left ischial, has deteriorated considerably since the last visit.  There is an increase in depth and the granular tissue is friable.  A large piece of wadded silver alginate was removed today by staff.  There is concern that this wound is being properly dressed by HH.  Wound #3, Sacrum, remains stable with little change.  It appears to be nearly closed.  Wound $4, left medial upper thigh, is stable with no significant change  We will continue to use silver alginate on all open wounds.     6/7/23 - Mr. Martinez returns today for followup on several wounds.  Some have done except personally well recently which is correct it to his home health.  We are able to close to wounds today.  Wound #1, Right posterior hip,  remains stable and today it is still 0.1 x 0.1 x 0.1.  We will leave this wound open 1 additional week and continue to monitor.  Wound #2, left ischial, has improved since the last visit.  There is a decrease in depth and the granular tissue is again stable.  The patient was educated that he should reminder the home health nurses to open this wound very carefully and gently to avoid ripping the tissue open again since it is finally making excellent progress.  Wound #3, Sacrum, this wound, today, we are able to close.  Wound #4, left medial upper thigh, is stable with no significant change although it is slightly larger.  We will continue to use silver alginate on all open wounds.      Review of Systems   Musculoskeletal:  Positive for gait problem.   Skin:  Positive for wound.   All other systems reviewed and are negative.      Objective:      Vitals:    06/07/23 1117   BP: 104/63   Pulse: 80   Resp: 18       Physical Exam  Vitals reviewed.   Constitutional:       Appearance: Normal appearance. He is normal weight.   HENT:      Head: Normocephalic.   Cardiovascular:      Rate and Rhythm: Normal rate.      Pulses: Normal pulses.   Pulmonary:      Effort: Pulmonary effort is normal.   Musculoskeletal:      Comments: quadraplegia   Skin:     General: Skin is warm and dry.   Neurological:      General: No focal deficit present.      Mental Status: He is alert and oriented to person, place, and time.   Psychiatric:         Mood and Affect: Mood normal.          Altered Skin Integrity 05/23/22 1337 Right posterior Hip #1 (Active)   05/23/22 1337   Altered Skin Integrity Present on Admission - Did Patient arrive to the hospital with altered skin?: yes   Side: Right   Orientation: posterior   Location: Hip   Wound Number: #1   Is this injury device related?: No   Primary Wound Type:    Description of Altered Skin Integrity:    Ankle-Brachial Index:    Pulses:    Removal Indication and Assessment:    Wound Outcome:     (Retired) Wound Length (cm):    (Retired) Wound Width (cm):    (Retired) Depth (cm):    Wound Description (Comments):    Removal Indications:    Dressing Appearance Moist drainage 06/07/23 1242   Drainage Amount Moderate 06/07/23 1242   Drainage Characteristics/Odor Serosanguineous 06/07/23 1242   Appearance Pink;Moist 06/07/23 1242   Tissue loss description Full thickness 06/07/23 1242   Periwound Area Intact 06/07/23 1242   Wound Edges Open 06/07/23 1242   Wound Length (cm) 0.1 cm 06/07/23 1242   Wound Width (cm) 0.1 cm 06/07/23 1242   Wound Depth (cm) 0.2 cm 06/07/23 1242   Wound Volume (cm^3) 0.002 cm^3 06/07/23 1242   Wound Surface Area (cm^2) 0.01 cm^2 06/07/23 1242   Care Cleansed with:;Wound cleanser 06/07/23 1242   Dressing Applied 06/07/23 1242   Dressing Change Due 06/08/23 06/07/23 1242            Altered Skin Integrity 05/23/22 1353 Left Ischial tuberosity #2 (Active)   05/23/22 1353   Altered Skin Integrity Present on Admission - Did Patient arrive to the hospital with altered skin?: yes   Side: Left   Orientation:    Location: Ischial tuberosity   Wound Number: #2   Is this injury device related?: No   Primary Wound Type:    Description of Altered Skin Integrity:    Ankle-Brachial Index:    Pulses:    Removal Indication and Assessment:    Wound Outcome:    (Retired) Wound Length (cm):    (Retired) Wound Width (cm):    (Retired) Depth (cm):    Wound Description (Comments):    Removal Indications:    Dressing Appearance Moist drainage 06/07/23 1242   Drainage Amount Moderate 06/07/23 1242   Drainage Characteristics/Odor Serosanguineous 06/07/23 1242   Appearance Pink;Moist 06/07/23 1242   Tissue loss description Full thickness 06/07/23 1242   Periwound Area Intact 06/07/23 1242   Wound Edges Open 06/07/23 1242   Wound Length (cm) 1 cm 06/07/23 1242   Wound Width (cm) 2 cm 06/07/23 1242   Wound Depth (cm) 0.8 cm 06/07/23 1242   Wound Volume (cm^3) 1.6 cm^3 06/07/23 1242   Wound Surface Area (cm^2) 2  cm^2 06/07/23 1242   Care Cleansed with:;Wound cleanser 06/07/23 1242   Dressing Applied 06/07/23 1242   Dressing Change Due 06/08/23 06/07/23 1242            Altered Skin Integrity 01/05/23 1351 Left upper;medial Thigh #4  Full thickness tissue loss. Subcutaneous fat may be visible but bone, tendon or muscle are not exposed (Active)   01/05/23 1351   Altered Skin Integrity Present on Admission - Did Patient arrive to the hospital with altered skin?: yes   Side: Left   Orientation: upper;medial   Location: Thigh   Wound Number: #4   Is this injury device related?: No   Primary Wound Type:    Description of Altered Skin Integrity: Full thickness tissue loss. Subcutaneous fat may be visible but bone, tendon or muscle are not exposed   Ankle-Brachial Index:    Pulses:    Removal Indication and Assessment:    Wound Outcome:    (Retired) Wound Length (cm):    (Retired) Wound Width (cm):    (Retired) Depth (cm):    Wound Description (Comments):    Removal Indications:    Dressing Appearance Moist drainage 06/07/23 1242   Drainage Amount Moderate 06/07/23 1242   Drainage Characteristics/Odor Serosanguineous 06/07/23 1242   Appearance Pink;Moist 06/07/23 1242   Tissue loss description Full thickness 06/07/23 1242   Periwound Area Intact 06/07/23 1242   Wound Edges Open 06/07/23 1242   Wound Length (cm) 2.5 cm 06/07/23 1242   Wound Width (cm) 1 cm 06/07/23 1242   Wound Depth (cm) 0.5 cm 06/07/23 1242   Wound Volume (cm^3) 1.25 cm^3 06/07/23 1242   Wound Surface Area (cm^2) 2.5 cm^2 06/07/23 1242   Care Cleansed with:;Wound cleanser 06/07/23 1242   Dressing Applied 06/07/23 1242   Dressing Change Due 06/08/23 06/07/23 1242             Left ischial tuberosity    Right posterior hip   Ischial: silver alginate, gentle border dressing  Hip: silver alginate, gentle border dressing          Sacrum (closed)     Left medial upper thigh    Thigh:  silver alginate, gentle border dressing  RC      Assessment:         ICD-10-CM ICD-9-CM    1. Stage IV pressure ulcer of right hip  L89.214 707.04     707.24   2. Pressure ulcer of left buttock, stage 4  L89.324 707.05     707.24   3. Pressure injury of left thigh, stage 3  L89.223 707.09     707.23   4. Pressure injury of sacral region, stage 4  L89.154 707.03     707.24   5. Quadriplegia, unspecified  G82.50 344.00             Procedures:     Mechanical debridement only.      Excisional debridement performed:  [] Yes [x] No   Selective debridement performed:  [] Yes [x] No   Mechanical debridement performed:  [x] Yes [] No   Silver nitrate applied:    [x] Yes [] No   Labs ordered this visit:   [] Yes [x] No   Imaging ordered this visit:   [] Yes [x] No   Tissue pathology and/or culture taken:  [] Yes [x] No       MEDICATIONS    Current Outpatient Medications:     apixaban (ELIQUIS) 5 mg Tab, Take 5 mg by mouth once daily at 6am., Disp: , Rfl:     lactulose (CHRONULAC) 10 gram/15 mL solution, TAKE 15 ML BY MOUTH TWICE DAILY, Disp: , Rfl:     LINZESS 145 mcg Cap capsule, Take 145 mcg by mouth once daily., Disp: , Rfl:    Review of patient's allergies indicates:   Allergen Reactions    Meropenem Anaphylaxis    Penicillins        HOME HEALTH AGENCY:  [facility]   TIMES PER WEEK/DAYS:      Left ischial is much improved this week.  Thank you.  Please continue to very GENTLY open and clean this wound to avoid splitting of the tissue as we strive to allow this wound to close.  The sacrum is now closed!    ORDERS:  Right hip: Cleanse with wound cleanser and apply silver alginate to the wound bed, cover with a gentle border - to be changed daily.   Left ischial: Cleanse with wound cleanser and apply silver alginate to the wound bed, cover with a gentle border - to be changed daily.   Left medial upper thigh: Cleanse with wound cleanser and apply silver alginate to the wound bed, cover with a gentle border - to be changed daily.        Follow up in about 3 weeks (around 6/28/2023).      Electronically  signed:  Jessie Bah NP

## 2023-07-12 ENCOUNTER — HOSPITAL ENCOUNTER (OUTPATIENT)
Dept: WOUND CARE | Facility: HOSPITAL | Age: 48
Discharge: HOME | End: 2023-07-12
Attending: NURSE PRACTITIONER
Payer: MEDICARE

## 2023-07-12 VITALS
SYSTOLIC BLOOD PRESSURE: 108 MMHG | HEIGHT: 65 IN | RESPIRATION RATE: 18 BRPM | HEART RATE: 78 BPM | DIASTOLIC BLOOD PRESSURE: 70 MMHG | WEIGHT: 170 LBS | BODY MASS INDEX: 28.32 KG/M2

## 2023-07-12 DIAGNOSIS — S91.209A NAIL AVULSION OF TOE, INITIAL ENCOUNTER: ICD-10-CM

## 2023-07-12 DIAGNOSIS — G82.50 QUADRIPLEGIA, UNSPECIFIED: ICD-10-CM

## 2023-07-12 DIAGNOSIS — L89.214 STAGE IV PRESSURE ULCER OF RIGHT HIP: Primary | ICD-10-CM

## 2023-07-12 DIAGNOSIS — L89.324 PRESSURE ULCER OF LEFT BUTTOCK, STAGE 4: ICD-10-CM

## 2023-07-12 DIAGNOSIS — L89.223 PRESSURE INJURY OF LEFT THIGH, STAGE 3: ICD-10-CM

## 2023-07-12 PROCEDURE — 99212 OFFICE O/P EST SF 10 MIN: CPT | Mod: 25

## 2023-07-12 PROCEDURE — 11719 TRIM NAIL(S) ANY NUMBER: CPT | Mod: 59

## 2023-07-12 PROCEDURE — 11730 AVULSION NAIL PLATE SIMPLE 1: CPT

## 2023-07-12 PROCEDURE — 27000999 HC MEDICAL RECORD PHOTO DOCUMENTATION

## 2023-07-12 PROCEDURE — 97597 DBRDMT OPN WND 1ST 20 CM/<: CPT

## 2023-07-12 NOTE — PROCEDURES
"Debridement    Date/Time: 7/12/2023 11:00 AM  Performed by: Jessie Bah NP  Authorized by: Jessie Bah NP     Time out: Immediately prior to procedure a "time out" was called to verify the correct patient, procedure, equipment, support staff and site/side marked as required.    Consent Done?:  Yes (Verbal)    Preparation: Patient was prepped and draped with clean technique    Local anesthesia used?: No      Wound Details:    Location:  Left leg (medial upper thigh)    Type of Debridement:  Non-excisional       Length (cm):  2.3       Area (sq cm):  2.3       Width (cm):  1       Percent Debrided (%):  100       Depth (cm):  0.2       Total Area Debrided (sq cm):  2.3    Depth of debridement:  Epidermis/Dermis    Devitalized tissue debrided:  Biofilm, Exudate, Fibrin and Slough    Debridement - 1st Wound - Instruments: Dermal.     Silver nitrate applied in treatment of hypergranulated tissue.  "

## 2023-07-12 NOTE — PROGRESS NOTES
Home Health: [facility]   Smoker   [] Yes  [x] No  Diabetic   [] Yes   [x] No  Low air loss mattress [x] Yes [] No   Is the patient eligible for a graft, and/or currently grafting?  [] Yes [x] No    If so, which one/size?       Subjective:       Patient ID: Diego Martinez Jr. is a 48 y.o. male.    Chief Complaint: Pressure Ulcer (Right hip - Stage 4 #1/Left ischial - Stage 4 #2/Left medial upper thigh- Stage 3 #4)    HPI   Mr. Martinez is a long time patient of [facility].    He comes via wheelchair with a care attendant present with him at all times.    He does have a history of quadriplegia.  He has home health who assist him with his wound care daily and care attendants to provide care at other times.  He has a very pleasant demeanor and added to an is usually jovial in spite of his challenges.  He now has mygola.    We have been using powdered collagen on most of the wounds and they have made impressive progress with that product.   We will begin using collagen with silver is to keep the wound progressing.    **The sacral area appears to be closed and stable.  We are continue to apply silver alginate in this area for protection only.    **The right hip remains stable and clean.  This wound is nearly closed at the base, after several months.    **The left ischial wound continues to progress very well and at this point has a small area remaining open.  Silver nitrate was applied to the small hypergranulated area.  We will progressed to collagen with silver for the time being.    **The left upper medial thigh is a new wound that was discovered over the past two weeks by HH.  It appears as though there was a wound there previously although the patient does deny that.  The area remains stable today we will continue to use collagen with silver on it.    3/1/23 - Mr. Martinez returns today for followup for his multiple wounds on his lower extremities.  Three of his four wounds remain  stable.  However, there is concern over the left ischial wound.  This wound appears to have been pulled wide open during the cleansing process by , resulting in deterioration and increase in depth of the wound.  This will be address with .      3/9/23 - The patient returns today for followup.  All wounds are stable and/or improving at this visit.  ** the sacrum remains closed with no areas of concern at this time.  ** right hip is stable with only a very small remaining opening.  We will continue to put silver alginate over that small opening.  ** the left upper medial thigh is also stable.  There is a large origin present, we will work with that margin once the wound has closed.  We will continue to put silver alginate over the wound.  ** this visit the left ischial has improved over the last visit.  It has again begun to decrease in depth.  We will use collagen with silver daily on this wound.    3/15/23 - Mr. Martinez returns today with several wounds.  The majority of his wounds are stable and are improving, however, today the sacrum has reopened.  Although small, it is concerning as this area took a considerable time to close and must be washed carefully so that it does not quickly reopened to this stage IV pressure injury that it was previously.  We will continue to monitor for deterioration.    We will continue the current treatment plan on all wounds, adding silver alginate to the newly open sacrum.    3/29/23 - the patient returns to clinic today for followup on all wounds.  All of his wounds do appear stable with no deterioration.  We applied powdered collagen to the left ischial wound, and collagen with silver to all other wounds.  All dressings will remain in place until Sunday at which time home health will be allowed to change the dressing.  Of note, the patient did receive a jury summons for jury duty and did request a written excuse, which he was given in light of the fact that he is quadriplegia  and does have multiple wounds making it impact go for him to be sequestered for jury duty for any number of days which would be the risk.    4/12/23 - Mr. Martinez returns today with all wounds stable.  He does have home health who assists with his wound care.  We will switch back to collagen with silver due to the concern of HH regarding variation in dressing changes.     4/27/23 - Today all wounds remain stable.  However, the wound to the left ischial was bleeding a moderate amount.  Silver nitrate x 2 and pressure were used to achieve homeostasis, however, the wound bed/granular tissue is very friable.  This may be due to the use of collagen in the wound bed.  He does take Eliquis.  We will change to silver alginate for the time being to allow the wound bed to settle down.  Silver ag will be used on all wounds for consistency.     5/10/23 - Mr. Martinez returns today for follow up on several wounds.  All wounds do remain stable today, with slight decreases in size with all wounds with the exception of the sacrum.  The sacrum is measuring slightly larger today although there has been no increase in drainage and no S & S of infection.  We will continue to use silver alginate on all wounds.     5/24/23 - The patient returns today for several wounds.    Wound #1, Right posterior hip, has progressed exceptionally well and today is 0.1 x 0.1 x 0.1.  We will leave this wound open and continue to monitor.  Wound #2, left ischial, has deteriorated considerably since the last visit.  There is an increase in depth and the granular tissue is friable.  A large piece of wadded silver alginate was removed today by staff.  There is concern that this wound is being properly dressed by HH.  Wound #3, Sacrum, remains stable with little change.  It appears to be nearly closed.  Wound $4, left medial upper thigh, is stable with no significant change  We will continue to use silver alginate on all open wounds.     6/7/23 - Mr. Martinez  returns today for followup on several wounds.  Some have done except personally well recently which is correct it to his home health.  We are able to close to wounds today.  Wound #1, Right posterior hip, remains stable and today it is still 0.1 x 0.1 x 0.1.  We will leave this wound open 1 additional week and continue to monitor.  Wound #2, left ischial, has improved since the last visit.  There is a decrease in depth and the granular tissue is again stable.  The patient was educated that he should reminder the home health nurses to open this wound very carefully and gently to avoid ripping the tissue open again since it is finally making excellent progress.  Wound #3, Sacrum, this wound, today, we are able to close.  Wound #4, left medial upper thigh, is stable with no significant change although it is slightly larger.  We will continue to use silver alginate on all open wounds.    7/10/23 - Mr. Martinez was last seen on 6/7/23.  Today he returns with is wounds stable:  Wound #1 - Right posterior hip, stable, no change since the last visit, no S & S of infection, no increase in drainage.  Wound #2 - Left ischial, continue to improve, with a very slight decrease in size this week.  Tissue is no longer friable.  Silver nitrate was applied, we will continue to use silver alginate.  Wound #3 - sacrum, reassessed, remains closed  Wound #4 - left medial upper thigh - this wound was debrided, wound margins are rolling and slightly macerated.  Silver nitrate was applied.  We will continue to use silver alginate.    The patient also reported today that he injured his right great toe nail.  Today it was assessed and there was a considerable amount of dried blood noted.  The nail was removed (90%) with a nipper.  We will use Betadine for one week.     Review of Systems   Musculoskeletal:  Positive for gait problem.   Skin:  Positive for wound.   All other systems reviewed and are negative.      Objective:      Vitals:     07/12/23 1254   BP: 108/70   Pulse: 78   Resp: 18       Physical Exam  Vitals reviewed.   Constitutional:       Appearance: Normal appearance. He is normal weight.   HENT:      Head: Normocephalic.   Cardiovascular:      Rate and Rhythm: Normal rate.      Pulses: Normal pulses.   Pulmonary:      Effort: Pulmonary effort is normal.   Musculoskeletal:      Comments: quadraplegia   Skin:     General: Skin is warm and dry.   Neurological:      General: No focal deficit present.      Mental Status: He is alert and oriented to person, place, and time.   Psychiatric:         Mood and Affect: Mood normal.          Altered Skin Integrity 05/23/22 1337 Right posterior Hip #1 (Active)   05/23/22 1337   Altered Skin Integrity Present on Admission - Did Patient arrive to the hospital with altered skin?: yes   Side: Right   Orientation: posterior   Location: Hip   Wound Number: #1   Is this injury device related?: No   Primary Wound Type:    Description of Altered Skin Integrity:    Ankle-Brachial Index:    Pulses:    Removal Indication and Assessment:    Wound Outcome:    (Retired) Wound Length (cm):    (Retired) Wound Width (cm):    (Retired) Depth (cm):    Wound Description (Comments):    Removal Indications:    Dressing Appearance Moist drainage 07/12/23 1245   Drainage Amount Moderate 07/12/23 1245   Drainage Characteristics/Odor Serosanguineous 07/12/23 1245   Appearance Moist 07/12/23 1245   Tissue loss description Full thickness 07/12/23 1245   Periwound Area Intact 07/12/23 1245   Wound Edges Open 07/12/23 1245   Wound Length (cm) 0.1 cm 07/12/23 1245   Wound Width (cm) 0.1 cm 07/12/23 1245   Wound Depth (cm) 0.2 cm 07/12/23 1245   Wound Volume (cm^3) 0.002 cm^3 07/12/23 1245   Wound Surface Area (cm^2) 0.01 cm^2 07/12/23 1245   Care Cleansed with:;Wound cleanser 07/12/23 1245   Dressing Applied 07/12/23 1245   Dressing Change Due 07/13/23 07/12/23 1245            Altered Skin Integrity 05/23/22 1353 Left Ischial  tuberosity #2 (Active)   05/23/22 1353   Altered Skin Integrity Present on Admission - Did Patient arrive to the hospital with altered skin?: yes   Side: Left   Orientation:    Location: Ischial tuberosity   Wound Number: #2   Is this injury device related?: No   Primary Wound Type:    Description of Altered Skin Integrity:    Ankle-Brachial Index:    Pulses:    Removal Indication and Assessment:    Wound Outcome:    (Retired) Wound Length (cm):    (Retired) Wound Width (cm):    (Retired) Depth (cm):    Wound Description (Comments):    Removal Indications:    Dressing Appearance Moist drainage 07/12/23 1245   Drainage Amount Moderate 07/12/23 1245   Drainage Characteristics/Odor Serosanguineous 07/12/23 1245   Appearance Pink;Moist 07/12/23 1245   Tissue loss description Full thickness 07/12/23 1245   Periwound Area Intact 07/12/23 1245   Wound Edges Open 07/12/23 1245   Wound Length (cm) 0.5 cm 07/12/23 1245   Wound Width (cm) 0.5 cm 07/12/23 1245   Wound Depth (cm) 0.2 cm 07/12/23 1245   Wound Volume (cm^3) 0.05 cm^3 07/12/23 1245   Wound Surface Area (cm^2) 0.25 cm^2 07/12/23 1245   Care Cleansed with:;Wound cleanser 07/12/23 1245   Dressing Applied 07/12/23 1245   Dressing Change Due 07/13/23 07/12/23 1245            Altered Skin Integrity 01/05/23 1351 Left upper;medial Thigh #4  Full thickness tissue loss. Subcutaneous fat may be visible but bone, tendon or muscle are not exposed (Active)   01/05/23 1351   Altered Skin Integrity Present on Admission - Did Patient arrive to the hospital with altered skin?: yes   Side: Left   Orientation: upper;medial   Location: Thigh   Wound Number: #4   Is this injury device related?: No   Primary Wound Type:    Description of Altered Skin Integrity: Full thickness tissue loss. Subcutaneous fat may be visible but bone, tendon or muscle are not exposed   Ankle-Brachial Index:    Pulses:    Removal Indication and Assessment:    Wound Outcome:    (Retired) Wound Length (cm):   "  (Retired) Wound Width (cm):    (Retired) Depth (cm):    Wound Description (Comments):    Removal Indications:    Dressing Appearance Moist drainage 07/12/23 1245   Drainage Amount Moderate 07/12/23 1245   Drainage Characteristics/Odor Serosanguineous 07/12/23 1245   Appearance Pink;Moist 07/12/23 1245   Tissue loss description Full thickness 07/12/23 1245   Periwound Area Intact 07/12/23 1245   Wound Edges Open 07/12/23 1245   Wound Length (cm) 2.3 cm 07/12/23 1245   Wound Width (cm) 1 cm 07/12/23 1245   Wound Depth (cm) 0.2 cm 07/12/23 1245   Wound Volume (cm^3) 0.46 cm^3 07/12/23 1245   Wound Surface Area (cm^2) 2.3 cm^2 07/12/23 1245   Care Cleansed with:;Wound cleanser 07/12/23 1245   Dressing Applied 07/12/23 1245   Dressing Change Due 07/13/23 07/12/23 1245       07/12/23                Left ischial                                                 Scrotum (just monitoring)   silver alginate, gentle border                   Left upper medial thigh                                Right hip   Both:  silver alginate, gentle border      Right great toe - removed   Betadine        Assessment:         ICD-10-CM ICD-9-CM   1. Stage IV pressure ulcer of right hip  L89.214 707.04     707.24   2. Pressure ulcer of left buttock, stage 4  L89.324 707.05     707.24   3. Pressure injury of left thigh, stage 3  L89.223 707.09     707.23   4. Quadriplegia, unspecified  G82.50 344.00   5. Nail avulsion of toe, initial encounter  S91.209A 893.0               Procedures:     Debridement     Date/Time: 7/12/2023 11:00 AM  Performed by: Jessie Bah NP  Authorized by: Jessie Bah NP      Time out: Immediately prior to procedure a "time out" was called to verify the correct patient, procedure, equipment, support staff and site/side marked as required.     Consent Done?:  Yes (Verbal)     Preparation: Patient was prepped and draped with clean technique    Local anesthesia used?: No       Wound Details:    Location:  Left " leg (medial upper thigh)    Type of Debridement:  Non-excisional       Length (cm):  2.3       Area (sq cm):  2.3       Width (cm):  1       Percent Debrided (%):  100       Depth (cm):  0.2       Total Area Debrided (sq cm):  2.3    Depth of debridement:  Epidermis/Dermis    Devitalized tissue debrided:  Biofilm, Exudate, Fibrin and Slough    Debridement - 1st Wound - Instruments: Dermal.      Silver nitrate applied in treatment of hypergranulated tissue.  Nail removal, right great toe    Excisional debridement performed:  [] Yes [x] No   Selective debridement performed:  [x] Yes [] No   Mechanical debridement performed:  [] Yes [x] No   Silver nitrate applied:    [x] Yes [] No   Labs ordered this visit:   [] Yes [x] No   Imaging ordered this visit:   [] Yes [x] No   Tissue pathology and/or culture taken:  [] Yes [x] No       MEDICATIONS    Current Outpatient Medications:     apixaban (ELIQUIS) 5 mg Tab, Take 5 mg by mouth once daily at 6am., Disp: , Rfl:     LINZESS 145 mcg Cap capsule, Take 145 mcg by mouth once daily., Disp: , Rfl:    Review of patient's allergies indicates:   Allergen Reactions    Meropenem Anaphylaxis    Penicillins        HOME HEALTH AGENCY:  [facility]   TIMES PER WEEK/DAYS:  daily     Left ischial is much improved this week.  Thank you.  Please continue to very GENTLY open and clean this wound to avoid splitting of the tissue as we strive to allow this wound to close.  The sacrum is now closed!    ORDERS:  Right hip: Cleanse with wound cleanser and apply silver alginate to the wound bed, cover with a gentle border - to be changed daily.   Left ischial: Cleanse with wound cleanser and apply silver alginate to the wound bed, cover with a gentle border - to be changed daily.   Left medial upper thigh: Cleanse with wound cleanser and apply silver alginate to the wound bed, cover with a gentle border - to be changed daily.        Follow up in about 2 weeks (around 7/26/2023).       Electronically signed:  Jessie Bah NP

## 2023-07-26 ENCOUNTER — HOSPITAL ENCOUNTER (OUTPATIENT)
Dept: WOUND CARE | Facility: HOSPITAL | Age: 48
Discharge: HOME | End: 2023-07-26
Attending: NURSE PRACTITIONER
Payer: MEDICARE

## 2023-07-26 VITALS
RESPIRATION RATE: 18 BRPM | HEART RATE: 52 BPM | HEIGHT: 65 IN | DIASTOLIC BLOOD PRESSURE: 75 MMHG | BODY MASS INDEX: 28.32 KG/M2 | SYSTOLIC BLOOD PRESSURE: 148 MMHG | WEIGHT: 170 LBS

## 2023-07-26 DIAGNOSIS — L89.223 PRESSURE INJURY OF LEFT THIGH, STAGE 3: ICD-10-CM

## 2023-07-26 DIAGNOSIS — L89.214 STAGE IV PRESSURE ULCER OF RIGHT HIP: Primary | ICD-10-CM

## 2023-07-26 DIAGNOSIS — L89.324 PRESSURE ULCER OF LEFT BUTTOCK, STAGE 4: ICD-10-CM

## 2023-07-26 DIAGNOSIS — L89.154 PRESSURE INJURY OF SACRAL REGION, STAGE 4: ICD-10-CM

## 2023-07-26 DIAGNOSIS — G82.50 QUADRIPLEGIA, UNSPECIFIED: ICD-10-CM

## 2023-07-26 PROCEDURE — 97597 DBRDMT OPN WND 1ST 20 CM/<: CPT

## 2023-07-26 PROCEDURE — 99213 OFFICE O/P EST LOW 20 MIN: CPT | Mod: 25

## 2023-07-26 PROCEDURE — 27000999 HC MEDICAL RECORD PHOTO DOCUMENTATION

## 2023-07-26 NOTE — PROCEDURES
"Debridement    Date/Time: 7/26/2023 10:59 AM  Performed by: Jessie Bah NP  Authorized by: Jessie Bah NP     Time out: Immediately prior to procedure a "time out" was called to verify the correct patient, procedure, equipment, support staff and site/side marked as required.    Consent Done?:  Yes (Verbal)    Preparation: Patient was prepped and draped with clean technique    Local anesthesia used?: No      Wound Details:    Location:  Left leg    Type of Debridement:  Non-excisional (thigh)       Length (cm):  2       Area (sq cm):  2       Width (cm):  1       Percent Debrided (%):  100       Depth (cm):  0.2       Total Area Debrided (sq cm):  2    Depth of debridement:  Epidermis/Dermis    Devitalized tissue debrided:  Biofilm, Callus, Exudate and Fibrin    Instruments:  Curette (Dermal)  Bleeding:  None  Patient tolerance:  Patient tolerated the procedure well with no immediate complications     Silver nitrate applied in treatment of hypergranulated tissue.  "

## 2023-07-26 NOTE — PROGRESS NOTES
[facility] [employer]   Smoker   [] Yes  [x] No  Diabetic   [] Yes   [x] No  Low air loss mattress [x] Yes [] No   Is the patient eligible for a graft, and/or currently grafting?  [] Yes [x] No    If so, which one/size?       Subjective:       Patient ID: Diego Martinez Jr. is a 48 y.o. male.    Chief Complaint: Pressure Ulcer (Right hip - Stage 4 /Left ischial - Stage 4 /Left medial upper thigh- Stage 3)    HPI   Mr. Martinez is a long time patient of [facility].    He comes via wheelchair with a care attendant present with him at all times.    He does have a history of quadriplegia.  He has home health who assist him with his wound care daily and care attendants to provide care at other times.  He has a very pleasant demeanor and added to an is usually jovial in spite of his challenges.  He now has [employer].    We have been using powdered collagen on most of the wounds and they have made impressive progress with that product.   We will begin using collagen with silver is to keep the wound progressing.    **The sacral area appears to be closed and stable.  We are continue to apply silver alginate in this area for protection only.    **The right hip remains stable and clean.  This wound is nearly closed at the base, after several months.    **The left ischial wound continues to progress very well and at this point has a small area remaining open.  Silver nitrate was applied to the small hypergranulated area.  We will progressed to collagen with silver for the time being.    **The left upper medial thigh is a new wound that was discovered over the past two weeks by .  It appears as though there was a wound there previously although the patient does deny that.  The area remains stable today we will continue to use collagen with silver on it.    3/1/23 - Mr. Martinez returns today for followup for his multiple wounds on his lower extremities.  Three of his four wounds remain stable.   However, there is concern over the left ischial wound.  This wound appears to have been pulled wide open during the cleansing process by , resulting in deterioration and increase in depth of the wound.  This will be address with .      3/9/23 - The patient returns today for followup.  All wounds are stable and/or improving at this visit.  ** the sacrum remains closed with no areas of concern at this time.  ** right hip is stable with only a very small remaining opening.  We will continue to put silver alginate over that small opening.  ** the left upper medial thigh is also stable.  There is a large origin present, we will work with that margin once the wound has closed.  We will continue to put silver alginate over the wound.  ** this visit the left ischial has improved over the last visit.  It has again begun to decrease in depth.  We will use collagen with silver daily on this wound.    3/15/23 - Mr. Martinez returns today with several wounds.  The majority of his wounds are stable and are improving, however, today the sacrum has reopened.  Although small, it is concerning as this area took a considerable time to close and must be washed carefully so that it does not quickly reopened to this stage IV pressure injury that it was previously.  We will continue to monitor for deterioration.    We will continue the current treatment plan on all wounds, adding silver alginate to the newly open sacrum.    3/29/23 - the patient returns to clinic today for followup on all wounds.  All of his wounds do appear stable with no deterioration.  We applied powdered collagen to the left ischial wound, and collagen with silver to all other wounds.  All dressings will remain in place until Sunday at which time home health will be allowed to change the dressing.  Of note, the patient did receive a jury summons for jury duty and did request a written excuse, which he was given in light of the fact that he is quadriplegia and does  have multiple wounds making it impact go for him to be sequestered for jury duty for any number of days which would be the risk.    4/12/23 - Mr. Martinez returns today with all wounds stable.  He does have home health who assists with his wound care.  We will switch back to collagen with silver due to the concern of HH regarding variation in dressing changes.     4/27/23 - Today all wounds remain stable.  However, the wound to the left ischial was bleeding a moderate amount.  Silver nitrate x 2 and pressure were used to achieve homeostasis, however, the wound bed/granular tissue is very friable.  This may be due to the use of collagen in the wound bed.  He does take Eliquis.  We will change to silver alginate for the time being to allow the wound bed to settle down.  Silver ag will be used on all wounds for consistency.     5/10/23 - Mr. Martinez returns today for follow up on several wounds.  All wounds do remain stable today, with slight decreases in size with all wounds with the exception of the sacrum.  The sacrum is measuring slightly larger today although there has been no increase in drainage and no S & S of infection.  We will continue to use silver alginate on all wounds.     5/24/23 - The patient returns today for several wounds.    Wound #1, Right posterior hip, has progressed exceptionally well and today is 0.1 x 0.1 x 0.1.  We will leave this wound open and continue to monitor.  Wound #2, left ischial, has deteriorated considerably since the last visit.  There is an increase in depth and the granular tissue is friable.  A large piece of wadded silver alginate was removed today by staff.  There is concern that this wound is being properly dressed by HH.  Wound #3, Sacrum, remains stable with little change.  It appears to be nearly closed.  Wound $4, left medial upper thigh, is stable with no significant change  We will continue to use silver alginate on all open wounds.     6/7/23 - Mr. Martinez returns today  for followup on several wounds.  Some have done except personally well recently which is correct it to his home health.  We are able to close to wounds today.  Wound #1, Right posterior hip, remains stable and today it is still 0.1 x 0.1 x 0.1.  We will leave this wound open 1 additional week and continue to monitor.  Wound #2, left ischial, has improved since the last visit.  There is a decrease in depth and the granular tissue is again stable.  The patient was educated that he should reminder the home health nurses to open this wound very carefully and gently to avoid ripping the tissue open again since it is finally making excellent progress.  Wound #3, Sacrum, this wound, today, we are able to close.  Wound #4, left medial upper thigh, is stable with no significant change although it is slightly larger.  We will continue to use silver alginate on all open wounds.    7/10/23 - Mr. Martinez was last seen on 6/7/23.  Today he returns with is wounds stable:  Wound #1 - Right posterior hip, stable, no change since the last visit, no S & S of infection, no increase in drainage.  Wound #2 - Left ischial, continue to improve, with a very slight decrease in size this week.  Tissue is no longer friable.  Silver nitrate was applied, we will continue to use silver alginate.  Wound #3 - sacrum, reassessed, remains closed  Wound #4 - left medial upper thigh - this wound was debrided, wound margins are rolling and slightly macerated.  Silver nitrate was applied.  We will continue to use silver alginate.  The patient also reported today that he injured his right great toe nail.  Today it was assessed and there was a considerable amount of dried blood noted.  The nail was removed (90%) with a nipper.  We will use Betadine for one week.     7/26/23 - the patient returns today for followup on his several wound:  Wound #1 - Right posterior hip; this wound remains stable with no significant change, no S & S of infection  Wound #2 -  Left ischial, significant increase in size due to overextending of wound bed - increase in width from 0.5 cm to 3.0 cm; extreme care must be used when opening this wound bed asked to not over extend in split the wound open again.  Wound #3 - Sacrum; we are continuing to monitor this wound as there was some maceration.  A callus paring was performed, and we will continue to monitor because there appears to be changes occurring.  Wound #4 - Left medial upper thigh - this wound was selectively debrided and has an increased amount macerated tissue.  We need to ensure that this dressing is being changed daily.    Review of Systems   Musculoskeletal:  Positive for gait problem.   Skin:  Positive for wound.   All other systems reviewed and are negative.      Objective:      Vitals:    07/26/23 1123   BP: (!) 148/75   Pulse: (!) 52   Resp: 18       Physical Exam  Vitals reviewed.   Constitutional:       Appearance: Normal appearance. He is normal weight.   HENT:      Head: Normocephalic.   Cardiovascular:      Rate and Rhythm: Normal rate.      Pulses: Normal pulses.   Pulmonary:      Effort: Pulmonary effort is normal.   Musculoskeletal:      Comments: quadraplegia   Skin:     General: Skin is warm and dry.   Neurological:      General: No focal deficit present.      Mental Status: He is alert and oriented to person, place, and time.   Psychiatric:         Mood and Affect: Mood normal.          Altered Skin Integrity 05/23/22 1337 Right posterior Hip #1 (Active)   05/23/22 1337   Altered Skin Integrity Present on Admission - Did Patient arrive to the hospital with altered skin?: yes   Side: Right   Orientation: posterior   Location: Hip   Wound Number: #1   Is this injury device related?: No   Primary Wound Type:    Description of Altered Skin Integrity:    Ankle-Brachial Index:    Pulses:    Removal Indication and Assessment:    Wound Outcome:    (Retired) Wound Length (cm):    (Retired) Wound Width (cm):    (Retired)  Depth (cm):    Wound Description (Comments):    Removal Indications:    Dressing Appearance Moist drainage 07/26/23 1217   Drainage Amount Moderate 07/26/23 1217   Drainage Characteristics/Odor Serosanguineous 07/26/23 1217   Appearance Slough;Moist 07/26/23 1217   Tissue loss description Full thickness 07/26/23 1217   Periwound Area Intact 07/26/23 1217   Wound Edges Open 07/26/23 1217   Wound Length (cm) 0.3 cm 07/26/23 1217   Wound Width (cm) 0.3 cm 07/26/23 1217   Wound Depth (cm) 0.2 cm 07/26/23 1217   Wound Volume (cm^3) 0.018 cm^3 07/26/23 1217   Wound Surface Area (cm^2) 0.09 cm^2 07/26/23 1217   Care Cleansed with:;Wound cleanser 07/26/23 1217   Dressing Applied 07/26/23 1217   Dressing Change Due 07/27/23 07/26/23 1217            Altered Skin Integrity 05/23/22 1353 Left Ischial tuberosity #2 (Active)   05/23/22 1353   Altered Skin Integrity Present on Admission - Did Patient arrive to the hospital with altered skin?: yes   Side: Left   Orientation:    Location: Ischial tuberosity   Wound Number: #2   Is this injury device related?: No   Primary Wound Type:    Description of Altered Skin Integrity:    Ankle-Brachial Index:    Pulses:    Removal Indication and Assessment:    Wound Outcome:    (Retired) Wound Length (cm):    (Retired) Wound Width (cm):    (Retired) Depth (cm):    Wound Description (Comments):    Removal Indications:    Dressing Appearance Moist drainage 07/26/23 1217   Drainage Amount Moderate 07/26/23 1217   Drainage Characteristics/Odor Serosanguineous 07/26/23 1217   Appearance Red;Moist 07/26/23 1217   Tissue loss description Full thickness 07/26/23 1217   Periwound Area Intact 07/26/23 1217   Wound Edges Open 07/26/23 1217   Wound Length (cm) 3 cm 07/26/23 1217   Wound Width (cm) 0.5 cm 07/26/23 1217   Wound Depth (cm) 0.2 cm 07/26/23 1217   Wound Volume (cm^3) 0.3 cm^3 07/26/23 1217   Wound Surface Area (cm^2) 1.5 cm^2 07/26/23 1217   Care Cleansed with:;Wound cleanser 07/26/23 1217    Dressing Applied 07/26/23 1217   Dressing Change Due 07/27/23 07/26/23 1217            Altered Skin Integrity 01/05/23 1351 Left upper;medial Thigh #4  Full thickness tissue loss. Subcutaneous fat may be visible but bone, tendon or muscle are not exposed (Active)   01/05/23 1351   Altered Skin Integrity Present on Admission - Did Patient arrive to the hospital with altered skin?: yes   Side: Left   Orientation: upper;medial   Location: Thigh   Wound Number: #4   Is this injury device related?: No   Primary Wound Type:    Description of Altered Skin Integrity: Full thickness tissue loss. Subcutaneous fat may be visible but bone, tendon or muscle are not exposed   Ankle-Brachial Index:    Pulses:    Removal Indication and Assessment:    Wound Outcome:    (Retired) Wound Length (cm):    (Retired) Wound Width (cm):    (Retired) Depth (cm):    Wound Description (Comments):    Removal Indications:    Dressing Appearance Moist drainage 07/26/23 1217   Drainage Amount Moderate 07/26/23 1217   Drainage Characteristics/Odor Serosanguineous;Malodorous 07/26/23 1217   Appearance Moist 07/26/23 1217   Tissue loss description Full thickness 07/26/23 1217   Periwound Area Macerated 07/26/23 1217   Wound Edges Open 07/26/23 1217   Wound Length (cm) 2 cm 07/26/23 1217   Wound Width (cm) 1 cm 07/26/23 1217   Wound Depth (cm) 0.2 cm 07/26/23 1217   Wound Volume (cm^3) 0.4 cm^3 07/26/23 1217   Wound Surface Area (cm^2) 2 cm^2 07/26/23 1217   Care Cleansed with:;Wound cleanser 07/26/23 1217   Dressing Applied 07/26/23 1217   Dressing Change Due 07/27/23 07/26/23 1217       07/25/23      Left medial upper thigh - pre                             Left medial upper thigh - post   mesalt, 4x4 gauze, gentle border        Left ischial - pre         Left ischial - post   silver alginate, 4x4 gauze, gentle border          Right hip                                                       Left great toe - monitoring ONLY   silver alginate, 4x4  "gauze, gentle border        Sacrum - monitoring ONLY                          Scrotum - monitoring ONLY                          Assessment:         ICD-10-CM ICD-9-CM   1. Stage IV pressure ulcer of right hip  L89.214 707.04     707.24   2. Pressure ulcer of left buttock, stage 4  L89.324 707.05     707.24   3. Pressure injury of left thigh, stage 3  L89.223 707.09     707.23   4. Pressure injury of sacral region, stage 4  L89.154 707.03     707.24   5. Quadriplegia, unspecified  G82.50 344.00             Procedures:     Debridement     Date/Time: 7/26/2023 10:59 AM  Performed by: Jessie Bah NP  Authorized by: Jessie Bah NP      Time out: Immediately prior to procedure a "time out" was called to verify the correct patient, procedure, equipment, support staff and site/side marked as required.     Consent Done?:  Yes (Verbal)     Preparation: Patient was prepped and draped with clean technique    Local anesthesia used?: No       Wound Details:    Location:  Left leg    Type of Debridement:  Non-excisional (thigh)       Length (cm):  2       Area (sq cm):  2       Width (cm):  1       Percent Debrided (%):  100       Depth (cm):  0.2       Total Area Debrided (sq cm):  2    Depth of debridement:  Epidermis/Dermis    Devitalized tissue debrided:  Biofilm, Callus, Exudate and Fibrin    Instruments:  Curette (Dermal)  Bleeding:  None  Patient tolerance:  Patient tolerated the procedure well with no immediate complications      Silver nitrate applied in treatment of hypergranulated tissue.    Excisional debridement performed:  [] Yes [x] No   Selective debridement performed:  [x] Yes [] No   Mechanical debridement performed:  [] Yes [x] No   Silver nitrate applied:    [x] Yes [] No   Labs ordered this visit:   [] Yes [x] No   Imaging ordered this visit:   [] Yes [x] No   Tissue pathology and/or culture taken:  [] Yes [x] No       MEDICATIONS    Current Outpatient Medications:     apixaban (ELIQUIS) 5 mg Tab, " Take 5 mg by mouth once daily at 6am., Disp: , Rfl:     LINZESS 145 mcg Cap capsule, Take 145 mcg by mouth once daily., Disp: , Rfl:    Review of patient's allergies indicates:   Allergen Reactions    Meropenem Anaphylaxis    Penicillins        HOME HEALTH AGENCY:  [facility]   TIMES PER WEEK/DAYS:  daily     Left ischial is much larger this week - increase in width from 0.5 cm to 3.0 cm!   Please continue to very GENTLY open and clean this wound to avoid splitting of the tissue as we strive to allow this wound to close.  The sacrum is again being closely monitored due to excessive moisture.     ORDERS:  Right hip: Cleanse with wound cleanser and apply silver alginate to the wound bed (making sure it is touching wound bed), cover with 4x4 gauze and a gentle border - to be changed daily.   Left ischial: Cleanse with wound cleanser and apply silver alginate to the wound bed, cover with 4x4 gauze and a gentle border - to be changed daily.   Left medial upper thigh: Cleanse with wound cleanser and apply mesalt to the wound bed, cover with 4x4 gauze and a gentle border - to be changed daily.      Follow up in about 2 weeks (around 8/9/2023).      Electronically signed:  Jessie Bah NP

## 2023-08-09 ENCOUNTER — HOSPITAL ENCOUNTER (OUTPATIENT)
Dept: WOUND CARE | Facility: HOSPITAL | Age: 48
Discharge: HOME | End: 2023-08-09
Attending: NURSE PRACTITIONER
Payer: MEDICARE

## 2023-08-09 VITALS
SYSTOLIC BLOOD PRESSURE: 162 MMHG | RESPIRATION RATE: 18 BRPM | HEART RATE: 52 BPM | WEIGHT: 170 LBS | HEIGHT: 65 IN | DIASTOLIC BLOOD PRESSURE: 77 MMHG | BODY MASS INDEX: 28.32 KG/M2

## 2023-08-09 DIAGNOSIS — L89.324 PRESSURE ULCER OF LEFT BUTTOCK, STAGE 4: ICD-10-CM

## 2023-08-09 DIAGNOSIS — L89.214 STAGE IV PRESSURE ULCER OF RIGHT HIP: Primary | ICD-10-CM

## 2023-08-09 DIAGNOSIS — L89.223 PRESSURE INJURY OF LEFT THIGH, STAGE 3: ICD-10-CM

## 2023-08-09 DIAGNOSIS — G82.50 QUADRIPLEGIA, UNSPECIFIED: ICD-10-CM

## 2023-08-09 PROCEDURE — 99213 OFFICE O/P EST LOW 20 MIN: CPT | Mod: 25

## 2023-08-09 PROCEDURE — 27000999 HC MEDICAL RECORD PHOTO DOCUMENTATION

## 2023-08-09 PROCEDURE — 97597 DBRDMT OPN WND 1ST 20 CM/<: CPT

## 2023-08-09 NOTE — PROGRESS NOTES
[facility]   Smoker   [] Yes  [x] No  Diabetic   [] Yes   [x] No  Low air loss mattress [x] Yes [] No   Is the patient eligible for a graft, and/or currently grafting?  [] Yes [x] No    If so, which one/size?      Subjective:       Patient ID: Diego Martinez Jr. is a 48 y.o. male.    Chief Complaint: Pressure Ulcer (Right hip - Stage 4 /Left ischial - Stage 4 /Left medial upper thigh- Stage 3))    HPI   Mr. Martinez is a long time patient of [facility].    He comes via wheelchair with a care attendant present with him at all times.    He does have a history of quadriplegia.  He has home health who assist him with his wound care daily and care attendants to provide care at other times.  He has a very pleasant demeanor and added to an is usually jovial in spite of his challenges.  He now has [employer].    We have been using powdered collagen on most of the wounds and they have made impressive progress with that product.   We will begin using collagen with silver is to keep the wound progressing.    **The sacral area appears to be closed and stable.  We are continue to apply silver alginate in this area for protection only.    **The right hip remains stable and clean.  This wound is nearly closed at the base, after several months.    **The left ischial wound continues to progress very well and at this point has a small area remaining open.  Silver nitrate was applied to the small hypergranulated area.  We will progressed to collagen with silver for the time being.    **The left upper medial thigh is a new wound that was discovered over the past two weeks by .  It appears as though there was a wound there previously although the patient does deny that.  The area remains stable today we will continue to use collagen with silver on it.    3/1/23 - Mr. Martinez returns today for followup for his multiple wounds on his lower extremities.  Three of his four wounds remain stable.   However, there is concern over the left ischial wound.  This wound appears to have been pulled wide open during the cleansing process by , resulting in deterioration and increase in depth of the wound.  This will be address with .      3/9/23 - The patient returns today for followup.  All wounds are stable and/or improving at this visit.  ** the sacrum remains closed with no areas of concern at this time.  ** right hip is stable with only a very small remaining opening.  We will continue to put silver alginate over that small opening.  ** the left upper medial thigh is also stable.  There is a large origin present, we will work with that margin once the wound has closed.  We will continue to put silver alginate over the wound.  ** this visit the left ischial has improved over the last visit.  It has again begun to decrease in depth.  We will use collagen with silver daily on this wound.    3/15/23 - Mr. Martinez returns today with several wounds.  The majority of his wounds are stable and are improving, however, today the sacrum has reopened.  Although small, it is concerning as this area took a considerable time to close and must be washed carefully so that it does not quickly reopened to this stage IV pressure injury that it was previously.  We will continue to monitor for deterioration.    We will continue the current treatment plan on all wounds, adding silver alginate to the newly open sacrum.    3/29/23 - the patient returns to clinic today for followup on all wounds.  All of his wounds do appear stable with no deterioration.  We applied powdered collagen to the left ischial wound, and collagen with silver to all other wounds.  All dressings will remain in place until Sunday at which time home health will be allowed to change the dressing.  Of note, the patient did receive a jury summons for jury duty and did request a written excuse, which he was given in light of the fact that he is quadriplegia and does  have multiple wounds making it impact go for him to be sequestered for jury duty for any number of days which would be the risk.    4/12/23 - Mr. Martinez returns today with all wounds stable.  He does have home health who assists with his wound care.  We will switch back to collagen with silver due to the concern of HH regarding variation in dressing changes.     4/27/23 - Today all wounds remain stable.  However, the wound to the left ischial was bleeding a moderate amount.  Silver nitrate x 2 and pressure were used to achieve homeostasis, however, the wound bed/granular tissue is very friable.  This may be due to the use of collagen in the wound bed.  He does take Eliquis.  We will change to silver alginate for the time being to allow the wound bed to settle down.  Silver ag will be used on all wounds for consistency.     5/10/23 - Mr. Martinez returns today for follow up on several wounds.  All wounds do remain stable today, with slight decreases in size with all wounds with the exception of the sacrum.  The sacrum is measuring slightly larger today although there has been no increase in drainage and no S & S of infection.  We will continue to use silver alginate on all wounds.     5/24/23 - The patient returns today for several wounds.    Wound #1, Right posterior hip, has progressed exceptionally well and today is 0.1 x 0.1 x 0.1.  We will leave this wound open and continue to monitor.  Wound #2, left ischial, has deteriorated considerably since the last visit.  There is an increase in depth and the granular tissue is friable.  A large piece of wadded silver alginate was removed today by staff.  There is concern that this wound is being properly dressed by HH.  Wound #3, Sacrum, remains stable with little change.  It appears to be nearly closed.  Wound $4, left medial upper thigh, is stable with no significant change  We will continue to use silver alginate on all open wounds.     6/7/23 - Mr. Martinez returns today  for followup on several wounds.  Some have done except personally well recently which is correct it to his home health.  We are able to close to wounds today.  Wound #1, Right posterior hip, remains stable and today it is still 0.1 x 0.1 x 0.1.  We will leave this wound open 1 additional week and continue to monitor.  Wound #2, left ischial, has improved since the last visit.  There is a decrease in depth and the granular tissue is again stable.  The patient was educated that he should reminder the home health nurses to open this wound very carefully and gently to avoid ripping the tissue open again since it is finally making excellent progress.  Wound #3, Sacrum, this wound, today, we are able to close.  Wound #4, left medial upper thigh, is stable with no significant change although it is slightly larger.  We will continue to use silver alginate on all open wounds.    7/10/23 - Mr. Martinez was last seen on 6/7/23.  Today he returns with is wounds stable:  Wound #1 - Right posterior hip, stable, no change since the last visit, no S & S of infection, no increase in drainage.  Wound #2 - Left ischial, continue to improve, with a very slight decrease in size this week.  Tissue is no longer friable.  Silver nitrate was applied, we will continue to use silver alginate.  Wound #3 - sacrum, reassessed, remains closed  Wound #4 - left medial upper thigh - this wound was debrided, wound margins are rolling and slightly macerated.  Silver nitrate was applied.  We will continue to use silver alginate.  The patient also reported today that he injured his right great toe nail.  Today it was assessed and there was a considerable amount of dried blood noted.  The nail was removed (90%) with a nipper.  We will use Betadine for one week.     7/26/23 - the patient returns today for followup on his several wound:  Wound #1 - Right posterior hip; this wound remains stable with no significant change, no S & S of infection  Wound #2 -  Left ischial, significant increase in size due to overextending of wound bed - increase in width from 0.5 cm to 3.0 cm; extreme care must be used when opening this wound bed asked to not over extend in split the wound open again.  Wound #3 - Sacrum; we are continuing to monitor this wound as there was some maceration.  A callus paring was performed, and we will continue to monitor because there appears to be changes occurring.  Wound #4 - Left medial upper thigh - this wound was selectively debrided and has an increased amount macerated tissue.  We need to ensure that this dressing is being changed daily.    8/9/23 - Mr. Martinez returns today to be seen for three wounds:  Wound #1 - Right posterior hip - this wound continues to remains stable.  There is still some depth, however, there is very little drainage and no signs and symptoms of infection.  We will continue to pack this wound with silver alginate.  Wound #2 - Left ischial, this wound remains at 3.0 cm which is a significant increase over the measurement of 0.5 cm on 07/12/2023.  Increase in size is due to the forcing open of the wound during the cleansing process.  Home health has been reminded that they should open that wound very carefully so is not to over extend the very fragile epithelial tissue.  Additionally, the orders were to use silver alginate on this wound and today mesalt was removed.  They will be reminded they should use the appropriate dressing on the appropriate wounds and me salt should not be used in this wound.  Wound #3 - Sacrum; we are continuing to monitor this area, however, there is no open wound at this time.  It will be reopened if necessary.  Wound #4 - Left medial upper though - this wound was selectively debrided today with removal a moderate amount of slough from the wound bed.  There is a large island of epithelial tissue in the wound bed, however, there is still also a large area of macerated tissue around the periwound.   Home health is reminded that they should be applying me salt to this wound and this wound only.  They should be covering the me salt and wound bed with silver alginate to be changed daily.        Review of Systems   Musculoskeletal:  Positive for gait problem.   Skin:  Positive for wound.   All other systems reviewed and are negative.        Objective:      Vitals:    08/09/23 1214   BP: (!) 162/77   Pulse: (!) 52   Resp: 18       Physical Exam  Vitals reviewed.   Constitutional:       Appearance: Normal appearance. He is normal weight.   HENT:      Head: Normocephalic.   Cardiovascular:      Rate and Rhythm: Normal rate.      Pulses: Normal pulses.   Pulmonary:      Effort: Pulmonary effort is normal.   Musculoskeletal:      Comments: quadraplegia   Skin:     General: Skin is warm and dry.   Neurological:      General: No focal deficit present.      Mental Status: He is alert and oriented to person, place, and time.   Psychiatric:         Mood and Affect: Mood normal.            Altered Skin Integrity 05/23/22 1337 Right posterior Hip #1 (Active)   05/23/22 1337   Altered Skin Integrity Present on Admission - Did Patient arrive to the hospital with altered skin?: yes   Side: Right   Orientation: posterior   Location: Hip   Wound Number: #1   Is this injury device related?: No   Primary Wound Type:    Description of Altered Skin Integrity:    Ankle-Brachial Index:    Pulses:    Removal Indication and Assessment:    Wound Outcome:    (Retired) Wound Length (cm):    (Retired) Wound Width (cm):    (Retired) Depth (cm):    Wound Description (Comments):    Removal Indications:    Dressing Appearance Moist drainage 08/09/23 1215   Drainage Amount Moderate 08/09/23 1215   Drainage Characteristics/Odor Serosanguineous 08/09/23 1215   Appearance Pink;Red;Moist 08/09/23 1215   Tissue loss description Full thickness 08/09/23 1215   Periwound Area Dry;[place] 08/09/23 1215   Wound Edges Open 08/09/23 1215   Wound Length (cm) 0.3  cm 08/09/23 1215   Wound Width (cm) 0.3 cm 08/09/23 1215   Wound Depth (cm) 0.2 cm 08/09/23 1215   Wound Volume (cm^3) 0.018 cm^3 08/09/23 1215   Wound Surface Area (cm^2) 0.09 cm^2 08/09/23 1215   Care Cleansed with:;Wound cleanser 08/09/23 1215   Dressing Applied 08/09/23 1215   Dressing Change Due 08/10/23 08/09/23 1215            Altered Skin Integrity 05/23/22 1353 Left Ischial tuberosity #2 (Active)   05/23/22 1353   Altered Skin Integrity Present on Admission - Did Patient arrive to the hospital with altered skin?: yes   Side: Left   Orientation:    Location: Ischial tuberosity   Wound Number: #2   Is this injury device related?: No   Primary Wound Type:    Description of Altered Skin Integrity:    Ankle-Brachial Index:    Pulses:    Removal Indication and Assessment:    Wound Outcome:    (Retired) Wound Length (cm):    (Retired) Wound Width (cm):    (Retired) Depth (cm):    Wound Description (Comments):    Removal Indications:    Dressing Appearance Moist drainage 08/09/23 1215   Drainage Amount Moderate 08/09/23 1215   Drainage Characteristics/Odor Serosanguineous 08/09/23 1215   Appearance Pink;Red;Moist 08/09/23 1215   Tissue loss description Full thickness 08/09/23 1215   Periwound Area Moist;Pale white 08/09/23 1215   Wound Edges Open 08/09/23 1215   Wound Length (cm) 3 cm 08/09/23 1215   Wound Width (cm) 0.5 cm 08/09/23 1215   Wound Depth (cm) 0.2 cm 08/09/23 1215   Wound Volume (cm^3) 0.3 cm^3 08/09/23 1215   Wound Surface Area (cm^2) 1.5 cm^2 08/09/23 1215   Care Cleansed with:;Wound cleanser 08/09/23 1215   Dressing Applied 08/09/23 1215   Dressing Change Due 08/10/23 08/09/23 1215            Altered Skin Integrity 01/05/23 1351 Left upper;medial Thigh #4  Full thickness tissue loss. Subcutaneous fat may be visible but bone, tendon or muscle are not exposed (Active)   01/05/23 1351   Altered Skin Integrity Present on Admission - Did Patient arrive to the hospital with altered skin?: yes   Side: Left    Orientation: upper;medial   Location: Thigh   Wound Number: #4   Is this injury device related?: No   Primary Wound Type:    Description of Altered Skin Integrity: Full thickness tissue loss. Subcutaneous fat may be visible but bone, tendon or muscle are not exposed   Ankle-Brachial Index:    Pulses:    Removal Indication and Assessment:    Wound Outcome:    (Retired) Wound Length (cm):    (Retired) Wound Width (cm):    (Retired) Depth (cm):    Wound Description (Comments):    Removal Indications:    Dressing Appearance Moist drainage 08/09/23 1215   Drainage Amount Moderate 08/09/23 1215   Drainage Characteristics/Odor Serosanguineous 08/09/23 1215   Appearance Pink;Red;Moist 08/09/23 1215   Tissue loss description Full thickness 08/09/23 1215   Periwound Area [place];Dry 08/09/23 1215   Wound Edges Open 08/09/23 1215   Wound Length (cm) 2.5 cm 08/09/23 1215   Wound Width (cm) 0.6 cm 08/09/23 1215   Wound Depth (cm) 0.2 cm 08/09/23 1215   Wound Volume (cm^3) 0.3 cm^3 08/09/23 1215   Wound Surface Area (cm^2) 1.5 cm^2 08/09/23 1215   Care Cleansed with:;Wound cleanser 08/09/23 1215   Dressing Applied 08/09/23 1215   Dressing Change Due 08/10/23 08/09/23 1215       8/9/23        Right hip (pre)                                               Left ischium (pre)  (Silver alginate, 4x4, gentle border)              (silver alginate, 4x4, gentle border)            Scrotum(pre,monitor 1.5cm x 0.4cm x 0.2cm)          Left upper thigh (pre)                                   Left upper thigh (post)                                                                                                                                             (Mesalt, silver alginate, 4x4, gentle border)                   Assessment:         ICD-10-CM ICD-9-CM   1. Stage IV pressure ulcer of right hip  L89.214 707.04     707.24   2. Pressure ulcer of left buttock, stage 4  L89.324 707.05     707.24   3. Pressure injury of left thigh, stage 3  L89.223  "707.09     707.23   4. Quadriplegia, unspecified  G82.50 344.00         Procedures:     Debridement     Date/Time: 8/9/2023 11:00 AM     Performed by: Jessie Bah NP  Authorized by: Jessie Bah NP    Time out: Immediately prior to procedure a "time out" was called to verify the correct patient, procedure, equipment, support staff and site/side marked as required.     Consent Done?:  Yes (Verbal)     Preparation: Patient was prepped and draped with clean technique    Local anesthesia used?: No       Wound Details:    Location:  Left leg (Upper thigh)    Type of Debridement:  Non-excisional       Length (cm):  2.5       Area (sq cm):  1.5       Width (cm):  0.6       Percent Debrided (%):  100       Depth (cm):  0.2       Total Area Debrided (sq cm):  1.5    Depth of debridement:  Epidermis/Dermis    Devitalized tissue debrided:  Biofilm, Callus, Exudate, Fibrin and Slough    Instruments:  Curette  Patient tolerance:  Patient tolerated the procedure well with no immediate complications    Excisional debridement performed:  [] Yes [] No   Selective debridement performed:  [x] Yes [] No   Mechanical debridement performed:  [] Yes [] No   Silver nitrate applied:    [] Yes [] No   Labs ordered this visit:   [] Yes [] No   Imaging ordered this visit:   [] Yes [] No   Tissue pathology and/or culture taken:  [] Yes [] No       MEDICATIONS    Current Outpatient Medications:     apixaban (ELIQUIS) 5 mg Tab, Take 5 mg by mouth once daily at 6am., Disp: , Rfl:     LINZESS 145 mcg Cap capsule, Take 145 mcg by mouth once daily., Disp: , Rfl:    Review of patient's allergies indicates:   Allergen Reactions    Meropenem Anaphylaxis    Penicillins        HOME HEALTH AGENCY:  [facility]   TIMES PER WEEK/DAYS:  daily     Left ischial is much larger this week - increase in width from 0.5 cm to 3.0 cm!   Please continue to very GENTLY open and clean this wound to avoid splitting of the tissue as we strive to allow " this wound to close.  The sacrum is again being closely monitored due to excessive moisture.     ORDERS:  Right hip: Cleanse with wound cleanser and apply silver alginate to the wound bed (making sure it is touching wound bed), cover with 4x4 gauze and a gentle border - to be changed daily.   Left ischial: Cleanse with wound cleanser and apply silver alginate to the wound bed, cover with 4x4 gauze and a gentle border - to be changed daily.   Left medial upper thigh: Cleanse with wound cleanser and apply mesalt to the wound bed, cover with silver alginate and 4x4 gauze and a gentle border - to be changed daily.      Follow up in 2 weeks (on 8/23/2023) for stretcher.      Electronically signed:  Jessie Bah NP

## 2023-08-09 NOTE — PROCEDURES
"Debridement    Date/Time: 8/9/2023 11:00 AM    Performed by: Jessie Bah NP  Authorized by: Jessie Bah NP    Time out: Immediately prior to procedure a "time out" was called to verify the correct patient, procedure, equipment, support staff and site/side marked as required.    Consent Done?:  Yes (Verbal)    Preparation: Patient was prepped and draped with clean technique    Local anesthesia used?: No      Wound Details:    Location:  Left leg (Upper thigh)    Type of Debridement:  Non-excisional       Length (cm):  2.5       Area (sq cm):  1.5       Width (cm):  0.6       Percent Debrided (%):  100       Depth (cm):  0.2       Total Area Debrided (sq cm):  1.5    Depth of debridement:  Epidermis/Dermis    Devitalized tissue debrided:  Biofilm, Callus, Exudate, Fibrin and Slough    Instruments:  Curette  Patient tolerance:  Patient tolerated the procedure well with no immediate complications    "

## 2023-08-23 ENCOUNTER — HOSPITAL ENCOUNTER (OUTPATIENT)
Dept: WOUND CARE | Facility: HOSPITAL | Age: 48
Discharge: HOME | End: 2023-08-23
Attending: NURSE PRACTITIONER
Payer: MEDICARE

## 2023-08-23 VITALS
RESPIRATION RATE: 18 BRPM | HEIGHT: 65 IN | BODY MASS INDEX: 28.32 KG/M2 | DIASTOLIC BLOOD PRESSURE: 69 MMHG | HEART RATE: 78 BPM | WEIGHT: 170 LBS | SYSTOLIC BLOOD PRESSURE: 93 MMHG

## 2023-08-23 DIAGNOSIS — L89.324 PRESSURE ULCER OF LEFT BUTTOCK, STAGE 4: ICD-10-CM

## 2023-08-23 DIAGNOSIS — L89.214 STAGE IV PRESSURE ULCER OF RIGHT HIP: Primary | ICD-10-CM

## 2023-08-23 DIAGNOSIS — L89.223 PRESSURE INJURY OF LEFT THIGH, STAGE 3: ICD-10-CM

## 2023-08-23 DIAGNOSIS — G82.50 QUADRIPLEGIA, UNSPECIFIED: ICD-10-CM

## 2023-08-23 PROCEDURE — 99213 OFFICE O/P EST LOW 20 MIN: CPT

## 2023-08-23 PROCEDURE — 27000999 HC MEDICAL RECORD PHOTO DOCUMENTATION

## 2023-08-23 NOTE — PROGRESS NOTES
Home Health: [employer] [facility]   Smoker   [] Yes  [x] No  Diabetic   [] Yes   [x] No  Low air loss mattress [x] Yes [] No   Is the patient eligible for a graft, and/or currently grafting?  [] Yes [x] No    If so, which one/size?    Subjective:       Patient ID: Diego Martinez Jr. is a 48 y.o. male.    Chief Complaint: Pressure Ulcer ((Right hip - Stage 4 /Left ischial - Stage 4 /Left medial upper thigh- Stage 3)))    HPI   Mr. Martinez is a long time patient of [facility].    He comes via wheelchair with a care attendant present with him at all times.    He does have a history of quadriplegia.  He has home health who assist him with his wound care daily and care attendants to provide care at other times.  He has a very pleasant demeanor and added to an is usually jovial in spite of his challenges.  He now has [employer].    We have been using powdered collagen on most of the wounds and they have made impressive progress with that product.   We will begin using collagen with silver is to keep the wound progressing.    **The sacral area appears to be closed and stable.  We are continue to apply silver alginate in this area for protection only.    **The right hip remains stable and clean.  This wound is nearly closed at the base, after several months.    **The left ischial wound continues to progress very well and at this point has a small area remaining open.  Silver nitrate was applied to the small hypergranulated area.  We will progressed to collagen with silver for the time being.    **The left upper medial thigh is a new wound that was discovered over the past two weeks by HH.  It appears as though there was a wound there previously although the patient does deny that.  The area remains stable today we will continue to use collagen with silver on it.    3/1/23 - Mr. Martinez returns today for followup for his multiple wounds on his lower extremities.  Three of his four wounds remain stable.   However, there is concern over the left ischial wound.  This wound appears to have been pulled wide open during the cleansing process by , resulting in deterioration and increase in depth of the wound.  This will be address with .      3/9/23 - The patient returns today for followup.  All wounds are stable and/or improving at this visit.  ** the sacrum remains closed with no areas of concern at this time.  ** right hip is stable with only a very small remaining opening.  We will continue to put silver alginate over that small opening.  ** the left upper medial thigh is also stable.  There is a large origin present, we will work with that margin once the wound has closed.  We will continue to put silver alginate over the wound.  ** this visit the left ischial has improved over the last visit.  It has again begun to decrease in depth.  We will use collagen with silver daily on this wound.    3/15/23 - Mr. Martinez returns today with several wounds.  The majority of his wounds are stable and are improving, however, today the sacrum has reopened.  Although small, it is concerning as this area took a considerable time to close and must be washed carefully so that it does not quickly reopened to this stage IV pressure injury that it was previously.  We will continue to monitor for deterioration.    We will continue the current treatment plan on all wounds, adding silver alginate to the newly open sacrum.    3/29/23 - the patient returns to clinic today for followup on all wounds.  All of his wounds do appear stable with no deterioration.  We applied powdered collagen to the left ischial wound, and collagen with silver to all other wounds.  All dressings will remain in place until Sunday at which time home health will be allowed to change the dressing.  Of note, the patient did receive a jury summons for jury duty and did request a written excuse, which he was given in light of the fact that he is quadriplegia and does  have multiple wounds making it impact go for him to be sequestered for jury duty for any number of days which would be the risk.    4/12/23 - Mr. Martinez returns today with all wounds stable.  He does have home health who assists with his wound care.  We will switch back to collagen with silver due to the concern of HH regarding variation in dressing changes.     4/27/23 - Today all wounds remain stable.  However, the wound to the left ischial was bleeding a moderate amount.  Silver nitrate x 2 and pressure were used to achieve homeostasis, however, the wound bed/granular tissue is very friable.  This may be due to the use of collagen in the wound bed.  He does take Eliquis.  We will change to silver alginate for the time being to allow the wound bed to settle down.  Silver ag will be used on all wounds for consistency.     5/10/23 - Mr. Martinez returns today for follow up on several wounds.  All wounds do remain stable today, with slight decreases in size with all wounds with the exception of the sacrum.  The sacrum is measuring slightly larger today although there has been no increase in drainage and no S & S of infection.  We will continue to use silver alginate on all wounds.     5/24/23 - The patient returns today for several wounds.    Wound #1, Right posterior hip, has progressed exceptionally well and today is 0.1 x 0.1 x 0.1.  We will leave this wound open and continue to monitor.  Wound #2, left ischial, has deteriorated considerably since the last visit.  There is an increase in depth and the granular tissue is friable.  A large piece of wadded silver alginate was removed today by staff.  There is concern that this wound is being properly dressed by HH.  Wound #3, Sacrum, remains stable with little change.  It appears to be nearly closed.  Wound $4, left medial upper thigh, is stable with no significant change  We will continue to use silver alginate on all open wounds.     6/7/23 - Mr. Martinez returns today  for followup on several wounds.  Some have done except personally well recently which is correct it to his home health.  We are able to close to wounds today.  Wound #1, Right posterior hip, remains stable and today it is still 0.1 x 0.1 x 0.1.  We will leave this wound open 1 additional week and continue to monitor.  Wound #2, left ischial, has improved since the last visit.  There is a decrease in depth and the granular tissue is again stable.  The patient was educated that he should reminder the home health nurses to open this wound very carefully and gently to avoid ripping the tissue open again since it is finally making excellent progress.  Wound #3, Sacrum, this wound, today, we are able to close.  Wound #4, left medial upper thigh, is stable with no significant change although it is slightly larger.  We will continue to use silver alginate on all open wounds.    7/10/23 - Mr. Martinez was last seen on 6/7/23.  Today he returns with is wounds stable:  Wound #1 - Right posterior hip, stable, no change since the last visit, no S & S of infection, no increase in drainage.  Wound #2 - Left ischial, continue to improve, with a very slight decrease in size this week.  Tissue is no longer friable.  Silver nitrate was applied, we will continue to use silver alginate.  Wound #3 - sacrum, reassessed, remains closed  Wound #4 - left medial upper thigh - this wound was debrided, wound margins are rolling and slightly macerated.  Silver nitrate was applied.  We will continue to use silver alginate.  The patient also reported today that he injured his right great toe nail.  Today it was assessed and there was a considerable amount of dried blood noted.  The nail was removed (90%) with a nipper.  We will use Betadine for one week.     7/26/23 - the patient returns today for followup on his several wound:  Wound #1 - Right posterior hip; this wound remains stable with no significant change, no S & S of infection  Wound #2 -  Left ischial, significant increase in size due to overextending of wound bed - increase in width from 0.5 cm to 3.0 cm; extreme care must be used when opening this wound bed asked to not over extend in split the wound open again.  Wound #3 - Sacrum; we are continuing to monitor this wound as there was some maceration.  A callus paring was performed, and we will continue to monitor because there appears to be changes occurring.  Wound #4 - Left medial upper thigh - this wound was selectively debrided and has an increased amount macerated tissue.  We need to ensure that this dressing is being changed daily.    8/9/23 - Mr. Martinez returns today to be seen for three wounds:  Wound #1 - Right posterior hip - this wound continues to remains stable.  There is still some depth, however, there is very little drainage and no signs and symptoms of infection.  We will continue to pack this wound with silver alginate.  Wound #2 - Left ischial, this wound remains at 3.0 cm which is a significant increase over the measurement of 0.5 cm on 07/12/2023.  Increase in size is due to the forcing open of the wound during the cleansing process.  Home health has been reminded that they should open that wound very carefully so is not to over extend the very fragile epithelial tissue.  Additionally, the orders were to use silver alginate on this wound and today mesalt was removed.  They will be reminded they should use the appropriate dressing on the appropriate wounds and me salt should not be used in this wound.  Wound #3 - Sacrum; we are continuing to monitor this area, however, there is no open wound at this time.  It will be reopened if necessary.  Wound #4 - Left medial upper thigh - this wound was selectively debrided today with removal a moderate amount of slough from the wound bed.  There is a large island of epithelial tissue in the wound bed, however, there is still also a large area of macerated tissue around the periwound.  Home  health is reminded that they should be applying me salt to this wound and this wound only.  They should be covering the me salt and wound bed with silver alginate to be changed daily.    8/24/23 - The patient returns today for reassessments his 3 wounds.    Wound #1 - Right posterior hip - remains clean and stable.  There are no signs and symptoms of infection and very little drainage.  We are using silver alginate on this wound.    Wound #2 - Left ischial - 2.7 x 0.5 x 0.2 cm - wound is improved significantly over his last visit on 08/09/2023.  This provider appreciate the care that home health is taking when opening the wound to cleanse in the depth of the wound.  It is obvious that they have not been over extending the wound which is allowing it to progress as it should be progressing now.  We will continue to apply silver alginate on this wound.  Wound #3 - Sacrum - we will continue to monitor the sacrum again as it has now reopened very slightly.  Wound #4 - Left medial upper thigh - this wound remains stable with no signs and symptoms of infection.  The large margin of macerated tissue remains.  Ideally, this could be surgically debrided, however, we may have to do this in clinic at some point with a scalpel.    Review of Systems   Musculoskeletal:  Positive for gait problem.   Skin:  Positive for wound.   All other systems reviewed and are negative.        Objective:      Vitals:    08/23/23 1108   BP: 93/69   Pulse: 78   Resp: 18       Physical Exam  Vitals reviewed.   Constitutional:       Appearance: Normal appearance. He is normal weight.   HENT:      Head: Normocephalic.   Cardiovascular:      Rate and Rhythm: Normal rate.      Pulses: Normal pulses.   Pulmonary:      Effort: Pulmonary effort is normal.   Musculoskeletal:      Comments: quadraplegia   Skin:     General: Skin is warm and dry.   Neurological:      General: No focal deficit present.      Mental Status: He is alert and oriented to person,  place, and time.   Psychiatric:         Mood and Affect: Mood normal.            Altered Skin Integrity 05/23/22 1337 Right posterior Hip #1 (Active)   05/23/22 1337   Altered Skin Integrity Present on Admission - Did Patient arrive to the hospital with altered skin?: yes   Side: Right   Orientation: posterior   Location: Hip   Wound Number: #1   Is this injury device related?: No   Primary Wound Type:    Description of Altered Skin Integrity:    Ankle-Brachial Index:    Pulses:    Removal Indication and Assessment:    Wound Outcome:    (Retired) Wound Length (cm):    (Retired) Wound Width (cm):    (Retired) Depth (cm):    Wound Description (Comments):    Removal Indications:    Dressing Appearance Moist drainage 08/23/23 1109   Drainage Amount Moderate 08/23/23 1109   Drainage Characteristics/Odor Serosanguineous 08/23/23 1109   Appearance Pink;Moist 08/23/23 1109   Tissue loss description Full thickness 08/23/23 1109   Periwound Area Dry;[place] 08/23/23 1109   Wound Edges Open 08/23/23 1109   Wound Length (cm) 0.3 cm 08/23/23 1109   Wound Width (cm) 0.3 cm 08/23/23 1109   Wound Depth (cm) 0.2 cm 08/23/23 1109   Wound Volume (cm^3) 0.018 cm^3 08/23/23 1109   Wound Surface Area (cm^2) 0.09 cm^2 08/23/23 1109   Care Cleansed with:;Wound cleanser 08/23/23 1109   Dressing Applied 08/23/23 1109   Dressing Change Due 08/24/23 08/23/23 1109            Altered Skin Integrity 05/23/22 1353 Left Ischial tuberosity #2 (Active)   05/23/22 1353   Altered Skin Integrity Present on Admission - Did Patient arrive to the hospital with altered skin?: yes   Side: Left   Orientation:    Location: Ischial tuberosity   Wound Number: #2   Is this injury device related?: No   Primary Wound Type:    Description of Altered Skin Integrity:    Ankle-Brachial Index:    Pulses:    Removal Indication and Assessment:    Wound Outcome:    (Retired) Wound Length (cm):    (Retired) Wound Width (cm):    (Retired) Depth (cm):    Wound Description  (Comments):    Removal Indications:    Dressing Appearance Moist drainage 08/23/23 1109   Drainage Amount Moderate 08/23/23 1109   Drainage Characteristics/Odor Serosanguineous 08/23/23 1109   Appearance Pink;Red;Moist 08/23/23 1109   Tissue loss description Full thickness 08/23/23 1109   Periwound Area [place];Dry 08/23/23 1109   Wound Edges Defined 08/23/23 1109   Wound Length (cm) 2.7 cm 08/23/23 1109   Wound Width (cm) 0.5 cm 08/23/23 1109   Wound Depth (cm) 0.2 cm 08/23/23 1109   Wound Volume (cm^3) 0.27 cm^3 08/23/23 1109   Wound Surface Area (cm^2) 1.35 cm^2 08/23/23 1109   Care Cleansed with:;Wound cleanser 08/23/23 1109   Dressing Applied 08/23/23 1109   Dressing Change Due 08/24/23 08/23/23 1109            Altered Skin Integrity 01/05/23 1351 Left upper;medial Thigh #4  Full thickness tissue loss. Subcutaneous fat may be visible but bone, tendon or muscle are not exposed (Active)   01/05/23 1351   Altered Skin Integrity Present on Admission - Did Patient arrive to the hospital with altered skin?: yes   Side: Left   Orientation: upper;medial   Location: Thigh   Wound Number: #4   Is this injury device related?: No   Primary Wound Type:    Description of Altered Skin Integrity: Full thickness tissue loss. Subcutaneous fat may be visible but bone, tendon or muscle are not exposed   Ankle-Brachial Index:    Pulses:    Removal Indication and Assessment:    Wound Outcome:    (Retired) Wound Length (cm):    (Retired) Wound Width (cm):    (Retired) Depth (cm):    Wound Description (Comments):    Removal Indications:    Dressing Appearance Moist drainage 08/23/23 1109   Drainage Amount Moderate 08/23/23 1109   Drainage Characteristics/Odor Serosanguineous 08/23/23 1109   Appearance Pink;Red;Moist;Epithelialization 08/23/23 1109   Tissue loss description Full thickness 08/23/23 1109   Periwound Area Pale white;Moist 08/23/23 1109   Wound Edges Open 08/23/23 1109   Wound Length (cm) 2 cm 08/23/23 1109   Wound Width (cm)  0.8 cm 08/23/23 1109   Wound Depth (cm) 0.2 cm 08/23/23 1109   Wound Volume (cm^3) 0.32 cm^3 08/23/23 1109   Wound Surface Area (cm^2) 1.6 cm^2 08/23/23 1109   Care Cleansed with:;Wound cleanser 08/23/23 1109   Dressing Applied 08/23/23 1109   Dressing Change Due 08/24/23 08/23/23 1109            Altered Skin Integrity 08/23/23 1138 Sacral spine (Active)   08/23/23 1138   Altered Skin Integrity Present on Admission - Did Patient arrive to the hospital with altered skin?: yes   Side:    Orientation:    Location: Sacral spine   Wound Number:    Is this injury device related?:    Primary Wound Type:    Description of Altered Skin Integrity:    Ankle-Brachial Index:    Pulses:    Removal Indication and Assessment:    Wound Outcome:    (Retired) Wound Length (cm):    (Retired) Wound Width (cm):    (Retired) Depth (cm):    Wound Description (Comments):    Removal Indications:    Dressing Appearance Moist drainage 08/23/23 1138   Drainage Amount Moderate 08/23/23 1138   Drainage Characteristics/Odor Serosanguineous 08/23/23 1138   Appearance Pink;Moist 08/23/23 1138   Tissue loss description Full thickness 08/23/23 1138   Periwound Area Pale white;Moist 08/23/23 1138   Wound Edges Open 08/23/23 1138   Wound Length (cm) 0.3 cm 08/23/23 1138   Wound Width (cm) 0.4 cm 08/23/23 1138   Wound Depth (cm) 0.3 cm 08/23/23 1138   Wound Volume (cm^3) 0.036 cm^3 08/23/23 1138   Wound Surface Area (cm^2) 0.12 cm^2 08/23/23 1138   Care Cleansed with:;Wound cleanser 08/23/23 1138   Dressing Applied 08/23/23 1138   Dressing Change Due 08/24/23 08/23/23 1138         8/23/23           Right hip (pre)                                             Left ischium (pre)                                         Left posterior thigh (pre)  (silver alginate, 4x4, foam dressing)            (silver alginate, 4x4, foam dressing)             (silver alginate, 4x4, foam dressing)    Sacrum (pre)  (silver alginate, 4x4, foam dressing)                   Assessment:         ICD-10-CM ICD-9-CM   1. Stage IV pressure ulcer of right hip  L89.214 707.04     707.24   2. Pressure ulcer of left buttock, stage 4  L89.324 707.05     707.24   3. Pressure injury of left thigh, stage 3  L89.223 707.09     707.23   4. Quadriplegia, unspecified  G82.50 344.00           Procedures:     Mechanical debridement only    Excisional debridement performed:  [] Yes [] No   Selective debridement performed:  [] Yes [] No   Mechanical debridement performed:  [x] Yes [] No   Silver nitrate applied:    [] Yes [] No   Labs ordered this visit:   [] Yes [] No   Imaging ordered this visit:   [] Yes [] No   Tissue pathology and/or culture taken:  [] Yes [] No       MEDICATIONS    Current Outpatient Medications:     apixaban (ELIQUIS) 5 mg Tab, Take 5 mg by mouth once daily at 6am., Disp: , Rfl:     LINZESS 145 mcg Cap capsule, Take 145 mcg by mouth once daily., Disp: , Rfl:    Review of patient's allergies indicates:   Allergen Reactions    Meropenem Anaphylaxis    Penicillins        HOME HEALTH AGENCY:  [employer]   TIMES PER WEEK/DAYS:  daily     Left ischial is much larger this week - increase in width from 0.5 cm to 3.0 cm!   Please continue to very GENTLY open and clean this wound to avoid splitting of the tissue as we strive to allow this wound to close.  The sacrum is again being closely monitored due to excessive moisture.     ORDERS:  Right hip: Cleanse with wound cleanser and apply silver alginate to the wound bed (making sure it is touching wound bed), cover with 4x4 gauze and a gentle border - to be changed daily.   Left ischial: Cleanse with wound cleanser and apply silver alginate to the wound bed, cover with 4x4 gauze and a gentle border - to be changed daily.   Left medial upper thigh: Cleanse with wound cleanser and apply silver alginate to the wound bed, cover with 4x4 gauze and a gentle border - to be changed daily.   Sacrum: Cleanse with wound cleanser and apply  silver alginate to the wound bed, cover with 4x4 gauze and a gentle border - to be changed daily.      Follow up in 2 weeks (on 9/6/2023) for stretcher.      Electronically signed:  Jessie Bah NP

## 2023-09-06 ENCOUNTER — HOSPITAL ENCOUNTER (OUTPATIENT)
Dept: WOUND CARE | Facility: HOSPITAL | Age: 48
Discharge: HOME | End: 2023-09-06
Attending: NURSE PRACTITIONER
Payer: MEDICARE

## 2023-09-06 VITALS
SYSTOLIC BLOOD PRESSURE: 122 MMHG | BODY MASS INDEX: 28.32 KG/M2 | DIASTOLIC BLOOD PRESSURE: 63 MMHG | HEIGHT: 65 IN | RESPIRATION RATE: 18 BRPM | HEART RATE: 56 BPM | WEIGHT: 170 LBS

## 2023-09-06 DIAGNOSIS — L89.223 PRESSURE INJURY OF LEFT THIGH, STAGE 3: ICD-10-CM

## 2023-09-06 DIAGNOSIS — L89.214 STAGE IV PRESSURE ULCER OF RIGHT HIP: Primary | ICD-10-CM

## 2023-09-06 DIAGNOSIS — L89.324 PRESSURE ULCER OF LEFT BUTTOCK, STAGE 4: ICD-10-CM

## 2023-09-06 PROCEDURE — 99212 OFFICE O/P EST SF 10 MIN: CPT

## 2023-09-06 PROCEDURE — 27000999 HC MEDICAL RECORD PHOTO DOCUMENTATION

## 2023-09-06 PROCEDURE — 97597 DBRDMT OPN WND 1ST 20 CM/<: CPT

## 2023-09-06 RX ORDER — MUPIROCIN 20 MG/G
OINTMENT TOPICAL 2 TIMES DAILY
COMMUNITY
Start: 2023-08-25

## 2023-09-06 NOTE — PROGRESS NOTES
[facility] [place]   Smoker   [] Yes  [x] No  Diabetic   [] Yes   [x] No  Low air loss mattress [x] Yes [] No   Is the patient eligible for a graft, and/or currently grafting?  [] Yes [x] No    If so, which one/size?    Subjective:       Patient ID: Diego Martinez Jr. is a 48 y.o. male.    Chief Complaint: Pressure Ulcer (Right hip - Stage 4 /Left ischial - Stage 4 /Left medial upper thigh- Stage 3)    HPI   Mr. Martinez is a long time patient of [facility].    He comes via wheelchair with a care attendant present with him at all times.    He does have a history of quadriplegia.  He has home health who assist him with his wound care daily and care attendants to provide care at other times.  He has a very pleasant demeanor and added to an is usually jovial in spite of his challenges.  He now has [employer].    We have been using powdered collagen on most of the wounds and they have made impressive progress with that product.   We will begin using collagen with silver is to keep the wound progressing.    **The sacral area appears to be closed and stable.  We are continue to apply silver alginate in this area for protection only.    **The right hip remains stable and clean.  This wound is nearly closed at the base, after several months.    **The left ischial wound continues to progress very well and at this point has a small area remaining open.  Silver nitrate was applied to the small hypergranulated area.  We will progressed to collagen with silver for the time being.    **The left upper medial thigh is a new wound that was discovered over the past two weeks by .  It appears as though there was a wound there previously although the patient does deny that.  The area remains stable today we will continue to use collagen with silver on it.    3/1/23 - Mr. Martinez returns today for followup for his multiple wounds on his lower extremities.  Three of his four wounds remain stable.   However, there is concern over the left ischial wound.  This wound appears to have been pulled wide open during the cleansing process by , resulting in deterioration and increase in depth of the wound.  This will be address with .      3/9/23 - The patient returns today for followup.  All wounds are stable and/or improving at this visit.  ** the sacrum remains closed with no areas of concern at this time.  ** right hip is stable with only a very small remaining opening.  We will continue to put silver alginate over that small opening.  ** the left upper medial thigh is also stable.  There is a large origin present, we will work with that margin once the wound has closed.  We will continue to put silver alginate over the wound.  ** this visit the left ischial has improved over the last visit.  It has again begun to decrease in depth.  We will use collagen with silver daily on this wound.    3/15/23 - Mr. Martinez returns today with several wounds.  The majority of his wounds are stable and are improving, however, today the sacrum has reopened.  Although small, it is concerning as this area took a considerable time to close and must be washed carefully so that it does not quickly reopened to this stage IV pressure injury that it was previously.  We will continue to monitor for deterioration.    We will continue the current treatment plan on all wounds, adding silver alginate to the newly open sacrum.    3/29/23 - the patient returns to clinic today for followup on all wounds.  All of his wounds do appear stable with no deterioration.  We applied powdered collagen to the left ischial wound, and collagen with silver to all other wounds.  All dressings will remain in place until Sunday at which time home health will be allowed to change the dressing.  Of note, the patient did receive a jury summons for jury duty and did request a written excuse, which he was given in light of the fact that he is quadriplegia and does  have multiple wounds making it impact go for him to be sequestered for jury duty for any number of days which would be the risk.    4/12/23 - Mr. Martinez returns today with all wounds stable.  He does have home health who assists with his wound care.  We will switch back to collagen with silver due to the concern of HH regarding variation in dressing changes.     4/27/23 - Today all wounds remain stable.  However, the wound to the left ischial was bleeding a moderate amount.  Silver nitrate x 2 and pressure were used to achieve homeostasis, however, the wound bed/granular tissue is very friable.  This may be due to the use of collagen in the wound bed.  He does take Eliquis.  We will change to silver alginate for the time being to allow the wound bed to settle down.  Silver ag will be used on all wounds for consistency.     5/10/23 - Mr. Martinez returns today for follow up on several wounds.  All wounds do remain stable today, with slight decreases in size with all wounds with the exception of the sacrum.  The sacrum is measuring slightly larger today although there has been no increase in drainage and no S & S of infection.  We will continue to use silver alginate on all wounds.     5/24/23 - The patient returns today for several wounds.    Wound #1, Right posterior hip, has progressed exceptionally well and today is 0.1 x 0.1 x 0.1.  We will leave this wound open and continue to monitor.  Wound #2, left ischial, has deteriorated considerably since the last visit.  There is an increase in depth and the granular tissue is friable.  A large piece of wadded silver alginate was removed today by staff.  There is concern that this wound is being properly dressed by HH.  Wound #3, Sacrum, remains stable with little change.  It appears to be nearly closed.  Wound $4, left medial upper thigh, is stable with no significant change  We will continue to use silver alginate on all open wounds.     6/7/23 - Mr. Martinez returns today  for followup on several wounds.  Some have done except personally well recently which is correct it to his home health.  We are able to close to wounds today.  Wound #1, Right posterior hip, remains stable and today it is still 0.1 x 0.1 x 0.1.  We will leave this wound open 1 additional week and continue to monitor.  Wound #2, left ischial, has improved since the last visit.  There is a decrease in depth and the granular tissue is again stable.  The patient was educated that he should reminder the home health nurses to open this wound very carefully and gently to avoid ripping the tissue open again since it is finally making excellent progress.  Wound #3, Sacrum, this wound, today, we are able to close.  Wound #4, left medial upper thigh, is stable with no significant change although it is slightly larger.  We will continue to use silver alginate on all open wounds.    7/10/23 - Mr. Martinez was last seen on 6/7/23.  Today he returns with is wounds stable:  Wound #1 - Right posterior hip, stable, no change since the last visit, no S & S of infection, no increase in drainage.  Wound #2 - Left ischial, continue to improve, with a very slight decrease in size this week.  Tissue is no longer friable.  Silver nitrate was applied, we will continue to use silver alginate.  Wound #3 - sacrum, reassessed, remains closed  Wound #4 - left medial upper thigh - this wound was debrided, wound margins are rolling and slightly macerated.  Silver nitrate was applied.  We will continue to use silver alginate.  The patient also reported today that he injured his right great toe nail.  Today it was assessed and there was a considerable amount of dried blood noted.  The nail was removed (90%) with a nipper.  We will use Betadine for one week.     7/26/23 - the patient returns today for followup on his several wound:  Wound #1 - Right posterior hip; this wound remains stable with no significant change, no S & S of infection  Wound #2 -  Left ischial, significant increase in size due to overextending of wound bed - increase in width from 0.5 cm to 3.0 cm; extreme care must be used when opening this wound bed asked to not over extend in split the wound open again.  Wound #3 - Sacrum; we are continuing to monitor this wound as there was some maceration.  A callus paring was performed, and we will continue to monitor because there appears to be changes occurring.  Wound #4 - Left medial upper thigh - this wound was selectively debrided and has an increased amount macerated tissue.  We need to ensure that this dressing is being changed daily.    8/9/23 - Mr. Martinez returns today to be seen for three wounds:  Wound #1 - Right posterior hip - this wound continues to remains stable.  There is still some depth, however, there is very little drainage and no signs and symptoms of infection.  We will continue to pack this wound with silver alginate.  Wound #2 - Left ischial, this wound remains at 3.0 cm which is a significant increase over the measurement of 0.5 cm on 07/12/2023.  Increase in size is due to the forcing open of the wound during the cleansing process.  Home health has been reminded that they should open that wound very carefully so is not to over extend the very fragile epithelial tissue.  Additionally, the orders were to use silver alginate on this wound and today mesalt was removed.  They will be reminded they should use the appropriate dressing on the appropriate wounds and me salt should not be used in this wound.  Wound #3 - Sacrum; we are continuing to monitor this area, however, there is no open wound at this time.  It will be reopened if necessary.  Wound #4 - Left medial upper thigh - this wound was selectively debrided today with removal a moderate amount of slough from the wound bed.  There is a large island of epithelial tissue in the wound bed, however, there is still also a large area of macerated tissue around the periwound.  Home  health is reminded that they should be applying me salt to this wound and this wound only.  They should be covering the me salt and wound bed with silver alginate to be changed daily.    8/24/23 - The patient returns today for reassessments his 3 wounds.    Wound #1 - Right posterior hip - remains clean and stable.  There are no signs and symptoms of infection and very little drainage.  We are using silver alginate on this wound.    Wound #2 - Left ischial - 2.7 x 0.5 x 0.2 cm - wound is improved significantly over his last visit on 08/09/2023.  This provider appreciate the care that home health is taking when opening the wound to cleanse in the depth of the wound.  It is obvious that they have not been over extending the wound which is allowing it to progress as it should be progressing now.  We will continue to apply silver alginate on this wound.  Wound #3 - Sacrum - we will continue to monitor the sacrum again as it has now reopened very slightly.  Wound #4 - Left medial upper thigh - this wound remains stable with no signs and symptoms of infection.  The large margin of macerated tissue remains.  Ideally, this could be surgically debrided, however, we may have to do this in clinic at some point with a scalpel.    9/6/23 - Mr. Martinez returns for f/up on the 3 wounds:  Wound #1 - Right posterior hip - remains clean and stable, continuing to decrease in size slowly.  There are no signs and symptoms of infection and very little drainage.  We will continue using silver alginate on this wound.    Wound #2 - Left ischial - 1.5 x 0.5 x 0.2 cm - wound continues to decrease in size with a very small remaining area.  This provider appreciates the care that home health continues to take when opening the wound to cleanse in the depth of the wound.  It is obvious that they have not been over extending the wound which is allowing it to progress as it should be progressing now.  We will continue to apply silver alginate on  this wound.  Wound #3 - Sacrum - has now reclosed but will continue to monitor  Wound #4 - Left medial upper thigh - this wound remains stable with no signs and symptoms of infection.  The large margin of macerated tissue remains.  A large amount of this tissue was selectively debrided off today.  We will focus on removal of the rolled edges each week.    Review of Systems   Musculoskeletal:  Positive for gait problem.   Skin:  Positive for wound.   All other systems reviewed and are negative.        Objective:      Vitals:    09/06/23 1154   BP: 122/63   Pulse: (!) 56   Resp: 18       Physical Exam  Vitals reviewed.   Constitutional:       Appearance: Normal appearance. He is normal weight.   HENT:      Head: Normocephalic.   Cardiovascular:      Rate and Rhythm: Normal rate.      Pulses: Normal pulses.   Pulmonary:      Effort: Pulmonary effort is normal.   Musculoskeletal:      Comments: quadraplegia   Skin:     General: Skin is warm and dry.   Neurological:      General: No focal deficit present.      Mental Status: He is alert and oriented to person, place, and time.   Psychiatric:         Mood and Affect: Mood normal.            Altered Skin Integrity 05/23/22 1337 Right posterior Hip #1 (Active)   05/23/22 1337   Altered Skin Integrity Present on Admission - Did Patient arrive to the hospital with altered skin?: yes   Side: Right   Orientation: posterior   Location: Hip   Wound Number: #1   Is this injury device related?: No   Primary Wound Type:    Description of Altered Skin Integrity:    Ankle-Brachial Index:    Pulses:    Removal Indication and Assessment:    Wound Outcome:    (Retired) Wound Length (cm):    (Retired) Wound Width (cm):    (Retired) Depth (cm):    Wound Description (Comments):    Removal Indications:    Dressing Appearance Moist drainage 09/06/23 1247   Drainage Amount Moderate 09/06/23 1247   Drainage Characteristics/Odor Serosanguineous 09/06/23 1247   Appearance Pink;Moist 09/06/23 1247    Tissue loss description Full thickness 09/06/23 1247   Periwound Area Intact 09/06/23 1247   Wound Edges Open 09/06/23 1247   Wound Length (cm) 0.3 cm 09/06/23 1247   Wound Width (cm) 0.3 cm 09/06/23 1247   Wound Depth (cm) 0.2 cm 09/06/23 1247   Wound Volume (cm^3) 0.018 cm^3 09/06/23 1247   Wound Surface Area (cm^2) 0.09 cm^2 09/06/23 1247   Care Cleansed with:;Wound cleanser 09/06/23 1247   Dressing Applied 09/06/23 1247   Dressing Change Due 09/07/23 09/06/23 1247            Altered Skin Integrity 05/23/22 1353 Left Ischial tuberosity #2 (Active)   05/23/22 1353   Altered Skin Integrity Present on Admission - Did Patient arrive to the hospital with altered skin?: yes   Side: Left   Orientation:    Location: Ischial tuberosity   Wound Number: #2   Is this injury device related?: No   Primary Wound Type:    Description of Altered Skin Integrity:    Ankle-Brachial Index:    Pulses:    Removal Indication and Assessment:    Wound Outcome:    (Retired) Wound Length (cm):    (Retired) Wound Width (cm):    (Retired) Depth (cm):    Wound Description (Comments):    Removal Indications:    Dressing Appearance Moist drainage 09/06/23 1247   Drainage Amount Moderate 09/06/23 1247   Drainage Characteristics/Odor Serosanguineous 09/06/23 1247   Appearance Pink;Moist 09/06/23 1247   Tissue loss description Full thickness 09/06/23 1247   Periwound Area Intact 09/06/23 1247   Wound Edges Open 09/06/23 1247   Wound Length (cm) 1.5 cm 09/06/23 1247   Wound Width (cm) 0.5 cm 09/06/23 1247   Wound Depth (cm) 0.2 cm 09/06/23 1247   Wound Volume (cm^3) 0.15 cm^3 09/06/23 1247   Wound Surface Area (cm^2) 0.75 cm^2 09/06/23 1247   Care Cleansed with:;Wound cleanser 09/06/23 1247   Dressing Applied 09/06/23 1247   Dressing Change Due 09/07/23 09/06/23 1247            Altered Skin Integrity 01/05/23 1351 Left upper;medial Thigh #4  Full thickness tissue loss. Subcutaneous fat may be visible but bone, tendon or muscle are not exposed  (Active)   01/05/23 1351   Altered Skin Integrity Present on Admission - Did Patient arrive to the hospital with altered skin?: yes   Side: Left   Orientation: upper;medial   Location: Thigh   Wound Number: #4   Is this injury device related?: No   Primary Wound Type:    Description of Altered Skin Integrity: Full thickness tissue loss. Subcutaneous fat may be visible but bone, tendon or muscle are not exposed   Ankle-Brachial Index:    Pulses:    Removal Indication and Assessment:    Wound Outcome:    (Retired) Wound Length (cm):    (Retired) Wound Width (cm):    (Retired) Depth (cm):    Wound Description (Comments):    Removal Indications:    Dressing Appearance Moist drainage 09/06/23 1247   Drainage Amount Moderate 09/06/23 1247   Drainage Characteristics/Odor Serosanguineous 09/06/23 1247   Appearance Moist;Pink 09/06/23 1247   Tissue loss description Full thickness 09/06/23 1247   Periwound Area Macerated 09/06/23 1247   Wound Edges Open 09/06/23 1247   Wound Length (cm) 2 cm 09/06/23 1247   Wound Width (cm) 0.5 cm 09/06/23 1247   Wound Depth (cm) 0.2 cm 09/06/23 1247   Wound Volume (cm^3) 0.2 cm^3 09/06/23 1247   Wound Surface Area (cm^2) 1 cm^2 09/06/23 1247   Care Cleansed with:;Wound cleanser 09/06/23 1247   Dressing Applied 09/06/23 1247   Dressing Change Due 09/07/23 09/06/23 1247     09/06/23    Left ischial     Right posterior hip       Left upper medial thigh - pre meagan.                   Left upper medial thigh - post meagan.        (Silver alginate/gentle border on all wounds)   (Just monitoring)                   Assessment:         ICD-10-CM ICD-9-CM   1. Stage IV pressure ulcer of right hip  L89.214 707.04     707.24   2. Pressure injury of left thigh, stage 3  L89.223 707.09     707.23   3. Pressure ulcer of left buttock, stage 4  L89.324 707.05     707.24         Procedures:     Debridement     Date/Time: 9/6/2023 10:56 AM     Performed by: Jessie Bah NP  Authorized by: Jessie Bah  "NP    Time out: Immediately prior to procedure a "time out" was called to verify the correct patient, procedure, equipment, support staff and site/side marked as required.     Consent Done?:  Yes (Verbal)     Preparation: Patient was prepped and draped with clean technique    Local anesthesia used?: No       Wound Details:    Location:  Left leg (Left medial upper thigh)    Type of Debridement:  Non-excisional       Length (cm):  2       Area (sq cm):  1       Width (cm):  0.5       Percent Debrided (%):  100       Depth (cm):  0.2       Total Area Debrided (sq cm):  1    Depth of debridement:  Epidermis/Dermis    Devitalized tissue debrided:  Biofilm, Callus, Exudate and Fibrin    Instruments:  Curette (Dermal)  Bleeding:  Minimal  Method Used:  Pressure  Patient tolerance:  Patient tolerated the procedure well with no immediate complications    Excisional debridement performed:  [] Yes [] No   Selective debridement performed:  [x] Yes [] No   Mechanical debridement performed:  [] Yes [] No   Silver nitrate applied:    [] Yes [] No   Labs ordered this visit:   [] Yes [] No   Imaging ordered this visit:   [] Yes [] No   Tissue pathology and/or culture taken:  [] Yes [] No       MEDICATIONS    Current Outpatient Medications:     apixaban (ELIQUIS) 5 mg Tab, Take 5 mg by mouth once daily at 6am., Disp: , Rfl:     LINZESS 145 mcg Cap capsule, Take 145 mcg by mouth once daily., Disp: , Rfl:     mupirocin (BACTROBAN) 2 % ointment, 2 (two) times daily. Apply to affected area for five days, Disp: , Rfl:    Review of patient's allergies indicates:   Allergen Reactions    Meropenem Anaphylaxis    Penicillins        HOME HEALTH AGENCY:  [employer] [facility]   TIMES PER WEEK/DAYS:  daily     Left ischial is much larger this week - increase in width from 0.5 cm to 3.0 cm!   Please continue to very GENTLY open and clean this wound to avoid splitting of the tissue as we strive to allow this wound to close.  The sacrum is again " being closely monitored due to excessive moisture.     ORDERS:  Right hip: Cleanse with wound cleanser and apply silver alginate to the wound bed (making sure it is touching wound bed), cover with 4x4 gauze and a gentle border - to be changed daily.   Left ischial: Cleanse with wound cleanser and apply silver alginate to the wound bed, cover with 4x4 gauze and a gentle border - to be changed daily.   Left medial upper thigh: Cleanse with wound cleanser and apply silver alginate to the wound bed, cover with 4x4 gauze and a gentle border - to be changed daily.        Follow up in about 2 weeks (around 9/20/2023) for multiple pressure ulcers .        Electronically signed:  Jessie Bah NP

## 2023-09-07 NOTE — PROCEDURES
"Debridement    Date/Time: 9/6/2023 10:56 AM    Performed by: Jessie Bah NP  Authorized by: Jessie Bah NP    Time out: Immediately prior to procedure a "time out" was called to verify the correct patient, procedure, equipment, support staff and site/side marked as required.    Consent Done?:  Yes (Verbal)    Preparation: Patient was prepped and draped with clean technique    Local anesthesia used?: No      Wound Details:    Location:  Left leg (Left medial upper thigh)    Type of Debridement:  Non-excisional       Length (cm):  2       Area (sq cm):  1       Width (cm):  0.5       Percent Debrided (%):  100       Depth (cm):  0.2       Total Area Debrided (sq cm):  1    Depth of debridement:  Epidermis/Dermis    Devitalized tissue debrided:  Biofilm, Callus, Exudate and Fibrin    Instruments:  Curette (Dermal)  Bleeding:  Minimal  Method Used:  Pressure  Patient tolerance:  Patient tolerated the procedure well with no immediate complications    "

## 2023-09-20 ENCOUNTER — HOSPITAL ENCOUNTER (OUTPATIENT)
Dept: WOUND CARE | Facility: HOSPITAL | Age: 48
Discharge: HOME | End: 2023-09-20
Attending: NURSE PRACTITIONER
Payer: MEDICARE

## 2023-09-20 VITALS
HEART RATE: 84 BPM | HEIGHT: 65 IN | WEIGHT: 235 LBS | BODY MASS INDEX: 39.15 KG/M2 | SYSTOLIC BLOOD PRESSURE: 80 MMHG | DIASTOLIC BLOOD PRESSURE: 50 MMHG | RESPIRATION RATE: 18 BRPM

## 2023-09-20 DIAGNOSIS — L89.223 PRESSURE INJURY OF LEFT THIGH, STAGE 3: ICD-10-CM

## 2023-09-20 DIAGNOSIS — G82.50 QUADRIPLEGIA, UNSPECIFIED: ICD-10-CM

## 2023-09-20 DIAGNOSIS — L89.214 STAGE IV PRESSURE ULCER OF RIGHT HIP: Primary | ICD-10-CM

## 2023-09-20 DIAGNOSIS — L89.324 PRESSURE ULCER OF LEFT BUTTOCK, STAGE 4: ICD-10-CM

## 2023-09-20 DIAGNOSIS — L89.154 PRESSURE INJURY OF SACRAL REGION, STAGE 4: ICD-10-CM

## 2023-09-20 PROCEDURE — 97597 DBRDMT OPN WND 1ST 20 CM/<: CPT

## 2023-09-20 PROCEDURE — 27000999 HC MEDICAL RECORD PHOTO DOCUMENTATION

## 2023-09-20 PROCEDURE — 99213 OFFICE O/P EST LOW 20 MIN: CPT | Mod: 25

## 2023-09-20 PROCEDURE — 17250 CHEM CAUT OF GRANLTJ TISSUE: CPT

## 2023-09-20 NOTE — PROCEDURES
"Debridement    Date/Time: 9/20/2023 11:00 AM    Performed by: Jessie Bah NP  Authorized by: Jessie Bah NP    Time out: Immediately prior to procedure a "time out" was called to verify the correct patient, procedure, equipment, support staff and site/side marked as required.    Consent Done?:  Yes (Verbal)    Preparation: Patient was prepped and draped with clean technique    Local anesthesia used?: No      Wound Details:    Location:  Left leg (Medial thigh)    Type of Debridement:  Non-excisional       Length (cm):  1.3       Area (sq cm):  0.91       Width (cm):  0.7       Percent Debrided (%):  100       Depth (cm):  0.2       Total Area Debrided (sq cm):  0.91    Depth of debridement:  Epidermis/Dermis    Devitalized tissue debrided:  Biofilm, Callus, Exudate, Fibrin and Slough    Instruments:  Curette (Dermal)  Bleeding:  Minimal  Hemostasis Achieved: Yes  Method Used:  Pressure and Silver Nitrate    2nd Wound Details:     Debridement - 2nd Wound - General Location: Sacrum.    Type of Debridement:  Non-excisional       Length (cm):  0.5       Area (sq cm):  0.5       Width (cm):  1       Percent Debrided (%):  100       Depth (cm):  0.2       Total Area Debrided (sq cm):  0.5    Depth of debridement:  Epidermis/Dermis    Devitalized tissue debrided:  Biofilm, Callus, Exudate, Fibrin and Slough    Debridement - 2nd Wound - Instruments: Dermal.  Bleeding:  Minimal  Method Used:  Silver Nitrate and Pressure  Patient tolerance:  Patient tolerated the procedure well with no immediate complications    "

## 2023-09-20 NOTE — PROGRESS NOTES
Home Health: [facility] [employer]   Smoker   [] Yes  [x] No  Diabetic   [] Yes   [x] No  Low air loss mattress [x] Yes [] No   Is the patient eligible for a graft, and/or currently grafting?  [] Yes [x] No    If so, which one/size?    Subjective:       Patient ID: Diego Martinez Jr. is a 48 y.o. male.    Chief Complaint: Pressure Ulcer (Right hip - Stage 4 /Left ischial - Stage 4 /Left medial upper thigh- Stage 3/Sacrum - stage 4)    HPI   Mr. Martinez is a long time patient of [facility].    He comes via wheelchair with a care attendant present with him at all times.    He does have a history of quadriplegia.  He has home health who assist him with his wound care daily and care attendants to provide care at other times.  He has a very pleasant demeanor and added to an is usually jovial in spite of his challenges.  He now has [employer].    We have been using powdered collagen on most of the wounds and they have made impressive progress with that product.   We will begin using collagen with silver is to keep the wound progressing.    **The sacral area appears to be closed and stable.  We are continue to apply silver alginate in this area for protection only.    **The right hip remains stable and clean.  This wound is nearly closed at the base, after several months.    **The left ischial wound continues to progress very well and at this point has a small area remaining open.  Silver nitrate was applied to the small hypergranulated area.  We will progressed to collagen with silver for the time being.    **The left upper medial thigh is a new wound that was discovered over the past two weeks by HH.  It appears as though there was a wound there previously although the patient does deny that.  The area remains stable today we will continue to use collagen with silver on it.    3/1/23 - Mr. Martinez returns today for followup for his multiple wounds on his lower extremities.  Three of his four wounds  remain stable.  However, there is concern over the left ischial wound.  This wound appears to have been pulled wide open during the cleansing process by , resulting in deterioration and increase in depth of the wound.  This will be address with .      3/9/23 - The patient returns today for followup.  All wounds are stable and/or improving at this visit.  ** the sacrum remains closed with no areas of concern at this time.  ** right hip is stable with only a very small remaining opening.  We will continue to put silver alginate over that small opening.  ** the left upper medial thigh is also stable.  There is a large origin present, we will work with that margin once the wound has closed.  We will continue to put silver alginate over the wound.  ** this visit the left ischial has improved over the last visit.  It has again begun to decrease in depth.  We will use collagen with silver daily on this wound.    3/15/23 - Mr. Martinez returns today with several wounds.  The majority of his wounds are stable and are improving, however, today the sacrum has reopened.  Although small, it is concerning as this area took a considerable time to close and must be washed carefully so that it does not quickly reopened to this stage IV pressure injury that it was previously.  We will continue to monitor for deterioration.    We will continue the current treatment plan on all wounds, adding silver alginate to the newly open sacrum.    3/29/23 - the patient returns to clinic today for followup on all wounds.  All of his wounds do appear stable with no deterioration.  We applied powdered collagen to the left ischial wound, and collagen with silver to all other wounds.  All dressings will remain in place until Sunday at which time home health will be allowed to change the dressing.  Of note, the patient did receive a jury summons for jury duty and did request a written excuse, which he was given in light of the fact that he is  quadriplegia and does have multiple wounds making it impact go for him to be sequestered for jury duty for any number of days which would be the risk.    4/12/23 - Mr. Martinez returns today with all wounds stable.  He does have home health who assists with his wound care.  We will switch back to collagen with silver due to the concern of HH regarding variation in dressing changes.     4/27/23 - Today all wounds remain stable.  However, the wound to the left ischial was bleeding a moderate amount.  Silver nitrate x 2 and pressure were used to achieve homeostasis, however, the wound bed/granular tissue is very friable.  This may be due to the use of collagen in the wound bed.  He does take Eliquis.  We will change to silver alginate for the time being to allow the wound bed to settle down.  Silver ag will be used on all wounds for consistency.     5/10/23 - Mr. Martinez returns today for follow up on several wounds.  All wounds do remain stable today, with slight decreases in size with all wounds with the exception of the sacrum.  The sacrum is measuring slightly larger today although there has been no increase in drainage and no S & S of infection.  We will continue to use silver alginate on all wounds.     5/24/23 - The patient returns today for several wounds.    Wound #1, Right posterior hip, has progressed exceptionally well and today is 0.1 x 0.1 x 0.1.  We will leave this wound open and continue to monitor.  Wound #2, left ischial, has deteriorated considerably since the last visit.  There is an increase in depth and the granular tissue is friable.  A large piece of wadded silver alginate was removed today by staff.  There is concern that this wound is being properly dressed by HH.  Wound #3, Sacrum, remains stable with little change.  It appears to be nearly closed.  Wound $4, left medial upper thigh, is stable with no significant change  We will continue to use silver alginate on all open wounds.     6/7/23 -  Mr. Martinez returns today for followup on several wounds.  Some have done except personally well recently which is correct it to his home health.  We are able to close to wounds today.  Wound #1, Right posterior hip, remains stable and today it is still 0.1 x 0.1 x 0.1.  We will leave this wound open 1 additional week and continue to monitor.  Wound #2, left ischial, has improved since the last visit.  There is a decrease in depth and the granular tissue is again stable.  The patient was educated that he should reminder the home health nurses to open this wound very carefully and gently to avoid ripping the tissue open again since it is finally making excellent progress.  Wound #3, Sacrum, this wound, today, we are able to close.  Wound #4, left medial upper thigh, is stable with no significant change although it is slightly larger.  We will continue to use silver alginate on all open wounds.    7/10/23 - Mr. Martinez was last seen on 6/7/23.  Today he returns with is wounds stable:  Wound #1 - Right posterior hip, stable, no change since the last visit, no S & S of infection, no increase in drainage.  Wound #2 - Left ischial, continue to improve, with a very slight decrease in size this week.  Tissue is no longer friable.  Silver nitrate was applied, we will continue to use silver alginate.  Wound #3 - sacrum, reassessed, remains closed  Wound #4 - left medial upper thigh - this wound was debrided, wound margins are rolling and slightly macerated.  Silver nitrate was applied.  We will continue to use silver alginate.  The patient also reported today that he injured his right great toe nail.  Today it was assessed and there was a considerable amount of dried blood noted.  The nail was removed (90%) with a nipper.  We will use Betadine for one week.     7/26/23 - the patient returns today for followup on his several wound:  Wound #1 - Right posterior hip; this wound remains stable with no significant change, no S & S of  infection  Wound #2 - Left ischial, significant increase in size due to overextending of wound bed - increase in width from 0.5 cm to 3.0 cm; extreme care must be used when opening this wound bed asked to not over extend in split the wound open again.  Wound #3 - Sacrum; we are continuing to monitor this wound as there was some maceration.  A callus paring was performed, and we will continue to monitor because there appears to be changes occurring.  Wound #4 - Left medial upper thigh - this wound was selectively debrided and has an increased amount macerated tissue.  We need to ensure that this dressing is being changed daily.    8/9/23 - Mr. Martinez returns today to be seen for three wounds:  Wound #1 - Right posterior hip - this wound continues to remains stable.  There is still some depth, however, there is very little drainage and no signs and symptoms of infection.  We will continue to pack this wound with silver alginate.  Wound #2 - Left ischial, this wound remains at 3.0 cm which is a significant increase over the measurement of 0.5 cm on 07/12/2023.  Increase in size is due to the forcing open of the wound during the cleansing process.  Home health has been reminded that they should open that wound very carefully so is not to over extend the very fragile epithelial tissue.  Additionally, the orders were to use silver alginate on this wound and today mesalt was removed.  They will be reminded they should use the appropriate dressing on the appropriate wounds and me salt should not be used in this wound.  Wound #3 - Sacrum; we are continuing to monitor this area, however, there is no open wound at this time.  It will be reopened if necessary.  Wound #4 - Left medial upper thigh - this wound was selectively debrided today with removal a moderate amount of slough from the wound bed.  There is a large island of epithelial tissue in the wound bed, however, there is still also a large area of macerated tissue  around the periwound.  Home health is reminded that they should be applying me salt to this wound and this wound only.  They should be covering the me salt and wound bed with silver alginate to be changed daily.    8/24/23 - The patient returns today for reassessments his 3 wounds.    Wound #1 - Right posterior hip - remains clean and stable.  There are no signs and symptoms of infection and very little drainage.  We are using silver alginate on this wound.    Wound #2 - Left ischial - 2.7 x 0.5 x 0.2 cm - wound is improved significantly over his last visit on 08/09/2023.  This provider appreciate the care that home health is taking when opening the wound to cleanse in the depth of the wound.  It is obvious that they have not been over extending the wound which is allowing it to progress as it should be progressing now.  We will continue to apply silver alginate on this wound.  Wound #3 - Sacrum - we will continue to monitor the sacrum again as it has now reopened very slightly.  Wound #4 - Left medial upper thigh - this wound remains stable with no signs and symptoms of infection.  The large margin of macerated tissue remains.  Ideally, this could be surgically debrided, however, we may have to do this in clinic at some point with a scalpel.    9/6/23 - Mr. Martinez returns for f/up on the 3 wounds:  Wound #1 - Right posterior hip - remains clean and stable, continuing to decrease in size slowly.  There are no signs and symptoms of infection and very little drainage.  We will continue using silver alginate on this wound.    Wound #2 - Left ischial - 1.5 x 0.5 x 0.2 cm - wound continues to decrease in size with a very small remaining area.  This provider appreciates the care that home health continues to take when opening the wound to cleanse in the depth of the wound.  It is obvious that they have not been over extending the wound which is allowing it to progress as it should be progressing now.  We will continue to  apply silver alginate on this wound.  Wound #3 - Sacrum - has now reclosed but will continue to monitor  Wound #4 - Left medial upper thigh - this wound remains stable with no signs and symptoms of infection.  The large margin of macerated tissue remains.  A large amount of this tissue was selectively debrided off today.  We will focus on removal of the rolled edges each week.    9/20/23 - The patient returns today for followup on several wounds:  Wound #1 - Right posterior hip - This wound was mechanically debrided.  It remains clean and stable, continuing to decrease in size slowly.  There are no signs and symptoms of infection and very little drainage.  We will continue using silver alginate on this wound.    Wound #2 - Left ischial - 1.7 x 0.5 x 0.2 cm - wound remains stable with no significant change in size this visit.  The wound has no S & S of infection.  We will continue to apply silver alginate on this wound.  Wound #3 - Sacrum - has reopened.  This wound was selectively debrided today.  We will begin application of collagen and silver alginate.  Wound #4 - Left medial upper thigh - this wound remains stable with no signs and symptoms of infection. It was selectively debrided today and we will begin collagen and silver alginate.      Review of Systems   Musculoskeletal:  Positive for gait problem.   Skin:  Positive for wound.   All other systems reviewed and are negative.        Objective:      Vitals:    09/20/23 1136   BP: (!) 80/50   Pulse: 84   Resp: 18       Physical Exam  Vitals reviewed.   Constitutional:       Appearance: Normal appearance. He is normal weight.   HENT:      Head: Normocephalic.   Cardiovascular:      Rate and Rhythm: Normal rate.      Pulses: Normal pulses.   Pulmonary:      Effort: Pulmonary effort is normal.   Musculoskeletal:      Comments: quadraplegia   Skin:     General: Skin is warm and dry.   Neurological:      General: No focal deficit present.      Mental Status: He is  alert and oriented to person, place, and time.   Psychiatric:         Mood and Affect: Mood normal.            Altered Skin Integrity 05/23/22 1337 Right posterior Hip #1 (Active)   05/23/22 1337   Altered Skin Integrity Present on Admission - Did Patient arrive to the hospital with altered skin?: yes   Side: Right   Orientation: posterior   Location: Hip   Wound Number: #1   Is this injury device related?: No   Primary Wound Type:    Description of Altered Skin Integrity:    Ankle-Brachial Index:    Pulses:    Removal Indication and Assessment:    Wound Outcome:    (Retired) Wound Length (cm):    (Retired) Wound Width (cm):    (Retired) Depth (cm):    Wound Description (Comments):    Removal Indications:    Dressing Appearance Moist drainage 09/20/23 1143   Drainage Amount Moderate 09/20/23 1143   Drainage Characteristics/Odor Serosanguineous 09/20/23 1143   Appearance Pink;Red;Moist 09/20/23 1143   Tissue loss description Full thickness 09/20/23 1143   Periwound Area Dry;[place] 09/20/23 1143   Wound Edges Defined 09/20/23 1143   Wound Length (cm) 0.3 cm 09/20/23 1143   Wound Width (cm) 0.3 cm 09/20/23 1143   Wound Depth (cm) 0.2 cm 09/20/23 1143   Wound Volume (cm^3) 0.018 cm^3 09/20/23 1143   Wound Surface Area (cm^2) 0.09 cm^2 09/20/23 1143   Care Cleansed with:;Wound cleanser 09/20/23 1143   Dressing Applied 09/20/23 1143   Dressing Change Due 09/21/23 09/20/23 1143            Altered Skin Integrity 05/23/22 1353 Left Ischial tuberosity #2 (Active)   05/23/22 1353   Altered Skin Integrity Present on Admission - Did Patient arrive to the hospital with altered skin?: yes   Side: Left   Orientation:    Location: Ischial tuberosity   Wound Number: #2   Is this injury device related?: No   Primary Wound Type:    Description of Altered Skin Integrity:    Ankle-Brachial Index:    Pulses:    Removal Indication and Assessment:    Wound Outcome:    (Retired) Wound Length (cm):    (Retired) Wound Width (cm):    (Retired)  Depth (cm):    Wound Description (Comments):    Removal Indications:    Dressing Appearance Moist drainage 09/20/23 1143   Drainage Amount Moderate 09/20/23 1143   Drainage Characteristics/Odor Serosanguineous 09/20/23 1143   Appearance Pink;Red;Moist 09/20/23 1143   Tissue loss description Full thickness 09/20/23 1143   Periwound Area Dry;[place] 09/20/23 1143   Wound Length (cm) 0.5 cm 09/20/23 1143   Wound Width (cm) 1.7 cm 09/20/23 1143   Wound Depth (cm) 0.2 cm 09/20/23 1143   Wound Volume (cm^3) 0.17 cm^3 09/20/23 1143   Wound Surface Area (cm^2) 0.85 cm^2 09/20/23 1143   Care Cleansed with:;Wound cleanser 09/20/23 1143   Dressing Applied 09/20/23 1143   Dressing Change Due 09/21/23 09/20/23 1143            Altered Skin Integrity 01/05/23 1351 Left upper;medial Thigh #4  Full thickness tissue loss. Subcutaneous fat may be visible but bone, tendon or muscle are not exposed (Active)   01/05/23 1351   Altered Skin Integrity Present on Admission - Did Patient arrive to the hospital with altered skin?: yes   Side: Left   Orientation: upper;medial   Location: Thigh   Wound Number: #4   Is this injury device related?: No   Primary Wound Type:    Description of Altered Skin Integrity: Full thickness tissue loss. Subcutaneous fat may be visible but bone, tendon or muscle are not exposed   Ankle-Brachial Index:    Pulses:    Removal Indication and Assessment:    Wound Outcome:    (Retired) Wound Length (cm):    (Retired) Wound Width (cm):    (Retired) Depth (cm):    Wound Description (Comments):    Removal Indications:    Dressing Appearance Moist drainage 09/20/23 1143   Drainage Amount Moderate 09/20/23 1143   Drainage Characteristics/Odor Serosanguineous 09/20/23 1143   Appearance Pink;Moist;Red 09/20/23 1143   Tissue loss description Full thickness 09/20/23 1143   Periwound Area Dry;[place] 09/20/23 1143   Wound Edges Defined 09/20/23 1143   Wound Length (cm) 1.3 cm 09/20/23 1143   Wound Width (cm) 0.7 cm 09/20/23 1143    Wound Depth (cm) 0.2 cm 09/20/23 1143   Wound Volume (cm^3) 0.182 cm^3 09/20/23 1143   Wound Surface Area (cm^2) 0.91 cm^2 09/20/23 1143   Care Cleansed with:;Wound cleanser 09/20/23 1143   Dressing Applied 09/20/23 1143   Dressing Change Due 09/23/23 09/20/23 1143            Altered Skin Integrity 09/20/23 1148 Sacral spine (Active)   09/20/23 1148   Altered Skin Integrity Present on Admission - Did Patient arrive to the hospital with altered skin?: yes   Side:    Orientation:    Location: Sacral spine   Wound Number:    Is this injury device related?:    Primary Wound Type:    Description of Altered Skin Integrity:    Ankle-Brachial Index:    Pulses:    Removal Indication and Assessment:    Wound Outcome:    (Retired) Wound Length (cm):    (Retired) Wound Width (cm):    (Retired) Depth (cm):    Wound Description (Comments):    Removal Indications:    Dressing Appearance Moist drainage 09/20/23 1143   Drainage Amount Moderate 09/20/23 1143   Drainage Characteristics/Odor Serosanguineous 09/20/23 1143   Appearance Pink;Red;Moist 09/20/23 1143   Tissue loss description Full thickness 09/20/23 1143   Periwound Area Dry;[place] 09/20/23 1143   Wound Edges Defined 09/20/23 1143   Wound Length (cm) 0.5 cm 09/20/23 1143   Wound Width (cm) 1 cm 09/20/23 1143   Wound Depth (cm) 0.2 cm 09/20/23 1143   Wound Volume (cm^3) 0.1 cm^3 09/20/23 1143   Wound Surface Area (cm^2) 0.5 cm^2 09/20/23 1143   Care Cleansed with:;Wound cleanser 09/20/23 1143   Dressing Applied 09/20/23 1143   Dressing Change Due 09/23/23 09/20/23 1143       9/20/23       Right hip (pre)                                              Left ischium (pre)  (Silver alginate, 4x4, gentle border)             (silver alginate, 4x4, gentle border)                                             Left medial thigh (pre)                                  Left medial thigh (pre)                                                                        (Collagen, silver alginate,  "4x4, gentle border)         Sacrum (pre)                                                 Sacrum (post)                                                                        (Collagen, silver alginate, gentle border)                 Assessment:         ICD-10-CM ICD-9-CM   1. Stage IV pressure ulcer of right hip  L89.214 707.04     707.24   2. Pressure ulcer of left buttock, stage 4  L89.324 707.05     707.24   3. Pressure injury of left thigh, stage 3  L89.223 707.09     707.23   4. Pressure injury of sacral region, stage 4  L89.154 707.03     707.24   5. Quadriplegia, unspecified  G82.50 344.00           Procedures:     Debridement     Date/Time: 9/20/2023 11:00 AM     Performed by: Jessie Bah NP  Authorized by: Jessie Bah NP    Time out: Immediately prior to procedure a "time out" was called to verify the correct patient, procedure, equipment, support staff and site/side marked as required.     Consent Done?:  Yes (Verbal)     Preparation: Patient was prepped and draped with clean technique    Local anesthesia used?: No       Wound Details:    Location:  Left leg (Medial thigh)    Type of Debridement:  Non-excisional       Length (cm):  1.3       Area (sq cm):  0.91       Width (cm):  0.7       Percent Debrided (%):  100       Depth (cm):  0.2       Total Area Debrided (sq cm):  0.91    Depth of debridement:  Epidermis/Dermis    Devitalized tissue debrided:  Biofilm, Callus, Exudate, Fibrin and Slough    Instruments:  Curette (Dermal)  Bleeding:  Minimal  Hemostasis Achieved: Yes  Method Used:  Pressure and Silver Nitrate     2nd Wound Details:     Debridement - 2nd Wound - General Location: Sacrum.    Type of Debridement:  Non-excisional       Length (cm):  0.5       Area (sq cm):  0.5       Width (cm):  1       Percent Debrided (%):  100       Depth (cm):  0.2       Total Area Debrided (sq cm):  0.5    Depth of debridement:  Epidermis/Dermis    Devitalized tissue debrided:  Biofilm, Callus, " Exudate, Fibrin and Slough    Debridement - 2nd Wound - Instruments: Dermal.  Bleeding:  Minimal  Method Used:  Silver Nitrate and Pressure  Patient tolerance:  Patient tolerated the procedure well with no immediate complications    Excisional debridement performed:  [] Yes [] No   Selective debridement performed:  [x] Yes [] No   Mechanical debridement performed:  [] Yes [] No   Silver nitrate applied:    [x] Yes [] No   Labs ordered this visit:   [] Yes [] No   Imaging ordered this visit:   [] Yes [] No   Tissue pathology and/or culture taken:  [] Yes [] No       MEDICATIONS    Current Outpatient Medications:     apixaban (ELIQUIS) 5 mg Tab, Take 5 mg by mouth once daily at 6am., Disp: , Rfl:     LINZESS 145 mcg Cap capsule, Take 145 mcg by mouth once daily., Disp: , Rfl:     mupirocin (BACTROBAN) 2 % ointment, 2 (two) times daily. Apply to affected area for five days, Disp: , Rfl:    Review of patient's allergies indicates:   Allergen Reactions    Meropenem Anaphylaxis    Penicillins        HOME HEALTH AGENCY:  [facility]   TIMES PER WEEK/DAYS:  daily     ORDERS:  Right hip: Cleanse with wound cleanser and apply silver alginate to the wound bed (making sure it is touching wound bed), cover with 4x4 gauze and a gentle border - to be changed daily.   Left ischial: Cleanse with wound cleanser and apply silver alginate to the wound bed, cover with 4x4 gauze and a gentle border - to be changed daily.   Left medial upper thigh: Do Not Remove dressing (collagen) until Saturday, on Saturday cleanse with wound cleanser and apply silver alginate to the wound bed, cover with 4x4 and a gentle border - to be changed daily   Sacrum: Do Not Remove dressing (collagen) until Saturday, on Saturday cleanse with wound cleanser and apply silver alginate to the wound bed, cover with 4x4 and a gentle border - to be changed daily     Follow up in 2 weeks (on 10/4/2023) for stretcher.        Electronically signed:  Jessie  Olvin, JAIRON

## 2023-10-04 ENCOUNTER — HOSPITAL ENCOUNTER (OUTPATIENT)
Dept: WOUND CARE | Facility: HOSPITAL | Age: 48
Discharge: HOME | End: 2023-10-04
Attending: NURSE PRACTITIONER
Payer: MEDICARE

## 2023-10-04 VITALS
BODY MASS INDEX: 39.15 KG/M2 | HEART RATE: 51 BPM | RESPIRATION RATE: 18 BRPM | SYSTOLIC BLOOD PRESSURE: 169 MMHG | DIASTOLIC BLOOD PRESSURE: 79 MMHG | HEIGHT: 65 IN | WEIGHT: 235 LBS

## 2023-10-04 DIAGNOSIS — L89.223 PRESSURE INJURY OF LEFT THIGH, STAGE 3: ICD-10-CM

## 2023-10-04 DIAGNOSIS — L89.154 PRESSURE INJURY OF SACRAL REGION, STAGE 4: ICD-10-CM

## 2023-10-04 DIAGNOSIS — G82.50 QUADRIPLEGIA, UNSPECIFIED: ICD-10-CM

## 2023-10-04 DIAGNOSIS — L89.324 PRESSURE ULCER OF LEFT BUTTOCK, STAGE 4: ICD-10-CM

## 2023-10-04 DIAGNOSIS — L89.214 STAGE IV PRESSURE ULCER OF RIGHT HIP: Primary | ICD-10-CM

## 2023-10-04 PROCEDURE — 99212 OFFICE O/P EST SF 10 MIN: CPT

## 2023-10-04 PROCEDURE — 97598 DBRDMT OPN WND ADDL 20CM/<: CPT

## 2023-10-04 PROCEDURE — 27000999 HC MEDICAL RECORD PHOTO DOCUMENTATION

## 2023-10-04 NOTE — PROGRESS NOTES
Home Health: [facility]   Smoker   [] Yes  [x] No  Diabetic   [] Yes   [x] No  Low air loss mattress [x] Yes [] No   Is the patient eligible for a graft, and/or currently grafting?  [] Yes [x] No    If so, which one/size?    Subjective:       Patient ID: Diego Martinez Jr. is a 48 y.o. male.    Chief Complaint: Pressure Ulcer (Right hip - Stage 4 /Left ischial - Stage 4 /Left medial upper thigh- Stage 3/Sacrum - stage 4)    HPI   Mr. Martinez is a long time patient of [facility].    He comes via wheelchair with a care attendant present with him at all times.    He does have a history of quadriplegia.  He has home health who assist him with his wound care daily and care attendants to provide care at other times.  He has a very pleasant demeanor and added to an is usually jovial in spite of his challenges.  He now has [employer].    We have been using powdered collagen on most of the wounds and they have made impressive progress with that product.   We will begin using collagen with silver is to keep the wound progressing.    **The sacral area appears to be closed and stable.  We are continue to apply silver alginate in this area for protection only.    **The right hip remains stable and clean.  This wound is nearly closed at the base, after several months.    **The left ischial wound continues to progress very well and at this point has a small area remaining open.  Silver nitrate was applied to the small hypergranulated area.  We will progressed to collagen with silver for the time being.    **The left upper medial thigh is a new wound that was discovered over the past two weeks by HH.  It appears as though there was a wound there previously although the patient does deny that.  The area remains stable today we will continue to use collagen with silver on it.    3/1/23 - Mr. Martinez returns today for followup for his multiple wounds on his lower extremities.  Three of his four wounds  Patient will not discharge to [facility] today due pending pain prescriptions. Orders were called in to pharmacy when it was thought that patient would discharge home. However, it was determined that this patient's home environment was not suitable for discharge needs.  worked diligently to find placement on this Sunday finding an open bed at [facility]. However, patient had no paper prescription for pain meds,needed for [facility] admission. Patient thought her boyfriend could  her prescription and bring to [facility]. After  not hearing back from the boyfriend it was decided not to transfer patient today. Spoke with Jayshree from [facility] letting them know that this patient would not be arriving today. Medical was notified   remain stable.  However, there is concern over the left ischial wound.  This wound appears to have been pulled wide open during the cleansing process by , resulting in deterioration and increase in depth of the wound.  This will be address with .      3/9/23 - The patient returns today for followup.  All wounds are stable and/or improving at this visit.  ** the sacrum remains closed with no areas of concern at this time.  ** right hip is stable with only a very small remaining opening.  We will continue to put silver alginate over that small opening.  ** the left upper medial thigh is also stable.  There is a large origin present, we will work with that margin once the wound has closed.  We will continue to put silver alginate over the wound.  ** this visit the left ischial has improved over the last visit.  It has again begun to decrease in depth.  We will use collagen with silver daily on this wound.    3/15/23 - Mr. Martinez returns today with several wounds.  The majority of his wounds are stable and are improving, however, today the sacrum has reopened.  Although small, it is concerning as this area took a considerable time to close and must be washed carefully so that it does not quickly reopened to this stage IV pressure injury that it was previously.  We will continue to monitor for deterioration.    We will continue the current treatment plan on all wounds, adding silver alginate to the newly open sacrum.    3/29/23 - the patient returns to clinic today for followup on all wounds.  All of his wounds do appear stable with no deterioration.  We applied powdered collagen to the left ischial wound, and collagen with silver to all other wounds.  All dressings will remain in place until Sunday at which time home health will be allowed to change the dressing.  Of note, the patient did receive a jury summons for jury duty and did request a written excuse, which he was given in light of the fact that he is  quadriplegia and does have multiple wounds making it impact go for him to be sequestered for jury duty for any number of days which would be the risk.    4/12/23 - Mr. Martinez returns today with all wounds stable.  He does have home health who assists with his wound care.  We will switch back to collagen with silver due to the concern of HH regarding variation in dressing changes.     4/27/23 - Today all wounds remain stable.  However, the wound to the left ischial was bleeding a moderate amount.  Silver nitrate x 2 and pressure were used to achieve homeostasis, however, the wound bed/granular tissue is very friable.  This may be due to the use of collagen in the wound bed.  He does take Eliquis.  We will change to silver alginate for the time being to allow the wound bed to settle down.  Silver ag will be used on all wounds for consistency.     5/10/23 - Mr. Martinez returns today for follow up on several wounds.  All wounds do remain stable today, with slight decreases in size with all wounds with the exception of the sacrum.  The sacrum is measuring slightly larger today although there has been no increase in drainage and no S & S of infection.  We will continue to use silver alginate on all wounds.     5/24/23 - The patient returns today for several wounds.    Wound #1, Right posterior hip, has progressed exceptionally well and today is 0.1 x 0.1 x 0.1.  We will leave this wound open and continue to monitor.  Wound #2, left ischial, has deteriorated considerably since the last visit.  There is an increase in depth and the granular tissue is friable.  A large piece of wadded silver alginate was removed today by staff.  There is concern that this wound is being properly dressed by HH.  Wound #3, Sacrum, remains stable with little change.  It appears to be nearly closed.  Wound $4, left medial upper thigh, is stable with no significant change  We will continue to use silver alginate on all open wounds.     6/7/23 -  Mr. Martinez returns today for followup on several wounds.  Some have done except personally well recently which is correct it to his home health.  We are able to close to wounds today.  Wound #1, Right posterior hip, remains stable and today it is still 0.1 x 0.1 x 0.1.  We will leave this wound open 1 additional week and continue to monitor.  Wound #2, left ischial, has improved since the last visit.  There is a decrease in depth and the granular tissue is again stable.  The patient was educated that he should reminder the home health nurses to open this wound very carefully and gently to avoid ripping the tissue open again since it is finally making excellent progress.  Wound #3, Sacrum, this wound, today, we are able to close.  Wound #4, left medial upper thigh, is stable with no significant change although it is slightly larger.  We will continue to use silver alginate on all open wounds.    7/10/23 - Mr. Martinez was last seen on 6/7/23.  Today he returns with is wounds stable:  Wound #1 - Right posterior hip, stable, no change since the last visit, no S & S of infection, no increase in drainage.  Wound #2 - Left ischial, continue to improve, with a very slight decrease in size this week.  Tissue is no longer friable.  Silver nitrate was applied, we will continue to use silver alginate.  Wound #3 - sacrum, reassessed, remains closed  Wound #4 - left medial upper thigh - this wound was debrided, wound margins are rolling and slightly macerated.  Silver nitrate was applied.  We will continue to use silver alginate.  The patient also reported today that he injured his right great toe nail.  Today it was assessed and there was a considerable amount of dried blood noted.  The nail was removed (90%) with a nipper.  We will use Betadine for one week.     7/26/23 - the patient returns today for followup on his several wound:  Wound #1 - Right posterior hip; this wound remains stable with no significant change, no S & S of  infection  Wound #2 - Left ischial, significant increase in size due to overextending of wound bed - increase in width from 0.5 cm to 3.0 cm; extreme care must be used when opening this wound bed asked to not over extend in split the wound open again.  Wound #3 - Sacrum; we are continuing to monitor this wound as there was some maceration.  A callus paring was performed, and we will continue to monitor because there appears to be changes occurring.  Wound #4 - Left medial upper thigh - this wound was selectively debrided and has an increased amount macerated tissue.  We need to ensure that this dressing is being changed daily.    8/9/23 - Mr. Martinez returns today to be seen for three wounds:  Wound #1 - Right posterior hip - this wound continues to remains stable.  There is still some depth, however, there is very little drainage and no signs and symptoms of infection.  We will continue to pack this wound with silver alginate.  Wound #2 - Left ischial, this wound remains at 3.0 cm which is a significant increase over the measurement of 0.5 cm on 07/12/2023.  Increase in size is due to the forcing open of the wound during the cleansing process.  Home health has been reminded that they should open that wound very carefully so is not to over extend the very fragile epithelial tissue.  Additionally, the orders were to use silver alginate on this wound and today mesalt was removed.  They will be reminded they should use the appropriate dressing on the appropriate wounds and me salt should not be used in this wound.  Wound #3 - Sacrum; we are continuing to monitor this area, however, there is no open wound at this time.  It will be reopened if necessary.  Wound #4 - Left medial upper thigh - this wound was selectively debrided today with removal a moderate amount of slough from the wound bed.  There is a large island of epithelial tissue in the wound bed, however, there is still also a large area of macerated tissue  around the periwound.  Home health is reminded that they should be applying me salt to this wound and this wound only.  They should be covering the me salt and wound bed with silver alginate to be changed daily.    8/24/23 - The patient returns today for reassessments his 3 wounds.    Wound #1 - Right posterior hip - remains clean and stable.  There are no signs and symptoms of infection and very little drainage.  We are using silver alginate on this wound.    Wound #2 - Left ischial - 2.7 x 0.5 x 0.2 cm - wound is improved significantly over his last visit on 08/09/2023.  This provider appreciate the care that home health is taking when opening the wound to cleanse in the depth of the wound.  It is obvious that they have not been over extending the wound which is allowing it to progress as it should be progressing now.  We will continue to apply silver alginate on this wound.  Wound #3 - Sacrum - we will continue to monitor the sacrum again as it has now reopened very slightly.  Wound #4 - Left medial upper thigh - this wound remains stable with no signs and symptoms of infection.  The large margin of macerated tissue remains.  Ideally, this could be surgically debrided, however, we may have to do this in clinic at some point with a scalpel.    9/6/23 - Mr. Martinez returns for f/up on the 3 wounds:  Wound #1 - Right posterior hip - remains clean and stable, continuing to decrease in size slowly.  There are no signs and symptoms of infection and very little drainage.  We will continue using silver alginate on this wound.    Wound #2 - Left ischial - 1.5 x 0.5 x 0.2 cm - wound continues to decrease in size with a very small remaining area.  This provider appreciates the care that home health continues to take when opening the wound to cleanse in the depth of the wound.  It is obvious that they have not been over extending the wound which is allowing it to progress as it should be progressing now.  We will continue to  apply silver alginate on this wound.  Wound #3 - Sacrum - has now reclosed but will continue to monitor  Wound #4 - Left medial upper thigh - this wound remains stable with no signs and symptoms of infection.  The large margin of macerated tissue remains.  A large amount of this tissue was selectively debrided off today.  We will focus on removal of the rolled edges each week.    9/20/23 - The patient returns today for followup on several wounds:  Wound #1 - Right posterior hip - This wound was mechanically debrided.  It remains clean and stable, continuing to decrease in size slowly.  There are no signs and symptoms of infection and very little drainage.  We will continue using silver alginate on this wound.    Wound #2 - Left ischial - 1.7 x 0.5 x 0.2 cm - wound remains stable with no significant change in size this visit.  The wound has no S & S of infection.  We will continue to apply silver alginate on this wound.  Wound #3 - Sacrum - has reopened.  This wound was selectively debrided today.  We will begin application of collagen and silver alginate.  Wound #4 - Left medial upper thigh - this wound remains stable with no signs and symptoms of infection. It was selectively debrided today and we will begin collagen and silver alginate.      10/4/23 - Mr. Martinez returns today for f/up on the following wounds:  Wound #1 - Right posterior hip - This wound was mechanically debrided.  It remains clean and stable, with no significant change.  There are no signs and symptoms of infection and very little drainage.  We will continue using silver alginate on this wound.    Wound #2 - Left ischial - 0.5 x 2.0 x 0.2 cm - wound remains stable with no significant change although it is slightly larger than last week.   The wound has no S & S of infection.  We will continue to apply silver alginate on this wound.  Wound #3 - Sacrum - remains open.  This wound was selectively debrided today.  We will continue powdered collagen  and silver alginate.  Wound #4 - Left medial upper thigh - this wound remains stable with no signs and symptoms of infection. It was selectively debrided today and we will begin powdered collagen and silver alginate.     Review of Systems   Musculoskeletal:  Positive for gait problem.   Skin:  Positive for wound.   All other systems reviewed and are negative.        Objective:      Vitals:    10/04/23 1156   BP: (!) 169/79   Pulse: (!) 51   Resp: 18       Physical Exam  Vitals reviewed.   Constitutional:       Appearance: Normal appearance. He is normal weight.   HENT:      Head: Normocephalic.   Cardiovascular:      Rate and Rhythm: Normal rate.      Pulses: Normal pulses.   Pulmonary:      Effort: Pulmonary effort is normal.   Musculoskeletal:      Comments: quadraplegia   Skin:     General: Skin is warm and dry.   Neurological:      General: No focal deficit present.      Mental Status: He is alert and oriented to person, place, and time.   Psychiatric:         Mood and Affect: Mood normal.            Altered Skin Integrity 05/23/22 1337 Right posterior Hip #1 (Active)   05/23/22 1337   Altered Skin Integrity Present on Admission - Did Patient arrive to the hospital with altered skin?: yes   Side: Right   Orientation: posterior   Location: Hip   Wound Number: #1   Is this injury device related?: No   Primary Wound Type:    Description of Altered Skin Integrity:    Ankle-Brachial Index:    Pulses:    Removal Indication and Assessment:    Wound Outcome:    (Retired) Wound Length (cm):    (Retired) Wound Width (cm):    (Retired) Depth (cm):    Wound Description (Comments):    Removal Indications:    Description of Altered Skin Integrity Full thickness tissue loss. Subcutaneous fat may be visible but bone, tendon or muscle are not exposed 10/04/23 1207   Dressing Appearance Moist drainage;Intact 10/04/23 1207   Drainage Amount Moderate 10/04/23 1207   Drainage Characteristics/Odor Serosanguineous 10/04/23 1207    Appearance Intact;Slough 10/04/23 1207   Tissue loss description Full thickness 10/04/23 1207   Periwound Area Intact;Moist 10/04/23 1207   Wound Length (cm) 0.3 cm 10/04/23 1207   Wound Width (cm) 0.3 cm 10/04/23 1207   Wound Depth (cm) 0.2 cm 10/04/23 1207   Wound Volume (cm^3) 0.018 cm^3 10/04/23 1207   Wound Surface Area (cm^2) 0.09 cm^2 10/04/23 1207   Care Cleansed with:;Wound cleanser 10/04/23 1207   Dressing Applied;Other (comment) 10/04/23 1207   Dressing Change Due 10/05/23 10/04/23 1207            Altered Skin Integrity 05/23/22 1353 Left Ischial tuberosity #2 (Active)   05/23/22 1353   Altered Skin Integrity Present on Admission - Did Patient arrive to the hospital with altered skin?: yes   Side: Left   Orientation:    Location: Ischial tuberosity   Wound Number: #2   Is this injury device related?: No   Primary Wound Type:    Description of Altered Skin Integrity:    Ankle-Brachial Index:    Pulses:    Removal Indication and Assessment:    Wound Outcome:    (Retired) Wound Length (cm):    (Retired) Wound Width (cm):    (Retired) Depth (cm):    Wound Description (Comments):    Removal Indications:    Description of Altered Skin Integrity Full thickness tissue loss. Subcutaneous fat may be visible but bone, tendon or muscle are not exposed 10/04/23 1207   Dressing Appearance Intact;Moist drainage 10/04/23 1207   Drainage Amount Moderate 10/04/23 1207   Drainage Characteristics/Odor Serosanguineous 10/04/23 1207   Appearance Intact;Slough 10/04/23 1207   Tissue loss description Full thickness 10/04/23 1207   Periwound Area Intact;Moist 10/04/23 1207   Wound Length (cm) 0.5 cm 10/04/23 1207   Wound Width (cm) 2 cm 10/04/23 1207   Wound Depth (cm) 0.2 cm 10/04/23 1207   Wound Volume (cm^3) 0.2 cm^3 10/04/23 1207   Wound Surface Area (cm^2) 1 cm^2 10/04/23 1207   Care Cleansed with:;Wound cleanser;Debrided 10/04/23 1207   Dressing Applied 10/04/23 1207   Dressing Change Due 10/06/23 10/04/23 1207             Altered Skin Integrity 01/05/23 1351 Left upper;medial Thigh #4  Full thickness tissue loss. Subcutaneous fat may be visible but bone, tendon or muscle are not exposed (Active)   01/05/23 1351   Altered Skin Integrity Present on Admission - Did Patient arrive to the hospital with altered skin?: yes   Side: Left   Orientation: upper;medial   Location: Thigh   Wound Number: #4   Is this injury device related?: No   Primary Wound Type:    Description of Altered Skin Integrity: Full thickness tissue loss. Subcutaneous fat may be visible but bone, tendon or muscle are not exposed   Ankle-Brachial Index:    Pulses:    Removal Indication and Assessment:    Wound Outcome:    (Retired) Wound Length (cm):    (Retired) Wound Width (cm):    (Retired) Depth (cm):    Wound Description (Comments):    Removal Indications:    Description of Altered Skin Integrity Full thickness tissue loss. Subcutaneous fat may be visible but bone, tendon or muscle are not exposed 10/04/23 1207   Dressing Appearance Moist drainage;Intact 10/04/23 1207   Drainage Amount Moderate 10/04/23 1207   Drainage Characteristics/Odor Serosanguineous 10/04/23 1207   Appearance Intact;Slough 10/04/23 1207   Tissue loss description Full thickness 10/04/23 1207   Periwound Area Intact;Moist 10/04/23 1207   Wound Edges Open 10/04/23 1207   Wound Length (cm) 2 cm 10/04/23 1207   Wound Width (cm) 1.3 cm 10/04/23 1207   Wound Depth (cm) 0.2 cm 10/04/23 1207   Wound Volume (cm^3) 0.52 cm^3 10/04/23 1207   Wound Surface Area (cm^2) 2.6 cm^2 10/04/23 1207   Care Cleansed with:;Wound cleanser 10/04/23 1207   Dressing Applied;Other (comment) 10/04/23 1207   Dressing Change Due 10/06/23 10/04/23 1207            Altered Skin Integrity 09/20/23 1148 Sacral spine (Active)   09/20/23 1148   Altered Skin Integrity Present on Admission - Did Patient arrive to the hospital with altered skin?: yes   Side:    Orientation:    Location: Sacral spine   Wound Number:    Is this injury  "device related?:    Primary Wound Type:    Description of Altered Skin Integrity:    Ankle-Brachial Index:    Pulses:    Removal Indication and Assessment:    Wound Outcome:    (Retired) Wound Length (cm):    (Retired) Wound Width (cm):    (Retired) Depth (cm):    Wound Description (Comments):    Removal Indications:    Dressing Appearance Moist drainage;Intact 10/04/23 1207   Drainage Amount Moderate 10/04/23 1207   Drainage Characteristics/Odor Serosanguineous 10/04/23 1207   Appearance Intact;Slough 10/04/23 1207   Tissue loss description Full thickness 10/04/23 1207   Periwound Area Intact;Moist;Pale white 10/04/23 1207   Wound Edges Defined 10/04/23 1207   Wound Length (cm) 0.4 cm 10/04/23 1207   Wound Width (cm) 1 cm 10/04/23 1207   Wound Depth (cm) 0.2 cm 10/04/23 1207   Wound Volume (cm^3) 0.08 cm^3 10/04/23 1207   Wound Surface Area (cm^2) 0.4 cm^2 10/04/23 1207   Care Cleansed with:;Wound cleanser 10/04/23 1207   Dressing Applied;Other (comment) 10/04/23 1207   Dressing Change Due 10/06/23 10/04/23 1207     10/4/23    Sacrum  (Powdered Collagen, calcium alginate, gentle border)        Left ischium  Calcium alginate, gentle border        Left posterior thigh  (Powdered Collagen, calcium alginate, gentle border)                 Assessment:         ICD-10-CM ICD-9-CM   1. Stage IV pressure ulcer of right hip  L89.214 707.04     707.24   2. Pressure ulcer of left buttock, stage 4  L89.324 707.05     707.24   3. Pressure injury of left thigh, stage 3  L89.223 707.09     707.23   4. Pressure injury of sacral region, stage 4  L89.154 707.03     707.24   5. Quadriplegia, unspecified  G82.50 344.00             Procedures:     Debridement     Date/Time: 10/4/2023 11:00 AM     Performed by: Jessie Bah NP  Authorized by: Jessie Bah NP    Time out: Immediately prior to procedure a "time out" was called to verify the correct patient, procedure, equipment, support staff and site/side marked as required.   "   Consent Done?:  Yes (Verbal)     Preparation: Patient was prepped and draped with clean technique    Local anesthesia used?: No       Wound Details:    Location:  Sacrum    Type of Debridement:  Non-excisional       Length (cm):  0.4       Area (sq cm):  0.4       Width (cm):  1       Percent Debrided (%):  100       Depth (cm):  0.2       Total Area Debrided (sq cm):  0.4    Depth of debridement:  Epidermis/Dermis    Devitalized tissue debrided:  Biofilm, Callus, Exudate, Fibrin and Slough    Instruments:  Curette  Bleeding:  Minimal  Hemostasis Achieved: Yes  Method Used:  Pressure     2nd Wound Details:     Location:  Left leg (posterior thigh)    Type of Debridement:  Excisional       Length (cm):  2       Area (sq cm):  2.6       Width (cm):  1.3       Percent Debrided (%):  100       Depth (cm):  0.2       Total Area Debrided (sq cm):  2.6    Depth of debridement:  Epidermis/Dermis    Devitalized tissue debrided:  Biofilm, Callus, Exudate, Fibrin and Slough    Instruments:  Curette, Forceps and Scissors  Patient tolerance:  Patient tolerated the procedure well with no immediate complications    Excisional debridement performed:  [] Yes [] No   Selective debridement performed:  [x] Yes [] No   Mechanical debridement performed:  [] Yes [] No   Silver nitrate applied:    [] Yes [] No   Labs ordered this visit:   [] Yes [] No   Imaging ordered this visit:   [] Yes [] No   Tissue pathology and/or culture taken:  [] Yes [] No       MEDICATIONS    Current Outpatient Medications:     apixaban (ELIQUIS) 5 mg Tab, Take 5 mg by mouth once daily at 6am., Disp: , Rfl:     LINZESS 145 mcg Cap capsule, Take 145 mcg by mouth once daily., Disp: , Rfl:     mupirocin (BACTROBAN) 2 % ointment, 2 (two) times daily. Apply to affected area for five days, Disp: , Rfl:    Review of patient's allergies indicates:   Allergen Reactions    Meropenem Anaphylaxis    Penicillins        [facility]:  [facility]   TIMES PER  WEEK/DAYS:  daily     ORDERS:  Right hip: Cleanse with wound cleanser and apply silver alginate to the wound bed (making sure it is touching wound bed), cover with 4x4 gauze and a gentle border - to be changed daily.   Left ischial: Cleanse with wound cleanser and apply silver alginate to the wound bed, cover with 4x4 gauze and a gentle border - to be changed daily.   Left medial upper thigh: Do Not Remove dressing (powdered collagen) until Saturday, on Saturday cleanse with wound cleanser and apply silver alginate to the wound bed, cover with 4x4 and a gentle border - to be changed daily   Sacrum: Do Not Remove dressing (powdered collagen) until Saturday, on Saturday cleanse with wound cleanser and apply silver alginate to the wound bed, cover with 4x4 and a gentle border - to be changed daily     Follow up in about 2 weeks (around 10/18/2023) for Stretcher.        Electronically signed:  Jessie Bah NP

## 2023-10-04 NOTE — PROCEDURES
"Debridement    Date/Time: 10/4/2023 11:00 AM    Performed by: Jessie Bah NP  Authorized by: Jessie Bah NP    Time out: Immediately prior to procedure a "time out" was called to verify the correct patient, procedure, equipment, support staff and site/side marked as required.    Consent Done?:  Yes (Verbal)    Preparation: Patient was prepped and draped with clean technique    Local anesthesia used?: No      Wound Details:    Location:  Sacrum    Type of Debridement:  Non-excisional       Length (cm):  0.4       Area (sq cm):  0.4       Width (cm):  1       Percent Debrided (%):  100       Depth (cm):  0.2       Total Area Debrided (sq cm):  0.4    Depth of debridement:  Epidermis/Dermis    Devitalized tissue debrided:  Biofilm, Callus, Exudate, Fibrin and Slough    Instruments:  Curette  Bleeding:  Minimal  Hemostasis Achieved: Yes  Method Used:  Pressure    2nd Wound Details:     Location:  Left leg (posterior thigh)    Type of Debridement:  Excisional       Length (cm):  2       Area (sq cm):  2.6       Width (cm):  1.3       Percent Debrided (%):  100       Depth (cm):  0.2       Total Area Debrided (sq cm):  2.6    Depth of debridement:  Epidermis/Dermis    Devitalized tissue debrided:  Biofilm, Callus, Exudate, Fibrin and Slough    Instruments:  Curette, Forceps and Scissors  Patient tolerance:  Patient tolerated the procedure well with no immediate complications    "

## 2023-10-18 ENCOUNTER — HOSPITAL ENCOUNTER (OUTPATIENT)
Dept: WOUND CARE | Facility: HOSPITAL | Age: 48
Discharge: HOME | End: 2023-10-18
Attending: NURSE PRACTITIONER
Payer: MEDICARE

## 2023-10-18 VITALS
HEIGHT: 65 IN | DIASTOLIC BLOOD PRESSURE: 80 MMHG | SYSTOLIC BLOOD PRESSURE: 134 MMHG | BODY MASS INDEX: 39.15 KG/M2 | HEART RATE: 70 BPM | WEIGHT: 235 LBS

## 2023-10-18 DIAGNOSIS — L89.324 PRESSURE ULCER OF LEFT BUTTOCK, STAGE 4: ICD-10-CM

## 2023-10-18 DIAGNOSIS — L89.223 PRESSURE INJURY OF LEFT THIGH, STAGE 3: ICD-10-CM

## 2023-10-18 DIAGNOSIS — G82.50 QUADRIPLEGIA, UNSPECIFIED: ICD-10-CM

## 2023-10-18 DIAGNOSIS — L89.214 STAGE IV PRESSURE ULCER OF RIGHT HIP: Primary | ICD-10-CM

## 2023-10-18 DIAGNOSIS — L89.154 PRESSURE INJURY OF SACRAL REGION, STAGE 4: ICD-10-CM

## 2023-10-18 PROCEDURE — 99212 OFFICE O/P EST SF 10 MIN: CPT

## 2023-10-18 PROCEDURE — 27000999 HC MEDICAL RECORD PHOTO DOCUMENTATION

## 2023-10-18 NOTE — PROGRESS NOTES
Home Health: [facility]   Smoker   [] Yes  [x] No  Diabetic   [] Yes   [x] No  Low air loss mattress [x] Yes [] No   Is the patient eligible for a graft, and/or currently grafting?  [] Yes [x] No      Subjective:       Patient ID: Diego Martinez Jr. is a 48 y.o. male.    Chief Complaint: Pressure Ulcer (Right hip - Stage 4 /Left ischial - Stage 4 /Left medial upper thigh- Stage 3/Sacrum - stage 4/ )    HPI   Mr. Martinez is a long time patient of [facility].    He comes via wheelchair with a care attendant present with him at all times.    He does have a history of quadriplegia.  He has home health who assist him with his wound care daily and care attendants to provide care at other times.  He has a very pleasant demeanor and added to an is usually jovial in spite of his challenges.  He now has [employer].    We have been using powdered collagen on most of the wounds and they have made impressive progress with that product.   We will begin using collagen with silver is to keep the wound progressing.    **The sacral area appears to be closed and stable.  We are continue to apply silver alginate in this area for protection only.    **The right hip remains stable and clean.  This wound is nearly closed at the base, after several months.    **The left ischial wound continues to progress very well and at this point has a small area remaining open.  Silver nitrate was applied to the small hypergranulated area.  We will progressed to collagen with silver for the time being.    **The left upper medial thigh is a new wound that was discovered over the past two weeks by HH.  It appears as though there was a wound there previously although the patient does deny that.  The area remains stable today we will continue to use collagen with silver on it.    3/1/23 - Mr. Martinez returns today for followup for his multiple wounds on his lower extremities.  Three of his four wounds remain stable.  However,  there is concern over the left ischial wound.  This wound appears to have been pulled wide open during the cleansing process by , resulting in deterioration and increase in depth of the wound.  This will be address with .      3/9/23 - The patient returns today for followup.  All wounds are stable and/or improving at this visit.  ** the sacrum remains closed with no areas of concern at this time.  ** right hip is stable with only a very small remaining opening.  We will continue to put silver alginate over that small opening.  ** the left upper medial thigh is also stable.  There is a large origin present, we will work with that margin once the wound has closed.  We will continue to put silver alginate over the wound.  ** this visit the left ischial has improved over the last visit.  It has again begun to decrease in depth.  We will use collagen with silver daily on this wound.    3/15/23 - Mr. Martinez returns today with several wounds.  The majority of his wounds are stable and are improving, however, today the sacrum has reopened.  Although small, it is concerning as this area took a considerable time to close and must be washed carefully so that it does not quickly reopened to this stage IV pressure injury that it was previously.  We will continue to monitor for deterioration.    We will continue the current treatment plan on all wounds, adding silver alginate to the newly open sacrum.    3/29/23 - the patient returns to clinic today for followup on all wounds.  All of his wounds do appear stable with no deterioration.  We applied powdered collagen to the left ischial wound, and collagen with silver to all other wounds.  All dressings will remain in place until Sunday at which time home health will be allowed to change the dressing.  Of note, the patient did receive a jury summons for jury duty and did request a written excuse, which he was given in light of the fact that he is quadriplegia and does have  multiple wounds making it impact go for him to be sequestered for jury duty for any number of days which would be the risk.    4/12/23 - Mr. Martinez returns today with all wounds stable.  He does have home health who assists with his wound care.  We will switch back to collagen with silver due to the concern of HH regarding variation in dressing changes.     4/27/23 - Today all wounds remain stable.  However, the wound to the left ischial was bleeding a moderate amount.  Silver nitrate x 2 and pressure were used to achieve homeostasis, however, the wound bed/granular tissue is very friable.  This may be due to the use of collagen in the wound bed.  He does take Eliquis.  We will change to silver alginate for the time being to allow the wound bed to settle down.  Silver ag will be used on all wounds for consistency.     5/10/23 - Mr. Martinez returns today for follow up on several wounds.  All wounds do remain stable today, with slight decreases in size with all wounds with the exception of the sacrum.  The sacrum is measuring slightly larger today although there has been no increase in drainage and no S & S of infection.  We will continue to use silver alginate on all wounds.     5/24/23 - The patient returns today for several wounds.    Wound #1, Right posterior hip, has progressed exceptionally well and today is 0.1 x 0.1 x 0.1.  We will leave this wound open and continue to monitor.  Wound #2, left ischial, has deteriorated considerably since the last visit.  There is an increase in depth and the granular tissue is friable.  A large piece of wadded silver alginate was removed today by staff.  There is concern that this wound is being properly dressed by HH.  Wound #3, Sacrum, remains stable with little change.  It appears to be nearly closed.  Wound $4, left medial upper thigh, is stable with no significant change  We will continue to use silver alginate on all open wounds.     6/7/23 - Mr. Martinez returns today for  followup on several wounds.  Some have done except personally well recently which is correct it to his home health.  We are able to close to wounds today.  Wound #1, Right posterior hip, remains stable and today it is still 0.1 x 0.1 x 0.1.  We will leave this wound open 1 additional week and continue to monitor.  Wound #2, left ischial, has improved since the last visit.  There is a decrease in depth and the granular tissue is again stable.  The patient was educated that he should reminder the home health nurses to open this wound very carefully and gently to avoid ripping the tissue open again since it is finally making excellent progress.  Wound #3, Sacrum, this wound, today, we are able to close.  Wound #4, left medial upper thigh, is stable with no significant change although it is slightly larger.  We will continue to use silver alginate on all open wounds.    7/10/23 - Mr. Martinez was last seen on 6/7/23.  Today he returns with is wounds stable:  Wound #1 - Right posterior hip, stable, no change since the last visit, no S & S of infection, no increase in drainage.  Wound #2 - Left ischial, continue to improve, with a very slight decrease in size this week.  Tissue is no longer friable.  Silver nitrate was applied, we will continue to use silver alginate.  Wound #3 - sacrum, reassessed, remains closed  Wound #4 - left medial upper thigh - this wound was debrided, wound margins are rolling and slightly macerated.  Silver nitrate was applied.  We will continue to use silver alginate.  The patient also reported today that he injured his right great toe nail.  Today it was assessed and there was a considerable amount of dried blood noted.  The nail was removed (90%) with a nipper.  We will use Betadine for one week.     7/26/23 - the patient returns today for followup on his several wound:  Wound #1 - Right posterior hip; this wound remains stable with no significant change, no S & S of infection  Wound #2 - Left  ischial, significant increase in size due to overextending of wound bed - increase in width from 0.5 cm to 3.0 cm; extreme care must be used when opening this wound bed asked to not over extend in split the wound open again.  Wound #3 - Sacrum; we are continuing to monitor this wound as there was some maceration.  A callus paring was performed, and we will continue to monitor because there appears to be changes occurring.  Wound #4 - Left medial upper thigh - this wound was selectively debrided and has an increased amount macerated tissue.  We need to ensure that this dressing is being changed daily.    8/9/23 - Mr. Martinez returns today to be seen for three wounds:  Wound #1 - Right posterior hip - this wound continues to remains stable.  There is still some depth, however, there is very little drainage and no signs and symptoms of infection.  We will continue to pack this wound with silver alginate.  Wound #2 - Left ischial, this wound remains at 3.0 cm which is a significant increase over the measurement of 0.5 cm on 07/12/2023.  Increase in size is due to the forcing open of the wound during the cleansing process.  Home health has been reminded that they should open that wound very carefully so is not to over extend the very fragile epithelial tissue.  Additionally, the orders were to use silver alginate on this wound and today mesalt was removed.  They will be reminded they should use the appropriate dressing on the appropriate wounds and me salt should not be used in this wound.  Wound #3 - Sacrum; we are continuing to monitor this area, however, there is no open wound at this time.  It will be reopened if necessary.  Wound #4 - Left medial upper thigh - this wound was selectively debrided today with removal a moderate amount of slough from the wound bed.  There is a large island of epithelial tissue in the wound bed, however, there is still also a large area of macerated tissue around the periwound.  Home  health is reminded that they should be applying me salt to this wound and this wound only.  They should be covering the me salt and wound bed with silver alginate to be changed daily.    8/24/23 - The patient returns today for reassessments his 3 wounds.    Wound #1 - Right posterior hip - remains clean and stable.  There are no signs and symptoms of infection and very little drainage.  We are using silver alginate on this wound.    Wound #2 - Left ischial - 2.7 x 0.5 x 0.2 cm - wound is improved significantly over his last visit on 08/09/2023.  This provider appreciate the care that home health is taking when opening the wound to cleanse in the depth of the wound.  It is obvious that they have not been over extending the wound which is allowing it to progress as it should be progressing now.  We will continue to apply silver alginate on this wound.  Wound #3 - Sacrum - we will continue to monitor the sacrum again as it has now reopened very slightly.  Wound #4 - Left medial upper thigh - this wound remains stable with no signs and symptoms of infection.  The large margin of macerated tissue remains.  Ideally, this could be surgically debrided, however, we may have to do this in clinic at some point with a scalpel.    9/6/23 - Mr. Martinez returns for f/up on the 3 wounds:  Wound #1 - Right posterior hip - remains clean and stable, continuing to decrease in size slowly.  There are no signs and symptoms of infection and very little drainage.  We will continue using silver alginate on this wound.    Wound #2 - Left ischial - 1.5 x 0.5 x 0.2 cm - wound continues to decrease in size with a very small remaining area.  This provider appreciates the care that home health continues to take when opening the wound to cleanse in the depth of the wound.  It is obvious that they have not been over extending the wound which is allowing it to progress as it should be progressing now.  We will continue to apply silver alginate on  this wound.  Wound #3 - Sacrum - has now reclosed but will continue to monitor  Wound #4 - Left medial upper thigh - this wound remains stable with no signs and symptoms of infection.  The large margin of macerated tissue remains.  A large amount of this tissue was selectively debrided off today.  We will focus on removal of the rolled edges each week.    9/20/23 - The patient returns today for followup on several wounds:  Wound #1 - Right posterior hip - This wound was mechanically debrided.  It remains clean and stable, continuing to decrease in size slowly.  There are no signs and symptoms of infection and very little drainage.  We will continue using silver alginate on this wound.    Wound #2 - Left ischial - 1.7 x 0.5 x 0.2 cm - wound remains stable with no significant change in size this visit.  The wound has no S & S of infection.  We will continue to apply silver alginate on this wound.  Wound #3 - Sacrum - has reopened.  This wound was selectively debrided today.  We will begin application of collagen and silver alginate.  Wound #4 - Left medial upper thigh - this wound remains stable with no signs and symptoms of infection. It was selectively debrided today and we will begin collagen and silver alginate.      10/4/23 - Mr. Martinez returns today for f/up on the following wounds:  Wound #1 - Right posterior hip - This wound was mechanically debrided.  It remains clean and stable, with no significant change.  There are no signs and symptoms of infection and very little drainage.  We will continue using silver alginate on this wound.    Wound #2 - Left ischial - 0.5 x 2.0 x 0.2 cm - wound remains stable with no significant change although it is slightly larger than last week.   The wound has no S & S of infection.  We will continue to apply silver alginate on this wound.  Wound #3 - Sacrum - remains open.  This wound was selectively debrided today.  We will continue powdered collagen and silver alginate.  Wound  #4 - Left medial upper thigh - this wound remains stable with no signs and symptoms of infection. It was selectively debrided today and we will begin powdered collagen and silver alginate.     10/18/23 - Mr. Martinez is seen today for the f/up on several wounds:  Wound #1 - Right posterior hip - This wound was mechanically debrided.  It remains clean and stable, with no significant change.  There are no signs and symptoms of infection and very little drainage.  We will continue using silver alginate on this wound.    Wound #2 - Left ischial - 0.5 x 1.5 x 0.2 cm - wound remains stable with no significant change although it is slightly larger than last week.   The wound has no S & S of infection.  We will continue to apply silver alginate on this wound.  Wound #3 - Sacrum - remains open.  This wound was selectively debrided today.  We will continue powdered collagen and silver alginate.  Wound #4 - Left medial upper thigh - this wound remains stable with no signs and symptoms of infection. It was mechanically debrided today and we will begin powdered collagen and silver alginate.       Review of Systems   Musculoskeletal:  Positive for gait problem.   Skin:  Positive for wound.   All other systems reviewed and are negative.        Objective:      Vitals:    10/18/23 1317   BP: 134/80   Pulse: 70     Physical Exam  Vitals reviewed.   Constitutional:       Appearance: Normal appearance. He is normal weight.   HENT:      Head: Normocephalic.   Cardiovascular:      Rate and Rhythm: Normal rate.      Pulses: Normal pulses.   Pulmonary:      Effort: Pulmonary effort is normal.   Musculoskeletal:      Comments: quadraplegia   Skin:     General: Skin is warm and dry.   Neurological:      General: No focal deficit present.      Mental Status: He is alert and oriented to person, place, and time.   Psychiatric:         Mood and Affect: Mood normal.            Altered Skin Integrity 05/23/22 1337 Right posterior Hip #1 (Active)    05/23/22 1337   Altered Skin Integrity Present on Admission - Did Patient arrive to the hospital with altered skin?: yes   Side: Right   Orientation: posterior   Location: Hip   Wound Number: #1   Is this injury device related?: No   Primary Wound Type:    Description of Altered Skin Integrity:    Ankle-Brachial Index:    Pulses:    Removal Indication and Assessment:    Wound Outcome:    (Retired) Wound Length (cm):    (Retired) Wound Width (cm):    (Retired) Depth (cm):    Wound Description (Comments):    Removal Indications:    Dressing Appearance Moist drainage 10/18/23 1318   Drainage Amount Moderate 10/18/23 1318   Drainage Characteristics/Odor Serosanguineous 10/18/23 1318   Appearance Pink;Moist 10/18/23 1318   Tissue loss description Full thickness 10/18/23 1318   Periwound Area Intact 10/18/23 1318   Wound Edges Open 10/18/23 1318   Wound Length (cm) 0.3 cm 10/18/23 1318   Wound Width (cm) 0.3 cm 10/18/23 1318   Wound Depth (cm) 0.2 cm 10/18/23 1318   Wound Volume (cm^3) 0.018 cm^3 10/18/23 1318   Wound Surface Area (cm^2) 0.09 cm^2 10/18/23 1318   Care Cleansed with:;Wound cleanser 10/18/23 1318   Dressing Applied 10/18/23 1318   Dressing Change Due 10/19/23 10/18/23 1318            Altered Skin Integrity 05/23/22 1353 Left Ischial tuberosity #2 (Active)   05/23/22 1353   Altered Skin Integrity Present on Admission - Did Patient arrive to the hospital with altered skin?: yes   Side: Left   Orientation:    Location: Ischial tuberosity   Wound Number: #2   Is this injury device related?: No   Primary Wound Type:    Description of Altered Skin Integrity:    Ankle-Brachial Index:    Pulses:    Removal Indication and Assessment:    Wound Outcome:    (Retired) Wound Length (cm):    (Retired) Wound Width (cm):    (Retired) Depth (cm):    Wound Description (Comments):    Removal Indications:    Dressing Appearance Moist drainage 10/18/23 1318   Drainage Amount Moderate 10/18/23 1318   Drainage  Characteristics/Odor Serosanguineous 10/18/23 1318   Appearance Pink;Moist 10/18/23 1318   Tissue loss description Full thickness 10/18/23 1318   Periwound Area Intact 10/18/23 1318   Wound Edges Open 10/18/23 1318   Wound Length (cm) 0.5 cm 10/18/23 1318   Wound Width (cm) 1.5 cm 10/18/23 1318   Wound Depth (cm) 0.2 cm 10/18/23 1318   Wound Volume (cm^3) 0.15 cm^3 10/18/23 1318   Wound Surface Area (cm^2) 0.75 cm^2 10/18/23 1318   Care Cleansed with:;Wound cleanser 10/18/23 1318   Dressing Applied 10/18/23 1318   Dressing Change Due 10/19/23 10/18/23 1318            Altered Skin Integrity 01/05/23 1351 Left upper;medial Thigh #4  Full thickness tissue loss. Subcutaneous fat may be visible but bone, tendon or muscle are not exposed (Active)   01/05/23 1351   Altered Skin Integrity Present on Admission - Did Patient arrive to the hospital with altered skin?: yes   Side: Left   Orientation: upper;medial   Location: Thigh   Wound Number: #4   Is this injury device related?: No   Primary Wound Type:    Description of Altered Skin Integrity: Full thickness tissue loss. Subcutaneous fat may be visible but bone, tendon or muscle are not exposed   Ankle-Brachial Index:    Pulses:    Removal Indication and Assessment:    Wound Outcome:    (Retired) Wound Length (cm):    (Retired) Wound Width (cm):    (Retired) Depth (cm):    Wound Description (Comments):    Removal Indications:    Dressing Appearance Moist drainage 10/18/23 1318   Drainage Amount Moderate 10/18/23 1318   Drainage Characteristics/Odor Serosanguineous 10/18/23 1318   Appearance Pink;Moist 10/18/23 1318   Tissue loss description Full thickness 10/18/23 1318   Periwound Area Intact 10/18/23 1318   Wound Edges Open 10/18/23 1318   Wound Length (cm) 1.5 cm 10/18/23 1318   Wound Width (cm) 1.3 cm 10/18/23 1318   Wound Depth (cm) 0.2 cm 10/18/23 1318   Wound Volume (cm^3) 0.39 cm^3 10/18/23 1318   Wound Surface Area (cm^2) 1.95 cm^2 10/18/23 1318   Care Cleansed  with:;Wound cleanser 10/18/23 1318   Dressing Applied 10/18/23 1318   Dressing Change Due 10/19/23 10/18/23 1318            Altered Skin Integrity 09/20/23 1148 Sacral spine (Active)   09/20/23 1148   Altered Skin Integrity Present on Admission - Did Patient arrive to the hospital with altered skin?: yes   Side:    Orientation:    Location: Sacral spine   Wound Number:    Is this injury device related?:    Primary Wound Type:    Description of Altered Skin Integrity:    Ankle-Brachial Index:    Pulses:    Removal Indication and Assessment:    Wound Outcome:    (Retired) Wound Length (cm):    (Retired) Wound Width (cm):    (Retired) Depth (cm):    Wound Description (Comments):    Removal Indications:    Dressing Appearance Moist drainage 10/18/23 1318   Drainage Amount Moderate 10/18/23 1318   Drainage Characteristics/Odor Serosanguineous 10/18/23 1318   Appearance Pink;Moist 10/18/23 1318   Tissue loss description Full thickness 10/18/23 1318   Periwound Area Intact 10/18/23 1318   Wound Edges Open 10/18/23 1318   Wound Length (cm) 0.2 cm 10/18/23 1318   Wound Width (cm) 1 cm 10/18/23 1318   Wound Depth (cm) 0.2 cm 10/18/23 1318   Wound Volume (cm^3) 0.04 cm^3 10/18/23 1318   Wound Surface Area (cm^2) 0.2 cm^2 10/18/23 1318   Care Cleansed with:;Wound cleanser 10/18/23 1318   Dressing Applied 10/18/23 1318   Dressing Change Due 10/19/23 10/18/23 1318     10/18/23      Right hip (silver alginate)                              Left ischial (silver alginate)       Sacrum  (powdered collagen)                      Left medial upper thigh (powdered collagen)   ML               Assessment:           ICD-10-CM ICD-9-CM   1. Stage IV pressure ulcer of right hip  L89.214 707.04     707.24   2. Pressure injury of left thigh, stage 3  L89.223 707.09     707.23   3. Pressure ulcer of left buttock, stage 4  L89.324 707.05     707.24   4. Pressure injury of sacral region, stage 4  L89.154 707.03     707.24   5. Quadriplegia,  unspecified  G82.50 344.00         Procedures:     Mechanical debridement only    Excisional debridement performed:  [] Yes [] No   Selective debridement performed:  [] Yes [] No   Mechanical debridement performed:  [x] Yes [] No   Silver nitrate applied:    [] Yes [] No   Labs ordered this visit:   [] Yes [] No   Imaging ordered this visit:   [] Yes [] No   Tissue pathology and/or culture taken:  [] Yes [] No       MEDICATIONS    Current Outpatient Medications:     apixaban (ELIQUIS) 5 mg Tab, Take 5 mg by mouth once daily at 6am., Disp: , Rfl:     LINZESS 145 mcg Cap capsule, Take 145 mcg by mouth once daily., Disp: , Rfl:     mupirocin (BACTROBAN) 2 % ointment, 2 (two) times daily. Apply to affected area for five days, Disp: , Rfl:    Review of patient's allergies indicates:   Allergen Reactions    Meropenem Anaphylaxis    Penicillins        HOME HEALTH AGENCY:  [facility]   TIMES PER WEEK/DAYS:  daily   ORDERS:  Right hip: Cleanse with wound cleanser and apply silver alginate to the wound bed (making sure it is touching wound bed), cover with 4x4 gauze and a gentle border - to be changed daily.   Left ischial: Cleanse with wound cleanser and apply silver alginate to the wound bed, cover with 4x4 gauze and a gentle border - to be changed daily.   Left medial upper thigh: Do Not Remove dressing (powdered collagen) until Saturday, on Saturday cleanse with wound cleanser and apply silver alginate to the wound bed, cover with 4x4 and a gentle border - to be changed daily   Sacrum: Do Not Remove dressing (powdered collagen) until Saturday, on Saturday cleanse with wound cleanser and apply silver alginate to the wound bed, cover with 4x4 and a gentle border - to be changed daily       Follow up in about 2 weeks (around 11/1/2023).        Electronically signed:  Jessie Bah NP

## 2023-10-31 ENCOUNTER — HOSPITAL ENCOUNTER (OUTPATIENT)
Dept: WOUND CARE | Facility: HOSPITAL | Age: 48
Discharge: HOME | End: 2023-10-31
Attending: FAMILY MEDICINE
Payer: MEDICARE

## 2023-10-31 VITALS
SYSTOLIC BLOOD PRESSURE: 118 MMHG | DIASTOLIC BLOOD PRESSURE: 61 MMHG | BODY MASS INDEX: 39.15 KG/M2 | RESPIRATION RATE: 18 BRPM | WEIGHT: 235 LBS | HEART RATE: 63 BPM | HEIGHT: 65 IN

## 2023-10-31 DIAGNOSIS — G82.50 QUADRIPLEGIA, UNSPECIFIED: ICD-10-CM

## 2023-10-31 DIAGNOSIS — L89.214 STAGE IV PRESSURE ULCER OF RIGHT HIP: Primary | ICD-10-CM

## 2023-10-31 DIAGNOSIS — L89.324 PRESSURE ULCER OF LEFT BUTTOCK, STAGE 4: ICD-10-CM

## 2023-10-31 DIAGNOSIS — L89.223 PRESSURE INJURY OF LEFT THIGH, STAGE 3: ICD-10-CM

## 2023-10-31 DIAGNOSIS — L89.154 PRESSURE INJURY OF SACRAL REGION, STAGE 4: ICD-10-CM

## 2023-10-31 PROCEDURE — 99212 OFFICE O/P EST SF 10 MIN: CPT

## 2023-10-31 PROCEDURE — 27000999 HC MEDICAL RECORD PHOTO DOCUMENTATION

## 2023-10-31 NOTE — PROGRESS NOTES
Home Health: [facility]   Smoker   [] Yes  [x] No  Diabetic   [] Yes   [x] No  Low air loss mattress [x] Yes [] No   Is the patient eligible for a graft, and/or currently grafting?  [] Yes [x] No      Subjective:       Patient ID: Diego Martinez Jr. is a 48 y.o. male.    Chief Complaint: Pressure Ulcer (Right hip - Stage 4 /Left ischial - Stage 4 /Left medial upper thigh - Stage 3/Sacrum - stage 4)    HPI   Mr. Martinez is a long time patient of [facility].    He comes via wheelchair with a care attendant present with him at all times.    He does have a history of quadriplegia.  He has home health who assist him with his wound care daily and care attendants to provide care at other times.  He has a very pleasant demeanor and added to an is usually jovial in spite of his challenges.  He now has [employer].    6/7/23 - Mr. Martinez returns today for followup on several wounds.  Some have done except personally well recently which is correct it to his home health.  We are able to close to wounds today.  Wound #1, Right posterior hip, remains stable and today it is still 0.1 x 0.1 x 0.1.  We will leave this wound open 1 additional week and continue to monitor.  Wound #2, left ischial, has improved since the last visit.  There is a decrease in depth and the granular tissue is again stable.  The patient was educated that he should reminder the home health nurses to open this wound very carefully and gently to avoid ripping the tissue open again since it is finally making excellent progress.  Wound #3, Sacrum, this wound, today, we are able to close.  Wound #4, left medial upper thigh, is stable with no significant change although it is slightly larger.  We will continue to use silver alginate on all open wounds.    7/10/23 - Mr. Martinez was last seen on 6/7/23.  Today he returns with is wounds stable:  Wound #1 - Right posterior hip, stable, no change since the last visit, no S & S of infection, no  increase in drainage.  Wound #2 - Left ischial, continue to improve, with a very slight decrease in size this week.  Tissue is no longer friable.  Silver nitrate was applied, we will continue to use silver alginate.  Wound #3 - sacrum, reassessed, remains closed  Wound #4 - left medial upper thigh - this wound was debrided, wound margins are rolling and slightly macerated.  Silver nitrate was applied.  We will continue to use silver alginate.  The patient also reported today that he injured his right great toe nail.  Today it was assessed and there was a considerable amount of dried blood noted.  The nail was removed (90%) with a nipper.  We will use Betadine for one week.     7/26/23 - the patient returns today for followup on his several wound:  Wound #1 - Right posterior hip; this wound remains stable with no significant change, no S & S of infection  Wound #2 - Left ischial, significant increase in size due to overextending of wound bed - increase in width from 0.5 cm to 3.0 cm; extreme care must be used when opening this wound bed asked to not over extend in split the wound open again.  Wound #3 - Sacrum; we are continuing to monitor this wound as there was some maceration.  A callus paring was performed, and we will continue to monitor because there appears to be changes occurring.  Wound #4 - Left medial upper thigh - this wound was selectively debrided and has an increased amount macerated tissue.  We need to ensure that this dressing is being changed daily.    8/9/23 - Mr. Martinez returns today to be seen for three wounds:  Wound #1 - Right posterior hip - this wound continues to remains stable.  There is still some depth, however, there is very little drainage and no signs and symptoms of infection.  We will continue to pack this wound with silver alginate.  Wound #2 - Left ischial, this wound remains at 3.0 cm which is a significant increase over the measurement of 0.5 cm on 07/12/2023.  Increase in size  is due to the forcing open of the wound during the cleansing process.  Home health has been reminded that they should open that wound very carefully so is not to over extend the very fragile epithelial tissue.  Additionally, the orders were to use silver alginate on this wound and today mesalt was removed.  They will be reminded they should use the appropriate dressing on the appropriate wounds and me salt should not be used in this wound.  Wound #3 - Sacrum; we are continuing to monitor this area, however, there is no open wound at this time.  It will be reopened if necessary.  Wound #4 - Left medial upper thigh - this wound was selectively debrided today with removal a moderate amount of slough from the wound bed.  There is a large island of epithelial tissue in the wound bed, however, there is still also a large area of macerated tissue around the periwound.  Home health is reminded that they should be applying me salt to this wound and this wound only.  They should be covering the me salt and wound bed with silver alginate to be changed daily.    8/24/23 - The patient returns today for reassessments his 3 wounds.    Wound #1 - Right posterior hip - remains clean and stable.  There are no signs and symptoms of infection and very little drainage.  We are using silver alginate on this wound.    Wound #2 - Left ischial - 2.7 x 0.5 x 0.2 cm - wound is improved significantly over his last visit on 08/09/2023.  This provider appreciate the care that home health is taking when opening the wound to cleanse in the depth of the wound.  It is obvious that they have not been over extending the wound which is allowing it to progress as it should be progressing now.  We will continue to apply silver alginate on this wound.  Wound #3 - Sacrum - we will continue to monitor the sacrum again as it has now reopened very slightly.  Wound #4 - Left medial upper thigh - this wound remains stable with no signs and symptoms of  infection.  The large margin of macerated tissue remains.  Ideally, this could be surgically debrided, however, we may have to do this in clinic at some point with a scalpel.    9/6/23 - Mr. Martinez returns for f/up on the 3 wounds:  Wound #1 - Right posterior hip - remains clean and stable, continuing to decrease in size slowly.  There are no signs and symptoms of infection and very little drainage.  We will continue using silver alginate on this wound.    Wound #2 - Left ischial - 1.5 x 0.5 x 0.2 cm - wound continues to decrease in size with a very small remaining area.  This provider appreciates the care that home health continues to take when opening the wound to cleanse in the depth of the wound.  It is obvious that they have not been over extending the wound which is allowing it to progress as it should be progressing now.  We will continue to apply silver alginate on this wound.  Wound #3 - Sacrum - has now reclosed but will continue to monitor  Wound #4 - Left medial upper thigh - this wound remains stable with no signs and symptoms of infection.  The large margin of macerated tissue remains.  A large amount of this tissue was selectively debrided off today.  We will focus on removal of the rolled edges each week.    9/20/23 - The patient returns today for followup on several wounds:  Wound #1 - Right posterior hip - This wound was mechanically debrided.  It remains clean and stable, continuing to decrease in size slowly.  There are no signs and symptoms of infection and very little drainage.  We will continue using silver alginate on this wound.    Wound #2 - Left ischial - 1.7 x 0.5 x 0.2 cm - wound remains stable with no significant change in size this visit.  The wound has no S & S of infection.  We will continue to apply silver alginate on this wound.  Wound #3 - Sacrum - has reopened.  This wound was selectively debrided today.  We will begin application of collagen and silver alginate.  Wound #4 - Left  medial upper thigh - this wound remains stable with no signs and symptoms of infection. It was selectively debrided today and we will begin collagen and silver alginate.      10/4/23 - Mr. Martinez returns today for f/up on the following wounds:  Wound #1 - Right posterior hip - This wound was mechanically debrided.  It remains clean and stable, with no significant change.  There are no signs and symptoms of infection and very little drainage.  We will continue using silver alginate on this wound.    Wound #2 - Left ischial - 0.5 x 2.0 x 0.2 cm - wound remains stable with no significant change although it is slightly larger than last week.   The wound has no S & S of infection.  We will continue to apply silver alginate on this wound.  Wound #3 - Sacrum - remains open.  This wound was selectively debrided today.  We will continue powdered collagen and silver alginate.  Wound #4 - Left medial upper thigh - this wound remains stable with no signs and symptoms of infection. It was selectively debrided today and we will begin powdered collagen and silver alginate.     10/18/23 - Mr. Martinez is seen today for the f/up on several wounds:  Wound #1 - Right posterior hip - This wound was mechanically debrided.  It remains clean and stable, with no significant change.  There are no signs and symptoms of infection and very little drainage.  We will continue using silver alginate on this wound.    Wound #2 - Left ischial - 0.5 x 1.5 x 0.2 cm - wound remains stable with no significant change although it is slightly larger than last week.   The wound has no S & S of infection.  We will continue to apply silver alginate on this wound.  Wound #3 - Sacrum - remains open.  This wound was selectively debrided today.  We will continue powdered collagen and silver alginate.  Wound #4 - Left medial upper thigh - this wound remains stable with no signs and symptoms of infection. It was mechanically debrided today and we will begin powdered  collagen and silver alginate.     October 31st:  48-year-old black male, who is a quadriplegia, is here for follow up of his for pressure injuries.  The worse 1 at this time is the left ischial, left buttock.  The ulcers appear clean, without obvious secondary infection, foul odor, or discharge.  He has home health services almost every day.    Review of Systems   Constitutional: Negative.    Respiratory: Negative.     Cardiovascular: Negative.    Musculoskeletal:  Positive for gait problem.   Skin:  Positive for wound.        As documented in the HPI.   All other systems reviewed and are negative.        Objective:      Vitals:    10/31/23 1138   BP: 118/61   Pulse: 63   Resp: 18     Physical Exam  Vitals reviewed.   Constitutional:       Appearance: Normal appearance. He is normal weight.   HENT:      Head: Normocephalic.   Cardiovascular:      Rate and Rhythm: Normal rate.      Pulses: Normal pulses.   Pulmonary:      Effort: Pulmonary effort is normal.   Musculoskeletal:      Comments: quadraplegia   Skin:     General: Skin is warm and dry.      Comments: There are 4 pressure injuries on his posterior side.  The left posterior thigh ulcer appears to be the deepest.  There is no obvious secondary infection.  I did mechanical debridements on all of the open pressure injuries.   Neurological:      General: No focal deficit present.      Mental Status: He is alert and oriented to person, place, and time.   Psychiatric:         Mood and Affect: Mood normal.            Altered Skin Integrity 05/23/22 1337 Right posterior Hip #1 (Active)   05/23/22 1337   Altered Skin Integrity Present on Admission - Did Patient arrive to the hospital with altered skin?: yes   Side: Right   Orientation: posterior   Location: Hip   Wound Number: #1   Is this injury device related?: No   Primary Wound Type:    Description of Altered Skin Integrity:    Ankle-Brachial Index:    Pulses:    Removal Indication and Assessment:    Wound  Outcome:    (Retired) Wound Length (cm):    (Retired) Wound Width (cm):    (Retired) Depth (cm):    Wound Description (Comments):    Removal Indications:    Dressing Appearance Moist drainage 10/31/23 1139   Drainage Amount Moderate 10/31/23 1139   Drainage Characteristics/Odor Serosanguineous 10/31/23 1139   Appearance [place] 10/31/23 1139   Tissue loss description Full thickness 10/31/23 1139   Periwound Area Intact 10/31/23 1139   Wound Edges Open 10/31/23 1139   Wound Length (cm) 0.3 cm 10/31/23 1139   Wound Width (cm) 0.3 cm 10/31/23 1139   Wound Depth (cm) 0.2 cm 10/31/23 1139   Wound Volume (cm^3) 0.018 cm^3 10/31/23 1139   Wound Surface Area (cm^2) 0.09 cm^2 10/31/23 1139   Care Cleansed with:;Wound cleanser 10/31/23 1139   Dressing Applied 10/31/23 1205   Dressing Change Due 11/01/23 10/31/23 1205            Altered Skin Integrity 05/23/22 1353 Left Ischial tuberosity #2 (Active)   05/23/22 1353   Altered Skin Integrity Present on Admission - Did Patient arrive to the hospital with altered skin?: yes   Side: Left   Orientation:    Location: Ischial tuberosity   Wound Number: #2   Is this injury device related?: No   Primary Wound Type:    Description of Altered Skin Integrity:    Ankle-Brachial Index:    Pulses:    Removal Indication and Assessment:    Wound Outcome:    (Retired) Wound Length (cm):    (Retired) Wound Width (cm):    (Retired) Depth (cm):    Wound Description (Comments):    Removal Indications:    Dressing Appearance Moist drainage 10/31/23 1139   Drainage Amount Moderate 10/31/23 1139   Drainage Characteristics/Odor Serosanguineous 10/31/23 1139   Appearance Moist 10/31/23 1139   Tissue loss description Full thickness 10/31/23 1139   Periwound Area Intact 10/31/23 1139   Wound Edges Open 10/31/23 1139   Wound Length (cm) 0.8 cm 10/31/23 1139   Wound Width (cm) 1 cm 10/31/23 1139   Wound Depth (cm) 0.2 cm 10/31/23 1139   Wound Volume (cm^3) 0.16 cm^3 10/31/23 1139   Wound Surface Area (cm^2)  0.8 cm^2 10/31/23 1139   Care Cleansed with:;Wound cleanser 10/31/23 1139   Dressing Applied 10/31/23 1205   Dressing Change Due 11/01/23 10/31/23 1205            Altered Skin Integrity 01/05/23 1351 Left upper;medial Thigh #4  Full thickness tissue loss. Subcutaneous fat may be visible but bone, tendon or muscle are not exposed (Active)   01/05/23 1351   Altered Skin Integrity Present on Admission - Did Patient arrive to the hospital with altered skin?: yes   Side: Left   Orientation: upper;medial   Location: Thigh   Wound Number: #4   Is this injury device related?: No   Primary Wound Type:    Description of Altered Skin Integrity: Full thickness tissue loss. Subcutaneous fat may be visible but bone, tendon or muscle are not exposed   Ankle-Brachial Index:    Pulses:    Removal Indication and Assessment:    Wound Outcome:    (Retired) Wound Length (cm):    (Retired) Wound Width (cm):    (Retired) Depth (cm):    Wound Description (Comments):    Removal Indications:    Dressing Appearance Moist drainage 10/31/23 1139   Drainage Amount Moderate 10/31/23 1139   Drainage Characteristics/Odor Serosanguineous 10/31/23 1139   Appearance Moist;Pink;Ecchymotic 10/31/23 1139   Tissue loss description Full thickness 10/31/23 1139   Periwound Area Intact 10/31/23 1139   Wound Edges Open 10/31/23 1139   Wound Length (cm) 2.1 cm 10/31/23 1139   Wound Width (cm) 1.2 cm 10/31/23 1139   Wound Depth (cm) 0.3 cm 10/31/23 1139   Wound Volume (cm^3) 0.756 cm^3 10/31/23 1139   Wound Surface Area (cm^2) 2.52 cm^2 10/31/23 1139   Care Cleansed with:;Wound cleanser 10/31/23 1139   Dressing Applied 10/31/23 1205   Dressing Change Due 11/01/23 10/31/23 1205            Altered Skin Integrity 09/20/23 1148 Sacral spine (Active)   09/20/23 1148   Altered Skin Integrity Present on Admission - Did Patient arrive to the hospital with altered skin?: yes   Side:    Orientation:    Location: Sacral spine   Wound Number:    Is this injury device  related?:    Primary Wound Type:    Description of Altered Skin Integrity:    Ankle-Brachial Index:    Pulses:    Removal Indication and Assessment:    Wound Outcome:    (Retired) Wound Length (cm):    (Retired) Wound Width (cm):    (Retired) Depth (cm):    Wound Description (Comments):    Removal Indications:    Dressing Appearance Moist drainage 10/31/23 1139   Drainage Amount Moderate 10/31/23 1139   Drainage Characteristics/Odor Serosanguineous 10/31/23 1139   Appearance [place];Moist 10/31/23 1139   Tissue loss description Full thickness 10/31/23 1139   Periwound Area Intact 10/31/23 1139   Wound Edges Open 10/31/23 1139   Wound Length (cm) 0.2 cm 10/31/23 1139   Wound Width (cm) 0.8 cm 10/31/23 1139   Wound Depth (cm) 0.4 cm 10/31/23 1139   Wound Volume (cm^3) 0.064 cm^3 10/31/23 1139   Wound Surface Area (cm^2) 0.16 cm^2 10/31/23 1139   Care Cleansed with:;Wound cleanser 10/31/23 1139   Dressing Applied 10/31/23 1205   Dressing Change Due 11/01/23 10/31/23 1205      10/31/23      Left posterior thigh                                        Left ischial       Sacrum                                                        Right hip   (Silver alginate, 4x4 gauze, gentle borders)   ML  Assessment:           ICD-10-CM ICD-9-CM   1. Stage IV pressure ulcer of right hip  L89.214 707.04     707.24   2. Pressure injury of left thigh, stage 3  L89.223 707.09     707.23   3. Pressure ulcer of left buttock, stage 4  L89.324 707.05     707.24   4. Pressure injury of sacral region, stage 4  L89.154 707.03     707.24   5. Quadriplegia, unspecified  G82.50 344.00         Procedures:   Mechanical debridement only    Excisional debridement performed:  [] Yes [x] No   Selective debridement performed:  [] Yes [x] No   Mechanical debridement performed:  [x] Yes [] No   Silver nitrate applied:    [] Yes [x] No   Labs ordered this visit:   [] Yes [] No   Imaging ordered this visit:   [] Yes [] No   Tissue pathology and/or culture taken:   [] Yes [] No         MEDICATIONS    Current Outpatient Medications:     apixaban (ELIQUIS) 5 mg Tab, Take 5 mg by mouth once daily at 6am., Disp: , Rfl:     LINZESS 145 mcg Cap capsule, Take 145 mcg by mouth once daily., Disp: , Rfl:     mupirocin (BACTROBAN) 2 % ointment, 2 (two) times daily. Apply to affected area for five days, Disp: , Rfl:    Review of patient's allergies indicates:   Allergen Reactions    Meropenem Anaphylaxis    Penicillins        HOME HEALTH AGENCY:  [facility]   TIMES PER WEEK/DAYS:  daily   ORDERS:  Right hip: Cleanse with wound cleanser and apply silver alginate to the wound bed (making sure it is touching wound bed), cover with 4x4 gauze and a gentle border - to be changed daily.   Left ischial: Cleanse with wound cleanser and apply silver alginate to the wound bed, cover with 4x4 gauze and a gentle border - to be changed daily.   Left medial upper thigh: Cleanse with wound cleanser and apply silver alginate to the wound bed, cover with 4x4 gauze and a gentle border - to be changed daily.   Sacrum: Cleanse with wound cleanser and apply silver alginate to the wound bed, cover with 4x4 gauze and a gentle border - to be changed daily.        Follow up in about 2 weeks (around 11/14/2023).

## 2023-11-14 ENCOUNTER — HOSPITAL ENCOUNTER (OUTPATIENT)
Dept: WOUND CARE | Facility: HOSPITAL | Age: 48
Discharge: HOME | End: 2023-11-14
Attending: NURSE PRACTITIONER
Payer: MEDICARE

## 2023-11-14 VITALS
HEART RATE: 64 BPM | WEIGHT: 235 LBS | HEIGHT: 65 IN | DIASTOLIC BLOOD PRESSURE: 64 MMHG | SYSTOLIC BLOOD PRESSURE: 122 MMHG | RESPIRATION RATE: 18 BRPM | BODY MASS INDEX: 39.15 KG/M2

## 2023-11-14 DIAGNOSIS — L89.154 PRESSURE INJURY OF SACRAL REGION, STAGE 4: ICD-10-CM

## 2023-11-14 DIAGNOSIS — G82.50 QUADRIPLEGIA, UNSPECIFIED: ICD-10-CM

## 2023-11-14 DIAGNOSIS — L89.214 STAGE IV PRESSURE ULCER OF RIGHT HIP: Primary | ICD-10-CM

## 2023-11-14 DIAGNOSIS — L89.324 PRESSURE ULCER OF LEFT BUTTOCK, STAGE 4: ICD-10-CM

## 2023-11-14 DIAGNOSIS — L89.223 PRESSURE INJURY OF LEFT THIGH, STAGE 3: ICD-10-CM

## 2023-11-14 PROCEDURE — 27000999 HC MEDICAL RECORD PHOTO DOCUMENTATION

## 2023-11-14 PROCEDURE — 97597 DBRDMT OPN WND 1ST 20 CM/<: CPT

## 2023-11-14 PROCEDURE — 99213 OFFICE O/P EST LOW 20 MIN: CPT | Mod: 25

## 2023-11-14 NOTE — PROGRESS NOTES
Home Health: [facility]   Smoker   [] Yes  [x] No  Diabetic   [] Yes   [x] No  Low air loss mattress [x] Yes [] No   Is the patient eligible for a graft, and/or currently grafting?  [] Yes [x] No      Subjective:       Patient ID: Diego Martinez Jr. is a 48 y.o. male.    Chief Complaint: Pressure Ulcer (Right hip - Stage 4 /Left ischial - Stage 4 /Left medial upper thigh - Stage 3/Sacrum - stage 4)    HPI   Mr. Martinez is a long time patient of [facility].    He comes via wheelchair with a care attendant present with him at all times.    He does have a history of quadriplegia.  He has home health who assist him with his wound care daily and care attendants to provide care at other times.  He has a very pleasant demeanor and added to an is usually jovial in spite of his challenges.  [facility]    6/7/23 - Mr. Martinez returns today for followup on several wounds.  Some have done except personally well recently which is correct it to his home health.  We are able to close to wounds today.  Wound #1, Right posterior hip, remains stable and today it is still 0.1 x 0.1 x 0.1.  We will leave this wound open 1 additional week and continue to monitor.  Wound #2, left ischial, has improved since the last visit.  There is a decrease in depth and the granular tissue is again stable.  The patient was educated that he should reminder the home health nurses to open this wound very carefully and gently to avoid ripping the tissue open again since it is finally making excellent progress.  Wound #3, Sacrum, this wound, today, we are able to close.  Wound #4, left medial upper thigh, is stable with no significant change although it is slightly larger.  We will continue to use silver alginate on all open wounds.    7/10/23 - Mr. Martinez was last seen on 6/7/23.  Today he returns with is wounds stable:  Wound #1 - Right posterior hip, stable, no change since the last visit, no S & S of infection, no  increase in drainage.  Wound #2 - Left ischial, continue to improve, with a very slight decrease in size this week.  Tissue is no longer friable.  Silver nitrate was applied, we will continue to use silver alginate.  Wound #3 - sacrum, reassessed, remains closed  Wound #4 - left medial upper thigh - this wound was debrided, wound margins are rolling and slightly macerated.  Silver nitrate was applied.  We will continue to use silver alginate.  The patient also reported today that he injured his right great toe nail.  Today it was assessed and there was a considerable amount of dried blood noted.  The nail was removed (90%) with a nipper.  We will use Betadine for one week.     7/26/23 - the patient returns today for followup on his several wound:  Wound #1 - Right posterior hip; this wound remains stable with no significant change, no S & S of infection  Wound #2 - Left ischial, significant increase in size due to overextending of wound bed - increase in width from 0.5 cm to 3.0 cm; extreme care must be used when opening this wound bed asked to not over extend in split the wound open again.  Wound #3 - Sacrum; we are continuing to monitor this wound as there was some maceration.  A callus paring was performed, and we will continue to monitor because there appears to be changes occurring.  Wound #4 - Left medial upper thigh - this wound was selectively debrided and has an increased amount macerated tissue.  We need to ensure that this dressing is being changed daily.    8/9/23 - Mr. Martinez returns today to be seen for three wounds:  Wound #1 - Right posterior hip - this wound continues to remains stable.  There is still some depth, however, there is very little drainage and no signs and symptoms of infection.  We will continue to pack this wound with silver alginate.  Wound #2 - Left ischial, this wound remains at 3.0 cm which is a significant increase over the measurement of 0.5 cm on 07/12/2023.  Increase in size  is due to the forcing open of the wound during the cleansing process.  Home health has been reminded that they should open that wound very carefully so is not to over extend the very fragile epithelial tissue.  Additionally, the orders were to use silver alginate on this wound and today mesalt was removed.  They will be reminded they should use the appropriate dressing on the appropriate wounds and me salt should not be used in this wound.  Wound #3 - Sacrum; we are continuing to monitor this area, however, there is no open wound at this time.  It will be reopened if necessary.  Wound #4 - Left medial upper thigh - this wound was selectively debrided today with removal a moderate amount of slough from the wound bed.  There is a large island of epithelial tissue in the wound bed, however, there is still also a large area of macerated tissue around the periwound.  Home health is reminded that they should be applying me salt to this wound and this wound only.  They should be covering the me salt and wound bed with silver alginate to be changed daily.    8/24/23 - The patient returns today for reassessments his 3 wounds.    Wound #1 - Right posterior hip - remains clean and stable.  There are no signs and symptoms of infection and very little drainage.  We are using silver alginate on this wound.    Wound #2 - Left ischial - 2.7 x 0.5 x 0.2 cm - wound is improved significantly over his last visit on 08/09/2023.  This provider appreciate the care that home health is taking when opening the wound to cleanse in the depth of the wound.  It is obvious that they have not been over extending the wound which is allowing it to progress as it should be progressing now.  We will continue to apply silver alginate on this wound.  Wound #3 - Sacrum - we will continue to monitor the sacrum again as it has now reopened very slightly.  Wound #4 - Left medial upper thigh - this wound remains stable with no signs and symptoms of  infection.  The large margin of macerated tissue remains.  Ideally, this could be surgically debrided, however, we may have to do this in clinic at some point with a scalpel.    9/6/23 - Mr. Martinez returns for f/up on the 3 wounds:  Wound #1 - Right posterior hip - remains clean and stable, continuing to decrease in size slowly.  There are no signs and symptoms of infection and very little drainage.  We will continue using silver alginate on this wound.    Wound #2 - Left ischial - 1.5 x 0.5 x 0.2 cm - wound continues to decrease in size with a very small remaining area.  This provider appreciates the care that home health continues to take when opening the wound to cleanse in the depth of the wound.  It is obvious that they have not been over extending the wound which is allowing it to progress as it should be progressing now.  We will continue to apply silver alginate on this wound.  Wound #3 - Sacrum - has now reclosed but will continue to monitor  Wound #4 - Left medial upper thigh - this wound remains stable with no signs and symptoms of infection.  The large margin of macerated tissue remains.  A large amount of this tissue was selectively debrided off today.  We will focus on removal of the rolled edges each week.    9/20/23 - The patient returns today for followup on several wounds:  Wound #1 - Right posterior hip - This wound was mechanically debrided.  It remains clean and stable, continuing to decrease in size slowly.  There are no signs and symptoms of infection and very little drainage.  We will continue using silver alginate on this wound.    Wound #2 - Left ischial - 1.7 x 0.5 x 0.2 cm - wound remains stable with no significant change in size this visit.  The wound has no S & S of infection.  We will continue to apply silver alginate on this wound.  Wound #3 - Sacrum - has reopened.  This wound was selectively debrided today.  We will begin application of collagen and silver alginate.  Wound #4 - Left  medial upper thigh - this wound remains stable with no signs and symptoms of infection. It was selectively debrided today and we will begin collagen and silver alginate.      10/4/23 - Mr. Martinez returns today for f/up on the following wounds:  Wound #1 - Right posterior hip - This wound was mechanically debrided.  It remains clean and stable, with no significant change.  There are no signs and symptoms of infection and very little drainage.  We will continue using silver alginate on this wound.    Wound #2 - Left ischial - 0.5 x 2.0 x 0.2 cm - wound remains stable with no significant change although it is slightly larger than last week.   The wound has no S & S of infection.  We will continue to apply silver alginate on this wound.  Wound #3 - Sacrum - remains open.  This wound was selectively debrided today.  We will continue powdered collagen and silver alginate.  Wound #4 - Left medial upper thigh - this wound remains stable with no signs and symptoms of infection. It was selectively debrided today and we will begin powdered collagen and silver alginate.     10/18/23 - Mr. Martinez is seen today for the f/up on several wounds:  Wound #1 - Right posterior hip - This wound was mechanically debrided.  It remains clean and stable, with no significant change.  There are no signs and symptoms of infection and very little drainage.  We will continue using silver alginate on this wound.    Wound #2 - Left ischial - 0.5 x 1.5 x 0.2 cm - wound remains stable with no significant change although it is slightly larger than last week.   The wound has no S & S of infection.  We will continue to apply silver alginate on this wound.  Wound #3 - Sacrum - remains open.  This wound was selectively debrided today.  We will continue powdered collagen and silver alginate.  Wound #4 - Left medial upper thigh - this wound remains stable with no signs and symptoms of infection. It was mechanically debrided today and we will begin powdered  collagen and silver alginate.     October 31st:  48-year-old black male, who is a quadriplegia, is here for follow up of his for pressure injuries.  The worse 1 at this time is the left ischial, left buttock.  The ulcers appear clean, without obvious secondary infection, foul odor, or discharge.  He has home health services almost every day.    11/14/23 - Mr. Martinez returns today for followup on several wounds.  All remains stable.  The left ischial does measures slightly larger, however, the wound bed is clean.  It was selectively debrided and there are no signs and symptoms of infection with this wound or any of his wounds at this time.  Wound #1 - Right posterior hip - This wound was mechanically debrided.  It remains clean and stable, with no significant change.  There are no signs and symptoms of infection and very little drainage.  We will continue using silver alginate on this wound.    Wound #2 - Left ischial - 0.8 x 1.0 x 0.2 cm  - wound remains stable with no significant change although it is slightly larger than last week.   The wound has no S & S of infection.  We will continue to apply silver alginate on this wound.  Wound #3 - Sacrum - remains open.  We will continue silver alginate.  Wound #4 - Left medial upper thigh -  4.0 x 2.0 x 0.3 cm- this wound has increased in size slightly, but there are no signs and symptoms of infection. It was selectively debrided today and we will continue silver alginate.     Review of Systems   Constitutional: Negative.    Respiratory: Negative.     Cardiovascular: Negative.    Musculoskeletal:  Positive for gait problem.   Skin:  Positive for wound.        As documented in the HPI.   All other systems reviewed and are negative.        Objective:      Vitals:    11/14/23 1117   BP: 122/64   Pulse: 64   Resp: 18     Physical Exam  Vitals reviewed.   Constitutional:       Appearance: Normal appearance. He is normal weight.   HENT:      Head: Normocephalic.    Cardiovascular:      Rate and Rhythm: Normal rate.      Pulses: Normal pulses.   Pulmonary:      Effort: Pulmonary effort is normal.   Musculoskeletal:      Comments: quadraplegia   Skin:     General: Skin is warm and dry.      Comments: There are 4 pressure injuries on his posterior side.     Neurological:      General: No focal deficit present.      Mental Status: He is alert and oriented to person, place, and time.   Psychiatric:         Mood and Affect: Mood normal.            Altered Skin Integrity 05/23/22 1337 Right posterior Hip #1 (Active)   05/23/22 1337   Altered Skin Integrity Present on Admission - Did Patient arrive to the hospital with altered skin?: yes   Side: Right   Orientation: posterior   Location: Hip   Wound Number: #1   Is this injury device related?: No   Primary Wound Type:    Description of Altered Skin Integrity:    Ankle-Brachial Index:    Pulses:    Removal Indication and Assessment:    Wound Outcome:    (Retired) Wound Length (cm):    (Retired) Wound Width (cm):    (Retired) Depth (cm):    Wound Description (Comments):    Removal Indications:    Dressing Appearance Moist drainage 11/14/23 1126   Drainage Amount Moderate 11/14/23 1126   Drainage Characteristics/Odor Serosanguineous 11/14/23 1126   Appearance Intact;Moist;Granulating 11/14/23 1126   Tissue loss description Full thickness 11/14/23 1126   Periwound Area Intact 11/14/23 1126   Wound Edges Open 11/14/23 1126   Wound Length (cm) 0.5 cm 11/14/23 1126   Wound Width (cm) 0.3 cm 11/14/23 1126   Wound Depth (cm) 0.8 cm 11/14/23 1126   Wound Volume (cm^3) 0.12 cm^3 11/14/23 1126   Wound Surface Area (cm^2) 0.15 cm^2 11/14/23 1126   Care Cleansed with:;Wound cleanser 11/14/23 1126   Dressing Applied;Other (comment) 11/14/23 1126   Dressing Change Due 11/15/23 11/14/23 1126            Altered Skin Integrity 05/23/22 1353 Left Ischial tuberosity #2 (Active)   05/23/22 1353   Altered Skin Integrity Present on Admission - Did Patient  arrive to the hospital with altered skin?: yes   Side: Left   Orientation:    Location: Ischial tuberosity   Wound Number: #2   Is this injury device related?: No   Primary Wound Type:    Description of Altered Skin Integrity:    Ankle-Brachial Index:    Pulses:    Removal Indication and Assessment:    Wound Outcome:    (Retired) Wound Length (cm):    (Retired) Wound Width (cm):    (Retired) Depth (cm):    Wound Description (Comments):    Removal Indications:    Dressing Appearance Moist drainage 11/14/23 1126   Drainage Amount Moderate 11/14/23 1126   Drainage Characteristics/Odor Serosanguineous 11/14/23 1126   Appearance Intact;Moist 11/14/23 1126   Tissue loss description Full thickness 11/14/23 1126   Periwound Area Intact;Moist 11/14/23 1126   Wound Edges Open 11/14/23 1126   Wound Length (cm) 0.8 cm 11/14/23 1126   Wound Width (cm) 1 cm 11/14/23 1126   Wound Depth (cm) 0.2 cm 11/14/23 1126   Wound Volume (cm^3) 0.16 cm^3 11/14/23 1126   Wound Surface Area (cm^2) 0.8 cm^2 11/14/23 1126   Care Cleansed with:;Wound cleanser 11/14/23 1126   Dressing Applied;Other (comment) 11/14/23 1126   Dressing Change Due 11/15/23 11/14/23 1126            Altered Skin Integrity 01/05/23 1351 Left upper;medial Thigh #4  Full thickness tissue loss. Subcutaneous fat may be visible but bone, tendon or muscle are not exposed (Active)   01/05/23 1351   Altered Skin Integrity Present on Admission - Did Patient arrive to the hospital with altered skin?: yes   Side: Left   Orientation: upper;medial   Location: Thigh   Wound Number: #4   Is this injury device related?: No   Primary Wound Type:    Description of Altered Skin Integrity: Full thickness tissue loss. Subcutaneous fat may be visible but bone, tendon or muscle are not exposed   Ankle-Brachial Index:    Pulses:    Removal Indication and Assessment:    Wound Outcome:    (Retired) Wound Length (cm):    (Retired) Wound Width (cm):    (Retired) Depth (cm):    Wound Description  (Comments):    Removal Indications:    Dressing Appearance Moist drainage 11/14/23 1126   Drainage Amount Moderate 11/14/23 1126   Drainage Characteristics/Odor Serosanguineous 11/14/23 1126   Appearance Intact;Granulating;Moist 11/14/23 1126   Tissue loss description Full thickness 11/14/23 1126   Periwound Area Intact 11/14/23 1126   Wound Edges Open 11/14/23 1126   Wound Length (cm) 4 cm 11/14/23 1126   Wound Width (cm) 2 cm 11/14/23 1126   Wound Depth (cm) 0.3 cm 11/14/23 1126   Wound Volume (cm^3) 2.4 cm^3 11/14/23 1126   Wound Surface Area (cm^2) 8 cm^2 11/14/23 1126   Care Cleansed with:;Wound cleanser 11/14/23 1126   Dressing Applied;Other (comment) 11/14/23 1126   Dressing Change Due 11/15/23 11/14/23 1126            Altered Skin Integrity 09/20/23 1148 Sacral spine (Active)   09/20/23 1148   Altered Skin Integrity Present on Admission - Did Patient arrive to the hospital with altered skin?: yes   Side:    Orientation:    Location: Sacral spine   Wound Number:    Is this injury device related?:    Primary Wound Type:    Description of Altered Skin Integrity:    Ankle-Brachial Index:    Pulses:    Removal Indication and Assessment:    Wound Outcome:    (Retired) Wound Length (cm):    (Retired) Wound Width (cm):    (Retired) Depth (cm):    Wound Description (Comments):    Removal Indications:    Dressing Appearance Moist drainage 11/14/23 1126   Drainage Amount Moderate 11/14/23 1126   Drainage Characteristics/Odor Serosanguineous 11/14/23 1126   Appearance Intact;Pink;Moist;Granulating 11/14/23 1126   Tissue loss description Full thickness 11/14/23 1126   Periwound Area Intact;Moist 11/14/23 1126   Wound Edges Open 11/14/23 1126   Wound Length (cm) 0.2 cm 11/14/23 1126   Wound Width (cm) 0.8 cm 11/14/23 1126   Wound Depth (cm) 0.3 cm 11/14/23 1126   Wound Volume (cm^3) 0.048 cm^3 11/14/23 1126   Wound Surface Area (cm^2) 0.16 cm^2 11/14/23 1126   Care Cleansed with:;Wound cleanser 11/14/23 1126   Dressing  "Applied;Other (comment) 11/14/23 1126   Dressing Change Due 11/15/23 11/14/23 1126        11/14/23    Left ischium   Silver alginate, 4x4 gentle foam border dressing  CT        Left upper medial thigh      Left upper medial thigh - post   Silver alginate, 6x6  gentle foam border dressing  CT      Sacrum   Silver alginate, 4x4 gentle foam border dressing   CT      Right hip   Silver alginate, 4x4 gentle foam border dressing  CT    Assessment:           ICD-10-CM ICD-9-CM   1. Stage IV pressure ulcer of right hip  L89.214 707.04     707.24   2. Pressure injury of left thigh, stage 3  L89.223 707.09     707.23   3. Pressure ulcer of left buttock, stage 4  L89.324 707.05     707.24   4. Pressure injury of sacral region, stage 4  L89.154 707.03     707.24   5. Quadriplegia, unspecified  G82.50 344.00           Procedures:     Debridement     Date/Time: 11/14/2023 11:00 AM     Performed by: Jessie Bah NP  Authorized by: Jessie Bah NP    Time out: Immediately prior to procedure a "time out" was called to verify the correct patient, procedure, equipment, support staff and site/side marked as required.        Preparation: Patient was prepped and draped with clean technique    Local anesthesia used?: No       Wound Details:    Location:  Left leg (Upper thigh)    Type of Debridement:  Non-excisional       Length (cm):  4       Area (sq cm):  8       Width (cm):  2       Percent Debrided (%):  100       Depth (cm):  0.3       Total Area Debrided (sq cm):  8    Depth of debridement:  Epidermis/Dermis    Devitalized tissue debrided:  Biofilm, Callus, Exudate, Fibrin and Slough    Instruments:  Curette (Dermal)  Bleeding:  Minimal  Hemostasis Achieved: Yes  Method Used:  Pressure  Patient tolerance:  Patient tolerated the procedure well with no immediate complications     Left ischial - Mechanical debridement  Sacrum - Mechanical debridement  Right hip - Mechanical debridement    Excisional debridement performed: "  [] Yes [] No   Selective debridement performed:  [x] Yes [] No   Mechanical debridement performed:  [] Yes [] No   Silver nitrate applied:    [] Yes [] No   Labs ordered this visit:   [] Yes [] No   Imaging ordered this visit:   [] Yes [] No   Tissue pathology and/or culture taken:  [] Yes [] No         MEDICATIONS    Current Outpatient Medications:     apixaban (ELIQUIS) 5 mg Tab, Take 5 mg by mouth once daily at 6am., Disp: , Rfl:     LINZESS 145 mcg Cap capsule, Take 145 mcg by mouth once daily., Disp: , Rfl:     mupirocin (BACTROBAN) 2 % ointment, 2 (two) times daily. Apply to affected area for five days, Disp: , Rfl:    Review of patient's allergies indicates:   Allergen Reactions    Meropenem Anaphylaxis    Penicillins        HOME HEALTH AGENCY:  [facility] [facility]   TIMES PER WEEK/DAYS:  daily   ORDERS:  Right hip: Cleanse with wound cleanser and apply silver alginate to the wound bed (making sure it is touching wound bed), cover with 4x4 gauze and a gentle border - to be changed daily.   Left ischial: Cleanse with wound cleanser and apply silver alginate to the wound bed, cover with 4x4 gauze and a gentle border - to be changed daily.   Left medial upper thigh: Cleanse with wound cleanser and apply silver alginate to the wound bed, cover with 4x4 gauze and a gentle border - to be changed daily.   Sacrum: Cleanse with wound cleanser and apply silver alginate to the wound bed, cover with 4x4 gauze and a gentle border - to be changed daily.        Follow up in 2 weeks (on 11/28/2023) for pressure ulcers - stretcher .

## 2023-11-14 NOTE — PROCEDURES
"Debridement    Date/Time: 11/14/2023 11:00 AM    Performed by: Jessie Bah NP  Authorized by: Jessie Bah NP    Time out: Immediately prior to procedure a "time out" was called to verify the correct patient, procedure, equipment, support staff and site/side marked as required.      Preparation: Patient was prepped and draped with clean technique    Local anesthesia used?: No      Wound Details:    Location:  Left leg (Upper thigh)    Type of Debridement:  Non-excisional       Length (cm):  4       Area (sq cm):  8       Width (cm):  2       Percent Debrided (%):  100       Depth (cm):  0.3       Total Area Debrided (sq cm):  8    Depth of debridement:  Epidermis/Dermis    Devitalized tissue debrided:  Biofilm, Callus, Exudate, Fibrin and Slough    Instruments:  Curette (Dermal)  Bleeding:  Minimal  Hemostasis Achieved: Yes  Method Used:  Pressure  Patient tolerance:  Patient tolerated the procedure well with no immediate complications    "

## 2023-12-05 ENCOUNTER — HOSPITAL ENCOUNTER (OUTPATIENT)
Dept: WOUND CARE | Facility: HOSPITAL | Age: 48
Discharge: HOME | End: 2023-12-05
Attending: NURSE PRACTITIONER
Payer: MEDICARE

## 2023-12-05 VITALS
SYSTOLIC BLOOD PRESSURE: 161 MMHG | HEART RATE: 52 BPM | RESPIRATION RATE: 18 BRPM | WEIGHT: 235 LBS | HEIGHT: 65 IN | BODY MASS INDEX: 39.15 KG/M2 | DIASTOLIC BLOOD PRESSURE: 83 MMHG

## 2023-12-05 DIAGNOSIS — L89.223 PRESSURE INJURY OF LEFT THIGH, STAGE 3: ICD-10-CM

## 2023-12-05 DIAGNOSIS — L89.324 PRESSURE ULCER OF LEFT BUTTOCK, STAGE 4: ICD-10-CM

## 2023-12-05 DIAGNOSIS — L89.154 PRESSURE INJURY OF SACRAL REGION, STAGE 4: ICD-10-CM

## 2023-12-05 DIAGNOSIS — L89.214 STAGE IV PRESSURE ULCER OF RIGHT HIP: Primary | ICD-10-CM

## 2023-12-05 DIAGNOSIS — G82.50 QUADRIPLEGIA, UNSPECIFIED: ICD-10-CM

## 2023-12-05 PROCEDURE — 99213 OFFICE O/P EST LOW 20 MIN: CPT | Mod: 25

## 2023-12-05 PROCEDURE — 27000999 HC MEDICAL RECORD PHOTO DOCUMENTATION

## 2023-12-05 PROCEDURE — 97597 DBRDMT OPN WND 1ST 20 CM/<: CPT

## 2023-12-05 NOTE — PROGRESS NOTES
Home Health: [facility]   Smoker   [] Yes  [x] No  Diabetic   [] Yes   [x] No  Low air loss mattress [x] Yes [] No   Is the patient eligible for a graft, and/or currently grafting?  [] Yes [x] No     Subjective:       Patient ID: Diego Martinez Jr. is a 48 y.o. male.    Chief Complaint: Pressure Ulcer (Right hip - Stage 4 /Left ischial - Stage 4 /Left medial upper thigh - Stage 3/Sacrum - stage 4)/ /)    HPI   Mr. Martinez is a long time patient of [facility].    He comes via wheelchair with a care attendant present with him at all times.    He does have a history of quadriplegia.  He has home health who assist him with his wound care daily and care attendants to provide care at other times.  He has a very pleasant demeanor and added to an is usually jovial in spite of his challenges.  [facility].    6/7/23 - Mr. Martinez returns today for followup on several wounds.  Some have done except personally well recently which is correct it to his home health.  We are able to close to wounds today.  Wound #1, Right posterior hip, remains stable and today it is still 0.1 x 0.1 x 0.1.  We will leave this wound open 1 additional week and continue to monitor.  Wound #2, left ischial, has improved since the last visit.  There is a decrease in depth and the granular tissue is again stable.  The patient was educated that he should reminder the home health nurses to open this wound very carefully and gently to avoid ripping the tissue open again since it is finally making excellent progress.  Wound #3, Sacrum, this wound, today, we are able to close.  Wound #4, left medial upper thigh, is stable with no significant change although it is slightly larger.  We will continue to use silver alginate on all open wounds.    7/10/23 - Mr. Martinez was last seen on 6/7/23.  Today he returns with is wounds stable:  Wound #1 - Right posterior hip, stable, no change since the last visit, no S & S of infection,  no increase in drainage.  Wound #2 - Left ischial, continue to improve, with a very slight decrease in size this week.  Tissue is no longer friable.  Silver nitrate was applied, we will continue to use silver alginate.  Wound #3 - sacrum, reassessed, remains closed  Wound #4 - left medial upper thigh - this wound was debrided, wound margins are rolling and slightly macerated.  Silver nitrate was applied.  We will continue to use silver alginate.  The patient also reported today that he injured his right great toe nail.  Today it was assessed and there was a considerable amount of dried blood noted.  The nail was removed (90%) with a nipper.  We will use Betadine for one week.     7/26/23 - the patient returns today for followup on his several wound:  Wound #1 - Right posterior hip; this wound remains stable with no significant change, no S & S of infection  Wound #2 - Left ischial, significant increase in size due to overextending of wound bed - increase in width from 0.5 cm to 3.0 cm; extreme care must be used when opening this wound bed asked to not over extend in split the wound open again.  Wound #3 - Sacrum; we are continuing to monitor this wound as there was some maceration.  A callus paring was performed, and we will continue to monitor because there appears to be changes occurring.  Wound #4 - Left medial upper thigh - this wound was selectively debrided and has an increased amount macerated tissue.  We need to ensure that this dressing is being changed daily.    8/9/23 - Mr. Martinez returns today to be seen for three wounds:  Wound #1 - Right posterior hip - this wound continues to remains stable.  There is still some depth, however, there is very little drainage and no signs and symptoms of infection.  We will continue to pack this wound with silver alginate.  Wound #2 - Left ischial, this wound remains at 3.0 cm which is a significant increase over the measurement of 0.5 cm on 07/12/2023.  Increase in  size is due to the forcing open of the wound during the cleansing process.  Home health has been reminded that they should open that wound very carefully so is not to over extend the very fragile epithelial tissue.  Additionally, the orders were to use silver alginate on this wound and today mesalt was removed.  They will be reminded they should use the appropriate dressing on the appropriate wounds and me salt should not be used in this wound.  Wound #3 - Sacrum; we are continuing to monitor this area, however, there is no open wound at this time.  It will be reopened if necessary.  Wound #4 - Left medial upper thigh - this wound was selectively debrided today with removal a moderate amount of slough from the wound bed.  There is a large island of epithelial tissue in the wound bed, however, there is still also a large area of macerated tissue around the periwound.  Home health is reminded that they should be applying me salt to this wound and this wound only.  They should be covering the me salt and wound bed with silver alginate to be changed daily.    8/24/23 - The patient returns today for reassessments his 3 wounds.    Wound #1 - Right posterior hip - remains clean and stable.  There are no signs and symptoms of infection and very little drainage.  We are using silver alginate on this wound.    Wound #2 - Left ischial - 2.7 x 0.5 x 0.2 cm - wound is improved significantly over his last visit on 08/09/2023.  This provider appreciate the care that home health is taking when opening the wound to cleanse in the depth of the wound.  It is obvious that they have not been over extending the wound which is allowing it to progress as it should be progressing now.  We will continue to apply silver alginate on this wound.  Wound #3 - Sacrum - we will continue to monitor the sacrum again as it has now reopened very slightly.  Wound #4 - Left medial upper thigh - this wound remains stable with no signs and symptoms of  infection.  The large margin of macerated tissue remains.  Ideally, this could be surgically debrided, however, we may have to do this in clinic at some point with a scalpel.    9/6/23 - Mr. Martinez returns for f/up on the 3 wounds:  Wound #1 - Right posterior hip - remains clean and stable, continuing to decrease in size slowly.  There are no signs and symptoms of infection and very little drainage.  We will continue using silver alginate on this wound.    Wound #2 - Left ischial - 1.5 x 0.5 x 0.2 cm - wound continues to decrease in size with a very small remaining area.  This provider appreciates the care that home health continues to take when opening the wound to cleanse in the depth of the wound.  It is obvious that they have not been over extending the wound which is allowing it to progress as it should be progressing now.  We will continue to apply silver alginate on this wound.  Wound #3 - Sacrum - has now reclosed but will continue to monitor  Wound #4 - Left medial upper thigh - this wound remains stable with no signs and symptoms of infection.  The large margin of macerated tissue remains.  A large amount of this tissue was selectively debrided off today.  We will focus on removal of the rolled edges each week.    9/20/23 - The patient returns today for followup on several wounds:  Wound #1 - Right posterior hip - This wound was mechanically debrided.  It remains clean and stable, continuing to decrease in size slowly.  There are no signs and symptoms of infection and very little drainage.  We will continue using silver alginate on this wound.    Wound #2 - Left ischial - 1.7 x 0.5 x 0.2 cm - wound remains stable with no significant change in size this visit.  The wound has no S & S of infection.  We will continue to apply silver alginate on this wound.  Wound #3 - Sacrum - has reopened.  This wound was selectively debrided today.  We will begin application of collagen and silver alginate.  Wound #4 - Left  medial upper thigh - this wound remains stable with no signs and symptoms of infection. It was selectively debrided today and we will begin collagen and silver alginate.      10/4/23 - Mr. Martinez returns today for f/up on the following wounds:  Wound #1 - Right posterior hip - This wound was mechanically debrided.  It remains clean and stable, with no significant change.  There are no signs and symptoms of infection and very little drainage.  We will continue using silver alginate on this wound.    Wound #2 - Left ischial - 0.5 x 2.0 x 0.2 cm - wound remains stable with no significant change although it is slightly larger than last week.   The wound has no S & S of infection.  We will continue to apply silver alginate on this wound.  Wound #3 - Sacrum - remains open.  This wound was selectively debrided today.  We will continue powdered collagen and silver alginate.  Wound #4 - Left medial upper thigh - this wound remains stable with no signs and symptoms of infection. It was selectively debrided today and we will begin powdered collagen and silver alginate.     10/18/23 - Mr. Martinez is seen today for the f/up on several wounds:  Wound #1 - Right posterior hip - This wound was mechanically debrided.  It remains clean and stable, with no significant change.  There are no signs and symptoms of infection and very little drainage.  We will continue using silver alginate on this wound.    Wound #2 - Left ischial - 0.5 x 1.5 x 0.2 cm - wound remains stable with no significant change although it is slightly larger than last week.   The wound has no S & S of infection.  We will continue to apply silver alginate on this wound.  Wound #3 - Sacrum - remains open.  This wound was selectively debrided today.  We will continue powdered collagen and silver alginate.  Wound #4 - Left medial upper thigh - this wound remains stable with no signs and symptoms of infection. It was mechanically debrided today and we will begin powdered  collagen and silver alginate.     October 31st:  48-year-old black male, who is a quadriplegia, is here for follow up of his for pressure injuries.  The worse 1 at this time is the left ischial, left buttock.  The ulcers appear clean, without obvious secondary infection, foul odor, or discharge.  He has home health services almost every day.    11/14/23 - Mr. Martinez returns today for followup on several wounds.  All remains stable.  The left ischial does measures slightly larger, however, the wound bed is clean.  It was selectively debrided and there are no signs and symptoms of infection with this wound or any of his wounds at this time.  Wound #1 - Right posterior hip - This wound was mechanically debrided.  It remains clean and stable, with no significant change.  There are no signs and symptoms of infection and very little drainage.  We will continue using silver alginate on this wound.    Wound #2 - Left ischial - 0.8 x 1.0 x 0.2 cm  - wound remains stable with no significant change although it is slightly larger than last week.   The wound has no S & S of infection.  We will continue to apply silver alginate on this wound.  Wound #3 - Sacrum - remains open.  We will continue silver alginate.  Wound #4 - Left medial upper thigh -  4.0 x 2.0 x 0.3 cm- this wound has increased in size slightly, but there are no signs and symptoms of infection. It was selectively debrided today and we will continue silver alginate.     12/5/23 - Mr. Martinez is seen today for f/up on his wounds.  They all remain stable, in various stages.  Today none show S & S of infection, with no increase in drainage, redness, etc.    Wound #1 - Right posterior hip - This wound was mechanically debride.  It does continue to drain a scant amount.  We will continue using silver alginate on this wound.    Wound #2 - Left ischial - 0.5 x 1.0 x 0.2 cm  - wound remains stable with a slight decrease in size.   The wound has no S & S of infection.  We  will continue to apply silver alginate on this wound.  Wound #3 - Sacrum - remains open.  We will continue silver alginate.  Wound #4 - Left medial upper thigh -  2.7 x 1.8 x 0.3 cm - this wound has decreased in size and appears to be doing well.  There are no signs and symptoms of infection. It was selectively debrided today and we will continue silver alginate.     Review of Systems   Constitutional: Negative.    Respiratory: Negative.     Cardiovascular: Negative.    Musculoskeletal:  Positive for gait problem.   Skin:  Positive for wound.        As documented in the HPI.   All other systems reviewed and are negative.        Objective:      Vitals:    12/05/23 1141   BP: (!) 161/83   Pulse: (!) 52   Resp: 18     Physical Exam  Vitals reviewed.   Constitutional:       Appearance: Normal appearance. He is normal weight.   HENT:      Head: Normocephalic.   Cardiovascular:      Rate and Rhythm: Normal rate.      Pulses: Normal pulses.   Pulmonary:      Effort: Pulmonary effort is normal.   Musculoskeletal:      Comments: quadraplegia   Skin:     General: Skin is warm and dry.      Comments: There are 4 pressure injuries on his posterior side.     Neurological:      General: No focal deficit present.      Mental Status: He is alert and oriented to person, place, and time.   Psychiatric:         Mood and Affect: Mood normal.            Altered Skin Integrity 05/23/22 1337 Right posterior Hip #1 (Active)   05/23/22 1337   Altered Skin Integrity Present on Admission - Did Patient arrive to the hospital with altered skin?: yes   Side: Right   Orientation: posterior   Location: Hip   Wound Number: #1   Is this injury device related?: No   Primary Wound Type:    Description of Altered Skin Integrity:    Ankle-Brachial Index:    Pulses:    Removal Indication and Assessment:    Wound Outcome:    (Retired) Wound Length (cm):    (Retired) Wound Width (cm):    (Retired) Depth (cm):    Wound Description (Comments):    Removal  Indications:    Dressing Appearance Moist drainage 12/05/23 1146   Drainage Amount Moderate 12/05/23 1146   Drainage Characteristics/Odor Creamy;Other (see comments) 12/05/23 1146   Appearance Pink;Moist 12/05/23 1146   Tissue loss description Full thickness 12/05/23 1146   Periwound Area Dry;[place] 12/05/23 1146   Wound Edges Defined 12/05/23 1146   Wound Length (cm) 0.5 cm 12/05/23 1146   Wound Width (cm) 0.3 cm 12/05/23 1146   Wound Depth (cm) 0.8 cm 12/05/23 1146   Wound Volume (cm^3) 0.12 cm^3 12/05/23 1146   Wound Surface Area (cm^2) 0.15 cm^2 12/05/23 1146   Care Cleansed with:;Wound cleanser 12/05/23 1146   Dressing Applied 12/05/23 1146   Dressing Change Due 12/06/23 12/05/23 1146            Altered Skin Integrity 05/23/22 1353 Left Ischial tuberosity #2 (Active)   05/23/22 1353   Altered Skin Integrity Present on Admission - Did Patient arrive to the hospital with altered skin?: yes   Side: Left   Orientation:    Location: Ischial tuberosity   Wound Number: #2   Is this injury device related?: No   Primary Wound Type:    Description of Altered Skin Integrity:    Ankle-Brachial Index:    Pulses:    Removal Indication and Assessment:    Wound Outcome:    (Retired) Wound Length (cm):    (Retired) Wound Width (cm):    (Retired) Depth (cm):    Wound Description (Comments):    Removal Indications:    Dressing Appearance Moist drainage 12/05/23 1146   Drainage Amount Moderate 12/05/23 1146   Drainage Characteristics/Odor Serosanguineous 12/05/23 1146   Appearance Pink;Red;Moist 12/05/23 1146   Tissue loss description Full thickness 12/05/23 1146   Periwound Area [place];Dry 12/05/23 1146   Wound Edges Defined 12/05/23 1146   Wound Length (cm) 0.5 cm 12/05/23 1146   Wound Width (cm) 1 cm 12/05/23 1146   Wound Depth (cm) 0.2 cm 12/05/23 1146   Wound Volume (cm^3) 0.1 cm^3 12/05/23 1146   Wound Surface Area (cm^2) 0.5 cm^2 12/05/23 1146   Care Cleansed with:;Wound cleanser 12/05/23 1146   Dressing Applied 12/05/23 1146             Altered Skin Integrity 01/05/23 1351 Left upper;medial Thigh #4  Full thickness tissue loss. Subcutaneous fat may be visible but bone, tendon or muscle are not exposed (Active)   01/05/23 1351   Altered Skin Integrity Present on Admission - Did Patient arrive to the hospital with altered skin?: yes   Side: Left   Orientation: upper;medial   Location: Thigh   Wound Number: #4   Is this injury device related?: No   Primary Wound Type:    Description of Altered Skin Integrity: Full thickness tissue loss. Subcutaneous fat may be visible but bone, tendon or muscle are not exposed   Ankle-Brachial Index:    Pulses:    Removal Indication and Assessment:    Wound Outcome:    (Retired) Wound Length (cm):    (Retired) Wound Width (cm):    (Retired) Depth (cm):    Wound Description (Comments):    Removal Indications:    Dressing Appearance Moist drainage 12/05/23 1146   Drainage Amount Moderate 12/05/23 1146   Drainage Characteristics/Odor Serosanguineous 12/05/23 1146   Appearance Pink;Moist;Red 12/05/23 1146   Tissue loss description Full thickness 12/05/23 1146   Periwound Area Dry;[place] 12/05/23 1146   Wound Edges Defined 12/05/23 1146   Wound Length (cm) 2.7 cm 12/05/23 1146   Wound Width (cm) 1.8 cm 12/05/23 1146   Wound Depth (cm) 0.3 cm 12/05/23 1146   Wound Volume (cm^3) 1.458 cm^3 12/05/23 1146   Wound Surface Area (cm^2) 4.86 cm^2 12/05/23 1146   Care Cleansed with:;Wound cleanser 12/05/23 1146   Dressing Applied 12/05/23 1146   Dressing Change Due 12/06/23 12/05/23 1146            Altered Skin Integrity 09/20/23 1148 Sacral spine (Active)   09/20/23 1148   Altered Skin Integrity Present on Admission - Did Patient arrive to the hospital with altered skin?: yes   Side:    Orientation:    Location: Sacral spine   Wound Number:    Is this injury device related?:    Primary Wound Type:    Description of Altered Skin Integrity:    Ankle-Brachial Index:    Pulses:    Removal Indication and Assessment:    Wound  Outcome:    (Retired) Wound Length (cm):    (Retired) Wound Width (cm):    (Retired) Depth (cm):    Wound Description (Comments):    Removal Indications:    Dressing Appearance Moist drainage 12/05/23 1146   Drainage Amount Moderate 12/05/23 1146   Drainage Characteristics/Odor Serosanguineous 12/05/23 1146   Appearance Pink;Moist 12/05/23 1146   Tissue loss description Full thickness 12/05/23 1146   Periwound Area Dry;[place] 12/05/23 1146   Wound Edges Defined 12/05/23 1146   Wound Length (cm) 0.3 cm 12/05/23 1146   Wound Width (cm) 0.4 cm 12/05/23 1146   Wound Depth (cm) 0.3 cm 12/05/23 1146   Wound Volume (cm^3) 0.036 cm^3 12/05/23 1146   Wound Surface Area (cm^2) 0.12 cm^2 12/05/23 1146   Care Cleansed with:;Wound cleanser 12/05/23 1146   Dressing Applied 12/05/23 1146   Dressing Change Due 12/06/23 12/05/23 1146          12/5/23           Right hip (pre)                                               Sacrum (pre)                                             Left thigh (pre)                                           Left ischium (pre)  (Silver alginate, 4x4, gentle border)             (Silver alginate, 4x4, gentle border)                                                                              (Silver alginate, 4x4, gentle border)        Scrotum (pre,monitor 0.5 x 0.5 x 0.2)          Left thigh (post)                                                                      (Silver alginate, 4x4, gentle border)  Assessment:           ICD-10-CM ICD-9-CM   1. Stage IV pressure ulcer of right hip  L89.214 707.04     707.24   2. Pressure injury of left thigh, stage 3  L89.223 707.09     707.23   3. Pressure ulcer of left buttock, stage 4  L89.324 707.05     707.24   4. Pressure injury of sacral region, stage 4  L89.154 707.03     707.24   5. Quadriplegia, unspecified  G82.50 344.00             Procedures:     Debridement     Date/Time: 12/5/2023 11:20 AM     Performed by: Jessie Bah NP  Authorized by: Olvin  "JAIRON Roman    Time out: Immediately prior to procedure a "time out" was called to verify the correct patient, procedure, equipment, support staff and site/side marked as required.     Consent Done?:  Yes (Verbal)     Preparation: Patient was prepped and draped with clean technique    Local anesthesia used?: No       Wound Details:    Location:  Left leg (left thigh)    Type of Debridement:  Non-excisional       Length (cm):  2.7       Area (sq cm):  4.86       Width (cm):  1.8       Percent Debrided (%):  100       Depth (cm):  0.3       Total Area Debrided (sq cm):  4.86    Depth of debridement:  Epidermis/Dermis    Devitalized tissue debrided:  Biofilm, Callus, Exudate, Fibrin and Slough    Instruments:  Forceps, Scissors and Curette (Dermal)  Bleeding:  None  Patient tolerance:  Patient tolerated the procedure well with no immediate complications       Excisional debridement performed:  [] Yes [] No   Selective debridement performed:  [x] Yes [] No   Mechanical debridement performed:  [] Yes [] No   Silver nitrate applied:    [] Yes [] No   Labs ordered this visit:   [] Yes [] No   Imaging ordered this visit:   [] Yes [] No   Tissue pathology and/or culture taken:  [] Yes [] No         MEDICATIONS    Current Outpatient Medications:     apixaban (ELIQUIS) 5 mg Tab, Take 5 mg by mouth once daily at 6am., Disp: , Rfl:     LINZESS 145 mcg Cap capsule, Take 145 mcg by mouth once daily., Disp: , Rfl:     mupirocin (BACTROBAN) 2 % ointment, 2 (two) times daily. Apply to affected area for five days, Disp: , Rfl:    Review of patient's allergies indicates:   Allergen Reactions    Meropenem Anaphylaxis    Penicillins        HOME HEALTH AGENCY:  [facility]   TIMES PER WEEK/DAYS:  daily   ORDERS:  Right hip: Cleanse with wound cleanser and apply silver alginate to the wound bed (making sure it is touching wound bed), cover with 4x4 gauze and a gentle border - to be changed daily.   Left ischial: Cleanse with wound " cleanser and apply silver alginate to the wound bed, cover with 4x4 gauze and a gentle border - to be changed daily.   Left medial upper thigh: Cleanse with wound cleanser and apply silver alginate to the wound bed, cover with 4x4 gauze and a gentle border - to be changed daily.   Sacrum: Cleanse with wound cleanser and apply silver alginate to the wound bed, cover with 4x4 gauze and a gentle border - to be changed daily.   Scrotum: keep cleans and dry, apply calmospetine daily    Follow up in 2 weeks (on 12/19/2023) for stretcher.

## 2023-12-05 NOTE — PROCEDURES
"Debridement    Date/Time: 12/5/2023 11:20 AM    Performed by: Jessie Bah NP  Authorized by: Jessie Bah NP    Time out: Immediately prior to procedure a "time out" was called to verify the correct patient, procedure, equipment, support staff and site/side marked as required.    Consent Done?:  Yes (Verbal)    Preparation: Patient was prepped and draped with clean technique    Local anesthesia used?: No      Wound Details:    Location:  Left leg (left thigh)    Type of Debridement:  Non-excisional       Length (cm):  2.7       Area (sq cm):  4.86       Width (cm):  1.8       Percent Debrided (%):  100       Depth (cm):  0.3       Total Area Debrided (sq cm):  4.86    Depth of debridement:  Epidermis/Dermis    Devitalized tissue debrided:  Biofilm, Callus, Exudate, Fibrin and Slough    Instruments:  Forceps, Scissors and Curette (Dermal)  Bleeding:  None  Patient tolerance:  Patient tolerated the procedure well with no immediate complications    "

## 2023-12-06 ENCOUNTER — LAB REQUISITION (OUTPATIENT)
Dept: LAB | Facility: HOSPITAL | Age: 48
End: 2023-12-06
Payer: MEDICARE

## 2023-12-06 DIAGNOSIS — Z46.6 ENCOUNTER FOR FITTING AND ADJUSTMENT OF URINARY DEVICE: ICD-10-CM

## 2023-12-06 LAB
APPEARANCE UR: CLEAR
BACTERIA #/AREA URNS AUTO: ABNORMAL /HPF
BILIRUB UR QL STRIP.AUTO: NEGATIVE
COLOR UR AUTO: YELLOW
GLUCOSE UR QL STRIP.AUTO: NEGATIVE
KETONES UR QL STRIP.AUTO: NEGATIVE
LEUKOCYTE ESTERASE UR QL STRIP.AUTO: ABNORMAL
NITRITE UR QL STRIP.AUTO: POSITIVE
PH UR STRIP.AUTO: 7 [PH]
PROT UR QL STRIP.AUTO: ABNORMAL
RBC #/AREA URNS AUTO: ABNORMAL /HPF
RBC UR QL AUTO: ABNORMAL
SP GR UR STRIP.AUTO: 1.01 (ref 1–1.03)
SQUAMOUS #/AREA URNS AUTO: ABNORMAL /HPF
UROBILINOGEN UR STRIP-ACNC: 0.2
WBC #/AREA URNS AUTO: ABNORMAL /HPF

## 2023-12-06 PROCEDURE — 81001 URINALYSIS AUTO W/SCOPE: CPT | Performed by: FAMILY MEDICINE

## 2023-12-06 PROCEDURE — 87086 URINE CULTURE/COLONY COUNT: CPT | Performed by: FAMILY MEDICINE

## 2023-12-06 PROCEDURE — 87077 CULTURE AEROBIC IDENTIFY: CPT | Mod: 91 | Performed by: FAMILY MEDICINE

## 2023-12-10 LAB
BACTERIA UR CULT: ABNORMAL
BACTERIA UR CULT: ABNORMAL

## 2024-01-02 ENCOUNTER — HOSPITAL ENCOUNTER (OUTPATIENT)
Dept: WOUND CARE | Facility: HOSPITAL | Age: 49
Discharge: HOME | End: 2024-01-02
Attending: FAMILY MEDICINE
Payer: MEDICARE

## 2024-01-02 VITALS
DIASTOLIC BLOOD PRESSURE: 60 MMHG | BODY MASS INDEX: 39.15 KG/M2 | RESPIRATION RATE: 19 BRPM | HEIGHT: 65 IN | HEART RATE: 70 BPM | WEIGHT: 235 LBS | SYSTOLIC BLOOD PRESSURE: 123 MMHG

## 2024-01-02 DIAGNOSIS — L89.214 STAGE IV PRESSURE ULCER OF RIGHT HIP: Primary | ICD-10-CM

## 2024-01-02 DIAGNOSIS — L89.154 PRESSURE INJURY OF SACRAL REGION, STAGE 4: ICD-10-CM

## 2024-01-02 DIAGNOSIS — L89.223 PRESSURE INJURY OF LEFT THIGH, STAGE 3: ICD-10-CM

## 2024-01-02 DIAGNOSIS — G82.50 QUADRIPLEGIA, UNSPECIFIED: ICD-10-CM

## 2024-01-02 PROCEDURE — 99213 OFFICE O/P EST LOW 20 MIN: CPT

## 2024-01-02 PROCEDURE — 27000999 HC MEDICAL RECORD PHOTO DOCUMENTATION

## 2024-01-02 NOTE — PROGRESS NOTES
Home Health: [facility]   Smoker   [] Yes  [x] No  Diabetic   [] Yes   [x] No  Low air loss mattress [x] Yes [] No   Is the patient eligible for a graft, and/or currently grafting?  [] Yes [x] No     Subjective:       Patient ID: Diego Martinez Jr. is a 48 y.o. male.    Chief Complaint: Pressure Ulcer (Right hip - Stage 4 /Left ischial - Stage 4 /Sacrum - stage 4/Left medial upper thigh - Stage 3/Scrotum - stage 2 (NEW))    HPI   Mr. Martinez is a long time patient of [facility].    He comes via wheelchair with a care attendant present with him at all times.    He does have a history of quadriplegia.  He has home health who assist him with his wound care daily and care attendants to provide care at other times.  He has a very pleasant demeanor and added to an is usually jovial in spite of his challenges.  He now has [facility].    6/7/23 - Mr. Martinez returns today for followup on several wounds.  Some have done except personally well recently which is correct it to his home health.  We are able to close to wounds today.  Wound #1, Right posterior hip, remains stable and today it is still 0.1 x 0.1 x 0.1.  We will leave this wound open 1 additional week and continue to monitor.  Wound #2, left ischial, has improved since the last visit.  There is a decrease in depth and the granular tissue is again stable.  The patient was educated that he should reminder the home health nurses to open this wound very carefully and gently to avoid ripping the tissue open again since it is finally making excellent progress.  Wound #3, Sacrum, this wound, today, we are able to close.  Wound #4, left medial upper thigh, is stable with no significant change although it is slightly larger.  We will continue to use silver alginate on all open wounds.    7/10/23 - Mr. Martinez was last seen on 6/7/23.  Today he returns with is wounds stable:  Wound #1 - Right posterior hip, stable, no change since the last visit, no  S & S of infection, no increase in drainage.  Wound #2 - Left ischial, continue to improve, with a very slight decrease in size this week.  Tissue is no longer friable.  Silver nitrate was applied, we will continue to use silver alginate.  Wound #3 - sacrum, reassessed, remains closed  Wound #4 - left medial upper thigh - this wound was debrided, wound margins are rolling and slightly macerated.  Silver nitrate was applied.  We will continue to use silver alginate.  The patient also reported today that he injured his right great toe nail.  Today it was assessed and there was a considerable amount of dried blood noted.  The nail was removed (90%) with a nipper.  We will use Betadine for one week.     7/26/23 - the patient returns today for followup on his several wound:  Wound #1 - Right posterior hip; this wound remains stable with no significant change, no S & S of infection  Wound #2 - Left ischial, significant increase in size due to overextending of wound bed - increase in width from 0.5 cm to 3.0 cm; extreme care must be used when opening this wound bed asked to not over extend in split the wound open again.  Wound #3 - Sacrum; we are continuing to monitor this wound as there was some maceration.  A callus paring was performed, and we will continue to monitor because there appears to be changes occurring.  Wound #4 - Left medial upper thigh - this wound was selectively debrided and has an increased amount macerated tissue.  We need to ensure that this dressing is being changed daily.    8/9/23 - Mr. Martinez returns today to be seen for three wounds:  Wound #1 - Right posterior hip - this wound continues to remains stable.  There is still some depth, however, there is very little drainage and no signs and symptoms of infection.  We will continue to pack this wound with silver alginate.  Wound #2 - Left ischial, this wound remains at 3.0 cm which is a significant increase over the measurement of 0.5 cm on  07/12/2023.  Increase in size is due to the forcing open of the wound during the cleansing process.  Home health has been reminded that they should open that wound very carefully so is not to over extend the very fragile epithelial tissue.  Additionally, the orders were to use silver alginate on this wound and today mesalt was removed.  They will be reminded they should use the appropriate dressing on the appropriate wounds and me salt should not be used in this wound.  Wound #3 - Sacrum; we are continuing to monitor this area, however, there is no open wound at this time.  It will be reopened if necessary.  Wound #4 - Left medial upper thigh - this wound was selectively debrided today with removal a moderate amount of slough from the wound bed.  There is a large island of epithelial tissue in the wound bed, however, there is still also a large area of macerated tissue around the periwound.  Home health is reminded that they should be applying me salt to this wound and this wound only.  They should be covering the me salt and wound bed with silver alginate to be changed daily.    8/24/23 - The patient returns today for reassessments his 3 wounds.    Wound #1 - Right posterior hip - remains clean and stable.  There are no signs and symptoms of infection and very little drainage.  We are using silver alginate on this wound.    Wound #2 - Left ischial - 2.7 x 0.5 x 0.2 cm - wound is improved significantly over his last visit on 08/09/2023.  This provider appreciate the care that home health is taking when opening the wound to cleanse in the depth of the wound.  It is obvious that they have not been over extending the wound which is allowing it to progress as it should be progressing now.  We will continue to apply silver alginate on this wound.  Wound #3 - Sacrum - we will continue to monitor the sacrum again as it has now reopened very slightly.  Wound #4 - Left medial upper thigh - this wound remains stable with no  signs and symptoms of infection.  The large margin of macerated tissue remains.  Ideally, this could be surgically debrided, however, we may have to do this in clinic at some point with a scalpel.    9/6/23 - Mr. Martinez returns for f/up on the 3 wounds:  Wound #1 - Right posterior hip - remains clean and stable, continuing to decrease in size slowly.  There are no signs and symptoms of infection and very little drainage.  We will continue using silver alginate on this wound.    Wound #2 - Left ischial - 1.5 x 0.5 x 0.2 cm - wound continues to decrease in size with a very small remaining area.  This provider appreciates the care that home health continues to take when opening the wound to cleanse in the depth of the wound.  It is obvious that they have not been over extending the wound which is allowing it to progress as it should be progressing now.  We will continue to apply silver alginate on this wound.  Wound #3 - Sacrum - has now reclosed but will continue to monitor  Wound #4 - Left medial upper thigh - this wound remains stable with no signs and symptoms of infection.  The large margin of macerated tissue remains.  A large amount of this tissue was selectively debrided off today.  We will focus on removal of the rolled edges each week.    9/20/23 - The patient returns today for followup on several wounds:  Wound #1 - Right posterior hip - This wound was mechanically debrided.  It remains clean and stable, continuing to decrease in size slowly.  There are no signs and symptoms of infection and very little drainage.  We will continue using silver alginate on this wound.    Wound #2 - Left ischial - 1.7 x 0.5 x 0.2 cm - wound remains stable with no significant change in size this visit.  The wound has no S & S of infection.  We will continue to apply silver alginate on this wound.  Wound #3 - Sacrum - has reopened.  This wound was selectively debrided today.  We will begin application of collagen and silver  alginate.  Wound #4 - Left medial upper thigh - this wound remains stable with no signs and symptoms of infection. It was selectively debrided today and we will begin collagen and silver alginate.      10/4/23 - Mr. Martinez returns today for f/up on the following wounds:  Wound #1 - Right posterior hip - This wound was mechanically debrided.  It remains clean and stable, with no significant change.  There are no signs and symptoms of infection and very little drainage.  We will continue using silver alginate on this wound.    Wound #2 - Left ischial - 0.5 x 2.0 x 0.2 cm - wound remains stable with no significant change although it is slightly larger than last week.   The wound has no S & S of infection.  We will continue to apply silver alginate on this wound.  Wound #3 - Sacrum - remains open.  This wound was selectively debrided today.  We will continue powdered collagen and silver alginate.  Wound #4 - Left medial upper thigh - this wound remains stable with no signs and symptoms of infection. It was selectively debrided today and we will begin powdered collagen and silver alginate.     10/18/23 - Mr. Martinez is seen today for the f/up on several wounds:  Wound #1 - Right posterior hip - This wound was mechanically debrided.  It remains clean and stable, with no significant change.  There are no signs and symptoms of infection and very little drainage.  We will continue using silver alginate on this wound.    Wound #2 - Left ischial - 0.5 x 1.5 x 0.2 cm - wound remains stable with no significant change although it is slightly larger than last week.   The wound has no S & S of infection.  We will continue to apply silver alginate on this wound.  Wound #3 - Sacrum - remains open.  This wound was selectively debrided today.  We will continue powdered collagen and silver alginate.  Wound #4 - Left medial upper thigh - this wound remains stable with no signs and symptoms of infection. It was mechanically debrided today  and we will begin powdered collagen and silver alginate.     October 31st:  48-year-old black male, who is a quadriplegia, is here for follow up of his for pressure injuries.  The worse 1 at this time is the left ischial, left buttock.  The ulcers appear clean, without obvious secondary infection, foul odor, or discharge.  He has home health services almost every day.    11/14/23 - Mr. Matrinez returns today for followup on several wounds.  All remains stable.  The left ischial does measures slightly larger, however, the wound bed is clean.  It was selectively debrided and there are no signs and symptoms of infection with this wound or any of his wounds at this time.  Wound #1 - Right posterior hip - This wound was mechanically debrided.  It remains clean and stable, with no significant change.  There are no signs and symptoms of infection and very little drainage.  We will continue using silver alginate on this wound.    Wound #2 - Left ischial - 0.8 x 1.0 x 0.2 cm  - wound remains stable with no significant change although it is slightly larger than last week.   The wound has no S & S of infection.  We will continue to apply silver alginate on this wound.  Wound #3 - Sacrum - remains open.  We will continue silver alginate.  Wound #4 - Left medial upper thigh -  4.0 x 2.0 x 0.3 cm- this wound has increased in size slightly, but there are no signs and symptoms of infection. It was selectively debrided today and we will continue silver alginate.     12/5/23 - Mr. Martinez is seen today for f/up on his wounds.  They all remain stable, in various stages.  Today none show S & S of infection, with no increase in drainage, redness, etc.    Wound #1 - Right posterior hip - This wound was mechanically debride.  It does continue to drain a scant amount.  We will continue using silver alginate on this wound.    Wound #2 - Left ischial - 0.5 x 1.0 x 0.2 cm  - wound remains stable with a slight decrease in size.   The wound has no  S & S of infection.  We will continue to apply silver alginate on this wound.  Wound #3 - Sacrum - remains open.  We will continue silver alginate.  Wound #4 - Left medial upper thigh -  2.7 x 1.8 x 0.3 cm - this wound has decreased in size and appears to be doing well.  There are no signs and symptoms of infection. It was selectively debrided today and we will continue silver alginate.     January 2, 2024:  48-year-old black male, with quadriplegia, is here for a multiple stage III and stage IV pressure injuries.  They are all under control at this time.  They are relatively shallow.  He has a good low air loss mattress at home.    Review of Systems   Constitutional: Negative.    Respiratory: Negative.     Cardiovascular: Negative.    Musculoskeletal:  Positive for gait problem.   Skin:  Positive for wound.        As documented in the HPI.   All other systems reviewed and are negative.        Objective:      Vitals:    01/02/24 1055   BP: 123/60   Pulse: 70   Resp: 19     Physical Exam  Vitals reviewed.   Constitutional:       Appearance: Normal appearance. He is normal weight.   HENT:      Head: Normocephalic.   Cardiovascular:      Rate and Rhythm: Normal rate.      Pulses: Normal pulses.   Pulmonary:      Effort: Pulmonary effort is normal.   Musculoskeletal:      Comments: quadraplegia   Skin:     General: Skin is warm and dry.      Comments: There are multiple stage III end-stage for pressure injuries on his back side, and they are all very clean.  I did mechanical debridements on all of them.  He also has a new wound on the posterior scrotum.   Neurological:      General: No focal deficit present.      Mental Status: He is alert and oriented to person, place, and time.   Psychiatric:         Mood and Affect: Mood normal.            Altered Skin Integrity 05/23/22 1337 Right posterior Hip #1 (Active)   05/23/22 1337   Altered Skin Integrity Present on Admission - Did Patient arrive to the hospital with altered  skin?: yes   Side: Right   Orientation: posterior   Location: Hip   Wound Number: #1   Is this injury device related?: No   Primary Wound Type:    Description of Altered Skin Integrity:    Ankle-Brachial Index:    Pulses:    Removal Indication and Assessment:    Wound Outcome:    (Retired) Wound Length (cm):    (Retired) Wound Width (cm):    (Retired) Depth (cm):    Wound Description (Comments):    Removal Indications:    Dressing Appearance Moist drainage 01/02/24 1112   Drainage Amount Moderate 01/02/24 1112   Drainage Characteristics/Odor Serosanguineous 01/02/24 1112   Appearance Moist;Pink 01/02/24 1112   Tissue loss description Full thickness 01/02/24 1112   Periwound Area Intact 01/02/24 1112   Wound Edges Open 01/02/24 1112   Wound Length (cm) 0.3 cm 01/02/24 1112   Wound Width (cm) 0.3 cm 01/02/24 1112   Wound Depth (cm) 0.5 cm 01/02/24 1112   Wound Volume (cm^3) 0.045 cm^3 01/02/24 1112   Wound Surface Area (cm^2) 0.09 cm^2 01/02/24 1112   Care Cleansed with:;Wound cleanser 01/02/24 1112   Dressing Applied;Other (comment) 01/02/24 1112   Dressing Change Due 01/03/24 01/02/24 1112            Altered Skin Integrity 05/23/22 1353 Left Ischial tuberosity #2 (Active)   05/23/22 1353   Altered Skin Integrity Present on Admission - Did Patient arrive to the hospital with altered skin?: yes   Side: Left   Orientation:    Location: Ischial tuberosity   Wound Number: #2   Is this injury device related?: No   Primary Wound Type:    Description of Altered Skin Integrity:    Ankle-Brachial Index:    Pulses:    Removal Indication and Assessment:    Wound Outcome:    (Retired) Wound Length (cm):    (Retired) Wound Width (cm):    (Retired) Depth (cm):    Wound Description (Comments):    Removal Indications:    Dressing Appearance Moist drainage 01/02/24 1112   Drainage Amount Moderate 01/02/24 1112   Drainage Characteristics/Odor Serosanguineous 01/02/24 1112   Appearance Moist;Red;Granulating 01/02/24 1112   Tissue loss  description Full thickness 01/02/24 1112   Periwound Area Intact 01/02/24 1112   Wound Edges Open 01/02/24 1112   Wound Length (cm) 0.5 cm 01/02/24 1112   Wound Width (cm) 1 cm 01/02/24 1112   Wound Depth (cm) 0.2 cm 01/02/24 1112   Wound Volume (cm^3) 0.1 cm^3 01/02/24 1112   Wound Surface Area (cm^2) 0.5 cm^2 01/02/24 1112   Care Cleansed with:;Wound cleanser 01/02/24 1112   Dressing Applied;Other (comment) 01/02/24 1112   Dressing Change Due 01/03/24 01/02/24 1112            Altered Skin Integrity 01/05/23 1351 Left upper;medial Thigh #4  (Active)   01/05/23 1351   Altered Skin Integrity Present on Admission - Did Patient arrive to the hospital with altered skin?: yes   Side: Left   Orientation: upper;medial   Location: Thigh   Wound Number: #4   Is this injury device related?: No   Primary Wound Type:    Description of Altered Skin Integrity:    Ankle-Brachial Index:    Pulses:    Removal Indication and Assessment:    Wound Outcome:    (Retired) Wound Length (cm):    (Retired) Wound Width (cm):    (Retired) Depth (cm):    Wound Description (Comments):    Removal Indications:    Dressing Appearance Moist drainage 01/02/24 1112   Drainage Amount Moderate 01/02/24 1112   Drainage Characteristics/Odor Serosanguineous 01/02/24 1112   Appearance Moist;Red 01/02/24 1112   Tissue loss description Full thickness 01/02/24 1112   Periwound Area Intact;Pale white 01/02/24 1112   Wound Edges Open 01/02/24 1112   Wound Length (cm) 3 cm 01/02/24 1112   Wound Width (cm) 2.5 cm 01/02/24 1112   Wound Depth (cm) 0.3 cm 01/02/24 1112   Wound Volume (cm^3) 2.25 cm^3 01/02/24 1112   Wound Surface Area (cm^2) 7.5 cm^2 01/02/24 1112   Care Cleansed with:;Wound cleanser 01/02/24 1112   Dressing Applied;Other (comment) 01/02/24 1112   Dressing Change Due 01/03/24 01/02/24 1112            Altered Skin Integrity 09/20/23 1148 Sacral spine #3 (Active)   09/20/23 1148   Altered Skin Integrity Present on Admission - Did Patient arrive to the  hospital with altered skin?: yes   Side:    Orientation:    Location: Sacral spine   Wound Number: #3   Is this injury device related?:    Primary Wound Type:    Description of Altered Skin Integrity:    Ankle-Brachial Index:    Pulses:    Removal Indication and Assessment:    Wound Outcome:    (Retired) Wound Length (cm):    (Retired) Wound Width (cm):    (Retired) Depth (cm):    Wound Description (Comments):    Removal Indications:    Dressing Appearance Moist drainage 01/02/24 1112   Drainage Amount Moderate 01/02/24 1112   Drainage Characteristics/Odor Serosanguineous 01/02/24 1112   Appearance Moist;Red 01/02/24 1112   Tissue loss description Full thickness 01/02/24 1112   Periwound Area Pale white 01/02/24 1112   Wound Edges Open 01/02/24 1112   Wound Length (cm) 0.3 cm 01/02/24 1112   Wound Width (cm) 0.3 cm 01/02/24 1112   Wound Depth (cm) 0.3 cm 01/02/24 1112   Wound Volume (cm^3) 0.027 cm^3 01/02/24 1112   Wound Surface Area (cm^2) 0.09 cm^2 01/02/24 1112   Care Cleansed with:;Wound cleanser 01/02/24 1112   Dressing Applied;Other (comment) 01/02/24 1112   Dressing Change Due 01/03/24 01/02/24 1112            Altered Skin Integrity 01/02/24 posterior Scrotum #5 (Active)   01/02/24    Altered Skin Integrity Present on Admission - Did Patient arrive to the hospital with altered skin?: yes   Side:    Orientation: posterior   Location: Scrotum   Wound Number: #5   Is this injury device related?: No   Primary Wound Type:    Description of Altered Skin Integrity:    Ankle-Brachial Index:    Pulses:    Removal Indication and Assessment:    Wound Outcome:    (Retired) Wound Length (cm):    (Retired) Wound Width (cm):    (Retired) Depth (cm):    Wound Description (Comments):    Removal Indications:    Dressing Appearance Moist drainage 01/02/24 1112   Drainage Amount Moderate 01/02/24 1112   Drainage Characteristics/Odor Serosanguineous 01/02/24 1112   Appearance Moist;Red 01/02/24 1112   Tissue loss description  Full thickness 01/02/24 1112   Periwound Area Intact 01/02/24 1112   Wound Edges Open 01/02/24 1112   Wound Length (cm) 1 cm 01/02/24 1112   Wound Width (cm) 0.5 cm 01/02/24 1112   Wound Depth (cm) 0.2 cm 01/02/24 1112   Wound Volume (cm^3) 0.1 cm^3 01/02/24 1112   Wound Surface Area (cm^2) 0.5 cm^2 01/02/24 1112   Care Cleansed with:;Wound cleanser 01/02/24 1112   Dressing Applied;Other (comment) 01/02/24 1112   Dressing Change Due 01/03/24 01/02/24 1112          01/02/2024    Left ischium  silver alginate, gentle foam border dressing       Left upper posterior thigh   silver alginate, gentle foam border dressing       Scrotum  silver alginate, gentle foam border dressing       Sacrum   silver alginate, gentle foam border dressing       Right posterior hip   silver alginate, gentle foam border dressing   CT    Assessment:           ICD-10-CM ICD-9-CM   1. Stage IV pressure ulcer of right hip  L89.214 707.04     707.24   2. Pressure injury of left thigh, stage 3  L89.223 707.09     707.23   3. Quadriplegia, unspecified  G82.50 344.00   4. Pressure injury of sacral region, stage 4  L89.154 707.03     707.24               Procedures:         Excisional debridement performed:  [] Yes [x] No   Selective debridement performed:  [] Yes [x] No   Mechanical debridement performed:  [x] Yes [] No   Silver nitrate applied:    [] Yes [x] No   Labs ordered this visit:   [] Yes [x] No   Imaging ordered this visit:   [] Yes [x] No   Tissue pathology and/or culture taken:  [] Yes [x] No         MEDICATIONS    Current Outpatient Medications:     apixaban (ELIQUIS) 5 mg Tab, Take 5 mg by mouth once daily at 6am., Disp: , Rfl:     LINZESS 145 mcg Cap capsule, Take 145 mcg by mouth once daily., Disp: , Rfl:     mupirocin (BACTROBAN) 2 % ointment, 2 (two) times daily. Apply to affected area for five days, Disp: , Rfl:    Review of patient's allergies indicates:   Allergen Reactions    Meropenem Anaphylaxis    Penicillins        HOME  HEALTH AGENCY:  [email or web address]   TIMES PER WEEK/DAYS:  daily   ORDERS:  Right hip: Cleanse with wound cleanser and apply silver alginate to the wound bed (making sure it is touching wound bed), cover with 4x4 gauze and a gentle border - to be changed daily.   Left ischial: Cleanse with wound cleanser and apply silver alginate to the wound bed, cover with 4x4 gauze and a gentle border - to be changed daily.   Left medial upper thigh: Cleanse with wound cleanser and apply silver alginate to the wound bed, cover with 4x4 gauze and a gentle border - to be changed daily.   Sacrum: Cleanse with wound cleanser and apply silver alginate to the wound bed, cover with 4x4 gauze and a gentle border - to be changed daily.   Scrotum: keep cleans and dry, apply calmospetine daily    Follow up in 2 weeks (on 1/16/2024) for pressure ulcers - stretcher.

## 2024-01-22 ENCOUNTER — HOSPITAL ENCOUNTER (OUTPATIENT)
Dept: WOUND CARE | Facility: HOSPITAL | Age: 49
Discharge: HOME | End: 2024-01-22
Attending: FAMILY MEDICINE
Payer: MEDICARE

## 2024-01-22 VITALS
HEIGHT: 65 IN | RESPIRATION RATE: 18 BRPM | BODY MASS INDEX: 39.15 KG/M2 | HEART RATE: 53 BPM | SYSTOLIC BLOOD PRESSURE: 142 MMHG | DIASTOLIC BLOOD PRESSURE: 65 MMHG | WEIGHT: 235 LBS

## 2024-01-22 DIAGNOSIS — L89.154 PRESSURE INJURY OF SACRAL REGION, STAGE 4: ICD-10-CM

## 2024-01-22 DIAGNOSIS — L89.214 STAGE IV PRESSURE ULCER OF RIGHT HIP: Primary | ICD-10-CM

## 2024-01-22 DIAGNOSIS — L89.324 PRESSURE ULCER OF LEFT BUTTOCK, STAGE 4: ICD-10-CM

## 2024-01-22 DIAGNOSIS — G82.50 QUADRIPLEGIA, UNSPECIFIED: ICD-10-CM

## 2024-01-22 DIAGNOSIS — L89.223 PRESSURE INJURY OF LEFT THIGH, STAGE 3: ICD-10-CM

## 2024-01-22 PROCEDURE — 99213 OFFICE O/P EST LOW 20 MIN: CPT | Mod: 25

## 2024-01-22 PROCEDURE — 97597 DBRDMT OPN WND 1ST 20 CM/<: CPT

## 2024-01-22 PROCEDURE — 27000999 HC MEDICAL RECORD PHOTO DOCUMENTATION

## 2024-01-22 NOTE — PROGRESS NOTES
Home Health: [facility]   Smoker   [] Yes  [x] No  Diabetic   [] Yes   [x] No  Low air loss mattress [x] Yes [] No   Is the patient eligible for a graft, and/or currently grafting?  [] Yes [x] No     Subjective:       Patient ID: Diego Martinez Jr. is a 48 y.o. male.    Chief Complaint: Pressure Ulcer ((Right hip - Stage 4 /Left ischial - Stage 4 /Sacrum - stage 4/Left medial upper thigh - Stage 3/Scrotum - stage 2 (NEW))/ /)    HPI   Mr. Martinez is a long time patient of [facility].    He comes via wheelchair with a care attendant present with him at all times.    He does have a history of quadriplegia.  He has home health who assist him with his wound care daily and care attendants to provide care at other times.  He has a very pleasant demeanor and added to an is usually jovial in spite of his challenges.  He now has [employer].    6/7/23 - Mr. Martinez returns today for followup on several wounds.  Some have done except personally well recently which is correct it to his home health.  We are able to close to wounds today.  Wound #1, Right posterior hip, remains stable and today it is still 0.1 x 0.1 x 0.1.  We will leave this wound open 1 additional week and continue to monitor.  Wound #2, left ischial, has improved since the last visit.  There is a decrease in depth and the granular tissue is again stable.  The patient was educated that he should reminder the home health nurses to open this wound very carefully and gently to avoid ripping the tissue open again since it is finally making excellent progress.  Wound #3, Sacrum, this wound, today, we are able to close.  Wound #4, left medial upper thigh, is stable with no significant change although it is slightly larger.  We will continue to use silver alginate on all open wounds.    7/10/23 - Mr. Martinez was last seen on 6/7/23.  Today he returns with is wounds stable:  Wound #1 - Right posterior hip, stable, no change since the last  visit, no S & S of infection, no increase in drainage.  Wound #2 - Left ischial, continue to improve, with a very slight decrease in size this week.  Tissue is no longer friable.  Silver nitrate was applied, we will continue to use silver alginate.  Wound #3 - sacrum, reassessed, remains closed  Wound #4 - left medial upper thigh - this wound was debrided, wound margins are rolling and slightly macerated.  Silver nitrate was applied.  We will continue to use silver alginate.  The patient also reported today that he injured his right great toe nail.  Today it was assessed and there was a considerable amount of dried blood noted.  The nail was removed (90%) with a nipper.  We will use Betadine for one week.     7/26/23 - the patient returns today for followup on his several wound:  Wound #1 - Right posterior hip; this wound remains stable with no significant change, no S & S of infection  Wound #2 - Left ischial, significant increase in size due to overextending of wound bed - increase in width from 0.5 cm to 3.0 cm; extreme care must be used when opening this wound bed asked to not over extend in split the wound open again.  Wound #3 - Sacrum; we are continuing to monitor this wound as there was some maceration.  A callus paring was performed, and we will continue to monitor because there appears to be changes occurring.  Wound #4 - Left medial upper thigh - this wound was selectively debrided and has an increased amount macerated tissue.  We need to ensure that this dressing is being changed daily.    8/9/23 - Mr. Martinez returns today to be seen for three wounds:  Wound #1 - Right posterior hip - this wound continues to remains stable.  There is still some depth, however, there is very little drainage and no signs and symptoms of infection.  We will continue to pack this wound with silver alginate.  Wound #2 - Left ischial, this wound remains at 3.0 cm which is a significant increase over the measurement of 0.5 cm  on 07/12/2023.  Increase in size is due to the forcing open of the wound during the cleansing process.  Home health has been reminded that they should open that wound very carefully so is not to over extend the very fragile epithelial tissue.  Additionally, the orders were to use silver alginate on this wound and today mesalt was removed.  They will be reminded they should use the appropriate dressing on the appropriate wounds and me salt should not be used in this wound.  Wound #3 - Sacrum; we are continuing to monitor this area, however, there is no open wound at this time.  It will be reopened if necessary.  Wound #4 - Left medial upper thigh - this wound was selectively debrided today with removal a moderate amount of slough from the wound bed.  There is a large island of epithelial tissue in the wound bed, however, there is still also a large area of macerated tissue around the periwound.  Home health is reminded that they should be applying me salt to this wound and this wound only.  They should be covering the me salt and wound bed with silver alginate to be changed daily.    8/24/23 - The patient returns today for reassessments his 3 wounds.    Wound #1 - Right posterior hip - remains clean and stable.  There are no signs and symptoms of infection and very little drainage.  We are using silver alginate on this wound.    Wound #2 - Left ischial - 2.7 x 0.5 x 0.2 cm - wound is improved significantly over his last visit on 08/09/2023.  This provider appreciate the care that home health is taking when opening the wound to cleanse in the depth of the wound.  It is obvious that they have not been over extending the wound which is allowing it to progress as it should be progressing now.  We will continue to apply silver alginate on this wound.  Wound #3 - Sacrum - we will continue to monitor the sacrum again as it has now reopened very slightly.  Wound #4 - Left medial upper thigh - this wound remains stable with  no signs and symptoms of infection.  The large margin of macerated tissue remains.  Ideally, this could be surgically debrided, however, we may have to do this in clinic at some point with a scalpel.    9/6/23 - Mr. Martinez returns for f/up on the 3 wounds:  Wound #1 - Right posterior hip - remains clean and stable, continuing to decrease in size slowly.  There are no signs and symptoms of infection and very little drainage.  We will continue using silver alginate on this wound.    Wound #2 - Left ischial - 1.5 x 0.5 x 0.2 cm - wound continues to decrease in size with a very small remaining area.  This provider appreciates the care that home health continues to take when opening the wound to cleanse in the depth of the wound.  It is obvious that they have not been over extending the wound which is allowing it to progress as it should be progressing now.  We will continue to apply silver alginate on this wound.  Wound #3 - Sacrum - has now reclosed but will continue to monitor  Wound #4 - Left medial upper thigh - this wound remains stable with no signs and symptoms of infection.  The large margin of macerated tissue remains.  A large amount of this tissue was selectively debrided off today.  We will focus on removal of the rolled edges each week.    9/20/23 - The patient returns today for followup on several wounds:  Wound #1 - Right posterior hip - This wound was mechanically debrided.  It remains clean and stable, continuing to decrease in size slowly.  There are no signs and symptoms of infection and very little drainage.  We will continue using silver alginate on this wound.    Wound #2 - Left ischial - 1.7 x 0.5 x 0.2 cm - wound remains stable with no significant change in size this visit.  The wound has no S & S of infection.  We will continue to apply silver alginate on this wound.  Wound #3 - Sacrum - has reopened.  This wound was selectively debrided today.  We will begin application of collagen and silver  alginate.  Wound #4 - Left medial upper thigh - this wound remains stable with no signs and symptoms of infection. It was selectively debrided today and we will begin collagen and silver alginate.      10/4/23 - Mr. Martinez returns today for f/up on the following wounds:  Wound #1 - Right posterior hip - This wound was mechanically debrided.  It remains clean and stable, with no significant change.  There are no signs and symptoms of infection and very little drainage.  We will continue using silver alginate on this wound.    Wound #2 - Left ischial - 0.5 x 2.0 x 0.2 cm - wound remains stable with no significant change although it is slightly larger than last week.   The wound has no S & S of infection.  We will continue to apply silver alginate on this wound.  Wound #3 - Sacrum - remains open.  This wound was selectively debrided today.  We will continue powdered collagen and silver alginate.  Wound #4 - Left medial upper thigh - this wound remains stable with no signs and symptoms of infection. It was selectively debrided today and we will begin powdered collagen and silver alginate.     10/18/23 - Mr. Martinez is seen today for the f/up on several wounds:  Wound #1 - Right posterior hip - This wound was mechanically debrided.  It remains clean and stable, with no significant change.  There are no signs and symptoms of infection and very little drainage.  We will continue using silver alginate on this wound.    Wound #2 - Left ischial - 0.5 x 1.5 x 0.2 cm - wound remains stable with no significant change although it is slightly larger than last week.   The wound has no S & S of infection.  We will continue to apply silver alginate on this wound.  Wound #3 - Sacrum - remains open.  This wound was selectively debrided today.  We will continue powdered collagen and silver alginate.  Wound #4 - Left medial upper thigh - this wound remains stable with no signs and symptoms of infection. It was mechanically debrided today  and we will begin powdered collagen and silver alginate.     October 31st:  48-year-old black male, who is a quadriplegia, is here for follow up of his for pressure injuries.  The worse 1 at this time is the left ischial, left buttock.  The ulcers appear clean, without obvious secondary infection, foul odor, or discharge.  He has home health services almost every day.    11/14/23 - Mr. Martinez returns today for followup on several wounds.  All remains stable.  The left ischial does measures slightly larger, however, the wound bed is clean.  It was selectively debrided and there are no signs and symptoms of infection with this wound or any of his wounds at this time.  Wound #1 - Right posterior hip - This wound was mechanically debrided.  It remains clean and stable, with no significant change.  There are no signs and symptoms of infection and very little drainage.  We will continue using silver alginate on this wound.    Wound #2 - Left ischial - 0.8 x 1.0 x 0.2 cm  - wound remains stable with no significant change although it is slightly larger than last week.   The wound has no S & S of infection.  We will continue to apply silver alginate on this wound.  Wound #3 - Sacrum - remains open.  We will continue silver alginate.  Wound #4 - Left medial upper thigh -  4.0 x 2.0 x 0.3 cm- this wound has increased in size slightly, but there are no signs and symptoms of infection. It was selectively debrided today and we will continue silver alginate.     12/5/23 - Mr. Martinez is seen today for f/up on his wounds.  They all remain stable, in various stages.  Today none show S & S of infection, with no increase in drainage, redness, etc.    Wound #1 - Right posterior hip - This wound was mechanically debride.  It does continue to drain a scant amount.  We will continue using silver alginate on this wound.    Wound #2 - Left ischial - 0.5 x 1.0 x 0.2 cm  - wound remains stable with a slight decrease in size.   The wound has no  S & S of infection.  We will continue to apply silver alginate on this wound.  Wound #3 - Sacrum - remains open.  We will continue silver alginate.  Wound #4 - Left medial upper thigh -  2.7 x 1.8 x 0.3 cm - this wound has decreased in size and appears to be doing well.  There are no signs and symptoms of infection. It was selectively debrided today and we will continue silver alginate.     January 2, 2024:  48-year-old black male, with quadriplegia, is here for a multiple stage III and stage IV pressure injuries.  They are all under control at this time.  They are relatively shallow.  He has a good low air loss mattress at home.    1/22/24 - The patient returns today for f/up on several wounds.  Today many of the wounds are improving.  Wound #1 - Right posterior hip - continues to progress well and has a very small remaining opening.  We will continue to use silver alginate placed over the wound.    Wound #2 - Left ischial -0.4 x 1.0 x 0.2 cm  - wound remains appears stable with a slight decrease in size.   The wound has no S & S of infection.  We will continue to apply silver alginate on this wound.  Wound #3 - Sacrum - remains open and we will continue to monitor.  We will begin application of Endoform.    Wound #4 - Left medial upper thigh -  3.2 x 3.3 x 0.3 cm - this wound has increased in size slightly.   and appears to be doing well.  There are no signs and symptoms of infection. It was selectively debrided today and we will continue silver alginate.  He does continue to have HH assistance on a daily basis.  If the left medial upper thigh does not improve by next week we will consider ultrasonic debridement.       Review of Systems   Constitutional: Negative.    Respiratory: Negative.     Cardiovascular: Negative.    Musculoskeletal:  Positive for gait problem.   Skin:  Positive for wound.        As documented in the HPI.   All other systems reviewed and are negative.        Objective:      Vitals:     01/22/24 1141   BP: (!) 142/65   Pulse: (!) 53   Resp: 18     Physical Exam  Vitals reviewed.   Constitutional:       Appearance: Normal appearance. He is normal weight.   HENT:      Head: Normocephalic.   Cardiovascular:      Rate and Rhythm: Normal rate.      Pulses: Normal pulses.   Pulmonary:      Effort: Pulmonary effort is normal.   Musculoskeletal:      Comments: quadraplegia   Skin:     General: Skin is warm and dry.      Comments: Several scattered wounds   Neurological:      General: No focal deficit present.      Mental Status: He is alert and oriented to person, place, and time.   Psychiatric:         Mood and Affect: Mood normal.            Altered Skin Integrity 05/23/22 1337 Right posterior Hip #1 (Active)   05/23/22 1337   Altered Skin Integrity Present on Admission - Did Patient arrive to the hospital with altered skin?: yes   Side: Right   Orientation: posterior   Location: Hip   Wound Number: #1   Is this injury device related?: No   Primary Wound Type:    Description of Altered Skin Integrity:    Ankle-Brachial Index:    Pulses:    Removal Indication and Assessment:    Wound Outcome:    (Retired) Wound Length (cm):    (Retired) Wound Width (cm):    (Retired) Depth (cm):    Wound Description (Comments):    Removal Indications:    Dressing Appearance Moist drainage 01/22/24 1152   Drainage Amount Moderate 01/22/24 1152   Drainage Characteristics/Odor Serosanguineous 01/22/24 1152   Appearance Pink;Moist 01/22/24 1152   Tissue loss description Full thickness 01/22/24 1152   Periwound Area Dry 01/22/24 1152   Wound Edges Open 01/22/24 1152   Wound Length (cm) 0.3 cm 01/22/24 1152   Wound Width (cm) 0.3 cm 01/22/24 1152   Wound Depth (cm) 0.5 cm 01/22/24 1152   Wound Volume (cm^3) 0.045 cm^3 01/22/24 1152   Wound Surface Area (cm^2) 0.09 cm^2 01/22/24 1152   Care Cleansed with:;Wound cleanser 01/22/24 1152   Dressing Applied 01/22/24 1152   Dressing Change Due 01/23/24 01/22/24 1152            Altered  Skin Integrity 05/23/22 1353 Left Ischial tuberosity #2 (Active)   05/23/22 1353   Altered Skin Integrity Present on Admission - Did Patient arrive to the hospital with altered skin?: yes   Side: Left   Orientation:    Location: Ischial tuberosity   Wound Number: #2   Is this injury device related?: No   Primary Wound Type:    Description of Altered Skin Integrity:    Ankle-Brachial Index:    Pulses:    Removal Indication and Assessment:    Wound Outcome:    (Retired) Wound Length (cm):    (Retired) Wound Width (cm):    (Retired) Depth (cm):    Wound Description (Comments):    Removal Indications:    Dressing Appearance Moist drainage 01/22/24 1152   Drainage Amount Moderate 01/22/24 1152   Drainage Characteristics/Odor Serosanguineous 01/22/24 1152   Appearance Pink;Moist 01/22/24 1152   Tissue loss description Full thickness 01/22/24 1152   Periwound Area Dry 01/22/24 1152   Wound Edges Defined 01/22/24 1152   Wound Length (cm) 0.4 cm 01/22/24 1152   Wound Width (cm) 1 cm 01/22/24 1152   Wound Depth (cm) 0.2 cm 01/22/24 1152   Wound Volume (cm^3) 0.08 cm^3 01/22/24 1152   Wound Surface Area (cm^2) 0.4 cm^2 01/22/24 1152   Care Cleansed with:;Wound cleanser 01/22/24 1152   Dressing Applied 01/22/24 1152   Dressing Change Due 01/23/24 01/22/24 1152            Altered Skin Integrity 01/05/23 1351 Left upper;medial Thigh #4  (Active)   01/05/23 1351   Altered Skin Integrity Present on Admission - Did Patient arrive to the hospital with altered skin?: yes   Side: Left   Orientation: upper;medial   Location: Thigh   Wound Number: #4   Is this injury device related?: No   Primary Wound Type:    Description of Altered Skin Integrity:    Ankle-Brachial Index:    Pulses:    Removal Indication and Assessment:    Wound Outcome:    (Retired) Wound Length (cm):    (Retired) Wound Width (cm):    (Retired) Depth (cm):    Wound Description (Comments):    Removal Indications:    Dressing Appearance Moist drainage 01/22/24 1152    Drainage Amount Moderate 01/22/24 1152   Drainage Characteristics/Odor Serosanguineous 01/22/24 1152   Appearance Red;Moist 01/22/24 1152   Tissue loss description Full thickness 01/22/24 1152   Periwound Area Moist;Pale white 01/22/24 1152   Wound Edges Defined;Callused 01/22/24 1152   Wound Length (cm) 3.2 cm 01/22/24 1152   Wound Width (cm) 2.2 cm 01/22/24 1152   Wound Depth (cm) 0.3 cm 01/22/24 1152   Wound Volume (cm^3) 2.112 cm^3 01/22/24 1152   Wound Surface Area (cm^2) 7.04 cm^2 01/22/24 1152   Care Cleansed with:;Wound cleanser 01/22/24 1152   Dressing Applied 01/22/24 1152   Dressing Change Due 01/23/24 01/22/24 1152            Altered Skin Integrity 09/20/23 1148 Sacral spine #3 (Active)   09/20/23 1148   Altered Skin Integrity Present on Admission - Did Patient arrive to the hospital with altered skin?: yes   Side:    Orientation:    Location: Sacral spine   Wound Number: #3   Is this injury device related?:    Primary Wound Type:    Description of Altered Skin Integrity:    Ankle-Brachial Index:    Pulses:    Removal Indication and Assessment:    Wound Outcome:    (Retired) Wound Length (cm):    (Retired) Wound Width (cm):    (Retired) Depth (cm):    Wound Description (Comments):    Removal Indications:    Dressing Appearance Moist drainage 01/22/24 1152   Drainage Amount Moderate 01/22/24 1152   Drainage Characteristics/Odor Serosanguineous 01/22/24 1152   Appearance Pink 01/22/24 1152   Tissue loss description Full thickness 01/22/24 1152   Periwound Area Dry 01/22/24 1152   Wound Edges Defined;Callused 01/22/24 1152   Wound Length (cm) 0.2 cm 01/22/24 1152   Wound Width (cm) 0.5 cm 01/22/24 1152   Wound Depth (cm) 0.3 cm 01/22/24 1152   Wound Volume (cm^3) 0.03 cm^3 01/22/24 1152   Wound Surface Area (cm^2) 0.1 cm^2 01/22/24 1152   Care Cleansed with:;Wound cleanser 01/22/24 1152   Dressing Applied 01/22/24 1152   Dressing Change Due 01/23/24 01/22/24 1152            Altered Skin Integrity 01/02/24  posterior Scrotum #5 (Active)   01/02/24    Altered Skin Integrity Present on Admission - Did Patient arrive to the hospital with altered skin?: yes   Side:    Orientation: posterior   Location: Scrotum   Wound Number: #5   Is this injury device related?: No   Primary Wound Type:    Description of Altered Skin Integrity:    Ankle-Brachial Index:    Pulses:    Removal Indication and Assessment:    Wound Outcome:    (Retired) Wound Length (cm):    (Retired) Wound Width (cm):    (Retired) Depth (cm):    Wound Description (Comments):    Removal Indications:    Dressing Appearance Moist drainage 01/22/24 1152   Drainage Amount Moderate 01/22/24 1152   Drainage Characteristics/Odor Serosanguineous 01/22/24 1152   Appearance Pink;Moist 01/22/24 1152   Tissue loss description Full thickness 01/22/24 1152   Periwound Area Dry 01/22/24 1152   Wound Edges Open 01/22/24 1152   Wound Length (cm) 1 cm 01/22/24 1152   Wound Width (cm) 0.3 cm 01/22/24 1152   Wound Depth (cm) 0.2 cm 01/22/24 1152   Wound Volume (cm^3) 0.06 cm^3 01/22/24 1152   Wound Surface Area (cm^2) 0.3 cm^2 01/22/24 1152   Care Cleansed with:;Wound cleanser 01/22/24 1152   Dressing Applied 01/22/24 1152   Dressing Change Due 01/23/24 01/22/24 1152        1/22/24                         Right hip (pre)                                            Sacrum (pre)                                                             Scrotum (pre)                                                                     (Endoform, calcium alginate, 4x4, island dressing)  (endoform, calcium alginate, 4x4, island dressing)        Left thigh (pre)                                               Left ischium (pre)                                                                       (Endoform, calcium alginate, 4x,4 island dressing)         Right hip (post)                                            Left thigh (post)  (Calcium alginate, 4x4, island dressing)      (mesalt, calcium alginate,  "4x4, island dressing)  Assessment:           ICD-10-CM ICD-9-CM   1. Stage IV pressure ulcer of right hip  L89.214 707.04     707.24   2. Pressure injury of left thigh, stage 3  L89.223 707.09     707.23   3. Pressure injury of sacral region, stage 4  L89.154 707.03     707.24   4. Pressure ulcer of left buttock, stage 4  L89.324 707.05     707.24   5. Quadriplegia, unspecified  G82.50 344.00                 Procedures:       Debridement     Date/Time: 1/22/2024 11:20 AM     Performed by: Jessie Bah NP  Authorized by: Jessie Bah NP    Time out: Immediately prior to procedure a "time out" was called to verify the correct patient, procedure, equipment, support staff and site/side marked as required.     Consent Done?:  Yes (Verbal)     Preparation: Patient was prepped and draped with clean technique    Local anesthesia used?: No       Wound Details:    Location:  Left leg (Thigh)    Type of Debridement:  Non-excisional       Length (cm):  3.2       Area (sq cm):  7.04       Width (cm):  2.2       Percent Debrided (%):  100       Depth (cm):  0.3       Total Area Debrided (sq cm):  7.04    Depth of debridement:  Epidermis/Dermis    Devitalized tissue debrided:  Biofilm, Callus, Exudate, Fibrin and Slough    Instruments:  Curette  Bleeding:  None    Excisional debridement performed:  [] Yes [] No   Selective debridement performed:  [x] Yes [] No   Mechanical debridement performed:  [] Yes [] No   Silver nitrate applied:    [] Yes [] No   Labs ordered this visit:   [] Yes [] No   Imaging ordered this visit:   [] Yes [] No   Tissue pathology and/or culture taken:  [] Yes [] No         MEDICATIONS    Current Outpatient Medications:     apixaban (ELIQUIS) 5 mg Tab, Take 5 mg by mouth once daily at 6am., Disp: , Rfl:     LINZESS 145 mcg Cap capsule, Take 145 mcg by mouth once daily., Disp: , Rfl:     mupirocin (BACTROBAN) 2 % ointment, 2 (two) times daily. Apply to affected area for five days, Disp: , Rfl:  "   Review of patient's allergies indicates:   Allergen Reactions    Meropenem Anaphylaxis    Penicillins        HOME HEALTH AGENCY:  [facility]   TIMES PER WEEK/DAYS:  daily   ORDERS:  Right hip: Cleanse with wound cleanser and apply silver alginate to the wound bed (making sure it is touching wound bed), cover with 4x4 gauze and a gentle border - to be changed daily.   Left ischial: **Do Not remove until Thursday, endoform in place** on Thursday, remove dressing, cleanse with wound cleanser, apply silver alginate to wound bed, cover with 4x4 and a gentle border - to be changed daily  Left medial upper thigh: Cleanse with wound cleanser and apply mesalt and silver alginate to the wound bed, cover with 4x4 gauze and a gentle border - to be changed daily.   Sacrum: **Do Not remove until Thursday, endoform in place** on Thursday, remove dressing, cleanse with wound cleanser, apply silver alginate to wound bed, cover with 4x4 and a gentle border - to be changed daily  Scrotum: **Do Not remove until Thursday, endoform in place** on Thursday, remove dressing, cleanse with wound cleanser, apply silver alginate to wound bed, cover with 4x4 and a gentle border - to be changed daily    Follow up in 8 days (on 1/30/2024) for stretcher.

## 2024-01-22 NOTE — PROCEDURES
"Debridement    Date/Time: 1/22/2024 11:20 AM    Performed by: Jessie Bah NP  Authorized by: Jessie Bah NP    Time out: Immediately prior to procedure a "time out" was called to verify the correct patient, procedure, equipment, support staff and site/side marked as required.    Consent Done?:  Yes (Verbal)    Preparation: Patient was prepped and draped with clean technique    Local anesthesia used?: No      Wound Details:    Location:  Left leg (Thigh)    Type of Debridement:  Non-excisional       Length (cm):  3.2       Area (sq cm):  7.04       Width (cm):  2.2       Percent Debrided (%):  100       Depth (cm):  0.3       Total Area Debrided (sq cm):  7.04    Depth of debridement:  Epidermis/Dermis    Devitalized tissue debrided:  Biofilm, Callus, Exudate, Fibrin and Slough    Instruments:  Curette  Bleeding:  None    "

## 2024-01-30 ENCOUNTER — HOSPITAL ENCOUNTER (OUTPATIENT)
Dept: WOUND CARE | Facility: HOSPITAL | Age: 49
Discharge: HOME | End: 2024-01-30
Attending: FAMILY MEDICINE
Payer: MEDICARE

## 2024-01-30 VITALS
RESPIRATION RATE: 18 BRPM | DIASTOLIC BLOOD PRESSURE: 56 MMHG | WEIGHT: 235 LBS | SYSTOLIC BLOOD PRESSURE: 105 MMHG | HEIGHT: 65 IN | HEART RATE: 77 BPM | BODY MASS INDEX: 39.15 KG/M2

## 2024-01-30 DIAGNOSIS — L89.214 STAGE IV PRESSURE ULCER OF RIGHT HIP: Primary | ICD-10-CM

## 2024-01-30 DIAGNOSIS — L89.324 PRESSURE ULCER OF LEFT BUTTOCK, STAGE 4: ICD-10-CM

## 2024-01-30 DIAGNOSIS — L89.154 PRESSURE INJURY OF SACRAL REGION, STAGE 4: ICD-10-CM

## 2024-01-30 DIAGNOSIS — L89.223 PRESSURE INJURY OF LEFT THIGH, STAGE 3: ICD-10-CM

## 2024-01-30 PROCEDURE — 99212 OFFICE O/P EST SF 10 MIN: CPT

## 2024-01-30 PROCEDURE — 27000999 HC MEDICAL RECORD PHOTO DOCUMENTATION

## 2024-01-30 NOTE — PROGRESS NOTES
Home Health: [facility]   Smoker   [] Yes  [x] No  Diabetic   [] Yes   [x] No  Low air loss mattress [x] Yes [] No   Is the patient eligible for a graft, and/or currently grafting?  [] Yes [x] No     Subjective:       Patient ID: Diego Martinez Jr. is a 48 y.o. male.    Chief Complaint: Pressure Ulcer (Right hip - Stage 4 /Left ischial - Stage 4 /Sacrum - stage 4/Left medial upper thigh - Stage 3/Scrotum - stage 2 )    HPI   Mr. Martinez is a long time patient of [facility].    He comes via wheelchair with a care attendant present with him at all times.    He does have a history of quadriplegia.  He has home health who assist him with his wound care daily and care attendants to provide care at other times.  He has a very pleasant demeanor and added to an is usually jovial in spite of his challenges.  [facility] now has [facility].    6/7/23 - Mr. Martinez returns today for followup on several wounds.  Some have done except personally well recently which is correct it to his home health.  We are able to close to wounds today.  Wound #1, Right posterior hip, remains stable and today it is still 0.1 x 0.1 x 0.1.  We will leave this wound open 1 additional week and continue to monitor.  Wound #2, left ischial, has improved since the last visit.  There is a decrease in depth and the granular tissue is again stable.  The patient was educated that he should reminder the home health nurses to open this wound very carefully and gently to avoid ripping the tissue open again since it is finally making excellent progress.  Wound #3, Sacrum, this wound, today, we are able to close.  Wound #4, left medial upper thigh, is stable with no significant change although it is slightly larger.  We will continue to use silver alginate on all open wounds.    7/10/23 - Mr. Martinez was last seen on 6/7/23.  Today he returns with is wounds stable:  Wound #1 - Right posterior hip, stable, no change since the last visit, no S &  S of infection, no increase in drainage.  Wound #2 - Left ischial, continue to improve, with a very slight decrease in size this week.  Tissue is no longer friable.  Silver nitrate was applied, we will continue to use silver alginate.  Wound #3 - sacrum, reassessed, remains closed  Wound #4 - left medial upper thigh - this wound was debrided, wound margins are rolling and slightly macerated.  Silver nitrate was applied.  We will continue to use silver alginate.  The patient also reported today that he injured his right great toe nail.  Today it was assessed and there was a considerable amount of dried blood noted.  The nail was removed (90%) with a nipper.  We will use Betadine for one week.     7/26/23 - the patient returns today for followup on his several wound:  Wound #1 - Right posterior hip; this wound remains stable with no significant change, no S & S of infection  Wound #2 - Left ischial, significant increase in size due to overextending of wound bed - increase in width from 0.5 cm to 3.0 cm; extreme care must be used when opening this wound bed asked to not over extend in split the wound open again.  Wound #3 - Sacrum; we are continuing to monitor this wound as there was some maceration.  A callus paring was performed, and we will continue to monitor because there appears to be changes occurring.  Wound #4 - Left medial upper thigh - this wound was selectively debrided and has an increased amount macerated tissue.  We need to ensure that this dressing is being changed daily.    8/9/23 - Mr. Martinez returns today to be seen for three wounds:  Wound #1 - Right posterior hip - this wound continues to remains stable.  There is still some depth, however, there is very little drainage and no signs and symptoms of infection.  We will continue to pack this wound with silver alginate.  Wound #2 - Left ischial, this wound remains at 3.0 cm which is a significant increase over the measurement of 0.5 cm on 07/12/2023.   Increase in size is due to the forcing open of the wound during the cleansing process.  Home health has been reminded that they should open that wound very carefully so is not to over extend the very fragile epithelial tissue.  Additionally, the orders were to use silver alginate on this wound and today mesalt was removed.  They will be reminded they should use the appropriate dressing on the appropriate wounds and me salt should not be used in this wound.  Wound #3 - Sacrum; we are continuing to monitor this area, however, there is no open wound at this time.  It will be reopened if necessary.  Wound #4 - Left medial upper thigh - this wound was selectively debrided today with removal a moderate amount of slough from the wound bed.  There is a large island of epithelial tissue in the wound bed, however, there is still also a large area of macerated tissue around the periwound.  Home health is reminded that they should be applying me salt to this wound and this wound only.  They should be covering the me salt and wound bed with silver alginate to be changed daily.    8/24/23 - The patient returns today for reassessments his 3 wounds.    Wound #1 - Right posterior hip - remains clean and stable.  There are no signs and symptoms of infection and very little drainage.  We are using silver alginate on this wound.    Wound #2 - Left ischial - 2.7 x 0.5 x 0.2 cm - wound is improved significantly over his last visit on 08/09/2023.  This provider appreciate the care that home health is taking when opening the wound to cleanse in the depth of the wound.  It is obvious that they have not been over extending the wound which is allowing it to progress as it should be progressing now.  We will continue to apply silver alginate on this wound.  Wound #3 - Sacrum - we will continue to monitor the sacrum again as it has now reopened very slightly.  Wound #4 - Left medial upper thigh - this wound remains stable with no signs and  symptoms of infection.  The large margin of macerated tissue remains.  Ideally, this could be surgically debrided, however, we may have to do this in clinic at some point with a scalpel.    9/6/23 - Mr. Martinez returns for f/up on the 3 wounds:  Wound #1 - Right posterior hip - remains clean and stable, continuing to decrease in size slowly.  There are no signs and symptoms of infection and very little drainage.  We will continue using silver alginate on this wound.    Wound #2 - Left ischial - 1.5 x 0.5 x 0.2 cm - wound continues to decrease in size with a very small remaining area.  This provider appreciates the care that home health continues to take when opening the wound to cleanse in the depth of the wound.  It is obvious that they have not been over extending the wound which is allowing it to progress as it should be progressing now.  We will continue to apply silver alginate on this wound.  Wound #3 - Sacrum - has now reclosed but will continue to monitor  Wound #4 - Left medial upper thigh - this wound remains stable with no signs and symptoms of infection.  The large margin of macerated tissue remains.  A large amount of this tissue was selectively debrided off today.  We will focus on removal of the rolled edges each week.    9/20/23 - The patient returns today for followup on several wounds:  Wound #1 - Right posterior hip - This wound was mechanically debrided.  It remains clean and stable, continuing to decrease in size slowly.  There are no signs and symptoms of infection and very little drainage.  We will continue using silver alginate on this wound.    Wound #2 - Left ischial - 1.7 x 0.5 x 0.2 cm - wound remains stable with no significant change in size this visit.  The wound has no S & S of infection.  We will continue to apply silver alginate on this wound.  Wound #3 - Sacrum - has reopened.  This wound was selectively debrided today.  We will begin application of collagen and silver  alginate.  Wound #4 - Left medial upper thigh - this wound remains stable with no signs and symptoms of infection. It was selectively debrided today and we will begin collagen and silver alginate.      10/4/23 - Mr. Martinez returns today for f/up on the following wounds:  Wound #1 - Right posterior hip - This wound was mechanically debrided.  It remains clean and stable, with no significant change.  There are no signs and symptoms of infection and very little drainage.  We will continue using silver alginate on this wound.    Wound #2 - Left ischial - 0.5 x 2.0 x 0.2 cm - wound remains stable with no significant change although it is slightly larger than last week.   The wound has no S & S of infection.  We will continue to apply silver alginate on this wound.  Wound #3 - Sacrum - remains open.  This wound was selectively debrided today.  We will continue powdered collagen and silver alginate.  Wound #4 - Left medial upper thigh - this wound remains stable with no signs and symptoms of infection. It was selectively debrided today and we will begin powdered collagen and silver alginate.     10/18/23 - Mr. Martinez is seen today for the f/up on several wounds:  Wound #1 - Right posterior hip - This wound was mechanically debrided.  It remains clean and stable, with no significant change.  There are no signs and symptoms of infection and very little drainage.  We will continue using silver alginate on this wound.    Wound #2 - Left ischial - 0.5 x 1.5 x 0.2 cm - wound remains stable with no significant change although it is slightly larger than last week.   The wound has no S & S of infection.  We will continue to apply silver alginate on this wound.  Wound #3 - Sacrum - remains open.  This wound was selectively debrided today.  We will continue powdered collagen and silver alginate.  Wound #4 - Left medial upper thigh - this wound remains stable with no signs and symptoms of infection. It was mechanically debrided today  and we will begin powdered collagen and silver alginate.     October 31st:  48-year-old black male, who is a quadriplegia, is here for follow up of his for pressure injuries.  The worse 1 at this time is the left ischial, left buttock.  The ulcers appear clean, without obvious secondary infection, foul odor, or discharge.  He has home health services almost every day.    11/14/23 - Mr. Martinez returns today for followup on several wounds.  All remains stable.  The left ischial does measures slightly larger, however, the wound bed is clean.  It was selectively debrided and there are no signs and symptoms of infection with this wound or any of his wounds at this time.  Wound #1 - Right posterior hip - This wound was mechanically debrided.  It remains clean and stable, with no significant change.  There are no signs and symptoms of infection and very little drainage.  We will continue using silver alginate on this wound.    Wound #2 - Left ischial - 0.8 x 1.0 x 0.2 cm  - wound remains stable with no significant change although it is slightly larger than last week.   The wound has no S & S of infection.  We will continue to apply silver alginate on this wound.  Wound #3 - Sacrum - remains open.  We will continue silver alginate.  Wound #4 - Left medial upper thigh -  4.0 x 2.0 x 0.3 cm- this wound has increased in size slightly, but there are no signs and symptoms of infection. It was selectively debrided today and we will continue silver alginate.     12/5/23 - Mr. Martinez is seen today for f/up on his wounds.  They all remain stable, in various stages.  Today none show S & S of infection, with no increase in drainage, redness, etc.    Wound #1 - Right posterior hip - This wound was mechanically debride.  It does continue to drain a scant amount.  We will continue using silver alginate on this wound.    Wound #2 - Left ischial - 0.5 x 1.0 x 0.2 cm  - wound remains stable with a slight decrease in size.   The wound has no  S & S of infection.  We will continue to apply silver alginate on this wound.  Wound #3 - Sacrum - remains open.  We will continue silver alginate.  Wound #4 - Left medial upper thigh -  2.7 x 1.8 x 0.3 cm - this wound has decreased in size and appears to be doing well.  There are no signs and symptoms of infection. It was selectively debrided today and we will continue silver alginate.     January 2, 2024:  48-year-old black male, with quadriplegia, is here for a multiple stage III and stage IV pressure injuries.  They are all under control at this time.  They are relatively shallow.  He has a good low air loss mattress at home.    1/22/24 - The patient returns today for f/up on several wounds.  Today many of the wounds are improving.  Wound #1 - Right posterior hip - continues to progress well and has a very small remaining opening.  We will continue to use silver alginate placed over the wound.    Wound #2 - Left ischial -0.4 x 1.0 x 0.2 cm  - wound remains appears stable with a slight decrease in size.   The wound has no S & S of infection.  We will continue to apply silver alginate on this wound.  Wound #3 - Sacrum - remains open and we will continue to monitor.  We will begin application of Endoform.    Wound #4 - Left medial upper thigh -  3.2 x 3.3 x 0.3 cm - this wound has increased in size slightly.   and appears to be doing well.  There are no signs and symptoms of infection. It was selectively debrided today and we will continue silver alginate.  He does continue to have HH assistance on a daily basis.  If the left medial upper thigh does not improve by next week we will consider ultrasonic debridement.     January 30, 2024:  48-year-old black male, with quadriplegia, is here for follow up of his multiple pressure injuries, including right hip, left ischial, sacral, left medial upper thigh, and a stage III scrotal pressure ulcer.  The wounds are all clean at this time, and need no surgical debridement  today.  The measurements are approximately the same as last visit.    Review of Systems   Constitutional: Negative.    Respiratory: Negative.     Cardiovascular: Negative.    Musculoskeletal:  Positive for gait problem.   Skin:  Positive for wound.        As documented in the HPI.   All other systems reviewed and are negative.        Objective:      Vitals:    01/30/24 1113   BP: (!) 105/56   Pulse: 77   Resp: 18     Physical Exam  Vitals reviewed.   Constitutional:       Appearance: Normal appearance. He is normal weight.   HENT:      Head: Normocephalic.   Cardiovascular:      Rate and Rhythm: Normal rate.      Pulses: Normal pulses.   Pulmonary:      Effort: Pulmonary effort is normal.   Musculoskeletal:      Comments: quadraplegia   Skin:     General: Skin is warm and dry.      Comments: The right hip pressure injury is clean, and I did a mechanical debridement.  Left ischial wound is clean, and required no surgical debridement.  The sacrum is clean, and I did a mechanical debridement.  The left medial upper thigh wound is clean.  No surgical debridement necessary.  This wound has improved slightly with measurements.  The scrotal ulcer, is a stage III, and it is very clean, and I did a mechanical debridement.   Neurological:      General: No focal deficit present.      Mental Status: He is alert and oriented to person, place, and time.   Psychiatric:         Mood and Affect: Mood normal.            Altered Skin Integrity 05/23/22 1337 Right posterior Hip #1 (Active)   05/23/22 1337   Altered Skin Integrity Present on Admission - Did Patient arrive to the hospital with altered skin?: yes   Side: Right   Orientation: posterior   Location: Hip   Wound Number: #1   Is this injury device related?: No   Primary Wound Type:    Description of Altered Skin Integrity:    Ankle-Brachial Index:    Pulses:    Removal Indication and Assessment:    Wound Outcome:    (Retired) Wound Length (cm):    (Retired) Wound Width (cm):     (Retired) Depth (cm):    Wound Description (Comments):    Removal Indications:    Dressing Appearance Dried drainage 01/30/24 1214   Drainage Amount Moderate 01/30/24 1214   Drainage Characteristics/Odor Serosanguineous 01/30/24 1214   Appearance Pink 01/30/24 1214   Tissue loss description Full thickness 01/30/24 1214   Periwound Area Intact 01/30/24 1214   Wound Edges Open 01/30/24 1214   Wound Length (cm) 0.3 cm 01/30/24 1214   Wound Width (cm) 0.3 cm 01/30/24 1214   Wound Depth (cm) 0.5 cm 01/30/24 1214   Wound Volume (cm^3) 0.045 cm^3 01/30/24 1214   Wound Surface Area (cm^2) 0.09 cm^2 01/30/24 1214   Care Cleansed with:;Wound cleanser 01/30/24 1214   Dressing Applied 01/30/24 1214   Dressing Change Due 01/31/24 01/30/24 1214            Altered Skin Integrity 05/23/22 1353 Left Ischial tuberosity #2 (Active)   05/23/22 1353   Altered Skin Integrity Present on Admission - Did Patient arrive to the hospital with altered skin?: yes   Side: Left   Orientation:    Location: Ischial tuberosity   Wound Number: #2   Is this injury device related?: No   Primary Wound Type:    Description of Altered Skin Integrity:    Ankle-Brachial Index:    Pulses:    Removal Indication and Assessment:    Wound Outcome:    (Retired) Wound Length (cm):    (Retired) Wound Width (cm):    (Retired) Depth (cm):    Wound Description (Comments):    Removal Indications:    Dressing Appearance Moist drainage 01/30/24 1214   Drainage Amount Moderate 01/30/24 1214   Drainage Characteristics/Odor Serosanguineous 01/30/24 1214   Appearance Pink;Moist 01/30/24 1214   Tissue loss description Full thickness 01/30/24 1214   Periwound Area Dry;Intact 01/30/24 1214   Wound Edges Open 01/30/24 1214   Wound Length (cm) 0.4 cm 01/30/24 1214   Wound Width (cm) 0.8 cm 01/30/24 1214   Wound Depth (cm) 0.2 cm 01/30/24 1214   Wound Volume (cm^3) 0.064 cm^3 01/30/24 1214   Wound Surface Area (cm^2) 0.32 cm^2 01/30/24 1214   Care Cleansed with:;Wound cleanser  01/30/24 1214   Dressing Applied 01/30/24 1214   Dressing Change Due 02/01/24 01/30/24 1214            Altered Skin Integrity 01/05/23 1351 Left upper;medial Thigh #4  (Active)   01/05/23 1351   Altered Skin Integrity Present on Admission - Did Patient arrive to the hospital with altered skin?: yes   Side: Left   Orientation: upper;medial   Location: Thigh   Wound Number: #4   Is this injury device related?: No   Primary Wound Type:    Description of Altered Skin Integrity:    Ankle-Brachial Index:    Pulses:    Removal Indication and Assessment:    Wound Outcome:    (Retired) Wound Length (cm):    (Retired) Wound Width (cm):    (Retired) Depth (cm):    Wound Description (Comments):    Removal Indications:    Dressing Appearance Moist drainage 01/30/24 1214   Drainage Amount Moderate 01/30/24 1214   Drainage Characteristics/Odor Serosanguineous 01/30/24 1214   Appearance Red;Moist 01/30/24 1214   Tissue loss description Full thickness 01/30/24 1214   Periwound Area Moist 01/30/24 1214   Wound Edges Open 01/30/24 1214   Wound Length (cm) 3 cm 01/30/24 1214   Wound Width (cm) 1.8 cm 01/30/24 1214   Wound Depth (cm) 0.3 cm 01/30/24 1214   Wound Volume (cm^3) 1.62 cm^3 01/30/24 1214   Wound Surface Area (cm^2) 5.4 cm^2 01/30/24 1214   Care Cleansed with:;Wound cleanser 01/30/24 1214   Dressing Applied 01/30/24 1214   Dressing Change Due 01/31/24 01/30/24 1214            Altered Skin Integrity 09/20/23 1148 Sacral spine #3 (Active)   09/20/23 1148   Altered Skin Integrity Present on Admission - Did Patient arrive to the hospital with altered skin?: yes   Side:    Orientation:    Location: Sacral spine   Wound Number: #3   Is this injury device related?:    Primary Wound Type:    Description of Altered Skin Integrity:    Ankle-Brachial Index:    Pulses:    Removal Indication and Assessment:    Wound Outcome:    (Retired) Wound Length (cm):    (Retired) Wound Width (cm):    (Retired) Depth (cm):    Wound Description  (Comments):    Removal Indications:    Dressing Appearance Dried drainage 01/30/24 1214   Drainage Amount Moderate 01/30/24 1214   Drainage Characteristics/Odor Serosanguineous 01/30/24 1214   Appearance Pink 01/30/24 1214   Tissue loss description Full thickness 01/30/24 1214   Periwound Area Intact 01/30/24 1214   Wound Length (cm) 0.3 cm 01/30/24 1214   Wound Width (cm) 0.3 cm 01/30/24 1214   Wound Depth (cm) 0.2 cm 01/30/24 1214   Wound Volume (cm^3) 0.018 cm^3 01/30/24 1214   Wound Surface Area (cm^2) 0.09 cm^2 01/30/24 1214   Care Cleansed with:;Wound cleanser 01/30/24 1214   Dressing Applied 01/30/24 1214   Dressing Change Due 02/01/24 01/30/24 1214            Altered Skin Integrity 01/02/24 posterior Scrotum #5 (Active)   01/02/24    Altered Skin Integrity Present on Admission - Did Patient arrive to the hospital with altered skin?: yes   Side:    Orientation: posterior   Location: Scrotum   Wound Number: #5   Is this injury device related?: No   Primary Wound Type:    Description of Altered Skin Integrity:    Ankle-Brachial Index:    Pulses:    Removal Indication and Assessment:    Wound Outcome:    (Retired) Wound Length (cm):    (Retired) Wound Width (cm):    (Retired) Depth (cm):    Wound Description (Comments):    Removal Indications:    Dressing Appearance Moist drainage 01/30/24 1214   Drainage Amount Moderate 01/30/24 1214   Drainage Characteristics/Odor Serosanguineous 01/30/24 1214   Appearance Red 01/30/24 1214   Tissue loss description Full thickness 01/30/24 1214   Periwound Area Intact 01/30/24 1214   Wound Edges Open 01/30/24 1214   Wound Length (cm) 0.5 cm 01/30/24 1214   Wound Width (cm) 0.5 cm 01/30/24 1214   Wound Depth (cm) 0.2 cm 01/30/24 1214   Wound Volume (cm^3) 0.05 cm^3 01/30/24 1214   Wound Surface Area (cm^2) 0.25 cm^2 01/30/24 1214   Care Cleansed with:;Wound cleanser 01/30/24 1214   Dressing Applied 01/30/24 1214   Dressing Change Due 02/01/24 01/30/24 1214      01/30/24       Right hip                                                       Left ischium       Sacrum                                                       Scrotum     Left medial upper thigh   ML  Assessment:           ICD-10-CM ICD-9-CM   1. Stage IV pressure ulcer of right hip  L89.214 707.04     707.24   2. Pressure injury of left thigh, stage 3  L89.223 707.09     707.23   3. Pressure injury of sacral region, stage 4  L89.154 707.03     707.24   4. Pressure ulcer of left buttock, stage 4  L89.324 707.05     707.24           Procedures:   Mechanical     Excisional debridement performed:  [] Yes [x] No   Selective debridement performed:  [] Yes [x] No   Mechanical debridement performed:  [x] Yes [] No   Silver nitrate applied:    [] Yes [] No   Labs ordered this visit:   [] Yes [] No   Imaging ordered this visit:   [] Yes [] No   Tissue pathology and/or culture taken:  [] Yes [] No         MEDICATIONS    Current Outpatient Medications:     apixaban (ELIQUIS) 5 mg Tab, Take 5 mg by mouth once daily at 6am., Disp: , Rfl:     LINZESS 145 mcg Cap capsule, Take 145 mcg by mouth once daily., Disp: , Rfl:     mupirocin (BACTROBAN) 2 % ointment, 2 (two) times daily. Apply to affected area for five days, Disp: , Rfl:    Review of patient's allergies indicates:   Allergen Reactions    Meropenem Anaphylaxis    Penicillins        HOME HEALTH AGENCY:  [facility]   TIMES PER WEEK/DAYS:  daily   ORDERS:  Right hip: Cleanse with wound cleanser and apply silver alginate to the wound bed (making sure it is touching wound bed), cover with 4x4 gauze and a gentle border - to be changed daily.   Left ischial: **Do Not remove until Thursday, endoform in place** on Thursday, remove dressing, cleanse with wound cleanser, apply silver alginate to wound bed, cover with 4x4 and a gentle border - to be changed daily  Left medial upper thigh: Cleanse with wound cleanser and apply mesalt and silver alginate to the wound bed, cover with 4x4  gauze and a gentle border - to be changed daily.   Sacrum: **Do Not remove until Thursday, endoform in place** on Thursday, remove dressing, cleanse with wound cleanser, apply silver alginate to wound bed, cover with 4x4 and a gentle border - to be changed daily  Scrotum: **Do Not remove until Thursday, endoform in place** on Thursday, remove dressing, cleanse with wound cleanser, apply silver alginate to wound bed, cover with 4x4 and a gentle border - to be changed daily      Follow up in about 15 days (around 2/14/2024).

## 2024-02-20 ENCOUNTER — HOSPITAL ENCOUNTER (OUTPATIENT)
Dept: WOUND CARE | Facility: HOSPITAL | Age: 49
Discharge: HOME | End: 2024-02-20
Attending: NURSE PRACTITIONER
Payer: MEDICARE

## 2024-02-20 VITALS
WEIGHT: 175 LBS | SYSTOLIC BLOOD PRESSURE: 142 MMHG | RESPIRATION RATE: 18 BRPM | DIASTOLIC BLOOD PRESSURE: 65 MMHG | BODY MASS INDEX: 29.16 KG/M2 | HEART RATE: 60 BPM | HEIGHT: 65 IN

## 2024-02-20 DIAGNOSIS — L89.214 STAGE IV PRESSURE ULCER OF RIGHT HIP: Primary | ICD-10-CM

## 2024-02-20 DIAGNOSIS — L89.223 PRESSURE INJURY OF LEFT THIGH, STAGE 3: ICD-10-CM

## 2024-02-20 DIAGNOSIS — L89.324 PRESSURE ULCER OF LEFT BUTTOCK, STAGE 4: ICD-10-CM

## 2024-02-20 DIAGNOSIS — L89.154 PRESSURE INJURY OF SACRAL REGION, STAGE 4: ICD-10-CM

## 2024-02-20 DIAGNOSIS — G82.50 QUADRIPLEGIA, UNSPECIFIED: ICD-10-CM

## 2024-02-20 PROCEDURE — 99212 OFFICE O/P EST SF 10 MIN: CPT

## 2024-02-20 PROCEDURE — 27000999 HC MEDICAL RECORD PHOTO DOCUMENTATION

## 2024-02-20 NOTE — PROGRESS NOTES
Home Health: [facility]   Smoker   [] Yes  [x] No  Diabetic   [] Yes   [x] No  Low air loss mattress [x] Yes [] No   Is the patient eligible for a graft, and/or currently grafting?  [] Yes [x] No     Subjective:       Patient ID: Diego Martinez Jr. is a 48 y.o. male.    Chief Complaint: Pressure Ulcer (Right hip - Stage 4 /Left ischial - Stage 4 /Sacrum - stage 4/Left medial upper thigh - Stage 3/Scrotum - stage 2 (HEALED) )    HPI   Mr. Martinez is a long time patient of [facility].    He comes via wheelchair with a care attendant present with him at all times.    He does have a history of quadriplegia.  He has home health who assist him with his wound care daily and care attendants to provide care at other times.  He has a very pleasant demeanor and added to an is usually jovial in spite of his challenges.  [facility]    6/7/23 - Mr. Martinez returns today for followup on several wounds.  Some have done except personally well recently which is correct it to his home health.  We are able to close to wounds today.  Wound #1, Right posterior hip, remains stable and today it is still 0.1 x 0.1 x 0.1.  We will leave this wound open 1 additional week and continue to monitor.  Wound #2, left ischial, has improved since the last visit.  There is a decrease in depth and the granular tissue is again stable.  The patient was educated that he should reminder the home health nurses to open this wound very carefully and gently to avoid ripping the tissue open again since it is finally making excellent progress.  Wound #3, Sacrum, this wound, today, we are able to close.  Wound #4, left medial upper thigh, is stable with no significant change although it is slightly larger.  We will continue to use silver alginate on all open wounds.    7/10/23 - Mr. Martinez was last seen on 6/7/23.  Today he returns with is wounds stable:  Wound #1 - Right posterior hip, stable, no change since the last  visit, no S & S of infection, no increase in drainage.  Wound #2 - Left ischial, continue to improve, with a very slight decrease in size this week.  Tissue is no longer friable.  Silver nitrate was applied, we will continue to use silver alginate.  Wound #3 - sacrum, reassessed, remains closed  Wound #4 - left medial upper thigh - this wound was debrided, wound margins are rolling and slightly macerated.  Silver nitrate was applied.  We will continue to use silver alginate.  The patient also reported today that he injured his right great toe nail.  Today it was assessed and there was a considerable amount of dried blood noted.  The nail was removed (90%) with a nipper.  We will use Betadine for one week.     7/26/23 - the patient returns today for followup on his several wound:  Wound #1 - Right posterior hip; this wound remains stable with no significant change, no S & S of infection  Wound #2 - Left ischial, significant increase in size due to overextending of wound bed - increase in width from 0.5 cm to 3.0 cm; extreme care must be used when opening this wound bed asked to not over extend in split the wound open again.  Wound #3 - Sacrum; we are continuing to monitor this wound as there was some maceration.  A callus paring was performed, and we will continue to monitor because there appears to be changes occurring.  Wound #4 - Left medial upper thigh - this wound was selectively debrided and has an increased amount macerated tissue.  We need to ensure that this dressing is being changed daily.    8/9/23 - Mr. Martinez returns today to be seen for three wounds:  Wound #1 - Right posterior hip - this wound continues to remains stable.  There is still some depth, however, there is very little drainage and no signs and symptoms of infection.  We will continue to pack this wound with silver alginate.  Wound #2 - Left ischial, this wound remains at 3.0 cm which is a significant increase over the measurement of 0.5 cm  on 07/12/2023.  Increase in size is due to the forcing open of the wound during the cleansing process.  Home health has been reminded that they should open that wound very carefully so is not to over extend the very fragile epithelial tissue.  Additionally, the orders were to use silver alginate on this wound and today mesalt was removed.  They will be reminded they should use the appropriate dressing on the appropriate wounds and me salt should not be used in this wound.  Wound #3 - Sacrum; we are continuing to monitor this area, however, there is no open wound at this time.  It will be reopened if necessary.  Wound #4 - Left medial upper thigh - this wound was selectively debrided today with removal a moderate amount of slough from the wound bed.  There is a large island of epithelial tissue in the wound bed, however, there is still also a large area of macerated tissue around the periwound.  Home health is reminded that they should be applying me salt to this wound and this wound only.  They should be covering the me salt and wound bed with silver alginate to be changed daily.    8/24/23 - The patient returns today for reassessments his 3 wounds.    Wound #1 - Right posterior hip - remains clean and stable.  There are no signs and symptoms of infection and very little drainage.  We are using silver alginate on this wound.    Wound #2 - Left ischial - 2.7 x 0.5 x 0.2 cm - wound is improved significantly over his last visit on 08/09/2023.  This provider appreciate the care that home health is taking when opening the wound to cleanse in the depth of the wound.  It is obvious that they have not been over extending the wound which is allowing it to progress as it should be progressing now.  We will continue to apply silver alginate on this wound.  Wound #3 - Sacrum - we will continue to monitor the sacrum again as it has now reopened very slightly.  Wound #4 - Left medial upper thigh - this wound remains stable with  no signs and symptoms of infection.  The large margin of macerated tissue remains.  Ideally, this could be surgically debrided, however, we may have to do this in clinic at some point with a scalpel.    9/6/23 - Mr. Martinez returns for f/up on the 3 wounds:  Wound #1 - Right posterior hip - remains clean and stable, continuing to decrease in size slowly.  There are no signs and symptoms of infection and very little drainage.  We will continue using silver alginate on this wound.    Wound #2 - Left ischial - 1.5 x 0.5 x 0.2 cm - wound continues to decrease in size with a very small remaining area.  This provider appreciates the care that home health continues to take when opening the wound to cleanse in the depth of the wound.  It is obvious that they have not been over extending the wound which is allowing it to progress as it should be progressing now.  We will continue to apply silver alginate on this wound.  Wound #3 - Sacrum - has now reclosed but will continue to monitor  Wound #4 - Left medial upper thigh - this wound remains stable with no signs and symptoms of infection.  The large margin of macerated tissue remains.  A large amount of this tissue was selectively debrided off today.  We will focus on removal of the rolled edges each week.    9/20/23 - The patient returns today for followup on several wounds:  Wound #1 - Right posterior hip - This wound was mechanically debrided.  It remains clean and stable, continuing to decrease in size slowly.  There are no signs and symptoms of infection and very little drainage.  We will continue using silver alginate on this wound.    Wound #2 - Left ischial - 1.7 x 0.5 x 0.2 cm - wound remains stable with no significant change in size this visit.  The wound has no S & S of infection.  We will continue to apply silver alginate on this wound.  Wound #3 - Sacrum - has reopened.  This wound was selectively debrided today.  We will begin application of collagen and silver  alginate.  Wound #4 - Left medial upper thigh - this wound remains stable with no signs and symptoms of infection. It was selectively debrided today and we will begin collagen and silver alginate.      10/4/23 - Mr. Martinez returns today for f/up on the following wounds:  Wound #1 - Right posterior hip - This wound was mechanically debrided.  It remains clean and stable, with no significant change.  There are no signs and symptoms of infection and very little drainage.  We will continue using silver alginate on this wound.    Wound #2 - Left ischial - 0.5 x 2.0 x 0.2 cm - wound remains stable with no significant change although it is slightly larger than last week.   The wound has no S & S of infection.  We will continue to apply silver alginate on this wound.  Wound #3 - Sacrum - remains open.  This wound was selectively debrided today.  We will continue powdered collagen and silver alginate.  Wound #4 - Left medial upper thigh - this wound remains stable with no signs and symptoms of infection. It was selectively debrided today and we will begin powdered collagen and silver alginate.     10/18/23 - Mr. Martinez is seen today for the f/up on several wounds:  Wound #1 - Right posterior hip - This wound was mechanically debrided.  It remains clean and stable, with no significant change.  There are no signs and symptoms of infection and very little drainage.  We will continue using silver alginate on this wound.    Wound #2 - Left ischial - 0.5 x 1.5 x 0.2 cm - wound remains stable with no significant change although it is slightly larger than last week.   The wound has no S & S of infection.  We will continue to apply silver alginate on this wound.  Wound #3 - Sacrum - remains open.  This wound was selectively debrided today.  We will continue powdered collagen and silver alginate.  Wound #4 - Left medial upper thigh - this wound remains stable with no signs and symptoms of infection. It was mechanically debrided today  and we will begin powdered collagen and silver alginate.     October 31st:  48-year-old black male, who is a quadriplegia, is here for follow up of his for pressure injuries.  The worse 1 at this time is the left ischial, left buttock.  The ulcers appear clean, without obvious secondary infection, foul odor, or discharge.  He has home health services almost every day.    11/14/23 - Mr. Martinez returns today for followup on several wounds.  All remains stable.  The left ischial does measures slightly larger, however, the wound bed is clean.  It was selectively debrided and there are no signs and symptoms of infection with this wound or any of his wounds at this time.  Wound #1 - Right posterior hip - This wound was mechanically debrided.  It remains clean and stable, with no significant change.  There are no signs and symptoms of infection and very little drainage.  We will continue using silver alginate on this wound.    Wound #2 - Left ischial - 0.8 x 1.0 x 0.2 cm  - wound remains stable with no significant change although it is slightly larger than last week.   The wound has no S & S of infection.  We will continue to apply silver alginate on this wound.  Wound #3 - Sacrum - remains open.  We will continue silver alginate.  Wound #4 - Left medial upper thigh -  4.0 x 2.0 x 0.3 cm- this wound has increased in size slightly, but there are no signs and symptoms of infection. It was selectively debrided today and we will continue silver alginate.     12/5/23 - Mr. Martinez is seen today for f/up on his wounds.  They all remain stable, in various stages.  Today none show S & S of infection, with no increase in drainage, redness, etc.    Wound #1 - Right posterior hip - This wound was mechanically debride.  It does continue to drain a scant amount.  We will continue using silver alginate on this wound.    Wound #2 - Left ischial - 0.5 x 1.0 x 0.2 cm  - wound remains stable with a slight decrease in size.   The wound has no  S & S of infection.  We will continue to apply silver alginate on this wound.  Wound #3 - Sacrum - remains open.  We will continue silver alginate.  Wound #4 - Left medial upper thigh -  2.7 x 1.8 x 0.3 cm - this wound has decreased in size and appears to be doing well.  There are no signs and symptoms of infection. It was selectively debrided today and we will continue silver alginate.     January 2, 2024:  48-year-old black male, with quadriplegia, is here for a multiple stage III and stage IV pressure injuries.  They are all under control at this time.  They are relatively shallow.  He has a good low air loss mattress at home.    1/22/24 - The patient returns today for f/up on several wounds.  Today many of the wounds are improving.  Wound #1 - Right posterior hip - continues to progress well and has a very small remaining opening.  We will continue to use silver alginate placed over the wound.    Wound #2 - Left ischial -0.4 x 1.0 x 0.2 cm  - wound remains appears stable with a slight decrease in size.   The wound has no S & S of infection.  We will continue to apply silver alginate on this wound.  Wound #3 - Sacrum - remains open and we will continue to monitor.  We will begin application of Endoform.    Wound #4 - Left medial upper thigh -  3.2 x 3.3 x 0.3 cm - this wound has increased in size slightly.   and appears to be doing well.  There are no signs and symptoms of infection. It was selectively debrided today and we will continue silver alginate.  He does continue to have HH assistance on a daily basis.  If the left medial upper thigh does not improve by next week we will consider ultrasonic debridement.     January 30, 2024:  48-year-old black male, with quadriplegia, is here for follow up of his multiple pressure injuries, including right hip, left ischial, sacral, left medial upper thigh, and a stage III scrotal pressure ulcer.  The wounds are all clean at this time, and need no surgical debridement  today.  The measurements are approximately the same as last visit.    2/20/24 - Mr. Martinez Is seen today for followup on several wounds.   Some of the wounds continue to progress well while others have deteriorated since he was last seen by this provider on January 22nd.  In particular, the left medial upper thigh has continued to deteriorate and today we are attempting to authorize a Theraskin  graft for that wound.  Wound #1 - Right posterior hip - Remains stable with no reported drainage, no S & S of infection.  We will continue to use silver alginate placed over the wound.    Wound #2 - Left ischial -0.8 x 0.5 x 0.2 cm  - wound continues to decrease in size.   The wound has no S & S of infection.  We will continue to apply silver alginate on this wound.  Wound #3 - Sacrum - 0.3 x 0.8 x 0.4 cm - remains open and has increased in size slightly we will continue to monitor and apply silver alginate.  Wound #4 - Left medial upper thigh - 2.5 x 3.0 x 0.9 cm - this wound has increased in size slightly. There are no signs and symptoms of infection. It was selectively debrided today and we will attempt to authorize a Theraskin graft for this wound.   The left medial upper thigh will be ultrasonically debrided at his next visit.    Review of Systems   Constitutional: Negative.    Respiratory: Negative.     Cardiovascular: Negative.    Musculoskeletal:  Positive for gait problem.   Skin:  Positive for wound.        As documented in the HPI.   All other systems reviewed and are negative.        Objective:      Vitals:    02/20/24 1158   BP: (!) 142/65   Pulse: 60   Resp: 18     Physical Exam  Vitals reviewed.   Constitutional:       Appearance: Normal appearance. He is normal weight.   HENT:      Head: Normocephalic.   Cardiovascular:      Rate and Rhythm: Normal rate.      Pulses: Normal pulses.   Pulmonary:      Effort: Pulmonary effort is normal.   Musculoskeletal:      Comments: quadraplegia   Skin:     General: Skin is  warm and dry.      Comments: Multiple pressure injuries   Neurological:      General: No focal deficit present.      Mental Status: He is alert and oriented to person, place, and time.   Psychiatric:         Mood and Affect: Mood normal.            Altered Skin Integrity 05/23/22 1337 Right posterior Hip #1 (Active)   05/23/22 1337   Altered Skin Integrity Present on Admission - Did Patient arrive to the hospital with altered skin?: yes   Side: Right   Orientation: posterior   Location: Hip   Wound Number: #1   Is this injury device related?: No   Primary Wound Type:    Description of Altered Skin Integrity:    Ankle-Brachial Index:    Pulses:    Removal Indication and Assessment:    Wound Outcome:    (Retired) Wound Length (cm):    (Retired) Wound Width (cm):    (Retired) Depth (cm):    Wound Description (Comments):    Removal Indications:    Dressing Appearance Dried drainage 02/20/24 1237   Drainage Amount Moderate 02/20/24 1237   Drainage Characteristics/Odor Serosanguineous 02/20/24 1237   Appearance Pink 02/20/24 1237   Tissue loss description Full thickness 02/20/24 1237   Periwound Area Intact 02/20/24 1237   Wound Edges Open 02/20/24 1237   Wound Length (cm) 0.3 cm 02/20/24 1237   Wound Width (cm) 0.3 cm 02/20/24 1237   Wound Depth (cm) 0.5 cm 02/20/24 1237   Wound Volume (cm^3) 0.045 cm^3 02/20/24 1237   Wound Surface Area (cm^2) 0.09 cm^2 02/20/24 1237   Care Cleansed with:;Wound cleanser 02/20/24 1237   Dressing Applied 02/20/24 1237   Dressing Change Due 02/21/24 02/20/24 1237            Altered Skin Integrity 05/23/22 1353 Left Ischial tuberosity #2 (Active)   05/23/22 1353   Altered Skin Integrity Present on Admission - Did Patient arrive to the hospital with altered skin?: yes   Side: Left   Orientation:    Location: Ischial tuberosity   Wound Number: #2   Is this injury device related?: No   Primary Wound Type:    Description of Altered Skin Integrity:    Ankle-Brachial Index:    Pulses:    Removal  Indication and Assessment:    Wound Outcome:    (Retired) Wound Length (cm):    (Retired) Wound Width (cm):    (Retired) Depth (cm):    Wound Description (Comments):    Removal Indications:    Dressing Appearance Moist drainage 02/20/24 1237   Drainage Amount Moderate 02/20/24 1237   Drainage Characteristics/Odor Serosanguineous 02/20/24 1237   Appearance Pink;Moist 02/20/24 1237   Tissue loss description Full thickness 02/20/24 1237   Periwound Area Intact 02/20/24 1237   Wound Edges Open 02/20/24 1237   Wound Length (cm) 0.8 cm 02/20/24 1237   Wound Width (cm) 0.5 cm 02/20/24 1237   Wound Depth (cm) 0.2 cm 02/20/24 1237   Wound Volume (cm^3) 0.08 cm^3 02/20/24 1237   Wound Surface Area (cm^2) 0.4 cm^2 02/20/24 1237   Care Cleansed with:;Wound cleanser 02/20/24 1237   Dressing Applied 02/20/24 1237   Dressing Change Due 02/21/24 02/20/24 1237            Altered Skin Integrity 01/05/23 1351 Left upper;medial Thigh #4  (Active)   01/05/23 1351   Altered Skin Integrity Present on Admission - Did Patient arrive to the hospital with altered skin?: yes   Side: Left   Orientation: upper;medial   Location: Thigh   Wound Number: #4   Is this injury device related?: No   Primary Wound Type:    Description of Altered Skin Integrity:    Ankle-Brachial Index:    Pulses:    Removal Indication and Assessment:    Wound Outcome:    (Retired) Wound Length (cm):    (Retired) Wound Width (cm):    (Retired) Depth (cm):    Wound Description (Comments):    Removal Indications:    Dressing Appearance Moist drainage 02/20/24 1237   Drainage Amount Moderate 02/20/24 1237   Drainage Characteristics/Odor Serosanguineous 02/20/24 1237   Appearance Red;Moist 02/20/24 1237   Tissue loss description Full thickness 02/20/24 1237   Periwound Area Intact 02/20/24 1237   Wound Edges Open 02/20/24 1237   Wound Length (cm) 2.5 cm 02/20/24 1237   Wound Width (cm) 3 cm 02/20/24 1237   Wound Depth (cm) 0.9 cm 02/20/24 1237   Wound Volume (cm^3) 6.75 cm^3  02/20/24 1237   Wound Surface Area (cm^2) 7.5 cm^2 02/20/24 1237   Care Cleansed with:;Wound cleanser 02/20/24 1237   Dressing Applied 02/20/24 1237   Dressing Change Due 02/21/24 02/20/24 1237            Altered Skin Integrity 09/20/23 1148 Sacral spine #3 (Active)   09/20/23 1148   Altered Skin Integrity Present on Admission - Did Patient arrive to the hospital with altered skin?: yes   Side:    Orientation:    Location: Sacral spine   Wound Number: #3   Is this injury device related?:    Primary Wound Type:    Description of Altered Skin Integrity:    Ankle-Brachial Index:    Pulses:    Removal Indication and Assessment:    Wound Outcome:    (Retired) Wound Length (cm):    (Retired) Wound Width (cm):    (Retired) Depth (cm):    Wound Description (Comments):    Removal Indications:    Dressing Appearance Moist drainage 02/20/24 1237   Drainage Amount Moderate 02/20/24 1237   Drainage Characteristics/Odor Serosanguineous 02/20/24 1237   Appearance Pink;Moist 02/20/24 1237   Tissue loss description Full thickness 02/20/24 1237   Periwound Area Intact;Moist 02/20/24 1237   Wound Edges Open 02/20/24 1237   Wound Length (cm) 0.3 cm 02/20/24 1237   Wound Width (cm) 0.8 cm 02/20/24 1237   Wound Depth (cm) 0.4 cm 02/20/24 1237   Wound Volume (cm^3) 0.096 cm^3 02/20/24 1237   Wound Surface Area (cm^2) 0.24 cm^2 02/20/24 1237   Care Cleansed with:;Wound cleanser 02/20/24 1237   Dressing Applied 02/20/24 1237   Dressing Change Due 02/21/24 02/20/24 1237          02/20/24        Right hip                                                         Left medial upper thigh        Sacrum                                                           Scrotum   \  Left ischium   (Silver alginate, 4x4 gauze, gentle border)   ML  Assessment:           ICD-10-CM ICD-9-CM   1. Stage IV pressure ulcer of right hip  L89.214 707.04     707.24   2. Pressure injury of left thigh, stage 3  L89.223 707.09     707.23   3. Pressure injury of sacral  region, stage 4  L89.154 707.03     707.24   4. Pressure ulcer of left buttock, stage 4  L89.324 707.05     707.24   5. Quadriplegia, unspecified  G82.50 344.00           Procedures:     Mechanical debridement only    Excisional debridement performed:  [] Yes [] No   Selective debridement performed:  [] Yes [] No   Mechanical debridement performed:  [x] Yes [] No   Silver nitrate applied:    [] Yes [] No   Labs ordered this visit:   [] Yes [] No   Imaging ordered this visit:   [] Yes [] No   Tissue pathology and/or culture taken:  [] Yes [] No         MEDICATIONS    Current Outpatient Medications:     apixaban (ELIQUIS) 5 mg Tab, Take 5 mg by mouth once daily at 6am., Disp: , Rfl:     LINZESS 145 mcg Cap capsule, Take 145 mcg by mouth once daily., Disp: , Rfl:     mupirocin (BACTROBAN) 2 % ointment, 2 (two) times daily. Apply to affected area for five days, Disp: , Rfl:    Review of patient's allergies indicates:   Allergen Reactions    Meropenem Anaphylaxis    Penicillins          HOME HEALTH AGENCY:  [facility]   TIMES PER WEEK/DAYS:  daily   ORDERS:  Right hip: Cleanse with wound cleanser and apply silver alginate to the wound bed, cover with 4x4 gauze and gentle border - to be changed daily.   Left ischial: Cleanse with wound cleanser and apply silver alginate to the wound bed, cover with 4x4 gauze and gentle border - to be changed daily.   Left medial upper thigh: Cleanse with wound cleanser and apply silver alginate to the wound bed, cover with 4x4 gauze and gentle border - to be changed daily.   Sacrum: Cleanse with wound cleanser and apply silver alginate to the wound bed, cover with 4x4 gauze and gentle border - to be changed daily.       Follow up in about 1 week (around 2/27/2024).

## 2024-02-29 ENCOUNTER — HOSPITAL ENCOUNTER (OUTPATIENT)
Dept: WOUND CARE | Facility: HOSPITAL | Age: 49
Discharge: HOME | End: 2024-02-29
Attending: NURSE PRACTITIONER
Payer: MEDICARE

## 2024-02-29 VITALS
SYSTOLIC BLOOD PRESSURE: 63 MMHG | DIASTOLIC BLOOD PRESSURE: 49 MMHG | BODY MASS INDEX: 29.16 KG/M2 | HEART RATE: 101 BPM | HEIGHT: 65 IN | WEIGHT: 175 LBS

## 2024-02-29 DIAGNOSIS — L89.154 PRESSURE INJURY OF SACRAL REGION, STAGE 4: ICD-10-CM

## 2024-02-29 DIAGNOSIS — G82.50 QUADRIPLEGIA, UNSPECIFIED: ICD-10-CM

## 2024-02-29 DIAGNOSIS — L89.214 STAGE IV PRESSURE ULCER OF RIGHT HIP: Primary | ICD-10-CM

## 2024-02-29 DIAGNOSIS — L89.324 PRESSURE ULCER OF LEFT BUTTOCK, STAGE 4: ICD-10-CM

## 2024-02-29 DIAGNOSIS — L89.223 PRESSURE INJURY OF LEFT THIGH, STAGE 3: ICD-10-CM

## 2024-02-29 PROCEDURE — 99212 OFFICE O/P EST SF 10 MIN: CPT

## 2024-02-29 PROCEDURE — 97597 DBRDMT OPN WND 1ST 20 CM/<: CPT

## 2024-02-29 PROCEDURE — 27000999 HC MEDICAL RECORD PHOTO DOCUMENTATION

## 2024-02-29 NOTE — PROGRESS NOTES
Home Health: [facility]   Smoker   [] Yes  [x] No  Diabetic   [] Yes   [x] No  Low air loss mattress [x] Yes [] No   Is the patient eligible for a graft, and/or currently grafting?  [] Yes [x] No     Subjective:       Patient ID: Diego Martinez Jr. is a 48 y.o. male.    Chief Complaint: Pressure Ulcer (Right hip - Stage 4 /Left ischial - Stage 4 /Sacrum - stage 4/Left medial upper thigh - Stage 3)    HPI   Mr. Martinez is a long time patient of [facility].    He comes via wheelchair with a care attendant present with him at all times.    He does have a history of quadriplegia.  He has home health who assist him with his wound care daily and care attendants to provide care at other times.  He has a very pleasant demeanor and added to an is usually jovial in spite of his challenges.  [facility]    6/7/23 - Mr. Martinez returns today for followup on several wounds.  Some have done except personally well recently which is correct it to his home health.  We are able to close to wounds today.  Wound #1, Right posterior hip, remains stable and today it is still 0.1 x 0.1 x 0.1.  We will leave this wound open 1 additional week and continue to monitor.  Wound #2, left ischial, has improved since the last visit.  There is a decrease in depth and the granular tissue is again stable.  The patient was educated that he should reminder the home health nurses to open this wound very carefully and gently to avoid ripping the tissue open again since it is finally making excellent progress.  Wound #3, Sacrum, this wound, today, we are able to close.  Wound #4, left medial upper thigh, is stable with no significant change although it is slightly larger.  We will continue to use silver alginate on all open wounds.    7/10/23 - Mr. Martinez was last seen on 6/7/23.  Today he returns with is wounds stable:  Wound #1 - Right posterior hip, stable, no change since the last visit, no S & S of infection, no  increase in drainage.  Wound #2 - Left ischial, continue to improve, with a very slight decrease in size this week.  Tissue is no longer friable.  Silver nitrate was applied, we will continue to use silver alginate.  Wound #3 - sacrum, reassessed, remains closed  Wound #4 - left medial upper thigh - this wound was debrided, wound margins are rolling and slightly macerated.  Silver nitrate was applied.  We will continue to use silver alginate.  The patient also reported today that he injured his right great toe nail.  Today it was assessed and there was a considerable amount of dried blood noted.  The nail was removed (90%) with a nipper.  We will use Betadine for one week.     7/26/23 - the patient returns today for followup on his several wound:  Wound #1 - Right posterior hip; this wound remains stable with no significant change, no S & S of infection  Wound #2 - Left ischial, significant increase in size due to overextending of wound bed - increase in width from 0.5 cm to 3.0 cm; extreme care must be used when opening this wound bed asked to not over extend in split the wound open again.  Wound #3 - Sacrum; we are continuing to monitor this wound as there was some maceration.  A callus paring was performed, and we will continue to monitor because there appears to be changes occurring.  Wound #4 - Left medial upper thigh - this wound was selectively debrided and has an increased amount macerated tissue.  We need to ensure that this dressing is being changed daily.    8/9/23 - Mr. Martinez returns today to be seen for three wounds:  Wound #1 - Right posterior hip - this wound continues to remains stable.  There is still some depth, however, there is very little drainage and no signs and symptoms of infection.  We will continue to pack this wound with silver alginate.  Wound #2 - Left ischial, this wound remains at 3.0 cm which is a significant increase over the measurement of 0.5 cm on 07/12/2023.  Increase in size  is due to the forcing open of the wound during the cleansing process.  Home health has been reminded that they should open that wound very carefully so is not to over extend the very fragile epithelial tissue.  Additionally, the orders were to use silver alginate on this wound and today mesalt was removed.  They will be reminded they should use the appropriate dressing on the appropriate wounds and me salt should not be used in this wound.  Wound #3 - Sacrum; we are continuing to monitor this area, however, there is no open wound at this time.  It will be reopened if necessary.  Wound #4 - Left medial upper thigh - this wound was selectively debrided today with removal a moderate amount of slough from the wound bed.  There is a large island of epithelial tissue in the wound bed, however, there is still also a large area of macerated tissue around the periwound.  Home health is reminded that they should be applying me salt to this wound and this wound only.  They should be covering the me salt and wound bed with silver alginate to be changed daily.    8/24/23 - The patient returns today for reassessments his 3 wounds.    Wound #1 - Right posterior hip - remains clean and stable.  There are no signs and symptoms of infection and very little drainage.  We are using silver alginate on this wound.    Wound #2 - Left ischial - 2.7 x 0.5 x 0.2 cm - wound is improved significantly over his last visit on 08/09/2023.  This provider appreciate the care that home health is taking when opening the wound to cleanse in the depth of the wound.  It is obvious that they have not been over extending the wound which is allowing it to progress as it should be progressing now.  We will continue to apply silver alginate on this wound.  Wound #3 - Sacrum - we will continue to monitor the sacrum again as it has now reopened very slightly.  Wound #4 - Left medial upper thigh - this wound remains stable with no signs and symptoms of  infection.  The large margin of macerated tissue remains.  Ideally, this could be surgically debrided, however, we may have to do this in clinic at some point with a scalpel.    9/6/23 - Mr. Martinez returns for f/up on the 3 wounds:  Wound #1 - Right posterior hip - remains clean and stable, continuing to decrease in size slowly.  There are no signs and symptoms of infection and very little drainage.  We will continue using silver alginate on this wound.    Wound #2 - Left ischial - 1.5 x 0.5 x 0.2 cm - wound continues to decrease in size with a very small remaining area.  This provider appreciates the care that home health continues to take when opening the wound to cleanse in the depth of the wound.  It is obvious that they have not been over extending the wound which is allowing it to progress as it should be progressing now.  We will continue to apply silver alginate on this wound.  Wound #3 - Sacrum - has now reclosed but will continue to monitor  Wound #4 - Left medial upper thigh - this wound remains stable with no signs and symptoms of infection.  The large margin of macerated tissue remains.  A large amount of this tissue was selectively debrided off today.  We will focus on removal of the rolled edges each week.    9/20/23 - The patient returns today for followup on several wounds:  Wound #1 - Right posterior hip - This wound was mechanically debrided.  It remains clean and stable, continuing to decrease in size slowly.  There are no signs and symptoms of infection and very little drainage.  We will continue using silver alginate on this wound.    Wound #2 - Left ischial - 1.7 x 0.5 x 0.2 cm - wound remains stable with no significant change in size this visit.  The wound has no S & S of infection.  We will continue to apply silver alginate on this wound.  Wound #3 - Sacrum - has reopened.  This wound was selectively debrided today.  We will begin application of collagen and silver alginate.  Wound #4 - Left  medial upper thigh - this wound remains stable with no signs and symptoms of infection. It was selectively debrided today and we will begin collagen and silver alginate.      10/4/23 - Mr. Martinez returns today for f/up on the following wounds:  Wound #1 - Right posterior hip - This wound was mechanically debrided.  It remains clean and stable, with no significant change.  There are no signs and symptoms of infection and very little drainage.  We will continue using silver alginate on this wound.    Wound #2 - Left ischial - 0.5 x 2.0 x 0.2 cm - wound remains stable with no significant change although it is slightly larger than last week.   The wound has no S & S of infection.  We will continue to apply silver alginate on this wound.  Wound #3 - Sacrum - remains open.  This wound was selectively debrided today.  We will continue powdered collagen and silver alginate.  Wound #4 - Left medial upper thigh - this wound remains stable with no signs and symptoms of infection. It was selectively debrided today and we will begin powdered collagen and silver alginate.     10/18/23 - Mr. Martinez is seen today for the f/up on several wounds:  Wound #1 - Right posterior hip - This wound was mechanically debrided.  It remains clean and stable, with no significant change.  There are no signs and symptoms of infection and very little drainage.  We will continue using silver alginate on this wound.    Wound #2 - Left ischial - 0.5 x 1.5 x 0.2 cm - wound remains stable with no significant change although it is slightly larger than last week.   The wound has no S & S of infection.  We will continue to apply silver alginate on this wound.  Wound #3 - Sacrum - remains open.  This wound was selectively debrided today.  We will continue powdered collagen and silver alginate.  Wound #4 - Left medial upper thigh - this wound remains stable with no signs and symptoms of infection. It was mechanically debrided today and we will begin powdered  collagen and silver alginate.     October 31st:  48-year-old black male, who is a quadriplegia, is here for follow up of his for pressure injuries.  The worse 1 at this time is the left ischial, left buttock.  The ulcers appear clean, without obvious secondary infection, foul odor, or discharge.  He has home health services almost every day.    11/14/23 - Mr. Martinez returns today for followup on several wounds.  All remains stable.  The left ischial does measures slightly larger, however, the wound bed is clean.  It was selectively debrided and there are no signs and symptoms of infection with this wound or any of his wounds at this time.  Wound #1 - Right posterior hip - This wound was mechanically debrided.  It remains clean and stable, with no significant change.  There are no signs and symptoms of infection and very little drainage.  We will continue using silver alginate on this wound.    Wound #2 - Left ischial - 0.8 x 1.0 x 0.2 cm  - wound remains stable with no significant change although it is slightly larger than last week.   The wound has no S & S of infection.  We will continue to apply silver alginate on this wound.  Wound #3 - Sacrum - remains open.  We will continue silver alginate.  Wound #4 - Left medial upper thigh -  4.0 x 2.0 x 0.3 cm- this wound has increased in size slightly, but there are no signs and symptoms of infection. It was selectively debrided today and we will continue silver alginate.     12/5/23 - Mr. Martinez is seen today for f/up on his wounds.  They all remain stable, in various stages.  Today none show S & S of infection, with no increase in drainage, redness, etc.    Wound #1 - Right posterior hip - This wound was mechanically debride.  It does continue to drain a scant amount.  We will continue using silver alginate on this wound.    Wound #2 - Left ischial - 0.5 x 1.0 x 0.2 cm  - wound remains stable with a slight decrease in size.   The wound has no S & S of infection.  We  will continue to apply silver alginate on this wound.  Wound #3 - Sacrum - remains open.  We will continue silver alginate.  Wound #4 - Left medial upper thigh -  2.7 x 1.8 x 0.3 cm - this wound has decreased in size and appears to be doing well.  There are no signs and symptoms of infection. It was selectively debrided today and we will continue silver alginate.     January 2, 2024:  48-year-old black male, with quadriplegia, is here for a multiple stage III and stage IV pressure injuries.  They are all under control at this time.  They are relatively shallow.  He has a good low air loss mattress at home.    1/22/24 - The patient returns today for f/up on several wounds.  Today many of the wounds are improving.  Wound #1 - Right posterior hip - continues to progress well and has a very small remaining opening.  We will continue to use silver alginate placed over the wound.    Wound #2 - Left ischial -0.4 x 1.0 x 0.2 cm  - wound remains appears stable with a slight decrease in size.   The wound has no S & S of infection.  We will continue to apply silver alginate on this wound.  Wound #3 - Sacrum - remains open and we will continue to monitor.  We will begin application of Endoform.    Wound #4 - Left medial upper thigh -  3.2 x 3.3 x 0.3 cm - this wound has increased in size slightly.   and appears to be doing well.  There are no signs and symptoms of infection. It was selectively debrided today and we will continue silver alginate.  He does continue to have HH assistance on a daily basis.  If the left medial upper thigh does not improve by next week we will consider ultrasonic debridement.     January 30, 2024:  48-year-old black male, with quadriplegia, is here for follow up of his multiple pressure injuries, including right hip, left ischial, sacral, left medial upper thigh, and a stage III scrotal pressure ulcer.  The wounds are all clean at this time, and need no surgical debridement today.  The measurements  are approximately the same as last visit.    2/20/24 - Mr. Martinez Is seen today for followup on several wounds.   Some of the wounds continue to progress well while others have deteriorated since he was last seen by this provider on January 22nd.  In particular, the left medial upper thigh has continued to deteriorate and today we are attempting to authorize a Theraskin  graft for that wound.  Wound #1 - Right posterior hip - Remains stable with no reported drainage, no S & S of infection.  We will continue to use silver alginate placed over the wound.    Wound #2 - Left ischial -0.8 x 0.5 x 0.2 cm  - wound continues to decrease in size.   The wound has no S & S of infection.  We will continue to apply silver alginate on this wound.  Wound #3 - Sacrum - 0.3 x 0.8 x 0.4 cm - remains open and has increased in size slightly we will continue to monitor and apply silver alginate.  Wound #4 - Left medial upper thigh - 2.5 x 3.0 x 0.9 cm - this wound has increased in size slightly. There are no signs and symptoms of infection. It was selectively debrided today and we will attempt to authorize a Theraskin graft for this wound.   The left medial upper thigh will be ultrasonically debrided at his next visit.    2/29/24 - The patient returns today for f/up on wounds.  We are attempting to authorize Theraskin grafts for the patient.  In preparation, the wounds were selectively debrided using the ultrasonic debrider.  If authorized, graft prep will be done at his next visit.  Today he was instructed to begin application of mupirocin ointment to each wound, also in preparation of the process.    Review of Systems   Constitutional: Negative.    Respiratory: Negative.     Cardiovascular: Negative.    Musculoskeletal:  Positive for gait problem.   Skin:  Positive for wound.        As documented in the HPI.   All other systems reviewed and are negative.        Objective:      Vitals:    02/29/24 1202   BP: (!) 63/49   Pulse: 101      Physical Exam  Vitals reviewed.   Constitutional:       Appearance: Normal appearance. He is normal weight.   HENT:      Head: Normocephalic.   Cardiovascular:      Rate and Rhythm: Normal rate.      Pulses: Normal pulses.   Pulmonary:      Effort: Pulmonary effort is normal.   Musculoskeletal:      Comments: quadraplegia   Skin:     General: Skin is warm and dry.      Comments: Multiple pressure injuries   Neurological:      General: No focal deficit present.      Mental Status: He is alert and oriented to person, place, and time.   Psychiatric:         Mood and Affect: Mood normal.            Altered Skin Integrity 05/23/22 1337 Right posterior Hip #1 (Active)   05/23/22 1337   Altered Skin Integrity Present on Admission - Did Patient arrive to the hospital with altered skin?: yes   Side: Right   Orientation: posterior   Location: Hip   Wound Number: #1   Is this injury device related?: No   Primary Wound Type:    Description of Altered Skin Integrity:    Ankle-Brachial Index:    Pulses:    Removal Indication and Assessment:    Wound Outcome:    (Retired) Wound Length (cm):    (Retired) Wound Width (cm):    (Retired) Depth (cm):    Wound Description (Comments):    Removal Indications:    Description of Altered Skin Integrity Full thickness tissue loss. Subcutaneous fat may be visible but bone, tendon or muscle are not exposed 02/29/24 1530   Dressing Appearance Intact;Moist drainage 02/29/24 1530   Drainage Amount Moderate 02/29/24 1530   Drainage Characteristics/Odor Serosanguineous 02/29/24 1530   Appearance Intact;Slough;Yellow;Green 02/29/24 1530   Tissue loss description Full thickness 02/29/24 1530   Periwound Area Intact;Pale white 02/29/24 1530   Wound Edges Open 02/29/24 1530   Wound Length (cm) 0.3 cm 02/29/24 1530   Wound Width (cm) 0.3 cm 02/29/24 1530   Wound Depth (cm) 2 cm 02/29/24 1530   Wound Volume (cm^3) 0.18 cm^3 02/29/24 1530   Wound Surface Area (cm^2) 0.09 cm^2 02/29/24 1530   Care  Cleansed with:;Debrided;Other (see comments) 02/29/24 1530   Dressing Applied;Other (comment) 02/29/24 1530   Dressing Change Due 03/01/24 02/29/24 1530            Altered Skin Integrity 05/23/22 1353 Left Ischial tuberosity #2 (Active)   05/23/22 1353   Altered Skin Integrity Present on Admission - Did Patient arrive to the hospital with altered skin?: yes   Side: Left   Orientation:    Location: Ischial tuberosity   Wound Number: #2   Is this injury device related?: No   Primary Wound Type:    Description of Altered Skin Integrity:    Ankle-Brachial Index:    Pulses:    Removal Indication and Assessment:    Wound Outcome:    (Retired) Wound Length (cm):    (Retired) Wound Width (cm):    (Retired) Depth (cm):    Wound Description (Comments):    Removal Indications:    Description of Altered Skin Integrity Full thickness tissue loss. Subcutaneous fat may be visible but bone, tendon or muscle are not exposed 02/29/24 1530   Dressing Appearance Intact;Moist drainage 02/29/24 1530   Drainage Amount Moderate 02/29/24 1530   Drainage Characteristics/Odor Serosanguineous 02/29/24 1530   Appearance Intact;Slough;Moist;Epithelialization 02/29/24 1530   Tissue loss description Full thickness 02/29/24 1530   Periwound Area Intact;Moist 02/29/24 1530   Wound Edges Open 02/29/24 1530   Wound Length (cm) 0.8 cm 02/29/24 1530   Wound Width (cm) 0.5 cm 02/29/24 1530   Wound Depth (cm) 0.2 cm 02/29/24 1530   Wound Volume (cm^3) 0.08 cm^3 02/29/24 1530   Wound Surface Area (cm^2) 0.4 cm^2 02/29/24 1530   Care Cleansed with:;Wound cleanser 02/29/24 1530   Dressing Applied 02/29/24 1530   Dressing Change Due 03/01/24 02/29/24 1530            Altered Skin Integrity 01/05/23 1351 Left upper;medial Thigh #4  (Active)   01/05/23 1351   Altered Skin Integrity Present on Admission - Did Patient arrive to the hospital with altered skin?: yes   Side: Left   Orientation: upper;medial   Location: Thigh   Wound Number: #4   Is this injury device  related?: No   Primary Wound Type:    Description of Altered Skin Integrity:    Ankle-Brachial Index:    Pulses:    Removal Indication and Assessment:    Wound Outcome:    (Retired) Wound Length (cm):    (Retired) Wound Width (cm):    (Retired) Depth (cm):    Wound Description (Comments):    Removal Indications:    Description of Altered Skin Integrity Full thickness tissue loss. Subcutaneous fat may be visible but bone, tendon or muscle are not exposed 02/29/24 1530   Dressing Appearance Moist drainage;Intact 02/29/24 1530   Drainage Amount Moderate 02/29/24 1530   Drainage Characteristics/Odor Serosanguineous 02/29/24 1530   Appearance Intact;Red;Slough;Moist;Epithelialization;Granulating 02/29/24 1530   Tissue loss description Full thickness 02/29/24 1530   Periwound Area Intact;Dry 02/29/24 1530   Wound Edges Open 02/29/24 1530   Wound Length (cm) 3 cm 02/29/24 1530   Wound Width (cm) 3 cm 02/29/24 1530   Wound Depth (cm) 0.5 cm 02/29/24 1530   Wound Volume (cm^3) 4.5 cm^3 02/29/24 1530   Wound Surface Area (cm^2) 9 cm^2 02/29/24 1530   Care Cleansed with:;Wound cleanser;Other (see comments) 02/29/24 1530   Dressing Applied;Other (comment) 02/29/24 1530   Dressing Change Due 03/01/24 02/29/24 1530            Altered Skin Integrity 09/20/23 1148 Sacral spine #3 (Active)   09/20/23 1148   Altered Skin Integrity Present on Admission - Did Patient arrive to the hospital with altered skin?: yes   Side:    Orientation:    Location: Sacral spine   Wound Number: #3   Is this injury device related?:    Primary Wound Type:    Description of Altered Skin Integrity:    Ankle-Brachial Index:    Pulses:    Removal Indication and Assessment:    Wound Outcome:    (Retired) Wound Length (cm):    (Retired) Wound Width (cm):    (Retired) Depth (cm):    Wound Description (Comments):    Removal Indications:    Description of Altered Skin Integrity Full thickness tissue loss. Subcutaneous fat may be visible but bone, tendon or muscle  are not exposed 02/29/24 1530   Dressing Appearance Moist drainage;Intact 02/29/24 1530   Drainage Amount Moderate 02/29/24 1530   Drainage Characteristics/Odor Serosanguineous 02/29/24 1530   Appearance Intact;Slough;Moist;Epithelialization;Granulating 02/29/24 1530   Tissue loss description Full thickness 02/29/24 1530   Periwound Area Intact;Pale white 02/29/24 1530   Wound Edges Open 02/29/24 1530   Wound Length (cm) 0.2 cm 02/29/24 1530   Wound Width (cm) 0.8 cm 02/29/24 1530   Wound Depth (cm) 0.3 cm 02/29/24 1530   Wound Volume (cm^3) 0.048 cm^3 02/29/24 1530   Wound Surface Area (cm^2) 0.16 cm^2 02/29/24 1530   Care Cleansed with:;Wound cleanser 02/29/24 1530   Dressing Applied;Other (comment) 02/29/24 1530   Dressing Change Due 03/01/24 02/29/24 1530        2/29/24                                                                               Left ischium                                                                                                                                  Silver alginate, gentle border                Left medial upper thigh- Pre                         Post   ROCKY, silver alginate, gentle border      Sacrum- Post  Silver alginate                                                                                                                     Right medial medial hip- Pre                      Post                                                                                    Silver alginate, gentle border                                                                                                                 TR      Assessment:           ICD-10-CM ICD-9-CM   1. Stage IV pressure ulcer of right hip  L89.214 707.04     707.24   2. Pressure injury of left thigh, stage 3  L89.223 707.09     707.23   3. Pressure injury of sacral region, stage 4  L89.154 707.03     707.24   4. Pressure ulcer of left buttock, stage 4  L89.324 707.05     707.24   5. Quadriplegia, unspecified   "G82.50 344.00           Procedures:     Debridement     Date/Time: 2/29/2024 11:20 AM     Performed by: Jessie Bah NP  Authorized by: Jessie Bah NP    Time out: Immediately prior to procedure a "time out" was called to verify the correct patient, procedure, equipment, support staff and site/side marked as required.     Consent Done?:  Yes (Verbal)     Preparation: Patient was prepped and draped with clean technique    Local anesthesia used?: No       Wound Details:    Location:  Left leg (medial upper thigh)    Type of Debridement:  Non-excisional       Length (cm):  3       Area (sq cm):  9       Width (cm):  3       Percent Debrided (%):  100       Depth (cm):  0.5       Total Area Debrided (sq cm):  9    Depth of debridement:  Epidermis/Dermis    Devitalized tissue debrided:  Biofilm, Callus, Exudate and Fibrin    Instruments:  Ultrasound Probe (Portillo probe)  Bleeding:  None     2nd Wound Details:     Location:  Right leg (Posterior hip)    Type of Debridement:  Non-excisional       Length (cm):  0.3       Area (sq cm):  0.09       Width (cm):  0.3       Percent Debrided (%):  100       Depth (cm):  2       Total Area Debrided (sq cm):  0.09    Depth of debridement:  Epidermis/Dermis    Devitalized tissue debrided:  Biofilm, Callus, Exudate and Fibrin    Instruments:  Ultrasound Probe (Portillo probe)  Bleeding:  None  Patient tolerance:  Patient tolerated the procedure well with no immediate complications       Excisional debridement performed:  [] Yes [] No   Selective debridement performed:  [x] Yes [] No   Mechanical debridement performed:  [] Yes [] No   Silver nitrate applied:    [] Yes [] No   Labs ordered this visit:   [] Yes [] No   Imaging ordered this visit:   [] Yes [] No   Tissue pathology and/or culture taken:  [] Yes [] No         MEDICATIONS    Current Outpatient Medications:     apixaban (ELIQUIS) 5 mg Tab, Take 5 mg by mouth once daily at 6am., Disp: , Rfl:     LINZESS 145 mcg Cap " capsule, Take 145 mcg by mouth once daily., Disp: , Rfl:     mupirocin (BACTROBAN) 2 % ointment, 2 (two) times daily. Apply to affected area for five days, Disp: , Rfl:    Review of patient's allergies indicates:   Allergen Reactions    Meropenem Anaphylaxis    Penicillins          HOME [facility]ALTH AG[facility]Y:  [facility]   TIMES PER WEEK/DAYS:  daily   ORDERS:  Right hip: Cleanse with wound cleanser and apply silver alginate to the wound bed, cover with 4x4 gauze and gentle border - to be changed daily.   Left ischial: Cleanse with wound cleanser and apply silver alginate to the wound bed, cover with 4x4 gauze and gentle border - to be changed daily.   Left medial upper thigh: Cleanse with wound cleanser and apply mupirocin ointment to wound bed, cover with silver alginate ,  4x4 gauze and gentle border - to be changed daily.   Sacrum: Cleanse with wound cleanser and apply silver alginate to the wound bed, cover with 4x4 gauze and gentle border - to be changed daily.       Follow up in about 1 week (around 3/7/2024) for janis.

## 2024-03-04 NOTE — PROCEDURES
"Debridement    Date/Time: 2/29/2024 11:20 AM    Performed by: Jessie Bah NP  Authorized by: Jessie Bah NP    Time out: Immediately prior to procedure a "time out" was called to verify the correct patient, procedure, equipment, support staff and site/side marked as required.    Consent Done?:  Yes (Verbal)    Preparation: Patient was prepped and draped with clean technique    Local anesthesia used?: No      Wound Details:    Location:  Left leg (medial upper thigh)    Type of Debridement:  Non-excisional       Length (cm):  3       Area (sq cm):  9       Width (cm):  3       Percent Debrided (%):  100       Depth (cm):  0.5       Total Area Debrided (sq cm):  9    Depth of debridement:  Epidermis/Dermis    Devitalized tissue debrided:  Biofilm, Callus, Exudate and Fibrin    Instruments:  Ultrasound Probe (Portillo probe)  Bleeding:  None    2nd Wound Details:     Location:  Right leg (Posterior hip)    Type of Debridement:  Non-excisional       Length (cm):  0.3       Area (sq cm):  0.09       Width (cm):  0.3       Percent Debrided (%):  100       Depth (cm):  2       Total Area Debrided (sq cm):  0.09    Depth of debridement:  Epidermis/Dermis    Devitalized tissue debrided:  Biofilm, Callus, Exudate and Fibrin    Instruments:  Ultrasound Probe (Portillo probe)  Bleeding:  None  Patient tolerance:  Patient tolerated the procedure well with no immediate complications    "

## 2024-03-07 ENCOUNTER — HOSPITAL ENCOUNTER (OUTPATIENT)
Dept: WOUND CARE | Facility: HOSPITAL | Age: 49
Discharge: HOME | End: 2024-03-07
Attending: NURSE PRACTITIONER
Payer: MEDICARE

## 2024-03-07 VITALS
HEIGHT: 65 IN | HEART RATE: 90 BPM | SYSTOLIC BLOOD PRESSURE: 64 MMHG | WEIGHT: 175 LBS | DIASTOLIC BLOOD PRESSURE: 51 MMHG | BODY MASS INDEX: 29.16 KG/M2

## 2024-03-07 DIAGNOSIS — G82.50 QUADRIPLEGIA, UNSPECIFIED: ICD-10-CM

## 2024-03-07 DIAGNOSIS — L89.223 PRESSURE INJURY OF LEFT THIGH, STAGE 3: ICD-10-CM

## 2024-03-07 DIAGNOSIS — L89.214 STAGE IV PRESSURE ULCER OF RIGHT HIP: Primary | ICD-10-CM

## 2024-03-07 DIAGNOSIS — L89.154 PRESSURE INJURY OF SACRAL REGION, STAGE 4: ICD-10-CM

## 2024-03-07 DIAGNOSIS — L89.324 PRESSURE ULCER OF LEFT BUTTOCK, STAGE 4: ICD-10-CM

## 2024-03-07 PROCEDURE — 99212 OFFICE O/P EST SF 10 MIN: CPT | Mod: 25

## 2024-03-07 PROCEDURE — 27000999 HC MEDICAL RECORD PHOTO DOCUMENTATION

## 2024-03-07 PROCEDURE — 97597 DBRDMT OPN WND 1ST 20 CM/<: CPT | Mod: 59

## 2024-03-07 PROCEDURE — 15271 SKIN SUB GRAFT TRNK/ARM/LEG: CPT

## 2024-03-07 PROCEDURE — 15275 SKIN SUB GRAFT FACE/NK/HF/G: CPT

## 2024-03-07 NOTE — PROCEDURES
"Debridement    Date/Time: 3/7/2024 11:00 AM    Performed by: Jessie Bah NP  Authorized by: Jessie Bah NP    Time out: Immediately prior to procedure a "time out" was called to verify the correct patient, procedure, equipment, support staff and site/side marked as required.    Consent Done?:  Yes (Verbal)    Preparation: Patient was prepped and draped with clean technique    Local anesthesia used?: No      Wound Details:    Location:  Right hip    Type of Debridement:  Non-excisional       Length (cm):  0.5       Area (sq cm):  0.2       Width (cm):  0.4       Percent Debrided (%):  100       Depth (cm):  1.5       Total Area Debrided (sq cm):  0.2    Depth of debridement:  Epidermis/Dermis    Devitalized tissue debrided:  Biofilm, Exudate, Fibrin and Slough    Instruments:  Ultrasound Probe (Portillo probe)  Bleeding:  Minimal  Hemostasis Achieved: Yes  Method Used:  Pressure    2nd Wound Details:     Location:  Left leg (Left medial upper thigh)    Type of Debridement:  Non-excisional       Length (cm):  3.3       Area (sq cm):  6.6       Width (cm):  2       Percent Debrided (%):  100       Depth (cm):  0.3       Total Area Debrided (sq cm):  6.6    Depth of debridement:  Epidermis/Dermis    Devitalized tissue debrided:  Biofilm, Exudate, Fibrin and Slough    Instruments:  Ultrasound Probe (Portillo probe)  Bleeding:  None  Patient tolerance:  Patient tolerated the procedure well with no immediate complications    "

## 2024-03-07 NOTE — PROGRESS NOTES
Home Health: [facility]   Smoker   [] Yes  [x] No  Diabetic   [] Yes   [x] No  Low air loss mattress [x] Yes [] No   Is the patient eligible for a graft, and/or currently grafting?  [] Yes [x] No     Subjective:       Patient ID: Diego Martinez Jr. is a 48 y.o. male.    Chief Complaint: Pressure Ulcer ((Right hip - Stage 4 /Left ischial - Stage 4 /Sacrum - stage 4/Left medial upper thigh - Stage 3)    HPI   Mr. Martinez is a long time patient of [facility].    He comes via wheelchair with a care attendant present with him at all times.    He does have a history of quadriplegia.  He has home health who assist him with his wound care daily and care attendants to provide care at other times.  He has a very pleasant demeanor and added to an is usually jovial in spite of his challenges.  He now has Virgance.    6/7/23 - Mr. Martinez returns today for followup on several wounds.  Some have done except personally well recently which is correct it to his home health.  We are able to close to wounds today.  Wound #1, Right posterior hip, remains stable and today it is still 0.1 x 0.1 x 0.1.  We will leave this wound open 1 additional week and continue to monitor.  Wound #2, left ischial, has improved since the last visit.  There is a decrease in depth and the granular tissue is again stable.  The patient was educated that he should reminder the home health nurses to open this wound very carefully and gently to avoid ripping the tissue open again since it is finally making excellent progress.  Wound #3, Sacrum, this wound, today, we are able to close.  Wound #4, left medial upper thigh, is stable with no significant change although it is slightly larger.  We will continue to use silver alginate on all open wounds.    7/10/23 - Mr. Martinez was last seen on 6/7/23.  Today he returns with is wounds stable:  Wound #1 - Right posterior hip, stable, no change since the last visit, no S & S of infection,  no increase in drainage.  Wound #2 - Left ischial, continue to improve, with a very slight decrease in size this week.  Tissue is no longer friable.  Silver nitrate was applied, we will continue to use silver alginate.  Wound #3 - sacrum, reassessed, remains closed  Wound #4 - left medial upper thigh - this wound was debrided, wound margins are rolling and slightly macerated.  Silver nitrate was applied.  We will continue to use silver alginate.  The patient also reported today that he injured his right great toe nail.  Today it was assessed and there was a considerable amount of dried blood noted.  The nail was removed (90%) with a nipper.  We will use Betadine for one week.     7/26/23 - the patient returns today for followup on his several wound:  Wound #1 - Right posterior hip; this wound remains stable with no significant change, no S & S of infection  Wound #2 - Left ischial, significant increase in size due to overextending of wound bed - increase in width from 0.5 cm to 3.0 cm; extreme care must be used when opening this wound bed asked to not over extend in split the wound open again.  Wound #3 - Sacrum; we are continuing to monitor this wound as there was some maceration.  A callus paring was performed, and we will continue to monitor because there appears to be changes occurring.  Wound #4 - Left medial upper thigh - this wound was selectively debrided and has an increased amount macerated tissue.  We need to ensure that this dressing is being changed daily.    8/9/23 - Mr. Martinez returns today to be seen for three wounds:  Wound #1 - Right posterior hip - this wound continues to remains stable.  There is still some depth, however, there is very little drainage and no signs and symptoms of infection.  We will continue to pack this wound with silver alginate.  Wound #2 - Left ischial, this wound remains at 3.0 cm which is a significant increase over the measurement of 0.5 cm on 07/12/2023.  Increase in  size is due to the forcing open of the wound during the cleansing process.  Home health has been reminded that they should open that wound very carefully so is not to over extend the very fragile epithelial tissue.  Additionally, the orders were to use silver alginate on this wound and today mesalt was removed.  They will be reminded they should use the appropriate dressing on the appropriate wounds and me salt should not be used in this wound.  Wound #3 - Sacrum; we are continuing to monitor this area, however, there is no open wound at this time.  It will be reopened if necessary.  Wound #4 - Left medial upper thigh - this wound was selectively debrided today with removal a moderate amount of slough from the wound bed.  There is a large island of epithelial tissue in the wound bed, however, there is still also a large area of macerated tissue around the periwound.  Home health is reminded that they should be applying me salt to this wound and this wound only.  They should be covering the me salt and wound bed with silver alginate to be changed daily.    8/24/23 - The patient returns today for reassessments his 3 wounds.    Wound #1 - Right posterior hip - remains clean and stable.  There are no signs and symptoms of infection and very little drainage.  We are using silver alginate on this wound.    Wound #2 - Left ischial - 2.7 x 0.5 x 0.2 cm - wound is improved significantly over his last visit on 08/09/2023.  This provider appreciate the care that home health is taking when opening the wound to cleanse in the depth of the wound.  It is obvious that they have not been over extending the wound which is allowing it to progress as it should be progressing now.  We will continue to apply silver alginate on this wound.  Wound #3 - Sacrum - we will continue to monitor the sacrum again as it has now reopened very slightly.  Wound #4 - Left medial upper thigh - this wound remains stable with no signs and symptoms of  infection.  The large margin of macerated tissue remains.  Ideally, this could be surgically debrided, however, we may have to do this in clinic at some point with a scalpel.    9/6/23 - Mr. Martinez returns for f/up on the 3 wounds:  Wound #1 - Right posterior hip - remains clean and stable, continuing to decrease in size slowly.  There are no signs and symptoms of infection and very little drainage.  We will continue using silver alginate on this wound.    Wound #2 - Left ischial - 1.5 x 0.5 x 0.2 cm - wound continues to decrease in size with a very small remaining area.  This provider appreciates the care that home health continues to take when opening the wound to cleanse in the depth of the wound.  It is obvious that they have not been over extending the wound which is allowing it to progress as it should be progressing now.  We will continue to apply silver alginate on this wound.  Wound #3 - Sacrum - has now reclosed but will continue to monitor  Wound #4 - Left medial upper thigh - this wound remains stable with no signs and symptoms of infection.  The large margin of macerated tissue remains.  A large amount of this tissue was selectively debrided off today.  We will focus on removal of the rolled edges each week.    9/20/23 - The patient returns today for followup on several wounds:  Wound #1 - Right posterior hip - This wound was mechanically debrided.  It remains clean and stable, continuing to decrease in size slowly.  There are no signs and symptoms of infection and very little drainage.  We will continue using silver alginate on this wound.    Wound #2 - Left ischial - 1.7 x 0.5 x 0.2 cm - wound remains stable with no significant change in size this visit.  The wound has no S & S of infection.  We will continue to apply silver alginate on this wound.  Wound #3 - Sacrum - has reopened.  This wound was selectively debrided today.  We will begin application of collagen and silver alginate.  Wound #4 - Left  medial upper thigh - this wound remains stable with no signs and symptoms of infection. It was selectively debrided today and we will begin collagen and silver alginate.      10/4/23 - Mr. Martinez returns today for f/up on the following wounds:  Wound #1 - Right posterior hip - This wound was mechanically debrided.  It remains clean and stable, with no significant change.  There are no signs and symptoms of infection and very little drainage.  We will continue using silver alginate on this wound.    Wound #2 - Left ischial - 0.5 x 2.0 x 0.2 cm - wound remains stable with no significant change although it is slightly larger than last week.   The wound has no S & S of infection.  We will continue to apply silver alginate on this wound.  Wound #3 - Sacrum - remains open.  This wound was selectively debrided today.  We will continue powdered collagen and silver alginate.  Wound #4 - Left medial upper thigh - this wound remains stable with no signs and symptoms of infection. It was selectively debrided today and we will begin powdered collagen and silver alginate.     10/18/23 - Mr. Martinez is seen today for the f/up on several wounds:  Wound #1 - Right posterior hip - This wound was mechanically debrided.  It remains clean and stable, with no significant change.  There are no signs and symptoms of infection and very little drainage.  We will continue using silver alginate on this wound.    Wound #2 - Left ischial - 0.5 x 1.5 x 0.2 cm - wound remains stable with no significant change although it is slightly larger than last week.   The wound has no S & S of infection.  We will continue to apply silver alginate on this wound.  Wound #3 - Sacrum - remains open.  This wound was selectively debrided today.  We will continue powdered collagen and silver alginate.  Wound #4 - Left medial upper thigh - this wound remains stable with no signs and symptoms of infection. It was mechanically debrided today and we will begin powdered  collagen and silver alginate.     October 31st:  48-year-old black male, who is a quadriplegia, is here for follow up of his for pressure injuries.  The worse 1 at this time is the left ischial, left buttock.  The ulcers appear clean, without obvious secondary infection, foul odor, or discharge.  He has home health services almost every day.    11/14/23 - Mr. Martinez returns today for followup on several wounds.  All remains stable.  The left ischial does measures slightly larger, however, the wound bed is clean.  It was selectively debrided and there are no signs and symptoms of infection with this wound or any of his wounds at this time.  Wound #1 - Right posterior hip - This wound was mechanically debrided.  It remains clean and stable, with no significant change.  There are no signs and symptoms of infection and very little drainage.  We will continue using silver alginate on this wound.    Wound #2 - Left ischial - 0.8 x 1.0 x 0.2 cm  - wound remains stable with no significant change although it is slightly larger than last week.   The wound has no S & S of infection.  We will continue to apply silver alginate on this wound.  Wound #3 - Sacrum - remains open.  We will continue silver alginate.  Wound #4 - Left medial upper thigh -  4.0 x 2.0 x 0.3 cm- this wound has increased in size slightly, but there are no signs and symptoms of infection. It was selectively debrided today and we will continue silver alginate.     12/5/23 - Mr. Martinez is seen today for f/up on his wounds.  They all remain stable, in various stages.  Today none show S & S of infection, with no increase in drainage, redness, etc.    Wound #1 - Right posterior hip - This wound was mechanically debride.  It does continue to drain a scant amount.  We will continue using silver alginate on this wound.    Wound #2 - Left ischial - 0.5 x 1.0 x 0.2 cm  - wound remains stable with a slight decrease in size.   The wound has no S & S of infection.  We  will continue to apply silver alginate on this wound.  Wound #3 - Sacrum - remains open.  We will continue silver alginate.  Wound #4 - Left medial upper thigh -  2.7 x 1.8 x 0.3 cm - this wound has decreased in size and appears to be doing well.  There are no signs and symptoms of infection. It was selectively debrided today and we will continue silver alginate.     January 2, 2024:  48-year-old black male, with quadriplegia, is here for a multiple stage III and stage IV pressure injuries.  They are all under control at this time.  They are relatively shallow.  He has a good low air loss mattress at home.    1/22/24 - The patient returns today for f/up on several wounds.  Today many of the wounds are improving.  Wound #1 - Right posterior hip - continues to progress well and has a very small remaining opening.  We will continue to use silver alginate placed over the wound.    Wound #2 - Left ischial -0.4 x 1.0 x 0.2 cm  - wound remains appears stable with a slight decrease in size.   The wound has no S & S of infection.  We will continue to apply silver alginate on this wound.  Wound #3 - Sacrum - remains open and we will continue to monitor.  We will begin application of Endoform.    Wound #4 - Left medial upper thigh -  3.2 x 3.3 x 0.3 cm - this wound has increased in size slightly.   and appears to be doing well.  There are no signs and symptoms of infection. It was selectively debrided today and we will continue silver alginate.  He does continue to have HH assistance on a daily basis.  If the left medial upper thigh does not improve by next week we will consider ultrasonic debridement.     January 30, 2024:  48-year-old black male, with quadriplegia, is here for follow up of his multiple pressure injuries, including right hip, left ischial, sacral, left medial upper thigh, and a stage III scrotal pressure ulcer.  The wounds are all clean at this time, and need no surgical debridement today.  The measurements  are approximately the same as last visit.    2/20/24 - Mr. Martinez Is seen today for followup on several wounds.   Some of the wounds continue to progress well while others have deteriorated since he was last seen by this provider on January 22nd.  In particular, the left medial upper thigh has continued to deteriorate and today we are attempting to authorize a Theraskin  graft for that wound.  Wound #1 - Right posterior hip - Remains stable with no reported drainage, no S & S of infection.  We will continue to use silver alginate placed over the wound.    Wound #2 - Left ischial -0.8 x 0.5 x 0.2 cm  - wound continues to decrease in size.   The wound has no S & S of infection.  We will continue to apply silver alginate on this wound.  Wound #3 - Sacrum - 0.3 x 0.8 x 0.4 cm - remains open and has increased in size slightly we will continue to monitor and apply silver alginate.  Wound #4 - Left medial upper thigh - 2.5 x 3.0 x 0.9 cm - this wound has increased in size slightly. There are no signs and symptoms of infection. It was selectively debrided today and we will attempt to authorize a Theraskin graft for this wound.   The left medial upper thigh will be ultrasonically debrided at his next visit.    2/29/24 - The patient returns today for f/up on wounds.  We are attempting to authorize Theraskin grafts for the patient.  In preparation, the wounds were selectively debrided using the ultrasonic debrider.  If authorized, graft prep will be done at his next visit.  Today he was instructed to begin application of mupirocin ointment to each wound, also in preparation of the process.    3/7/24 - Mr. Martinez is seen today for f/up on several wounds.  He is approved for Theraskin grafts on wound #4, the left medial upper thigh.  Wound #1 - Right posterior hip -   The wound has deteriorated greatly over the last couple of weeks.  The wound was nearly closed two weeks ago.  Today it was ultrasonically debrided and has a depth  of 1.5 cm.  Wound #2 - Left ischial - 0.3 x 0.3 x 0.2 cm  - wound continues to decrease in size.   The wound has no S & S of infection.  We will continue to apply silver alginate on this wound.  Wound #3 - Sacrum - 0.3 x 0.8 x 0.2 cm - remains open and has decreased in depth slightly.  we will continue to monitor and apply silver alginate.  Wound #4 - Left medial upper thigh - 3.3 x 2.0 x 0.3 cm - this wound has continued to increase in size and depth.  Today it was selectively debrided using the ultrasonic debrider.  Theraskin graft #1 was applied.  Theraskin graft #2, 13.0 cm2 has been ordered.  Graft #1 applied was 6.0 cm2 which is too small for this wound.  We will increase in size.     Review of Systems   Constitutional: Negative.    Respiratory: Negative.     Cardiovascular: Negative.    Musculoskeletal:  Positive for gait problem.   Skin:  Positive for wound.        As documented in the HPI.   All other systems reviewed and are negative.        Objective:      Vitals:    03/07/24 1331   BP: (!) 64/51   Pulse: 90       Physical Exam  Vitals reviewed.   Constitutional:       Appearance: Normal appearance. He is normal weight.   HENT:      Head: Normocephalic.   Cardiovascular:      Rate and Rhythm: Normal rate.      Pulses: Normal pulses.   Pulmonary:      Effort: Pulmonary effort is normal.   Musculoskeletal:      Comments: quadraplegia   Skin:     General: Skin is warm and dry.      Comments: Multiple pressure injuries   Neurological:      General: No focal deficit present.      Mental Status: He is alert and oriented to person, place, and time.   Psychiatric:         Mood and Affect: Mood normal.            Altered Skin Integrity 05/23/22 1337 Right posterior Hip #1 (Active)   05/23/22 1337   Altered Skin Integrity Present on Admission - Did Patient arrive to the hospital with altered skin?: yes   Side: Right   Orientation: posterior   Location: Hip   Wound Number: #1   Is this injury device related?: No    Primary Wound Type:    Description of Altered Skin Integrity:    Ankle-Brachial Index:    Pulses:    Removal Indication and Assessment:    Wound Outcome:    (Retired) Wound Length (cm):    (Retired) Wound Width (cm):    (Retired) Depth (cm):    Wound Description (Comments):    Removal Indications:    Description of Altered Skin Integrity Full thickness tissue loss. Subcutaneous fat may be visible but bone, tendon or muscle are not exposed 03/07/24 1302   Dressing Appearance Intact;Moist drainage 03/07/24 1302   Drainage Amount Moderate 03/07/24 1302   Drainage Characteristics/Odor Serosanguineous;Sanguineous;Yellow 03/07/24 1302   Appearance Intact;Slough;Moist;Not granulating 03/07/24 1302   Tissue loss description Full thickness 03/07/24 1302   Periwound Area Intact 03/07/24 1302   Wound Edges Open 03/07/24 1302   Wound Length (cm) 0.5 cm 03/07/24 1302   Wound Width (cm) 0.4 cm 03/07/24 1302   Wound Depth (cm) 1.5 cm 03/07/24 1302   Wound Volume (cm^3) 0.3 cm^3 03/07/24 1302   Wound Surface Area (cm^2) 0.2 cm^2 03/07/24 1302   Care Cleansed with:;Wound cleanser;Debrided;Other (see comments) 03/07/24 1302   Dressing Applied 03/07/24 1302   Dressing Change Due 03/08/24 03/07/24 1302            Altered Skin Integrity 05/23/22 1353 Left Ischial tuberosity #2 (Active)   05/23/22 1353   Altered Skin Integrity Present on Admission - Did Patient arrive to the hospital with altered skin?: yes   Side: Left   Orientation:    Location: Ischial tuberosity   Wound Number: #2   Is this injury device related?: No   Primary Wound Type:    Description of Altered Skin Integrity:    Ankle-Brachial Index:    Pulses:    Removal Indication and Assessment:    Wound Outcome:    (Retired) Wound Length (cm):    (Retired) Wound Width (cm):    (Retired) Depth (cm):    Wound Description (Comments):    Removal Indications:    Description of Altered Skin Integrity Full thickness tissue loss. Subcutaneous fat may be visible but bone, tendon or  muscle are not exposed 03/07/24 1302   Drainage Amount Moderate 03/07/24 1302   Drainage Characteristics/Odor Serosanguineous 03/07/24 1302   Appearance Intact;Moist;Granulating 03/07/24 1302   Tissue loss description Full thickness 03/07/24 1302   Periwound Area Intact 03/07/24 1302   Wound Edges Open 03/07/24 1302   Wound Length (cm) 0.3 cm 03/07/24 1302   Wound Width (cm) 0.3 cm 03/07/24 1302   Wound Depth (cm) 0.2 cm 03/07/24 1302   Wound Volume (cm^3) 0.018 cm^3 03/07/24 1302   Wound Surface Area (cm^2) 0.09 cm^2 03/07/24 1302   Care Cleansed with:;Wound cleanser 03/07/24 1302   Dressing Applied 03/07/24 1302   Dressing Change Due 03/08/24 03/07/24 1302            Altered Skin Integrity 01/05/23 1351 Left upper;medial Thigh #4  (Active)   01/05/23 1351   Altered Skin Integrity Present on Admission - Did Patient arrive to the hospital with altered skin?: yes   Side: Left   Orientation: upper;medial   Location: Thigh   Wound Number: #4   Is this injury device related?: No   Primary Wound Type:    Description of Altered Skin Integrity:    Ankle-Brachial Index:    Pulses:    Removal Indication and Assessment:    Wound Outcome:    (Retired) Wound Length (cm):    (Retired) Wound Width (cm):    (Retired) Depth (cm):    Wound Description (Comments):    Removal Indications:    Description of Altered Skin Integrity Full thickness tissue loss. Subcutaneous fat may be visible but bone, tendon or muscle are not exposed 03/07/24 1302   Dressing Appearance Moist drainage;Dry;Intact 03/07/24 1302   Drainage Amount Moderate 03/07/24 1302   Drainage Characteristics/Odor Serosanguineous 03/07/24 1302   Appearance Intact;Red;Slough;Moist;Granulating 03/07/24 1302   Tissue loss description Full thickness 03/07/24 1302   Periwound Area Intact 03/07/24 1302   Wound Length (cm) 3.3 cm 03/07/24 1302   Wound Width (cm) 2 cm 03/07/24 1302   Wound Depth (cm) 0.3 cm 03/07/24 1302   Wound Volume (cm^3) 1.98 cm^3 03/07/24 1302   Wound  Surface Area (cm^2) 6.6 cm^2 03/07/24 1302   Care Cleansed with:;Debrided;Other (see comments) 03/07/24 1302   Dressing Applied;Other (comment) 03/07/24 1302   Dressing Change Due 03/14/24 03/07/24 1302            Altered Skin Integrity 09/20/23 1148 Sacral spine #3 (Active)   09/20/23 1148   Altered Skin Integrity Present on Admission - Did Patient arrive to the hospital with altered skin?: yes   Side:    Orientation:    Location: Sacral spine   Wound Number: #3   Is this injury device related?:    Primary Wound Type:    Description of Altered Skin Integrity:    Ankle-Brachial Index:    Pulses:    Removal Indication and Assessment:    Wound Outcome:    (Retired) Wound Length (cm):    (Retired) Wound Width (cm):    (Retired) Depth (cm):    Wound Description (Comments):    Removal Indications:    Description of Altered Skin Integrity Full thickness tissue loss. Subcutaneous fat may be visible but bone, tendon or muscle are not exposed 03/07/24 1302   Dressing Appearance Intact;Moist drainage 03/07/24 1302   Drainage Amount Moderate 03/07/24 1302   Drainage Characteristics/Odor Serosanguineous 03/07/24 1302   Appearance Intact 03/07/24 1302   Tissue loss description Full thickness 03/07/24 1302   Periwound Area Intact;Pale white;Moist 03/07/24 1302   Wound Edges Open 03/07/24 1302   Wound Length (cm) 0.3 cm 03/07/24 1302   Wound Width (cm) 0.8 cm 03/07/24 1302   Wound Depth (cm) 0.2 cm 03/07/24 1302   Wound Volume (cm^3) 0.048 cm^3 03/07/24 1302   Wound Surface Area (cm^2) 0.24 cm^2 03/07/24 1302   Care Cleansed with:;Wound cleanser 03/07/24 1302   Dressing Applied 03/07/24 1302   Dressing Change Due 03/08/24 03/07/24 1302          3/7/24    Left ischium  Silver alginate, gentle border        Left medial upper thigh  #1 6.5 sq cm Theraskin, benzoin, dermabond,   versatel, medipore tape, silver alginate,   4x4, gentle border, tape         Sacrum  Silver alginate, 4x4, gentle border        Right posterior thigh  Silver  "alginate, 4x4, gentle border  TR    Assessment:           ICD-10-CM ICD-9-CM   1. Stage IV pressure ulcer of right hip  L89.214 707.04     707.24   2. Pressure injury of left thigh, stage 3  L89.223 707.09     707.23   3. Pressure injury of sacral region, stage 4  L89.154 707.03     707.24   4. Pressure ulcer of left buttock, stage 4  L89.324 707.05     707.24   5. Quadriplegia, unspecified  G82.50 344.00             Procedures:     Debridement     Date/Time: 3/7/2024 11:00 AM     Performed by: Jessie Bah NP  Authorized by: Jessie Bah NP    Time out: Immediately prior to procedure a "time out" was called to verify the correct patient, procedure, equipment, support staff and site/side marked as required.     Consent Done?:  Yes (Verbal)     Preparation: Patient was prepped and draped with clean technique    Local anesthesia used?: No       Wound Details:    Location:  Right hip    Type of Debridement:  Non-excisional       Length (cm):  0.5       Area (sq cm):  0.2       Width (cm):  0.4       Percent Debrided (%):  100       Depth (cm):  1.5       Total Area Debrided (sq cm):  0.2    Depth of debridement:  Epidermis/Dermis    Devitalized tissue debrided:  Biofilm, Exudate, Fibrin and Slough    Instruments:  Ultrasound Probe (Portillo probe)  Bleeding:  Minimal  Hemostasis Achieved: Yes  Method Used:  Pressure     2nd Wound Details:     Location:  Left leg (Left medial upper thigh)    Type of Debridement:  Non-excisional       Length (cm):  3.3       Area (sq cm):  6.6       Width (cm):  2       Percent Debrided (%):  100       Depth (cm):  0.3       Total Area Debrided (sq cm):  6.6    Depth of debridement:  Epidermis/Dermis    Devitalized tissue debrided:  Biofilm, Exudate, Fibrin and Slough    Instruments:  Ultrasound Probe (Portillo probe)  Bleeding:  None  Patient tolerance:  Patient tolerated the procedure well with no immediate complications       Skin substitute     Date/Time: 3/7/2024 11:00 AM   "   Performed by: Jessie Bah NP  Authorized by: Jessie Bah NP    Consent:     Consent obtained:  Verbal and written    Consent given by:  Patient  Procedure details:     Product applied:  Theraskin    Product lot #:  [id number]    Product expiration:  9/12/2028    Amount used (cm^2):  6    Saline lot #:  [id number]    Saline expiration:  12/1/2025    Secured: Yes      Secured with:  Dermabond  Dressing:     Dressing applied:  Adaptic dressing and Steri-Strips (Medipore)    Wrapped with:  Conform bandage 3 inch  Post-procedure details:     Patient tolerance of procedure:  Tolerated well, no immediate complications           Excisional debridement performed:  [] Yes [] No   Selective debridement performed:  [x] Yes [] No   Mechanical debridement performed:  [] Yes [] No   Silver nitrate applied:    [] Yes [] No   Labs ordered this visit:   [] Yes [] No   Imaging ordered this visit:   [] Yes [] No   Tissue pathology and/or culture taken:  [] Yes [] No         MEDICATIONS    Current Outpatient Medications:     apixaban (ELIQUIS) 5 mg Tab, Take 5 mg by mouth once daily at 6am., Disp: , Rfl:     LINZESS 145 mcg Cap capsule, Take 145 mcg by mouth once daily., Disp: , Rfl:     mupirocin (BACTROBAN) 2 % ointment, 2 (two) times daily. Apply to affected area for five days, Disp: , Rfl:    Review of patient's allergies indicates:   Allergen Reactions    Meropenem Anaphylaxis    Penicillins          [facility]:  [facility]   TIMES PER WEEK/DAYS:  daily   ORDERS:  Right hip: Cleanse with wound cleanser and apply silver alginate to the wound bed, cover with 4x4 gauze and gentle border - to be changed daily.   Left ischial: Cleanse with wound cleanser and apply silver alginate to the wound bed, cover with 4x4 gauze and gentle border - to be changed daily.   Left medial upper thigh: GRAFT IN PLACE  Remove all layers of dressing above tape that are labeled (DO NOT REMOVE), replace layers (silver  alginate, 4x4 and gentle border and sure gentle border in place with tape, to be changed QOD.   Sacrum: Cleanse with wound cleanser and apply silver alginate to the wound bed, cover with 4x4 gauze and gentle border - to be changed daily.       Follow up in about 1 week (around 3/14/2024) for Stretcher.

## 2024-03-07 NOTE — PROCEDURES
Skin substitute    Date/Time: 3/7/2024 11:00 AM    Performed by: Jessie Bah NP  Authorized by: Jessie Bah NP    Consent:     Consent obtained:  Verbal and written    Consent given by:  Patient  Procedure details:     Product applied:  Theraskin    Product lot #:  [id number]    Product expiration:  9/12/2028    Amount used (cm^2):  6    Saline lot #:  [id number]    Saline expiration:  12/1/2025    Secured: Yes      Secured with:  Dermabond  Dressing:     Dressing applied:  Adaptic dressing and Steri-Strips (Medipore)    Wrapped with:  Conform bandage 3 inch  Post-procedure details:     Patient tolerance of procedure:  Tolerated well, no immediate complications

## 2024-03-14 ENCOUNTER — HOSPITAL ENCOUNTER (OUTPATIENT)
Dept: WOUND CARE | Facility: HOSPITAL | Age: 49
Discharge: HOME | End: 2024-03-14
Attending: NURSE PRACTITIONER
Payer: MEDICARE

## 2024-03-14 VITALS
BODY MASS INDEX: 29.16 KG/M2 | HEART RATE: 52 BPM | DIASTOLIC BLOOD PRESSURE: 71 MMHG | HEIGHT: 65 IN | WEIGHT: 175 LBS | RESPIRATION RATE: 18 BRPM | SYSTOLIC BLOOD PRESSURE: 130 MMHG

## 2024-03-14 DIAGNOSIS — L89.223 PRESSURE INJURY OF LEFT THIGH, STAGE 3: ICD-10-CM

## 2024-03-14 DIAGNOSIS — G82.50 QUADRIPLEGIA, UNSPECIFIED: ICD-10-CM

## 2024-03-14 DIAGNOSIS — L89.154 PRESSURE INJURY OF SACRAL REGION, STAGE 4: ICD-10-CM

## 2024-03-14 DIAGNOSIS — L89.324 PRESSURE ULCER OF LEFT BUTTOCK, STAGE 4: ICD-10-CM

## 2024-03-14 DIAGNOSIS — L89.214 STAGE IV PRESSURE ULCER OF RIGHT HIP: Primary | ICD-10-CM

## 2024-03-14 PROCEDURE — 97597 DBRDMT OPN WND 1ST 20 CM/<: CPT | Mod: 59

## 2024-03-14 PROCEDURE — 99213 OFFICE O/P EST LOW 20 MIN: CPT | Mod: 25

## 2024-03-14 PROCEDURE — 27000999 HC MEDICAL RECORD PHOTO DOCUMENTATION

## 2024-03-14 PROCEDURE — 15271 SKIN SUB GRAFT TRNK/ARM/LEG: CPT

## 2024-03-14 NOTE — PROGRESS NOTES
Home Health: [facility]   Smoker   [] Yes  [x] No  Diabetic   [] Yes   [x] No  Low air loss mattress [x] Yes [] No   Is the patient eligible for a graft, and/or currently grafting?  [] Yes [x] No       Subjective:       Patient ID: Diego Martinez Jr. is a 48 y.o. male.    Chief Complaint: Pressure Ulcer (((Right hip - Stage 4 /Left ischial - Stage 4 /Sacrum - stage 4/Left medial upper thigh - Stage 3))    HPI   Mr. Martinez is a long time patient of [facility].    He comes via wheelchair with a care attendant present with him at all times.    He does have a history of quadriplegia.  He has home health who assist him with his wound care daily and care attendants to provide care at other times.  He has a very pleasant demeanor and added to an is usually jovial in spite of his challenges.  [facility]    6/7/23 - Mr. Martinez returns today for followup on several wounds.  Some have done except personally well recently which is correct it to his home health.  We are able to close to wounds today.  Wound #1, Right posterior hip, remains stable and today it is still 0.1 x 0.1 x 0.1.  We will leave this wound open 1 additional week and continue to monitor.  Wound #2, left ischial, has improved since the last visit.  There is a decrease in depth and the granular tissue is again stable.  The patient was educated that he should reminder the home health nurses to open this wound very carefully and gently to avoid ripping the tissue open again since it is finally making excellent progress.  Wound #3, Sacrum, this wound, today, we are able to close.  Wound #4, left medial upper thigh, is stable with no significant change although it is slightly larger.  We will continue to use silver alginate on all open wounds.    7/10/23 - Mr. Martinez was last seen on 6/7/23.  Today he returns with is wounds stable:  Wound #1 - Right posterior hip, stable, no change since the last visit, no S & S of  infection, no increase in drainage.  Wound #2 - Left ischial, continue to improve, with a very slight decrease in size this week.  Tissue is no longer friable.  Silver nitrate was applied, we will continue to use silver alginate.  Wound #3 - sacrum, reassessed, remains closed  Wound #4 - left medial upper thigh - this wound was debrided, wound margins are rolling and slightly macerated.  Silver nitrate was applied.  We will continue to use silver alginate.  The patient also reported today that he injured his right great toe nail.  Today it was assessed and there was a considerable amount of dried blood noted.  The nail was removed (90%) with a nipper.  We will use Betadine for one week.     7/26/23 - the patient returns today for followup on his several wound:  Wound #1 - Right posterior hip; this wound remains stable with no significant change, no S & S of infection  Wound #2 - Left ischial, significant increase in size due to overextending of wound bed - increase in width from 0.5 cm to 3.0 cm; extreme care must be used when opening this wound bed asked to not over extend in split the wound open again.  Wound #3 - Sacrum; we are continuing to monitor this wound as there was some maceration.  A callus paring was performed, and we will continue to monitor because there appears to be changes occurring.  Wound #4 - Left medial upper thigh - this wound was selectively debrided and has an increased amount macerated tissue.  We need to ensure that this dressing is being changed daily.    8/9/23 - Mr. Martinez returns today to be seen for three wounds:  Wound #1 - Right posterior hip - this wound continues to remains stable.  There is still some depth, however, there is very little drainage and no signs and symptoms of infection.  We will continue to pack this wound with silver alginate.  Wound #2 - Left ischial, this wound remains at 3.0 cm which is a significant increase over the measurement of 0.5 cm on 07/12/2023.   Increase in size is due to the forcing open of the wound during the cleansing process.  Home health has been reminded that they should open that wound very carefully so is not to over extend the very fragile epithelial tissue.  Additionally, the orders were to use silver alginate on this wound and today mesalt was removed.  They will be reminded they should use the appropriate dressing on the appropriate wounds and me salt should not be used in this wound.  Wound #3 - Sacrum; we are continuing to monitor this area, however, there is no open wound at this time.  It will be reopened if necessary.  Wound #4 - Left medial upper thigh - this wound was selectively debrided today with removal a moderate amount of slough from the wound bed.  There is a large island of epithelial tissue in the wound bed, however, there is still also a large area of macerated tissue around the periwound.  Home health is reminded that they should be applying me salt to this wound and this wound only.  They should be covering the me salt and wound bed with silver alginate to be changed daily.    8/24/23 - The patient returns today for reassessments his 3 wounds.    Wound #1 - Right posterior hip - remains clean and stable.  There are no signs and symptoms of infection and very little drainage.  We are using silver alginate on this wound.    Wound #2 - Left ischial - 2.7 x 0.5 x 0.2 cm - wound is improved significantly over his last visit on 08/09/2023.  This provider appreciate the care that home health is taking when opening the wound to cleanse in the depth of the wound.  It is obvious that they have not been over extending the wound which is allowing it to progress as it should be progressing now.  We will continue to apply silver alginate on this wound.  Wound #3 - Sacrum - we will continue to monitor the sacrum again as it has now reopened very slightly.  Wound #4 - Left medial upper thigh - this wound remains stable with no signs and  symptoms of infection.  The large margin of macerated tissue remains.  Ideally, this could be surgically debrided, however, we may have to do this in clinic at some point with a scalpel.    9/6/23 - Mr. Martinez returns for f/up on the 3 wounds:  Wound #1 - Right posterior hip - remains clean and stable, continuing to decrease in size slowly.  There are no signs and symptoms of infection and very little drainage.  We will continue using silver alginate on this wound.    Wound #2 - Left ischial - 1.5 x 0.5 x 0.2 cm - wound continues to decrease in size with a very small remaining area.  This provider appreciates the care that home health continues to take when opening the wound to cleanse in the depth of the wound.  It is obvious that they have not been over extending the wound which is allowing it to progress as it should be progressing now.  We will continue to apply silver alginate on this wound.  Wound #3 - Sacrum - has now reclosed but will continue to monitor  Wound #4 - Left medial upper thigh - this wound remains stable with no signs and symptoms of infection.  The large margin of macerated tissue remains.  A large amount of this tissue was selectively debrided off today.  We will focus on removal of the rolled edges each week.    9/20/23 - The patient returns today for followup on several wounds:  Wound #1 - Right posterior hip - This wound was mechanically debrided.  It remains clean and stable, continuing to decrease in size slowly.  There are no signs and symptoms of infection and very little drainage.  We will continue using silver alginate on this wound.    Wound #2 - Left ischial - 1.7 x 0.5 x 0.2 cm - wound remains stable with no significant change in size this visit.  The wound has no S & S of infection.  We will continue to apply silver alginate on this wound.  Wound #3 - Sacrum - has reopened.  This wound was selectively debrided today.  We will begin application of collagen and silver  alginate.  Wound #4 - Left medial upper thigh - this wound remains stable with no signs and symptoms of infection. It was selectively debrided today and we will begin collagen and silver alginate.      10/4/23 - Mr. Martinez returns today for f/up on the following wounds:  Wound #1 - Right posterior hip - This wound was mechanically debrided.  It remains clean and stable, with no significant change.  There are no signs and symptoms of infection and very little drainage.  We will continue using silver alginate on this wound.    Wound #2 - Left ischial - 0.5 x 2.0 x 0.2 cm - wound remains stable with no significant change although it is slightly larger than last week.   The wound has no S & S of infection.  We will continue to apply silver alginate on this wound.  Wound #3 - Sacrum - remains open.  This wound was selectively debrided today.  We will continue powdered collagen and silver alginate.  Wound #4 - Left medial upper thigh - this wound remains stable with no signs and symptoms of infection. It was selectively debrided today and we will begin powdered collagen and silver alginate.     10/18/23 - Mr. Martinez is seen today for the f/up on several wounds:  Wound #1 - Right posterior hip - This wound was mechanically debrided.  It remains clean and stable, with no significant change.  There are no signs and symptoms of infection and very little drainage.  We will continue using silver alginate on this wound.    Wound #2 - Left ischial - 0.5 x 1.5 x 0.2 cm - wound remains stable with no significant change although it is slightly larger than last week.   The wound has no S & S of infection.  We will continue to apply silver alginate on this wound.  Wound #3 - Sacrum - remains open.  This wound was selectively debrided today.  We will continue powdered collagen and silver alginate.  Wound #4 - Left medial upper thigh - this wound remains stable with no signs and symptoms of infection. It was mechanically debrided today  and we will begin powdered collagen and silver alginate.     October 31st:  48-year-old black male, who is a quadriplegia, is here for follow up of his for pressure injuries.  The worse 1 at this time is the left ischial, left buttock.  The ulcers appear clean, without obvious secondary infection, foul odor, or discharge.  He has home health services almost every day.    11/14/23 - Mr. Martinez returns today for followup on several wounds.  All remains stable.  The left ischial does measures slightly larger, however, the wound bed is clean.  It was selectively debrided and there are no signs and symptoms of infection with this wound or any of his wounds at this time.  Wound #1 - Right posterior hip - This wound was mechanically debrided.  It remains clean and stable, with no significant change.  There are no signs and symptoms of infection and very little drainage.  We will continue using silver alginate on this wound.    Wound #2 - Left ischial - 0.8 x 1.0 x 0.2 cm  - wound remains stable with no significant change although it is slightly larger than last week.   The wound has no S & S of infection.  We will continue to apply silver alginate on this wound.  Wound #3 - Sacrum - remains open.  We will continue silver alginate.  Wound #4 - Left medial upper thigh -  4.0 x 2.0 x 0.3 cm- this wound has increased in size slightly, but there are no signs and symptoms of infection. It was selectively debrided today and we will continue silver alginate.     12/5/23 - Mr. Martinez is seen today for f/up on his wounds.  They all remain stable, in various stages.  Today none show S & S of infection, with no increase in drainage, redness, etc.    Wound #1 - Right posterior hip - This wound was mechanically debride.  It does continue to drain a scant amount.  We will continue using silver alginate on this wound.    Wound #2 - Left ischial - 0.5 x 1.0 x 0.2 cm  - wound remains stable with a slight decrease in size.   The wound has no  S & S of infection.  We will continue to apply silver alginate on this wound.  Wound #3 - Sacrum - remains open.  We will continue silver alginate.  Wound #4 - Left medial upper thigh -  2.7 x 1.8 x 0.3 cm - this wound has decreased in size and appears to be doing well.  There are no signs and symptoms of infection. It was selectively debrided today and we will continue silver alginate.     January 2, 2024:  48-year-old black male, with quadriplegia, is here for a multiple stage III and stage IV pressure injuries.  They are all under control at this time.  They are relatively shallow.  He has a good low air loss mattress at home.    1/22/24 - The patient returns today for f/up on several wounds.  Today many of the wounds are improving.  Wound #1 - Right posterior hip - continues to progress well and has a very small remaining opening.  We will continue to use silver alginate placed over the wound.    Wound #2 - Left ischial -0.4 x 1.0 x 0.2 cm  - wound remains appears stable with a slight decrease in size.   The wound has no S & S of infection.  We will continue to apply silver alginate on this wound.  Wound #3 - Sacrum - remains open and we will continue to monitor.  We will begin application of Endoform.    Wound #4 - Left medial upper thigh -  3.2 x 3.3 x 0.3 cm - this wound has increased in size slightly.   and appears to be doing well.  There are no signs and symptoms of infection. It was selectively debrided today and we will continue silver alginate.  He does continue to have HH assistance on a daily basis.  If the left medial upper thigh does not improve by next week we will consider ultrasonic debridement.     January 30, 2024:  48-year-old black male, with quadriplegia, is here for follow up of his multiple pressure injuries, including right hip, left ischial, sacral, left medial upper thigh, and a stage III scrotal pressure ulcer.  The wounds are all clean at this time, and need no surgical debridement  today.  The measurements are approximately the same as last visit.    2/20/24 - Mr. Martinez Is seen today for followup on several wounds.   Some of the wounds continue to progress well while others have deteriorated since he was last seen by this provider on January 22nd.  In particular, the left medial upper thigh has continued to deteriorate and today we are attempting to authorize a Theraskin  graft for that wound.  Wound #1 - Right posterior hip - Remains stable with no reported drainage, no S & S of infection.  We will continue to use silver alginate placed over the wound.    Wound #2 - Left ischial -0.8 x 0.5 x 0.2 cm  - wound continues to decrease in size.   The wound has no S & S of infection.  We will continue to apply silver alginate on this wound.  Wound #3 - Sacrum - 0.3 x 0.8 x 0.4 cm - remains open and has increased in size slightly we will continue to monitor and apply silver alginate.  Wound #4 - Left medial upper thigh - 2.5 x 3.0 x 0.9 cm - this wound has increased in size slightly. There are no signs and symptoms of infection. It was selectively debrided today and we will attempt to authorize a Theraskin graft for this wound.   The left medial upper thigh will be ultrasonically debrided at his next visit.    2/29/24 - The patient returns today for f/up on wounds.  We are attempting to authorize Theraskin grafts for the patient.  In preparation, the wounds were selectively debrided using the ultrasonic debrider.  If authorized, graft prep will be done at his next visit.  Today he was instructed to begin application of mupirocin ointment to each wound, also in preparation of the process.    3/7/24 - Mr. Martinez is seen today for f/up on several wounds.  He is approved for Theraskin grafts on wound #4, the left medial upper thigh.  Wound #1 - Right posterior hip -   The wound has deteriorated greatly over the last couple of weeks.  The wound was nearly closed two weeks ago.  Today it was ultrasonically  debrided and has a depth of 1.5 cm.  Wound #2 - Left ischial - 0.3 x 0.3 x 0.2 cm  - wound continues to decrease in size.   The wound has no S & S of infection.  We will continue to apply silver alginate on this wound.  Wound #3 - Sacrum - 0.3 x 0.8 x 0.2 cm - remains open and has decreased in depth slightly.  we will continue to monitor and apply silver alginate.  Wound #4 - Left medial upper thigh - 3.3 x 2.0 x 0.3 cm - this wound has continued to increase in size and depth.  Today it was selectively debrided using the ultrasonic debrider.  Theraskin graft #1 was applied.  Theraskin graft #2, 13.0 cm2 has been ordered.  Graft #1 applied was 6.0 cm2 which is too small for this wound.  We will increase in size.     3/14/24 - Mr. Martinez returns for f/up on his wounds.  We are currently grafting the left medial upper thigh.    Wound #1 - Right posterior hip -   The wound has deteriorated continually over the last couple of weeks.  The wound was nearly closed two weeks ago.  Today a culture was obtained to determine if there is an infective process occurring causing deterioration.    Wound #2 - Left ischial - 0.2 x 0.2 x 0.2 cm  - wound continues to decrease in size.   The wound has no S & S of infection.  We will continue to apply silver alginate on this wound.  Wound #3 - Sacrum - 0.2 x 1.0 x 0.2 cm - remains open and has increased again in depth slightly.  we will continue to monitor and apply silver alginate.  Wound #4 - Left medial upper thigh - 3.3 x 2.0 x 0.3 cm - this wound has remained the same size and depth.  At his last visit, Theraskin graft #2 (13.0 cm2) was applied.  This graft was pulled as taught as possible, and could barely be secured.  There is depth to the wound, and any attempt to apply pressure to the graft to allow contact with the wound bed would cause the graft to detach.  We must have a larger graft to achieve the wound depth.     Review of Systems   Constitutional: Negative.    Respiratory:  Negative.     Cardiovascular: Negative.    Musculoskeletal:  Positive for gait problem.   Skin:  Positive for wound.        As documented in the HPI.   All other systems reviewed and are negative.        Objective:      Vitals:    03/14/24 1151   BP: 130/71   Pulse: (!) 52   Resp: 18         Physical Exam  Vitals reviewed.   Constitutional:       Appearance: Normal appearance. He is normal weight.   HENT:      Head: Normocephalic.   Cardiovascular:      Rate and Rhythm: Normal rate.      Pulses: Normal pulses.   Pulmonary:      Effort: Pulmonary effort is normal.   Musculoskeletal:      Comments: quadraplegia   Skin:     General: Skin is warm and dry.      Comments: Multiple pressure injuries   Neurological:      General: No focal deficit present.      Mental Status: He is alert and oriented to person, place, and time.   Psychiatric:         Mood and Affect: Mood normal.            Altered Skin Integrity 05/23/22 1337 Right posterior Hip #1 (Active)   05/23/22 1337   Altered Skin Integrity Present on Admission - Did Patient arrive to the hospital with altered skin?: yes   Side: Right   Orientation: posterior   Location: Hip   Wound Number: #1   Is this injury device related?: No   Primary Wound Type:    Description of Altered Skin Integrity:    Ankle-Brachial Index:    Pulses:    Removal Indication and Assessment:    Wound Outcome:    (Retired) Wound Length (cm):    (Retired) Wound Width (cm):    (Retired) Depth (cm):    Wound Description (Comments):    Removal Indications:    Dressing Appearance Moist drainage 03/14/24 1159   Drainage Amount Moderate 03/14/24 1159   Drainage Characteristics/Odor Serosanguineous 03/14/24 1159   Appearance Dressing in place, unable to visualize;Moist 03/14/24 1159   Tissue loss description Full thickness 03/14/24 1159   Wound Edges Open 03/14/24 1159   Wound Length (cm) 0.5 cm 03/14/24 1159   Wound Width (cm) 0.5 cm 03/14/24 1159   Wound Depth (cm) 1.5 cm 03/14/24 1159   Wound Volume  (cm^3) 0.375 cm^3 03/14/24 1159   Wound Surface Area (cm^2) 0.25 cm^2 03/14/24 1159   Care Cleansed with:;Wound cleanser 03/14/24 1159   Dressing Applied;Other (comment) 03/14/24 1159            Altered Skin Integrity 05/23/22 1353 Left Ischial tuberosity #2 (Active)   05/23/22 1353   Altered Skin Integrity Present on Admission - Did Patient arrive to the hospital with altered skin?: yes   Side: Left   Orientation:    Location: Ischial tuberosity   Wound Number: #2   Is this injury device related?: No   Primary Wound Type:    Description of Altered Skin Integrity:    Ankle-Brachial Index:    Pulses:    Removal Indication and Assessment:    Wound Outcome:    (Retired) Wound Length (cm):    (Retired) Wound Width (cm):    (Retired) Depth (cm):    Wound Description (Comments):    Removal Indications:    Drainage Amount Moderate 03/14/24 1159   Appearance Intact;Moist 03/14/24 1159   Wound Edges Open 03/14/24 1159   Wound Length (cm) 0.2 cm 03/14/24 1159   Wound Width (cm) 0.2 cm 03/14/24 1159   Wound Depth (cm) 0.2 cm 03/14/24 1159   Wound Volume (cm^3) 0.008 cm^3 03/14/24 1159   Wound Surface Area (cm^2) 0.04 cm^2 03/14/24 1159   Dressing Applied;Silver 03/14/24 1159            Altered Skin Integrity 01/05/23 1351 Left upper;medial Thigh #4  (Active)   01/05/23 1351   Altered Skin Integrity Present on Admission - Did Patient arrive to the hospital with altered skin?: yes   Side: Left   Orientation: upper;medial   Location: Thigh   Wound Number: #4   Is this injury device related?: No   Primary Wound Type:    Description of Altered Skin Integrity:    Ankle-Brachial Index:    Pulses:    Removal Indication and Assessment:    Wound Outcome:    (Retired) Wound Length (cm):    (Retired) Wound Width (cm):    (Retired) Depth (cm):    Wound Description (Comments):    Removal Indications:    Drainage Amount Moderate 03/14/24 1159   Appearance Intact;Moist 03/14/24 1159   Wound Edges Open 03/14/24 1159   Wound Length (cm) 3.3 cm  03/14/24 1159   Wound Width (cm) 2 cm 03/14/24 1159   Wound Depth (cm) 0.3 cm 03/14/24 1159   Wound Volume (cm^3) 1.98 cm^3 03/14/24 1159   Wound Surface Area (cm^2) 6.6 cm^2 03/14/24 1159            Altered Skin Integrity 09/20/23 1148 Sacral spine #3 (Active)   09/20/23 1148   Altered Skin Integrity Present on Admission - Did Patient arrive to the hospital with altered skin?: yes   Side:    Orientation:    Location: Sacral spine   Wound Number: #3   Is this injury device related?:    Primary Wound Type:    Description of Altered Skin Integrity:    Ankle-Brachial Index:    Pulses:    Removal Indication and Assessment:    Wound Outcome:    (Retired) Wound Length (cm):    (Retired) Wound Width (cm):    (Retired) Depth (cm):    Wound Description (Comments):    Removal Indications:    Dressing Appearance Moist drainage 03/14/24 1159   Drainage Amount Moderate 03/14/24 1159   Drainage Characteristics/Odor Serosanguineous 03/14/24 1159   Appearance Dressing in place, unable to visualize;Intact;Moist 03/14/24 1159   Tissue loss description Full thickness 03/14/24 1159   Periwound Area Intact;Moist 03/14/24 1159   Wound Edges Open 03/14/24 1159   Wound Length (cm) 0.2 cm 03/14/24 1159   Wound Width (cm) 1 cm 03/14/24 1159   Wound Depth (cm) 0.2 cm 03/14/24 1159   Wound Volume (cm^3) 0.04 cm^3 03/14/24 1159   Wound Surface Area (cm^2) 0.2 cm^2 03/14/24 1159   Care Cleansed with:;Wound cleanser 03/14/24 1159   Dressing Applied;Other (comment) 03/14/24 1159       03/14/24    Right hip  No dressings noted      Sacral  No dressings noted    NO PHOTO LEFT ISCHIAL TUBEROSITY      Left medial upper thigh  (Theraskin graft 13sq cm, dermabond, mastisol, versatile, medipore tape, silver alginate, 4x4 gauze, gentle border)  BL    Assessment:           ICD-10-CM ICD-9-CM   1. Stage IV pressure ulcer of right hip  L89.214 707.04     707.24   2. Pressure injury of left thigh, stage 3  L89.223 707.09     707.23   3. Pressure injury of  sacral region, stage 4  L89.154 707.03     707.24   4. Pressure ulcer of left buttock, stage 4  L89.324 707.05     707.24   5. Quadriplegia, unspecified  G82.50 344.00             Procedures:     Skin substitute     Date/Time: 3/14/2024 11:36 AM     Performed by: Jessie Bah NP  Authorized by: Jessie Bah NP    Consent:     Consent obtained:  Verbal and written    Consent given by:  Patient  Procedure details:     Total wound area (cm2): left medial upper thigh.    Product applied:  Theraskin    Product lot #:  [id number]    Product expiration:  5/7/2024    Amount used (cm^2):  13    Saline lot #:  [id number]    Saline expiration:  5/1/2025    Secured: Yes      Secured with:  Dermabond  Dressing:     Dressing applied:  Adaptic dressing, 4x4 and Steri-Strips (Medipore tape)    Wrapped with:  Conform bandage 3 inch  Post-procedure details:     Patient tolerance of procedure:  Tolerated well, no immediate complications       Excisional debridement performed:  [] Yes [] No   Selective debridement performed:  [] Yes [] No   Mechanical debridement performed:  [] Yes [] No   Silver nitrate applied:    [] Yes [] No   Labs ordered this visit:   [] Yes [] No   Imaging ordered this visit:   [] Yes [] No   Tissue pathology and/or culture taken:  [x] Yes [] No  - culture obtained, right hip        MEDICATIONS    Current Outpatient Medications:     apixaban (ELIQUIS) 5 mg Tab, Take 5 mg by mouth once daily at 6am., Disp: , Rfl:     LINZESS 145 mcg Cap capsule, Take 145 mcg by mouth once daily., Disp: , Rfl:     mupirocin (BACTROBAN) 2 % ointment, 2 (two) times daily. Apply to affected area for five days, Disp: , Rfl:    Review of patient's allergies indicates:   Allergen Reactions    Meropenem Anaphylaxis    Penicillins          [facility]:  [facility]   TIMES PER WEEK/DAYS:  daily   ORDERS:  Right hip: Cleanse with wound cleanser and apply silver alginate to the wound bed, cover with 4x4 gauze and  gentle border - to be changed daily.   Left ischial: Cleanse with wound cleanser and apply endoform to the wound bed, cover with 4x4 gauze and gentle border - to be changed daily.   Left medial upper thigh: GRAFT IN PLACE  Remove all layers of dressing above tape that are labeled (DO NOT REMOVE), replace layers (silver alginate, 4x4 and gentle border and sure gentle border in place with tape, to be changed QOD.   Sacrum: Cleanse with wound cleanser and apply endoform to the wound bed, cover with 4x4 gauze and gentle border - to be changed daily.       Follow up in 1 week (on 3/21/2024).

## 2024-03-18 NOTE — PROCEDURES
Skin substitute    Date/Time: 3/14/2024 11:36 AM    Performed by: Jessie Bah NP  Authorized by: Jessie Bah NP    Consent:     Consent obtained:  Verbal and written    Consent given by:  Patient  Procedure details:     Total wound area (cm2): left medial upper thigh.    Product applied:  Theraskin    Product lot #:  [id number]    Product expiration:  5/7/2024    Amount used (cm^2):  13    Saline lot #:  [id number]    Saline expiration:  5/1/2025    Secured: Yes      Secured with:  Dermabond  Dressing:     Dressing applied:  Adaptic dressing, 4x4 and Steri-Strips (Medipore tape)    Wrapped with:  Conform bandage 3 inch  Post-procedure details:     Patient tolerance of procedure:  Tolerated well, no immediate complications

## 2024-03-21 ENCOUNTER — HOSPITAL ENCOUNTER (OUTPATIENT)
Dept: WOUND CARE | Facility: HOSPITAL | Age: 49
Discharge: HOME | End: 2024-03-21
Attending: NURSE PRACTITIONER
Payer: MEDICARE

## 2024-03-21 VITALS
BODY MASS INDEX: 29.17 KG/M2 | HEIGHT: 65 IN | RESPIRATION RATE: 18 BRPM | SYSTOLIC BLOOD PRESSURE: 127 MMHG | DIASTOLIC BLOOD PRESSURE: 78 MMHG | WEIGHT: 175.06 LBS | HEART RATE: 61 BPM

## 2024-03-21 DIAGNOSIS — G82.50 QUADRIPLEGIA, UNSPECIFIED: ICD-10-CM

## 2024-03-21 DIAGNOSIS — L89.223 PRESSURE INJURY OF LEFT THIGH, STAGE 3: ICD-10-CM

## 2024-03-21 DIAGNOSIS — L89.154 PRESSURE INJURY OF SACRAL REGION, STAGE 4: ICD-10-CM

## 2024-03-21 DIAGNOSIS — L89.214 STAGE IV PRESSURE ULCER OF RIGHT HIP: Primary | ICD-10-CM

## 2024-03-21 DIAGNOSIS — L89.324 PRESSURE ULCER OF LEFT BUTTOCK, STAGE 4: ICD-10-CM

## 2024-03-21 PROCEDURE — 99213 OFFICE O/P EST LOW 20 MIN: CPT

## 2024-03-21 PROCEDURE — 27000999 HC MEDICAL RECORD PHOTO DOCUMENTATION

## 2024-03-21 RX ORDER — CLINDAMYCIN HYDROCHLORIDE 300 MG/1
300 CAPSULE ORAL EVERY 6 HOURS
Qty: 40 CAPSULE | Refills: 0 | Status: SHIPPED | OUTPATIENT
Start: 2024-03-21 | End: 2024-03-31

## 2024-03-21 RX ORDER — DOXYCYCLINE HYCLATE 100 MG
100 TABLET ORAL 2 TIMES DAILY
Qty: 20 TABLET | Refills: 0 | Status: SHIPPED | OUTPATIENT
Start: 2024-03-21 | End: 2024-03-31

## 2024-03-21 NOTE — PROGRESS NOTES
Home Health: [facility]   Smoker   [] Yes  [x] No  Diabetic   [] Yes   [x] No  Low air loss mattress [x] Yes [] No   Is the patient eligible for a graft, and/or currently grafting?  [] Yes [x] No       Subjective:       Patient ID: Diego Martinez Jr. is a 48 y.o. male.    Chief Complaint: Pressure Ulcer (Right hip - Stage 4 /Left ischial - Stage 4 /Sacrum - stage 4/Left medial upper thigh - Stage 3)    HPI   Mr. Martinez is a long time patient of [facility].    He comes via wheelchair with a care attendant present with him at all times.    He does have a history of quadriplegia.  He has home health who assist him with his wound care daily and care attendants to provide care at other times.  He has a very pleasant demeanor and added to an is usually jovial in spite of his challenges.  [facility]    6/7/23 - Mr. Martinez returns today for followup on several wounds.  Some have done except personally well recently which is correct it to his home health.  We are able to close to wounds today.  Wound #1, Right posterior hip, remains stable and today it is still 0.1 x 0.1 x 0.1.  We will leave this wound open 1 additional week and continue to monitor.  Wound #2, left ischial, has improved since the last visit.  There is a decrease in depth and the granular tissue is again stable.  The patient was educated that he should reminder the home health nurses to open this wound very carefully and gently to avoid ripping the tissue open again since it is finally making excellent progress.  Wound #3, Sacrum, this wound, today, we are able to close.  Wound #4, left medial upper thigh, is stable with no significant change although it is slightly larger.  We will continue to use silver alginate on all open wounds.    7/10/23 - Mr. Martinez was last seen on 6/7/23.  Today he returns with is wounds stable:  Wound #1 - Right posterior hip, stable, no change since the last visit, no S & S of infection,  no increase in drainage.  Wound #2 - Left ischial, continue to improve, with a very slight decrease in size this week.  Tissue is no longer friable.  Silver nitrate was applied, we will continue to use silver alginate.  Wound #3 - sacrum, reassessed, remains closed  Wound #4 - left medial upper thigh - this wound was debrided, wound margins are rolling and slightly macerated.  Silver nitrate was applied.  We will continue to use silver alginate.  The patient also reported today that he injured his right great toe nail.  Today it was assessed and there was a considerable amount of dried blood noted.  The nail was removed (90%) with a nipper.  We will use Betadine for one week.     7/26/23 - the patient returns today for followup on his several wound:  Wound #1 - Right posterior hip; this wound remains stable with no significant change, no S & S of infection  Wound #2 - Left ischial, significant increase in size due to overextending of wound bed - increase in width from 0.5 cm to 3.0 cm; extreme care must be used when opening this wound bed asked to not over extend in split the wound open again.  Wound #3 - Sacrum; we are continuing to monitor this wound as there was some maceration.  A callus paring was performed, and we will continue to monitor because there appears to be changes occurring.  Wound #4 - Left medial upper thigh - this wound was selectively debrided and has an increased amount macerated tissue.  We need to ensure that this dressing is being changed daily.    8/9/23 - Mr. Martinez returns today to be seen for three wounds:  Wound #1 - Right posterior hip - this wound continues to remains stable.  There is still some depth, however, there is very little drainage and no signs and symptoms of infection.  We will continue to pack this wound with silver alginate.  Wound #2 - Left ischial, this wound remains at 3.0 cm which is a significant increase over the measurement of 0.5 cm on 07/12/2023.  Increase in  size is due to the forcing open of the wound during the cleansing process.  Home health has been reminded that they should open that wound very carefully so is not to over extend the very fragile epithelial tissue.  Additionally, the orders were to use silver alginate on this wound and today mesalt was removed.  They will be reminded they should use the appropriate dressing on the appropriate wounds and me salt should not be used in this wound.  Wound #3 - Sacrum; we are continuing to monitor this area, however, there is no open wound at this time.  It will be reopened if necessary.  Wound #4 - Left medial upper thigh - this wound was selectively debrided today with removal a moderate amount of slough from the wound bed.  There is a large island of epithelial tissue in the wound bed, however, there is still also a large area of macerated tissue around the periwound.  Home health is reminded that they should be applying me salt to this wound and this wound only.  They should be covering the me salt and wound bed with silver alginate to be changed daily.    8/24/23 - The patient returns today for reassessments his 3 wounds.    Wound #1 - Right posterior hip - remains clean and stable.  There are no signs and symptoms of infection and very little drainage.  We are using silver alginate on this wound.    Wound #2 - Left ischial - 2.7 x 0.5 x 0.2 cm - wound is improved significantly over his last visit on 08/09/2023.  This provider appreciate the care that home health is taking when opening the wound to cleanse in the depth of the wound.  It is obvious that they have not been over extending the wound which is allowing it to progress as it should be progressing now.  We will continue to apply silver alginate on this wound.  Wound #3 - Sacrum - we will continue to monitor the sacrum again as it has now reopened very slightly.  Wound #4 - Left medial upper thigh - this wound remains stable with no signs and symptoms of  infection.  The large margin of macerated tissue remains.  Ideally, this could be surgically debrided, however, we may have to do this in clinic at some point with a scalpel.    9/6/23 - Mr. Martinez returns for f/up on the 3 wounds:  Wound #1 - Right posterior hip - remains clean and stable, continuing to decrease in size slowly.  There are no signs and symptoms of infection and very little drainage.  We will continue using silver alginate on this wound.    Wound #2 - Left ischial - 1.5 x 0.5 x 0.2 cm - wound continues to decrease in size with a very small remaining area.  This provider appreciates the care that home health continues to take when opening the wound to cleanse in the depth of the wound.  It is obvious that they have not been over extending the wound which is allowing it to progress as it should be progressing now.  We will continue to apply silver alginate on this wound.  Wound #3 - Sacrum - has now reclosed but will continue to monitor  Wound #4 - Left medial upper thigh - this wound remains stable with no signs and symptoms of infection.  The large margin of macerated tissue remains.  A large amount of this tissue was selectively debrided off today.  We will focus on removal of the rolled edges each week.    9/20/23 - The patient returns today for followup on several wounds:  Wound #1 - Right posterior hip - This wound was mechanically debrided.  It remains clean and stable, continuing to decrease in size slowly.  There are no signs and symptoms of infection and very little drainage.  We will continue using silver alginate on this wound.    Wound #2 - Left ischial - 1.7 x 0.5 x 0.2 cm - wound remains stable with no significant change in size this visit.  The wound has no S & S of infection.  We will continue to apply silver alginate on this wound.  Wound #3 - Sacrum - has reopened.  This wound was selectively debrided today.  We will begin application of collagen and silver alginate.  Wound #4 - Left  medial upper thigh - this wound remains stable with no signs and symptoms of infection. It was selectively debrided today and we will begin collagen and silver alginate.      10/4/23 - Mr. Martinez returns today for f/up on the following wounds:  Wound #1 - Right posterior hip - This wound was mechanically debrided.  It remains clean and stable, with no significant change.  There are no signs and symptoms of infection and very little drainage.  We will continue using silver alginate on this wound.    Wound #2 - Left ischial - 0.5 x 2.0 x 0.2 cm - wound remains stable with no significant change although it is slightly larger than last week.   The wound has no S & S of infection.  We will continue to apply silver alginate on this wound.  Wound #3 - Sacrum - remains open.  This wound was selectively debrided today.  We will continue powdered collagen and silver alginate.  Wound #4 - Left medial upper thigh - this wound remains stable with no signs and symptoms of infection. It was selectively debrided today and we will begin powdered collagen and silver alginate.     10/18/23 - Mr. Martinez is seen today for the f/up on several wounds:  Wound #1 - Right posterior hip - This wound was mechanically debrided.  It remains clean and stable, with no significant change.  There are no signs and symptoms of infection and very little drainage.  We will continue using silver alginate on this wound.    Wound #2 - Left ischial - 0.5 x 1.5 x 0.2 cm - wound remains stable with no significant change although it is slightly larger than last week.   The wound has no S & S of infection.  We will continue to apply silver alginate on this wound.  Wound #3 - Sacrum - remains open.  This wound was selectively debrided today.  We will continue powdered collagen and silver alginate.  Wound #4 - Left medial upper thigh - this wound remains stable with no signs and symptoms of infection. It was mechanically debrided today and we will begin powdered  collagen and silver alginate.     October 31st:  48-year-old black male, who is a quadriplegia, is here for follow up of his for pressure injuries.  The worse 1 at this time is the left ischial, left buttock.  The ulcers appear clean, without obvious secondary infection, foul odor, or discharge.  He has home health services almost every day.    11/14/23 - Mr. Martinez returns today for followup on several wounds.  All remains stable.  The left ischial does measures slightly larger, however, the wound bed is clean.  It was selectively debrided and there are no signs and symptoms of infection with this wound or any of his wounds at this time.  Wound #1 - Right posterior hip - This wound was mechanically debrided.  It remains clean and stable, with no significant change.  There are no signs and symptoms of infection and very little drainage.  We will continue using silver alginate on this wound.    Wound #2 - Left ischial - 0.8 x 1.0 x 0.2 cm  - wound remains stable with no significant change although it is slightly larger than last week.   The wound has no S & S of infection.  We will continue to apply silver alginate on this wound.  Wound #3 - Sacrum - remains open.  We will continue silver alginate.  Wound #4 - Left medial upper thigh -  4.0 x 2.0 x 0.3 cm- this wound has increased in size slightly, but there are no signs and symptoms of infection. It was selectively debrided today and we will continue silver alginate.     12/5/23 - Mr. Martinez is seen today for f/up on his wounds.  They all remain stable, in various stages.  Today none show S & S of infection, with no increase in drainage, redness, etc.    Wound #1 - Right posterior hip - This wound was mechanically debride.  It does continue to drain a scant amount.  We will continue using silver alginate on this wound.    Wound #2 - Left ischial - 0.5 x 1.0 x 0.2 cm  - wound remains stable with a slight decrease in size.   The wound has no S & S of infection.  We  will continue to apply silver alginate on this wound.  Wound #3 - Sacrum - remains open.  We will continue silver alginate.  Wound #4 - Left medial upper thigh -  2.7 x 1.8 x 0.3 cm - this wound has decreased in size and appears to be doing well.  There are no signs and symptoms of infection. It was selectively debrided today and we will continue silver alginate.     January 2, 2024:  48-year-old black male, with quadriplegia, is here for a multiple stage III and stage IV pressure injuries.  They are all under control at this time.  They are relatively shallow.  He has a good low air loss mattress at home.    1/22/24 - The patient returns today for f/up on several wounds.  Today many of the wounds are improving.  Wound #1 - Right posterior hip - continues to progress well and has a very small remaining opening.  We will continue to use silver alginate placed over the wound.    Wound #2 - Left ischial -0.4 x 1.0 x 0.2 cm  - wound remains appears stable with a slight decrease in size.   The wound has no S & S of infection.  We will continue to apply silver alginate on this wound.  Wound #3 - Sacrum - remains open and we will continue to monitor.  We will begin application of Endoform.    Wound #4 - Left medial upper thigh -  3.2 x 3.3 x 0.3 cm - this wound has increased in size slightly.   and appears to be doing well.  There are no signs and symptoms of infection. It was selectively debrided today and we will continue silver alginate.  He does continue to have HH assistance on a daily basis.  If the left medial upper thigh does not improve by next week we will consider ultrasonic debridement.     January 30, 2024:  48-year-old black male, with quadriplegia, is here for follow up of his multiple pressure injuries, including right hip, left ischial, sacral, left medial upper thigh, and a stage III scrotal pressure ulcer.  The wounds are all clean at this time, and need no surgical debridement today.  The measurements  are approximately the same as last visit.    2/20/24 - Mr. Martinez Is seen today for followup on several wounds.   Some of the wounds continue to progress well while others have deteriorated since he was last seen by this provider on January 22nd.  In particular, the left medial upper thigh has continued to deteriorate and today we are attempting to authorize a Theraskin  graft for that wound.  Wound #1 - Right posterior hip - Remains stable with no reported drainage, no S & S of infection.  We will continue to use silver alginate placed over the wound.    Wound #2 - Left ischial -0.8 x 0.5 x 0.2 cm  - wound continues to decrease in size.   The wound has no S & S of infection.  We will continue to apply silver alginate on this wound.  Wound #3 - Sacrum - 0.3 x 0.8 x 0.4 cm - remains open and has increased in size slightly we will continue to monitor and apply silver alginate.  Wound #4 - Left medial upper thigh - 2.5 x 3.0 x 0.9 cm - this wound has increased in size slightly. There are no signs and symptoms of infection. It was selectively debrided today and we will attempt to authorize a Theraskin graft for this wound.   The left medial upper thigh will be ultrasonically debrided at his next visit.    2/29/24 - The patient returns today for f/up on wounds.  We are attempting to authorize Theraskin grafts for the patient.  In preparation, the wounds were selectively debrided using the ultrasonic debrider.  If authorized, graft prep will be done at his next visit.  Today he was instructed to begin application of mupirocin ointment to each wound, also in preparation of the process.    3/7/24 - Mr. Martinez is seen today for f/up on several wounds.  He is approved for Theraskin grafts on wound #4, the left medial upper thigh.  Wound #1 - Right posterior hip -   The wound has deteriorated greatly over the last couple of weeks.  The wound was nearly closed two weeks ago.  Today it was ultrasonically debrided and has a depth  of 1.5 cm.  Wound #2 - Left ischial - 0.3 x 0.3 x 0.2 cm  - wound continues to decrease in size.   The wound has no S & S of infection.  We will continue to apply silver alginate on this wound.  Wound #3 - Sacrum - 0.3 x 0.8 x 0.2 cm - remains open and has decreased in depth slightly.  we will continue to monitor and apply silver alginate.  Wound #4 - Left medial upper thigh - 3.3 x 2.0 x 0.3 cm - this wound has continued to increase in size and depth.  Today it was selectively debrided using the ultrasonic debrider.  Theraskin graft #1 was applied.  Theraskin graft #2, 13.0 cm2 has been ordered.  Graft #1 applied was 6.0 cm2 which is too small for this wound.  We will increase in size.     3/14/24 - Mr. Martinez returns for f/up on his wounds.  We are currently grafting the left medial upper thigh.    Wound #1 - Right posterior hip -   The wound has deteriorated continually over the last couple of weeks.  The wound was nearly closed two weeks ago.  Today a culture was obtained to determine if there is an infective process occurring causing deterioration.    Wound #2 - Left ischial - 0.2 x 0.2 x 0.2 cm  - wound continues to decrease in size.   The wound has no S & S of infection.  We will continue to apply silver alginate on this wound.  Wound #3 - Sacrum - 0.2 x 1.0 x 0.2 cm - remains open and has increased again in depth slightly.  we will continue to monitor and apply silver alginate.  Wound #4 - Left medial upper thigh - 3.3 x 2.0 x 0.3 cm - this wound has remained the same size and depth.  At his last visit, Theraskin graft #2 (13.0 cm2) was applied.  This graft was pulled as taught as possible, and could barely be secured.  There is depth to the wound, and any attempt to apply pressure to the graft to allow contact with the wound bed would cause the graft to detach.  We must have a larger graft to achieve the wound depth.     3/21/24 - We reviewed the results from the patients culture of the right posterior hip.   It has several bacteria present in the culture.  He was prescribed Bactrim and Augmentin based on those culture results.  Because of the likelihood of contamination of other wounds, we will hold off on future grafts of the left medial upper thigh at this time.  He will begin his abx regimen and we will consider resuming grafting with the infection is cleared.  He will also apply Mupirocin ointment to the sacrum and left medial upper thigh.  Mupirocin will not be put into the right posterior hip due to the difficulty of cleaning it out.        Review of Systems   Constitutional: Negative.    Respiratory: Negative.     Cardiovascular: Negative.    Musculoskeletal:  Positive for gait problem.   Skin:  Positive for wound.        As documented in the HPI.   All other systems reviewed and are negative.        Objective:      Vitals:    03/21/24 1202   BP: 127/78   Pulse: 61   Resp: 18         Physical Exam  Vitals reviewed.   Constitutional:       Appearance: Normal appearance. He is normal weight.   HENT:      Head: Normocephalic.   Cardiovascular:      Rate and Rhythm: Normal rate.      Pulses: Normal pulses.   Pulmonary:      Effort: Pulmonary effort is normal.   Musculoskeletal:      Comments: quadraplegia   Skin:     General: Skin is warm and dry.      Comments: Multiple pressure injuries   Neurological:      General: No focal deficit present.      Mental Status: He is alert and oriented to person, place, and time.   Psychiatric:         Mood and Affect: Mood normal.            Altered Skin Integrity 05/23/22 1337 Right posterior Hip #1 (Active)   05/23/22 1337   Altered Skin Integrity Present on Admission - Did Patient arrive to the hospital with altered skin?: yes   Side: Right   Orientation: posterior   Location: Hip   Wound Number: #1   Is this injury device related?: No   Primary Wound Type:    Description of Altered Skin Integrity:    Ankle-Brachial Index:    Pulses:    Removal Indication and Assessment:     Wound Outcome:    (Retired) Wound Length (cm):    (Retired) Wound Width (cm):    (Retired) Depth (cm):    Wound Description (Comments):    Removal Indications:    Dressing Appearance Moist drainage 03/21/24 1305   Drainage Amount Moderate 03/21/24 1305   Drainage Characteristics/Odor Serosanguineous 03/21/24 1305   Appearance Slough;Moist;Red 03/21/24 1305   Tissue loss description Full thickness 03/21/24 1305   Periwound Area Intact;Moist 03/21/24 1305   Wound Edges Open 03/21/24 1305   Wound Length (cm) 0.6 cm 03/21/24 1305   Wound Width (cm) 0.5 cm 03/21/24 1305   Wound Depth (cm) 2.1 cm 03/21/24 1305   Wound Volume (cm^3) 0.63 cm^3 03/21/24 1305   Wound Surface Area (cm^2) 0.3 cm^2 03/21/24 1305   Care Cleansed with:;Wound cleanser 03/21/24 1305   Dressing Applied;Other (comment) 03/21/24 1305   Dressing Change Due 03/22/24 03/21/24 1305            Altered Skin Integrity 05/23/22 1353 Left Ischial tuberosity #2 (Active)   05/23/22 1353   Altered Skin Integrity Present on Admission - Did Patient arrive to the hospital with altered skin?: yes   Side: Left   Orientation:    Location: Ischial tuberosity   Wound Number: #2   Is this injury device related?: No   Primary Wound Type:    Description of Altered Skin Integrity:    Ankle-Brachial Index:    Pulses:    Removal Indication and Assessment:    Wound Outcome:    (Retired) Wound Length (cm):    (Retired) Wound Width (cm):    (Retired) Depth (cm):    Wound Description (Comments):    Removal Indications:    Dressing Appearance Moist drainage 03/21/24 1305   Drainage Amount Moderate 03/21/24 1305   Drainage Characteristics/Odor Serosanguineous 03/21/24 1305   Appearance Slough;Moist;Red 03/21/24 1305   Tissue loss description Full thickness 03/21/24 1305   Periwound Area Intact;Moist 03/21/24 1305   Wound Edges Open 03/21/24 1305   Wound Length (cm) 0.2 cm 03/21/24 1305   Wound Width (cm) 0.2 cm 03/21/24 1305   Wound Depth (cm) 0.2 cm 03/21/24 1305   Wound Volume  (cm^3) 0.008 cm^3 03/21/24 1305   Wound Surface Area (cm^2) 0.04 cm^2 03/21/24 1305   Care Cleansed with:;Wound cleanser 03/21/24 1305   Dressing Applied;Other (comment) 03/21/24 1305   Dressing Change Due 03/22/24 03/21/24 1305            Altered Skin Integrity 01/05/23 1351 Left upper;medial Thigh #4  (Active)   01/05/23 1351   Altered Skin Integrity Present on Admission - Did Patient arrive to the hospital with altered skin?: yes   Side: Left   Orientation: upper;medial   Location: Thigh   Wound Number: #4   Is this injury device related?: No   Primary Wound Type:    Description of Altered Skin Integrity:    Ankle-Brachial Index:    Pulses:    Removal Indication and Assessment:    Wound Outcome:    (Retired) Wound Length (cm):    (Retired) Wound Width (cm):    (Retired) Depth (cm):    Wound Description (Comments):    Removal Indications:    Dressing Appearance Moist drainage 03/21/24 1305   Drainage Amount Moderate 03/21/24 1305   Drainage Characteristics/Odor Serosanguineous 03/21/24 1305   Appearance Slough;Red;Moist 03/21/24 1305   Tissue loss description Full thickness 03/21/24 1305   Periwound Area Intact;Moist 03/21/24 1305   Wound Edges Open 03/21/24 1305   Wound Length (cm) 3.5 cm 03/21/24 1305   Wound Width (cm) 2.8 cm 03/21/24 1305   Wound Depth (cm) 0.5 cm 03/21/24 1305   Wound Volume (cm^3) 4.9 cm^3 03/21/24 1305   Wound Surface Area (cm^2) 9.8 cm^2 03/21/24 1305   Care Cleansed with:;Wound cleanser 03/21/24 1305   Dressing Applied;Other (comment) 03/21/24 1305   Dressing Change Due 03/22/24 03/21/24 1305            Altered Skin Integrity 09/20/23 1148 Sacral spine #3 (Active)   09/20/23 1148   Altered Skin Integrity Present on Admission - Did Patient arrive to the hospital with altered skin?: yes   Side:    Orientation:    Location: Sacral spine   Wound Number: #3   Is this injury device related?:    Primary Wound Type:    Description of Altered Skin Integrity:    Ankle-Brachial Index:    Pulses:     Removal Indication and Assessment:    Wound Outcome:    (Retired) Wound Length (cm):    (Retired) Wound Width (cm):    (Retired) Depth (cm):    Wound Description (Comments):    Removal Indications:    Dressing Appearance Moist drainage 03/21/24 1305   Drainage Amount Moderate 03/21/24 1305   Drainage Characteristics/Odor Serosanguineous 03/21/24 1305   Appearance Slough;Red;Moist 03/21/24 1305   Tissue loss description Full thickness 03/21/24 1305   Periwound Area Intact 03/21/24 1305   Wound Edges Open 03/21/24 1305   Wound Length (cm) 0.3 cm 03/21/24 1305   Wound Width (cm) 1 cm 03/21/24 1305   Wound Depth (cm) 0.3 cm 03/21/24 1305   Wound Volume (cm^3) 0.09 cm^3 03/21/24 1305   Wound Surface Area (cm^2) 0.3 cm^2 03/21/24 1305   Care Cleansed with:;Wound cleanser 03/21/24 1305   Dressing Applied;Other (comment) 03/21/24 1305   Dressing Change Due 03/22/24 03/21/24 1305     3/21/24    Left ischium   Silver alginate, 6x6 gentle foam border dressing  CT      Left medial upper thigh  ROCKY, silver alginate, 6x6 gentle foam border dressing  CT      Sacrum - ROCKY, silver alginate, 4x4 gentle foam border dressing  CT      Right posterior hip - Silver alginate, 4x4 gentle foam border dressing  CT      Assessment:           ICD-10-CM ICD-9-CM   1. Stage IV pressure ulcer of right hip  L89.214 707.04     707.24   2. Pressure injury of left thigh, stage 3  L89.223 707.09     707.23   3. Pressure injury of sacral region, stage 4  L89.154 707.03     707.24   4. Pressure ulcer of left buttock, stage 4  L89.324 707.05     707.24   5. Quadriplegia, unspecified  G82.50 344.00           Procedures:     Mechanical debridement only - GRAFTING ON HOLD AT THIS TIME due to infection.    Excisional debridement performed:  [] Yes [] No   Selective debridement performed:  [] Yes [] No   Mechanical debridement performed:  [x] Yes [] No   Silver nitrate applied:    [] Yes [] No   Labs ordered this visit:   [] Yes [] No   Imaging ordered this  visit:   [] Yes [] No   Tissue pathology and/or culture taken:  [] Yes [] No         MEDICATIONS    Current Outpatient Medications:     apixaban (ELIQUIS) 5 mg Tab, Take 5 mg by mouth once daily at 6am., Disp: , Rfl:     LINZESS 145 mcg Cap capsule, Take 145 mcg by mouth once daily., Disp: , Rfl:     mupirocin (BACTROBAN) 2 % ointment, 2 (two) times daily. Apply to affected area for five days, Disp: , Rfl:     clindamycin (CLEOCIN) 300 MG capsule, Take 1 capsule (300 mg total) by mouth every 6 (six) hours. for 10 days, Disp: 40 capsule, Rfl: 0    doxycycline (VIBRA-TABS) 100 MG tablet, Take 1 tablet (100 mg total) by mouth 2 (two) times daily. for 10 days, Disp: 20 tablet, Rfl: 0   Review of patient's allergies indicates:   Allergen Reactions    Meropenem Anaphylaxis    Penicillins          HOME [facility]:  [facility]   TIMES PER WEEK/DAYS:  daily   ORDERS:  Right hip wound: Cleanse with wound cleanser, apply silver alginate, 4x4 gentle foam border dressing to be changed daily   Left ischial wound: Cleanse with wound cleanser, apply silver alginate, 6x6 gentle foam border dressing to be changed daily  Left medial upper thigh wound: Cleanse with wound cleanser, apply mupirocin ointment, silver alginate, 6x6 gentle foam border dressing to be changed daily   Sacral wound: Cleanse with wound cleanser, apply mupirocin ointment, silver alginate, 6x6 gentle foam border dressing to be changed daily       Follow up in 1 week (on 3/28/2024) for PU - stretcher.

## 2024-03-27 ENCOUNTER — HOSPITAL ENCOUNTER (OUTPATIENT)
Dept: WOUND CARE | Facility: HOSPITAL | Age: 49
Discharge: HOME | End: 2024-03-27
Attending: NURSE PRACTITIONER
Payer: MEDICARE

## 2024-03-27 VITALS
HEIGHT: 65 IN | BODY MASS INDEX: 29.17 KG/M2 | RESPIRATION RATE: 19 BRPM | DIASTOLIC BLOOD PRESSURE: 78 MMHG | WEIGHT: 175.06 LBS | SYSTOLIC BLOOD PRESSURE: 106 MMHG | HEART RATE: 64 BPM

## 2024-03-27 DIAGNOSIS — L89.324 PRESSURE ULCER OF LEFT BUTTOCK, STAGE 4: ICD-10-CM

## 2024-03-27 DIAGNOSIS — L89.154 PRESSURE INJURY OF SACRAL REGION, STAGE 4: ICD-10-CM

## 2024-03-27 DIAGNOSIS — G82.50 QUADRIPLEGIA, UNSPECIFIED: ICD-10-CM

## 2024-03-27 DIAGNOSIS — L89.223 PRESSURE INJURY OF LEFT THIGH, STAGE 3: ICD-10-CM

## 2024-03-27 DIAGNOSIS — L89.214 STAGE IV PRESSURE ULCER OF RIGHT HIP: Primary | ICD-10-CM

## 2024-03-27 PROCEDURE — 99213 OFFICE O/P EST LOW 20 MIN: CPT | Mod: 25

## 2024-03-27 PROCEDURE — 17250 CHEM CAUT OF GRANLTJ TISSUE: CPT

## 2024-03-27 PROCEDURE — 27000999 HC MEDICAL RECORD PHOTO DOCUMENTATION

## 2024-03-27 NOTE — PROGRESS NOTES
Home Health: [facility]   Smoker   [] Yes  [x] No  Diabetic   [] Yes   [x] No  Low air loss mattress [x] Yes [] No   Is the patient eligible for a graft, and/or currently grafting?  [] Yes [x] No       Subjective:       Patient ID: Diego Martinez Jr. is a 48 y.o. male.    Chief Complaint: Pressure Ulcer (Right hip - Stage 4 /Left ischial - Stage 4 /Sacrum - stage 4/Left medial upper thigh - Stage 3)    HPI   Mr. Martinez is a long time patient of [facility].    He comes via wheelchair with a care attendant present with him at all times.    He does have a history of quadriplegia.  He has home health who assist him with his wound care daily and care attendants to provide care at other times.  He has a very pleasant demeanor and added to an is usually jovial in spite of his challenges.  [facility]    6/7/23 - Mr. Martinez returns today for followup on several wounds.  Some have done except personally well recently which is correct it to his home health.  We are able to close to wounds today.  Wound #1, Right posterior hip, remains stable and today it is still 0.1 x 0.1 x 0.1.  We will leave this wound open 1 additional week and continue to monitor.  Wound #2, left ischial, has improved since the last visit.  There is a decrease in depth and the granular tissue is again stable.  The patient was educated that he should reminder the home health nurses to open this wound very carefully and gently to avoid ripping the tissue open again since it is finally making excellent progress.  Wound #3, Sacrum, this wound, today, we are able to close.  Wound #4, left medial upper thigh, is stable with no significant change although it is slightly larger.  We will continue to use silver alginate on all open wounds.    7/10/23 - Mr. Martinez was last seen on 6/7/23.  Today he returns with is wounds stable:  Wound #1 - Right posterior hip, stable, no change since the last visit, no S & S of infection,  no increase in drainage.  Wound #2 - Left ischial, continue to improve, with a very slight decrease in size this week.  Tissue is no longer friable.  Silver nitrate was applied, we will continue to use silver alginate.  Wound #3 - sacrum, reassessed, remains closed  Wound #4 - left medial upper thigh - this wound was debrided, wound margins are rolling and slightly macerated.  Silver nitrate was applied.  We will continue to use silver alginate.  The patient also reported today that he injured his right great toe nail.  Today it was assessed and there was a considerable amount of dried blood noted.  The nail was removed (90%) with a nipper.  We will use Betadine for one week.     7/26/23 - the patient returns today for followup on his several wound:  Wound #1 - Right posterior hip; this wound remains stable with no significant change, no S & S of infection  Wound #2 - Left ischial, significant increase in size due to overextending of wound bed - increase in width from 0.5 cm to 3.0 cm; extreme care must be used when opening this wound bed asked to not over extend in split the wound open again.  Wound #3 - Sacrum; we are continuing to monitor this wound as there was some maceration.  A callus paring was performed, and we will continue to monitor because there appears to be changes occurring.  Wound #4 - Left medial upper thigh - this wound was selectively debrided and has an increased amount macerated tissue.  We need to ensure that this dressing is being changed daily.    8/9/23 - Mr. Martinez returns today to be seen for three wounds:  Wound #1 - Right posterior hip - this wound continues to remains stable.  There is still some depth, however, there is very little drainage and no signs and symptoms of infection.  We will continue to pack this wound with silver alginate.  Wound #2 - Left ischial, this wound remains at 3.0 cm which is a significant increase over the measurement of 0.5 cm on 07/12/2023.  Increase in  size is due to the forcing open of the wound during the cleansing process.  Home health has been reminded that they should open that wound very carefully so is not to over extend the very fragile epithelial tissue.  Additionally, the orders were to use silver alginate on this wound and today mesalt was removed.  They will be reminded they should use the appropriate dressing on the appropriate wounds and me salt should not be used in this wound.  Wound #3 - Sacrum; we are continuing to monitor this area, however, there is no open wound at this time.  It will be reopened if necessary.  Wound #4 - Left medial upper thigh - this wound was selectively debrided today with removal a moderate amount of slough from the wound bed.  There is a large island of epithelial tissue in the wound bed, however, there is still also a large area of macerated tissue around the periwound.  Home health is reminded that they should be applying me salt to this wound and this wound only.  They should be covering the me salt and wound bed with silver alginate to be changed daily.    8/24/23 - The patient returns today for reassessments his 3 wounds.    Wound #1 - Right posterior hip - remains clean and stable.  There are no signs and symptoms of infection and very little drainage.  We are using silver alginate on this wound.    Wound #2 - Left ischial - 2.7 x 0.5 x 0.2 cm - wound is improved significantly over his last visit on 08/09/2023.  This provider appreciate the care that home health is taking when opening the wound to cleanse in the depth of the wound.  It is obvious that they have not been over extending the wound which is allowing it to progress as it should be progressing now.  We will continue to apply silver alginate on this wound.  Wound #3 - Sacrum - we will continue to monitor the sacrum again as it has now reopened very slightly.  Wound #4 - Left medial upper thigh - this wound remains stable with no signs and symptoms of  infection.  The large margin of macerated tissue remains.  Ideally, this could be surgically debrided, however, we may have to do this in clinic at some point with a scalpel.    9/6/23 - Mr. Martinez returns for f/up on the 3 wounds:  Wound #1 - Right posterior hip - remains clean and stable, continuing to decrease in size slowly.  There are no signs and symptoms of infection and very little drainage.  We will continue using silver alginate on this wound.    Wound #2 - Left ischial - 1.5 x 0.5 x 0.2 cm - wound continues to decrease in size with a very small remaining area.  This provider appreciates the care that home health continues to take when opening the wound to cleanse in the depth of the wound.  It is obvious that they have not been over extending the wound which is allowing it to progress as it should be progressing now.  We will continue to apply silver alginate on this wound.  Wound #3 - Sacrum - has now reclosed but will continue to monitor  Wound #4 - Left medial upper thigh - this wound remains stable with no signs and symptoms of infection.  The large margin of macerated tissue remains.  A large amount of this tissue was selectively debrided off today.  We will focus on removal of the rolled edges each week.    9/20/23 - The patient returns today for followup on several wounds:  Wound #1 - Right posterior hip - This wound was mechanically debrided.  It remains clean and stable, continuing to decrease in size slowly.  There are no signs and symptoms of infection and very little drainage.  We will continue using silver alginate on this wound.    Wound #2 - Left ischial - 1.7 x 0.5 x 0.2 cm - wound remains stable with no significant change in size this visit.  The wound has no S & S of infection.  We will continue to apply silver alginate on this wound.  Wound #3 - Sacrum - has reopened.  This wound was selectively debrided today.  We will begin application of collagen and silver alginate.  Wound #4 - Left  medial upper thigh - this wound remains stable with no signs and symptoms of infection. It was selectively debrided today and we will begin collagen and silver alginate.      10/4/23 - Mr. Martinez returns today for f/up on the following wounds:  Wound #1 - Right posterior hip - This wound was mechanically debrided.  It remains clean and stable, with no significant change.  There are no signs and symptoms of infection and very little drainage.  We will continue using silver alginate on this wound.    Wound #2 - Left ischial - 0.5 x 2.0 x 0.2 cm - wound remains stable with no significant change although it is slightly larger than last week.   The wound has no S & S of infection.  We will continue to apply silver alginate on this wound.  Wound #3 - Sacrum - remains open.  This wound was selectively debrided today.  We will continue powdered collagen and silver alginate.  Wound #4 - Left medial upper thigh - this wound remains stable with no signs and symptoms of infection. It was selectively debrided today and we will begin powdered collagen and silver alginate.     10/18/23 - Mr. Martinez is seen today for the f/up on several wounds:  Wound #1 - Right posterior hip - This wound was mechanically debrided.  It remains clean and stable, with no significant change.  There are no signs and symptoms of infection and very little drainage.  We will continue using silver alginate on this wound.    Wound #2 - Left ischial - 0.5 x 1.5 x 0.2 cm - wound remains stable with no significant change although it is slightly larger than last week.   The wound has no S & S of infection.  We will continue to apply silver alginate on this wound.  Wound #3 - Sacrum - remains open.  This wound was selectively debrided today.  We will continue powdered collagen and silver alginate.  Wound #4 - Left medial upper thigh - this wound remains stable with no signs and symptoms of infection. It was mechanically debrided today and we will begin powdered  collagen and silver alginate.     October 31st:  48-year-old black male, who is a quadriplegia, is here for follow up of his for pressure injuries.  The worse 1 at this time is the left ischial, left buttock.  The ulcers appear clean, without obvious secondary infection, foul odor, or discharge.  He has home health services almost every day.    11/14/23 - Mr. Martinez returns today for followup on several wounds.  All remains stable.  The left ischial does measures slightly larger, however, the wound bed is clean.  It was selectively debrided and there are no signs and symptoms of infection with this wound or any of his wounds at this time.  Wound #1 - Right posterior hip - This wound was mechanically debrided.  It remains clean and stable, with no significant change.  There are no signs and symptoms of infection and very little drainage.  We will continue using silver alginate on this wound.    Wound #2 - Left ischial - 0.8 x 1.0 x 0.2 cm  - wound remains stable with no significant change although it is slightly larger than last week.   The wound has no S & S of infection.  We will continue to apply silver alginate on this wound.  Wound #3 - Sacrum - remains open.  We will continue silver alginate.  Wound #4 - Left medial upper thigh -  4.0 x 2.0 x 0.3 cm- this wound has increased in size slightly, but there are no signs and symptoms of infection. It was selectively debrided today and we will continue silver alginate.     12/5/23 - Mr. Martinez is seen today for f/up on his wounds.  They all remain stable, in various stages.  Today none show S & S of infection, with no increase in drainage, redness, etc.    Wound #1 - Right posterior hip - This wound was mechanically debride.  It does continue to drain a scant amount.  We will continue using silver alginate on this wound.    Wound #2 - Left ischial - 0.5 x 1.0 x 0.2 cm  - wound remains stable with a slight decrease in size.   The wound has no S & S of infection.  We  will continue to apply silver alginate on this wound.  Wound #3 - Sacrum - remains open.  We will continue silver alginate.  Wound #4 - Left medial upper thigh -  2.7 x 1.8 x 0.3 cm - this wound has decreased in size and appears to be doing well.  There are no signs and symptoms of infection. It was selectively debrided today and we will continue silver alginate.     January 2, 2024:  48-year-old black male, with quadriplegia, is here for a multiple stage III and stage IV pressure injuries.  They are all under control at this time.  They are relatively shallow.  He has a good low air loss mattress at home.    1/22/24 - The patient returns today for f/up on several wounds.  Today many of the wounds are improving.  Wound #1 - Right posterior hip - continues to progress well and has a very small remaining opening.  We will continue to use silver alginate placed over the wound.    Wound #2 - Left ischial -0.4 x 1.0 x 0.2 cm  - wound remains appears stable with a slight decrease in size.   The wound has no S & S of infection.  We will continue to apply silver alginate on this wound.  Wound #3 - Sacrum - remains open and we will continue to monitor.  We will begin application of Endoform.    Wound #4 - Left medial upper thigh -  3.2 x 3.3 x 0.3 cm - this wound has increased in size slightly.   and appears to be doing well.  There are no signs and symptoms of infection. It was selectively debrided today and we will continue silver alginate.  He does continue to have HH assistance on a daily basis.  If the left medial upper thigh does not improve by next week we will consider ultrasonic debridement.     January 30, 2024:  48-year-old black male, with quadriplegia, is here for follow up of his multiple pressure injuries, including right hip, left ischial, sacral, left medial upper thigh, and a stage III scrotal pressure ulcer.  The wounds are all clean at this time, and need no surgical debridement today.  The measurements  are approximately the same as last visit.    2/20/24 - Mr. Martinez Is seen today for followup on several wounds.   Some of the wounds continue to progress well while others have deteriorated since he was last seen by this provider on January 22nd.  In particular, the left medial upper thigh has continued to deteriorate and today we are attempting to authorize a Theraskin  graft for that wound.  Wound #1 - Right posterior hip - Remains stable with no reported drainage, no S & S of infection.  We will continue to use silver alginate placed over the wound.    Wound #2 - Left ischial -0.8 x 0.5 x 0.2 cm  - wound continues to decrease in size.   The wound has no S & S of infection.  We will continue to apply silver alginate on this wound.  Wound #3 - Sacrum - 0.3 x 0.8 x 0.4 cm - remains open and has increased in size slightly we will continue to monitor and apply silver alginate.  Wound #4 - Left medial upper thigh - 2.5 x 3.0 x 0.9 cm - this wound has increased in size slightly. There are no signs and symptoms of infection. It was selectively debrided today and we will attempt to authorize a Theraskin graft for this wound.   The left medial upper thigh will be ultrasonically debrided at his next visit.    2/29/24 - The patient returns today for f/up on wounds.  We are attempting to authorize Theraskin grafts for the patient.  In preparation, the wounds were selectively debrided using the ultrasonic debrider.  If authorized, graft prep will be done at his next visit.  Today he was instructed to begin application of mupirocin ointment to each wound, also in preparation of the process.    3/7/24 - Mr. Martinez is seen today for f/up on several wounds.  He is approved for Theraskin grafts on wound #4, the left medial upper thigh.  Wound #1 - Right posterior hip -   The wound has deteriorated greatly over the last couple of weeks.  The wound was nearly closed two weeks ago.  Today it was ultrasonically debrided and has a depth  of 1.5 cm.  Wound #2 - Left ischial - 0.3 x 0.3 x 0.2 cm  - wound continues to decrease in size.   The wound has no S & S of infection.  We will continue to apply silver alginate on this wound.  Wound #3 - Sacrum - 0.3 x 0.8 x 0.2 cm - remains open and has decreased in depth slightly.  we will continue to monitor and apply silver alginate.  Wound #4 - Left medial upper thigh - 3.3 x 2.0 x 0.3 cm - this wound has continued to increase in size and depth.  Today it was selectively debrided using the ultrasonic debrider.  Theraskin graft #1 was applied.  Theraskin graft #2, 13.0 cm2 has been ordered.  Graft #1 applied was 6.0 cm2 which is too small for this wound.  We will increase in size.     3/14/24 - Mr. Martinez returns for f/up on his wounds.  We are currently grafting the left medial upper thigh.    Wound #1 - Right posterior hip -   The wound has deteriorated continually over the last couple of weeks.  The wound was nearly closed two weeks ago.  Today a culture was obtained to determine if there is an infective process occurring causing deterioration.    Wound #2 - Left ischial - 0.2 x 0.2 x 0.2 cm  - wound continues to decrease in size.   The wound has no S & S of infection.  We will continue to apply silver alginate on this wound.  Wound #3 - Sacrum - 0.2 x 1.0 x 0.2 cm - remains open and has increased again in depth slightly.  we will continue to monitor and apply silver alginate.  Wound #4 - Left medial upper thigh - 3.3 x 2.0 x 0.3 cm - this wound has remained the same size and depth.  At his last visit, Theraskin graft #2 (13.0 cm2) was applied.  This graft was pulled as taught as possible, and could barely be secured.  There is depth to the wound, and any attempt to apply pressure to the graft to allow contact with the wound bed would cause the graft to detach.  We must have a larger graft to achieve the wound depth.     3/21/24 - We reviewed the results from the patients culture of the right posterior hip.   It has several bacteria present in the culture.  He was prescribed Bactrim and Augmentin based on those culture results.  Because of the likelihood of contamination of other wounds, we will hold off on future grafts of the left medial upper thigh at this time.  He will begin his abx regimen and we will consider resuming grafting with the infection is cleared.  He will also apply Mupirocin ointment to the sacrum and left medial upper thigh.  Mupirocin will not be put into the right posterior hip due to the difficulty of cleaning it out.      3/27/24 - The patient was prescribed abx at his last visit.  Today he reports that after taking abx x 2 days he developed GI issues and stopped taking them.  He did begin taking them again today.  He has 8 days remaining and was strongly encouraged to follow through and complete his entire prescription.  The right hip has increased in depth.  The other wounds remains stable and/or improved.  We will continue mupirocin and silver on all wounds with the exception of the right hip, where we will use silver only.     Review of Systems   Constitutional: Negative.    Respiratory: Negative.     Cardiovascular: Negative.    Musculoskeletal:  Positive for gait problem.   Skin:  Positive for wound.        As documented in the HPI.   All other systems reviewed and are negative.        Objective:      Vitals:    03/27/24 1301   BP: 106/78   Pulse: 64   Resp: 19         Physical Exam  Vitals reviewed.   Constitutional:       Appearance: Normal appearance. He is normal weight.   HENT:      Head: Normocephalic.   Cardiovascular:      Rate and Rhythm: Normal rate.      Pulses: Normal pulses.   Pulmonary:      Effort: Pulmonary effort is normal.   Musculoskeletal:      Comments: quadraplegia   Skin:     General: Skin is warm and dry.      Comments: Multiple pressure injuries   Neurological:      General: No focal deficit present.      Mental Status: He is alert and oriented to person, place, and  time.   Psychiatric:         Mood and Affect: Mood normal.            Altered Skin Integrity 05/23/22 1337 Right posterior Hip #1 (Active)   05/23/22 1337   Altered Skin Integrity Present on Admission - Did Patient arrive to the hospital with altered skin?: yes   Side: Right   Orientation: posterior   Location: Hip   Wound Number: #1   Is this injury device related?: No   Primary Wound Type:    Description of Altered Skin Integrity:    Ankle-Brachial Index:    Pulses:    Removal Indication and Assessment:    Wound Outcome:    (Retired) Wound Length (cm):    (Retired) Wound Width (cm):    (Retired) Depth (cm):    Wound Description (Comments):    Removal Indications:    Dressing Appearance Moist drainage 03/27/24 1301   Drainage Amount Moderate 03/27/24 1301   Drainage Characteristics/Odor Serosanguineous 03/27/24 1301   Appearance Moist 03/27/24 1301   Tissue loss description Full thickness 03/27/24 1301   Periwound Area Intact;Moist 03/27/24 1301   Wound Edges Open 03/27/24 1301   Wound Length (cm) 0.5 cm 03/27/24 1301   Wound Width (cm) 0.5 cm 03/27/24 1301   Wound Depth (cm) 2.5 cm 03/27/24 1301   Wound Volume (cm^3) 0.625 cm^3 03/27/24 1301   Wound Surface Area (cm^2) 0.25 cm^2 03/27/24 1301   Care Cleansed with:;Wound cleanser 03/27/24 1301   Dressing Applied;Other (comment) 03/27/24 1301   Dressing Change Due 03/28/24 03/27/24 1301            Altered Skin Integrity 05/23/22 1353 Left Ischial tuberosity #2 (Active)   05/23/22 1353   Altered Skin Integrity Present on Admission - Did Patient arrive to the hospital with altered skin?: yes   Side: Left   Orientation:    Location: Ischial tuberosity   Wound Number: #2   Is this injury device related?: No   Primary Wound Type:    Description of Altered Skin Integrity:    Ankle-Brachial Index:    Pulses:    Removal Indication and Assessment:    Wound Outcome:    (Retired) Wound Length (cm):    (Retired) Wound Width (cm):    (Retired) Depth (cm):    Wound Description  (Comments):    Removal Indications:    Dressing Appearance Moist drainage 03/27/24 1301   Drainage Amount Moderate 03/27/24 1301   Drainage Characteristics/Odor Serosanguineous 03/27/24 1301   Appearance Moist;Red;Granulating 03/27/24 1301   Tissue loss description Full thickness 03/27/24 1301   Periwound Area Intact 03/27/24 1301   Wound Edges Open 03/27/24 1301   Wound Length (cm) 0.3 cm 03/27/24 1301   Wound Width (cm) 0.3 cm 03/27/24 1301   Wound Depth (cm) 0.2 cm 03/27/24 1301   Wound Volume (cm^3) 0.018 cm^3 03/27/24 1301   Wound Surface Area (cm^2) 0.09 cm^2 03/27/24 1301   Care Cleansed with:;Wound cleanser 03/27/24 1301   Dressing Applied;Other (comment) 03/27/24 1301   Dressing Change Due 03/28/24 03/27/24 1301            Altered Skin Integrity 01/05/23 1351 Left upper;medial Thigh #4  (Active)   01/05/23 1351   Altered Skin Integrity Present on Admission - Did Patient arrive to the hospital with altered skin?: yes   Side: Left   Orientation: upper;medial   Location: Thigh   Wound Number: #4   Is this injury device related?: No   Primary Wound Type:    Description of Altered Skin Integrity:    Ankle-Brachial Index:    Pulses:    Removal Indication and Assessment:    Wound Outcome:    (Retired) Wound Length (cm):    (Retired) Wound Width (cm):    (Retired) Depth (cm):    Wound Description (Comments):    Removal Indications:    Dressing Appearance Moist drainage 03/27/24 1301   Drainage Amount Moderate 03/27/24 1301   Drainage Characteristics/Odor Serosanguineous 03/27/24 1301   Appearance Moist;Red;Granulating 03/27/24 1301   Tissue loss description Full thickness 03/27/24 1301   Periwound Area Intact;Moist 03/27/24 1301   Wound Edges Open 03/27/24 1301   Wound Length (cm) 3.4 cm 03/27/24 1301   Wound Width (cm) 3.3 cm 03/27/24 1301   Wound Depth (cm) 0.5 cm 03/27/24 1301   Wound Volume (cm^3) 5.61 cm^3 03/27/24 1301   Wound Surface Area (cm^2) 11.22 cm^2 03/27/24 1301   Care Cleansed with:;Wound cleanser  03/27/24 1301   Dressing Applied;Other (comment) 03/27/24 1301   Dressing Change Due 03/28/24 03/27/24 1301            Altered Skin Integrity 09/20/23 1148 Sacral spine #3 (Active)   09/20/23 1148   Altered Skin Integrity Present on Admission - Did Patient arrive to the hospital with altered skin?: yes   Side:    Orientation:    Location: Sacral spine   Wound Number: #3   Is this injury device related?:    Primary Wound Type:    Description of Altered Skin Integrity:    Ankle-Brachial Index:    Pulses:    Removal Indication and Assessment:    Wound Outcome:    (Retired) Wound Length (cm):    (Retired) Wound Width (cm):    (Retired) Depth (cm):    Wound Description (Comments):    Removal Indications:    Dressing Appearance Moist drainage 03/27/24 1301   Drainage Amount Moderate 03/27/24 1301   Drainage Characteristics/Odor Serosanguineous 03/27/24 1301   Appearance Pink;Moist 03/27/24 1301   Tissue loss description Full thickness 03/27/24 1301   Periwound Area Intact;Moist 03/27/24 1301   Wound Edges Open 03/27/24 1301   Wound Length (cm) 0.3 cm 03/27/24 1301   Wound Width (cm) 0.8 cm 03/27/24 1301   Wound Depth (cm) 0.5 cm 03/27/24 1301   Wound Volume (cm^3) 0.12 cm^3 03/27/24 1301   Wound Surface Area (cm^2) 0.24 cm^2 03/27/24 1301   Care Cleansed with:;Wound cleanser 03/27/24 1301   Dressing Applied;Other (comment) 03/27/24 1301   Dressing Change Due 03/28/24 03/27/24 1301       3/27/24    Left ischium  Silver alginate, 4x5 island dressing  CT      Left upper medial thigh   ROCKY, silver alginate, 4x5 island dressing  CT      Sacrum   ROCKY, silver alginate, 4x5 island dressing  CT      Right hip   Silver alginate, 4x5 island dressing  CT      Assessment:           ICD-10-CM ICD-9-CM   1. Stage IV pressure ulcer of right hip  L89.214 707.04     707.24   2. Pressure injury of left thigh, stage 3  L89.223 707.09     707.23   3. Pressure injury of sacral region, stage 4  L89.154 707.03     707.24   4. Pressure ulcer of  left buttock, stage 4  L89.324 707.05     707.24   5. Quadriplegia, unspecified  G82.50 344.00           Procedures:     Mechanical debridement only  Silver nitrate to Left medial upper thigh    Excisional debridement performed:  [] Yes [] No   Selective debridement performed:  [] Yes [] No   Mechanical debridement performed:  [] Yes [] No   Silver nitrate applied:    [x] Yes [] No   Labs ordered this visit:   [] Yes [] No   Imaging ordered this visit:   [] Yes [] No   Tissue pathology and/or culture taken:  [] Yes [] No         MEDICATIONS    Current Outpatient Medications:     apixaban (ELIQUIS) 5 mg Tab, Take 5 mg by mouth once daily at 6am., Disp: , Rfl:     clindamycin (CLEOCIN) 300 MG capsule, Take 1 capsule (300 mg total) by mouth every 6 (six) hours. for 10 days, Disp: 40 capsule, Rfl: 0    doxycycline (VIBRA-TABS) 100 MG tablet, Take 1 tablet (100 mg total) by mouth 2 (two) times daily. for 10 days, Disp: 20 tablet, Rfl: 0    LINZESS 145 mcg Cap capsule, Take 145 mcg by mouth once daily., Disp: , Rfl:     mupirocin (BACTROBAN) 2 % ointment, 2 (two) times daily. Apply to affected area for five days, Disp: , Rfl:    Review of patient's allergies indicates:   Allergen Reactions    Meropenem Anaphylaxis    Penicillins          [facility]ALTH [facility]:  [facility]   TIMES PER WEEK/DAYS:  daily   ORDERS:  Right hip wound: Cleanse with wound cleanser, apply silver alginate, 4x4 gentle foam border dressing to be changed daily   Left ischial wound: Cleanse with wound cleanser, apply silver alginate, 6x6 gentle foam border dressing to be changed daily  Left medial upper thigh wound: Cleanse with wound cleanser, apply mupirocin ointment, silver alginate, 6x6 gentle foam border dressing to be changed daily   Sacral wound: Cleanse with wound cleanser, apply mupirocin ointment, silver alginate, 6x6 gentle foam border dressing to be changed daily       Follow up in 1 week (on 4/3/2024) for PU - stretcher .

## 2024-04-03 ENCOUNTER — HOSPITAL ENCOUNTER (OUTPATIENT)
Dept: WOUND CARE | Facility: HOSPITAL | Age: 49
Discharge: HOME | End: 2024-04-03
Attending: NURSE PRACTITIONER
Payer: MEDICARE

## 2024-04-03 VITALS
WEIGHT: 175.06 LBS | HEIGHT: 65 IN | DIASTOLIC BLOOD PRESSURE: 68 MMHG | BODY MASS INDEX: 29.17 KG/M2 | HEART RATE: 64 BPM | SYSTOLIC BLOOD PRESSURE: 112 MMHG | RESPIRATION RATE: 18 BRPM

## 2024-04-03 DIAGNOSIS — G82.50 QUADRIPLEGIA, UNSPECIFIED: ICD-10-CM

## 2024-04-03 DIAGNOSIS — L89.214 STAGE IV PRESSURE ULCER OF RIGHT HIP: Primary | ICD-10-CM

## 2024-04-03 DIAGNOSIS — L89.154 PRESSURE INJURY OF SACRAL REGION, STAGE 4: ICD-10-CM

## 2024-04-03 DIAGNOSIS — L89.223 PRESSURE INJURY OF LEFT THIGH, STAGE 3: ICD-10-CM

## 2024-04-03 DIAGNOSIS — L89.324 PRESSURE ULCER OF LEFT BUTTOCK, STAGE 4: ICD-10-CM

## 2024-04-03 PROCEDURE — 99213 OFFICE O/P EST LOW 20 MIN: CPT | Mod: 25

## 2024-04-03 PROCEDURE — 97597 DBRDMT OPN WND 1ST 20 CM/<: CPT

## 2024-04-03 PROCEDURE — 27000999 HC MEDICAL RECORD PHOTO DOCUMENTATION

## 2024-04-03 PROCEDURE — 97598 DBRDMT OPN WND ADDL 20CM/<: CPT

## 2024-04-03 NOTE — PROCEDURES
"Debridement    Date/Time: 4/3/2024 10:49 AM    Performed by: Jessie Bah NP  Authorized by: Jessie Bah NP    Time out: Immediately prior to procedure a "time out" was called to verify the correct patient, procedure, equipment, support staff and site/side marked as required.    Consent Done?:  Yes (Verbal)    Preparation: Patient was prepped and draped with clean technique    Local anesthesia used?: No      Wound Details:    Location:  Right hip    Type of Debridement:  Non-excisional       Length (cm):  0.5       Area (sq cm):  0.25       Width (cm):  0.5       Percent Debrided (%):  100       Depth (cm):  2.5       Total Area Debrided (sq cm):  0.25    Depth of debridement:  Epidermis/Dermis    Devitalized tissue debrided:  Biofilm, Fibrin and Slough    Instruments:  Ultrasound Probe (Tunnel probe)  Bleeding:  None    2nd Wound Details:     Location:  Left leg (Upper thigh)    Type of Debridement:  Non-excisional       Length (cm):  3       Area (sq cm):  6       Width (cm):  2       Percent Debrided (%):  100       Depth (cm):  0.5       Total Area Debrided (sq cm):  6    Depth of debridement:  Epidermis/Dermis    Devitalized tissue debrided:  Biofilm, Callus, Exudate, Fibrin and Slough    Instruments:  Ultrasound Probe (Portillo probe)  Bleeding:  Minimal  Hemostasis Achieved: Yes    Method Used:  Pressure  Patient tolerance:  Patient tolerated the procedure well with no immediate complications    "

## 2024-04-03 NOTE — PROGRESS NOTES
Home Health: [facility]   Smoker   [] Yes  [x] No  Diabetic   [] Yes   [x] No  Low air loss mattress [x] Yes [] No   Is the patient eligible for a graft, and/or currently grafting?  [] Yes [x] No     Subjective:       Patient ID: Diego Martinez Jr. is a 48 y.o. male.    Chief Complaint: Pressure Ulcer (Right hip - Stage 4 /Left ischial - Stage 4 /Sacrum - stage 4/Left medial upper thigh - Stage 3)    HPI   Mr. Martinez is a long time patient of [facility].    He comes via wheelchair with a care attendant present with him at all times.    He does have a history of quadriplegia.  He has home health who assist him with his wound care daily and care attendants to provide care at other times.  He has a very pleasant demeanor and added to an is usually jovial in spite of his challenges.  [facility]    8/9/23 - Mr. Martinez returns today to be seen for three wounds:  Wound #1 - Right posterior hip - this wound continues to remains stable.  There is still some depth, however, there is very little drainage and no signs and symptoms of infection.  We will continue to pack this wound with silver alginate.  Wound #2 - Left ischial, this wound remains at 3.0 cm which is a significant increase over the measurement of 0.5 cm on 07/12/2023.  Increase in size is due to the forcing open of the wound during the cleansing process.  Home health has been reminded that they should open that wound very carefully so is not to over extend the very fragile epithelial tissue.  Additionally, the orders were to use silver alginate on this wound and today mesalt was removed.  They will be reminded they should use the appropriate dressing on the appropriate wounds and me salt should not be used in this wound.  Wound #3 - Sacrum; we are continuing to monitor this area, however, there is no open wound at this time.  It will be reopened if necessary.  Wound #4 - Left medial upper thigh - this wound was selectively  debrided today with removal a moderate amount of slough from the wound bed.  There is a large island of epithelial tissue in the wound bed, however, there is still also a large area of macerated tissue around the periwound.  Home health is reminded that they should be applying me salt to this wound and this wound only.  They should be covering the me salt and wound bed with silver alginate to be changed daily.    8/24/23 - The patient returns today for reassessments his 3 wounds.    Wound #1 - Right posterior hip - remains clean and stable.  There are no signs and symptoms of infection and very little drainage.  We are using silver alginate on this wound.    Wound #2 - Left ischial - 2.7 x 0.5 x 0.2 cm - wound is improved significantly over his last visit on 08/09/2023.  This provider appreciate the care that home health is taking when opening the wound to cleanse in the depth of the wound.  It is obvious that they have not been over extending the wound which is allowing it to progress as it should be progressing now.  We will continue to apply silver alginate on this wound.  Wound #3 - Sacrum - we will continue to monitor the sacrum again as it has now reopened very slightly.  Wound #4 - Left medial upper thigh - this wound remains stable with no signs and symptoms of infection.  The large margin of macerated tissue remains.  Ideally, this could be surgically debrided, however, we may have to do this in clinic at some point with a scalpel.    9/6/23 - Mr. Martinez returns for f/up on the 3 wounds:  Wound #1 - Right posterior hip - remains clean and stable, continuing to decrease in size slowly.  There are no signs and symptoms of infection and very little drainage.  We will continue using silver alginate on this wound.    Wound #2 - Left ischial - 1.5 x 0.5 x 0.2 cm - wound continues to decrease in size with a very small remaining area.  This provider appreciates the care that home health continues to take when  opening the wound to cleanse in the depth of the wound.  It is obvious that they have not been over extending the wound which is allowing it to progress as it should be progressing now.  We will continue to apply silver alginate on this wound.  Wound #3 - Sacrum - has now reclosed but will continue to monitor  Wound #4 - Left medial upper thigh - this wound remains stable with no signs and symptoms of infection.  The large margin of macerated tissue remains.  A large amount of this tissue was selectively debrided off today.  We will focus on removal of the rolled edges each week.    9/20/23 - The patient returns today for followup on several wounds:  Wound #1 - Right posterior hip - This wound was mechanically debrided.  It remains clean and stable, continuing to decrease in size slowly.  There are no signs and symptoms of infection and very little drainage.  We will continue using silver alginate on this wound.    Wound #2 - Left ischial - 1.7 x 0.5 x 0.2 cm - wound remains stable with no significant change in size this visit.  The wound has no S & S of infection.  We will continue to apply silver alginate on this wound.  Wound #3 - Sacrum - has reopened.  This wound was selectively debrided today.  We will begin application of collagen and silver alginate.  Wound #4 - Left medial upper thigh - this wound remains stable with no signs and symptoms of infection. It was selectively debrided today and we will begin collagen and silver alginate.      10/4/23 - Mr. Martinez returns today for f/up on the following wounds:  Wound #1 - Right posterior hip - This wound was mechanically debrided.  It remains clean and stable, with no significant change.  There are no signs and symptoms of infection and very little drainage.  We will continue using silver alginate on this wound.    Wound #2 - Left ischial - 0.5 x 2.0 x 0.2 cm - wound remains stable with no significant change although it is slightly larger than last week.   The  wound has no S & S of infection.  We will continue to apply silver alginate on this wound.  Wound #3 - Sacrum - remains open.  This wound was selectively debrided today.  We will continue powdered collagen and silver alginate.  Wound #4 - Left medial upper thigh - this wound remains stable with no signs and symptoms of infection. It was selectively debrided today and we will begin powdered collagen and silver alginate.     10/18/23 - Mr. Martinez is seen today for the f/up on several wounds:  Wound #1 - Right posterior hip - This wound was mechanically debrided.  It remains clean and stable, with no significant change.  There are no signs and symptoms of infection and very little drainage.  We will continue using silver alginate on this wound.    Wound #2 - Left ischial - 0.5 x 1.5 x 0.2 cm - wound remains stable with no significant change although it is slightly larger than last week.   The wound has no S & S of infection.  We will continue to apply silver alginate on this wound.  Wound #3 - Sacrum - remains open.  This wound was selectively debrided today.  We will continue powdered collagen and silver alginate.  Wound #4 - Left medial upper thigh - this wound remains stable with no signs and symptoms of infection. It was mechanically debrided today and we will begin powdered collagen and silver alginate.     October 31st:  48-year-old black male, who is a quadriplegia, is here for follow up of his for pressure injuries.  The worse 1 at this time is the left ischial, left buttock.  The ulcers appear clean, without obvious secondary infection, foul odor, or discharge.  He has home health services almost every day.    11/14/23 - Mr. Martinez returns today for followup on several wounds.  All remains stable.  The left ischial does measures slightly larger, however, the wound bed is clean.  It was selectively debrided and there are no signs and symptoms of infection with this wound or any of his wounds at this  time.  Wound #1 - Right posterior hip - This wound was mechanically debrided.  It remains clean and stable, with no significant change.  There are no signs and symptoms of infection and very little drainage.  We will continue using silver alginate on this wound.    Wound #2 - Left ischial - 0.8 x 1.0 x 0.2 cm  - wound remains stable with no significant change although it is slightly larger than last week.   The wound has no S & S of infection.  We will continue to apply silver alginate on this wound.  Wound #3 - Sacrum - remains open.  We will continue silver alginate.  Wound #4 - Left medial upper thigh -  4.0 x 2.0 x 0.3 cm- this wound has increased in size slightly, but there are no signs and symptoms of infection. It was selectively debrided today and we will continue silver alginate.     12/5/23 - Mr. Martinez is seen today for f/up on his wounds.  They all remain stable, in various stages.  Today none show S & S of infection, with no increase in drainage, redness, etc.    Wound #1 - Right posterior hip - This wound was mechanically debride.  It does continue to drain a scant amount.  We will continue using silver alginate on this wound.    Wound #2 - Left ischial - 0.5 x 1.0 x 0.2 cm  - wound remains stable with a slight decrease in size.   The wound has no S & S of infection.  We will continue to apply silver alginate on this wound.  Wound #3 - Sacrum - remains open.  We will continue silver alginate.  Wound #4 - Left medial upper thigh -  2.7 x 1.8 x 0.3 cm - this wound has decreased in size and appears to be doing well.  There are no signs and symptoms of infection. It was selectively debrided today and we will continue silver alginate.     January 2, 2024:  48-year-old black male, with quadriplegia, is here for a multiple stage III and stage IV pressure injuries.  They are all under control at this time.  They are relatively shallow.  He has a good low air loss mattress at home.    1/22/24 - The patient  returns today for f/up on several wounds.  Today many of the wounds are improving.  Wound #1 - Right posterior hip - continues to progress well and has a very small remaining opening.  We will continue to use silver alginate placed over the wound.    Wound #2 - Left ischial -0.4 x 1.0 x 0.2 cm  - wound remains appears stable with a slight decrease in size.   The wound has no S & S of infection.  We will continue to apply silver alginate on this wound.  Wound #3 - Sacrum - remains open and we will continue to monitor.  We will begin application of Endoform.    Wound #4 - Left medial upper thigh -  3.2 x 3.3 x 0.3 cm - this wound has increased in size slightly.   and appears to be doing well.  There are no signs and symptoms of infection. It was selectively debrided today and we will continue silver alginate.  He does continue to have HH assistance on a daily basis.  If the left medial upper thigh does not improve by next week we will consider ultrasonic debridement.     January 30, 2024:  48-year-old black male, with quadriplegia, is here for follow up of his multiple pressure injuries, including right hip, left ischial, sacral, left medial upper thigh, and a stage III scrotal pressure ulcer.  The wounds are all clean at this time, and need no surgical debridement today.  The measurements are approximately the same as last visit.    2/20/24 - Mr. Martinez Is seen today for followup on several wounds.   Some of the wounds continue to progress well while others have deteriorated since he was last seen by this provider on January 22nd.  In particular, the left medial upper thigh has continued to deteriorate and today we are attempting to authorize a Theraskin  graft for that wound.  Wound #1 - Right posterior hip - Remains stable with no reported drainage, no S & S of infection.  We will continue to use silver alginate placed over the wound.    Wound #2 - Left ischial -0.8 x 0.5 x 0.2 cm  - wound continues to decrease in  size.   The wound has no S & S of infection.  We will continue to apply silver alginate on this wound.  Wound #3 - Sacrum - 0.3 x 0.8 x 0.4 cm - remains open and has increased in size slightly we will continue to monitor and apply silver alginate.  Wound #4 - Left medial upper thigh - 2.5 x 3.0 x 0.9 cm - this wound has increased in size slightly. There are no signs and symptoms of infection. It was selectively debrided today and we will attempt to authorize a Theraskin graft for this wound.   The left medial upper thigh will be ultrasonically debrided at his next visit.    2/29/24 - The patient returns today for f/up on wounds.  We are attempting to authorize Theraskin grafts for the patient.  In preparation, the wounds were selectively debrided using the ultrasonic debrider.  If authorized, graft prep will be done at his next visit.  Today he was instructed to begin application of mupirocin ointment to each wound, also in preparation of the process.    3/7/24 - Mr. Martinez is seen today for f/up on several wounds.  He is approved for Theraskin grafts on wound #4, the left medial upper thigh.  Wound #1 - Right posterior hip -   The wound has deteriorated greatly over the last couple of weeks.  The wound was nearly closed two weeks ago.  Today it was ultrasonically debrided and has a depth of 1.5 cm.  Wound #2 - Left ischial - 0.3 x 0.3 x 0.2 cm  - wound continues to decrease in size.   The wound has no S & S of infection.  We will continue to apply silver alginate on this wound.  Wound #3 - Sacrum - 0.3 x 0.8 x 0.2 cm - remains open and has decreased in depth slightly.  we will continue to monitor and apply silver alginate.  Wound #4 - Left medial upper thigh - 3.3 x 2.0 x 0.3 cm - this wound has continued to increase in size and depth.  Today it was selectively debrided using the ultrasonic debrider.  Theraskin graft #1 was applied.  Theraskin graft #2, 13.0 cm2 has been ordered.  Graft #1 applied was 6.0 cm2 which  is too small for this wound.  We will increase in size.     3/14/24 - Mr. Martinez returns for f/up on his wounds.  We are currently grafting the left medial upper thigh.    Wound #1 - Right posterior hip -   The wound has deteriorated continually over the last couple of weeks.  The wound was nearly closed two weeks ago.  Today a culture was obtained to determine if there is an infective process occurring causing deterioration.    Wound #2 - Left ischial - 0.2 x 0.2 x 0.2 cm  - wound continues to decrease in size.   The wound has no S & S of infection.  We will continue to apply silver alginate on this wound.  Wound #3 - Sacrum - 0.2 x 1.0 x 0.2 cm - remains open and has increased again in depth slightly.  we will continue to monitor and apply silver alginate.  Wound #4 - Left medial upper thigh - 3.3 x 2.0 x 0.3 cm - this wound has remained the same size and depth.  At his last visit, Theraskin graft #2 (13.0 cm2) was applied.  This graft was pulled as taught as possible, and could barely be secured.  There is depth to the wound, and any attempt to apply pressure to the graft to allow contact with the wound bed would cause the graft to detach.  We must have a larger graft to achieve the wound depth.     3/21/24 - We reviewed the results from the patients culture of the right posterior hip.  It has several bacteria present in the culture.  He was prescribed Bactrim and Augmentin based on those culture results.  Because of the likelihood of contamination of other wounds, we will hold off on future grafts of the left medial upper thigh at this time.  He will begin his abx regimen and we will consider resuming grafting with the infection is cleared.  He will also apply Mupirocin ointment to the sacrum and left medial upper thigh.  Mupirocin will not be put into the right posterior hip due to the difficulty of cleaning it out.      3/27/24 - The patient was prescribed abx at his last visit.  Today he reports that after  taking abx x 2 days he developed GI issues and stopped taking them.  He did begin taking them again today.  He has 8 days remaining and was strongly encouraged to follow through and complete his entire prescription.  The right hip has increased in depth.  The other wounds remains stable and/or improved.  We will continue mupirocin and silver on all wounds with the exception of the right hip, where we will use silver only.     4/3/24 -   Wound #1 - Right posterior hip -   This wound was cultured on 3/14/24.  He has completed both Clindamycin and Doxycycline.  Today it was ultrasonically debrided.  We will continue to monitor and consider additional abx if no improvement shortly.   Wound #2 - Left ischial - Disappointingly this wound had healed, but today there was another large opening, likely due to positioning and/or cleaning.   Wound #3 - Sacrum - Continues to remain stable.   Wound #4 - Left medial upper thigh - 3.3 x 2.0 x 2.0 cm - this wound is no longer being grafted due to bacteria, inability to keep the graft in place, and inability to keep feeces out of wound.  Again today there was feeces in the wound.  It was cleansed using the ultrasonic debrider.     Review of Systems   Constitutional: Negative.    Respiratory: Negative.     Cardiovascular: Negative.    Musculoskeletal:  Positive for gait problem.   Skin:  Positive for wound.        As documented in the HPI.   All other systems reviewed and are negative.        Objective:      Vitals:    04/03/24 1122   BP: 112/68   Pulse: 64   Resp: 18     Physical Exam  Vitals reviewed.   Constitutional:       Appearance: Normal appearance. He is normal weight.   HENT:      Head: Normocephalic.   Cardiovascular:      Rate and Rhythm: Normal rate.      Pulses: Normal pulses.   Pulmonary:      Effort: Pulmonary effort is normal.   Musculoskeletal:      Comments: quadraplegia   Skin:     General: Skin is warm and dry.      Comments: Multiple pressure injuries    Neurological:      General: No focal deficit present.      Mental Status: He is alert and oriented to person, place, and time.   Psychiatric:         Mood and Affect: Mood normal.            Altered Skin Integrity 05/23/22 1337 Right posterior Hip #1 (Active)   05/23/22 1337   Altered Skin Integrity Present on Admission - Did Patient arrive to the hospital with altered skin?: yes   Side: Right   Orientation: posterior   Location: Hip   Wound Number: #1   Is this injury device related?: No   Primary Wound Type:    Description of Altered Skin Integrity:    Ankle-Brachial Index:    Pulses:    Removal Indication and Assessment:    Wound Outcome:    (Retired) Wound Length (cm):    (Retired) Wound Width (cm):    (Retired) Depth (cm):    Wound Description (Comments):    Removal Indications:    Dressing Appearance Moist drainage 04/03/24 1124   Drainage Amount Moderate 04/03/24 1124   Drainage Characteristics/Odor Purulent;Green 04/03/24 1124   Appearance Moist;Slough 04/03/24 1124   Tissue loss description Full thickness 04/03/24 1124   Periwound Area Intact 04/03/24 1124   Wound Edges Open 04/03/24 1124   Wound Length (cm) 0.5 cm 04/03/24 1124   Wound Width (cm) 0.5 cm 04/03/24 1124   Wound Depth (cm) 2.5 cm 04/03/24 1124   Wound Volume (cm^3) 0.625 cm^3 04/03/24 1124   Wound Surface Area (cm^2) 0.25 cm^2 04/03/24 1124   Care Cleansed with:;Wound cleanser 04/03/24 1124   Dressing Applied;Other (comment) 04/03/24 1124   Dressing Change Due 04/04/24 04/03/24 1124            Altered Skin Integrity 05/23/22 1353 Left Ischial tuberosity #2 (Active)   05/23/22 1353   Altered Skin Integrity Present on Admission - Did Patient arrive to the hospital with altered skin?: yes   Side: Left   Orientation:    Location: Ischial tuberosity   Wound Number: #2   Is this injury device related?: No   Primary Wound Type:    Description of Altered Skin Integrity:    Ankle-Brachial Index:    Pulses:    Removal Indication and Assessment:     Wound Outcome:    (Retired) Wound Length (cm):    (Retired) Wound Width (cm):    (Retired) Depth (cm):    Wound Description (Comments):    Removal Indications:    Dressing Appearance Moist drainage 04/03/24 1124   Drainage Amount Moderate 04/03/24 1124   Drainage Characteristics/Odor Serosanguineous 04/03/24 1124   Appearance Moist;Pink 04/03/24 1124   Tissue loss description Full thickness 04/03/24 1124   Periwound Area Intact 04/03/24 1124   Wound Edges Open 04/03/24 1124   Wound Length (cm) 0.4 cm 04/03/24 1124   Wound Width (cm) 1.5 cm 04/03/24 1124   Wound Depth (cm) 0.2 cm 04/03/24 1124   Wound Volume (cm^3) 0.12 cm^3 04/03/24 1124   Wound Surface Area (cm^2) 0.6 cm^2 04/03/24 1124   Care Cleansed with:;Wound cleanser 04/03/24 1124   Dressing Applied;Other (comment) 04/03/24 1124   Dressing Change Due 04/04/24 04/03/24 1124            Altered Skin Integrity 01/05/23 1351 Left upper;medial Thigh #4  (Active)   01/05/23 1351   Altered Skin Integrity Present on Admission - Did Patient arrive to the hospital with altered skin?: yes   Side: Left   Orientation: upper;medial   Location: Thigh   Wound Number: #4   Is this injury device related?: No   Primary Wound Type:    Description of Altered Skin Integrity:    Ankle-Brachial Index:    Pulses:    Removal Indication and Assessment:    Wound Outcome:    (Retired) Wound Length (cm):    (Retired) Wound Width (cm):    (Retired) Depth (cm):    Wound Description (Comments):    Removal Indications:    Dressing Appearance Moist drainage 04/03/24 1124   Drainage Amount Moderate 04/03/24 1124   Drainage Characteristics/Odor Serosanguineous 04/03/24 1124   Appearance Moist;Red;Granulating 04/03/24 1124   Tissue loss description Full thickness 04/03/24 1124   Periwound Area Intact 04/03/24 1124   Wound Edges Open 04/03/24 1124   Wound Length (cm) 3 cm 04/03/24 1124   Wound Width (cm) 2 cm 04/03/24 1124   Wound Depth (cm) 0.5 cm 04/03/24 1124   Wound Volume (cm^3) 3 cm^3  04/03/24 1124   Wound Surface Area (cm^2) 6 cm^2 04/03/24 1124   Care Cleansed with:;Wound cleanser 04/03/24 1124   Dressing Applied;Other (comment) 04/03/24 1124   Dressing Change Due 04/04/24 04/03/24 1124            Altered Skin Integrity 09/20/23 1148 Sacral spine #3 (Active)   09/20/23 1148   Altered Skin Integrity Present on Admission - Did Patient arrive to the hospital with altered skin?: yes   Side:    Orientation:    Location: Sacral spine   Wound Number: #3   Is this injury device related?:    Primary Wound Type:    Description of Altered Skin Integrity:    Ankle-Brachial Index:    Pulses:    Removal Indication and Assessment:    Wound Outcome:    (Retired) Wound Length (cm):    (Retired) Wound Width (cm):    (Retired) Depth (cm):    Wound Description (Comments):    Removal Indications:    Dressing Appearance Moist drainage 04/03/24 1124   Drainage Amount Moderate 04/03/24 1124   Drainage Characteristics/Odor Serosanguineous 04/03/24 1124   Appearance Moist;Pink 04/03/24 1124   Tissue loss description Full thickness 04/03/24 1124   Periwound Area Intact 04/03/24 1124   Wound Edges Open 04/03/24 1124   Wound Length (cm) 0.2 cm 04/03/24 1124   Wound Width (cm) 0.5 cm 04/03/24 1124   Wound Depth (cm) 0.5 cm 04/03/24 1124   Wound Volume (cm^3) 0.05 cm^3 04/03/24 1124   Wound Surface Area (cm^2) 0.1 cm^2 04/03/24 1124   Care Cleansed with:;Wound cleanser 04/03/24 1124   Dressing Applied;Other (comment) 04/03/24 1124   Dressing Change Due 04/04/24 04/03/24 1124       4/3/24      Right hip - pre isidra.                                      Right hip - post isidra.       NO ISIDRA.   Sacrum       Left ischium - pre isidra.                                Left ischium  no isidra.      Left upper medial thigh - pre isidra.                Left upper medial thigh - post isidra.   (ROCKY, silver alginate, 4x4 gauze, gentle border)   ML  Assessment:           ICD-10-CM ICD-9-CM   1. Stage IV pressure ulcer of right hip  L89.214 707.04      "707.24   2. Pressure injury of left thigh, stage 3  L89.223 707.09     707.23   3. Pressure injury of sacral region, stage 4  L89.154 707.03     707.24   4. Pressure ulcer of left buttock, stage 4  L89.324 707.05     707.24   5. Quadriplegia, unspecified  G82.50 344.00           Procedures:     Debridement     Date/Time: 4/3/2024 10:49 AM     Performed by: Jessie Bah NP  Authorized by: Jessie Bah NP    Time out: Immediately prior to procedure a "time out" was called to verify the correct patient, procedure, equipment, support staff and site/side marked as required.     Consent Done?:  Yes (Verbal)     Preparation: Patient was prepped and draped with clean technique    Local anesthesia used?: No       Wound Details:    Location:  Right hip    Type of Debridement:  Non-excisional       Length (cm):  0.5       Area (sq cm):  0.25       Width (cm):  0.5       Percent Debrided (%):  100       Depth (cm):  2.5       Total Area Debrided (sq cm):  0.25    Depth of debridement:  Epidermis/Dermis    Devitalized tissue debrided:  Biofilm, Fibrin and Slough    Instruments:  Ultrasound Probe (Tunnel probe)  Bleeding:  None     2nd Wound Details:     Location:  Left leg (Upper thigh)    Type of Debridement:  Non-excisional       Length (cm):  3       Area (sq cm):  6       Width (cm):  2       Percent Debrided (%):  100       Depth (cm):  0.5       Total Area Debrided (sq cm):  6    Depth of debridement:  Epidermis/Dermis    Devitalized tissue debrided:  Biofilm, Callus, Exudate, Fibrin and Slough    Instruments:  Ultrasound Probe (Portillo probe)  Bleeding:  Minimal  Hemostasis Achieved: Yes    Method Used:  Pressure  Patient tolerance:  Patient tolerated the procedure well with no immediate complications          Excisional debridement performed:  [] Yes [] No   Selective debridement performed:  [x] Yes [] No   Mechanical debridement performed:  [] Yes [] No   Silver nitrate applied:    [] Yes [] No   Labs ordered " this visit:   [] Yes [] No   Imaging ordered this visit:   [] Yes [] No   Tissue pathology and/or culture taken:  [] Yes [] No         MEDICATIONS    Current Outpatient Medications:     apixaban (ELIQUIS) 5 mg Tab, Take 5 mg by mouth once daily at 6am., Disp: , Rfl:     LINZESS 145 mcg Cap capsule, Take 145 mcg by mouth once daily., Disp: , Rfl:     mupirocin (BACTROBAN) 2 % ointment, 2 (two) times daily. Apply to affected area for five days, Disp: , Rfl:    Review of patient's allergies indicates:   Allergen Reactions    Meropenem Anaphylaxis    Penicillins          [facility]:  [facility]   TIMES PER WEEK/DAYS:  daily   ORDERS:  Right hip wound: Cleanse with wound cleanser, apply silver alginate, 4x4 gentle foam border dressing to be changed daily   Left ischial wound: Cleanse with wound cleanser, apply mupirocin ointment, silver alginate, 6x6 gentle foam border dressing to be changed daily   Left medial upper thigh wound: Cleanse with wound cleanser, apply mupirocin ointment, silver alginate, 6x6 gentle foam border dressing to be changed daily   Sacral wound: Cleanse with wound cleanser, apply mupirocin ointment, silver alginate, 6x6 gentle foam border dressing to be changed daily       Follow up in 1 week (on 4/10/2024) for multiple pressure wounds, Provider Visit.

## 2024-04-17 ENCOUNTER — HOSPITAL ENCOUNTER (OUTPATIENT)
Dept: WOUND CARE | Facility: HOSPITAL | Age: 49
Discharge: HOME | End: 2024-04-17
Attending: NURSE PRACTITIONER
Payer: MEDICARE

## 2024-04-17 VITALS
DIASTOLIC BLOOD PRESSURE: 56 MMHG | SYSTOLIC BLOOD PRESSURE: 104 MMHG | WEIGHT: 175.06 LBS | HEIGHT: 65 IN | HEART RATE: 75 BPM | RESPIRATION RATE: 18 BRPM | BODY MASS INDEX: 29.17 KG/M2

## 2024-04-17 DIAGNOSIS — L89.214 STAGE IV PRESSURE ULCER OF RIGHT HIP: Primary | ICD-10-CM

## 2024-04-17 DIAGNOSIS — L89.324 PRESSURE ULCER OF LEFT BUTTOCK, STAGE 4: ICD-10-CM

## 2024-04-17 DIAGNOSIS — L89.154 PRESSURE INJURY OF SACRAL REGION, STAGE 4: ICD-10-CM

## 2024-04-17 DIAGNOSIS — L89.223 PRESSURE INJURY OF LEFT THIGH, STAGE 3: ICD-10-CM

## 2024-04-17 DIAGNOSIS — G82.50 QUADRIPLEGIA, UNSPECIFIED: ICD-10-CM

## 2024-04-17 PROCEDURE — 99213 OFFICE O/P EST LOW 20 MIN: CPT

## 2024-04-17 PROCEDURE — 27000999 HC MEDICAL RECORD PHOTO DOCUMENTATION

## 2024-04-17 NOTE — PROGRESS NOTES
Home Health: [facility]   Smoker   [] Yes  [x] No  Diabetic   [] Yes   [x] No  Low air loss mattress [x] Yes [] No   Is the patient eligible for a graft, and/or currently grafting?  [] Yes [x] No       Subjective:       Patient ID: Diego Martinez Jr. is a 48 y.o. male.    Chief Complaint: Pressure Ulcer ((Right hip - Stage 4 /Left ischial - Stage 4 /Sacrum - stage 4/Left medial upper thigh - Stage 3))    HPI   Mr. Martinez is a long time patient of [facility].    He comes via wheelchair with a care attendant present with him at all times.    He does have a history of quadriplegia.  He has home health who assist him with his wound care daily and care attendants to provide care at other times.  He has a very pleasant demeanor and added to an is usually jovial in spite of his challenges.  He now has Gemmyo.    8/9/23 - Mr. Martinez returns today to be seen for three wounds:  Wound #1 - Right posterior hip - this wound continues to remains stable.  There is still some depth, however, there is very little drainage and no signs and symptoms of infection.  We will continue to pack this wound with silver alginate.  Wound #2 - Left ischial, this wound remains at 3.0 cm which is a significant increase over the measurement of 0.5 cm on 07/12/2023.  Increase in size is due to the forcing open of the wound during the cleansing process.  Home health has been reminded that they should open that wound very carefully so is not to over extend the very fragile epithelial tissue.  Additionally, the orders were to use silver alginate on this wound and today mesalt was removed.  They will be reminded they should use the appropriate dressing on the appropriate wounds and me salt should not be used in this wound.  Wound #3 - Sacrum; we are continuing to monitor this area, however, there is no open wound at this time.  It will be reopened if necessary.  Wound #4 - Left medial upper thigh - this wound was  selectively debrided today with removal a moderate amount of slough from the wound bed.  There is a large island of epithelial tissue in the wound bed, however, there is still also a large area of macerated tissue around the periwound.  Home health is reminded that they should be applying me salt to this wound and this wound only.  They should be covering the me salt and wound bed with silver alginate to be changed daily.    8/24/23 - The patient returns today for reassessments his 3 wounds.    Wound #1 - Right posterior hip - remains clean and stable.  There are no signs and symptoms of infection and very little drainage.  We are using silver alginate on this wound.    Wound #2 - Left ischial - 2.7 x 0.5 x 0.2 cm - wound is improved significantly over his last visit on 08/09/2023.  This provider appreciate the care that home health is taking when opening the wound to cleanse in the depth of the wound.  It is obvious that they have not been over extending the wound which is allowing it to progress as it should be progressing now.  We will continue to apply silver alginate on this wound.  Wound #3 - Sacrum - we will continue to monitor the sacrum again as it has now reopened very slightly.  Wound #4 - Left medial upper thigh - this wound remains stable with no signs and symptoms of infection.  The large margin of macerated tissue remains.  Ideally, this could be surgically debrided, however, we may have to do this in clinic at some point with a scalpel.    9/6/23 - Mr. Martinez returns for f/up on the 3 wounds:  Wound #1 - Right posterior hip - remains clean and stable, continuing to decrease in size slowly.  There are no signs and symptoms of infection and very little drainage.  We will continue using silver alginate on this wound.    Wound #2 - Left ischial - 1.5 x 0.5 x 0.2 cm - wound continues to decrease in size with a very small remaining area.  This provider appreciates the care that home health continues to  take when opening the wound to cleanse in the depth of the wound.  It is obvious that they have not been over extending the wound which is allowing it to progress as it should be progressing now.  We will continue to apply silver alginate on this wound.  Wound #3 - Sacrum - has now reclosed but will continue to monitor  Wound #4 - Left medial upper thigh - this wound remains stable with no signs and symptoms of infection.  The large margin of macerated tissue remains.  A large amount of this tissue was selectively debrided off today.  We will focus on removal of the rolled edges each week.    9/20/23 - The patient returns today for followup on several wounds:  Wound #1 - Right posterior hip - This wound was mechanically debrided.  It remains clean and stable, continuing to decrease in size slowly.  There are no signs and symptoms of infection and very little drainage.  We will continue using silver alginate on this wound.    Wound #2 - Left ischial - 1.7 x 0.5 x 0.2 cm - wound remains stable with no significant change in size this visit.  The wound has no S & S of infection.  We will continue to apply silver alginate on this wound.  Wound #3 - Sacrum - has reopened.  This wound was selectively debrided today.  We will begin application of collagen and silver alginate.  Wound #4 - Left medial upper thigh - this wound remains stable with no signs and symptoms of infection. It was selectively debrided today and we will begin collagen and silver alginate.      10/4/23 - Mr. Martinez returns today for f/up on the following wounds:  Wound #1 - Right posterior hip - This wound was mechanically debrided.  It remains clean and stable, with no significant change.  There are no signs and symptoms of infection and very little drainage.  We will continue using silver alginate on this wound.    Wound #2 - Left ischial - 0.5 x 2.0 x 0.2 cm - wound remains stable with no significant change although it is slightly larger than last  week.   The wound has no S & S of infection.  We will continue to apply silver alginate on this wound.  Wound #3 - Sacrum - remains open.  This wound was selectively debrided today.  We will continue powdered collagen and silver alginate.  Wound #4 - Left medial upper thigh - this wound remains stable with no signs and symptoms of infection. It was selectively debrided today and we will begin powdered collagen and silver alginate.     10/18/23 - Mr. Martinez is seen today for the f/up on several wounds:  Wound #1 - Right posterior hip - This wound was mechanically debrided.  It remains clean and stable, with no significant change.  There are no signs and symptoms of infection and very little drainage.  We will continue using silver alginate on this wound.    Wound #2 - Left ischial - 0.5 x 1.5 x 0.2 cm - wound remains stable with no significant change although it is slightly larger than last week.   The wound has no S & S of infection.  We will continue to apply silver alginate on this wound.  Wound #3 - Sacrum - remains open.  This wound was selectively debrided today.  We will continue powdered collagen and silver alginate.  Wound #4 - Left medial upper thigh - this wound remains stable with no signs and symptoms of infection. It was mechanically debrided today and we will begin powdered collagen and silver alginate.     October 31st:  48-year-old black male, who is a quadriplegia, is here for follow up of his for pressure injuries.  The worse 1 at this time is the left ischial, left buttock.  The ulcers appear clean, without obvious secondary infection, foul odor, or discharge.  He has home health services almost every day.    11/14/23 - Mr. Martinez returns today for followup on several wounds.  All remains stable.  The left ischial does measures slightly larger, however, the wound bed is clean.  It was selectively debrided and there are no signs and symptoms of infection with this wound or any of his wounds at this  time.  Wound #1 - Right posterior hip - This wound was mechanically debrided.  It remains clean and stable, with no significant change.  There are no signs and symptoms of infection and very little drainage.  We will continue using silver alginate on this wound.    Wound #2 - Left ischial - 0.8 x 1.0 x 0.2 cm  - wound remains stable with no significant change although it is slightly larger than last week.   The wound has no S & S of infection.  We will continue to apply silver alginate on this wound.  Wound #3 - Sacrum - remains open.  We will continue silver alginate.  Wound #4 - Left medial upper thigh -  4.0 x 2.0 x 0.3 cm- this wound has increased in size slightly, but there are no signs and symptoms of infection. It was selectively debrided today and we will continue silver alginate.     12/5/23 - Mr. Martinez is seen today for f/up on his wounds.  They all remain stable, in various stages.  Today none show S & S of infection, with no increase in drainage, redness, etc.    Wound #1 - Right posterior hip - This wound was mechanically debride.  It does continue to drain a scant amount.  We will continue using silver alginate on this wound.    Wound #2 - Left ischial - 0.5 x 1.0 x 0.2 cm  - wound remains stable with a slight decrease in size.   The wound has no S & S of infection.  We will continue to apply silver alginate on this wound.  Wound #3 - Sacrum - remains open.  We will continue silver alginate.  Wound #4 - Left medial upper thigh -  2.7 x 1.8 x 0.3 cm - this wound has decreased in size and appears to be doing well.  There are no signs and symptoms of infection. It was selectively debrided today and we will continue silver alginate.     January 2, 2024:  48-year-old black male, with quadriplegia, is here for a multiple stage III and stage IV pressure injuries.  They are all under control at this time.  They are relatively shallow.  He has a good low air loss mattress at home.    1/22/24 - The patient  returns today for f/up on several wounds.  Today many of the wounds are improving.  Wound #1 - Right posterior hip - continues to progress well and has a very small remaining opening.  We will continue to use silver alginate placed over the wound.    Wound #2 - Left ischial -0.4 x 1.0 x 0.2 cm  - wound remains appears stable with a slight decrease in size.   The wound has no S & S of infection.  We will continue to apply silver alginate on this wound.  Wound #3 - Sacrum - remains open and we will continue to monitor.  We will begin application of Endoform.    Wound #4 - Left medial upper thigh -  3.2 x 3.3 x 0.3 cm - this wound has increased in size slightly.   and appears to be doing well.  There are no signs and symptoms of infection. It was selectively debrided today and we will continue silver alginate.  He does continue to have HH assistance on a daily basis.  If the left medial upper thigh does not improve by next week we will consider ultrasonic debridement.     January 30, 2024:  48-year-old black male, with quadriplegia, is here for follow up of his multiple pressure injuries, including right hip, left ischial, sacral, left medial upper thigh, and a stage III scrotal pressure ulcer.  The wounds are all clean at this time, and need no surgical debridement today.  The measurements are approximately the same as last visit.    2/20/24 - Mr. Martinez Is seen today for followup on several wounds.   Some of the wounds continue to progress well while others have deteriorated since he was last seen by this provider on January 22nd.  In particular, the left medial upper thigh has continued to deteriorate and today we are attempting to authorize a Theraskin  graft for that wound.  Wound #1 - Right posterior hip - Remains stable with no reported drainage, no S & S of infection.  We will continue to use silver alginate placed over the wound.    Wound #2 - Left ischial -0.8 x 0.5 x 0.2 cm  - wound continues to decrease in  size.   The wound has no S & S of infection.  We will continue to apply silver alginate on this wound.  Wound #3 - Sacrum - 0.3 x 0.8 x 0.4 cm - remains open and has increased in size slightly we will continue to monitor and apply silver alginate.  Wound #4 - Left medial upper thigh - 2.5 x 3.0 x 0.9 cm - this wound has increased in size slightly. There are no signs and symptoms of infection. It was selectively debrided today and we will attempt to authorize a Theraskin graft for this wound.   The left medial upper thigh will be ultrasonically debrided at his next visit.    2/29/24 - The patient returns today for f/up on wounds.  We are attempting to authorize Theraskin grafts for the patient.  In preparation, the wounds were selectively debrided using the ultrasonic debrider.  If authorized, graft prep will be done at his next visit.  Today he was instructed to begin application of mupirocin ointment to each wound, also in preparation of the process.    3/7/24 - Mr. Martinez is seen today for f/up on several wounds.  He is approved for Theraskin grafts on wound #4, the left medial upper thigh.  Wound #1 - Right posterior hip -   The wound has deteriorated greatly over the last couple of weeks.  The wound was nearly closed two weeks ago.  Today it was ultrasonically debrided and has a depth of 1.5 cm.  Wound #2 - Left ischial - 0.3 x 0.3 x 0.2 cm  - wound continues to decrease in size.   The wound has no S & S of infection.  We will continue to apply silver alginate on this wound.  Wound #3 - Sacrum - 0.3 x 0.8 x 0.2 cm - remains open and has decreased in depth slightly.  we will continue to monitor and apply silver alginate.  Wound #4 - Left medial upper thigh - 3.3 x 2.0 x 0.3 cm - this wound has continued to increase in size and depth.  Today it was selectively debrided using the ultrasonic debrider.  Theraskin graft #1 was applied.  Theraskin graft #2, 13.0 cm2 has been ordered.  Graft #1 applied was 6.0 cm2 which  is too small for this wound.  We will increase in size.     3/14/24 - Mr. Martinez returns for f/up on his wounds.  We are currently grafting the left medial upper thigh.    Wound #1 - Right posterior hip -   The wound has deteriorated continually over the last couple of weeks.  The wound was nearly closed two weeks ago.  Today a culture was obtained to determine if there is an infective process occurring causing deterioration.    Wound #2 - Left ischial - 0.2 x 0.2 x 0.2 cm  - wound continues to decrease in size.   The wound has no S & S of infection.  We will continue to apply silver alginate on this wound.  Wound #3 - Sacrum - 0.2 x 1.0 x 0.2 cm - remains open and has increased again in depth slightly.  we will continue to monitor and apply silver alginate.  Wound #4 - Left medial upper thigh - 3.3 x 2.0 x 0.3 cm - this wound has remained the same size and depth.  At his last visit, Theraskin graft #2 (13.0 cm2) was applied.  This graft was pulled as taught as possible, and could barely be secured.  There is depth to the wound, and any attempt to apply pressure to the graft to allow contact with the wound bed would cause the graft to detach.  We must have a larger graft to achieve the wound depth.     3/21/24 - We reviewed the results from the patients culture of the right posterior hip.  It has several bacteria present in the culture.  He was prescribed Bactrim and Augmentin based on those culture results.  Because of the likelihood of contamination of other wounds, we will hold off on future grafts of the left medial upper thigh at this time.  He will begin his abx regimen and we will consider resuming grafting with the infection is cleared.  He will also apply Mupirocin ointment to the sacrum and left medial upper thigh.  Mupirocin will not be put into the right posterior hip due to the difficulty of cleaning it out.      3/27/24 - The patient was prescribed abx at his last visit.  Today he reports that after  taking abx x 2 days he developed GI issues and stopped taking them.  He did begin taking them again today.  He has 8 days remaining and was strongly encouraged to follow through and complete his entire prescription.  The right hip has increased in depth.  The other wounds remains stable and/or improved.  We will continue mupirocin and silver on all wounds with the exception of the right hip, where we will use silver only.     4/3/24 -   Wound #1 - Right posterior hip -   This wound was cultured on 3/14/24.  He has completed both Clindamycin and Doxycycline.  Today it was ultrasonically debrided.  We will continue to monitor and consider additional abx if no improvement shortly.   Wound #2 - Left ischial - Disappointingly this wound had healed, but today there was another large opening, likely due to positioning and/or cleaning.   Wound #3 - Sacrum - Continues to remain stable.   Wound #4 - Left medial upper thigh - 3.3 x 2.0 x 2.0 cm - this wound is no longer being grafted due to bacteria, inability to keep the graft in place, and inability to keep feeces out of wound.  Again today there was feeces in the wound.  It was cleansed using the ultrasonic debrider.     4/17/24 -   Wound #1 - Right posterior hip -   No significant change or improvement in this wound.  Topical abx have been ordered for use on all wounds.   Wound #2 - Left ischial - very small remaining opening.  [place] any aggressive repositioning wound should be healed shortly.  Wound #3 - Sacrum - Continues to remain stable.   Wound #4 - Left medial upper thigh - 3.3 x 3.2 x 0.5 - wound continues to deteriorate.  We discussed what may have changed recently causing the deterioration and that patient does not know.  It is alarming.  X-rays have been ordered for both hips to ensure that osteomyelitis is not present.  A BMP will be ordered through home health so that an MRI can be conducted.      Review of Systems   Constitutional: Negative.    Respiratory:  Negative.     Cardiovascular: Negative.    Musculoskeletal:  Positive for gait problem.   Skin:  Positive for wound.        As documented in the HPI.   All other systems reviewed and are negative.        Objective:      Vitals:    04/17/24 1047   BP: (!) 104/56   Pulse: 75   Resp: 18     Physical Exam  Vitals reviewed.   Constitutional:       Appearance: Normal appearance. He is normal weight.   HENT:      Head: Normocephalic.   Cardiovascular:      Rate and Rhythm: Normal rate.      Pulses: Normal pulses.   Pulmonary:      Effort: Pulmonary effort is normal.   Musculoskeletal:      Comments: quadraplegia   Skin:     General: Skin is warm and dry.      Comments: Multiple pressure injuries   Neurological:      General: No focal deficit present.      Mental Status: He is alert and oriented to person, place, and time.   Psychiatric:         Mood and Affect: Mood normal.            Altered Skin Integrity 05/23/22 1337 Right posterior Hip #1 (Active)   05/23/22 1337   Altered Skin Integrity Present on Admission - Did Patient arrive to the hospital with altered skin?: yes   Side: Right   Orientation: posterior   Location: Hip   Wound Number: #1   Is this injury device related?: No   Primary Wound Type:    Description of Altered Skin Integrity:    Ankle-Brachial Index:    Pulses:    Removal Indication and Assessment:    Wound Outcome:    (Retired) Wound Length (cm):    (Retired) Wound Width (cm):    (Retired) Depth (cm):    Wound Description (Comments):    Removal Indications:    Dressing Appearance Moist drainage 04/17/24 1055   Drainage Amount Moderate 04/17/24 1055   Drainage Characteristics/Odor Serosanguineous 04/17/24 1055   Appearance Pink;Moist;Slough 04/17/24 1055   Tissue loss description Full thickness 04/17/24 1055   Periwound Area Dry 04/17/24 1055   Wound Edges Open 04/17/24 1055   Wound Length (cm) 0.5 cm 04/17/24 1055   Wound Width (cm) 0.5 cm 04/17/24 1055   Wound Depth (cm) 2.7 cm 04/17/24 1055   Wound  Volume (cm^3) 0.675 cm^3 04/17/24 1055   Wound Surface Area (cm^2) 0.25 cm^2 04/17/24 1055   Care Cleansed with:;Wound cleanser 04/17/24 1055   Dressing Applied 04/17/24 1055   Dressing Change Due 04/18/24 04/17/24 1055            Altered Skin Integrity 05/23/22 1353 Left Ischial tuberosity #2 (Active)   05/23/22 1353   Altered Skin Integrity Present on Admission - Did Patient arrive to the hospital with altered skin?: yes   Side: Left   Orientation:    Location: Ischial tuberosity   Wound Number: #2   Is this injury device related?: No   Primary Wound Type:    Description of Altered Skin Integrity:    Ankle-Brachial Index:    Pulses:    Removal Indication and Assessment:    Wound Outcome:    (Retired) Wound Length (cm):    (Retired) Wound Width (cm):    (Retired) Depth (cm):    Wound Description (Comments):    Removal Indications:    Dressing Appearance Moist drainage 04/17/24 1055   Drainage Amount Small 04/17/24 1055   Drainage Characteristics/Odor Serosanguineous 04/17/24 1055   Appearance Pink;Moist 04/17/24 1055   Tissue loss description Full thickness 04/17/24 1055   Periwound Area Intact 04/17/24 1055   Wound Edges Defined 04/17/24 1055   Wound Length (cm) 0.2 cm 04/17/24 1055   Wound Width (cm) 0.2 cm 04/17/24 1055   Wound Depth (cm) 0.2 cm 04/17/24 1055   Wound Volume (cm^3) 0.008 cm^3 04/17/24 1055   Wound Surface Area (cm^2) 0.04 cm^2 04/17/24 1055   Care Cleansed with:;Wound cleanser 04/17/24 1055   Dressing Applied 04/17/24 1055   Dressing Change Due 04/18/24 04/17/24 1055            Altered Skin Integrity 01/05/23 1351 Left upper;medial Thigh #4  (Active)   01/05/23 1351   Altered Skin Integrity Present on Admission - Did Patient arrive to the hospital with altered skin?: yes   Side: Left   Orientation: upper;medial   Location: Thigh   Wound Number: #4   Is this injury device related?: No   Primary Wound Type:    Description of Altered Skin Integrity:    Ankle-Brachial Index:    Pulses:    Removal  Indication and Assessment:    Wound Outcome:    (Retired) Wound Length (cm):    (Retired) Wound Width (cm):    (Retired) Depth (cm):    Wound Description (Comments):    Removal Indications:    Dressing Appearance Moist drainage 04/17/24 1055   Drainage Amount Moderate 04/17/24 1055   Drainage Characteristics/Odor Serosanguineous 04/17/24 1055   Appearance Red;Moist 04/17/24 1055   Tissue loss description Full thickness 04/17/24 1055   Periwound Area Dry 04/17/24 1055   Wound Edges Open 04/17/24 1055   Wound Length (cm) 3 cm 04/17/24 1055   Wound Width (cm) 3.2 cm 04/17/24 1055   Wound Depth (cm) 0.5 cm 04/17/24 1055   Wound Volume (cm^3) 4.8 cm^3 04/17/24 1055   Wound Surface Area (cm^2) 9.6 cm^2 04/17/24 1055   Care Cleansed with:;Wound cleanser 04/17/24 1055   Dressing Applied 04/17/24 1055   Dressing Change Due 04/18/24 04/17/24 1055            Altered Skin Integrity 09/20/23 1148 Sacral spine #3 (Active)   09/20/23 1148   Altered Skin Integrity Present on Admission - Did Patient arrive to the hospital with altered skin?: yes   Side:    Orientation:    Location: Sacral spine   Wound Number: #3   Is this injury device related?:    Primary Wound Type:    Description of Altered Skin Integrity:    Ankle-Brachial Index:    Pulses:    Removal Indication and Assessment:    Wound Outcome:    (Retired) Wound Length (cm):    (Retired) Wound Width (cm):    (Retired) Depth (cm):    Wound Description (Comments):    Removal Indications:    Dressing Appearance Moist drainage 04/17/24 1055   Drainage Amount Moderate 04/17/24 1055   Drainage Characteristics/Odor Sanguineous;Serosanguineous 04/17/24 1055   Appearance Pink;Moist 04/17/24 1055   Tissue loss description Full thickness 04/17/24 1055   Periwound Area Dry 04/17/24 1055   Wound Edges Callused;Open 04/17/24 1055   Wound Length (cm) 0.2 cm 04/17/24 1055   Wound Width (cm) 0.7 cm 04/17/24 1055   Wound Depth (cm) 0.4 cm 04/17/24 1055   Wound Volume (cm^3) 0.056 cm^3 04/17/24  1055   Wound Surface Area (cm^2) 0.14 cm^2 04/17/24 1055   Care Cleansed with:;Wound cleanser 04/17/24 1055   Dressing Applied 04/17/24 1055   Dressing Change Due 04/18/24 04/17/24 1055       4/3/24      Right hip - pre isidra.                                      Right hip - post isidra.       NO ISIDRA.   Sacrum       Left ischium - pre isidra.                                Left ischium  no isidra.      Left upper medial thigh - pre isidra.                Left upper medial thigh - post isidra.   (ROCKY, silver alginate, 4x4 gauze, gentle border)   ML    4/17/24             Right hip (pre)                                             Sacrum (pre)                                                Left upper medial thigh (pre)                      Left ischium (pre)  (silver alginate, 4x4, gentle border - all wounds)        Assessment:           ICD-10-CM ICD-9-CM   1. Stage IV pressure ulcer of right hip  L89.214 707.04     707.24   2. Pressure injury of left thigh, stage 3  L89.223 707.09     707.23   3. Pressure injury of sacral region, stage 4  L89.154 707.03     707.24   4. Quadriplegia, unspecified  G82.50 344.00   5. Pressure ulcer of left buttock, stage 4  L89.324 707.05     707.24             Procedures:     Mechanical debridement only    Excisional debridement performed:  [] Yes [] No   Selective debridement performed:  [] Yes [] No   Mechanical debridement performed:  [x] Yes [] No   Silver nitrate applied:    [] Yes [] No   Labs ordered this visit:   [] Yes [] No   Imaging ordered this visit:   [] Yes [] No   Tissue pathology and/or culture taken:  [] Yes [] No         MEDICATIONS    Current Outpatient Medications:     apixaban (ELIQUIS) 5 mg Tab, Take 5 mg by mouth once daily at 6am., Disp: , Rfl:     LINZESS 145 mcg Cap capsule, Take 145 mcg by mouth once daily., Disp: , Rfl:     mupirocin (BACTROBAN) 2 % ointment, 2 (two) times daily. Apply to affected area for five days, Disp: , Rfl:    Review of patient's allergies indicates:    Allergen Reactions    Meropenem Anaphylaxis    Penicillins          [facility]:  [facility]   TIMES PER WEEK/DAYS:  daily   ORDERS:  Right hip wound: Cleanse with wound cleanser, apply silver alginate, 4x4 gentle foam border dressing to be changed daily   Left ischial wound: Cleanse with wound cleanser, apply silver alginate, 6x6 gentle foam border dressing to be changed daily   Left medial upper thigh wound: Cleanse with wound cleanser, apply mupirocin ointment, silver alginate, 6x6 gentle foam border dressing to be changed daily   Sacral wound: Cleanse with wound cleanser, apply mupirocin ointment, silver alginate, 6x6 gentle foam border dressing to be changed daily     Please draw BMP this week.    Follow up in 1 week (on 4/24/2024) for janis.

## 2024-04-29 ENCOUNTER — HOSPITAL ENCOUNTER (OUTPATIENT)
Dept: RADIOLOGY | Facility: HOSPITAL | Age: 49
Discharge: HOME | End: 2024-04-29
Attending: NURSE PRACTITIONER
Payer: MEDICARE

## 2024-04-29 DIAGNOSIS — L89.214 STAGE IV PRESSURE ULCER OF RIGHT HIP: ICD-10-CM

## 2024-04-29 DIAGNOSIS — L89.223 PRESSURE INJURY OF LEFT THIGH, STAGE 3: ICD-10-CM

## 2024-04-29 DIAGNOSIS — L89.324 PRESSURE ULCER OF LEFT BUTTOCK, STAGE 4: ICD-10-CM

## 2024-04-29 PROCEDURE — 73523 X-RAY EXAM HIPS BI 5/> VIEWS: CPT | Mod: TC

## 2024-05-01 ENCOUNTER — HOSPITAL ENCOUNTER (OUTPATIENT)
Dept: WOUND CARE | Facility: HOSPITAL | Age: 49
Discharge: HOME | End: 2024-05-01
Attending: NURSE PRACTITIONER
Payer: MEDICARE

## 2024-05-01 VITALS
DIASTOLIC BLOOD PRESSURE: 73 MMHG | RESPIRATION RATE: 18 BRPM | WEIGHT: 175.06 LBS | HEART RATE: 72 BPM | BODY MASS INDEX: 29.17 KG/M2 | SYSTOLIC BLOOD PRESSURE: 162 MMHG | HEIGHT: 65 IN

## 2024-05-01 DIAGNOSIS — L89.324 PRESSURE ULCER OF LEFT BUTTOCK, STAGE 4: ICD-10-CM

## 2024-05-01 DIAGNOSIS — G82.50 QUADRIPLEGIA, UNSPECIFIED: ICD-10-CM

## 2024-05-01 DIAGNOSIS — L89.223 PRESSURE INJURY OF LEFT THIGH, STAGE 3: ICD-10-CM

## 2024-05-01 DIAGNOSIS — L89.154 PRESSURE INJURY OF SACRAL REGION, STAGE 4: ICD-10-CM

## 2024-05-01 DIAGNOSIS — L89.214 STAGE IV PRESSURE ULCER OF RIGHT HIP: Primary | ICD-10-CM

## 2024-05-01 PROCEDURE — 27000999 HC MEDICAL RECORD PHOTO DOCUMENTATION

## 2024-05-01 PROCEDURE — 99213 OFFICE O/P EST LOW 20 MIN: CPT | Mod: 25

## 2024-05-01 PROCEDURE — 97597 DBRDMT OPN WND 1ST 20 CM/<: CPT

## 2024-05-01 NOTE — PROCEDURES
"Debridement    Date/Time: 5/1/2024 9:52 AM    Performed by: Jessie Bah NP  Authorized by: Jessie Bah NP    Time out: Immediately prior to procedure a "time out" was called to verify the correct patient, procedure, equipment, support staff and site/side marked as required.    Consent Done?:  Yes (Verbal)    Preparation: Patient was prepped and draped with clean technique    Local anesthesia used?: No      Wound Details:    Location:  Left hip (Left medial upper thigh)    Type of Debridement:  Non-excisional       Length (cm):  2.5       Area (sq cm):  5       Width (cm):  2       Percent Debrided (%):  100       Depth (cm):  1       Total Area Debrided (sq cm):  5    Depth of debridement:  Epidermis/Dermis    Devitalized tissue debrided:  Biofilm, Callus, Exudate and Fibrin    Instruments:  Curette (Dermal)  Bleeding:  Minimal  Hemostasis Achieved: Yes  Method Used:  Pressure  Patient tolerance:  Patient tolerated the procedure well with no immediate complications    "

## 2024-05-01 NOTE — PROGRESS NOTES
Home Health: [facility]   Smoker   [] Yes  [x] No  Diabetic   [] Yes   [x] No  Low air loss mattress [x] Yes [] No   Is the patient eligible for a graft, and/or currently grafting?  [] Yes [x] No       Subjective:       Patient ID: Diego Martinez Jr. is a 48 y.o. male.    Chief Complaint: Pressure Ulcer (Right hip - Stage 4 /Left ischial - Stage 4 (HEALED)/Sacrum - stage 4 (HEALED)/Left medial upper thigh - Stage 3)    HPI   Mr. Martinez is a long time patient of [facility].    He comes via wheelchair with a care attendant present with him at all times.    He does have a history of quadriplegia.  He has home health who assist him with his wound care daily and care attendants to provide care at other times.  He has a very pleasant demeanor and added to an is usually jovial in spite of his challenges.  [facility]    8/9/23 - Mr. Martinez returns today to be seen for three wounds:  Wound #1 - Right posterior hip - this wound continues to remains stable.  There is still some depth, however, there is very little drainage and no signs and symptoms of infection.  We will continue to pack this wound with silver alginate.  Wound #2 - Left ischial, this wound remains at 3.0 cm which is a significant increase over the measurement of 0.5 cm on 07/12/2023.  Increase in size is due to the forcing open of the wound during the cleansing process.  Home health has been reminded that they should open that wound very carefully so is not to over extend the very fragile epithelial tissue.  Additionally, the orders were to use silver alginate on this wound and today mesalt was removed.  They will be reminded they should use the appropriate dressing on the appropriate wounds and me salt should not be used in this wound.  Wound #3 - Sacrum; we are continuing to monitor this area, however, there is no open wound at this time.  It will be reopened if necessary.  Wound #4 - Left medial upper thigh - this  wound was selectively debrided today with removal a moderate amount of slough from the wound bed.  There is a large island of epithelial tissue in the wound bed, however, there is still also a large area of macerated tissue around the periwound.  Home health is reminded that they should be applying me salt to this wound and this wound only.  They should be covering the me salt and wound bed with silver alginate to be changed daily.    8/24/23 - The patient returns today for reassessments his 3 wounds.    Wound #1 - Right posterior hip - remains clean and stable.  There are no signs and symptoms of infection and very little drainage.  We are using silver alginate on this wound.    Wound #2 - Left ischial - 2.7 x 0.5 x 0.2 cm - wound is improved significantly over his last visit on 08/09/2023.  This provider appreciate the care that home health is taking when opening the wound to cleanse in the depth of the wound.  It is obvious that they have not been over extending the wound which is allowing it to progress as it should be progressing now.  We will continue to apply silver alginate on this wound.  Wound #3 - Sacrum - we will continue to monitor the sacrum again as it has now reopened very slightly.  Wound #4 - Left medial upper thigh - this wound remains stable with no signs and symptoms of infection.  The large margin of macerated tissue remains.  Ideally, this could be surgically debrided, however, we may have to do this in clinic at some point with a scalpel.    9/6/23 - Mr. Martinez returns for f/up on the 3 wounds:  Wound #1 - Right posterior hip - remains clean and stable, continuing to decrease in size slowly.  There are no signs and symptoms of infection and very little drainage.  We will continue using silver alginate on this wound.    Wound #2 - Left ischial - 1.5 x 0.5 x 0.2 cm - wound continues to decrease in size with a very small remaining area.  This provider appreciates the care that home health  continues to take when opening the wound to cleanse in the depth of the wound.  It is obvious that they have not been over extending the wound which is allowing it to progress as it should be progressing now.  We will continue to apply silver alginate on this wound.  Wound #3 - Sacrum - has now reclosed but will continue to monitor  Wound #4 - Left medial upper thigh - this wound remains stable with no signs and symptoms of infection.  The large margin of macerated tissue remains.  A large amount of this tissue was selectively debrided off today.  We will focus on removal of the rolled edges each week.    9/20/23 - The patient returns today for followup on several wounds:  Wound #1 - Right posterior hip - This wound was mechanically debrided.  It remains clean and stable, continuing to decrease in size slowly.  There are no signs and symptoms of infection and very little drainage.  We will continue using silver alginate on this wound.    Wound #2 - Left ischial - 1.7 x 0.5 x 0.2 cm - wound remains stable with no significant change in size this visit.  The wound has no S & S of infection.  We will continue to apply silver alginate on this wound.  Wound #3 - Sacrum - has reopened.  This wound was selectively debrided today.  We will begin application of collagen and silver alginate.  Wound #4 - Left medial upper thigh - this wound remains stable with no signs and symptoms of infection. It was selectively debrided today and we will begin collagen and silver alginate.      10/4/23 - Mr. Martinez returns today for f/up on the following wounds:  Wound #1 - Right posterior hip - This wound was mechanically debrided.  It remains clean and stable, with no significant change.  There are no signs and symptoms of infection and very little drainage.  We will continue using silver alginate on this wound.    Wound #2 - Left ischial - 0.5 x 2.0 x 0.2 cm - wound remains stable with no significant change although it is slightly larger  than last week.   The wound has no S & S of infection.  We will continue to apply silver alginate on this wound.  Wound #3 - Sacrum - remains open.  This wound was selectively debrided today.  We will continue powdered collagen and silver alginate.  Wound #4 - Left medial upper thigh - this wound remains stable with no signs and symptoms of infection. It was selectively debrided today and we will begin powdered collagen and silver alginate.     10/18/23 - Mr. Martinez is seen today for the f/up on several wounds:  Wound #1 - Right posterior hip - This wound was mechanically debrided.  It remains clean and stable, with no significant change.  There are no signs and symptoms of infection and very little drainage.  We will continue using silver alginate on this wound.    Wound #2 - Left ischial - 0.5 x 1.5 x 0.2 cm - wound remains stable with no significant change although it is slightly larger than last week.   The wound has no S & S of infection.  We will continue to apply silver alginate on this wound.  Wound #3 - Sacrum - remains open.  This wound was selectively debrided today.  We will continue powdered collagen and silver alginate.  Wound #4 - Left medial upper thigh - this wound remains stable with no signs and symptoms of infection. It was mechanically debrided today and we will begin powdered collagen and silver alginate.     October 31st:  48-year-old black male, who is a quadriplegia, is here for follow up of his for pressure injuries.  The worse 1 at this time is the left ischial, left buttock.  The ulcers appear clean, without obvious secondary infection, foul odor, or discharge.  He has home health services almost every day.    11/14/23 - Mr. Martinez returns today for followup on several wounds.  All remains stable.  The left ischial does measures slightly larger, however, the wound bed is clean.  It was selectively debrided and there are no signs and symptoms of infection with this wound or any of his  wounds at this time.  Wound #1 - Right posterior hip - This wound was mechanically debrided.  It remains clean and stable, with no significant change.  There are no signs and symptoms of infection and very little drainage.  We will continue using silver alginate on this wound.    Wound #2 - Left ischial - 0.8 x 1.0 x 0.2 cm  - wound remains stable with no significant change although it is slightly larger than last week.   The wound has no S & S of infection.  We will continue to apply silver alginate on this wound.  Wound #3 - Sacrum - remains open.  We will continue silver alginate.  Wound #4 - Left medial upper thigh -  4.0 x 2.0 x 0.3 cm- this wound has increased in size slightly, but there are no signs and symptoms of infection. It was selectively debrided today and we will continue silver alginate.     12/5/23 - Mr. Martinez is seen today for f/up on his wounds.  They all remain stable, in various stages.  Today none show S & S of infection, with no increase in drainage, redness, etc.    Wound #1 - Right posterior hip - This wound was mechanically debride.  It does continue to drain a scant amount.  We will continue using silver alginate on this wound.    Wound #2 - Left ischial - 0.5 x 1.0 x 0.2 cm  - wound remains stable with a slight decrease in size.   The wound has no S & S of infection.  We will continue to apply silver alginate on this wound.  Wound #3 - Sacrum - remains open.  We will continue silver alginate.  Wound #4 - Left medial upper thigh -  2.7 x 1.8 x 0.3 cm - this wound has decreased in size and appears to be doing well.  There are no signs and symptoms of infection. It was selectively debrided today and we will continue silver alginate.     January 2, 2024:  48-year-old black male, with quadriplegia, is here for a multiple stage III and stage IV pressure injuries.  They are all under control at this time.  They are relatively shallow.  He has a good low air loss mattress at home.    1/22/24 -  The patient returns today for f/up on several wounds.  Today many of the wounds are improving.  Wound #1 - Right posterior hip - continues to progress well and has a very small remaining opening.  We will continue to use silver alginate placed over the wound.    Wound #2 - Left ischial -0.4 x 1.0 x 0.2 cm  - wound remains appears stable with a slight decrease in size.   The wound has no S & S of infection.  We will continue to apply silver alginate on this wound.  Wound #3 - Sacrum - remains open and we will continue to monitor.  We will begin application of Endoform.    Wound #4 - Left medial upper thigh -  3.2 x 3.3 x 0.3 cm - this wound has increased in size slightly.   and appears to be doing well.  There are no signs and symptoms of infection. It was selectively debrided today and we will continue silver alginate.  He does continue to have HH assistance on a daily basis.  If the left medial upper thigh does not improve by next week we will consider ultrasonic debridement.     January 30, 2024:  48-year-old black male, with quadriplegia, is here for follow up of his multiple pressure injuries, including right hip, left ischial, sacral, left medial upper thigh, and a stage III scrotal pressure ulcer.  The wounds are all clean at this time, and need no surgical debridement today.  The measurements are approximately the same as last visit.    2/20/24 - Mr. Martinez Is seen today for followup on several wounds.   Some of the wounds continue to progress well while others have deteriorated since he was last seen by this provider on January 22nd.  In particular, the left medial upper thigh has continued to deteriorate and today we are attempting to authorize a Theraskin  graft for that wound.  Wound #1 - Right posterior hip - Remains stable with no reported drainage, no S & S of infection.  We will continue to use silver alginate placed over the wound.    Wound #2 - Left ischial -0.8 x 0.5 x 0.2 cm  - wound continues to  decrease in size.   The wound has no S & S of infection.  We will continue to apply silver alginate on this wound.  Wound #3 - Sacrum - 0.3 x 0.8 x 0.4 cm - remains open and has increased in size slightly we will continue to monitor and apply silver alginate.  Wound #4 - Left medial upper thigh - 2.5 x 3.0 x 0.9 cm - this wound has increased in size slightly. There are no signs and symptoms of infection. It was selectively debrided today and we will attempt to authorize a Theraskin graft for this wound.   The left medial upper thigh will be ultrasonically debrided at his next visit.    2/29/24 - The patient returns today for f/up on wounds.  We are attempting to authorize Theraskin grafts for the patient.  In preparation, the wounds were selectively debrided using the ultrasonic debrider.  If authorized, graft prep will be done at his next visit.  Today he was instructed to begin application of mupirocin ointment to each wound, also in preparation of the process.    3/7/24 - Mr. Martinez is seen today for f/up on several wounds.  He is approved for Theraskin grafts on wound #4, the left medial upper thigh.  Wound #1 - Right posterior hip -   The wound has deteriorated greatly over the last couple of weeks.  The wound was nearly closed two weeks ago.  Today it was ultrasonically debrided and has a depth of 1.5 cm.  Wound #2 - Left ischial - 0.3 x 0.3 x 0.2 cm  - wound continues to decrease in size.   The wound has no S & S of infection.  We will continue to apply silver alginate on this wound.  Wound #3 - Sacrum - 0.3 x 0.8 x 0.2 cm - remains open and has decreased in depth slightly.  we will continue to monitor and apply silver alginate.  Wound #4 - Left medial upper thigh - 3.3 x 2.0 x 0.3 cm - this wound has continued to increase in size and depth.  Today it was selectively debrided using the ultrasonic debrider.  Theraskin graft #1 was applied.  Theraskin graft #2, 13.0 cm2 has been ordered.  Graft #1 applied was  6.0 cm2 which is too small for this wound.  We will increase in size.     3/14/24 - Mr. Martinez returns for f/up on his wounds.  We are currently grafting the left medial upper thigh.    Wound #1 - Right posterior hip -   The wound has deteriorated continually over the last couple of weeks.  The wound was nearly closed two weeks ago.  Today a culture was obtained to determine if there is an infective process occurring causing deterioration.    Wound #2 - Left ischial - 0.2 x 0.2 x 0.2 cm  - wound continues to decrease in size.   The wound has no S & S of infection.  We will continue to apply silver alginate on this wound.  Wound #3 - Sacrum - 0.2 x 1.0 x 0.2 cm - remains open and has increased again in depth slightly.  we will continue to monitor and apply silver alginate.  Wound #4 - Left medial upper thigh - 3.3 x 2.0 x 0.3 cm - this wound has remained the same size and depth.  At his last visit, Theraskin graft #2 (13.0 cm2) was applied.  This graft was pulled as taught as possible, and could barely be secured.  There is depth to the wound, and any attempt to apply pressure to the graft to allow contact with the wound bed would cause the graft to detach.  We must have a larger graft to achieve the wound depth.     3/21/24 - We reviewed the results from the patients culture of the right posterior hip.  It has several bacteria present in the culture.  He was prescribed Bactrim and Augmentin based on those culture results.  Because of the likelihood of contamination of other wounds, we will hold off on future grafts of the left medial upper thigh at this time.  He will begin his abx regimen and we will consider resuming grafting with the infection is cleared.  He will also apply Mupirocin ointment to the sacrum and left medial upper thigh.  Mupirocin will not be put into the right posterior hip due to the difficulty of cleaning it out.      3/27/24 - The patient was prescribed abx at his last visit.  Today he reports  that after taking abx x 2 days he developed GI issues and stopped taking them.  He did begin taking them again today.  He has 8 days remaining and was strongly encouraged to follow through and complete his entire prescription.  The right hip has increased in depth.  The other wounds remains stable and/or improved.  We will continue mupirocin and silver on all wounds with the exception of the right hip, where we will use silver only.     4/3/24 -   Wound #1 - Right posterior hip -   This wound was cultured on 3/14/24.  He has completed both Clindamycin and Doxycycline.  Today it was ultrasonically debrided.  We will continue to monitor and consider additional abx if no improvement shortly.   Wound #2 - Left ischial - Disappointingly this wound had healed, but today there was another large opening, likely due to positioning and/or cleaning.   Wound #3 - Sacrum - Continues to remain stable.   Wound #4 - Left medial upper thigh - 3.3 x 2.0 x 2.0 cm - this wound is no longer being grafted due to bacteria, inability to keep the graft in place, and inability to keep feeces out of wound.  Again today there was feeces in the wound.  It was cleansed using the ultrasonic debrider.     4/17/24 -   Wound #1 - Right posterior hip -   No significant change or improvement in this wound.  Topical abx have been ordered for use on all wounds.   Wound #2 - Left ischial - very small remaining opening.  [place] any aggressive repositioning wound should be healed shortly.  Wound #3 - Sacrum - Continues to remain stable.   Wound #4 - Left medial upper thigh - 3.3 x 3.2 x 0.5 - wound continues to deteriorate.  We discussed what may have changed recently causing the deterioration and that patient does not know.  It is alarming.  X-rays have been ordered for both hips to ensure that osteomyelitis is not present.  A BMP will be ordered through home health so that an MRI can be conducted.      5/1/24 - Patient is picking up topical abx today to  begin using on open wounds (Cefazolin)  Wound #1 - Right posterior hip - slowly improving, slight decrease in depth.  Will begin using topical abx packed into wound.   Wound #2 - Left ischial - area assessed and wound is closed as of today!  Wound #3 - Sacrum - wound assessed and wound is closed as of today!  Wound #4 - Left medial upper thigh - 2.5 x 2.0 x 1.0 cm - wound length and width have decreased, depth increased slightly.  Wound was selectively debrided today.  We will begin application of topical abx on this wound daily.    An MRI was done of the left upper thigh and there is no indication of osteomyelitis at this time.      Review of Systems   Constitutional: Negative.    Respiratory: Negative.     Cardiovascular: Negative.    Musculoskeletal:  Positive for gait problem.   Skin:  Positive for wound.        As documented in the HPI.   All other systems reviewed and are negative.        Objective:      Vitals:    05/01/24 0958   BP: (!) 162/73   Pulse: 72   Resp: 18     Physical Exam  Vitals reviewed.   Constitutional:       Appearance: Normal appearance. He is normal weight.   HENT:      Head: Normocephalic.   Cardiovascular:      Rate and Rhythm: Normal rate.      Pulses: Normal pulses.   Pulmonary:      Effort: Pulmonary effort is normal.   Musculoskeletal:      Comments: quadraplegia   Skin:     General: Skin is warm and dry.      Comments: Multiple pressure injuries   Neurological:      General: No focal deficit present.      Mental Status: He is alert and oriented to person, place, and time.   Psychiatric:         Mood and Affect: Mood normal.            Altered Skin Integrity 05/23/22 1337 Right posterior Hip #1 (Active)   05/23/22 1337   Altered Skin Integrity Present on Admission - Did Patient arrive to the hospital with altered skin?: yes   Side: Right   Orientation: posterior   Location: Hip   Wound Number: #1   Is this injury device related?: No   Primary Wound Type:    Description of Altered  Skin Integrity:    Ankle-Brachial Index:    Pulses:    Removal Indication and Assessment:    Wound Outcome:    (Retired) Wound Length (cm):    (Retired) Wound Width (cm):    (Retired) Depth (cm):    Wound Description (Comments):    Removal Indications:    Dressing Appearance Moist drainage 05/01/24 1004   Drainage Amount Moderate 05/01/24 1004   Drainage Characteristics/Odor Serosanguineous 05/01/24 1004   Appearance Pink;Moist;Slough 05/01/24 1004   Periwound Area Intact 05/01/24 1004   Wound Edges Open 05/01/24 1004   Wound Length (cm) 0.5 cm 05/01/24 1004   Wound Width (cm) 0.5 cm 05/01/24 1004   Wound Depth (cm) 2.5 cm 05/01/24 1004   Wound Volume (cm^3) 0.625 cm^3 05/01/24 1004   Wound Surface Area (cm^2) 0.25 cm^2 05/01/24 1004   Care Cleansed with:;Wound cleanser 05/01/24 1004   Dressing Applied;Other (comment) 05/01/24 1004   Dressing Change Due 05/02/24 05/01/24 1004            Altered Skin Integrity 01/05/23 1351 Left upper;medial Thigh #4  (Active)   01/05/23 1351   Altered Skin Integrity Present on Admission - Did Patient arrive to the hospital with altered skin?: yes   Side: Left   Orientation: upper;medial   Location: Thigh   Wound Number: #4   Is this injury device related?: No   Primary Wound Type:    Description of Altered Skin Integrity:    Ankle-Brachial Index:    Pulses:    Removal Indication and Assessment:    Wound Outcome:    (Retired) Wound Length (cm):    (Retired) Wound Width (cm):    (Retired) Depth (cm):    Wound Description (Comments):    Removal Indications:    Dressing Appearance Moist drainage 05/01/24 1004   Drainage Amount Moderate 05/01/24 1004   Drainage Characteristics/Odor Serosanguineous 05/01/24 1004   Appearance Red;Moist 05/01/24 1004   Tissue loss description Full thickness 05/01/24 1004   Periwound Area Intact 05/01/24 1004   Wound Edges Open 05/01/24 1004   Wound Length (cm) 2.5 cm 05/01/24 1004   Wound Width (cm) 2 cm 05/01/24 1004   Wound Depth (cm) 1 cm 05/01/24 1004  "  Wound Volume (cm^3) 5 cm^3 05/01/24 1004   Wound Surface Area (cm^2) 5 cm^2 05/01/24 1004   Care Cleansed with:;Wound cleanser 05/01/24 1004   Dressing Applied;Other (comment) 05/01/24 1004   Dressing Change Due 05/02/24 05/01/24 1004         5/1/24    Sacrum (HEALED)      Left ischium (HEALED)      Right hip   1/2" NS moistened packing strip/topical antibiotic powder (cefazolin), silver alginate, 4x4 gentle foam border dressing  CT        Left upper posterior thigh      Post debridement  Topical antibiotic powder (cefazolin), silver alginate, 6x6 gentle foam border dressing  CT    Assessment:           ICD-10-CM ICD-9-CM   1. Stage IV pressure ulcer of right hip  L89.214 707.04     707.24   2. Pressure injury of left thigh, stage 3  L89.223 707.09     707.23   3. Pressure injury of sacral region, stage 4  L89.154 707.03     707.24   4. Pressure ulcer of left buttock, stage 4  L89.324 707.05     707.24   5. Quadriplegia, unspecified  G82.50 344.00           Procedures:     Debridement     Date/Time: 5/1/2024 9:52 AM     Performed by: Jessie Bah NP  Authorized by: Jessie Bah NP    Time out: Immediately prior to procedure a "time out" was called to verify the correct patient, procedure, equipment, support staff and site/side marked as required.     Consent Done?:  Yes (Verbal)     Preparation: Patient was prepped and draped with clean technique    Local anesthesia used?: No       Wound Details:    Location:  Left hip (Left medial upper thigh)    Type of Debridement:  Non-excisional       Length (cm):  2.5       Area (sq cm):  5       Width (cm):  2       Percent Debrided (%):  100       Depth (cm):  1       Total Area Debrided (sq cm):  5    Depth of debridement:  Epidermis/Dermis    Devitalized tissue debrided:  Biofilm, Callus, Exudate and Fibrin    Instruments:  Curette (Dermal)  Bleeding:  Minimal  Hemostasis Achieved: Yes  Method Used:  Pressure  Patient tolerance:  Patient tolerated the " "procedure well with no immediate complications       Excisional debridement performed:  [] Yes [] No   Selective debridement performed:  [x] Yes [] No   Mechanical debridement performed:  [] Yes [] No   Silver nitrate applied:    [] Yes [] No   Labs ordered this visit:   [] Yes [] No   Imaging ordered this visit:   [] Yes [] No   Tissue pathology and/or culture taken:  [] Yes [] No         MEDICATIONS    Current Outpatient Medications:     apixaban (ELIQUIS) 5 mg Tab, Take 5 mg by mouth once daily at 6am., Disp: , Rfl:     LINZESS 145 mcg Cap capsule, Take 145 mcg by mouth once daily., Disp: , Rfl:     mupirocin (BACTROBAN) 2 % ointment, 2 (two) times daily. Apply to affected area for five days, Disp: , Rfl:     [employer], OUTPATIENT,, cefazolin, Disp: , Rfl:    Review of patient's allergies indicates:   Allergen Reactions    Meropenem Anaphylaxis    Penicillins          [facility]:  [facility]   TIMES PER WEEK/DAYS:  daily   ORDERS:  Right hip wound: Cleanse with wound cleanser, apply topical antibiotic powder (cefazolin) onto moistened 1/2" packing strip into wound, cover with silver alginate, and 4x4 gentle foam border dressing to be changed daily   Left upper posterior thigh wound: Cleanse with wound cleanser, apply topical antibotic powder (cefazolin) onto wound bed, cover with silver alginate, and 6x6 gentle foam border dressing to be changed daily      Follow up in 1 week (on 5/8/2024) for PU - stretcher .        "

## 2024-05-06 ENCOUNTER — LAB REQUISITION (OUTPATIENT)
Dept: LAB | Facility: HOSPITAL | Age: 49
End: 2024-05-06
Payer: MEDICARE

## 2024-05-06 DIAGNOSIS — I10 ESSENTIAL (PRIMARY) HYPERTENSION: ICD-10-CM

## 2024-05-06 DIAGNOSIS — L89.154 PRESSURE ULCER OF SACRAL REGION, STAGE 4: ICD-10-CM

## 2024-05-06 DIAGNOSIS — A95.9: ICD-10-CM

## 2024-05-06 DIAGNOSIS — D64.9 ANEMIA, UNSPECIFIED: ICD-10-CM

## 2024-05-06 LAB
ALBUMIN SERPL-MCNC: 3.7 G/DL (ref 3.5–5)
ALBUMIN/GLOB SERPL: 1.2 RATIO (ref 1.1–2)
ALP SERPL-CCNC: 90 UNIT/L (ref 40–150)
ALT SERPL-CCNC: 22 UNIT/L (ref 0–55)
APPEARANCE UR: ABNORMAL
AST SERPL-CCNC: 23 UNIT/L (ref 5–34)
BACTERIA #/AREA URNS AUTO: ABNORMAL /HPF
BASOPHILS # BLD AUTO: 0.02 X10(3)/MCL
BASOPHILS NFR BLD AUTO: 0.4 %
BILIRUB SERPL-MCNC: 0.9 MG/DL
BILIRUB UR QL STRIP.AUTO: NEGATIVE
BUN SERPL-MCNC: 17 MG/DL (ref 8.9–20.6)
CALCIUM SERPL-MCNC: 8.8 MG/DL (ref 8.4–10.2)
CHLORIDE SERPL-SCNC: 108 MMOL/L (ref 98–107)
CHOLEST SERPL-MCNC: 127 MG/DL
CHOLEST/HDLC SERPL: 3 {RATIO} (ref 0–5)
CO2 SERPL-SCNC: 21 MMOL/L (ref 22–29)
COLOR UR AUTO: YELLOW
CREAT SERPL-MCNC: 0.68 MG/DL (ref 0.73–1.18)
DEPRECATED CALCIDIOL+CALCIFEROL SERPL-MC: 30.4 NG/ML (ref 30–80)
EOSINOPHIL # BLD AUTO: 0.12 X10(3)/MCL (ref 0–0.9)
EOSINOPHIL NFR BLD AUTO: 2.2 %
ERYTHROCYTE [DISTWIDTH] IN BLOOD BY AUTOMATED COUNT: 12.7 % (ref 11.5–17)
ERYTHROCYTE [SEDIMENTATION RATE] IN BLOOD: 27 MM/HR (ref 0–15)
FERRITIN SERPL-MCNC: 300.61 NG/ML (ref 21.81–274.66)
GFR SERPLBLD CREATININE-BSD FMLA CKD-EPI: >60 MLS/MIN/1.73/M2
GLOBULIN SER-MCNC: 3.2 GM/DL (ref 2.4–3.5)
GLUCOSE SERPL-MCNC: 76 MG/DL (ref 74–100)
GLUCOSE UR QL STRIP.AUTO: NEGATIVE
HCT VFR BLD AUTO: 39.3 % (ref 42–52)
HDLC SERPL-MCNC: 41 MG/DL (ref 35–60)
HGB BLD-MCNC: 13.1 G/DL (ref 14–18)
IMM GRANULOCYTES # BLD AUTO: 0.02 X10(3)/MCL (ref 0–0.04)
IMM GRANULOCYTES NFR BLD AUTO: 0.4 %
IRON SATN MFR SERPL: 41 % (ref 20–50)
IRON SERPL-MCNC: 104 UG/DL (ref 65–175)
KETONES UR QL STRIP.AUTO: NEGATIVE
LDLC SERPL CALC-MCNC: 73 MG/DL (ref 50–140)
LEUKOCYTE ESTERASE UR QL STRIP.AUTO: ABNORMAL
LYMPHOCYTES # BLD AUTO: 2.12 X10(3)/MCL (ref 0.6–4.6)
LYMPHOCYTES NFR BLD AUTO: 39.4 %
MCH RBC QN AUTO: 31 PG (ref 27–31)
MCHC RBC AUTO-ENTMCNC: 33.3 G/DL (ref 33–36)
MCV RBC AUTO: 93.1 FL (ref 80–94)
MONOCYTES # BLD AUTO: 0.5 X10(3)/MCL (ref 0.1–1.3)
MONOCYTES NFR BLD AUTO: 9.3 %
NEUTROPHILS # BLD AUTO: 2.6 X10(3)/MCL (ref 2.1–9.2)
NEUTROPHILS NFR BLD AUTO: 48.3 %
NITRITE UR QL STRIP.AUTO: POSITIVE
PH UR STRIP.AUTO: 6 [PH]
PLATELET # BLD AUTO: 225 X10(3)/MCL (ref 130–400)
PMV BLD AUTO: 10 FL (ref 7.4–10.4)
POTASSIUM SERPL-SCNC: 4.2 MMOL/L (ref 3.5–5.1)
PROT SERPL-MCNC: 6.9 GM/DL (ref 6.4–8.3)
PROT UR QL STRIP.AUTO: NEGATIVE
RBC # BLD AUTO: 4.22 X10(6)/MCL (ref 4.7–6.1)
RBC #/AREA URNS AUTO: ABNORMAL /HPF
RBC UR QL AUTO: ABNORMAL
SODIUM SERPL-SCNC: 136 MMOL/L (ref 136–145)
SP GR UR STRIP.AUTO: 1.01 (ref 1–1.03)
SQUAMOUS #/AREA URNS AUTO: ABNORMAL /HPF
TIBC SERPL-MCNC: 150 UG/DL (ref 69–240)
TIBC SERPL-MCNC: 254 UG/DL (ref 250–450)
TRIGL SERPL-MCNC: 64 MG/DL (ref 34–140)
TSH SERPL-ACNC: 1.14 UIU/ML (ref 0.35–4.94)
UROBILINOGEN UR STRIP-ACNC: 1
VLDLC SERPL CALC-MCNC: 13 MG/DL
WBC # SPEC AUTO: 5.38 X10(3)/MCL (ref 4.5–11.5)
WBC #/AREA URNS AUTO: ABNORMAL /HPF

## 2024-05-06 PROCEDURE — 82306 VITAMIN D 25 HYDROXY: CPT | Performed by: FAMILY MEDICINE

## 2024-05-06 PROCEDURE — 84443 ASSAY THYROID STIM HORMONE: CPT | Performed by: FAMILY MEDICINE

## 2024-05-06 PROCEDURE — 81003 URINALYSIS AUTO W/O SCOPE: CPT | Performed by: FAMILY MEDICINE

## 2024-05-06 PROCEDURE — 83540 ASSAY OF IRON: CPT | Performed by: FAMILY MEDICINE

## 2024-05-06 PROCEDURE — 81001 URINALYSIS AUTO W/SCOPE: CPT | Performed by: FAMILY MEDICINE

## 2024-05-06 PROCEDURE — 80053 COMPREHEN METABOLIC PANEL: CPT | Performed by: FAMILY MEDICINE

## 2024-05-06 PROCEDURE — 80061 LIPID PANEL: CPT | Performed by: FAMILY MEDICINE

## 2024-05-06 PROCEDURE — 82728 ASSAY OF FERRITIN: CPT | Performed by: FAMILY MEDICINE

## 2024-05-06 PROCEDURE — 85025 COMPLETE CBC W/AUTO DIFF WBC: CPT | Performed by: FAMILY MEDICINE

## 2024-05-06 PROCEDURE — 85652 RBC SED RATE AUTOMATED: CPT | Performed by: FAMILY MEDICINE

## 2024-05-15 ENCOUNTER — HOSPITAL ENCOUNTER (OUTPATIENT)
Dept: WOUND CARE | Facility: HOSPITAL | Age: 49
Discharge: HOME | End: 2024-05-15
Attending: NURSE PRACTITIONER
Payer: MEDICARE

## 2024-05-15 VITALS
WEIGHT: 175.06 LBS | SYSTOLIC BLOOD PRESSURE: 129 MMHG | DIASTOLIC BLOOD PRESSURE: 66 MMHG | RESPIRATION RATE: 18 BRPM | HEART RATE: 60 BPM | HEIGHT: 65 IN | BODY MASS INDEX: 29.17 KG/M2

## 2024-05-15 DIAGNOSIS — L89.214 STAGE IV PRESSURE ULCER OF RIGHT HIP: Primary | ICD-10-CM

## 2024-05-15 DIAGNOSIS — G82.50 QUADRIPLEGIA, UNSPECIFIED: ICD-10-CM

## 2024-05-15 DIAGNOSIS — L89.223 PRESSURE INJURY OF LEFT THIGH, STAGE 3: ICD-10-CM

## 2024-05-15 PROCEDURE — 27000999 HC MEDICAL RECORD PHOTO DOCUMENTATION

## 2024-05-15 PROCEDURE — 99212 OFFICE O/P EST SF 10 MIN: CPT | Mod: 25

## 2024-05-15 PROCEDURE — 97597 DBRDMT OPN WND 1ST 20 CM/<: CPT

## 2024-05-15 RX ORDER — CHOLECALCIFEROL (VITAMIN D3) 125 MCG
5000 CAPSULE ORAL
COMMUNITY
Start: 2024-05-09

## 2024-05-15 NOTE — PROGRESS NOTES
Home Health: [facility]   Smoker   [] Yes  [x] No  Diabetic   [] Yes   [x] No  Low air loss mattress [x] Yes [] No   Is the patient eligible for a graft, and/or currently grafting?  [] Yes [x] No     Subjective:       Patient ID: Diego Martinez Jr. is a 48 y.o. male.    Chief Complaint: Pressure Ulcer (Right hip - Stage 4/Left medial upper thigh - Stage 3)    HPI   Mr. Martinez is a long time patient of [facility].    He comes via wheelchair with a care attendant present with him at all times.    He does have a history of quadriplegia.  He has home health who assist him with his wound care daily and care attendants to provide care at other times.  He has a very pleasant demeanor and added to an is usually jovial in spite of his challenges.  He now has [facility].    8/9/23 - Mr. Martinez returns today to be seen for three wounds:  Wound #1 - Right posterior hip - this wound continues to remains stable.  There is still some depth, however, there is very little drainage and no signs and symptoms of infection.  We will continue to pack this wound with silver alginate.  Wound #2 - Left ischial, this wound remains at 3.0 cm which is a significant increase over the measurement of 0.5 cm on 07/12/2023.  Increase in size is due to the forcing open of the wound during the cleansing process.  Home health has been reminded that they should open that wound very carefully so is not to over extend the very fragile epithelial tissue.  Additionally, the orders were to use silver alginate on this wound and today mesalt was removed.  They will be reminded they should use the appropriate dressing on the appropriate wounds and me salt should not be used in this wound.  Wound #3 - Sacrum; we are continuing to monitor this area, however, there is no open wound at this time.  It will be reopened if necessary.  Wound #4 - Left medial upper thigh - this wound was selectively debrided today with removal a moderate  amount of slough from the wound bed.  There is a large island of epithelial tissue in the wound bed, however, there is still also a large area of macerated tissue around the periwound.  Home health is reminded that they should be applying me salt to this wound and this wound only.  They should be covering the me salt and wound bed with silver alginate to be changed daily.    8/24/23 - The patient returns today for reassessments his 3 wounds.    Wound #1 - Right posterior hip - remains clean and stable.  There are no signs and symptoms of infection and very little drainage.  We are using silver alginate on this wound.    Wound #2 - Left ischial - 2.7 x 0.5 x 0.2 cm - wound is improved significantly over his last visit on 08/09/2023.  This provider appreciate the care that home health is taking when opening the wound to cleanse in the depth of the wound.  It is obvious that they have not been over extending the wound which is allowing it to progress as it should be progressing now.  We will continue to apply silver alginate on this wound.  Wound #3 - Sacrum - we will continue to monitor the sacrum again as it has now reopened very slightly.  Wound #4 - Left medial upper thigh - this wound remains stable with no signs and symptoms of infection.  The large margin of macerated tissue remains.  Ideally, this could be surgically debrided, however, we may have to do this in clinic at some point with a scalpel.    9/6/23 - Mr. Martinez returns for f/up on the 3 wounds:  Wound #1 - Right posterior hip - remains clean and stable, continuing to decrease in size slowly.  There are no signs and symptoms of infection and very little drainage.  We will continue using silver alginate on this wound.    Wound #2 - Left ischial - 1.5 x 0.5 x 0.2 cm - wound continues to decrease in size with a very small remaining area.  This provider appreciates the care that home health continues to take when opening the wound to cleanse in the depth of  the wound.  It is obvious that they have not been over extending the wound which is allowing it to progress as it should be progressing now.  We will continue to apply silver alginate on this wound.  Wound #3 - Sacrum - has now reclosed but will continue to monitor  Wound #4 - Left medial upper thigh - this wound remains stable with no signs and symptoms of infection.  The large margin of macerated tissue remains.  A large amount of this tissue was selectively debrided off today.  We will focus on removal of the rolled edges each week.    9/20/23 - The patient returns today for followup on several wounds:  Wound #1 - Right posterior hip - This wound was mechanically debrided.  It remains clean and stable, continuing to decrease in size slowly.  There are no signs and symptoms of infection and very little drainage.  We will continue using silver alginate on this wound.    Wound #2 - Left ischial - 1.7 x 0.5 x 0.2 cm - wound remains stable with no significant change in size this visit.  The wound has no S & S of infection.  We will continue to apply silver alginate on this wound.  Wound #3 - Sacrum - has reopened.  This wound was selectively debrided today.  We will begin application of collagen and silver alginate.  Wound #4 - Left medial upper thigh - this wound remains stable with no signs and symptoms of infection. It was selectively debrided today and we will begin collagen and silver alginate.      10/4/23 - Mr. Martinez returns today for f/up on the following wounds:  Wound #1 - Right posterior hip - This wound was mechanically debrided.  It remains clean and stable, with no significant change.  There are no signs and symptoms of infection and very little drainage.  We will continue using silver alginate on this wound.    Wound #2 - Left ischial - 0.5 x 2.0 x 0.2 cm - wound remains stable with no significant change although it is slightly larger than last week.   The wound has no S & S of infection.  We will  continue to apply silver alginate on this wound.  Wound #3 - Sacrum - remains open.  This wound was selectively debrided today.  We will continue powdered collagen and silver alginate.  Wound #4 - Left medial upper thigh - this wound remains stable with no signs and symptoms of infection. It was selectively debrided today and we will begin powdered collagen and silver alginate.     10/18/23 - Mr. Martinez is seen today for the f/up on several wounds:  Wound #1 - Right posterior hip - This wound was mechanically debrided.  It remains clean and stable, with no significant change.  There are no signs and symptoms of infection and very little drainage.  We will continue using silver alginate on this wound.    Wound #2 - Left ischial - 0.5 x 1.5 x 0.2 cm - wound remains stable with no significant change although it is slightly larger than last week.   The wound has no S & S of infection.  We will continue to apply silver alginate on this wound.  Wound #3 - Sacrum - remains open.  This wound was selectively debrided today.  We will continue powdered collagen and silver alginate.  Wound #4 - Left medial upper thigh - this wound remains stable with no signs and symptoms of infection. It was mechanically debrided today and we will begin powdered collagen and silver alginate.     October 31st:  48-year-old black male, who is a quadriplegia, is here for follow up of his for pressure injuries.  The worse 1 at this time is the left ischial, left buttock.  The ulcers appear clean, without obvious secondary infection, foul odor, or discharge.  He has home health services almost every day.    11/14/23 - Mr. Martinez returns today for followup on several wounds.  All remains stable.  The left ischial does measures slightly larger, however, the wound bed is clean.  It was selectively debrided and there are no signs and symptoms of infection with this wound or any of his wounds at this time.  Wound #1 - Right posterior hip - This wound  was mechanically debrided.  It remains clean and stable, with no significant change.  There are no signs and symptoms of infection and very little drainage.  We will continue using silver alginate on this wound.    Wound #2 - Left ischial - 0.8 x 1.0 x 0.2 cm  - wound remains stable with no significant change although it is slightly larger than last week.   The wound has no S & S of infection.  We will continue to apply silver alginate on this wound.  Wound #3 - Sacrum - remains open.  We will continue silver alginate.  Wound #4 - Left medial upper thigh -  4.0 x 2.0 x 0.3 cm- this wound has increased in size slightly, but there are no signs and symptoms of infection. It was selectively debrided today and we will continue silver alginate.     12/5/23 - Mr. Martinez is seen today for f/up on his wounds.  They all remain stable, in various stages.  Today none show S & S of infection, with no increase in drainage, redness, etc.    Wound #1 - Right posterior hip - This wound was mechanically debride.  It does continue to drain a scant amount.  We will continue using silver alginate on this wound.    Wound #2 - Left ischial - 0.5 x 1.0 x 0.2 cm  - wound remains stable with a slight decrease in size.   The wound has no S & S of infection.  We will continue to apply silver alginate on this wound.  Wound #3 - Sacrum - remains open.  We will continue silver alginate.  Wound #4 - Left medial upper thigh -  2.7 x 1.8 x 0.3 cm - this wound has decreased in size and appears to be doing well.  There are no signs and symptoms of infection. It was selectively debrided today and we will continue silver alginate.     January 2, 2024:  48-year-old black male, with quadriplegia, is here for a multiple stage III and stage IV pressure injuries.  They are all under control at this time.  They are relatively shallow.  He has a good low air loss mattress at home.    1/22/24 - The patient returns today for f/up on several wounds.  Today many  of the wounds are improving.  Wound #1 - Right posterior hip - continues to progress well and has a very small remaining opening.  We will continue to use silver alginate placed over the wound.    Wound #2 - Left ischial -0.4 x 1.0 x 0.2 cm  - wound remains appears stable with a slight decrease in size.   The wound has no S & S of infection.  We will continue to apply silver alginate on this wound.  Wound #3 - Sacrum - remains open and we will continue to monitor.  We will begin application of Endoform.    Wound #4 - Left medial upper thigh -  3.2 x 3.3 x 0.3 cm - this wound has increased in size slightly.   and appears to be doing well.  There are no signs and symptoms of infection. It was selectively debrided today and we will continue silver alginate.  He does continue to have HH assistance on a daily basis.  If the left medial upper thigh does not improve by next week we will consider ultrasonic debridement.     January 30, 2024:  48-year-old black male, with quadriplegia, is here for follow up of his multiple pressure injuries, including right hip, left ischial, sacral, left medial upper thigh, and a stage III scrotal pressure ulcer.  The wounds are all clean at this time, and need no surgical debridement today.  The measurements are approximately the same as last visit.    2/20/24 - Mr. Martinez Is seen today for followup on several wounds.   Some of the wounds continue to progress well while others have deteriorated since he was last seen by this provider on January 22nd.  In particular, the left medial upper thigh has continued to deteriorate and today we are attempting to authorize a Theraskin  graft for that wound.  Wound #1 - Right posterior hip - Remains stable with no reported drainage, no S & S of infection.  We will continue to use silver alginate placed over the wound.    Wound #2 - Left ischial -0.8 x 0.5 x 0.2 cm  - wound continues to decrease in size.   The wound has no S & S of infection.  We will  continue to apply silver alginate on this wound.  Wound #3 - Sacrum - 0.3 x 0.8 x 0.4 cm - remains open and has increased in size slightly we will continue to monitor and apply silver alginate.  Wound #4 - Left medial upper thigh - 2.5 x 3.0 x 0.9 cm - this wound has increased in size slightly. There are no signs and symptoms of infection. It was selectively debrided today and we will attempt to authorize a Theraskin graft for this wound.   The left medial upper thigh will be ultrasonically debrided at his next visit.    2/29/24 - The patient returns today for f/up on wounds.  We are attempting to authorize Theraskin grafts for the patient.  In preparation, the wounds were selectively debrided using the ultrasonic debrider.  If authorized, graft prep will be done at his next visit.  Today he was instructed to begin application of mupirocin ointment to each wound, also in preparation of the process.    3/7/24 - Mr. Martinez is seen today for f/up on several wounds.  He is approved for Theraskin grafts on wound #4, the left medial upper thigh.  Wound #1 - Right posterior hip -   The wound has deteriorated greatly over the last couple of weeks.  The wound was nearly closed two weeks ago.  Today it was ultrasonically debrided and has a depth of 1.5 cm.  Wound #2 - Left ischial - 0.3 x 0.3 x 0.2 cm  - wound continues to decrease in size.   The wound has no S & S of infection.  We will continue to apply silver alginate on this wound.  Wound #3 - Sacrum - 0.3 x 0.8 x 0.2 cm - remains open and has decreased in depth slightly.  we will continue to monitor and apply silver alginate.  Wound #4 - Left medial upper thigh - 3.3 x 2.0 x 0.3 cm - this wound has continued to increase in size and depth.  Today it was selectively debrided using the ultrasonic debrider.  Theraskin graft #1 was applied.  Theraskin graft #2, 13.0 cm2 has been ordered.  Graft #1 applied was 6.0 cm2 which is too small for this wound.  We will increase in  size.     3/14/24 - Mr. Martinez returns for f/up on his wounds.  We are currently grafting the left medial upper thigh.    Wound #1 - Right posterior hip -   The wound has deteriorated continually over the last couple of weeks.  The wound was nearly closed two weeks ago.  Today a culture was obtained to determine if there is an infective process occurring causing deterioration.    Wound #2 - Left ischial - 0.2 x 0.2 x 0.2 cm  - wound continues to decrease in size.   The wound has no S & S of infection.  We will continue to apply silver alginate on this wound.  Wound #3 - Sacrum - 0.2 x 1.0 x 0.2 cm - remains open and has increased again in depth slightly.  we will continue to monitor and apply silver alginate.  Wound #4 - Left medial upper thigh - 3.3 x 2.0 x 0.3 cm - this wound has remained the same size and depth.  At his last visit, Theraskin graft #2 (13.0 cm2) was applied.  This graft was pulled as taught as possible, and could barely be secured.  There is depth to the wound, and any attempt to apply pressure to the graft to allow contact with the wound bed would cause the graft to detach.  We must have a larger graft to achieve the wound depth.     3/21/24 - We reviewed the results from the patients culture of the right posterior hip.  It has several bacteria present in the culture.  He was prescribed Bactrim and Augmentin based on those culture results.  Because of the likelihood of contamination of other wounds, we will hold off on future grafts of the left medial upper thigh at this time.  He will begin his abx regimen and we will consider resuming grafting with the infection is cleared.  He will also apply Mupirocin ointment to the sacrum and left medial upper thigh.  Mupirocin will not be put into the right posterior hip due to the difficulty of cleaning it out.      3/27/24 - The patient was prescribed abx at his last visit.  Today he reports that after taking abx x 2 days he developed GI issues and  stopped taking them.  He did begin taking them again today.  He has 8 days remaining and was strongly encouraged to follow through and complete his entire prescription.  The right hip has increased in depth.  The other wounds remains stable and/or improved.  We will continue mupirocin and silver on all wounds with the exception of the right hip, where we will use silver only.     4/3/24 -   Wound #1 - Right posterior hip -   This wound was cultured on 3/14/24.  He has completed both Clindamycin and Doxycycline.  Today it was ultrasonically debrided.  We will continue to monitor and consider additional abx if no improvement shortly.   Wound #2 - Left ischial - Disappointingly this wound had healed, but today there was another large opening, likely due to positioning and/or cleaning.   Wound #3 - Sacrum - Continues to remain stable.   Wound #4 - Left medial upper thigh - 3.3 x 2.0 x 2.0 cm - this wound is no longer being grafted due to bacteria, inability to keep the graft in place, and inability to keep feeces out of wound.  Again today there was feeces in the wound.  It was cleansed using the ultrasonic debrider.     4/17/24 -   Wound #1 - Right posterior hip -   No significant change or improvement in this wound.  Topical abx have been ordered for use on all wounds.   Wound #2 - Left ischial - very small remaining opening.  [place] any aggressive repositioning wound should be healed shortly.  Wound #3 - Sacrum - Continues to remain stable.   Wound #4 - Left medial upper thigh - 3.3 x 3.2 x 0.5 - wound continues to deteriorate.  We discussed what may have changed recently causing the deterioration and that patient does not know.  It is alarming.  X-rays have been ordered for both hips to ensure that osteomyelitis is not present.  A BMP will be ordered through home health so that an MRI can be conducted.      5/1/24 - Patient is picking up topical abx today to begin using on open wounds (Cefazolin)  Wound #1 - Right  posterior hip - slowly improving, slight decrease in depth.  Will begin using topical abx packed into wound.   Wound #2 - Left ischial - area assessed and wound is closed as of today!  Wound #3 - Sacrum - wound assessed and wound is closed as of today!  Wound #4 - Left medial upper thigh - 2.5 x 2.0 x 1.0 cm - wound length and width have decreased, depth increased slightly.  Wound was selectively debrided today.  We will begin application of topical abx on this wound daily.    An MRI was done of the left upper thigh and there is no indication of osteomyelitis at this time.    5/15/24 - We continue to use topical abx on both open wounds with no particular improvement.  The wound to the left medial upper thigh has increased in size slightly.  Neither wound appears to be infected.  Both were selectively debrided using the ultrasonic debrider. The sacrum and left ischium remain closed.   Wound #1 - Right posterior hip - 0.5 x 0.8 x 2.6 cm  Wound #4 - left medial upper thigh - 2.8 x 2.5 x 1.0 cm    Review of Systems   Constitutional: Negative.    Respiratory: Negative.     Cardiovascular: Negative.    Musculoskeletal:  Positive for gait problem.   Skin:  Positive for wound.        As documented in the HPI.   All other systems reviewed and are negative.        Objective:      Vitals:    05/15/24 1040   BP: 129/66   Pulse: 60   Resp: 18     Physical Exam  Vitals reviewed.   Constitutional:       Appearance: Normal appearance. He is normal weight.   HENT:      Head: Normocephalic.   Cardiovascular:      Rate and Rhythm: Normal rate.      Pulses: Normal pulses.   Pulmonary:      Effort: Pulmonary effort is normal.   Musculoskeletal:      Comments: quadraplegia   Skin:     General: Skin is warm and dry.      Comments: Multiple pressure injuries   Neurological:      General: No focal deficit present.      Mental Status: He is alert and oriented to person, place, and time.   Psychiatric:         Mood and Affect: Mood normal.           Altered Skin Integrity 05/23/22 1337 Right posterior Hip #1 (Active)   05/23/22 1337   Altered Skin Integrity Present on Admission - Did Patient arrive to the hospital with altered skin?: yes   Side: Right   Orientation: posterior   Location: Hip   Wound Number: #1   Is this injury device related?: No   Primary Wound Type:    Description of Altered Skin Integrity:    Ankle-Brachial Index:    Pulses:    Removal Indication and Assessment:    Wound Outcome:    (Retired) Wound Length (cm):    (Retired) Wound Width (cm):    (Retired) Depth (cm):    Wound Description (Comments):    Removal Indications:    Dressing Appearance Moist drainage 05/15/24 1042   Drainage Amount Moderate 05/15/24 1042   Drainage Characteristics/Odor Serosanguineous 05/15/24 1042   Appearance Pink;Moist 05/15/24 1042   Tissue loss description Full thickness 05/15/24 1042   Periwound Area Intact 05/15/24 1042   Wound Edges Open 05/15/24 1042   Wound Length (cm) 0.5 cm 05/15/24 1042   Wound Width (cm) 0.8 cm 05/15/24 1042   Wound Depth (cm) 2.6 cm 05/15/24 1042   Wound Volume (cm^3) 1.04 cm^3 05/15/24 1042   Wound Surface Area (cm^2) 0.4 cm^2 05/15/24 1042   Care Cleansed with:;Wound cleanser 05/15/24 1042   Dressing Applied 05/15/24 1042   Dressing Change Due 05/16/24 05/15/24 1042            Altered Skin Integrity 01/05/23 1351 Left upper;medial Thigh #4  (Active)   01/05/23 1351   Altered Skin Integrity Present on Admission - Did Patient arrive to the hospital with altered skin?: yes   Side: Left   Orientation: upper;medial   Location: Thigh   Wound Number: #4   Is this injury device related?: No   Primary Wound Type:    Description of Altered Skin Integrity:    Ankle-Brachial Index:    Pulses:    Removal Indication and Assessment:    Wound Outcome:    (Retired) Wound Length (cm):    (Retired) Wound Width (cm):    (Retired) Depth (cm):    Wound Description (Comments):    Removal Indications:    Dressing Appearance Moist drainage 05/15/24  "1042   Drainage Amount Moderate 05/15/24 1042   Drainage Characteristics/Odor Serosanguineous 05/15/24 1042   Appearance Moist;Red;Granulating 05/15/24 1042   Tissue loss description Full thickness 05/15/24 1042   Periwound Area Intact 05/15/24 1042   Wound Edges Open 05/15/24 1042   Wound Length (cm) 2.8 cm 05/15/24 1042   Wound Width (cm) 2.5 cm 05/15/24 1042   Wound Depth (cm) 1 cm 05/15/24 1042   Wound Volume (cm^3) 7 cm^3 05/15/24 1042   Wound Surface Area (cm^2) 7 cm^2 05/15/24 1042   Care Cleansed with:;Wound cleanser 05/15/24 1042   Dressing Applied 05/15/24 1042   Dressing Change Due 05/16/24 05/15/24 1042   05/15/24      Right hip - pre meagan.                                     Right hip - post meagan.       Left upper posterior thigh - pre meagan.            Left upper posterior thigh - post meagan.   (Silver alginate, 4x4 gauze, gentle border)   ML  Assessment:           ICD-10-CM ICD-9-CM   1. Stage IV pressure ulcer of right hip  L89.214 707.04     707.24   2. Pressure injury of left thigh, stage 3  L89.223 707.09     707.23   3. Quadriplegia, unspecified  G82.50 344.00           Procedures:     Debridement     Date/Time: 5/15/2024 10:36 AM     Performed by: Jessie Bah NP  Authorized by: Jessie Bah NP    Time out: Immediately prior to procedure a "time out" was called to verify the correct patient, procedure, equipment, support staff and site/side marked as required.     Consent Done?:  Yes (Verbal)     Preparation: Patient was prepped and draped with clean technique    Local anesthesia used?: No       Wound Details:    Location:  Right hip    Type of Debridement:  Non-excisional       Length (cm):  0.5       Area (sq cm):  0.4       Width (cm):  0.8       Percent Debrided (%):  100       Depth (cm):  2.6       Total Area Debrided (sq cm):  0.4    Depth of debridement:  Epidermis/Dermis    Devitalized tissue debrided:  Exudate and Fibrin    Instruments:  Ultrasound Probe (Portillo probe)  Bleeding:  " "Minimal  Hemostasis Achieved: Yes  Method Used:  Pressure     2nd Wound Details:     Location:  Left leg (Upper medial thigh)    Type of Debridement:  Non-excisional       Length (cm):  2.8       Area (sq cm):  7       Width (cm):  2.5       Percent Debrided (%):  100       Depth (cm):  1       Total Area Debrided (sq cm):  7    Depth of debridement:  Epidermis/Dermis    Devitalized tissue debrided:  Biofilm, Callus, Exudate, Fibrin and Slough    Instruments:  Curette and Ultrasound Probe (Dermal, Portillo probe)  Bleeding:  Minimal  Hemostasis Achieved: Yes    Method Used:  Pressure  Patient tolerance:  Patient tolerated the procedure well with no immediate complications       Excisional debridement performed:  [] Yes [] No   Selective debridement performed:  [x] Yes [] No   Mechanical debridement performed:  [] Yes [] No   Silver nitrate applied:    [] Yes [] No   Labs ordered this visit:   [] Yes [] No   Imaging ordered this visit:   [] Yes [] No   Tissue pathology and/or culture taken:  [] Yes [] No         MEDICATIONS    Current Outpatient Medications:     apixaban (ELIQUIS) 5 mg Tab, Take 5 mg by mouth once daily at 6am., Disp: , Rfl:     cholecalciferol, vitamin D3, 125 mcg (5,000 unit) capsule, Take 5,000 Units by mouth., Disp: , Rfl:     LINZESS 145 mcg Cap capsule, Take 145 mcg by mouth once daily., Disp: , Rfl:     mupirocin (BACTROBAN) 2 % ointment, 2 (two) times daily. Apply to affected area for five days, Disp: , Rfl:     [employer], OUTPATIENT,, cefazolin, Disp: , Rfl:    Review of patient's allergies indicates:   Allergen Reactions    Meropenem Anaphylaxis    Penicillins          HOME HEALTH AGENCY:  [employer] [facility]   TIMES PER WEEK/DAYS:  daily   ORDERS:  Right hip wound: Cleanse with wound cleanser, apply topical antibiotic powder (cefazolin) onto moistened 1/2" packing strip into wound, cover with silver alginate, and 4x4 gentle foam border dressing to be changed daily   Left " upper posterior thigh wound: Cleanse with wound cleanser, apply topical antibotic powder (cefazolin) onto wound bed, cover with silver alginate, and 6x6 gentle foam border dressing to be changed daily        Follow up in about 2 weeks (around 5/29/2024) for Provider Visit.

## 2024-05-15 NOTE — PROCEDURES
"Debridement    Date/Time: 5/15/2024 10:36 AM    Performed by: Jessie Bah NP  Authorized by: Jessie Bah NP    Time out: Immediately prior to procedure a "time out" was called to verify the correct patient, procedure, equipment, support staff and site/side marked as required.    Consent Done?:  Yes (Verbal)    Preparation: Patient was prepped and draped with clean technique    Local anesthesia used?: No      Wound Details:    Location:  Right hip    Type of Debridement:  Non-excisional       Length (cm):  0.5       Area (sq cm):  0.4       Width (cm):  0.8       Percent Debrided (%):  100       Depth (cm):  2.6       Total Area Debrided (sq cm):  0.4    Depth of debridement:  Epidermis/Dermis    Devitalized tissue debrided:  Exudate and Fibrin    Instruments:  Ultrasound Probe (Portillo probe)  Bleeding:  Minimal  Hemostasis Achieved: Yes  Method Used:  Pressure    2nd Wound Details:     Location:  Left leg (Upper medial thigh)    Type of Debridement:  Non-excisional       Length (cm):  2.8       Area (sq cm):  7       Width (cm):  2.5       Percent Debrided (%):  100       Depth (cm):  1       Total Area Debrided (sq cm):  7    Depth of debridement:  Epidermis/Dermis    Devitalized tissue debrided:  Biofilm, Callus, Exudate, Fibrin and Slough    Instruments:  Curette and Ultrasound Probe (Dermal, Portillo probe)  Bleeding:  Minimal  Hemostasis Achieved: Yes    Method Used:  Pressure  Patient tolerance:  Patient tolerated the procedure well with no immediate complications    "

## 2024-05-29 ENCOUNTER — HOSPITAL ENCOUNTER (OUTPATIENT)
Dept: WOUND CARE | Facility: HOSPITAL | Age: 49
Discharge: HOME | End: 2024-05-29
Attending: NURSE PRACTITIONER
Payer: MEDICARE

## 2024-05-29 VITALS
WEIGHT: 175.06 LBS | HEIGHT: 65 IN | HEART RATE: 45 BPM | RESPIRATION RATE: 19 BRPM | DIASTOLIC BLOOD PRESSURE: 77 MMHG | SYSTOLIC BLOOD PRESSURE: 180 MMHG | BODY MASS INDEX: 29.17 KG/M2

## 2024-05-29 DIAGNOSIS — G82.50 QUADRIPLEGIA, UNSPECIFIED: ICD-10-CM

## 2024-05-29 DIAGNOSIS — L89.223 PRESSURE INJURY OF LEFT THIGH, STAGE 3: ICD-10-CM

## 2024-05-29 DIAGNOSIS — L89.214 STAGE IV PRESSURE ULCER OF RIGHT HIP: Primary | ICD-10-CM

## 2024-05-29 PROCEDURE — 97598 DBRDMT OPN WND ADDL 20CM/<: CPT

## 2024-05-29 PROCEDURE — 27000999 HC MEDICAL RECORD PHOTO DOCUMENTATION

## 2024-05-29 PROCEDURE — 99213 OFFICE O/P EST LOW 20 MIN: CPT

## 2024-05-29 PROCEDURE — 97597 DBRDMT OPN WND 1ST 20 CM/<: CPT

## 2024-05-29 NOTE — PROGRESS NOTES
Home Health: [facility]   Smoker   [] Yes  [x] No  Diabetic   [] Yes   [x] No  Low air loss mattress [x] Yes [] No   Is the patient eligible for a graft, and/or currently grafting?  [] Yes [x] No         Subjective:       Patient ID: Diego Martinez Jr. is a 48 y.o. male.    Chief Complaint: Pressure Ulcer (Right hip - Stage 4/Left medial upper thigh - Stage 3)    HPI   Mr. Martinez is a long time patient of [facility].    He comes via wheelchair with a care attendant present with him at all times.    He does have a history of quadriplegia.  He has home health who assist him with his wound care daily and care attendants to provide care at other times.  He has a very pleasant demeanor and added to an is usually jovial in spite of his challenges.  He now has [employer].    8/9/23 - Mr. Martinez returns today to be seen for three wounds:  Wound #1 - Right posterior hip - this wound continues to remains stable.  There is still some depth, however, there is very little drainage and no signs and symptoms of infection.  We will continue to pack this wound with silver alginate.  Wound #2 - Left ischial, this wound remains at 3.0 cm which is a significant increase over the measurement of 0.5 cm on 07/12/2023.  Increase in size is due to the forcing open of the wound during the cleansing process.  Home health has been reminded that they should open that wound very carefully so is not to over extend the very fragile epithelial tissue.  Additionally, the orders were to use silver alginate on this wound and today mesalt was removed.  They will be reminded they should use the appropriate dressing on the appropriate wounds and me salt should not be used in this wound.  Wound #3 - Sacrum; we are continuing to monitor this area, however, there is no open wound at this time.  It will be reopened if necessary.  Wound #4 - Left medial upper thigh - this wound was selectively debrided today with removal a  moderate amount of slough from the wound bed.  There is a large island of epithelial tissue in the wound bed, however, there is still also a large area of macerated tissue around the periwound.  Home health is reminded that they should be applying me salt to this wound and this wound only.  They should be covering the me salt and wound bed with silver alginate to be changed daily.    8/24/23 - The patient returns today for reassessments his 3 wounds.    Wound #1 - Right posterior hip - remains clean and stable.  There are no signs and symptoms of infection and very little drainage.  We are using silver alginate on this wound.    Wound #2 - Left ischial - 2.7 x 0.5 x 0.2 cm - wound is improved significantly over his last visit on 08/09/2023.  This provider appreciate the care that home health is taking when opening the wound to cleanse in the depth of the wound.  It is obvious that they have not been over extending the wound which is allowing it to progress as it should be progressing now.  We will continue to apply silver alginate on this wound.  Wound #3 - Sacrum - we will continue to monitor the sacrum again as it has now reopened very slightly.  Wound #4 - Left medial upper thigh - this wound remains stable with no signs and symptoms of infection.  The large margin of macerated tissue remains.  Ideally, this could be surgically debrided, however, we may have to do this in clinic at some point with a scalpel.    9/6/23 - Mr. Martinez returns for f/up on the 3 wounds:  Wound #1 - Right posterior hip - remains clean and stable, continuing to decrease in size slowly.  There are no signs and symptoms of infection and very little drainage.  We will continue using silver alginate on this wound.    Wound #2 - Left ischial - 1.5 x 0.5 x 0.2 cm - wound continues to decrease in size with a very small remaining area.  This provider appreciates the care that home health continues to take when opening the wound to cleanse in the  depth of the wound.  It is obvious that they have not been over extending the wound which is allowing it to progress as it should be progressing now.  We will continue to apply silver alginate on this wound.  Wound #3 - Sacrum - has now reclosed but will continue to monitor  Wound #4 - Left medial upper thigh - this wound remains stable with no signs and symptoms of infection.  The large margin of macerated tissue remains.  A large amount of this tissue was selectively debrided off today.  We will focus on removal of the rolled edges each week.    9/20/23 - The patient returns today for followup on several wounds:  Wound #1 - Right posterior hip - This wound was mechanically debrided.  It remains clean and stable, continuing to decrease in size slowly.  There are no signs and symptoms of infection and very little drainage.  We will continue using silver alginate on this wound.    Wound #2 - Left ischial - 1.7 x 0.5 x 0.2 cm - wound remains stable with no significant change in size this visit.  The wound has no S & S of infection.  We will continue to apply silver alginate on this wound.  Wound #3 - Sacrum - has reopened.  This wound was selectively debrided today.  We will begin application of collagen and silver alginate.  Wound #4 - Left medial upper thigh - this wound remains stable with no signs and symptoms of infection. It was selectively debrided today and we will begin collagen and silver alginate.      10/4/23 - Mr. Martinez returns today for f/up on the following wounds:  Wound #1 - Right posterior hip - This wound was mechanically debrided.  It remains clean and stable, with no significant change.  There are no signs and symptoms of infection and very little drainage.  We will continue using silver alginate on this wound.    Wound #2 - Left ischial - 0.5 x 2.0 x 0.2 cm - wound remains stable with no significant change although it is slightly larger than last week.   The wound has no S & S of infection.  We  will continue to apply silver alginate on this wound.  Wound #3 - Sacrum - remains open.  This wound was selectively debrided today.  We will continue powdered collagen and silver alginate.  Wound #4 - Left medial upper thigh - this wound remains stable with no signs and symptoms of infection. It was selectively debrided today and we will begin powdered collagen and silver alginate.     10/18/23 - Mr. Martinez is seen today for the f/up on several wounds:  Wound #1 - Right posterior hip - This wound was mechanically debrided.  It remains clean and stable, with no significant change.  There are no signs and symptoms of infection and very little drainage.  We will continue using silver alginate on this wound.    Wound #2 - Left ischial - 0.5 x 1.5 x 0.2 cm - wound remains stable with no significant change although it is slightly larger than last week.   The wound has no S & S of infection.  We will continue to apply silver alginate on this wound.  Wound #3 - Sacrum - remains open.  This wound was selectively debrided today.  We will continue powdered collagen and silver alginate.  Wound #4 - Left medial upper thigh - this wound remains stable with no signs and symptoms of infection. It was mechanically debrided today and we will begin powdered collagen and silver alginate.     October 31st:  48-year-old black male, who is a quadriplegia, is here for follow up of his for pressure injuries.  The worse 1 at this time is the left ischial, left buttock.  The ulcers appear clean, without obvious secondary infection, foul odor, or discharge.  He has home health services almost every day.    11/14/23 - Mr. Martinez returns today for followup on several wounds.  All remains stable.  The left ischial does measures slightly larger, however, the wound bed is clean.  It was selectively debrided and there are no signs and symptoms of infection with this wound or any of his wounds at this time.  Wound #1 - Right posterior hip - This  wound was mechanically debrided.  It remains clean and stable, with no significant change.  There are no signs and symptoms of infection and very little drainage.  We will continue using silver alginate on this wound.    Wound #2 - Left ischial - 0.8 x 1.0 x 0.2 cm  - wound remains stable with no significant change although it is slightly larger than last week.   The wound has no S & S of infection.  We will continue to apply silver alginate on this wound.  Wound #3 - Sacrum - remains open.  We will continue silver alginate.  Wound #4 - Left medial upper thigh -  4.0 x 2.0 x 0.3 cm- this wound has increased in size slightly, but there are no signs and symptoms of infection. It was selectively debrided today and we will continue silver alginate.     12/5/23 - Mr. Martinez is seen today for f/up on his wounds.  They all remain stable, in various stages.  Today none show S & S of infection, with no increase in drainage, redness, etc.    Wound #1 - Right posterior hip - This wound was mechanically debride.  It does continue to drain a scant amount.  We will continue using silver alginate on this wound.    Wound #2 - Left ischial - 0.5 x 1.0 x 0.2 cm  - wound remains stable with a slight decrease in size.   The wound has no S & S of infection.  We will continue to apply silver alginate on this wound.  Wound #3 - Sacrum - remains open.  We will continue silver alginate.  Wound #4 - Left medial upper thigh -  2.7 x 1.8 x 0.3 cm - this wound has decreased in size and appears to be doing well.  There are no signs and symptoms of infection. It was selectively debrided today and we will continue silver alginate.     January 2, 2024:  48-year-old black male, with quadriplegia, is here for a multiple stage III and stage IV pressure injuries.  They are all under control at this time.  They are relatively shallow.  He has a good low air loss mattress at home.    1/22/24 - The patient returns today for f/up on several wounds.  Today  many of the wounds are improving.  Wound #1 - Right posterior hip - continues to progress well and has a very small remaining opening.  We will continue to use silver alginate placed over the wound.    Wound #2 - Left ischial -0.4 x 1.0 x 0.2 cm  - wound remains appears stable with a slight decrease in size.   The wound has no S & S of infection.  We will continue to apply silver alginate on this wound.  Wound #3 - Sacrum - remains open and we will continue to monitor.  We will begin application of Endoform.    Wound #4 - Left medial upper thigh -  3.2 x 3.3 x 0.3 cm - this wound has increased in size slightly.   and appears to be doing well.  There are no signs and symptoms of infection. It was selectively debrided today and we will continue silver alginate.  He does continue to have HH assistance on a daily basis.  If the left medial upper thigh does not improve by next week we will consider ultrasonic debridement.     January 30, 2024:  48-year-old black male, with quadriplegia, is here for follow up of his multiple pressure injuries, including right hip, left ischial, sacral, left medial upper thigh, and a stage III scrotal pressure ulcer.  The wounds are all clean at this time, and need no surgical debridement today.  The measurements are approximately the same as last visit.    2/20/24 - Mr. Martinez Is seen today for followup on several wounds.   Some of the wounds continue to progress well while others have deteriorated since he was last seen by this provider on January 22nd.  In particular, the left medial upper thigh has continued to deteriorate and today we are attempting to authorize a Theraskin  graft for that wound.  Wound #1 - Right posterior hip - Remains stable with no reported drainage, no S & S of infection.  We will continue to use silver alginate placed over the wound.    Wound #2 - Left ischial -0.8 x 0.5 x 0.2 cm  - wound continues to decrease in size.   The wound has no S & S of infection.  We  will continue to apply silver alginate on this wound.  Wound #3 - Sacrum - 0.3 x 0.8 x 0.4 cm - remains open and has increased in size slightly we will continue to monitor and apply silver alginate.  Wound #4 - Left medial upper thigh - 2.5 x 3.0 x 0.9 cm - this wound has increased in size slightly. There are no signs and symptoms of infection. It was selectively debrided today and we will attempt to authorize a Theraskin graft for this wound.   The left medial upper thigh will be ultrasonically debrided at his next visit.    2/29/24 - The patient returns today for f/up on wounds.  We are attempting to authorize Theraskin grafts for the patient.  In preparation, the wounds were selectively debrided using the ultrasonic debrider.  If authorized, graft prep will be done at his next visit.  Today he was instructed to begin application of mupirocin ointment to each wound, also in preparation of the process.    3/7/24 - Mr. Martinez is seen today for f/up on several wounds.  He is approved for Theraskin grafts on wound #4, the left medial upper thigh.  Wound #1 - Right posterior hip -   The wound has deteriorated greatly over the last couple of weeks.  The wound was nearly closed two weeks ago.  Today it was ultrasonically debrided and has a depth of 1.5 cm.  Wound #2 - Left ischial - 0.3 x 0.3 x 0.2 cm  - wound continues to decrease in size.   The wound has no S & S of infection.  We will continue to apply silver alginate on this wound.  Wound #3 - Sacrum - 0.3 x 0.8 x 0.2 cm - remains open and has decreased in depth slightly.  we will continue to monitor and apply silver alginate.  Wound #4 - Left medial upper thigh - 3.3 x 2.0 x 0.3 cm - this wound has continued to increase in size and depth.  Today it was selectively debrided using the ultrasonic debrider.  Theraskin graft #1 was applied.  Theraskin graft #2, 13.0 cm2 has been ordered.  Graft #1 applied was 6.0 cm2 which is too small for this wound.  We will increase  in size.     3/14/24 - Mr. Martinez returns for f/up on his wounds.  We are currently grafting the left medial upper thigh.    Wound #1 - Right posterior hip -   The wound has deteriorated continually over the last couple of weeks.  The wound was nearly closed two weeks ago.  Today a culture was obtained to determine if there is an infective process occurring causing deterioration.    Wound #2 - Left ischial - 0.2 x 0.2 x 0.2 cm  - wound continues to decrease in size.   The wound has no S & S of infection.  We will continue to apply silver alginate on this wound.  Wound #3 - Sacrum - 0.2 x 1.0 x 0.2 cm - remains open and has increased again in depth slightly.  we will continue to monitor and apply silver alginate.  Wound #4 - Left medial upper thigh - 3.3 x 2.0 x 0.3 cm - this wound has remained the same size and depth.  At his last visit, Theraskin graft #2 (13.0 cm2) was applied.  This graft was pulled as taught as possible, and could barely be secured.  There is depth to the wound, and any attempt to apply pressure to the graft to allow contact with the wound bed would cause the graft to detach.  We must have a larger graft to achieve the wound depth.     3/21/24 - We reviewed the results from the patients culture of the right posterior hip.  It has several bacteria present in the culture.  He was prescribed Bactrim and Augmentin based on those culture results.  Because of the likelihood of contamination of other wounds, we will hold off on future grafts of the left medial upper thigh at this time.  He will begin his abx regimen and we will consider resuming grafting with the infection is cleared.  He will also apply Mupirocin ointment to the sacrum and left medial upper thigh.  Mupirocin will not be put into the right posterior hip due to the difficulty of cleaning it out.      3/27/24 - The patient was prescribed abx at his last visit.  Today he reports that after taking abx x 2 days he developed GI issues and  stopped taking them.  He did begin taking them again today.  He has 8 days remaining and was strongly encouraged to follow through and complete his entire prescription.  The right hip has increased in depth.  The other wounds remains stable and/or improved.  We will continue mupirocin and silver on all wounds with the exception of the right hip, where we will use silver only.     4/3/24 -   Wound #1 - Right posterior hip -   This wound was cultured on 3/14/24.  He has completed both Clindamycin and Doxycycline.  Today it was ultrasonically debrided.  We will continue to monitor and consider additional abx if no improvement shortly.   Wound #2 - Left ischial - Disappointingly this wound had healed, but today there was another large opening, likely due to positioning and/or cleaning.   Wound #3 - Sacrum - Continues to remain stable.   Wound #4 - Left medial upper thigh - 3.3 x 2.0 x 2.0 cm - this wound is no longer being grafted due to bacteria, inability to keep the graft in place, and inability to keep feeces out of wound.  Again today there was feeces in the wound.  It was cleansed using the ultrasonic debrider.     4/17/24 -   Wound #1 - Right posterior hip -   No significant change or improvement in this wound.  Topical abx have been ordered for use on all wounds.   Wound #2 - Left ischial - very small remaining opening.  [place] any aggressive repositioning wound should be healed shortly.  Wound #3 - Sacrum - Continues to remain stable.   Wound #4 - Left medial upper thigh - 3.3 x 3.2 x 0.5 - wound continues to deteriorate.  We discussed what may have changed recently causing the deterioration and that patient does not know.  It is alarming.  X-rays have been ordered for both hips to ensure that osteomyelitis is not present.  A BMP will be ordered through home health so that an MRI can be conducted.      5/1/24 - Patient is picking up topical abx today to begin using on open wounds (Cefazolin)  Wound #1 - Right  posterior hip - slowly improving, slight decrease in depth.  Will begin using topical abx packed into wound.   Wound #2 - Left ischial - area assessed and wound is closed as of today!  Wound #3 - Sacrum - wound assessed and wound is closed as of today!  Wound #4 - Left medial upper thigh - 2.5 x 2.0 x 1.0 cm - wound length and width have decreased, depth increased slightly.  Wound was selectively debrided today.  We will begin application of topical abx on this wound daily.    An MRI was done of the left upper thigh and there is no indication of osteomyelitis at this time.    5/15/24 - We continue to use topical abx on both open wounds with no particular improvement.  The wound to the left medial upper thigh has increased in size slightly.  Neither wound appears to be infected.  Both were selectively debrided using the ultrasonic debrider. The sacrum and left ischium remain closed.   Wound #1 - Right posterior hip - 0.5 x 0.8 x 2.6 cm  Wound #4 - left medial upper thigh - 2.8 x 2.5 x 1.0 cm    5/29/24 - The patient returns today for f/up on two active wounds.  We have been using topical abx since 5/1/24.  Today those abx were d/c.  Both wounds were selectively debrided using the ultrasonic debrider.  The left medial upper thigh wound remains concerning due to frequent and regular exposure to feces.  Fecal contamination is making it extremely difficult to make progress with this wound.  The tunneling wound at the right hip also continues to worsen in spite of the use of abx.  The patient does have Home Health services to assist with his wounds.       Review of Systems   Constitutional: Negative.    Respiratory: Negative.     Cardiovascular: Negative.    Musculoskeletal:  Positive for gait problem.   Skin:  Positive for wound.        As documented in the HPI.   All other systems reviewed and are negative.        Objective:      Vitals:    05/29/24 1018   BP: (!) 180/77   Pulse: (!) 45   Resp: 19     Physical  Exam  Vitals reviewed.   Constitutional:       Appearance: Normal appearance. He is normal weight.   HENT:      Head: Normocephalic.   Cardiovascular:      Rate and Rhythm: Normal rate.      Pulses: Normal pulses.   Pulmonary:      Effort: Pulmonary effort is normal.   Musculoskeletal:      Comments: quadraplegia   Skin:     General: Skin is warm and dry.      Comments: Multiple pressure injuries   Neurological:      General: No focal deficit present.      Mental Status: He is alert and oriented to person, place, and time.   Psychiatric:         Mood and Affect: Mood normal.            Altered Skin Integrity 05/23/22 1337 Right posterior Hip #1 (Active)   05/23/22 1337   Altered Skin Integrity Present on Admission - Did Patient arrive to the hospital with altered skin?: yes   Side: Right   Orientation: posterior   Location: Hip   Wound Number: #1   Is this injury device related?: No   Primary Wound Type:    Description of Altered Skin Integrity:    Ankle-Brachial Index:    Pulses:    Removal Indication and Assessment:    Wound Outcome:    (Retired) Wound Length (cm):    (Retired) Wound Width (cm):    (Retired) Depth (cm):    Wound Description (Comments):    Removal Indications:    Dressing Appearance Moist drainage 05/29/24 1020   Drainage Amount Moderate 05/29/24 1020   Drainage Characteristics/Odor Serosanguineous 05/29/24 1020   Appearance Moist;Pink 05/29/24 1020   Tissue loss description Full thickness 05/29/24 1020   Periwound Area Intact 05/29/24 1020   Wound Edges Open 05/29/24 1020   Wound Length (cm) 0.5 cm 05/29/24 1020   Wound Width (cm) 0.6 cm 05/29/24 1020   Wound Depth (cm) 0.8 cm 05/29/24 1020   Wound Volume (cm^3) 0.24 cm^3 05/29/24 1020   Wound Surface Area (cm^2) 0.3 cm^2 05/29/24 1020   Care Cleansed with:;Wound cleanser 05/29/24 1020   Dressing Applied;Other (comment) 05/29/24 1020   Dressing Change Due 05/30/24 05/29/24 1020            Altered Skin Integrity 01/05/23 1351 Left upper;medial  "Thigh #4  (Active)   01/05/23 1351   Altered Skin Integrity Present on Admission - Did Patient arrive to the hospital with altered skin?: yes   Side: Left   Orientation: upper;medial   Location: Thigh   Wound Number: #4   Is this injury device related?: No   Primary Wound Type:    Description of Altered Skin Integrity:    Ankle-Brachial Index:    Pulses:    Removal Indication and Assessment:    Wound Outcome:    (Retired) Wound Length (cm):    (Retired) Wound Width (cm):    (Retired) Depth (cm):    Wound Description (Comments):    Removal Indications:    Dressing Appearance Moist drainage 05/29/24 1020   Drainage Amount Moderate 05/29/24 1020   Drainage Characteristics/Odor Serosanguineous 05/29/24 1020   Appearance Moist;Red;Granulating 05/29/24 1020   Tissue loss description Full thickness 05/29/24 1020   Periwound Area Intact;Moist 05/29/24 1020   Wound Edges Open 05/29/24 1020   Wound Length (cm) 2.7 cm 05/29/24 1020   Wound Width (cm) 3 cm 05/29/24 1020   Wound Depth (cm) 3 cm 05/29/24 1020   Wound Volume (cm^3) 24.3 cm^3 05/29/24 1020   Wound Surface Area (cm^2) 8.1 cm^2 05/29/24 1020   Care Cleansed with:;Wound cleanser 05/29/24 1020   Dressing Applied;Other (comment) 05/29/24 1020   Dressing Change Due 05/30/24 05/29/24 1020     5/29/24      Right hip        Post debridement  plain 1/4" packing strip, silver alginate, 4x4 gentle foam border dressing   CT        Left upper posterior thigh       Post debridement  Silver alginate, 6x6 gentle foam border dressing  CT    Assessment:         ICD-10-CM ICD-9-CM   1. Stage IV pressure ulcer of right hip  L89.214 707.04     707.24   2. Pressure injury of left thigh, stage 3  L89.223 707.09     707.23   3. Quadriplegia, unspecified  G82.50 344.00         Procedures:          Debridement     Date/Time: 5/29/2024 10:08 AM     Performed by: Jessie Bah NP  Authorized by: Jessie Bah NP  Associated wounds:        Altered Skin Integrity 05/23/22 1337 Right " "posterior Hip #1       Altered Skin Integrity 01/05/23 1351 Left upper;medial Thigh #4   Time out: Immediately prior to procedure a "time out" was called to verify the correct patient, procedure, equipment, support staff and site/side marked as required.        Preparation: Patient was prepped and draped with clean technique    Local anesthesia used?: No       Wound Details:    Location:  Right hip    Type of Debridement:  Non-excisional       Length (cm):  0.5       Area (sq cm):  0.3       Width (cm):  0.6       Percent Debrided (%):  100       Depth (cm):  0.8       Total Area Debrided (sq cm):  0.3    Depth of debridement:  Epidermis/Dermis    Devitalized tissue debrided:  Biofilm, Exudate and Fibrin    Instruments:  Ultrasound Probe (Portillo probe)  Bleeding:  Minimal  Hemostasis Achieved: Yes  Method Used:  Pressure     2nd Wound Details:     Location:  Left leg (upper medial thigh)    Type of Debridement:  Non-excisional       Length (cm):  2.7       Area (sq cm):  8.1       Width (cm):  3       Depth (cm):  3    Depth of debridement:  Epidermis/Dermis    Devitalized tissue debrided:  Biofilm, Callus, Exudate, Fibrin and Slough    Instruments:  Ultrasound Probe (Dermal curette)  Bleeding:  Moderate  Hemostasis Achieved: Yes    Method Used:  Pressure  Patient tolerance:  Patient tolerated the procedure well with no immediate complications          Excisional debridement performed:  [] Yes [] No   Selective debridement performed:  [x] Yes [] No   Mechanical debridement performed:  [] Yes [] No   Silver nitrate applied:    [] Yes [] No   Labs ordered this visit:   [] Yes [] No   Imaging ordered this visit:   [] Yes [] No   Tissue pathology and/or culture taken:  [] Yes [] No         MEDICATIONS    Current Outpatient Medications:     apixaban (ELIQUIS) 5 mg Tab, Take 5 mg by mouth once daily at 6am., Disp: , Rfl:     cholecalciferol, vitamin D3, 125 mcg (5,000 unit) capsule, Take 5,000 Units by mouth., Disp: , Rfl: " "    LINZESS 145 mcg Cap capsule, Take 145 mcg by mouth once daily., Disp: , Rfl:     mupirocin (BACTROBAN) 2 % ointment, 2 (two) times daily. Apply to affected area for five days, Disp: , Rfl:     [employer], OUTPATIENT,, cefazolin, Disp: , Rfl:    Review of patient's allergies indicates:   Allergen Reactions    Meropenem Anaphylaxis    Penicillins          [facility]   TIMES PER WEEK/DAYS:  daily   ORDERS:  Right hip wound: Cleanse with wound cleanser, apply plain 1/4" packing strip into wound, cover with silver alginate, and 4x4 gentle foam border dressing to be changed daily   Left upper posterior thigh wound: Cleanse with wound cleanser, apply silver alginate, and 6x6 gentle foam border dressing to be changed daily        Follow up in 2 weeks (on 6/12/2024) for hip/thigh - stretcher .        "

## 2024-06-11 ENCOUNTER — HOSPITAL ENCOUNTER (OUTPATIENT)
Dept: WOUND CARE | Facility: HOSPITAL | Age: 49
Discharge: HOME | End: 2024-06-11
Attending: NURSE PRACTITIONER
Payer: MEDICARE

## 2024-06-11 VITALS
HEIGHT: 65 IN | SYSTOLIC BLOOD PRESSURE: 135 MMHG | WEIGHT: 175.06 LBS | DIASTOLIC BLOOD PRESSURE: 86 MMHG | RESPIRATION RATE: 18 BRPM | HEART RATE: 56 BPM | BODY MASS INDEX: 29.17 KG/M2

## 2024-06-11 DIAGNOSIS — L89.223 PRESSURE INJURY OF LEFT THIGH, STAGE 3: ICD-10-CM

## 2024-06-11 DIAGNOSIS — L89.154 PRESSURE INJURY OF SACRAL REGION, STAGE 4: ICD-10-CM

## 2024-06-11 DIAGNOSIS — G82.50 QUADRIPLEGIA, UNSPECIFIED: ICD-10-CM

## 2024-06-11 DIAGNOSIS — L89.214 STAGE IV PRESSURE ULCER OF RIGHT HIP: Primary | ICD-10-CM

## 2024-06-11 PROCEDURE — 27000999 HC MEDICAL RECORD PHOTO DOCUMENTATION

## 2024-06-11 PROCEDURE — 99213 OFFICE O/P EST LOW 20 MIN: CPT

## 2024-06-11 NOTE — PROGRESS NOTES
Home Health: [facility]   Smoker   [] Yes  [x] No  Diabetic   [] Yes   [x] No  Low air loss mattress [x] Yes [] No   Is the patient eligible for a graft, and/or currently grafting?  [] Yes [x] No       Subjective:       Patient ID: Diego Martinez Jr. is a 48 y.o. male.    Chief Complaint: Pressure Ulcer ((Right hip - Stage 4/Left medial upper thigh - Stage 3))    HPI   Mr. Martinez is a long time patient of [facility].    He comes via wheelchair with a care attendant present with him at all times.    He does have a history of quadriplegia.  He has home health who assist him with his wound care daily and care attendants to provide care at other times.  He has a very pleasant demeanor and added to an is usually jovial in spite of his challenges.  [facility]    8/9/23 - Mr. Martinez returns today to be seen for three wounds:  Wound #1 - Right posterior hip - this wound continues to remains stable.  There is still some depth, however, there is very little drainage and no signs and symptoms of infection.  We will continue to pack this wound with silver alginate.  Wound #2 - Left ischial, this wound remains at 3.0 cm which is a significant increase over the measurement of 0.5 cm on 07/12/2023.  Increase in size is due to the forcing open of the wound during the cleansing process.  Home health has been reminded that they should open that wound very carefully so is not to over extend the very fragile epithelial tissue.  Additionally, the orders were to use silver alginate on this wound and today mesalt was removed.  They will be reminded they should use the appropriate dressing on the appropriate wounds and me salt should not be used in this wound.  Wound #3 - Sacrum; we are continuing to monitor this area, however, there is no open wound at this time.  It will be reopened if necessary.  Wound #4 - Left medial upper thigh - this wound was selectively debrided today with removal a  moderate amount of slough from the wound bed.  There is a large island of epithelial tissue in the wound bed, however, there is still also a large area of macerated tissue around the periwound.  Home health is reminded that they should be applying me salt to this wound and this wound only.  They should be covering the me salt and wound bed with silver alginate to be changed daily.    8/24/23 - The patient returns today for reassessments his 3 wounds.    Wound #1 - Right posterior hip - remains clean and stable.  There are no signs and symptoms of infection and very little drainage.  We are using silver alginate on this wound.    Wound #2 - Left ischial - 2.7 x 0.5 x 0.2 cm - wound is improved significantly over his last visit on 08/09/2023.  This provider appreciate the care that home health is taking when opening the wound to cleanse in the depth of the wound.  It is obvious that they have not been over extending the wound which is allowing it to progress as it should be progressing now.  We will continue to apply silver alginate on this wound.  Wound #3 - Sacrum - we will continue to monitor the sacrum again as it has now reopened very slightly.  Wound #4 - Left medial upper thigh - this wound remains stable with no signs and symptoms of infection.  The large margin of macerated tissue remains.  Ideally, this could be surgically debrided, however, we may have to do this in clinic at some point with a scalpel.    9/6/23 - Mr. Martinez returns for f/up on the 3 wounds:  Wound #1 - Right posterior hip - remains clean and stable, continuing to decrease in size slowly.  There are no signs and symptoms of infection and very little drainage.  We will continue using silver alginate on this wound.    Wound #2 - Left ischial - 1.5 x 0.5 x 0.2 cm - wound continues to decrease in size with a very small remaining area.  This provider appreciates the care that home health continues to take when opening the wound to cleanse in the  depth of the wound.  It is obvious that they have not been over extending the wound which is allowing it to progress as it should be progressing now.  We will continue to apply silver alginate on this wound.  Wound #3 - Sacrum - has now reclosed but will continue to monitor  Wound #4 - Left medial upper thigh - this wound remains stable with no signs and symptoms of infection.  The large margin of macerated tissue remains.  A large amount of this tissue was selectively debrided off today.  We will focus on removal of the rolled edges each week.    9/20/23 - The patient returns today for followup on several wounds:  Wound #1 - Right posterior hip - This wound was mechanically debrided.  It remains clean and stable, continuing to decrease in size slowly.  There are no signs and symptoms of infection and very little drainage.  We will continue using silver alginate on this wound.    Wound #2 - Left ischial - 1.7 x 0.5 x 0.2 cm - wound remains stable with no significant change in size this visit.  The wound has no S & S of infection.  We will continue to apply silver alginate on this wound.  Wound #3 - Sacrum - has reopened.  This wound was selectively debrided today.  We will begin application of collagen and silver alginate.  Wound #4 - Left medial upper thigh - this wound remains stable with no signs and symptoms of infection. It was selectively debrided today and we will begin collagen and silver alginate.      10/4/23 - Mr. Martinez returns today for f/up on the following wounds:  Wound #1 - Right posterior hip - This wound was mechanically debrided.  It remains clean and stable, with no significant change.  There are no signs and symptoms of infection and very little drainage.  We will continue using silver alginate on this wound.    Wound #2 - Left ischial - 0.5 x 2.0 x 0.2 cm - wound remains stable with no significant change although it is slightly larger than last week.   The wound has no S & S of infection.  We  will continue to apply silver alginate on this wound.  Wound #3 - Sacrum - remains open.  This wound was selectively debrided today.  We will continue powdered collagen and silver alginate.  Wound #4 - Left medial upper thigh - this wound remains stable with no signs and symptoms of infection. It was selectively debrided today and we will begin powdered collagen and silver alginate.     10/18/23 - Mr. Martinez is seen today for the f/up on several wounds:  Wound #1 - Right posterior hip - This wound was mechanically debrided.  It remains clean and stable, with no significant change.  There are no signs and symptoms of infection and very little drainage.  We will continue using silver alginate on this wound.    Wound #2 - Left ischial - 0.5 x 1.5 x 0.2 cm - wound remains stable with no significant change although it is slightly larger than last week.   The wound has no S & S of infection.  We will continue to apply silver alginate on this wound.  Wound #3 - Sacrum - remains open.  This wound was selectively debrided today.  We will continue powdered collagen and silver alginate.  Wound #4 - Left medial upper thigh - this wound remains stable with no signs and symptoms of infection. It was mechanically debrided today and we will begin powdered collagen and silver alginate.     October 31st:  48-year-old black male, who is a quadriplegia, is here for follow up of his for pressure injuries.  The worse 1 at this time is the left ischial, left buttock.  The ulcers appear clean, without obvious secondary infection, foul odor, or discharge.  He has home health services almost every day.    11/14/23 - Mr. Martinez returns today for followup on several wounds.  All remains stable.  The left ischial does measures slightly larger, however, the wound bed is clean.  It was selectively debrided and there are no signs and symptoms of infection with this wound or any of his wounds at this time.  Wound #1 - Right posterior hip - This  wound was mechanically debrided.  It remains clean and stable, with no significant change.  There are no signs and symptoms of infection and very little drainage.  We will continue using silver alginate on this wound.    Wound #2 - Left ischial - 0.8 x 1.0 x 0.2 cm  - wound remains stable with no significant change although it is slightly larger than last week.   The wound has no S & S of infection.  We will continue to apply silver alginate on this wound.  Wound #3 - Sacrum - remains open.  We will continue silver alginate.  Wound #4 - Left medial upper thigh -  4.0 x 2.0 x 0.3 cm- this wound has increased in size slightly, but there are no signs and symptoms of infection. It was selectively debrided today and we will continue silver alginate.     12/5/23 - Mr. Martinez is seen today for f/up on his wounds.  They all remain stable, in various stages.  Today none show S & S of infection, with no increase in drainage, redness, etc.    Wound #1 - Right posterior hip - This wound was mechanically debride.  It does continue to drain a scant amount.  We will continue using silver alginate on this wound.    Wound #2 - Left ischial - 0.5 x 1.0 x 0.2 cm  - wound remains stable with a slight decrease in size.   The wound has no S & S of infection.  We will continue to apply silver alginate on this wound.  Wound #3 - Sacrum - remains open.  We will continue silver alginate.  Wound #4 - Left medial upper thigh -  2.7 x 1.8 x 0.3 cm - this wound has decreased in size and appears to be doing well.  There are no signs and symptoms of infection. It was selectively debrided today and we will continue silver alginate.     January 2, 2024:  48-year-old black male, with quadriplegia, is here for a multiple stage III and stage IV pressure injuries.  They are all under control at this time.  They are relatively shallow.  He has a good low air loss mattress at home.    1/22/24 - The patient returns today for f/up on several wounds.  Today  many of the wounds are improving.  Wound #1 - Right posterior hip - continues to progress well and has a very small remaining opening.  We will continue to use silver alginate placed over the wound.    Wound #2 - Left ischial -0.4 x 1.0 x 0.2 cm  - wound remains appears stable with a slight decrease in size.   The wound has no S & S of infection.  We will continue to apply silver alginate on this wound.  Wound #3 - Sacrum - remains open and we will continue to monitor.  We will begin application of Endoform.    Wound #4 - Left medial upper thigh -  3.2 x 3.3 x 0.3 cm - this wound has increased in size slightly.   and appears to be doing well.  There are no signs and symptoms of infection. It was selectively debrided today and we will continue silver alginate.  He does continue to have HH assistance on a daily basis.  If the left medial upper thigh does not improve by next week we will consider ultrasonic debridement.     January 30, 2024:  48-year-old black male, with quadriplegia, is here for follow up of his multiple pressure injuries, including right hip, left ischial, sacral, left medial upper thigh, and a stage III scrotal pressure ulcer.  The wounds are all clean at this time, and need no surgical debridement today.  The measurements are approximately the same as last visit.    2/20/24 - Mr. Martinez Is seen today for followup on several wounds.   Some of the wounds continue to progress well while others have deteriorated since he was last seen by this provider on January 22nd.  In particular, the left medial upper thigh has continued to deteriorate and today we are attempting to authorize a Theraskin  graft for that wound.  Wound #1 - Right posterior hip - Remains stable with no reported drainage, no S & S of infection.  We will continue to use silver alginate placed over the wound.    Wound #2 - Left ischial -0.8 x 0.5 x 0.2 cm  - wound continues to decrease in size.   The wound has no S & S of infection.  We  will continue to apply silver alginate on this wound.  Wound #3 - Sacrum - 0.3 x 0.8 x 0.4 cm - remains open and has increased in size slightly we will continue to monitor and apply silver alginate.  Wound #4 - Left medial upper thigh - 2.5 x 3.0 x 0.9 cm - this wound has increased in size slightly. There are no signs and symptoms of infection. It was selectively debrided today and we will attempt to authorize a Theraskin graft for this wound.   The left medial upper thigh will be ultrasonically debrided at his next visit.    2/29/24 - The patient returns today for f/up on wounds.  We are attempting to authorize Theraskin grafts for the patient.  In preparation, the wounds were selectively debrided using the ultrasonic debrider.  If authorized, graft prep will be done at his next visit.  Today he was instructed to begin application of mupirocin ointment to each wound, also in preparation of the process.    3/7/24 - Mr. Martinez is seen today for f/up on several wounds.  He is approved for Theraskin grafts on wound #4, the left medial upper thigh.  Wound #1 - Right posterior hip -   The wound has deteriorated greatly over the last couple of weeks.  The wound was nearly closed two weeks ago.  Today it was ultrasonically debrided and has a depth of 1.5 cm.  Wound #2 - Left ischial - 0.3 x 0.3 x 0.2 cm  - wound continues to decrease in size.   The wound has no S & S of infection.  We will continue to apply silver alginate on this wound.  Wound #3 - Sacrum - 0.3 x 0.8 x 0.2 cm - remains open and has decreased in depth slightly.  we will continue to monitor and apply silver alginate.  Wound #4 - Left medial upper thigh - 3.3 x 2.0 x 0.3 cm - this wound has continued to increase in size and depth.  Today it was selectively debrided using the ultrasonic debrider.  Theraskin graft #1 was applied.  Theraskin graft #2, 13.0 cm2 has been ordered.  Graft #1 applied was 6.0 cm2 which is too small for this wound.  We will increase  in size.     3/14/24 - Mr. Martinez returns for f/up on his wounds.  We are currently grafting the left medial upper thigh.    Wound #1 - Right posterior hip -   The wound has deteriorated continually over the last couple of weeks.  The wound was nearly closed two weeks ago.  Today a culture was obtained to determine if there is an infective process occurring causing deterioration.    Wound #2 - Left ischial - 0.2 x 0.2 x 0.2 cm  - wound continues to decrease in size.   The wound has no S & S of infection.  We will continue to apply silver alginate on this wound.  Wound #3 - Sacrum - 0.2 x 1.0 x 0.2 cm - remains open and has increased again in depth slightly.  we will continue to monitor and apply silver alginate.  Wound #4 - Left medial upper thigh - 3.3 x 2.0 x 0.3 cm - this wound has remained the same size and depth.  At his last visit, Theraskin graft #2 (13.0 cm2) was applied.  This graft was pulled as taught as possible, and could barely be secured.  There is depth to the wound, and any attempt to apply pressure to the graft to allow contact with the wound bed would cause the graft to detach.  We must have a larger graft to achieve the wound depth.     3/21/24 - We reviewed the results from the patients culture of the right posterior hip.  It has several bacteria present in the culture.  He was prescribed Bactrim and Augmentin based on those culture results.  Because of the likelihood of contamination of other wounds, we will hold off on future grafts of the left medial upper thigh at this time.  He will begin his abx regimen and we will consider resuming grafting with the infection is cleared.  He will also apply Mupirocin ointment to the sacrum and left medial upper thigh.  Mupirocin will not be put into the right posterior hip due to the difficulty of cleaning it out.      3/27/24 - The patient was prescribed abx at his last visit.  Today he reports that after taking abx x 2 days he developed GI issues and  stopped taking them.  He did begin taking them again today.  He has 8 days remaining and was strongly encouraged to follow through and complete his entire prescription.  The right hip has increased in depth.  The other wounds remains stable and/or improved.  We will continue mupirocin and silver on all wounds with the exception of the right hip, where we will use silver only.     4/3/24 -   Wound #1 - Right posterior hip -   This wound was cultured on 3/14/24.  He has completed both Clindamycin and Doxycycline.  Today it was ultrasonically debrided.  We will continue to monitor and consider additional abx if no improvement shortly.   Wound #2 - Left ischial - Disappointingly this wound had healed, but today there was another large opening, likely due to positioning and/or cleaning.   Wound #3 - Sacrum - Continues to remain stable.   Wound #4 - Left medial upper thigh - 3.3 x 2.0 x 2.0 cm - this wound is no longer being grafted due to bacteria, inability to keep the graft in place, and inability to keep feeces out of wound.  Again today there was feeces in the wound.  It was cleansed using the ultrasonic debrider.     4/17/24 -   Wound #1 - Right posterior hip -   No significant change or improvement in this wound.  Topical abx have been ordered for use on all wounds.   Wound #2 - Left ischial - very small remaining opening.  [place] any aggressive repositioning wound should be healed shortly.  Wound #3 - Sacrum - Continues to remain stable.   Wound #4 - Left medial upper thigh - 3.3 x 3.2 x 0.5 - wound continues to deteriorate.  We discussed what may have changed recently causing the deterioration and that patient does not know.  It is alarming.  X-rays have been ordered for both hips to ensure that osteomyelitis is not present.  A BMP will be ordered through home health so that an MRI can be conducted.      5/1/24 - Patient is picking up topical abx today to begin using on open wounds (Cefazolin)  Wound #1 - Right  posterior hip - slowly improving, slight decrease in depth.  Will begin using topical abx packed into wound.   Wound #2 - Left ischial - area assessed and wound is closed as of today!  Wound #3 - Sacrum - wound assessed and wound is closed as of today!  Wound #4 - Left medial upper thigh - 2.5 x 2.0 x 1.0 cm - wound length and width have decreased, depth increased slightly.  Wound was selectively debrided today.  We will begin application of topical abx on this wound daily.    An MRI was done of the left upper thigh and there is no indication of osteomyelitis at this time.    5/15/24 - We continue to use topical abx on both open wounds with no particular improvement.  The wound to the left medial upper thigh has increased in size slightly.  Neither wound appears to be infected.  Both were selectively debrided using the ultrasonic debrider. The sacrum and left ischium remain closed.   Wound #1 - Right posterior hip - 0.5 x 0.8 x 2.6 cm  Wound #4 - left medial upper thigh - 2.8 x 2.5 x 1.0 cm    5/29/24 - The patient returns today for f/up on two active wounds.  We have been using topical abx since 5/1/24.  Today those abx were d/c.  Both wounds were selectively debrided using the ultrasonic debrider.  The left medial upper thigh wound remains concerning due to frequent and regular exposure to feces.  Fecal contamination is making it extremely difficult to make progress with this wound.  The tunneling wound at the right hip also continues to worsen in spite of the use of abx.  The patient does have Home Health services to assist with his wounds.     6/11/24 - Mr. Martinez returns for followup on 2 wounds.  Today he was in a rush to complete his visit due to transportation issues.  No debridement was performed as result of time constraints.  The wound to the right hip did measure larger and deeper this visit.  The left medial upper thigh remains a proximally this same size and again today there was cc noted on the wound  margins.  The he needs to be a concern.  Today we will change the product for the left medial upper thigh from silver alginate to Polymem with the hopes that the additional cushion will be beneficial.      Review of Systems   Constitutional: Negative.    Respiratory: Negative.     Cardiovascular: Negative.    Musculoskeletal:  Positive for gait problem.   Skin:  Positive for wound.        As documented in the HPI.   All other systems reviewed and are negative.        Objective:      Vitals:    06/11/24 1219   BP: 135/86   Pulse: (!) 56   Resp: 18     Physical Exam  Vitals reviewed.   Constitutional:       Appearance: Normal appearance. He is normal weight.   HENT:      Head: Normocephalic.   Cardiovascular:      Rate and Rhythm: Normal rate.      Pulses: Normal pulses.   Pulmonary:      Effort: Pulmonary effort is normal.   Musculoskeletal:      Comments: quadraplegia   Skin:     General: Skin is warm and dry.      Comments: Multiple pressure injuries   Neurological:      General: No focal deficit present.      Mental Status: He is alert and oriented to person, place, and time.   Psychiatric:         Mood and Affect: Mood normal.            Altered Skin Integrity 05/23/22 1337 Right posterior Hip #1 (Active)   05/23/22 1337   Altered Skin Integrity Present on Admission - Did Patient arrive to the hospital with altered skin?: yes   Side: Right   Orientation: posterior   Location: Hip   Wound Number: #1   Is this injury device related?: No   Primary Wound Type:    Description of Altered Skin Integrity:    Ankle-Brachial Index:    Pulses:    Removal Indication and Assessment:    Wound Outcome:    (Retired) Wound Length (cm):    (Retired) Wound Width (cm):    (Retired) Depth (cm):    Wound Description (Comments):    Removal Indications:    Dressing Appearance Moist drainage 06/11/24 1223   Drainage Amount Moderate 06/11/24 1223   Drainage Characteristics/Odor Serosanguineous 06/11/24 1223   Appearance Pink;Moist;Slough  "06/11/24 1223   Tissue loss description Full thickness 06/11/24 1223   Periwound Area Dry 06/11/24 1223   Wound Edges Open 06/11/24 1223   Wound Length (cm) 0.5 cm 06/11/24 1223   Wound Width (cm) 0.6 cm 06/11/24 1223   Wound Depth (cm) 3 cm 06/11/24 1223   Wound Volume (cm^3) 0.9 cm^3 06/11/24 1223   Wound Surface Area (cm^2) 0.3 cm^2 06/11/24 1223   Care Cleansed with:;Wound cleanser 06/11/24 1223   Dressing Applied 06/11/24 1223   Dressing Change Due 06/12/24 06/11/24 1223            Altered Skin Integrity 01/05/23 1351 Left upper;medial Thigh #4  (Active)   01/05/23 1351   Altered Skin Integrity Present on Admission - Did Patient arrive to the hospital with altered skin?: yes   Side: Left   Orientation: upper;medial   Location: Thigh   Wound Number: #4   Is this injury device related?: No   Primary Wound Type:    Description of Altered Skin Integrity:    Ankle-Brachial Index:    Pulses:    Removal Indication and Assessment:    Wound Outcome:    (Retired) Wound Length (cm):    (Retired) Wound Width (cm):    (Retired) Depth (cm):    Wound Description (Comments):    Removal Indications:    Dressing Appearance Moist drainage 06/11/24 1223   Drainage Amount Moderate 06/11/24 1223   Drainage Characteristics/Odor Serosanguineous 06/11/24 1223   Appearance Red;Moist;Granulating 06/11/24 1223   Tissue loss description Full thickness 06/11/24 1223   Periwound Area Dry 06/11/24 1223   Wound Edges Open 06/11/24 1223   Wound Length (cm) 2.5 cm 06/11/24 1223   Wound Width (cm) 3.4 cm 06/11/24 1223   Wound Depth (cm) 1.5 cm 06/11/24 1223   Wound Volume (cm^3) 12.75 cm^3 06/11/24 1223   Wound Surface Area (cm^2) 8.5 cm^2 06/11/24 1223   Care Cleansed with:;Wound cleanser 06/11/24 1223   Dressing Applied 06/11/24 1223   Dressing Change Due 06/12/24 06/11/24 1223       6/11/24       Right hip (pre)                                              Left upper medial thigh (pre)  (1/2" plain packing strip, silver alginate,      " "(polymem, gentle border)  gentle border)  Assessment:         ICD-10-CM ICD-9-CM   1. Stage IV pressure ulcer of right hip  L89.214 707.04     707.24   2. Pressure injury of left thigh, stage 3  L89.223 707.09     707.23   3. Pressure injury of sacral region, stage 4  L89.154 707.03     707.24   4. Quadriplegia, unspecified  G82.50 344.00           Procedures:      Mechanical debridement only    Excisional debridement performed:  [] Yes [] No   Selective debridement performed:  [] Yes [] No   Mechanical debridement performed:  [x] Yes [] No   Silver nitrate applied:    [] Yes [] No   Labs ordered this visit:   [] Yes [] No   Imaging ordered this visit:   [] Yes [] No   Tissue pathology and/or culture taken:  [] Yes [] No         MEDICATIONS    Current Outpatient Medications:     apixaban (ELIQUIS) 5 mg Tab, Take 5 mg by mouth once daily at 6am., Disp: , Rfl:     cholecalciferol, vitamin D3, 125 mcg (5,000 unit) capsule, Take 5,000 Units by mouth., Disp: , Rfl:     LINZESS 145 mcg Cap capsule, Take 145 mcg by mouth once daily., Disp: , Rfl:     mupirocin (BACTROBAN) 2 % ointment, 2 (two) times daily. Apply to affected area for five days, Disp: , Rfl:    Review of patient's allergies indicates:   Allergen Reactions    Meropenem Anaphylaxis    Penicillins          HOME [facility]ALTH AG[facility]Y:  [facility]   TIMES PER WEEK/DAYS:  daily   ORDERS:  Right hip wound: Cleanse with wound cleanser, apply plain 1/2" packing strip into wound, cover with silver alginate, and 4x4 gentle foam border dressing to be changed daily   Left upper posterior thigh wound: Cleanse with wound cleanser, apply polymem, 4x4 gauze and 6x6 gentle foam border dressing to be changed daily        Follow up in 2 weeks (on 6/25/2024) for stretcher.        "

## 2024-07-02 ENCOUNTER — HOSPITAL ENCOUNTER (OUTPATIENT)
Dept: WOUND CARE | Facility: HOSPITAL | Age: 49
Discharge: HOME | End: 2024-07-02
Attending: NURSE PRACTITIONER
Payer: MEDICARE

## 2024-07-02 VITALS
BODY MASS INDEX: 29.17 KG/M2 | WEIGHT: 175.06 LBS | HEIGHT: 65 IN | RESPIRATION RATE: 19 BRPM | SYSTOLIC BLOOD PRESSURE: 128 MMHG | DIASTOLIC BLOOD PRESSURE: 74 MMHG | HEART RATE: 67 BPM

## 2024-07-02 DIAGNOSIS — L89.223 PRESSURE INJURY OF LEFT THIGH, STAGE 3: ICD-10-CM

## 2024-07-02 DIAGNOSIS — L89.214 STAGE IV PRESSURE ULCER OF RIGHT HIP: Primary | ICD-10-CM

## 2024-07-02 DIAGNOSIS — G82.50 QUADRIPLEGIA, UNSPECIFIED: ICD-10-CM

## 2024-07-02 PROCEDURE — 27000999 HC MEDICAL RECORD PHOTO DOCUMENTATION

## 2024-07-02 PROCEDURE — 97597 DBRDMT OPN WND 1ST 20 CM/<: CPT

## 2024-07-02 PROCEDURE — 99213 OFFICE O/P EST LOW 20 MIN: CPT

## 2024-07-02 NOTE — PROGRESS NOTES
Home Health: [facility]   Smoker   [] Yes  [x] No  Diabetic   [] Yes   [x] No  Low air loss mattress [x] Yes [] No   Is the patient eligible for a graft, and/or currently grafting?  [] Yes [x] No       Subjective:       Patient ID: Diego Martinez Jr. is a 49 y.o. male.    Chief Complaint: Pressure Ulcer (Right hip - Stage 4/Left medial upper thigh - Stage 3)    HPI   Mr. Martinez is a long time patient of [facility].    He comes via wheelchair with a care attendant present with him at all times.    He does have a history of quadriplegia.  He has home health who assist him with his wound care daily and care attendants to provide care at other times.  He has a very pleasant demeanor and added to an is usually jovial in spite of his challenges.  He now has [employer].    8/9/23 - Mr. Martinez returns today to be seen for three wounds:  Wound #1 - Right posterior hip - this wound continues to remains stable.  There is still some depth, however, there is very little drainage and no signs and symptoms of infection.  We will continue to pack this wound with silver alginate.  Wound #2 - Left ischial, this wound remains at 3.0 cm which is a significant increase over the measurement of 0.5 cm on 07/12/2023.  Increase in size is due to the forcing open of the wound during the cleansing process.  Home health has been reminded that they should open that wound very carefully so is not to over extend the very fragile epithelial tissue.  Additionally, the orders were to use silver alginate on this wound and today mesalt was removed.  They will be reminded they should use the appropriate dressing on the appropriate wounds and me salt should not be used in this wound.  Wound #3 - Sacrum; we are continuing to monitor this area, however, there is no open wound at this time.  It will be reopened if necessary.  Wound #4 - Left medial upper thigh - this wound was selectively debrided today with removal a moderate  amount of slough from the wound bed.  There is a large island of epithelial tissue in the wound bed, however, there is still also a large area of macerated tissue around the periwound.  Home health is reminded that they should be applying me salt to this wound and this wound only.  They should be covering the me salt and wound bed with silver alginate to be changed daily.    8/24/23 - The patient returns today for reassessments his 3 wounds.    Wound #1 - Right posterior hip - remains clean and stable.  There are no signs and symptoms of infection and very little drainage.  We are using silver alginate on this wound.    Wound #2 - Left ischial - 2.7 x 0.5 x 0.2 cm - wound is improved significantly over his last visit on 08/09/2023.  This provider appreciate the care that home health is taking when opening the wound to cleanse in the depth of the wound.  It is obvious that they have not been over extending the wound which is allowing it to progress as it should be progressing now.  We will continue to apply silver alginate on this wound.  Wound #3 - Sacrum - we will continue to monitor the sacrum again as it has now reopened very slightly.  Wound #4 - Left medial upper thigh - this wound remains stable with no signs and symptoms of infection.  The large margin of macerated tissue remains.  Ideally, this could be surgically debrided, however, we may have to do this in clinic at some point with a scalpel.    9/6/23 - Mr. Martinez returns for f/up on the 3 wounds:  Wound #1 - Right posterior hip - remains clean and stable, continuing to decrease in size slowly.  There are no signs and symptoms of infection and very little drainage.  We will continue using silver alginate on this wound.    Wound #2 - Left ischial - 1.5 x 0.5 x 0.2 cm - wound continues to decrease in size with a very small remaining area.  This provider appreciates the care that home health continues to take when opening the wound to cleanse in the depth of  the wound.  It is obvious that they have not been over extending the wound which is allowing it to progress as it should be progressing now.  We will continue to apply silver alginate on this wound.  Wound #3 - Sacrum - has now reclosed but will continue to monitor  Wound #4 - Left medial upper thigh - this wound remains stable with no signs and symptoms of infection.  The large margin of macerated tissue remains.  A large amount of this tissue was selectively debrided off today.  We will focus on removal of the rolled edges each week.    9/20/23 - The patient returns today for followup on several wounds:  Wound #1 - Right posterior hip - This wound was mechanically debrided.  It remains clean and stable, continuing to decrease in size slowly.  There are no signs and symptoms of infection and very little drainage.  We will continue using silver alginate on this wound.    Wound #2 - Left ischial - 1.7 x 0.5 x 0.2 cm - wound remains stable with no significant change in size this visit.  The wound has no S & S of infection.  We will continue to apply silver alginate on this wound.  Wound #3 - Sacrum - has reopened.  This wound was selectively debrided today.  We will begin application of collagen and silver alginate.  Wound #4 - Left medial upper thigh - this wound remains stable with no signs and symptoms of infection. It was selectively debrided today and we will begin collagen and silver alginate.      10/4/23 - Mr. Martinez returns today for f/up on the following wounds:  Wound #1 - Right posterior hip - This wound was mechanically debrided.  It remains clean and stable, with no significant change.  There are no signs and symptoms of infection and very little drainage.  We will continue using silver alginate on this wound.    Wound #2 - Left ischial - 0.5 x 2.0 x 0.2 cm - wound remains stable with no significant change although it is slightly larger than last week.   The wound has no S & S of infection.  We will  continue to apply silver alginate on this wound.  Wound #3 - Sacrum - remains open.  This wound was selectively debrided today.  We will continue powdered collagen and silver alginate.  Wound #4 - Left medial upper thigh - this wound remains stable with no signs and symptoms of infection. It was selectively debrided today and we will begin powdered collagen and silver alginate.     10/18/23 - Mr. Martinez is seen today for the f/up on several wounds:  Wound #1 - Right posterior hip - This wound was mechanically debrided.  It remains clean and stable, with no significant change.  There are no signs and symptoms of infection and very little drainage.  We will continue using silver alginate on this wound.    Wound #2 - Left ischial - 0.5 x 1.5 x 0.2 cm - wound remains stable with no significant change although it is slightly larger than last week.   The wound has no S & S of infection.  We will continue to apply silver alginate on this wound.  Wound #3 - Sacrum - remains open.  This wound was selectively debrided today.  We will continue powdered collagen and silver alginate.  Wound #4 - Left medial upper thigh - this wound remains stable with no signs and symptoms of infection. It was mechanically debrided today and we will begin powdered collagen and silver alginate.     October 31st:  48-year-old black male, who is a quadriplegia, is here for follow up of his for pressure injuries.  The worse 1 at this time is the left ischial, left buttock.  The ulcers appear clean, without obvious secondary infection, foul odor, or discharge.  He has home health services almost every day.    11/14/23 - Mr. Martinez returns today for followup on several wounds.  All remains stable.  The left ischial does measures slightly larger, however, the wound bed is clean.  It was selectively debrided and there are no signs and symptoms of infection with this wound or any of his wounds at this time.  Wound #1 - Right posterior hip - This wound  was mechanically debrided.  It remains clean and stable, with no significant change.  There are no signs and symptoms of infection and very little drainage.  We will continue using silver alginate on this wound.    Wound #2 - Left ischial - 0.8 x 1.0 x 0.2 cm  - wound remains stable with no significant change although it is slightly larger than last week.   The wound has no S & S of infection.  We will continue to apply silver alginate on this wound.  Wound #3 - Sacrum - remains open.  We will continue silver alginate.  Wound #4 - Left medial upper thigh -  4.0 x 2.0 x 0.3 cm- this wound has increased in size slightly, but there are no signs and symptoms of infection. It was selectively debrided today and we will continue silver alginate.     12/5/23 - Mr. Martinez is seen today for f/up on his wounds.  They all remain stable, in various stages.  Today none show S & S of infection, with no increase in drainage, redness, etc.    Wound #1 - Right posterior hip - This wound was mechanically debride.  It does continue to drain a scant amount.  We will continue using silver alginate on this wound.    Wound #2 - Left ischial - 0.5 x 1.0 x 0.2 cm  - wound remains stable with a slight decrease in size.   The wound has no S & S of infection.  We will continue to apply silver alginate on this wound.  Wound #3 - Sacrum - remains open.  We will continue silver alginate.  Wound #4 - Left medial upper thigh -  2.7 x 1.8 x 0.3 cm - this wound has decreased in size and appears to be doing well.  There are no signs and symptoms of infection. It was selectively debrided today and we will continue silver alginate.     January 2, 2024:  48-year-old black male, with quadriplegia, is here for a multiple stage III and stage IV pressure injuries.  They are all under control at this time.  They are relatively shallow.  He has a good low air loss mattress at home.    1/22/24 - The patient returns today for f/up on several wounds.  Today many  of the wounds are improving.  Wound #1 - Right posterior hip - continues to progress well and has a very small remaining opening.  We will continue to use silver alginate placed over the wound.    Wound #2 - Left ischial -0.4 x 1.0 x 0.2 cm  - wound remains appears stable with a slight decrease in size.   The wound has no S & S of infection.  We will continue to apply silver alginate on this wound.  Wound #3 - Sacrum - remains open and we will continue to monitor.  We will begin application of Endoform.    Wound #4 - Left medial upper thigh -  3.2 x 3.3 x 0.3 cm - this wound has increased in size slightly.   and appears to be doing well.  There are no signs and symptoms of infection. It was selectively debrided today and we will continue silver alginate.  He does continue to have HH assistance on a daily basis.  If the left medial upper thigh does not improve by next week we will consider ultrasonic debridement.     January 30, 2024:  48-year-old black male, with quadriplegia, is here for follow up of his multiple pressure injuries, including right hip, left ischial, sacral, left medial upper thigh, and a stage III scrotal pressure ulcer.  The wounds are all clean at this time, and need no surgical debridement today.  The measurements are approximately the same as last visit.    2/20/24 - Mr. Martinez Is seen today for followup on several wounds.   Some of the wounds continue to progress well while others have deteriorated since he was last seen by this provider on January 22nd.  In particular, the left medial upper thigh has continued to deteriorate and today we are attempting to authorize a Theraskin  graft for that wound.  Wound #1 - Right posterior hip - Remains stable with no reported drainage, no S & S of infection.  We will continue to use silver alginate placed over the wound.    Wound #2 - Left ischial -0.8 x 0.5 x 0.2 cm  - wound continues to decrease in size.   The wound has no S & S of infection.  We will  continue to apply silver alginate on this wound.  Wound #3 - Sacrum - 0.3 x 0.8 x 0.4 cm - remains open and has increased in size slightly we will continue to monitor and apply silver alginate.  Wound #4 - Left medial upper thigh - 2.5 x 3.0 x 0.9 cm - this wound has increased in size slightly. There are no signs and symptoms of infection. It was selectively debrided today and we will attempt to authorize a Theraskin graft for this wound.   The left medial upper thigh will be ultrasonically debrided at his next visit.    2/29/24 - The patient returns today for f/up on wounds.  We are attempting to authorize Theraskin grafts for the patient.  In preparation, the wounds were selectively debrided using the ultrasonic debrider.  If authorized, graft prep will be done at his next visit.  Today he was instructed to begin application of mupirocin ointment to each wound, also in preparation of the process.    3/7/24 - Mr. Martinez is seen today for f/up on several wounds.  He is approved for Theraskin grafts on wound #4, the left medial upper thigh.  Wound #1 - Right posterior hip -   The wound has deteriorated greatly over the last couple of weeks.  The wound was nearly closed two weeks ago.  Today it was ultrasonically debrided and has a depth of 1.5 cm.  Wound #2 - Left ischial - 0.3 x 0.3 x 0.2 cm  - wound continues to decrease in size.   The wound has no S & S of infection.  We will continue to apply silver alginate on this wound.  Wound #3 - Sacrum - 0.3 x 0.8 x 0.2 cm - remains open and has decreased in depth slightly.  we will continue to monitor and apply silver alginate.  Wound #4 - Left medial upper thigh - 3.3 x 2.0 x 0.3 cm - this wound has continued to increase in size and depth.  Today it was selectively debrided using the ultrasonic debrider.  Theraskin graft #1 was applied.  Theraskin graft #2, 13.0 cm2 has been ordered.  Graft #1 applied was 6.0 cm2 which is too small for this wound.  We will increase in  size.     3/14/24 - Mr. Martinez returns for f/up on his wounds.  We are currently grafting the left medial upper thigh.    Wound #1 - Right posterior hip -   The wound has deteriorated continually over the last couple of weeks.  The wound was nearly closed two weeks ago.  Today a culture was obtained to determine if there is an infective process occurring causing deterioration.    Wound #2 - Left ischial - 0.2 x 0.2 x 0.2 cm  - wound continues to decrease in size.   The wound has no S & S of infection.  We will continue to apply silver alginate on this wound.  Wound #3 - Sacrum - 0.2 x 1.0 x 0.2 cm - remains open and has increased again in depth slightly.  we will continue to monitor and apply silver alginate.  Wound #4 - Left medial upper thigh - 3.3 x 2.0 x 0.3 cm - this wound has remained the same size and depth.  At his last visit, Theraskin graft #2 (13.0 cm2) was applied.  This graft was pulled as taught as possible, and could barely be secured.  There is depth to the wound, and any attempt to apply pressure to the graft to allow contact with the wound bed would cause the graft to detach.  We must have a larger graft to achieve the wound depth.     3/21/24 - We reviewed the results from the patients culture of the right posterior hip.  It has several bacteria present in the culture.  He was prescribed Bactrim and Augmentin based on those culture results.  Because of the likelihood of contamination of other wounds, we will hold off on future grafts of the left medial upper thigh at this time.  He will begin his abx regimen and we will consider resuming grafting with the infection is cleared.  He will also apply Mupirocin ointment to the sacrum and left medial upper thigh.  Mupirocin will not be put into the right posterior hip due to the difficulty of cleaning it out.      3/27/24 - The patient was prescribed abx at his last visit.  Today he reports that after taking abx x 2 days he developed GI issues and  stopped taking them.  He did begin taking them again today.  He has 8 days remaining and was strongly encouraged to follow through and complete his entire prescription.  The right hip has increased in depth.  The other wounds remains stable and/or improved.  We will continue mupirocin and silver on all wounds with the exception of the right hip, where we will use silver only.     4/3/24 -   Wound #1 - Right posterior hip -   This wound was cultured on 3/14/24.  He has completed both Clindamycin and Doxycycline.  Today it was ultrasonically debrided.  We will continue to monitor and consider additional abx if no improvement shortly.   Wound #2 - Left ischial - Disappointingly this wound had healed, but today there was another large opening, likely due to positioning and/or cleaning.   Wound #3 - Sacrum - Continues to remain stable.   Wound #4 - Left medial upper thigh - 3.3 x 2.0 x 2.0 cm - this wound is no longer being grafted due to bacteria, inability to keep the graft in place, and inability to keep feeces out of wound.  Again today there was feeces in the wound.  It was cleansed using the ultrasonic debrider.     4/17/24 -   Wound #1 - Right posterior hip -   No significant change or improvement in this wound.  Topical abx have been ordered for use on all wounds.   Wound #2 - Left ischial - very small remaining opening.  [place] any aggressive repositioning wound should be healed shortly.  Wound #3 - Sacrum - Continues to remain stable.   Wound #4 - Left medial upper thigh - 3.3 x 3.2 x 0.5 - wound continues to deteriorate.  We discussed what may have changed recently causing the deterioration and that patient does not know.  It is alarming.  X-rays have been ordered for both hips to ensure that osteomyelitis is not present.  A BMP will be ordered through home health so that an MRI can be conducted.      5/1/24 - Patient is picking up topical abx today to begin using on open wounds (Cefazolin)  Wound #1 - Right  posterior hip - slowly improving, slight decrease in depth.  Will begin using topical abx packed into wound.   Wound #2 - Left ischial - area assessed and wound is closed as of today!  Wound #3 - Sacrum - wound assessed and wound is closed as of today!  Wound #4 - Left medial upper thigh - 2.5 x 2.0 x 1.0 cm - wound length and width have decreased, depth increased slightly.  Wound was selectively debrided today.  We will begin application of topical abx on this wound daily.    An MRI was done of the left upper thigh and there is no indication of osteomyelitis at this time.    5/15/24 - We continue to use topical abx on both open wounds with no particular improvement.  The wound to the left medial upper thigh has increased in size slightly.  Neither wound appears to be infected.  Both were selectively debrided using the ultrasonic debrider. The sacrum and left ischium remain closed.   Wound #1 - Right posterior hip - 0.5 x 0.8 x 2.6 cm  Wound #4 - left medial upper thigh - 2.8 x 2.5 x 1.0 cm    5/29/24 - The patient returns today for f/up on two active wounds.  We have been using topical abx since 5/1/24.  Today those abx were d/c.  Both wounds were selectively debrided using the ultrasonic debrider.  The left medial upper thigh wound remains concerning due to frequent and regular exposure to feces.  Fecal contamination is making it extremely difficult to make progress with this wound.  The tunneling wound at the right hip also continues to worsen in spite of the use of abx.  The patient does have Home Health services to assist with his wounds.     6/11/24 - Mr. Martinez returns for followup on 2 wounds.  Today he was in a rush to complete his visit due to transportation issues.  No debridement was performed as result of time constraints.  The wound to the right hip did measure larger and deeper this visit.  The left medial upper thigh remains a proximally this same size and again today there was cc noted on the wound  margins.  The he needs to be a concern.  Today we will change the product for the left medial upper thigh from silver alginate to Polymem with the hopes that the additional cushion will be beneficial.    7/2/24 - The   Patient returns today for followup on 2 wounds.  The wound to the right hip has again increased in depth.  There was also a small amount of purulent exudate in this wound.  A culture was obtained.  The wound at the left medial upper thigh was selectively debrided.  This wound continues to increase in size is well and there is continual concern due to the amount of feet cc which is routine only cleansed from the wound upon arrival to wound clinic.  We did have a conversation with the patient to encourage Home Health to clean that wound better following each dressing change as each time the patient has come to clinic there has been feces embedded into the wound bed.  We are unable to make much progress with the wound hygiene the way that it currently is.    Review of Systems   Constitutional: Negative.    Respiratory: Negative.     Cardiovascular: Negative.    Musculoskeletal:  Positive for gait problem.   Skin:  Positive for wound.        As documented in the HPI.   All other systems reviewed and are negative.        Objective:      Vitals:    07/02/24 1252   BP: 128/74   Pulse: 67   Resp: 19       Physical Exam  Vitals reviewed.   Constitutional:       Appearance: Normal appearance. He is normal weight.   HENT:      Head: Normocephalic.   Cardiovascular:      Rate and Rhythm: Normal rate.      Pulses: Normal pulses.   Pulmonary:      Effort: Pulmonary effort is normal.   Musculoskeletal:      Comments: quadraplegia   Skin:     General: Skin is warm and dry.      Comments: Multiple pressure injuries   Neurological:      General: No focal deficit present.      Mental Status: He is alert and oriented to person, place, and time.   Psychiatric:         Mood and Affect: Mood normal.            Altered Skin  Integrity 05/23/22 1337 Right posterior Hip #1 (Active)   05/23/22 1337   Altered Skin Integrity Present on Admission - Did Patient arrive to the hospital with altered skin?: yes   Side: Right   Orientation: posterior   Location: Hip   Wound Number: #1   Is this injury device related?: No   Primary Wound Type:    Description of Altered Skin Integrity:    Ankle-Brachial Index:    Pulses:    Removal Indication and Assessment:    Wound Outcome:    (Retired) Wound Length (cm):    (Retired) Wound Width (cm):    (Retired) Depth (cm):    Wound Description (Comments):    Removal Indications:    Dressing Appearance Moist drainage 07/02/24 1129   Drainage Amount Moderate 07/02/24 1129   Drainage Characteristics/Odor Serosanguineous 07/02/24 1129   Appearance Moist;Red;Granulating 07/02/24 1129   Tissue loss description Full thickness 07/02/24 1129   Periwound Area Intact 07/02/24 1129   Wound Edges Open 07/02/24 1129   Wound Length (cm) 0.5 cm 07/02/24 1129   Wound Width (cm) 0.8 cm 07/02/24 1129   Wound Depth (cm) 3.4 cm 07/02/24 1129   Wound Volume (cm^3) 1.36 cm^3 07/02/24 1129   Wound Surface Area (cm^2) 0.4 cm^2 07/02/24 1129   Care Cleansed with:;Wound cleanser 07/02/24 1129   Dressing Applied;Other (comment) 07/02/24 1129   Dressing Change Due 07/03/24 07/02/24 1129            Altered Skin Integrity 01/05/23 1351 Left upper;medial Thigh #4  (Active)   01/05/23 1351   Altered Skin Integrity Present on Admission - Did Patient arrive to the hospital with altered skin?: yes   Side: Left   Orientation: upper;medial   Location: Thigh   Wound Number: #4   Is this injury device related?: No   Primary Wound Type:    Description of Altered Skin Integrity:    Ankle-Brachial Index:    Pulses:    Removal Indication and Assessment:    Wound Outcome:    (Retired) Wound Length (cm):    (Retired) Wound Width (cm):    (Retired) Depth (cm):    Wound Description (Comments):    Removal Indications:    Dressing Appearance Moist drainage  "07/02/24 1129   Drainage Amount Moderate 07/02/24 1129   Drainage Characteristics/Odor Serosanguineous 07/02/24 1129   Appearance Moist;Red;Granulating 07/02/24 1129   Tissue loss description Full thickness 07/02/24 1129   Periwound Area Pale white 07/02/24 1129   Wound Edges Open 07/02/24 1129   Wound Length (cm) 2 cm 07/02/24 1129   Wound Width (cm) 3.7 cm 07/02/24 1129   Wound Depth (cm) 1.5 cm 07/02/24 1129   Wound Volume (cm^3) 11.1 cm^3 07/02/24 1129   Wound Surface Area (cm^2) 7.4 cm^2 07/02/24 1129   Care Cleansed with:;Wound cleanser 07/02/24 1129   Dressing Applied;Other (comment) 07/02/24 1129   Dressing Change Due 07/03/24 07/02/24 1129            Wound 07/02/24 Pressure Injury Sacral spine #3 (Active)   07/02/24    Present on Original Admission: Y   Primary Wound Type: Pressure inj   Side:    Orientation:    Location: Sacral spine   Wound Approximate Age at First Assessment (Weeks):    Wound Number: #3   Is this injury device related?: No   Incision Type:    Closure Method:    Wound Description (Comments):    Type:    Additional Comments:    Ankle-Brachial Index:    Pulses:    Removal Indication and Assessment:    Wound Outcome:    Dressing Appearance Moist drainage 07/02/24 1129   Drainage Amount Moderate 07/02/24 1129   Drainage Characteristics/Odor Serosanguineous 07/02/24 1129   Appearance Moist;Red;Granulating 07/02/24 1129   Tissue loss description Full thickness 07/02/24 1129   Periwound Area Intact 07/02/24 1129   Wound Edges Open 07/02/24 1129   Wound Length (cm) 0.3 cm 07/02/24 1129   Wound Width (cm) 0.3 cm 07/02/24 1129   Wound Depth (cm) 0.2 cm 07/02/24 1129   Wound Volume (cm^3) 0.018 cm^3 07/02/24 1129   Wound Surface Area (cm^2) 0.09 cm^2 07/02/24 1129   Care Cleansed with:;Wound cleanser 07/02/24 1129   Dressing Applied;Other (comment) 07/02/24 1129   Dressing Change Due 07/03/24 07/02/24 1129     7/2/24    Right hip   1/2" packing strip, silver alginate, gentle foam border dressing " "        Left upper posterior thigh                Post debridement  Polymem, gentle foam border dressing      Sacrum   Polymem, gentle foam border dressing  CT    Assessment:         ICD-10-CM ICD-9-CM   1. Stage IV pressure ulcer of right hip  L89.214 707.04     707.24   2. Pressure injury of left thigh, stage 3  L89.223 707.09     707.23   3. Quadriplegia, unspecified  G82.50 344.00         Procedures:     Debridement     Date/Time: 7/2/2024 10:57 AM     Performed by: Jessie Bah NP  Authorized by: Jessie Bah NP    Time out: Immediately prior to procedure a "time out" was called to verify the correct patient, procedure, equipment, support staff and site/side marked as required.     Consent Done?:  Yes (Verbal)     Preparation: Patient was prepped and draped with clean technique    Local anesthesia used?: No       Wound Details:    Location:  Left leg (Left medial upper thigh)    Type of Debridement:  Non-excisional       Length (cm):  2       Area (sq cm):  7.4       Width (cm):  3.7       Percent Debrided (%):  100       Depth (cm):  1.5       Total Area Debrided (sq cm):  7.4    Depth of debridement:  Epidermis/Dermis    Devitalized tissue debrided:  Biofilm, Exudate, Callus, Fibrin and Slough    Instruments:  Curette (Dermal)  Bleeding:  None  Patient tolerance:  Patient tolerated the procedure well with no immediate complications  Specimen Collected: Specimen sent to microbiology     Excisional debridement performed:  [] Yes [] No   Selective debridement performed:  [x] Yes [] No   Mechanical debridement performed:  [] Yes [] No   Silver nitrate applied:    [] Yes [] No   Labs ordered this visit:   [] Yes [] No   Imaging ordered this visit:   [] Yes [] No   Tissue pathology and/or culture taken:  [] Yes [] No         MEDICATIONS    Current Outpatient Medications:     apixaban (ELIQUIS) 5 mg Tab, Take 5 mg by mouth once daily at 6am., Disp: , Rfl:     cholecalciferol, vitamin D3, 125 mcg (5,000 " "unit) capsule, Take 5,000 Units by mouth., Disp: , Rfl:     LINZESS 145 mcg Cap capsule, Take 145 mcg by mouth once daily., Disp: , Rfl:     mupirocin (BACTROBAN) 2 % ointment, 2 (two) times daily. Apply to affected area for five days (Patient not taking: Reported on 7/2/2024), Disp: , Rfl:    Review of patient's allergies indicates:   Allergen Reactions    Meropenem Anaphylaxis    Penicillins          HOME [facility]:  [facility]   TIMES PER WEEK/DAYS:  daily   ORDERS:  Right hip wound: Cleanse with wound cleanser, apply plain 1/2" packing strip into wound, cover with silver alginate, and 4x4 gentle foam border dressing to be changed daily   Left upper posterior thigh wound: Cleanse with wound cleanser, apply polymem, 4x4 gauze and 6x6 gentle foam border dressing to be changed daily    Sacral wound: Cleanse with wound cleanser, apply polymem, 4x4 gauze, and 4x4 gentle foam border dressing to be changed daily       Follow up in 1 week (on 7/9/2024) for FELCIITY -janis .        "

## 2024-07-02 NOTE — PROCEDURES
"Debridement    Date/Time: 7/2/2024 10:57 AM    Performed by: Jessie Bah NP  Authorized by: Jessie Bah NP    Time out: Immediately prior to procedure a "time out" was called to verify the correct patient, procedure, equipment, support staff and site/side marked as required.    Consent Done?:  Yes (Verbal)    Preparation: Patient was prepped and draped with clean technique    Local anesthesia used?: No      Wound Details:    Location:  Left leg (Left medial upper thigh)    Type of Debridement:  Non-excisional       Length (cm):  2       Area (sq cm):  7.4       Width (cm):  3.7       Percent Debrided (%):  100       Depth (cm):  1.5       Total Area Debrided (sq cm):  7.4    Depth of debridement:  Epidermis/Dermis    Devitalized tissue debrided:  Biofilm, Exudate, Callus, Fibrin and Slough    Instruments:  Curette (Dermal)  Bleeding:  None  Patient tolerance:  Patient tolerated the procedure well with no immediate complications  Specimen Collected: Specimen sent to microbiology    "

## 2024-07-15 ENCOUNTER — HOSPITAL ENCOUNTER (OUTPATIENT)
Dept: WOUND CARE | Facility: HOSPITAL | Age: 49
Discharge: HOME | End: 2024-07-15
Attending: NURSE PRACTITIONER
Payer: MEDICARE

## 2024-07-15 VITALS
HEART RATE: 109 BPM | BODY MASS INDEX: 29.17 KG/M2 | HEIGHT: 65 IN | SYSTOLIC BLOOD PRESSURE: 101 MMHG | WEIGHT: 175.06 LBS | DIASTOLIC BLOOD PRESSURE: 68 MMHG | RESPIRATION RATE: 17 BRPM

## 2024-07-15 DIAGNOSIS — L89.214 STAGE IV PRESSURE ULCER OF RIGHT HIP: Primary | ICD-10-CM

## 2024-07-15 DIAGNOSIS — G82.50 QUADRIPLEGIA, UNSPECIFIED: ICD-10-CM

## 2024-07-15 DIAGNOSIS — L89.223 PRESSURE INJURY OF LEFT THIGH, STAGE 3: ICD-10-CM

## 2024-07-15 DIAGNOSIS — L89.324 PRESSURE ULCER OF LEFT BUTTOCK, STAGE 4: ICD-10-CM

## 2024-07-15 PROCEDURE — 27090011

## 2024-07-15 PROCEDURE — 97597 DBRDMT OPN WND 1ST 20 CM/<: CPT

## 2024-07-15 PROCEDURE — 99212 OFFICE O/P EST SF 10 MIN: CPT | Mod: 25

## 2024-07-15 RX ORDER — NITROFURANTOIN 25; 75 MG/1; MG/1
100 CAPSULE ORAL 2 TIMES DAILY
COMMUNITY
Start: 2024-07-12

## 2024-07-15 RX ORDER — METRONIDAZOLE 500 MG/1
500 TABLET ORAL EVERY 8 HOURS
Qty: 30 TABLET | Refills: 0 | Status: SHIPPED | OUTPATIENT
Start: 2024-07-15 | End: 2024-07-22

## 2024-07-15 NOTE — PROCEDURES
"Debridement    Date/Time: 7/15/2024 11:00 AM    Performed by: Jessie Bah NP  Authorized by: Jessie Bah NP  Associated wounds:        Altered Skin Integrity 05/23/22 1337 Right posterior Hip #1       Altered Skin Integrity 01/05/23 1351 Left upper;medial Thigh #4   Time out: Immediately prior to procedure a "time out" was called to verify the correct patient, procedure, equipment, support staff and site/side marked as required.    Consent Done?:  Yes (Verbal)    Preparation: Patient was prepped and draped with clean technique    Local anesthesia used?: No      Wound Details:    Location:  Right hip    Type of Debridement:  Non-excisional       Length (cm):  0.5       Area (sq cm):  0.4       Width (cm):  0.8       Percent Debrided (%):  100       Depth (cm):  3.2       Total Area Debrided (sq cm):  0.4    Depth of debridement:  Epidermis/Dermis    Devitalized tissue debrided:  Biofilm, Exudate, Fibrin and Slough    Instruments:  Ultrasound Probe (Portillo probe)  Bleeding:  None    2nd Wound Details:     Location:  Left leg (medial upper thigh)    Type of Debridement:  Non-excisional       Length (cm):  2.5       Area (sq cm):  7.5       Width (cm):  3       Percent Debrided (%):  100       Depth (cm):  0.6       Total Area Debrided (sq cm):  7.5    Depth of debridement:  Epidermis/Dermis    Devitalized tissue debrided:  Biofilm, Callus, Exudate and Fibrin    Instruments:  Curette and Ultrasound Probe (Dermal, Portillo probe)  Bleeding:  Moderate  Hemostasis Achieved: Yes    Method Used:  Pressure  Patient tolerance:  Patient tolerated the procedure well with no immediate complications    "

## 2024-07-15 NOTE — PROGRESS NOTES
Home Health: [facility]   Smoker   [] Yes  [x] No  Diabetic   [] Yes   [x] No  Low air loss mattress [x] Yes [] No   Is the patient eligible for a graft, and/or currently grafting?  [] Yes [x] No       Subjective:       Patient ID: Diego Martinez Jr. is a 49 y.o. male.    Chief Complaint: Pressure Ulcer (Right hip - Stage 4/Left medial upper thigh - Stage 3/Left Ischium)    HPI   Mr. Martinez is a long time patient of [facility].    He comes via wheelchair with a care attendant present with him at all times.    He does have a history of quadriplegia.  He has home health who assist him with his wound care daily and care attendants to provide care at other times.  He has a very pleasant demeanor and added to an is usually jovial in spite of his challenges.  [facility]    8/9/23 - Mr. Martinez returns today to be seen for three wounds:  Wound #1 - Right posterior hip - this wound continues to remains stable.  There is still some depth, however, there is very little drainage and no signs and symptoms of infection.  We will continue to pack this wound with silver alginate.  Wound #2 - Left ischial, this wound remains at 3.0 cm which is a significant increase over the measurement of 0.5 cm on 07/12/2023.  Increase in size is due to the forcing open of the wound during the cleansing process.  Home health has been reminded that they should open that wound very carefully so is not to over extend the very fragile epithelial tissue.  Additionally, the orders were to use silver alginate on this wound and today mesalt was removed.  They will be reminded they should use the appropriate dressing on the appropriate wounds and me salt should not be used in this wound.  Wound #3 - Sacrum; we are continuing to monitor this area, however, there is no open wound at this time.  It will be reopened if necessary.  Wound #4 - Left medial upper thigh - this wound was selectively debrided today with  removal a moderate amount of slough from the wound bed.  There is a large island of epithelial tissue in the wound bed, however, there is still also a large area of macerated tissue around the periwound.  Home health is reminded that they should be applying me salt to this wound and this wound only.  They should be covering the me salt and wound bed with silver alginate to be changed daily.    8/24/23 - The patient returns today for reassessments his 3 wounds.    Wound #1 - Right posterior hip - remains clean and stable.  There are no signs and symptoms of infection and very little drainage.  We are using silver alginate on this wound.    Wound #2 - Left ischial - 2.7 x 0.5 x 0.2 cm - wound is improved significantly over his last visit on 08/09/2023.  This provider appreciate the care that home health is taking when opening the wound to cleanse in the depth of the wound.  It is obvious that they have not been over extending the wound which is allowing it to progress as it should be progressing now.  We will continue to apply silver alginate on this wound.  Wound #3 - Sacrum - we will continue to monitor the sacrum again as it has now reopened very slightly.  Wound #4 - Left medial upper thigh - this wound remains stable with no signs and symptoms of infection.  The large margin of macerated tissue remains.  Ideally, this could be surgically debrided, however, we may have to do this in clinic at some point with a scalpel.    9/6/23 - Mr. Martinez returns for f/up on the 3 wounds:  Wound #1 - Right posterior hip - remains clean and stable, continuing to decrease in size slowly.  There are no signs and symptoms of infection and very little drainage.  We will continue using silver alginate on this wound.    Wound #2 - Left ischial - 1.5 x 0.5 x 0.2 cm - wound continues to decrease in size with a very small remaining area.  This provider appreciates the care that home health continues to take when opening the wound to  cleanse in the depth of the wound.  It is obvious that they have not been over extending the wound which is allowing it to progress as it should be progressing now.  We will continue to apply silver alginate on this wound.  Wound #3 - Sacrum - has now reclosed but will continue to monitor  Wound #4 - Left medial upper thigh - this wound remains stable with no signs and symptoms of infection.  The large margin of macerated tissue remains.  A large amount of this tissue was selectively debrided off today.  We will focus on removal of the rolled edges each week.    9/20/23 - The patient returns today for followup on several wounds:  Wound #1 - Right posterior hip - This wound was mechanically debrided.  It remains clean and stable, continuing to decrease in size slowly.  There are no signs and symptoms of infection and very little drainage.  We will continue using silver alginate on this wound.    Wound #2 - Left ischial - 1.7 x 0.5 x 0.2 cm - wound remains stable with no significant change in size this visit.  The wound has no S & S of infection.  We will continue to apply silver alginate on this wound.  Wound #3 - Sacrum - has reopened.  This wound was selectively debrided today.  We will begin application of collagen and silver alginate.  Wound #4 - Left medial upper thigh - this wound remains stable with no signs and symptoms of infection. It was selectively debrided today and we will begin collagen and silver alginate.      10/4/23 - Mr. Martinez returns today for f/up on the following wounds:  Wound #1 - Right posterior hip - This wound was mechanically debrided.  It remains clean and stable, with no significant change.  There are no signs and symptoms of infection and very little drainage.  We will continue using silver alginate on this wound.    Wound #2 - Left ischial - 0.5 x 2.0 x 0.2 cm - wound remains stable with no significant change although it is slightly larger than last week.   The wound has no S & S of  infection.  We will continue to apply silver alginate on this wound.  Wound #3 - Sacrum - remains open.  This wound was selectively debrided today.  We will continue powdered collagen and silver alginate.  Wound #4 - Left medial upper thigh - this wound remains stable with no signs and symptoms of infection. It was selectively debrided today and we will begin powdered collagen and silver alginate.     10/18/23 - Mr. Martinez is seen today for the f/up on several wounds:  Wound #1 - Right posterior hip - This wound was mechanically debrided.  It remains clean and stable, with no significant change.  There are no signs and symptoms of infection and very little drainage.  We will continue using silver alginate on this wound.    Wound #2 - Left ischial - 0.5 x 1.5 x 0.2 cm - wound remains stable with no significant change although it is slightly larger than last week.   The wound has no S & S of infection.  We will continue to apply silver alginate on this wound.  Wound #3 - Sacrum - remains open.  This wound was selectively debrided today.  We will continue powdered collagen and silver alginate.  Wound #4 - Left medial upper thigh - this wound remains stable with no signs and symptoms of infection. It was mechanically debrided today and we will begin powdered collagen and silver alginate.     October 31st:  48-year-old black male, who is a quadriplegia, is here for follow up of his for pressure injuries.  The worse 1 at this time is the left ischial, left buttock.  The ulcers appear clean, without obvious secondary infection, foul odor, or discharge.  He has home health services almost every day.    11/14/23 - Mr. Martinez returns today for followup on several wounds.  All remains stable.  The left ischial does measures slightly larger, however, the wound bed is clean.  It was selectively debrided and there are no signs and symptoms of infection with this wound or any of his wounds at this time.  Wound #1 - Right posterior  hip - This wound was mechanically debrided.  It remains clean and stable, with no significant change.  There are no signs and symptoms of infection and very little drainage.  We will continue using silver alginate on this wound.    Wound #2 - Left ischial - 0.8 x 1.0 x 0.2 cm  - wound remains stable with no significant change although it is slightly larger than last week.   The wound has no S & S of infection.  We will continue to apply silver alginate on this wound.  Wound #3 - Sacrum - remains open.  We will continue silver alginate.  Wound #4 - Left medial upper thigh -  4.0 x 2.0 x 0.3 cm- this wound has increased in size slightly, but there are no signs and symptoms of infection. It was selectively debrided today and we will continue silver alginate.     12/5/23 - Mr. Martinez is seen today for f/up on his wounds.  They all remain stable, in various stages.  Today none show S & S of infection, with no increase in drainage, redness, etc.    Wound #1 - Right posterior hip - This wound was mechanically debride.  It does continue to drain a scant amount.  We will continue using silver alginate on this wound.    Wound #2 - Left ischial - 0.5 x 1.0 x 0.2 cm  - wound remains stable with a slight decrease in size.   The wound has no S & S of infection.  We will continue to apply silver alginate on this wound.  Wound #3 - Sacrum - remains open.  We will continue silver alginate.  Wound #4 - Left medial upper thigh -  2.7 x 1.8 x 0.3 cm - this wound has decreased in size and appears to be doing well.  There are no signs and symptoms of infection. It was selectively debrided today and we will continue silver alginate.     January 2, 2024:  48-year-old black male, with quadriplegia, is here for a multiple stage III and stage IV pressure injuries.  They are all under control at this time.  They are relatively shallow.  He has a good low air loss mattress at home.    1/22/24 - The patient returns today for f/up on several  wounds.  Today many of the wounds are improving.  Wound #1 - Right posterior hip - continues to progress well and has a very small remaining opening.  We will continue to use silver alginate placed over the wound.    Wound #2 - Left ischial -0.4 x 1.0 x 0.2 cm  - wound remains appears stable with a slight decrease in size.   The wound has no S & S of infection.  We will continue to apply silver alginate on this wound.  Wound #3 - Sacrum - remains open and we will continue to monitor.  We will begin application of Endoform.    Wound #4 - Left medial upper thigh -  3.2 x 3.3 x 0.3 cm - this wound has increased in size slightly.   and appears to be doing well.  There are no signs and symptoms of infection. It was selectively debrided today and we will continue silver alginate.  He does continue to have HH assistance on a daily basis.  If the left medial upper thigh does not improve by next week we will consider ultrasonic debridement.     January 30, 2024:  48-year-old black male, with quadriplegia, is here for follow up of his multiple pressure injuries, including right hip, left ischial, sacral, left medial upper thigh, and a stage III scrotal pressure ulcer.  The wounds are all clean at this time, and need no surgical debridement today.  The measurements are approximately the same as last visit.    2/20/24 - Mr. Martinez Is seen today for followup on several wounds.   Some of the wounds continue to progress well while others have deteriorated since he was last seen by this provider on January 22nd.  In particular, the left medial upper thigh has continued to deteriorate and today we are attempting to authorize a Theraskin  graft for that wound.  Wound #1 - Right posterior hip - Remains stable with no reported drainage, no S & S of infection.  We will continue to use silver alginate placed over the wound.    Wound #2 - Left ischial -0.8 x 0.5 x 0.2 cm  - wound continues to decrease in size.   The wound has no S & S of  infection.  We will continue to apply silver alginate on this wound.  Wound #3 - Sacrum - 0.3 x 0.8 x 0.4 cm - remains open and has increased in size slightly we will continue to monitor and apply silver alginate.  Wound #4 - Left medial upper thigh - 2.5 x 3.0 x 0.9 cm - this wound has increased in size slightly. There are no signs and symptoms of infection. It was selectively debrided today and we will attempt to authorize a Theraskin graft for this wound.   The left medial upper thigh will be ultrasonically debrided at his next visit.    2/29/24 - The patient returns today for f/up on wounds.  We are attempting to authorize Theraskin grafts for the patient.  In preparation, the wounds were selectively debrided using the ultrasonic debrider.  If authorized, graft prep will be done at his next visit.  Today he was instructed to begin application of mupirocin ointment to each wound, also in preparation of the process.    3/7/24 - Mr. Martinez is seen today for f/up on several wounds.  He is approved for Theraskin grafts on wound #4, the left medial upper thigh.  Wound #1 - Right posterior hip -   The wound has deteriorated greatly over the last couple of weeks.  The wound was nearly closed two weeks ago.  Today it was ultrasonically debrided and has a depth of 1.5 cm.  Wound #2 - Left ischial - 0.3 x 0.3 x 0.2 cm  - wound continues to decrease in size.   The wound has no S & S of infection.  We will continue to apply silver alginate on this wound.  Wound #3 - Sacrum - 0.3 x 0.8 x 0.2 cm - remains open and has decreased in depth slightly.  we will continue to monitor and apply silver alginate.  Wound #4 - Left medial upper thigh - 3.3 x 2.0 x 0.3 cm - this wound has continued to increase in size and depth.  Today it was selectively debrided using the ultrasonic debrider.  Theraskin graft #1 was applied.  Theraskin graft #2, 13.0 cm2 has been ordered.  Graft #1 applied was 6.0 cm2 which is too small for this wound.  We  will increase in size.     3/14/24 - Mr. Martinez returns for f/up on his wounds.  We are currently grafting the left medial upper thigh.    Wound #1 - Right posterior hip -   The wound has deteriorated continually over the last couple of weeks.  The wound was nearly closed two weeks ago.  Today a culture was obtained to determine if there is an infective process occurring causing deterioration.    Wound #2 - Left ischial - 0.2 x 0.2 x 0.2 cm  - wound continues to decrease in size.   The wound has no S & S of infection.  We will continue to apply silver alginate on this wound.  Wound #3 - Sacrum - 0.2 x 1.0 x 0.2 cm - remains open and has increased again in depth slightly.  we will continue to monitor and apply silver alginate.  Wound #4 - Left medial upper thigh - 3.3 x 2.0 x 0.3 cm - this wound has remained the same size and depth.  At his last visit, Theraskin graft #2 (13.0 cm2) was applied.  This graft was pulled as taught as possible, and could barely be secured.  There is depth to the wound, and any attempt to apply pressure to the graft to allow contact with the wound bed would cause the graft to detach.  We must have a larger graft to achieve the wound depth.     3/21/24 - We reviewed the results from the patients culture of the right posterior hip.  It has several bacteria present in the culture.  He was prescribed Bactrim and Augmentin based on those culture results.  Because of the likelihood of contamination of other wounds, we will hold off on future grafts of the left medial upper thigh at this time.  He will begin his abx regimen and we will consider resuming grafting with the infection is cleared.  He will also apply Mupirocin ointment to the sacrum and left medial upper thigh.  Mupirocin will not be put into the right posterior hip due to the difficulty of cleaning it out.      3/27/24 - The patient was prescribed abx at his last visit.  Today he reports that after taking abx x 2 days he developed GI  issues and stopped taking them.  He did begin taking them again today.  He has 8 days remaining and was strongly encouraged to follow through and complete his entire prescription.  The right hip has increased in depth.  The other wounds remains stable and/or improved.  We will continue mupirocin and silver on all wounds with the exception of the right hip, where we will use silver only.     4/3/24 -   Wound #1 - Right posterior hip -   This wound was cultured on 3/14/24.  He has completed both Clindamycin and Doxycycline.  Today it was ultrasonically debrided.  We will continue to monitor and consider additional abx if no improvement shortly.   Wound #2 - Left ischial - Disappointingly this wound had healed, but today there was another large opening, likely due to positioning and/or cleaning.   Wound #3 - Sacrum - Continues to remain stable.   Wound #4 - Left medial upper thigh - 3.3 x 2.0 x 2.0 cm - this wound is no longer being grafted due to bacteria, inability to keep the graft in place, and inability to keep feeces out of wound.  Again today there was feeces in the wound.  It was cleansed using the ultrasonic debrider.     4/17/24 -   Wound #1 - Right posterior hip -   No significant change or improvement in this wound.  Topical abx have been ordered for use on all wounds.   Wound #2 - Left ischial - very small remaining opening.  [place] any aggressive repositioning wound should be healed shortly.  Wound #3 - Sacrum - Continues to remain stable.   Wound #4 - Left medial upper thigh - 3.3 x 3.2 x 0.5 - wound continues to deteriorate.  We discussed what may have changed recently causing the deterioration and that patient does not know.  It is alarming.  X-rays have been ordered for both hips to ensure that osteomyelitis is not present.  A BMP will be ordered through home health so that an MRI can be conducted.      5/1/24 - Patient is picking up topical abx today to begin using on open wounds (Cefazolin)  Wound  #1 - Right posterior hip - slowly improving, slight decrease in depth.  Will begin using topical abx packed into wound.   Wound #2 - Left ischial - area assessed and wound is closed as of today!  Wound #3 - Sacrum - wound assessed and wound is closed as of today!  Wound #4 - Left medial upper thigh - 2.5 x 2.0 x 1.0 cm - wound length and width have decreased, depth increased slightly.  Wound was selectively debrided today.  We will begin application of topical abx on this wound daily.    An MRI was done of the left upper thigh and there is no indication of osteomyelitis at this time.    5/15/24 - We continue to use topical abx on both open wounds with no particular improvement.  The wound to the left medial upper thigh has increased in size slightly.  Neither wound appears to be infected.  Both were selectively debrided using the ultrasonic debrider. The sacrum and left ischium remain closed.   Wound #1 - Right posterior hip - 0.5 x 0.8 x 2.6 cm  Wound #4 - left medial upper thigh - 2.8 x 2.5 x 1.0 cm    5/29/24 - The patient returns today for f/up on two active wounds.  We have been using topical abx since 5/1/24.  Today those abx were d/c.  Both wounds were selectively debrided using the ultrasonic debrider.  The left medial upper thigh wound remains concerning due to frequent and regular exposure to feces.  Fecal contamination is making it extremely difficult to make progress with this wound.  The tunneling wound at the right hip also continues to worsen in spite of the use of abx.  The patient does have Home Health services to assist with his wounds.     6/11/24 - Mr. Martinez returns for followup on 2 wounds.  Today he was in a rush to complete his visit due to transportation issues.  No debridement was performed as result of time constraints.  The wound to the right hip did measure larger and deeper this visit.  The left medial upper thigh remains a proximally this same size and again today there was cc noted on  the wound margins.  The he needs to be a concern.  Today we will change the product for the left medial upper thigh from silver alginate to Polymem with the hopes that the additional cushion will be beneficial.    7/2/24 - The   Patient returns today for followup on 2 wounds.  The wound to the right hip has again increased in depth.  There was also a small amount of purulent exudate in this wound.  A culture was obtained.  The wound at the left medial upper thigh was selectively debrided.  This wound continues to increase in size is well and there is continual concern due to the amount of feet cc which is routine only cleansed from the wound upon arrival to wound clinic.  We did have a conversation with the patient to encourage Home Health to clean that wound better following each dressing change as each time the patient has come to clinic there has been feces embedded into the wound bed.  We are unable to make much progress with the wound hygiene the way that it currently is.    7/15/24 - Mr. Martinez returns today for f/up on two wounds.  Additionally a closed wound to the ischium has reopened.  At his last visit a culture was obtained.  That culture was polymicrobial with some resistance.  Today and Rx for Metronidazole was sent to his pharmacy to being taking today.  The wound to the Left upper posterior thigh and right upper thigh were both debrided using a curette and ultrasonic debrider.  The Left upper posterior thigh has decreased in size this week and the tunnel at the right upper hip has decreased in depth.  We will continue Polymem to the Left upper posterior thigh and the reopened wound at the ischium, and continue to pack the right upper hip.  The sacrum remains closed. Of noted, he did report that he has been feeling very poorly over the last two weeks and followed up with his PCP today again.  He has been constipated and has had a recurrent UTI for which his is currently taking Nitrofurantoin.        Review of Systems   Constitutional: Negative.    Respiratory: Negative.     Cardiovascular: Negative.    Musculoskeletal:  Positive for gait problem.   Skin:  Positive for wound.        As documented in the HPI.   All other systems reviewed and are negative.        Objective:      Vitals:    07/15/24 1209   BP: 101/68   Pulse: 109   Resp: 17         Physical Exam  Vitals reviewed.   Constitutional:       Appearance: Normal appearance. He is normal weight.   HENT:      Head: Normocephalic.   Cardiovascular:      Rate and Rhythm: Normal rate.      Pulses: Normal pulses.   Pulmonary:      Effort: Pulmonary effort is normal.   Musculoskeletal:      Comments: quadraplegia   Skin:     General: Skin is warm and dry.      Comments: Multiple pressure injuries   Neurological:      General: No focal deficit present.      Mental Status: He is alert and oriented to person, place, and time.   Psychiatric:         Mood and Affect: Mood normal.            Altered Skin Integrity 05/23/22 1337 Right posterior Hip #1 (Active)   05/23/22 1337   Altered Skin Integrity Present on Admission - Did Patient arrive to the hospital with altered skin?: yes   Side: Right   Orientation: posterior   Location: Hip   Wound Number: #1   Is this injury device related?: No   Primary Wound Type:    Description of Altered Skin Integrity:    Ankle-Brachial Index:    Pulses:    Removal Indication and Assessment:    Wound Outcome:    (Retired) Wound Length (cm):    (Retired) Wound Width (cm):    (Retired) Depth (cm):    Wound Description (Comments):    Removal Indications:    Dressing Appearance Intact;Moist drainage 07/15/24 1145   Drainage Amount Moderate 07/15/24 1145   Drainage Characteristics/Odor Serosanguineous 07/15/24 1145   Appearance Intact;Slough;Moist;Granulating 07/15/24 1145   Tissue loss description Full thickness 07/15/24 1145   Periwound Area Intact 07/15/24 1145   Wound Edges Open 07/15/24 1145   Wound Length (cm) 0.5 cm 07/15/24  1145   Wound Width (cm) 0.8 cm 07/15/24 1145   Wound Depth (cm) 3.2 cm 07/15/24 1145   Wound Volume (cm^3) 1.28 cm^3 07/15/24 1145   Wound Surface Area (cm^2) 0.4 cm^2 07/15/24 1145   Care Cleansed with:;Wound cleanser;Other (see comments) 07/15/24 1145   Dressing Applied 07/15/24 1145   Dressing Change Due 07/16/24 07/15/24 1145            Altered Skin Integrity 05/23/22 1353 Left Ischial tuberosity #2 (Active)   05/23/22 1353   Altered Skin Integrity Present on Admission - Did Patient arrive to the hospital with altered skin?: yes   Side: Left   Orientation:    Location: Ischial tuberosity   Wound Number: #2   Is this injury device related?: No   Primary Wound Type:    Description of Altered Skin Integrity:    Ankle-Brachial Index:    Pulses:    Removal Indication and Assessment:    Wound Outcome:    (Retired) Wound Length (cm):    (Retired) Wound Width (cm):    (Retired) Depth (cm):    Wound Description (Comments):    Removal Indications:    Description of Altered Skin Integrity Full thickness tissue loss. Subcutaneous fat may be visible but bone, tendon or muscle are not exposed 07/15/24 1145   Dressing Appearance Moist drainage 07/15/24 1145   Drainage Amount Moderate 07/15/24 1145   Drainage Characteristics/Odor Sanguineous;Serosanguineous 07/15/24 1145   Appearance Intact;Slough;Moist;Red 07/15/24 1145   Tissue loss description Full thickness 07/15/24 1145   Periwound Area Intact;Moist;[place] 07/15/24 1145   Wound Edges Approximated 07/15/24 1145   Wound Length (cm) 1 cm 07/15/24 1145   Wound Width (cm) 0.8 cm 07/15/24 1145   Wound Depth (cm) 0.2 cm 07/15/24 1145   Wound Volume (cm^3) 0.16 cm^3 07/15/24 1145   Wound Surface Area (cm^2) 0.8 cm^2 07/15/24 1145   Care Cleansed with:;Wound cleanser 07/15/24 1145   Dressing Applied 07/15/24 1145   Dressing Change Due 07/16/24 07/15/24 1145            Altered Skin Integrity 01/05/23 1351 Left upper;medial Thigh #4  (Active)   01/05/23 1351   Altered Skin Integrity  Present on Admission - Did Patient arrive to the hospital with altered skin?: yes   Side: Left   Orientation: upper;medial   Location: Thigh   Wound Number: #4   Is this injury device related?: No   Primary Wound Type:    Description of Altered Skin Integrity:    Ankle-Brachial Index:    Pulses:    Removal Indication and Assessment:    Wound Outcome:    (Retired) Wound Length (cm):    (Retired) Wound Width (cm):    (Retired) Depth (cm):    Wound Description (Comments):    Removal Indications:    Dressing Appearance Moist drainage 07/15/24 1145   Drainage Amount Moderate 07/15/24 1145   Drainage Characteristics/Odor Serosanguineous 07/15/24 1145   Appearance Intact;Red;Slough;Moist;Epithelialization;Granulating 07/15/24 1145   Tissue loss description Full thickness 07/15/24 1145   Periwound Area Intact;Pale white 07/15/24 1145   Wound Edges Open 07/15/24 1145   Wound Length (cm) 2.5 cm 07/15/24 1145   Wound Width (cm) 3 cm 07/15/24 1145   Wound Depth (cm) 0.6 cm 07/15/24 1145   Wound Volume (cm^3) 4.5 cm^3 07/15/24 1145   Wound Surface Area (cm^2) 7.5 cm^2 07/15/24 1145   Care Cleansed with:;Wound cleanser;Debrided;Other (see comments) 07/15/24 1145   Dressing Applied 07/15/24 1145   Dressing Change Due 07/16/24 07/15/24 1145       [REMOVED]      Wound 07/02/24 Pressure Injury Sacral spine #3 (Removed)   07/02/24    Present on Original Admission: Y   Primary Wound Type: Pressure inj   Side:    Orientation:    Location: Sacral spine   Wound Approximate Age at First Assessment (Weeks):    Wound Number: #3   Is this injury device related?: No   Incision Type:    Closure Method:    Wound Description (Comments):    Type:    Additional Comments:    Ankle-Brachial Index:    Pulses:    Removal Indication and Assessment:    Wound Outcome: Healed   Removed 07/15/24 1259   Wound Length (cm) 0 cm 07/15/24 1145   Wound Width (cm) 0 cm 07/15/24 1145   Wound Depth (cm) 0 cm 07/15/24 1145   Wound Volume (cm^3) 0 cm^3 07/15/24 1145  "  Wound Surface Area (cm^2) 0 cm^2 07/15/24 1145     7/15/24    Left ischium   Polymem, gentle border           Left posterior upper thigh- Pre              Post  Polymem, gentle border         Sacrum- Monitor        Right hip  Polymem, gentle border   TR      Assessment:         ICD-10-CM ICD-9-CM   1. Stage IV pressure ulcer of right hip  L89.214 707.04     707.24   2. Pressure injury of left thigh, stage 3  L89.223 707.09     707.23   3. Pressure ulcer of left buttock, stage 4  L89.324 707.05     707.24   4. Quadriplegia, unspecified  G82.50 344.00           Procedures:     Debridement     Date/Time: 7/15/2024 11:00 AM     Performed by: Jessie Bah NP  Authorized by: Jessie Bah NP  Associated wounds:        Altered Skin Integrity 05/23/22 1337 Right posterior Hip #1       Altered Skin Integrity 01/05/23 1351 Left upper;medial Thigh #4   Time out: Immediately prior to procedure a "time out" was called to verify the correct patient, procedure, equipment, support staff and site/side marked as required.     Consent Done?:  Yes (Verbal)     Preparation: Patient was prepped and draped with clean technique    Local anesthesia used?: No       Wound Details:    Location:  Right hip    Type of Debridement:  Non-excisional       Length (cm):  0.5       Area (sq cm):  0.4       Width (cm):  0.8       Percent Debrided (%):  100       Depth (cm):  3.2       Total Area Debrided (sq cm):  0.4    Depth of debridement:  Epidermis/Dermis    Devitalized tissue debrided:  Biofilm, Exudate, Fibrin and Slough    Instruments:  Ultrasound Probe (Portillo probe)  Bleeding:  None     2nd Wound Details:     Location:  Left leg (medial upper thigh)    Type of Debridement:  Non-excisional       Length (cm):  2.5       Area (sq cm):  7.5       Width (cm):  3       Percent Debrided (%):  100       Depth (cm):  0.6       Total Area Debrided (sq cm):  7.5    Depth of debridement:  Epidermis/Dermis    Devitalized tissue debrided:  " Biofilm, Callus, Exudate and Fibrin    Instruments:  Curette and Ultrasound Probe (Dermal, Portillo probe)  Bleeding:  Moderate  Hemostasis Achieved: Yes    Method Used:  Pressure  Patient tolerance:  Patient tolerated the procedure well with no immediate complications       Excisional debridement performed:  [] Yes [] No   Selective debridement performed:  [x] Yes [] No   Mechanical debridement performed:  [] Yes [] No   Silver nitrate applied:    [] Yes [] No   Labs ordered this visit:   [] Yes [] No   Imaging ordered this visit:   [] Yes [] No   Tissue pathology and/or culture taken:  [] Yes [] No         MEDICATIONS    Current Outpatient Medications:     nitrofurantoin, macrocrystal-monohydrate, (MACROBID) 100 MG capsule, Take 100 mg by mouth 2 (two) times daily., Disp: , Rfl:     apixaban (ELIQUIS) 5 mg Tab, Take 5 mg by mouth once daily at 6am., Disp: , Rfl:     cholecalciferol, vitamin D3, 125 mcg (5,000 unit) capsule, Take 5,000 Units by mouth., Disp: , Rfl:     LINZESS 145 mcg Cap capsule, Take 145 mcg by mouth once daily., Disp: , Rfl:     metroNIDAZOLE (FLAGYL) 500 MG tablet, Take 1 tablet (500 mg total) by mouth every 8 (eight) hours. for 10 days, Disp: 30 tablet, Rfl: 0    mupirocin (BACTROBAN) 2 % ointment, 2 (two) times daily. Apply to affected area for five days (Patient not taking: Reported on 7/2/2024), Disp: , Rfl:    Review of patient's allergies indicates:   Allergen Reactions    Meropenem Anaphylaxis    Penicillins          [facility]   TIMES PER WEEK/DAYS:  daily   ORDERS:  Right hip wound: Cleanse with wound cleanser, gently pack a strip of polymem into wound, cover with additonal polymem, and 4x4 gentle foam border dressing to be changed daily   Left upper posterior thigh wound: Cleanse with wound cleanser, apply polymem, 4x4 gauze and 6x6 gentle foam border dressing to be changed daily    Left ischium: Cleanse with wound cleanser, apply polymem, 4x4 gauze, and 4x4  gentle foam border dressing to be changed daily       Follow up in about 1 week (around 7/22/2024) for stretcher.

## 2024-07-22 ENCOUNTER — HOSPITAL ENCOUNTER (OUTPATIENT)
Dept: WOUND CARE | Facility: HOSPITAL | Age: 49
Discharge: HOME | End: 2024-07-22
Attending: NURSE PRACTITIONER
Payer: MEDICARE

## 2024-07-22 VITALS
DIASTOLIC BLOOD PRESSURE: 83 MMHG | HEART RATE: 99 BPM | BODY MASS INDEX: 29.17 KG/M2 | RESPIRATION RATE: 16 BRPM | HEIGHT: 65 IN | WEIGHT: 175.06 LBS | SYSTOLIC BLOOD PRESSURE: 158 MMHG

## 2024-07-22 DIAGNOSIS — L89.324 PRESSURE ULCER OF LEFT BUTTOCK, STAGE 4: ICD-10-CM

## 2024-07-22 DIAGNOSIS — L89.214 STAGE IV PRESSURE ULCER OF RIGHT HIP: Primary | ICD-10-CM

## 2024-07-22 DIAGNOSIS — L89.223 PRESSURE INJURY OF LEFT THIGH, STAGE 3: ICD-10-CM

## 2024-07-22 DIAGNOSIS — G82.50 QUADRIPLEGIA, UNSPECIFIED: ICD-10-CM

## 2024-07-22 PROCEDURE — 97597 DBRDMT OPN WND 1ST 20 CM/<: CPT

## 2024-07-22 PROCEDURE — 27000999 HC MEDICAL RECORD PHOTO DOCUMENTATION

## 2024-07-22 PROCEDURE — 99212 OFFICE O/P EST SF 10 MIN: CPT | Mod: 25

## 2024-07-22 NOTE — PROGRESS NOTES
Home Health: [facility]   Smoker   [] Yes  [x] No  Diabetic   [] Yes   [x] No  Low air loss mattress [x] Yes [] No   Is the patient eligible for a graft, and/or currently grafting?  [] Yes [x] No       Subjective:       Patient ID: Diego Martinez Jr. is a 49 y.o. male.    Chief Complaint: Pressure Ulcer (Right hip - Stage 4/Left medial upper thigh - Stage 3/Left Ischium - stage 4 )    HPI   Mr. Martinez is a long time patient of [facility].    He comes via wheelchair with a care attendant present with him at all times.    He does have a history of quadriplegia.  He has home health who assist him with his wound care daily and care attendants to provide care at other times.  He has a very pleasant demeanor and added to an is usually jovial in spite of his challenges.  He now has [employer].    10/4/23 - Mr. Martinez returns today for f/up on the following wounds:  Wound #1 - Right posterior hip - This wound was mechanically debrided.  It remains clean and stable, with no significant change.  There are no signs and symptoms of infection and very little drainage.  We will continue using silver alginate on this wound.    Wound #2 - Left ischial - 0.5 x 2.0 x 0.2 cm - wound remains stable with no significant change although it is slightly larger than last week.   The wound has no S & S of infection.  We will continue to apply silver alginate on this wound.  Wound #3 - Sacrum - remains open.  This wound was selectively debrided today.  We will continue powdered collagen and silver alginate.  Wound #4 - Left medial upper thigh - this wound remains stable with no signs and symptoms of infection. It was selectively debrided today and we will begin powdered collagen and silver alginate.     10/18/23 - Mr. Martinez is seen today for the f/up on several wounds:  Wound #1 - Right posterior hip - This wound was mechanically debrided.  It remains clean and stable, with no significant change.  There are no  signs and symptoms of infection and very little drainage.  We will continue using silver alginate on this wound.    Wound #2 - Left ischial - 0.5 x 1.5 x 0.2 cm - wound remains stable with no significant change although it is slightly larger than last week.   The wound has no S & S of infection.  We will continue to apply silver alginate on this wound.  Wound #3 - Sacrum - remains open.  This wound was selectively debrided today.  We will continue powdered collagen and silver alginate.  Wound #4 - Left medial upper thigh - this wound remains stable with no signs and symptoms of infection. It was mechanically debrided today and we will begin powdered collagen and silver alginate.     October 31st:  48-year-old black male, who is a quadriplegia, is here for follow up of his for pressure injuries.  The worse 1 at this time is the left ischial, left buttock.  The ulcers appear clean, without obvious secondary infection, foul odor, or discharge.  He has home health services almost every day.    11/14/23 - Mr. Martinez returns today for followup on several wounds.  All remains stable.  The left ischial does measures slightly larger, however, the wound bed is clean.  It was selectively debrided and there are no signs and symptoms of infection with this wound or any of his wounds at this time.  Wound #1 - Right posterior hip - This wound was mechanically debrided.  It remains clean and stable, with no significant change.  There are no signs and symptoms of infection and very little drainage.  We will continue using silver alginate on this wound.    Wound #2 - Left ischial - 0.8 x 1.0 x 0.2 cm  - wound remains stable with no significant change although it is slightly larger than last week.   The wound has no S & S of infection.  We will continue to apply silver alginate on this wound.  Wound #3 - Sacrum - remains open.  We will continue silver alginate.  Wound #4 - Left medial upper thigh -  4.0 x 2.0 x 0.3 cm- this wound has  increased in size slightly, but there are no signs and symptoms of infection. It was selectively debrided today and we will continue silver alginate.     12/5/23 - Mr. Martinez is seen today for f/up on his wounds.  They all remain stable, in various stages.  Today none show S & S of infection, with no increase in drainage, redness, etc.    Wound #1 - Right posterior hip - This wound was mechanically debride.  It does continue to drain a scant amount.  We will continue using silver alginate on this wound.    Wound #2 - Left ischial - 0.5 x 1.0 x 0.2 cm  - wound remains stable with a slight decrease in size.   The wound has no S & S of infection.  We will continue to apply silver alginate on this wound.  Wound #3 - Sacrum - remains open.  We will continue silver alginate.  Wound #4 - Left medial upper thigh -  2.7 x 1.8 x 0.3 cm - this wound has decreased in size and appears to be doing well.  There are no signs and symptoms of infection. It was selectively debrided today and we will continue silver alginate.     January 2, 2024:  48-year-old black male, with quadriplegia, is here for a multiple stage III and stage IV pressure injuries.  They are all under control at this time.  They are relatively shallow.  He has a good low air loss mattress at home.    1/22/24 - The patient returns today for f/up on several wounds.  Today many of the wounds are improving.  Wound #1 - Right posterior hip - continues to progress well and has a very small remaining opening.  We will continue to use silver alginate placed over the wound.    Wound #2 - Left ischial -0.4 x 1.0 x 0.2 cm  - wound remains appears stable with a slight decrease in size.   The wound has no S & S of infection.  We will continue to apply silver alginate on this wound.  Wound #3 - Sacrum - remains open and we will continue to monitor.  We will begin application of Endoform.    Wound #4 - Left medial upper thigh -  3.2 x 3.3 x 0.3 cm - this wound has increased in  size slightly.   and appears to be doing well.  There are no signs and symptoms of infection. It was selectively debrided today and we will continue silver alginate.  He does continue to have HH assistance on a daily basis.  If the left medial upper thigh does not improve by next week we will consider ultrasonic debridement.     January 30, 2024:  48-year-old black male, with quadriplegia, is here for follow up of his multiple pressure injuries, including right hip, left ischial, sacral, left medial upper thigh, and a stage III scrotal pressure ulcer.  The wounds are all clean at this time, and need no surgical debridement today.  The measurements are approximately the same as last visit.    2/20/24 - Mr. Martinez Is seen today for followup on several wounds.   Some of the wounds continue to progress well while others have deteriorated since he was last seen by this provider on January 22nd.  In particular, the left medial upper thigh has continued to deteriorate and today we are attempting to authorize a Theraskin  graft for that wound.  Wound #1 - Right posterior hip - Remains stable with no reported drainage, no S & S of infection.  We will continue to use silver alginate placed over the wound.    Wound #2 - Left ischial -0.8 x 0.5 x 0.2 cm  - wound continues to decrease in size.   The wound has no S & S of infection.  We will continue to apply silver alginate on this wound.  Wound #3 - Sacrum - 0.3 x 0.8 x 0.4 cm - remains open and has increased in size slightly we will continue to monitor and apply silver alginate.  Wound #4 - Left medial upper thigh - 2.5 x 3.0 x 0.9 cm - this wound has increased in size slightly. There are no signs and symptoms of infection. It was selectively debrided today and we will attempt to authorize a Theraskin graft for this wound.   The left medial upper thigh will be ultrasonically debrided at his next visit.    2/29/24 - The patient returns today for f/up on wounds.  We are  attempting to authorize Theraskin grafts for the patient.  In preparation, the wounds were selectively debrided using the ultrasonic debrider.  If authorized, graft prep will be done at his next visit.  Today he was instructed to begin application of mupirocin ointment to each wound, also in preparation of the process.    3/7/24 - Mr. Martinez is seen today for f/up on several wounds.  He is approved for Theraskin grafts on wound #4, the left medial upper thigh.  Wound #1 - Right posterior hip -   The wound has deteriorated greatly over the last couple of weeks.  The wound was nearly closed two weeks ago.  Today it was ultrasonically debrided and has a depth of 1.5 cm.  Wound #2 - Left ischial - 0.3 x 0.3 x 0.2 cm  - wound continues to decrease in size.   The wound has no S & S of infection.  We will continue to apply silver alginate on this wound.  Wound #3 - Sacrum - 0.3 x 0.8 x 0.2 cm - remains open and has decreased in depth slightly.  we will continue to monitor and apply silver alginate.  Wound #4 - Left medial upper thigh - 3.3 x 2.0 x 0.3 cm - this wound has continued to increase in size and depth.  Today it was selectively debrided using the ultrasonic debrider.  Theraskin graft #1 was applied.  Theraskin graft #2, 13.0 cm2 has been ordered.  Graft #1 applied was 6.0 cm2 which is too small for this wound.  We will increase in size.     3/14/24 - Mr. Martinez returns for f/up on his wounds.  We are currently grafting the left medial upper thigh.    Wound #1 - Right posterior hip -   The wound has deteriorated continually over the last couple of weeks.  The wound was nearly closed two weeks ago.  Today a culture was obtained to determine if there is an infective process occurring causing deterioration.    Wound #2 - Left ischial - 0.2 x 0.2 x 0.2 cm  - wound continues to decrease in size.   The wound has no S & S of infection.  We will continue to apply silver alginate on this wound.  Wound #3 - Sacrum - 0.2 x  1.0 x 0.2 cm - remains open and has increased again in depth slightly.  we will continue to monitor and apply silver alginate.  Wound #4 - Left medial upper thigh - 3.3 x 2.0 x 0.3 cm - this wound has remained the same size and depth.  At his last visit, Theraskin graft #2 (13.0 cm2) was applied.  This graft was pulled as taught as possible, and could barely be secured.  There is depth to the wound, and any attempt to apply pressure to the graft to allow contact with the wound bed would cause the graft to detach.  We must have a larger graft to achieve the wound depth.     3/21/24 - We reviewed the results from the patients culture of the right posterior hip.  It has several bacteria present in the culture.  He was prescribed Bactrim and Augmentin based on those culture results.  Because of the likelihood of contamination of other wounds, we will hold off on future grafts of the left medial upper thigh at this time.  He will begin his abx regimen and we will consider resuming grafting with the infection is cleared.  He will also apply Mupirocin ointment to the sacrum and left medial upper thigh.  Mupirocin will not be put into the right posterior hip due to the difficulty of cleaning it out.      3/27/24 - The patient was prescribed abx at his last visit.  Today he reports that after taking abx x 2 days he developed GI issues and stopped taking them.  He did begin taking them again today.  He has 8 days remaining and was strongly encouraged to follow through and complete his entire prescription.  The right hip has increased in depth.  The other wounds remains stable and/or improved.  We will continue mupirocin and silver on all wounds with the exception of the right hip, where we will use silver only.     4/3/24 -   Wound #1 - Right posterior hip -   This wound was cultured on 3/14/24.  He has completed both Clindamycin and Doxycycline.  Today it was ultrasonically debrided.  We will continue to monitor and consider  additional abx if no improvement shortly.   Wound #2 - Left ischial - Disappointingly this wound had healed, but today there was another large opening, likely due to positioning and/or cleaning.   Wound #3 - Sacrum - Continues to remain stable.   Wound #4 - Left medial upper thigh - 3.3 x 2.0 x 2.0 cm - this wound is no longer being grafted due to bacteria, inability to keep the graft in place, and inability to keep feeces out of wound.  Again today there was feeces in the wound.  It was cleansed using the ultrasonic debrider.     4/17/24 -   Wound #1 - Right posterior hip -   No significant change or improvement in this wound.  Topical abx have been ordered for use on all wounds.   Wound #2 - Left ischial - very small remaining opening.  [place] any aggressive repositioning wound should be healed shortly.  Wound #3 - Sacrum - Continues to remain stable.   Wound #4 - Left medial upper thigh - 3.3 x 3.2 x 0.5 - wound continues to deteriorate.  We discussed what may have changed recently causing the deterioration and that patient does not know.  It is alarming.  X-rays have been ordered for both hips to ensure that osteomyelitis is not present.  A BMP will be ordered through home health so that an MRI can be conducted.    5/1/24 - Patient is picking up topical abx today to begin using on open wounds (Cefazolin)  Wound #1 - Right posterior hip - slowly improving, slight decrease in depth.  Will begin using topical abx packed into wound.   Wound #2 - Left ischial - area assessed and wound is closed as of today!  Wound #3 - Sacrum - wound assessed and wound is closed as of today!  Wound #4 - Left medial upper thigh - 2.5 x 2.0 x 1.0 cm - wound length and width have decreased, depth increased slightly.  Wound was selectively debrided today.  We will begin application of topical abx on this wound daily.    An MRI was done of the left upper thigh and there is no indication of osteomyelitis at this time.    5/15/24 - We  continue to use topical abx on both open wounds with no particular improvement.  The wound to the left medial upper thigh has increased in size slightly.  Neither wound appears to be infected.  Both were selectively debrided using the ultrasonic debrider. The sacrum and left ischium remain closed.   Wound #1 - Right posterior hip - 0.5 x 0.8 x 2.6 cm  Wound #4 - left medial upper thigh - 2.8 x 2.5 x 1.0 cm    5/29/24 - The patient returns today for f/up on two active wounds.  We have been using topical abx since 5/1/24.  Today those abx were d/c.  Both wounds were selectively debrided using the ultrasonic debrider.  The left medial upper thigh wound remains concerning due to frequent and regular exposure to feces.  Fecal contamination is making it extremely difficult to make progress with this wound.  The tunneling wound at the right hip also continues to worsen in spite of the use of abx.  The patient does have Home Health services to assist with his wounds.     6/11/24 - Mr. Martinez returns for followup on 2 wounds.  Today he was in a rush to complete his visit due to transportation issues.  No debridement was performed as result of time constraints.  The wound to the right hip did measure larger and deeper this visit.  The left medial upper thigh remains a proximally this same size and again today there was cc noted on the wound margins.  The he needs to be a concern.  Today we will change the product for the left medial upper thigh from silver alginate to Polymem with the hopes that the additional cushion will be beneficial.    7/2/24 - The   Patient returns today for followup on 2 wounds.  The wound to the right hip has again increased in depth.  There was also a small amount of purulent exudate in this wound.  A culture was obtained.  The wound at the left medial upper thigh was selectively debrided.  This wound continues to increase in size is well and there is continual concern due to the amount of feet cc  which is routine only cleansed from the wound upon arrival to wound clinic.  We did have a conversation with the patient to encourage Home Health to clean that wound better following each dressing change as each time the patient has come to clinic there has been feces embedded into the wound bed.  We are unable to make much progress with the wound hygiene the way that it currently is.    7/15/24 - Mr. Martinez returns today for f/up on two wounds.  Additionally a closed wound to the ischium has reopened.  At his last visit a culture was obtained.  That culture was polymicrobial with some resistance.  Today and Rx for Metronidazole was sent to his pharmacy to being taking today.  The wound to the Left upper posterior thigh and right upper thigh were both debrided using a curette and ultrasonic debrider.  The Left upper posterior thigh has decreased in size this week and the tunnel at the right upper hip has decreased in depth.  We will continue Polymem to the Left upper posterior thigh and the reopened wound at the ischium, and continue to pack the right upper hip.  The sacrum remains closed. Of noted, he did report that he has been feeling very poorly over the last two weeks and followed up with his PCP today again.  He has been constipated and has had a recurrent UTI for which his is currently taking Nitrofurantoin.     7/22/24 - The returns today for followup on several pressure injuries.  A callus paring was performed on the sacral area.  That wound does remain closed.  The wound to the left ischium was reopened at the last visit and today that wound is nearly closed.  We will continue to monitor.  The right hip remains stable with a depth of 3.2 cm.  It has not changed in the last couple of weeks.  We will continue to pack this wound with packing strips.  Most signficant wound is the left upper posterior thigh.  A selective debridement was performed on this wound and it is improving slightly.  None of the wounds  have any signs and symptoms of infection.  Of note, he is no longer taking Flagyl, however, he continues to take Macrobid as prescribed by his PCP, Dr. Mcdonough for a UTI.  Additionally, he did receive a total of 3 Rocephin injections for the same.  We will continue to use Polymem on all wounds with the exception the right hip which does require packing.      Review of Systems   Constitutional: Negative.    Respiratory: Negative.     Cardiovascular: Negative.    Musculoskeletal:  Positive for gait problem.   Skin:  Positive for wound.        As documented in the HPI.   All other systems reviewed and are negative.        Objective:      Vitals:    07/22/24 1105   BP: (!) 158/83   Pulse: 99   Resp: 16     Physical Exam  Vitals reviewed.   Constitutional:       Appearance: Normal appearance. He is normal weight.   HENT:      Head: Normocephalic.   Cardiovascular:      Rate and Rhythm: Normal rate.      Pulses: Normal pulses.   Pulmonary:      Effort: Pulmonary effort is normal.   Musculoskeletal:      Comments: quadraplegia   Skin:     General: Skin is warm and dry.      Comments: Multiple pressure injuries   Neurological:      General: No focal deficit present.      Mental Status: He is alert and oriented to person, place, and time.   Psychiatric:         Mood and Affect: Mood normal.            Altered Skin Integrity 05/23/22 1337 Right posterior Hip #1 (Active)   05/23/22 1337   Altered Skin Integrity Present on Admission - Did Patient arrive to the hospital with altered skin?: yes   Side: Right   Orientation: posterior   Location: Hip   Wound Number: #1   Is this injury device related?: No   Primary Wound Type:    Description of Altered Skin Integrity:    Ankle-Brachial Index:    Pulses:    Removal Indication and Assessment:    Wound Outcome:    (Retired) Wound Length (cm):    (Retired) Wound Width (cm):    (Retired) Depth (cm):    Wound Description (Comments):    Removal Indications:    Dressing Appearance  Dried drainage 07/22/24 1214   Drainage Amount Moderate 07/22/24 1214   Drainage Characteristics/Odor Creamy;Green 07/22/24 1214   Appearance Pink;Slough;Moist 07/22/24 1214   Tissue loss description Full thickness 07/22/24 1214   Periwound Area Moist 07/22/24 1214   Wound Edges Open 07/22/24 1214   Wound Length (cm) 0.5 cm 07/22/24 1214   Wound Width (cm) 0.8 cm 07/22/24 1214   Wound Depth (cm) 3.2 cm 07/22/24 1214   Wound Volume (cm^3) 1.28 cm^3 07/22/24 1214   Wound Surface Area (cm^2) 0.4 cm^2 07/22/24 1214   Care Cleansed with:;Wound cleanser 07/22/24 1214   Dressing Applied 07/22/24 1214   Dressing Change Due 07/23/24 07/22/24 1214            Altered Skin Integrity 05/23/22 1353 Left Ischial tuberosity #2 (Active)   05/23/22 1353   Altered Skin Integrity Present on Admission - Did Patient arrive to the hospital with altered skin?: yes   Side: Left   Orientation:    Location: Ischial tuberosity   Wound Number: #2   Is this injury device related?: No   Primary Wound Type:    Description of Altered Skin Integrity:    Ankle-Brachial Index:    Pulses:    Removal Indication and Assessment:    Wound Outcome:    (Retired) Wound Length (cm):    (Retired) Wound Width (cm):    (Retired) Depth (cm):    Wound Description (Comments):    Removal Indications:    Dressing Appearance Dried drainage 07/22/24 1214   Drainage Amount Moderate 07/22/24 1214   Drainage Characteristics/Odor Serosanguineous 07/22/24 1214   Appearance Pink 07/22/24 1214   Tissue loss description Full thickness 07/22/24 1214   Periwound Area Intact 07/22/24 1214   Wound Edges Open 07/22/24 1214   Wound Length (cm) 1 cm 07/22/24 1214   Wound Width (cm) 0.8 cm 07/22/24 1214   Wound Depth (cm) 0.2 cm 07/22/24 1214   Wound Volume (cm^3) 0.16 cm^3 07/22/24 1214   Wound Surface Area (cm^2) 0.8 cm^2 07/22/24 1214   Care Cleansed with:;Wound cleanser 07/22/24 1214   Dressing Applied 07/22/24 1214   Dressing Change Due 07/23/24 07/22/24 1214            Altered  Skin Integrity 01/05/23 1351 Left upper;medial Thigh #4  (Active)   01/05/23 1351   Altered Skin Integrity Present on Admission - Did Patient arrive to the hospital with altered skin?: yes   Side: Left   Orientation: upper;medial   Location: Thigh   Wound Number: #4   Is this injury device related?: No   Primary Wound Type:    Description of Altered Skin Integrity:    Ankle-Brachial Index:    Pulses:    Removal Indication and Assessment:    Wound Outcome:    (Retired) Wound Length (cm):    (Retired) Wound Width (cm):    (Retired) Depth (cm):    Wound Description (Comments):    Removal Indications:    Dressing Appearance Moist drainage 07/22/24 1214   Drainage Amount Moderate 07/22/24 1214   Drainage Characteristics/Odor Serosanguineous 07/22/24 1214   Appearance Red;Moist;Granulating 07/22/24 1214   Tissue loss description Full thickness 07/22/24 1214   Periwound Area Macerated 07/22/24 1214   Wound Edges Open 07/22/24 1214   Wound Length (cm) 3 cm 07/22/24 1214   Wound Width (cm) 2.3 cm 07/22/24 1214   Wound Depth (cm) 0.7 cm 07/22/24 1214   Wound Volume (cm^3) 4.83 cm^3 07/22/24 1214   Wound Surface Area (cm^2) 6.9 cm^2 07/22/24 1214   Care Cleansed with:;Wound cleanser 07/22/24 1214   Dressing Applied 07/22/24 1214   Dressing Change Due 07/23/24 07/22/24 1214   07/22/24    Left ischium       Left thigh - pre                                            Left thigh - post     Right hip   (Polymem, 4x4 gauze, island dressing)   ML  Assessment:           ICD-10-CM ICD-9-CM   1. Stage IV pressure ulcer of right hip  L89.214 707.04     707.24   2. Pressure injury of left thigh, stage 3  L89.223 707.09     707.23   3. Pressure ulcer of left buttock, stage 4  L89.324 707.05     707.24   4. Quadriplegia, unspecified  G82.50 344.00           Procedures:     Debridement     Date/Time: 7/22/2024 10:47 AM     Performed by: Jessie Bah NP  Authorized by: Jessie Bah NP    Time out: Immediately prior to procedure a  ""time out" was called to verify the correct patient, procedure, equipment, support staff and site/side marked as required.     Consent Done?:  Yes (Verbal)     Preparation: Patient was prepped and draped with clean technique    Local anesthesia used?: No       Wound Details:    Location:  Left leg    Type of Debridement:  Non-excisional       Length (cm):  3       Area (sq cm):  6.9       Width (cm):  2.3       Percent Debrided (%):  100       Depth (cm):  0.7       Total Area Debrided (sq cm):  6.9    Depth of debridement:  Epidermis/Dermis    Devitalized tissue debrided:  Biofilm, Callus, Exudate, Fibrin and Slough    Instruments:  Curette (Dermal)  Bleeding:  None  Patient tolerance:  Patient tolerated the procedure well with no immediate complications       Excisional debridement performed:  [] Yes [] No   Selective debridement performed:  [x] Yes [] No   Mechanical debridement performed:  [x] Yes [] No   Silver nitrate applied:    [] Yes [] No   Labs ordered this visit:   [] Yes [] No   Imaging ordered this visit:   [] Yes [] No   Tissue pathology and/or culture taken:  [] Yes [] No       MEDICATIONS    Current Outpatient Medications:     apixaban (ELIQUIS) 5 mg Tab, Take 5 mg by mouth once daily at 6am., Disp: , Rfl:     cholecalciferol, vitamin D3, 125 mcg (5,000 unit) capsule, Take 5,000 Units by mouth., Disp: , Rfl:     LINZESS 145 mcg Cap capsule, Take 145 mcg by mouth once daily., Disp: , Rfl:     nitrofurantoin, macrocrystal-monohydrate, (MACROBID) 100 MG capsule, Take 100 mg by mouth 2 (two) times daily., Disp: , Rfl:     mupirocin (BACTROBAN) 2 % ointment, 2 (two) times daily. Apply to affected area for five days (Patient not taking: Reported on 7/22/2024), Disp: , Rfl:    Review of patient's allergies indicates:   Allergen Reactions    Meropenem Anaphylaxis    Penicillins          [facility]:  [facility]   TIMES PER WEEK/DAYS:  daily   ORDERS:  Right hip wound: Cleanse with wound " cleanser, gently pack a strip of polymem into wound, cover with additonal polymem, and 4x4 gentle foam border dressing to be changed daily   Left upper posterior thigh wound: Cleanse with wound cleanser, apply polymem, 4x4 gauze and 6x6 gentle foam border dressing to be changed daily    Left ischium: Cleanse with wound cleanser, apply polymem, 4x4 gauze, and 4x4 gentle foam border dressing to be changed daily       Follow up in about 1 week (around 7/29/2024) for Provider Visit.

## 2024-07-22 NOTE — PROCEDURES
"Debridement    Date/Time: 7/22/2024 10:47 AM    Performed by: Jessie Bah NP  Authorized by: Jessie Bah NP    Time out: Immediately prior to procedure a "time out" was called to verify the correct patient, procedure, equipment, support staff and site/side marked as required.    Consent Done?:  Yes (Verbal)    Preparation: Patient was prepped and draped with clean technique    Local anesthesia used?: No      Wound Details:    Location:  Left leg    Type of Debridement:  Non-excisional       Length (cm):  3       Area (sq cm):  6.9       Width (cm):  2.3       Percent Debrided (%):  100       Depth (cm):  0.7       Total Area Debrided (sq cm):  6.9    Depth of debridement:  Epidermis/Dermis    Devitalized tissue debrided:  Biofilm, Callus, Exudate, Fibrin and Slough    Instruments:  Curette (Dermal)  Bleeding:  None  Patient tolerance:  Patient tolerated the procedure well with no immediate complications    "

## 2024-07-24 ENCOUNTER — LAB REQUISITION (OUTPATIENT)
Dept: LAB | Facility: HOSPITAL | Age: 49
End: 2024-07-24
Payer: MEDICARE

## 2024-07-24 DIAGNOSIS — N39.0 URINARY TRACT INFECTION, SITE NOT SPECIFIED: ICD-10-CM

## 2024-07-24 DIAGNOSIS — G82.50 QUADRIPLEGIA, UNSPECIFIED: ICD-10-CM

## 2024-07-24 LAB
BACTERIA #/AREA URNS AUTO: ABNORMAL /HPF
BILIRUB UR QL STRIP.AUTO: NEGATIVE
CLARITY UR: CLEAR
COLOR UR AUTO: YELLOW
GLUCOSE UR QL STRIP: NEGATIVE
HGB UR QL STRIP: ABNORMAL
KETONES UR QL STRIP: NEGATIVE
LEUKOCYTE ESTERASE UR QL STRIP: ABNORMAL
NITRITE UR QL STRIP: NEGATIVE
PH UR STRIP: 7 [PH]
PROT UR QL STRIP: NEGATIVE
RBC #/AREA URNS AUTO: ABNORMAL /HPF
SP GR UR STRIP.AUTO: 1.01 (ref 1–1.03)
SQUAMOUS #/AREA URNS AUTO: ABNORMAL /HPF
UROBILINOGEN UR STRIP-ACNC: 0.2
WBC #/AREA URNS AUTO: ABNORMAL /HPF

## 2024-07-24 PROCEDURE — 81003 URINALYSIS AUTO W/O SCOPE: CPT | Performed by: FAMILY MEDICINE

## 2024-07-29 ENCOUNTER — HOSPITAL ENCOUNTER (OUTPATIENT)
Dept: WOUND CARE | Facility: HOSPITAL | Age: 49
Discharge: HOME | End: 2024-07-29
Attending: NURSE PRACTITIONER
Payer: MEDICARE

## 2024-07-29 VITALS
BODY MASS INDEX: 29.17 KG/M2 | DIASTOLIC BLOOD PRESSURE: 67 MMHG | HEART RATE: 70 BPM | WEIGHT: 175.06 LBS | RESPIRATION RATE: 18 BRPM | SYSTOLIC BLOOD PRESSURE: 132 MMHG | HEIGHT: 65 IN

## 2024-07-29 DIAGNOSIS — L89.214 STAGE IV PRESSURE ULCER OF RIGHT HIP: Primary | ICD-10-CM

## 2024-07-29 DIAGNOSIS — G82.50 QUADRIPLEGIA, UNSPECIFIED: ICD-10-CM

## 2024-07-29 DIAGNOSIS — L89.223 PRESSURE INJURY OF LEFT THIGH, STAGE 3: ICD-10-CM

## 2024-07-29 DIAGNOSIS — L89.324 PRESSURE ULCER OF LEFT BUTTOCK, STAGE 4: ICD-10-CM

## 2024-07-29 PROCEDURE — 99212 OFFICE O/P EST SF 10 MIN: CPT

## 2024-07-29 PROCEDURE — 27000999 HC MEDICAL RECORD PHOTO DOCUMENTATION

## 2024-07-29 NOTE — PROGRESS NOTES
Home Health: [facility]   Smoker   [] Yes  [x] No  Diabetic   [] Yes   [x] No  Low air loss mattress [x] Yes [] No   Is the patient eligible for a graft, and/or currently grafting?  [] Yes [x] No     Subjective:       Patient ID: Diego Martinez Jr. is a 49 y.o. male.    Chief Complaint: Pressure Ulcer (Right hip - Stage 4/Left medial upper thigh - Stage 3/Left Ischium - stage 4)    HPI   Mr. Martinez is a long time patient of [facility].    He comes via wheelchair with a care attendant present with him at all times.    He does have a history of quadriplegia.  He has home health who assist him with his wound care daily and care attendants to provide care at other times.  He has a very pleasant demeanor and added to an is usually jovial in spite of his challenges.  He now has Mobile Authentication.    10/4/23 - Mr. Martinez returns today for f/up on the following wounds:  Wound #1 - Right posterior hip - This wound was mechanically debrided.  It remains clean and stable, with no significant change.  There are no signs and symptoms of infection and very little drainage.  We will continue using silver alginate on this wound.    Wound #2 - Left ischial - 0.5 x 2.0 x 0.2 cm - wound remains stable with no significant change although it is slightly larger than last week.   The wound has no S & S of infection.  We will continue to apply silver alginate on this wound.  Wound #3 - Sacrum - remains open.  This wound was selectively debrided today.  We will continue powdered collagen and silver alginate.  Wound #4 - Left medial upper thigh - this wound remains stable with no signs and symptoms of infection. It was selectively debrided today and we will begin powdered collagen and silver alginate.     10/18/23 - Mr. Martinez is seen today for the f/up on several wounds:  Wound #1 - Right posterior hip - This wound was mechanically debrided.  It remains clean and stable, with no significant change.  There are no signs  and symptoms of infection and very little drainage.  We will continue using silver alginate on this wound.    Wound #2 - Left ischial - 0.5 x 1.5 x 0.2 cm - wound remains stable with no significant change although it is slightly larger than last week.   The wound has no S & S of infection.  We will continue to apply silver alginate on this wound.  Wound #3 - Sacrum - remains open.  This wound was selectively debrided today.  We will continue powdered collagen and silver alginate.  Wound #4 - Left medial upper thigh - this wound remains stable with no signs and symptoms of infection. It was mechanically debrided today and we will begin powdered collagen and silver alginate.     October 31st:  48-year-old black male, who is a quadriplegia, is here for follow up of his for pressure injuries.  The worse 1 at this time is the left ischial, left buttock.  The ulcers appear clean, without obvious secondary infection, foul odor, or discharge.  He has home health services almost every day.    11/14/23 - Mr. Martinez returns today for followup on several wounds.  All remains stable.  The left ischial does measures slightly larger, however, the wound bed is clean.  It was selectively debrided and there are no signs and symptoms of infection with this wound or any of his wounds at this time.  Wound #1 - Right posterior hip - This wound was mechanically debrided.  It remains clean and stable, with no significant change.  There are no signs and symptoms of infection and very little drainage.  We will continue using silver alginate on this wound.    Wound #2 - Left ischial - 0.8 x 1.0 x 0.2 cm  - wound remains stable with no significant change although it is slightly larger than last week.   The wound has no S & S of infection.  We will continue to apply silver alginate on this wound.  Wound #3 - Sacrum - remains open.  We will continue silver alginate.  Wound #4 - Left medial upper thigh -  4.0 x 2.0 x 0.3 cm- this wound has  increased in size slightly, but there are no signs and symptoms of infection. It was selectively debrided today and we will continue silver alginate.     12/5/23 - Mr. Martinez is seen today for f/up on his wounds.  They all remain stable, in various stages.  Today none show S & S of infection, with no increase in drainage, redness, etc.    Wound #1 - Right posterior hip - This wound was mechanically debride.  It does continue to drain a scant amount.  We will continue using silver alginate on this wound.    Wound #2 - Left ischial - 0.5 x 1.0 x 0.2 cm  - wound remains stable with a slight decrease in size.   The wound has no S & S of infection.  We will continue to apply silver alginate on this wound.  Wound #3 - Sacrum - remains open.  We will continue silver alginate.  Wound #4 - Left medial upper thigh -  2.7 x 1.8 x 0.3 cm - this wound has decreased in size and appears to be doing well.  There are no signs and symptoms of infection. It was selectively debrided today and we will continue silver alginate.     January 2, 2024:  48-year-old black male, with quadriplegia, is here for a multiple stage III and stage IV pressure injuries.  They are all under control at this time.  They are relatively shallow.  He has a good low air loss mattress at home.    1/22/24 - The patient returns today for f/up on several wounds.  Today many of the wounds are improving.  Wound #1 - Right posterior hip - continues to progress well and has a very small remaining opening.  We will continue to use silver alginate placed over the wound.    Wound #2 - Left ischial -0.4 x 1.0 x 0.2 cm  - wound remains appears stable with a slight decrease in size.   The wound has no S & S of infection.  We will continue to apply silver alginate on this wound.  Wound #3 - Sacrum - remains open and we will continue to monitor.  We will begin application of Endoform.    Wound #4 - Left medial upper thigh -  3.2 x 3.3 x 0.3 cm - this wound has increased in  size slightly.   and appears to be doing well.  There are no signs and symptoms of infection. It was selectively debrided today and we will continue silver alginate.  He does continue to have HH assistance on a daily basis.  If the left medial upper thigh does not improve by next week we will consider ultrasonic debridement.     January 30, 2024:  48-year-old black male, with quadriplegia, is here for follow up of his multiple pressure injuries, including right hip, left ischial, sacral, left medial upper thigh, and a stage III scrotal pressure ulcer.  The wounds are all clean at this time, and need no surgical debridement today.  The measurements are approximately the same as last visit.    2/20/24 - Mr. Martinez Is seen today for followup on several wounds.   Some of the wounds continue to progress well while others have deteriorated since he was last seen by this provider on January 22nd.  In particular, the left medial upper thigh has continued to deteriorate and today we are attempting to authorize a Theraskin  graft for that wound.  Wound #1 - Right posterior hip - Remains stable with no reported drainage, no S & S of infection.  We will continue to use silver alginate placed over the wound.    Wound #2 - Left ischial -0.8 x 0.5 x 0.2 cm  - wound continues to decrease in size.   The wound has no S & S of infection.  We will continue to apply silver alginate on this wound.  Wound #3 - Sacrum - 0.3 x 0.8 x 0.4 cm - remains open and has increased in size slightly we will continue to monitor and apply silver alginate.  Wound #4 - Left medial upper thigh - 2.5 x 3.0 x 0.9 cm - this wound has increased in size slightly. There are no signs and symptoms of infection. It was selectively debrided today and we will attempt to authorize a Theraskin graft for this wound.   The left medial upper thigh will be ultrasonically debrided at his next visit.    2/29/24 - The patient returns today for f/up on wounds.  We are  attempting to authorize Theraskin grafts for the patient.  In preparation, the wounds were selectively debrided using the ultrasonic debrider.  If authorized, graft prep will be done at his next visit.  Today he was instructed to begin application of mupirocin ointment to each wound, also in preparation of the process.    3/7/24 - Mr. Martinez is seen today for f/up on several wounds.  He is approved for Theraskin grafts on wound #4, the left medial upper thigh.  Wound #1 - Right posterior hip -   The wound has deteriorated greatly over the last couple of weeks.  The wound was nearly closed two weeks ago.  Today it was ultrasonically debrided and has a depth of 1.5 cm.  Wound #2 - Left ischial - 0.3 x 0.3 x 0.2 cm  - wound continues to decrease in size.   The wound has no S & S of infection.  We will continue to apply silver alginate on this wound.  Wound #3 - Sacrum - 0.3 x 0.8 x 0.2 cm - remains open and has decreased in depth slightly.  we will continue to monitor and apply silver alginate.  Wound #4 - Left medial upper thigh - 3.3 x 2.0 x 0.3 cm - this wound has continued to increase in size and depth.  Today it was selectively debrided using the ultrasonic debrider.  Theraskin graft #1 was applied.  Theraskin graft #2, 13.0 cm2 has been ordered.  Graft #1 applied was 6.0 cm2 which is too small for this wound.  We will increase in size.     3/14/24 - Mr. Martinez returns for f/up on his wounds.  We are currently grafting the left medial upper thigh.    Wound #1 - Right posterior hip -   The wound has deteriorated continually over the last couple of weeks.  The wound was nearly closed two weeks ago.  Today a culture was obtained to determine if there is an infective process occurring causing deterioration.    Wound #2 - Left ischial - 0.2 x 0.2 x 0.2 cm  - wound continues to decrease in size.   The wound has no S & S of infection.  We will continue to apply silver alginate on this wound.  Wound #3 - Sacrum - 0.2 x  1.0 x 0.2 cm - remains open and has increased again in depth slightly.  we will continue to monitor and apply silver alginate.  Wound #4 - Left medial upper thigh - 3.3 x 2.0 x 0.3 cm - this wound has remained the same size and depth.  At his last visit, Theraskin graft #2 (13.0 cm2) was applied.  This graft was pulled as taught as possible, and could barely be secured.  There is depth to the wound, and any attempt to apply pressure to the graft to allow contact with the wound bed would cause the graft to detach.  We must have a larger graft to achieve the wound depth.     3/21/24 - We reviewed the results from the patients culture of the right posterior hip.  It has several bacteria present in the culture.  He was prescribed Bactrim and Augmentin based on those culture results.  Because of the likelihood of contamination of other wounds, we will hold off on future grafts of the left medial upper thigh at this time.  He will begin his abx regimen and we will consider resuming grafting with the infection is cleared.  He will also apply Mupirocin ointment to the sacrum and left medial upper thigh.  Mupirocin will not be put into the right posterior hip due to the difficulty of cleaning it out.      3/27/24 - The patient was prescribed abx at his last visit.  Today he reports that after taking abx x 2 days he developed GI issues and stopped taking them.  He did begin taking them again today.  He has 8 days remaining and was strongly encouraged to follow through and complete his entire prescription.  The right hip has increased in depth.  The other wounds remains stable and/or improved.  We will continue mupirocin and silver on all wounds with the exception of the right hip, where we will use silver only.     4/3/24 -   Wound #1 - Right posterior hip -   This wound was cultured on 3/14/24.  He has completed both Clindamycin and Doxycycline.  Today it was ultrasonically debrided.  We will continue to monitor and consider  additional abx if no improvement shortly.   Wound #2 - Left ischial - Disappointingly this wound had healed, but today there was another large opening, likely due to positioning and/or cleaning.   Wound #3 - Sacrum - Continues to remain stable.   Wound #4 - Left medial upper thigh - 3.3 x 2.0 x 2.0 cm - this wound is no longer being grafted due to bacteria, inability to keep the graft in place, and inability to keep feeces out of wound.  Again today there was feeces in the wound.  It was cleansed using the ultrasonic debrider.     4/17/24 -   Wound #1 - Right posterior hip -   No significant change or improvement in this wound.  Topical abx have been ordered for use on all wounds.   Wound #2 - Left ischial - very small remaining opening.  [place] any aggressive repositioning wound should be healed shortly.  Wound #3 - Sacrum - Continues to remain stable.   Wound #4 - Left medial upper thigh - 3.3 x 3.2 x 0.5 - wound continues to deteriorate.  We discussed what may have changed recently causing the deterioration and that patient does not know.  It is alarming.  X-rays have been ordered for both hips to ensure that osteomyelitis is not present.  A BMP will be ordered through home health so that an MRI can be conducted.    5/1/24 - Patient is picking up topical abx today to begin using on open wounds (Cefazolin)  Wound #1 - Right posterior hip - slowly improving, slight decrease in depth.  Will begin using topical abx packed into wound.   Wound #2 - Left ischial - area assessed and wound is closed as of today!  Wound #3 - Sacrum - wound assessed and wound is closed as of today!  Wound #4 - Left medial upper thigh - 2.5 x 2.0 x 1.0 cm - wound length and width have decreased, depth increased slightly.  Wound was selectively debrided today.  We will begin application of topical abx on this wound daily.    An MRI was done of the left upper thigh and there is no indication of osteomyelitis at this time.    5/15/24 - We  continue to use topical abx on both open wounds with no particular improvement.  The wound to the left medial upper thigh has increased in size slightly.  Neither wound appears to be infected.  Both were selectively debrided using the ultrasonic debrider. The sacrum and left ischium remain closed.   Wound #1 - Right posterior hip - 0.5 x 0.8 x 2.6 cm  Wound #4 - left medial upper thigh - 2.8 x 2.5 x 1.0 cm    5/29/24 - The patient returns today for f/up on two active wounds.  We have been using topical abx since 5/1/24.  Today those abx were d/c.  Both wounds were selectively debrided using the ultrasonic debrider.  The left medial upper thigh wound remains concerning due to frequent and regular exposure to feces.  Fecal contamination is making it extremely difficult to make progress with this wound.  The tunneling wound at the right hip also continues to worsen in spite of the use of abx.  The patient does have Home Health services to assist with his wounds.     6/11/24 - Mr. Martinez returns for followup on 2 wounds.  Today he was in a rush to complete his visit due to transportation issues.  No debridement was performed as result of time constraints.  The wound to the right hip did measure larger and deeper this visit.  The left medial upper thigh remains a proximally this same size and again today there was cc noted on the wound margins.  The he needs to be a concern.  Today we will change the product for the left medial upper thigh from silver alginate to Polymem with the hopes that the additional cushion will be beneficial.    7/2/24 - The   Patient returns today for followup on 2 wounds.  The wound to the right hip has again increased in depth.  There was also a small amount of purulent exudate in this wound.  A culture was obtained.  The wound at the left medial upper thigh was selectively debrided.  This wound continues to increase in size is well and there is continual concern due to the amount of feet cc  which is routine only cleansed from the wound upon arrival to wound clinic.  We did have a conversation with the patient to encourage Home Health to clean that wound better following each dressing change as each time the patient has come to clinic there has been feces embedded into the wound bed.  We are unable to make much progress with the wound hygiene the way that it currently is.    7/15/24 - Mr. Martinez returns today for f/up on two wounds.  Additionally a closed wound to the ischium has reopened.  At his last visit a culture was obtained.  That culture was polymicrobial with some resistance.  Today and Rx for Metronidazole was sent to his pharmacy to being taking today.  The wound to the Left upper posterior thigh and right upper thigh were both debrided using a curette and ultrasonic debrider.  The Left upper posterior thigh has decreased in size this week and the tunnel at the right upper hip has decreased in depth.  We will continue Polymem to the Left upper posterior thigh and the reopened wound at the ischium, and continue to pack the right upper hip.  The sacrum remains closed. Of noted, he did report that he has been feeling very poorly over the last two weeks and followed up with his PCP today again.  He has been constipated and has had a recurrent UTI for which his is currently taking Nitrofurantoin.     7/22/24 - The returns today for followup on several pressure injuries.  A callus paring was performed on the sacral area.  That wound does remain closed.  The wound to the left ischium was reopened at the last visit and today that wound is nearly closed.  We will continue to monitor.  The right hip remains stable with a depth of 3.2 cm.  It has not changed in the last couple of weeks.  We will continue to pack this wound with packing strips.  Most signficant wound is the left upper posterior thigh.  A selective debridement was performed on this wound and it is improving slightly.  None of the wounds  have any signs and symptoms of infection.  Of note, he is no longer taking Flagyl, however, he continues to take Macrobid as prescribed by his PCP, Dr. Mcdonough for a UTI.  Additionally, he did receive a total of 3 Rocephin injections for the same.  We will continue to use Polymem on all wounds with the exception the right hip which does require packing.    7/29/24 - Mr. Martinez is seen for f/up on the the pressure injuries to the lower extremities.  Today the left ischium is again closed but will be monitored.  The right hip still has considerable depth and undermining.  We will begin packing with Iodoform packing in an effort to clean the tissue.  The left upper posterior thigh looks slightly improved.  The granular tissue is very nice and there is some small amounts of epithelial tissue beginning to migrate from the wound margins.  The wound was chemically cauterized using silver nitrate.  We will continue with the use of Polymem on this wound.     Review of Systems   Constitutional: Negative.    Respiratory: Negative.     Cardiovascular: Negative.    Musculoskeletal:  Positive for gait problem.   Skin:  Positive for wound.        As documented in the HPI.   All other systems reviewed and are negative.        Objective:      Vitals:    07/29/24 1051   BP: 132/67   Pulse: 70   Resp: 18     Physical Exam  Vitals reviewed.   Constitutional:       Appearance: Normal appearance. He is normal weight.   HENT:      Head: Normocephalic.   Cardiovascular:      Rate and Rhythm: Normal rate.      Pulses: Normal pulses.   Pulmonary:      Effort: Pulmonary effort is normal.   Musculoskeletal:      Comments: quadraplegia   Skin:     General: Skin is warm and dry.      Comments: Multiple pressure injuries   Neurological:      General: No focal deficit present.      Mental Status: He is alert and oriented to person, place, and time.   Psychiatric:         Mood and Affect: Mood normal.            Altered Skin Integrity 05/23/22  1337 Right posterior Hip #1 (Active)   05/23/22 1337   Altered Skin Integrity Present on Admission - Did Patient arrive to the hospital with altered skin?: yes   Side: Right   Orientation: posterior   Location: Hip   Wound Number: #1   Is this injury device related?: No   Primary Wound Type:    Description of Altered Skin Integrity:    Ankle-Brachial Index:    Pulses:    Removal Indication and Assessment:    Wound Outcome:    (Retired) Wound Length (cm):    (Retired) Wound Width (cm):    (Retired) Depth (cm):    Wound Description (Comments):    Removal Indications:    Dressing Appearance Moist drainage 07/29/24 1120   Drainage Amount Moderate 07/29/24 1120   Drainage Characteristics/Odor Creamy;Green 07/29/24 1120   Appearance Pink;Slough 07/29/24 1120   Tissue loss description Full thickness 07/29/24 1120   Periwound Area Moist 07/29/24 1120   Wound Edges Open 07/29/24 1120   Wound Length (cm) 0.5 cm 07/29/24 1120   Wound Width (cm) 0.8 cm 07/29/24 1120   Wound Depth (cm) 3.2 cm 07/29/24 1120   Wound Volume (cm^3) 1.28 cm^3 07/29/24 1120   Wound Surface Area (cm^2) 0.4 cm^2 07/29/24 1120   Care Cleansed with:;Wound cleanser 07/29/24 1120   Dressing Applied 07/29/24 1120   Dressing Change Due 07/30/24 07/29/24 1120            Altered Skin Integrity 01/05/23 1351 Left upper;medial Thigh #4  (Active)   01/05/23 1351   Altered Skin Integrity Present on Admission - Did Patient arrive to the hospital with altered skin?: yes   Side: Left   Orientation: upper;medial   Location: Thigh   Wound Number: #4   Is this injury device related?: No   Primary Wound Type:    Description of Altered Skin Integrity:    Ankle-Brachial Index:    Pulses:    Removal Indication and Assessment:    Wound Outcome:    (Retired) Wound Length (cm):    (Retired) Wound Width (cm):    (Retired) Depth (cm):    Wound Description (Comments):    Removal Indications:    Dressing Appearance Saturated 07/29/24 1120   Drainage Amount Large 07/29/24 1120    Drainage Characteristics/Odor Serosanguineous 07/29/24 1120   Appearance Red;Moist 07/29/24 1120   Tissue loss description Full thickness 07/29/24 1120   Periwound Area Pale white 07/29/24 1120   Wound Edges Open 07/29/24 1120   Wound Length (cm) 3 cm 07/29/24 1120   Wound Width (cm) 2.5 cm 07/29/24 1120   Wound Depth (cm) 0.7 cm 07/29/24 1120   Wound Volume (cm^3) 5.25 cm^3 07/29/24 1120   Wound Surface Area (cm^2) 7.5 cm^2 07/29/24 1120   Care Cleansed with:;Wound cleanser 07/29/24 1120   Dressing Applied 07/29/24 1120   Dressing Change Due 07/30/24 07/29/24 1120   07/29/24      Right hip                                                     Left ischium (HEALED)   (Iodoform packing, 4x4 gauze, gentle border)       Left posterior thigh - pre                              Left posterior thigh - post   (Polymem, 4x4 gauze, gentle border)  ML  Assessment:           ICD-10-CM ICD-9-CM   1. Stage IV pressure ulcer of right hip  L89.214 707.04     707.24   2. Pressure injury of left thigh, stage 3  L89.223 707.09     707.23   3. Pressure ulcer of left buttock, stage 4  L89.324 707.05     707.24   4. Quadriplegia, unspecified  G82.50 344.00           Procedures:     Mechanical debridement only.  Silver nitrate applied.      Excisional debridement performed:  [] Yes [] No   Selective debridement performed:  [] Yes [] No   Mechanical debridement performed:  [x] Yes [] No   Silver nitrate applied:    [x] Yes [] No   Labs ordered this visit:   [] Yes [] No   Imaging ordered this visit:   [] Yes [] No   Tissue pathology and/or culture taken:  [] Yes [] No       MEDICATIONS    Current Outpatient Medications:     apixaban (ELIQUIS) 5 mg Tab, Take 5 mg by mouth once daily at 6am., Disp: , Rfl:     cholecalciferol, vitamin D3, 125 mcg (5,000 unit) capsule, Take 5,000 Units by mouth., Disp: , Rfl:     LINZESS 145 mcg Cap capsule, Take 145 mcg by mouth once daily., Disp: , Rfl:     mupirocin (BACTROBAN) 2 % ointment, 2 (two) times  "daily. Apply to affected area for five days (Patient not taking: Reported on 7/22/2024), Disp: , Rfl:     nitrofurantoin, macrocrystal-monohydrate, (MACROBID) 100 MG capsule, Take 100 mg by mouth 2 (two) times daily., Disp: , Rfl:    Review of patient's allergies indicates:   Allergen Reactions    Meropenem Anaphylaxis    Penicillins          HOME HEALTH AGENCY:  [facility]   TIMES PER WEEK/DAYS:  daily   ORDERS:  Right hip wound: Cleanse with wound cleanser and pack with 1/2" iodoform packing strip, cover with 4x4 gauze and a gentle border dressing - to be changed daily.   Left upper posterior thigh wound: Cleanse with wound cleanser and apply polymem to the wound bed, cover with 4x4 gauze and 6x6 gentle foam border dressing - to be changed daily.      Follow up in about 2 weeks (around 8/12/2024) for Provider Visit.        "

## 2024-08-14 ENCOUNTER — HOSPITAL ENCOUNTER (INPATIENT)
Facility: HOSPITAL | Age: 49
LOS: 1 days | Discharge: HOME | DRG: 388 | End: 2024-08-15
Attending: INTERNAL MEDICINE | Admitting: EMERGENCY MEDICINE
Payer: MEDICARE

## 2024-08-14 DIAGNOSIS — K56.41 FECAL IMPACTION: Primary | ICD-10-CM

## 2024-08-14 DIAGNOSIS — R10.9 ABDOMINAL PAIN, UNSPECIFIED ABDOMINAL LOCATION: ICD-10-CM

## 2024-08-14 LAB
ALBUMIN SERPL-MCNC: 3.9 G/DL (ref 3.5–5)
ALBUMIN/GLOB SERPL: 0.8 RATIO (ref 1.1–2)
ALP SERPL-CCNC: 88 UNIT/L (ref 40–150)
ALT SERPL-CCNC: 11 UNIT/L (ref 0–55)
ANION GAP SERPL CALC-SCNC: 12 MEQ/L
AST SERPL-CCNC: 18 UNIT/L (ref 5–34)
BACTERIA #/AREA URNS AUTO: ABNORMAL /HPF
BASOPHILS # BLD AUTO: 0.01 X10(3)/MCL
BASOPHILS NFR BLD AUTO: 0.1 %
BILIRUB SERPL-MCNC: 0.7 MG/DL
BILIRUB UR QL STRIP.AUTO: ABNORMAL
BUN SERPL-MCNC: 21 MG/DL (ref 8.4–25.7)
CALCIUM SERPL-MCNC: 10.1 MG/DL (ref 8.4–10.2)
CAOX CRY UR QL COMP ASSIST: ABNORMAL
CHLORIDE SERPL-SCNC: 109 MMOL/L (ref 98–107)
CLARITY UR: ABNORMAL
CO2 SERPL-SCNC: 23 MMOL/L (ref 22–29)
COLOR UR AUTO: YELLOW
CREAT SERPL-MCNC: 1.44 MG/DL (ref 0.73–1.18)
CREAT/UREA NIT SERPL: 15
EOSINOPHIL # BLD AUTO: 0 X10(3)/MCL (ref 0–0.9)
EOSINOPHIL NFR BLD AUTO: 0 %
ERYTHROCYTE [DISTWIDTH] IN BLOOD BY AUTOMATED COUNT: 13 % (ref 11.5–17)
GFR SERPLBLD CREATININE-BSD FMLA CKD-EPI: 59 ML/MIN/1.73/M2
GLOBULIN SER-MCNC: 4.7 GM/DL (ref 2.4–3.5)
GLUCOSE SERPL-MCNC: 128 MG/DL (ref 74–100)
GLUCOSE UR QL STRIP: NEGATIVE
HCT VFR BLD AUTO: 46.9 % (ref 42–52)
HGB BLD-MCNC: 14.1 G/DL (ref 14–18)
HGB UR QL STRIP: ABNORMAL
IMM GRANULOCYTES # BLD AUTO: 0.09 X10(3)/MCL (ref 0–0.04)
IMM GRANULOCYTES NFR BLD AUTO: 0.6 %
KETONES UR QL STRIP: NEGATIVE
LACTATE SERPL-SCNC: 1.4 MMOL/L (ref 0.5–2.2)
LEUKOCYTE ESTERASE UR QL STRIP: ABNORMAL
LIPASE SERPL-CCNC: 16 U/L
LYMPHOCYTES # BLD AUTO: 1.19 X10(3)/MCL (ref 0.6–4.6)
LYMPHOCYTES NFR BLD AUTO: 7.7 %
MCH RBC QN AUTO: 31.8 PG (ref 27–31)
MCHC RBC AUTO-ENTMCNC: 30.1 G/DL (ref 33–36)
MCV RBC AUTO: 105.6 FL (ref 80–94)
MONOCYTES # BLD AUTO: 1.04 X10(3)/MCL (ref 0.1–1.3)
MONOCYTES NFR BLD AUTO: 6.7 %
NEUTROPHILS # BLD AUTO: 13.22 X10(3)/MCL (ref 2.1–9.2)
NEUTROPHILS NFR BLD AUTO: 84.9 %
NITRITE UR QL STRIP: POSITIVE
PH UR STRIP: 6.5 [PH]
PLATELET # BLD AUTO: 326 X10(3)/MCL (ref 130–400)
PMV BLD AUTO: 9.5 FL (ref 7.4–10.4)
POTASSIUM SERPL-SCNC: 3.8 MMOL/L (ref 3.5–5.1)
PROT SERPL-MCNC: 8.6 GM/DL (ref 6.4–8.3)
PROT UR QL STRIP: ABNORMAL
RBC # BLD AUTO: 4.44 X10(6)/MCL (ref 4.7–6.1)
RBC #/AREA URNS AUTO: ABNORMAL /HPF
SODIUM SERPL-SCNC: 144 MMOL/L (ref 136–145)
SP GR UR STRIP.AUTO: 1.02 (ref 1–1.03)
SQUAMOUS #/AREA URNS AUTO: ABNORMAL /HPF
UROBILINOGEN UR STRIP-ACNC: 1
WBC # BLD AUTO: 15.55 X10(3)/MCL (ref 4.5–11.5)
WBC #/AREA URNS AUTO: ABNORMAL /HPF

## 2024-08-14 PROCEDURE — 25000003 PHARM REV CODE 250: Performed by: INTERNAL MEDICINE

## 2024-08-14 PROCEDURE — 81001 URINALYSIS AUTO W/SCOPE: CPT | Performed by: INTERNAL MEDICINE

## 2024-08-14 PROCEDURE — 83690 ASSAY OF LIPASE: CPT | Performed by: INTERNAL MEDICINE

## 2024-08-14 PROCEDURE — 99285 EMERGENCY DEPT VISIT HI MDM: CPT | Mod: 25

## 2024-08-14 PROCEDURE — 87077 CULTURE AEROBIC IDENTIFY: CPT | Performed by: INTERNAL MEDICINE

## 2024-08-14 PROCEDURE — 87086 URINE CULTURE/COLONY COUNT: CPT | Performed by: INTERNAL MEDICINE

## 2024-08-14 PROCEDURE — 80053 COMPREHEN METABOLIC PANEL: CPT | Performed by: INTERNAL MEDICINE

## 2024-08-14 PROCEDURE — 83605 ASSAY OF LACTIC ACID: CPT | Performed by: INTERNAL MEDICINE

## 2024-08-14 PROCEDURE — 25000003 PHARM REV CODE 250: Performed by: EMERGENCY MEDICINE

## 2024-08-14 PROCEDURE — 11000001 HC ACUTE MED/SURG PRIVATE ROOM

## 2024-08-14 PROCEDURE — A4216 STERILE WATER/SALINE, 10 ML: HCPCS | Performed by: INTERNAL MEDICINE

## 2024-08-14 PROCEDURE — 96361 HYDRATE IV INFUSION ADD-ON: CPT

## 2024-08-14 PROCEDURE — 36415 COLL VENOUS BLD VENIPUNCTURE: CPT | Performed by: INTERNAL MEDICINE

## 2024-08-14 PROCEDURE — 94761 N-INVAS EAR/PLS OXIMETRY MLT: CPT

## 2024-08-14 PROCEDURE — 85025 COMPLETE CBC W/AUTO DIFF WBC: CPT | Performed by: INTERNAL MEDICINE

## 2024-08-14 PROCEDURE — 87040 BLOOD CULTURE FOR BACTERIA: CPT | Performed by: INTERNAL MEDICINE

## 2024-08-14 PROCEDURE — 96360 HYDRATION IV INFUSION INIT: CPT

## 2024-08-14 RX ORDER — SODIUM CHLORIDE 9 MG/ML
1000 INJECTION, SOLUTION INTRAVENOUS
Status: COMPLETED | OUTPATIENT
Start: 2024-08-14 | End: 2024-08-14

## 2024-08-14 RX ORDER — SODIUM CHLORIDE 0.9 % (FLUSH) 0.9 %
10 SYRINGE (ML) INJECTION EVERY 8 HOURS
Status: DISCONTINUED | OUTPATIENT
Start: 2024-08-14 | End: 2024-08-15 | Stop reason: HOSPADM

## 2024-08-14 RX ORDER — ONDANSETRON HYDROCHLORIDE 2 MG/ML
4 INJECTION, SOLUTION INTRAVENOUS EVERY 6 HOURS PRN
Status: DISCONTINUED | OUTPATIENT
Start: 2024-08-14 | End: 2024-08-15 | Stop reason: HOSPADM

## 2024-08-14 RX ORDER — ACETAMINOPHEN 325 MG/1
650 TABLET ORAL EVERY 4 HOURS PRN
Status: DISCONTINUED | OUTPATIENT
Start: 2024-08-14 | End: 2024-08-15 | Stop reason: HOSPADM

## 2024-08-14 RX ORDER — IOPAMIDOL 755 MG/ML
100 INJECTION, SOLUTION INTRAVASCULAR
Status: DISCONTINUED | OUTPATIENT
Start: 2024-08-14 | End: 2024-08-14

## 2024-08-14 RX ORDER — LACTULOSE 10 G/15ML
10 SOLUTION ORAL; RECTAL 2 TIMES DAILY
COMMUNITY

## 2024-08-14 RX ORDER — LACTULOSE 10 G/15ML
10 SOLUTION ORAL 2 TIMES DAILY
Status: DISCONTINUED | OUTPATIENT
Start: 2024-08-14 | End: 2024-08-15 | Stop reason: HOSPADM

## 2024-08-14 RX ORDER — FAMOTIDINE 20 MG/1
20 TABLET, FILM COATED ORAL 2 TIMES DAILY
Status: DISCONTINUED | OUTPATIENT
Start: 2024-08-14 | End: 2024-08-15 | Stop reason: HOSPADM

## 2024-08-14 RX ADMIN — LACTULOSE 10 G: 20 SOLUTION ORAL at 09:08

## 2024-08-14 RX ADMIN — SODIUM CHLORIDE 1000 ML: 9 INJECTION, SOLUTION INTRAVENOUS at 09:08

## 2024-08-14 RX ADMIN — APIXABAN 5 MG: 5 TABLET, FILM COATED ORAL at 09:08

## 2024-08-14 RX ADMIN — SODIUM CHLORIDE 2382 ML: 9 INJECTION, SOLUTION INTRAVENOUS at 11:08

## 2024-08-14 RX ADMIN — Medication 10 ML: at 09:08

## 2024-08-14 RX ADMIN — SODIUM CHLORIDE 1000 ML: 9 INJECTION, SOLUTION INTRAVENOUS at 01:08

## 2024-08-14 RX ADMIN — FAMOTIDINE 20 MG: 20 TABLET ORAL at 09:08

## 2024-08-14 NOTE — ED PROVIDER NOTES
Encounter Date: 8/14/2024  History from patient     History     Chief Complaint   Patient presents with    Abdominal Pain     Abd pain since 10pm last night and vomited twice     HPI    Diego Martinez Jr. is 49 y.o. male who  has a past medical history of Chronic UTI, Decubitus ulcer of left ischium, stage 4, Depression, Osteoarthritis, and Quadriplegia. arrives in ER with c/o Abdominal Pain (Abd pain since 10pm last night and vomited twice)      Review of patient's allergies indicates:   Allergen Reactions    Meropenem Anaphylaxis    Penicillins      Past Medical History:   Diagnosis Date    Chronic UTI     Decubitus ulcer of left ischium, stage 4     Depression     Osteoarthritis     Quadriplegia      Past Surgical History:   Procedure Laterality Date    CHOLECYSTECTOMY      COLOSTOMY      LYSIS OF ADHESIONS      SPINE SURGERY      SUPRAPUBIC CYSTOSTOMY       Family History   Problem Relation Name Age of Onset    Heart disease Mother      Diabetes Mother      Diabetes Father       Social History     Tobacco Use    Smoking status: Never    Smokeless tobacco: Never   Substance Use Topics    Alcohol use: Yes     Comment: occasion    Drug use: Not Currently     Review of Systems   Constitutional:  Negative for fever.   HENT:  Negative for trouble swallowing and voice change.    Eyes:  Negative for visual disturbance.   Respiratory:  Negative for cough and shortness of breath.    Cardiovascular:  Negative for chest pain.   Gastrointestinal:  Positive for abdominal pain. Negative for diarrhea and vomiting.   Genitourinary:  Negative for dysuria and hematuria.   Musculoskeletal:  Negative for back pain and gait problem.   Skin:  Negative for color change and rash.   Neurological:  Negative for headaches.   Psychiatric/Behavioral:  Negative for behavioral problems and sleep disturbance.    All other systems reviewed and are negative.      Physical Exam     Initial Vitals [08/14/24 0701]   BP Pulse Resp Temp SpO2   (!)  154/94 98 16 97.5 °F (36.4 °C) 100 %      MAP       --         Physical Exam    Nursing note and vitals reviewed.  Constitutional: He appears well-developed.   HENT:   Head: Atraumatic.   Eyes: EOM are normal.   Neck: Neck supple.   Cardiovascular:  Normal rate.           Pulmonary/Chest: Breath sounds normal.   Abdominal: Abdomen is soft. He exhibits no distension. There is abdominal tenderness.   Musculoskeletal:      Cervical back: Neck supple.      Comments: Quadriplegia, but able to move the arms     Neurological: He is alert. GCS score is 15. GCS eye subscore is 4. GCS verbal subscore is 5. GCS motor subscore is 6.   Skin: Skin is warm and dry.         ED Course   Critical Care    Date/Time: 8/14/2024 2:03 PM    Performed by: Garcia Winslow MD  Authorized by: Cortez Savage MD  Total critical care time (exclusive of procedural time) : 0 minutes  Critical care was necessary to treat or prevent imminent or life-threatening deterioration of the following conditions: Hypotension.  Critical care was time spent personally by me on the following activities: development of treatment plan with patient or surrogate, discussions with consultants, evaluation of patient's response to treatment, examination of patient, obtaining history from patient or surrogate, ordering and performing treatments and interventions, ordering and review of laboratory studies, ordering and review of radiographic studies, pulse oximetry, re-evaluation of patient's condition and review of old charts.  Comments: Discussed with Dr. Hein the findings on CT scan, discussed with Dr. Savage about admission,        Labs Reviewed   COMPREHENSIVE METABOLIC PANEL - Abnormal       Result Value    Sodium 144      Potassium 3.8      Chloride 109 (*)     CO2 23      Glucose 128 (*)     Blood Urea Nitrogen 21.0      Creatinine 1.44 (*)     Calcium 10.1      Protein Total 8.6 (*)     Albumin 3.9      Globulin 4.7 (*)     Albumin/Globulin Ratio 0.8  (*)     Bilirubin Total 0.7      ALP 88      ALT 11      AST 18      eGFR 59      Anion Gap 12.0      BUN/Creatinine Ratio 15     CBC WITH DIFFERENTIAL - Abnormal    WBC 15.55 (*)     RBC 4.44 (*)     Hgb 14.1      Hct 46.9      .6 (*)     MCH 31.8 (*)     MCHC 30.1 (*)     RDW 13.0      Platelet 326      MPV 9.5      Neut % 84.9      Lymph % 7.7      Mono % 6.7      Eos % 0.0      Basophil % 0.1      Lymph # 1.19      Neut # 13.22 (*)     Mono # 1.04      Eos # 0.00      Baso # 0.01      IG# 0.09 (*)     IG% 0.6     URINALYSIS, REFLEX TO URINE CULTURE - Abnormal    Color, UA Yellow      Appearance, UA Cloudy (*)     Specific Gravity, UA 1.020      pH, UA 6.5      Protein, UA 1+ (*)     Glucose, UA Negative      Ketones, UA Negative      Blood, UA 3+ (*)     Bilirubin, UA 1+ (*)     Urobilinogen, UA 1.0      Nitrites, UA Positive (*)     Leukocyte Esterase, UA 3+ (*)    URINALYSIS, MICROSCOPIC - Abnormal    Bacteria, UA Moderate (*)     Calcium Oxalate Crystals, UA Moderate (*)     RBC, UA 3-5      WBC, UA 11-20 (*)     Squamous Epithelial Cells, UA Rare     LIPASE - Normal    Lipase Level 16     LACTIC ACID, PLASMA - Normal    Lactic Acid Level 1.4     BLOOD CULTURE [facility]   BLOOD CULTURE [facility]   CULTURE, URINE   CBC W/ AUTO DIFFERENTIAL    Narrative:     The following orders were created for panel order CBC auto differential.  Procedure                               Abnormality         Status                     ---------                               -----------         ------                     CBC with Differential[[id number]]       Abnormal            Final result                 Please view results for these tests on the individual orders.          Imaging Results              CT Renal Stone Study ABD Pelvis WO (Final result)  Result time 08/14/24 10:42:03      Final result by Ulisses Hein MD (08/14/24 10:42:03)                   Impression:      1. Interim development of dilated gas-filled loops of  small-bowel throughout including gas and fluid at the right side of the colon up to a large amount of dense feces at the left colon extending into the left ostomy and into a large dense fecal bolus at the rectum compatible with a distal bowel obstruction due to the large amounts of dense feces  2. Soft tissue and bony breakdown again noted to the hips bilaterally and at the soft tissues posterior to the sacrum which have progressed since the prior exam.  A few blebs of gas is seen within the soft tissues at the lateral right hip and posterior to the sacrum.  3. Findings and other details as above      Electronically signed by: Ulisses Hein  Date:    08/14/2024  Time:    10:42               Narrative:    EXAMINATION:  CT RENAL STONE STUDY ABD PELVIS WO    CLINICAL HISTORY:  Abdomina pain;, .    TECHNIQUE:  PATIENT RADIATION DOSE: DLP(mGycm) 416    As per PQRS measures, all CT scans at this facility used dose modulation, iterative reconstruction, and/or weight based dose adjustment when appropriate to reduce radiation dose to as low as reasonably achievable.    COMPARISON:  01/11/2021    FINDINGS:  Degenerative changes and curvature are noted to the thoracolumbar spine.  Bony structures are osteopenic.  The heart is normal in size.  Small pericardial effusion is seen.  There is elevation of the right hemidiaphragm.  The liver, spleen, adrenal glands, and pancreas are grossly within normal limits.  The gallbladder is surgically absent.  The stomach is distended with fluid and particulate food matter.  Dilated gas and fluid-filled loops of small bowel are scattered throughout.  Gas and fluid are seen within the right side of the colon.  Dense feces is seen within the left side of the colon up to a large dense fecal bolus at the rectum.  There is a ostomy at the anterior left lower abdomen which is distended with the feces.  The kidneys are relatively symmetric in size.  No hydronephrosis is seen.  No renal or ureteral  stone is identified.  Atherosclerosis is seen within the aorta and branching vessels.  The bladder is nondistended with a suprapubic catheter and small amount of gas.  No significant free fluid collection identified.  There is again bony deformity and dislocation at the hips bilaterally.  There is soft tissue and bone breakdown again evident at the hips bilaterally and posterior to the sacrum which have progressed since the prior exam.  A few blebs of gas are seen in the soft tissues at the right the lateral hip and posterior to the sacrum.                                       Medications   sodium chloride 0.9% bolus 1,000 mL 1,000 mL (0 mLs Intravenous Stopped 8/14/24 1044)   sodium chloride 0.9% bolus 2,382 mL 2,382 mL (0 mLs Intravenous Stopped 8/14/24 1209)   0.9%  NaCl infusion (1,000 mLs Intravenous New Bag 8/14/24 1321)     Medical Decision Making    Diego Martinez Jr. is 49 y.o. male who  has a past medical history of Chronic UTI, Decubitus ulcer of left ischium, stage 4, Depression, Osteoarthritis, and Quadriplegia. arrives in ER with c/o Abdominal Pain (Abd pain since 10pm last night and vomited twice)    Patient comes to the emergency room with complaint of mid abdominal pain which started last night after eating, denies any other complaints.  He has a colostomy where he has stool in there, he has suprapubic catheter which is draining urine,    He does have some tenderness in the periumbilical area, no particular distention, I am going to do blood work in his CT of the abdomen and pelvis decide further.        Amount and/or Complexity of Data Reviewed  Labs: ordered. Decision-making details documented in ED Course.  Radiology: ordered.    Risk  Prescription drug management.  Decision regarding hospitalization.               ED Course as of 08/14/24 1414   Wed Aug 14, 2024   0931 Patient's blood pressure is low, now, he says his usual blood pressure is 102/64, [GQ]   1103 Although blood pressure is low,  but patient is awake alert oriented x3, his blood pressure right now is 95/61 with a mean arterial pressure of 70, O2 sat is 100% on room air, heart rate is 77, again he is not in any distress,    We have looked at the sacral area, he had a large amount of stool in the diaper, the sacral decubitus actually looks okay he had a dressing on it, does not have any signs of any major infection in there, also his right hip decubitus does not appear to be infected,    I am going to give him 30 mL/kg bolus, and patient is allergic to penicillin and imipenem, he has this indwelling suprapubic catheter [GQ]   1301 Lactic Acid Level: 1.4 [GQ]   1301 WBC(!): 15.55 [GQ]   1301 Creatinine(!): 1.44 [GQ]   1301 BUN: 21.0 [GQ]   1301 CO2: 23 [GQ]   1312 Patient's lactic acid level is normal, he continues to have his blood pressure in 90s, right now actually his blood pressure is 110/67 with a mean arterial pressure of 76 and O2 sat of 97% on room air with a heart rate of 64. [GQ]   1322 I talked to Dr. Reed's and is okay with admission, and he will see from there.  As such I do not think patient is septic, he does have these decubitus ulcers which look okay as such, his the blood pressure runs low and he is otherwise asymptomatic from his hypotension, his heart rate is been in normal range actually in 60s and 70s, his O2 sat has been maintained up to 100% on room air, I will go ahead and admit him in the hospital I am giving him normal saline at 150 mL/hour, and we will see if we can give him some enemas or manual disimpact him. [GQ]      ED Course User Index  [GQ] Garcia Winslow MD                           Clinical Impression:  Final diagnoses:  [R10.9] Abdominal pain, unspecified abdominal location (Primary)  [K56.41] Fecal impaction          ED Disposition Condition    Admit Stable                Garcia Winslow MD  08/14/24 1404       Garcia Winslow MD  08/14/24 1414

## 2024-08-14 NOTE — NURSING
Enemas given per rectum and ostomy.  Pt able to retain approximately 1500cc per rectum total and 750cc per ostomy total.

## 2024-08-14 NOTE — H&P
[facility] - Medical Surgical Unit    History & Physical      Patient Name: Diego Martinez Jr.  MRN: [id number]  Admission Date: 8/14/2024  Attending Physician: Cortez Savage MD   Primary Care Provider: Ciaran Mcdonough MD         Patient information was obtained from patient and ER records.     Subjective:     Principal Problem:Fecal impaction    Chief Complaint:   Chief Complaint   Patient presents with    Abdominal Pain     Abd pain since 10pm last night and vomited twice        HPI:   Mr. Martinez is a 49-year-old male quadriplegic who presented to the ED earlier today with complaints of abdominal pain and 2 episodes of nausea/vomiting since 10:00 p.m. last night.  His evaluation in the ED was fairly unremarkable with exception of large amount of stool in the colon.  He does have a colostomy but continues to have bowel movements per rectum.  He was felt to be impacted and was admitted for enema therapy.  On seeing him following his arrival to the med/surge floor he was without complaints.  He stated that his abdominal pain had resolved and that he felt well enough to return home.  We discussed options and he was agreeable to staying overnight for some enema therapy to see if we could promote additional bowel movement.  Incidentally, he did have a bowel movement to while still in the ED.    Past Medical History:   Diagnosis Date    Chronic UTI     Decubitus ulcer of left ischium, stage 4     Depression     Osteoarthritis     Quadriplegia        Past Surgical History:   Procedure Laterality Date    CHOLECYSTECTOMY      COLOSTOMY      LYSIS OF ADHESIONS      SPINE SURGERY      SUPRAPUBIC CYSTOSTOMY         Review of patient's allergies indicates:   Allergen Reactions    Meropenem Anaphylaxis    Penicillins        No current facility-administered medications on file prior to encounter.     Current Outpatient Medications on File Prior to Encounter   Medication Sig    apixaban (ELIQUIS) 5 mg Tab Take  5 mg by mouth 2 (two) times daily.    lactulose (CHRONULAC) 10 gram/15 mL solution Take 10 g by mouth 2 (two) times daily.    LINZESS 145 mcg Cap capsule Take 145 mcg by mouth once daily.    mupirocin (BACTROBAN) 2 % ointment 2 (two) times daily. Apply to affected area for five days    cholecalciferol, vitamin D3, 125 mcg (5,000 unit) capsule Take 5,000 Units by mouth.    nitrofurantoin, macrocrystal-monohydrate, (MACROBID) 100 MG capsule Take 100 mg by mouth 2 (two) times daily.     Family History       Problem Relation (Age of Onset)    Diabetes Mother, Father    Heart disease Mother          Tobacco Use    Smoking status: Never    Smokeless tobacco: Never   Substance and Sexual Activity    Alcohol use: Yes     Comment: occasion    Drug use: Not Currently    Sexual activity: Not on file     Review of Systems   Constitutional: Negative.    HENT: Negative.     Respiratory: Negative.     Cardiovascular: Negative.    Gastrointestinal:  Positive for abdominal pain, nausea and vomiting.   Genitourinary: Negative.    Musculoskeletal: Negative.    Skin:         Stage IV decubital wound left sacral area   Neurological:         Chronic quadriplegia   Psychiatric/Behavioral: Negative.       Objective:     Vital Signs (Most Recent):  Temp: 98.1 °F (36.7 °C) (08/14/24 1600)  Pulse: 69 (08/14/24 1600)  Resp: 16 (08/14/24 1015)  BP: (!) 93/43 (08/14/24 1600)  SpO2: 100 % (08/14/24 1600) Vital Signs (24h Range):  Temp:  [97.5 °F (36.4 °C)-98.1 °F (36.7 °C)] 98.1 °F (36.7 °C)  Pulse:  [69-98] 69  Resp:  [16] 16  SpO2:  [98 %-100 %] 100 %  BP: ()/(43-94) 93/43     Weight: 65.1 kg (143 lb 8 oz)  Body mass index is 21.19 kg/m².    Physical exam  Constitution-well nourished, normally developed male in NAD  Eyes-PERRL, EOMI  HENT-normocephalic, atraumatic; external ears appear normal; moist mucous membranes  Neck-supple  Respiratory-normal respirations; CTA with good air movement  Heart-RRR; normal S1 and S2; no murmurs,  "gallops  Abdomen-soft, nontender, nondistended; active bowel sounds; colostomy right side of abdomen; SP catheter noted  Genitourinary-deferred  Musculoskeletal-no joint abnormalities, spasticity/contractures noted all extremities  Skin-warm, dry; no rashes  Neurologic-alert and oriented x3; chronic quadriplegia    Scheduled Meds:   apixaban  5 mg Oral BID    famotidine  20 mg Oral BID    lactulose  10 g Oral BID    [START ON 8/15/2024] linaCLOtide  145 mcg Oral Daily    sodium chloride 0.9%  10 mL Intravenous Q8H     Continuous Infusions:  PRN Meds:.  Current Facility-Administered Medications:     acetaminophen, 650 mg, Oral, Q4H PRN    ondansetron, 4 mg, Intravenous, Q6H PRN      Significant Labs: All pertinent labs within the past 24 hours have been reviewed.  CBC:   Recent Labs   Lab 08/14/24  0756   WBC 15.55*   HGB 14.1   HCT 46.9        CMP:   Recent Labs   Lab 08/14/24  0815      K 3.8   *   CO2 23   BUN 21.0   CREATININE 1.44*   CALCIUM 10.1   ALBUMIN 3.9   BILITOT 0.7   ALKPHOS 88   AST 18   ALT 11     Lipase:   Recent Labs   Lab 08/14/24  0815   LIPASE 16     Magnesium: No results for input(s): "MG" in the last 48 hours.  Urine Studies:   Recent Labs   Lab 08/14/24  1321   COLORU Yellow   APPEARANCEUA Cloudy*   PHUR 6.5   PROTEINUA 1+*   GLUCUA Negative   BILIRUBINUA 1+*   OCCULTUA 3+*   NITRITE Positive*   UROBILINOGEN 1.0   LEUKOCYTESUR 3+*   RBCUA 3-5   WBCUA 11-20*   BACTERIA Moderate*       Significant Imaging: I have reviewed all pertinent imaging results/findings within the past 24 hours.  CT abdomen/pelvis  Impression:  1. Interim development of dilated gas-filled loops of small-bowel throughout including gas and fluid at the right side of the colon up to a large amount of dense feces at the left colon extending into the left ostomy and into a large dense fecal bolus at the rectum compatible with a distal bowel obstruction due to the large amounts of dense feces  2. Soft tissue " and bony breakdown again noted to the hips bilaterally and at the soft tissues posterior to the sacrum which have progressed since the prior exam.  A few blebs of gas is seen within the soft tissues at the lateral right hip and posterior to the sacrum.    Assessment/Plan:   Possible fecal impaction  Patient's symptoms have resolved, he did have a bowel movement prior to arrival to the med/surge unit; abdominal exam is benign; I suspect CT findings are chronic nature; nevertheless, we will attempt promote some bowel emptying with serial enema therapy overnight  Continue Linzess, lactulose    Stage IV decubitus wounds over the posterior hips and sacrum  Consult wound care nurse  Nursing staff can perform dressing changes    Abnormal urinalysis  Findings suggestive of UTI however patient has SP catheter likely, these findings are chronic; urine culture was obtained in the ED and will await results; we will defer starting antibiotics until cultures result; patient has no symptoms suggestive of UTI    Quadriplegia    GI prophylaxis: Famotidine    Code status: Full  Active Diagnoses:    Diagnosis Date Noted POA    PRINCIPAL PROBLEM:  Fecal impaction [K56.41] 08/14/2024 Yes      Problems Resolved During this Admission:     VTE Risk Mitigation (From admission, onward)           Ordered     apixaban tablet 5 mg  2 times daily         08/14/24 1702     IP VTE HIGH RISK PATIENT  Once         08/14/24 1506     Place sequential compression device  Until discontinued         08/14/24 1506                      Cortez Savage MD  Department of Hospital Medicine   [facility] - Medical Surgical Unit

## 2024-08-15 VITALS
WEIGHT: 143.5 LBS | DIASTOLIC BLOOD PRESSURE: 73 MMHG | HEIGHT: 69 IN | BODY MASS INDEX: 21.25 KG/M2 | HEART RATE: 55 BPM | TEMPERATURE: 99 F | OXYGEN SATURATION: 100 % | SYSTOLIC BLOOD PRESSURE: 131 MMHG | RESPIRATION RATE: 18 BRPM

## 2024-08-15 LAB
ANION GAP SERPL CALC-SCNC: 8 MEQ/L
BASOPHILS # BLD AUTO: 0.04 X10(3)/MCL
BASOPHILS NFR BLD AUTO: 0.5 %
BUN SERPL-MCNC: 14 MG/DL (ref 8.4–25.7)
CALCIUM SERPL-MCNC: 8.2 MG/DL (ref 8.4–10.2)
CHLORIDE SERPL-SCNC: 115 MMOL/L (ref 98–107)
CO2 SERPL-SCNC: 18 MMOL/L (ref 22–29)
CREAT SERPL-MCNC: 0.69 MG/DL (ref 0.73–1.18)
CREAT/UREA NIT SERPL: 20
EOSINOPHIL # BLD AUTO: 0.11 X10(3)/MCL (ref 0–0.9)
EOSINOPHIL NFR BLD AUTO: 1.3 %
ERYTHROCYTE [DISTWIDTH] IN BLOOD BY AUTOMATED COUNT: 12.9 % (ref 11.5–17)
GFR SERPLBLD CREATININE-BSD FMLA CKD-EPI: >60 ML/MIN/1.73/M2
GLUCOSE SERPL-MCNC: 77 MG/DL (ref 74–100)
HCT VFR BLD AUTO: 33 % (ref 42–52)
HGB BLD-MCNC: 10.9 G/DL (ref 14–18)
IMM GRANULOCYTES # BLD AUTO: 0.03 X10(3)/MCL (ref 0–0.04)
IMM GRANULOCYTES NFR BLD AUTO: 0.4 %
LYMPHOCYTES # BLD AUTO: 2.89 X10(3)/MCL (ref 0.6–4.6)
LYMPHOCYTES NFR BLD AUTO: 34.1 %
MAGNESIUM SERPL-MCNC: 2 MG/DL (ref 1.6–2.6)
MCH RBC QN AUTO: 31.8 PG (ref 27–31)
MCHC RBC AUTO-ENTMCNC: 33 G/DL (ref 33–36)
MCV RBC AUTO: 96.2 FL (ref 80–94)
MONOCYTES # BLD AUTO: 0.66 X10(3)/MCL (ref 0.1–1.3)
MONOCYTES NFR BLD AUTO: 7.8 %
NEUTROPHILS # BLD AUTO: 4.74 X10(3)/MCL (ref 2.1–9.2)
NEUTROPHILS NFR BLD AUTO: 55.9 %
PLATELET # BLD AUTO: 279 X10(3)/MCL (ref 130–400)
PMV BLD AUTO: 9.4 FL (ref 7.4–10.4)
POTASSIUM SERPL-SCNC: 3.4 MMOL/L (ref 3.5–5.1)
RBC # BLD AUTO: 3.43 X10(6)/MCL (ref 4.7–6.1)
SODIUM SERPL-SCNC: 141 MMOL/L (ref 136–145)
WBC # BLD AUTO: 8.47 X10(3)/MCL (ref 4.5–11.5)

## 2024-08-15 PROCEDURE — A4216 STERILE WATER/SALINE, 10 ML: HCPCS | Performed by: INTERNAL MEDICINE

## 2024-08-15 PROCEDURE — 83735 ASSAY OF MAGNESIUM: CPT | Performed by: EMERGENCY MEDICINE

## 2024-08-15 PROCEDURE — 85025 COMPLETE CBC W/AUTO DIFF WBC: CPT | Performed by: INTERNAL MEDICINE

## 2024-08-15 PROCEDURE — 36415 COLL VENOUS BLD VENIPUNCTURE: CPT | Performed by: INTERNAL MEDICINE

## 2024-08-15 PROCEDURE — 25000003 PHARM REV CODE 250: Performed by: EMERGENCY MEDICINE

## 2024-08-15 PROCEDURE — 25000003 PHARM REV CODE 250: Performed by: INTERNAL MEDICINE

## 2024-08-15 PROCEDURE — 80048 BASIC METABOLIC PNL TOTAL CA: CPT | Performed by: EMERGENCY MEDICINE

## 2024-08-15 RX ORDER — POTASSIUM CHLORIDE 20 MEQ/1
40 TABLET, EXTENDED RELEASE ORAL ONCE
Status: COMPLETED | OUTPATIENT
Start: 2024-08-15 | End: 2024-08-15

## 2024-08-15 RX ADMIN — LACTULOSE 10 G: 20 SOLUTION ORAL at 09:08

## 2024-08-15 RX ADMIN — FAMOTIDINE 20 MG: 20 TABLET ORAL at 09:08

## 2024-08-15 RX ADMIN — Medication 10 ML: at 05:08

## 2024-08-15 RX ADMIN — APIXABAN 5 MG: 5 TABLET, FILM COATED ORAL at 09:08

## 2024-08-15 RX ADMIN — POTASSIUM CHLORIDE 40 MEQ: 1500 TABLET, EXTENDED RELEASE ORAL at 09:08

## 2024-08-15 NOTE — PLAN OF CARE
Problem: Adult Inpatient Plan of Care  Goal: Plan of Care Review  Outcome: Progressing  Goal: Patient-Specific Goal (Individualized)  Outcome: Progressing  Goal: Absence of Hospital-Acquired Illness or Injury  Outcome: Progressing  Goal: Optimal Comfort and Wellbeing  Outcome: Progressing  Goal: Readiness for Transition of Care  Outcome: Progressing     Problem: Wound  Goal: Optimal Coping  Outcome: Progressing  Goal: Optimal Functional Ability  Outcome: Progressing  Goal: Absence of Infection Signs and Symptoms  Outcome: Progressing  Goal: Improved Oral Intake  Outcome: Progressing  Goal: Optimal Pain Control and Function  Outcome: Progressing  Goal: Skin Health and Integrity  Outcome: Progressing  Goal: Optimal Wound Healing  Outcome: Progressing     Problem: Skin Injury Risk Increased  Goal: Skin Health and Integrity  Outcome: Progressing     Problem: Fall Injury Risk  Goal: Absence of Fall and Fall-Related Injury  Outcome: Progressing     Problem: Fluid Volume Deficit  Goal: Fluid Balance  Outcome: Progressing     Problem: Constipation  Goal: Effective Bowel Elimination  Outcome: Progressing     Problem: Comorbidity Management  Goal: Maintenance of Behavioral Health Symptom Control  Outcome: Progressing  Goal: Maintenance of Osteoarthritis Symptom Control  Outcome: Progressing

## 2024-08-15 NOTE — DISCHARGE SUMMARY
[facility] - Medical Surgical Unit  Hospital Medicine  Discharge Summary      Patient Name: Diego Martinez Jr.  MRN: [id number]  Admission Date: 8/14/2024  Hospital Length of Stay: 1 days  Discharge Date and Time:  08/15/2024 10:48 AM  Attending Physician: Cortez Savage MD   Discharging Provider: Cortez Savage MD  Discharge Provider Team: Networked reference to record PCT   Primary Care Provider: Ciaran Mcdonough MD        HPI:   Mr. Martinez is a 49-year-old male quadriplegic who presented to the ED earlier today with complaints of abdominal pain and 2 episodes of nausea/vomiting since 10:00 p.m. last night.  His evaluation in the ED was fairly unremarkable with exception of large amount of stool in the colon.  He does have a colostomy but continues to have bowel movements per rectum.  He was felt to be impacted and was admitted for enema therapy.  On seeing him following his arrival to the med/surge floor he was without complaints.  He stated that his abdominal pain had resolved and that he felt well enough to return home.  We discussed options and he was agreeable to staying overnight for some enema therapy to see if we could promote additional bowel movement.  Incidentally, he did have a bowel movement to while still in the ED.    * No surgery found *      Hospital Course:   8/15-when seen on rounds today had no complaints; he is tolerating his diet; he received total of 4 SS enemas yesterday evening and states he passed large amount of stool following these.    ---    Possible fecal impaction  Patient's symptoms have resolved, he did have a bowel movement prior to arrival to the med/surge unit; abdominal exam was benign; I suspect CT findings are chronic in nature; nevertheless, we gave him enemas per colostomy and per rectum  Resume routine medications at discharge     Stage IV decubitus wounds over the posterior hips and sacrum  Continue outpatient wound care     Abnormal  urinalysis  Findings suggestive of UTI however patient has SP catheter; likely, these findings are chronic; urine culture was obtained in the ED and growing GNR  As he is not displaying any signs or symptoms of infection therefore we will not start patient on antibiotics as I think this is colonization rather than acute infection. I discussed the findings with him and explained that should he develop fever/chills or other signs of infection he should seek evaluation at that time.     Quadriplegia    Physical exam  Constitution-well nourished, normally developed male in NAD  Eyes-PERRL, EOMI  HENT-normocephalic, atraumatic  Neck-supple  Respiratory-normal respirations  Heart-RRR  Abdomen-soft, nontender, nondistended; active bowel sounds; colostomy right side of abdomen; SP catheter noted  Genitourinary-deferred  Musculoskeletal-no joint abnormalities, spasticity/contractures noted all extremities  Skin-warm, dry; no rashes  Neurologic-alert and oriented x3; chronic quadriplegia       Consults:     Final Active Diagnoses:    Diagnosis Date Noted POA    PRINCIPAL PROBLEM:  Fecal impaction [K56.41] 08/14/2024 Yes      Problems Resolved During this Admission:      Discharged Condition: stable    Disposition: Home or Self Care    Follow Up:   Follow-up Information       Ciaran Mcdonough MD Follow up in 1 week(s).    Specialty: Family Medicine  Contact information:  [address] [place] [address] [phone]                           Patient Instructions:      Diet Adult Regular     Activity as tolerated     Medications:  Reconciled Home Medications:      Medication List        CONTINUE taking these medications      apixaban 5 mg Tab  Commonly known as: ELIQUIS  Take 5 mg by mouth 2 (two) times daily.     cholecalciferol (vitamin D3) 125 mcg (5,000 unit) capsule  Take 5,000 Units by mouth.     lactulose 10 gram/15 mL solution  Commonly known as: CHRONULAC  Take 10 g by mouth 2 (two) times daily.     LINZESS  145 mcg Cap capsule  Generic drug: linaCLOtide  Take 145 mcg by mouth once daily.     mupirocin 2 % ointment  Commonly known as: BACTROBAN  2 (two) times daily. Apply to affected area for five days     nitrofurantoin (macrocrystal-monohydrate) 100 MG capsule  Commonly known as: MACROBID  Take 100 mg by mouth 2 (two) times daily.              Significant Diagnostic Studies: Labs: CMP   Recent Labs   Lab 08/14/24  0815 08/15/24  0320    141   K 3.8 3.4*   * 115*   CO2 23 18*   BUN 21.0 14.0   CREATININE 1.44* 0.69*   CALCIUM 10.1 8.2*   ALBUMIN 3.9  --    BILITOT 0.7  --    ALKPHOS 88  --    AST 18  --    ALT 11  --     and CBC   Recent Labs   Lab 08/14/24  0756 08/15/24  0320   WBC 15.55* 8.47   HGB 14.1 10.9*   HCT 46.9 33.0*    279     Radiology:   CT abdomen/pelvis  Impression:  1. Interim development of dilated gas-filled loops of small-bowel throughout including gas and fluid at the right side of the colon up to a large amount of dense feces at the left colon extending into the left ostomy and into a large dense fecal bolus at the rectum compatible with a distal bowel obstruction due to the large amounts of dense feces  2. Soft tissue and bony breakdown again noted to the hips bilaterally and at the soft tissues posterior to the sacrum which have progressed since the prior exam.  A few blebs of gas is seen within the soft tissues at the lateral right hip and posterior to the sacrum.    Pending Diagnostic Studies:       None          Indwelling Lines/Drains at time of discharge:   Lines/Drains/Airways       Drain  Duration                  Suprapubic Catheter -- days                  Patient screened for social drivers of health:  Housing instability  Transportation needs  Utility difficulties  Interpersonal safety  ---no needs identified    Time spent on the discharge of patient: 40 minutes         Cortez Savage MD  Department of Hospital Medicine  [facility] - Medical Surgical  Unit

## 2024-08-15 NOTE — PLAN OF CARE
08/15/24 1058   Final Note   Assessment Type Final Discharge Note   Anticipated Discharge Disposition Home   Post-Acute Status   Discharge Delays None known at this time     D/c home.  No needs.

## 2024-08-16 ENCOUNTER — PATIENT OUTREACH (OUTPATIENT)
Dept: ADMINISTRATIVE | Facility: CLINIC | Age: 49
End: 2024-08-16
Payer: MEDICARE

## 2024-08-16 NOTE — PROGRESS NOTES
C3 nurse spoke with Diego Martinez Jr.  for a TCC post hospital discharge follow up call. The patient reports does not have a scheduled HOSFU appointment. C3 nurse was unable to schedule [facility]. Patient advised to contact their PCP to schedule a HOSPFU within 5-7 days.

## 2024-08-17 LAB
BACTERIA BLD CULT: NORMAL
BACTERIA BLD CULT: NORMAL
BACTERIA UR CULT: ABNORMAL

## 2024-08-18 LAB
BACTERIA UR CULT: ABNORMAL
BACTERIA UR CULT: ABNORMAL

## 2024-08-19 LAB
BACTERIA BLD CULT: NORMAL
BACTERIA BLD CULT: NORMAL

## 2024-09-04 ENCOUNTER — HOSPITAL ENCOUNTER (OUTPATIENT)
Dept: WOUND CARE | Facility: HOSPITAL | Age: 49
Discharge: HOME | End: 2024-09-04
Attending: FAMILY MEDICINE
Payer: MEDICARE

## 2024-09-04 VITALS
TEMPERATURE: 98 F | RESPIRATION RATE: 18 BRPM | WEIGHT: 143.5 LBS | HEART RATE: 70 BPM | SYSTOLIC BLOOD PRESSURE: 120 MMHG | HEIGHT: 69 IN | DIASTOLIC BLOOD PRESSURE: 72 MMHG | BODY MASS INDEX: 21.25 KG/M2

## 2024-09-04 DIAGNOSIS — L89.214 STAGE IV PRESSURE ULCER OF RIGHT HIP: Primary | ICD-10-CM

## 2024-09-04 DIAGNOSIS — L89.223 PRESSURE INJURY OF LEFT THIGH, STAGE 3: ICD-10-CM

## 2024-09-04 DIAGNOSIS — L89.324 PRESSURE ULCER OF LEFT BUTTOCK, STAGE 4: ICD-10-CM

## 2024-09-04 DIAGNOSIS — G82.50 QUADRIPLEGIA, UNSPECIFIED: ICD-10-CM

## 2024-09-04 PROCEDURE — 27000999 HC MEDICAL RECORD PHOTO DOCUMENTATION

## 2024-09-04 PROCEDURE — 99212 OFFICE O/P EST SF 10 MIN: CPT

## 2024-09-04 RX ORDER — LINACLOTIDE 290 UG/1
290 CAPSULE, GELATIN COATED ORAL
COMMUNITY
Start: 2024-08-28

## 2024-09-04 RX ORDER — DOXYCYCLINE HYCLATE 100 MG
100 TABLET ORAL 2 TIMES DAILY
Qty: 20 TABLET | Refills: 0 | Status: SHIPPED | OUTPATIENT
Start: 2024-09-04 | End: 2024-09-14

## 2024-09-04 RX ORDER — CIPROFLOXACIN 500 MG/1
500 TABLET ORAL 2 TIMES DAILY
Qty: 20 TABLET | Refills: 0 | Status: SHIPPED | OUTPATIENT
Start: 2024-09-04 | End: 2024-09-14

## 2024-09-04 NOTE — PROGRESS NOTES
[facility]   Smoker   [] Yes  [x] No  Diabetic   [] Yes   [x] No  Low air loss mattress [x] Yes [] No   Is the patient eligible for a graft, and/or currently grafting?  [] Yes [x] No       Subjective:       Patient ID: Diego Martinez Jr. is a 49 y.o. male.    Chief Complaint: Pressure Ulcer (Right hip - Stage 4/Left medial upper thigh - Stage 4/Sacrum - stage 4)    HPI   Mr. Martinez is a long time patient of [facility].    He comes via wheelchair with a care attendant present with him at all times.    He does have a history of quadriplegia.  He has home health who assist him with his wound care daily and care attendants to provide care at other times.  He has a very pleasant demeanor and added to an is usually jovial in spite of his challenges.  He now has [employer].    10/4/23 - Mr. Martinez returns today for f/up on the following wounds:  Wound #1 - Right posterior hip - This wound was mechanically debrided.  It remains clean and stable, with no significant change.  There are no signs and symptoms of infection and very little drainage.  We will continue using silver alginate on this wound.    Wound #2 - Left ischial - 0.5 x 2.0 x 0.2 cm - wound remains stable with no significant change although it is slightly larger than last week.   The wound has no S & S of infection.  We will continue to apply silver alginate on this wound.  Wound #3 - Sacrum - remains open.  This wound was selectively debrided today.  We will continue powdered collagen and silver alginate.  Wound #4 - Left medial upper thigh - this wound remains stable with no signs and symptoms of infection. It was selectively debrided today and we will begin powdered collagen and silver alginate.     10/18/23 - Mr. Martinez is seen today for the f/up on several wounds:  Wound #1 - Right posterior hip - This wound was mechanically debrided.  It remains clean and stable, with no significant change.  There are no signs and  symptoms of infection and very little drainage.  We will continue using silver alginate on this wound.    Wound #2 - Left ischial - 0.5 x 1.5 x 0.2 cm - wound remains stable with no significant change although it is slightly larger than last week.   The wound has no S & S of infection.  We will continue to apply silver alginate on this wound.  Wound #3 - Sacrum - remains open.  This wound was selectively debrided today.  We will continue powdered collagen and silver alginate.  Wound #4 - Left medial upper thigh - this wound remains stable with no signs and symptoms of infection. It was mechanically debrided today and we will begin powdered collagen and silver alginate.     October 31st:  48-year-old black male, who is a quadriplegia, is here for follow up of his for pressure injuries.  The worse 1 at this time is the left ischial, left buttock.  The ulcers appear clean, without obvious secondary infection, foul odor, or discharge.  He has home health services almost every day.    11/14/23 - Mr. Martinez returns today for followup on several wounds.  All remains stable.  The left ischial does measures slightly larger, however, the wound bed is clean.  It was selectively debrided and there are no signs and symptoms of infection with this wound or any of his wounds at this time.  Wound #1 - Right posterior hip - This wound was mechanically debrided.  It remains clean and stable, with no significant change.  There are no signs and symptoms of infection and very little drainage.  We will continue using silver alginate on this wound.    Wound #2 - Left ischial - 0.8 x 1.0 x 0.2 cm  - wound remains stable with no significant change although it is slightly larger than last week.   The wound has no S & S of infection.  We will continue to apply silver alginate on this wound.  Wound #3 - Sacrum - remains open.  We will continue silver alginate.  Wound #4 - Left medial upper thigh -  4.0 x 2.0 x 0.3 cm- this wound has increased  in size slightly, but there are no signs and symptoms of infection. It was selectively debrided today and we will continue silver alginate.     12/5/23 - Mr. Martinez is seen today for f/up on his wounds.  They all remain stable, in various stages.  Today none show S & S of infection, with no increase in drainage, redness, etc.    Wound #1 - Right posterior hip - This wound was mechanically debride.  It does continue to drain a scant amount.  We will continue using silver alginate on this wound.    Wound #2 - Left ischial - 0.5 x 1.0 x 0.2 cm  - wound remains stable with a slight decrease in size.   The wound has no S & S of infection.  We will continue to apply silver alginate on this wound.  Wound #3 - Sacrum - remains open.  We will continue silver alginate.  Wound #4 - Left medial upper thigh -  2.7 x 1.8 x 0.3 cm - this wound has decreased in size and appears to be doing well.  There are no signs and symptoms of infection. It was selectively debrided today and we will continue silver alginate.     January 2, 2024:  48-year-old black male, with quadriplegia, is here for a multiple stage III and stage IV pressure injuries.  They are all under control at this time.  They are relatively shallow.  He has a good low air loss mattress at home.    1/22/24 - The patient returns today for f/up on several wounds.  Today many of the wounds are improving.  Wound #1 - Right posterior hip - continues to progress well and has a very small remaining opening.  We will continue to use silver alginate placed over the wound.    Wound #2 - Left ischial -0.4 x 1.0 x 0.2 cm  - wound remains appears stable with a slight decrease in size.   The wound has no S & S of infection.  We will continue to apply silver alginate on this wound.  Wound #3 - Sacrum - remains open and we will continue to monitor.  We will begin application of Endoform.    Wound #4 - Left medial upper thigh -  3.2 x 3.3 x 0.3 cm - this wound has increased in size  slightly.   and appears to be doing well.  There are no signs and symptoms of infection. It was selectively debrided today and we will continue silver alginate.  He does continue to have HH assistance on a daily basis.  If the left medial upper thigh does not improve by next week we will consider ultrasonic debridement.     January 30, 2024:  48-year-old black male, with quadriplegia, is here for follow up of his multiple pressure injuries, including right hip, left ischial, sacral, left medial upper thigh, and a stage III scrotal pressure ulcer.  The wounds are all clean at this time, and need no surgical debridement today.  The measurements are approximately the same as last visit.    2/20/24 - Mr. Martinez Is seen today for followup on several wounds.   Some of the wounds continue to progress well while others have deteriorated since he was last seen by this provider on January 22nd.  In particular, the left medial upper thigh has continued to deteriorate and today we are attempting to authorize a Theraskin  graft for that wound.  Wound #1 - Right posterior hip - Remains stable with no reported drainage, no S & S of infection.  We will continue to use silver alginate placed over the wound.    Wound #2 - Left ischial -0.8 x 0.5 x 0.2 cm  - wound continues to decrease in size.   The wound has no S & S of infection.  We will continue to apply silver alginate on this wound.  Wound #3 - Sacrum - 0.3 x 0.8 x 0.4 cm - remains open and has increased in size slightly we will continue to monitor and apply silver alginate.  Wound #4 - Left medial upper thigh - 2.5 x 3.0 x 0.9 cm - this wound has increased in size slightly. There are no signs and symptoms of infection. It was selectively debrided today and we will attempt to authorize a Theraskin graft for this wound.   The left medial upper thigh will be ultrasonically debrided at his next visit.    2/29/24 - The patient returns today for f/up on wounds.  We are attempting to  authorize Theraskin grafts for the patient.  In preparation, the wounds were selectively debrided using the ultrasonic debrider.  If authorized, graft prep will be done at his next visit.  Today he was instructed to begin application of mupirocin ointment to each wound, also in preparation of the process.    3/7/24 - Mr. Martinez is seen today for f/up on several wounds.  He is approved for Theraskin grafts on wound #4, the left medial upper thigh.  Wound #1 - Right posterior hip -   The wound has deteriorated greatly over the last couple of weeks.  The wound was nearly closed two weeks ago.  Today it was ultrasonically debrided and has a depth of 1.5 cm.  Wound #2 - Left ischial - 0.3 x 0.3 x 0.2 cm  - wound continues to decrease in size.   The wound has no S & S of infection.  We will continue to apply silver alginate on this wound.  Wound #3 - Sacrum - 0.3 x 0.8 x 0.2 cm - remains open and has decreased in depth slightly.  we will continue to monitor and apply silver alginate.  Wound #4 - Left medial upper thigh - 3.3 x 2.0 x 0.3 cm - this wound has continued to increase in size and depth.  Today it was selectively debrided using the ultrasonic debrider.  Theraskin graft #1 was applied.  Theraskin graft #2, 13.0 cm2 has been ordered.  Graft #1 applied was 6.0 cm2 which is too small for this wound.  We will increase in size.     3/14/24 - Mr. Martinez returns for f/up on his wounds.  We are currently grafting the left medial upper thigh.    Wound #1 - Right posterior hip -   The wound has deteriorated continually over the last couple of weeks.  The wound was nearly closed two weeks ago.  Today a culture was obtained to determine if there is an infective process occurring causing deterioration.    Wound #2 - Left ischial - 0.2 x 0.2 x 0.2 cm  - wound continues to decrease in size.   The wound has no S & S of infection.  We will continue to apply silver alginate on this wound.  Wound #3 - Sacrum - 0.2 x 1.0 x 0.2 cm -  remains open and has increased again in depth slightly.  we will continue to monitor and apply silver alginate.  Wound #4 - Left medial upper thigh - 3.3 x 2.0 x 0.3 cm - this wound has remained the same size and depth.  At his last visit, Theraskin graft #2 (13.0 cm2) was applied.  This graft was pulled as taught as possible, and could barely be secured.  There is depth to the wound, and any attempt to apply pressure to the graft to allow contact with the wound bed would cause the graft to detach.  We must have a larger graft to achieve the wound depth.     3/21/24 - We reviewed the results from the patients culture of the right posterior hip.  It has several bacteria present in the culture.  He was prescribed Bactrim and Augmentin based on those culture results.  Because of the likelihood of contamination of other wounds, we will hold off on future grafts of the left medial upper thigh at this time.  He will begin his abx regimen and we will consider resuming grafting with the infection is cleared.  He will also apply Mupirocin ointment to the sacrum and left medial upper thigh.  Mupirocin will not be put into the right posterior hip due to the difficulty of cleaning it out.      3/27/24 - The patient was prescribed abx at his last visit.  Today he reports that after taking abx x 2 days he developed GI issues and stopped taking them.  He did begin taking them again today.  He has 8 days remaining and was strongly encouraged to follow through and complete his entire prescription.  The right hip has increased in depth.  The other wounds remains stable and/or improved.  We will continue mupirocin and silver on all wounds with the exception of the right hip, where we will use silver only.     4/3/24 -   Wound #1 - Right posterior hip -   This wound was cultured on 3/14/24.  He has completed both Clindamycin and Doxycycline.  Today it was ultrasonically debrided.  We will continue to monitor and consider additional abx  if no improvement shortly.   Wound #2 - Left ischial - Disappointingly this wound had healed, but today there was another large opening, likely due to positioning and/or cleaning.   Wound #3 - Sacrum - Continues to remain stable.   Wound #4 - Left medial upper thigh - 3.3 x 2.0 x 2.0 cm - this wound is no longer being grafted due to bacteria, inability to keep the graft in place, and inability to keep feeces out of wound.  Again today there was feeces in the wound.  It was cleansed using the ultrasonic debrider.     4/17/24 -   Wound #1 - Right posterior hip -   No significant change or improvement in this wound.  Topical abx have been ordered for use on all wounds.   Wound #2 - Left ischial - very small remaining opening.  [place] any aggressive repositioning wound should be healed shortly.  Wound #3 - Sacrum - Continues to remain stable.   Wound #4 - Left medial upper thigh - 3.3 x 3.2 x 0.5 - wound continues to deteriorate.  We discussed what may have changed recently causing the deterioration and that patient does not know.  It is alarming.  X-rays have been ordered for both hips to ensure that osteomyelitis is not present.  A BMP will be ordered through home health so that an MRI can be conducted.    5/1/24 - Patient is picking up topical abx today to begin using on open wounds (Cefazolin)  Wound #1 - Right posterior hip - slowly improving, slight decrease in depth.  Will begin using topical abx packed into wound.   Wound #2 - Left ischial - area assessed and wound is closed as of today!  Wound #3 - Sacrum - wound assessed and wound is closed as of today!  Wound #4 - Left medial upper thigh - 2.5 x 2.0 x 1.0 cm - wound length and width have decreased, depth increased slightly.  Wound was selectively debrided today.  We will begin application of topical abx on this wound daily.    An MRI was done of the left upper thigh and there is no indication of osteomyelitis at this time.    5/15/24 - We continue to use  topical abx on both open wounds with no particular improvement.  The wound to the left medial upper thigh has increased in size slightly.  Neither wound appears to be infected.  Both were selectively debrided using the ultrasonic debrider. The sacrum and left ischium remain closed.   Wound #1 - Right posterior hip - 0.5 x 0.8 x 2.6 cm  Wound #4 - left medial upper thigh - 2.8 x 2.5 x 1.0 cm    5/29/24 - The patient returns today for f/up on two active wounds.  We have been using topical abx since 5/1/24.  Today those abx were d/c.  Both wounds were selectively debrided using the ultrasonic debrider.  The left medial upper thigh wound remains concerning due to frequent and regular exposure to feces.  Fecal contamination is making it extremely difficult to make progress with this wound.  The tunneling wound at the right hip also continues to worsen in spite of the use of abx.  The patient does have Home Health services to assist with his wounds.     6/11/24 - Mr. Martinez returns for followup on 2 wounds.  Today he was in a rush to complete his visit due to transportation issues.  No debridement was performed as result of time constraints.  The wound to the right hip did measure larger and deeper this visit.  The left medial upper thigh remains a proximally this same size and again today there was cc noted on the wound margins.  The he needs to be a concern.  Today we will change the product for the left medial upper thigh from silver alginate to Polymem with the hopes that the additional cushion will be beneficial.    7/2/24 - The   Patient returns today for followup on 2 wounds.  The wound to the right hip has again increased in depth.  There was also a small amount of purulent exudate in this wound.  A culture was obtained.  The wound at the left medial upper thigh was selectively debrided.  This wound continues to increase in size is well and there is continual concern due to the amount of feet cc which is routine  only cleansed from the wound upon arrival to wound clinic.  We did have a conversation with the patient to encourage Home Health to clean that wound better following each dressing change as each time the patient has come to clinic there has been feces embedded into the wound bed.  We are unable to make much progress with the wound hygiene the way that it currently is.    7/15/24 - Mr. Martinez returns today for f/up on two wounds.  Additionally a closed wound to the ischium has reopened.  At his last visit a culture was obtained.  That culture was polymicrobial with some resistance.  Today and Rx for Metronidazole was sent to his pharmacy to being taking today.  The wound to the Left upper posterior thigh and right upper thigh were both debrided using a curette and ultrasonic debrider.  The Left upper posterior thigh has decreased in size this week and the tunnel at the right upper hip has decreased in depth.  We will continue Polymem to the Left upper posterior thigh and the reopened wound at the ischium, and continue to pack the right upper hip.  The sacrum remains closed. Of noted, he did report that he has been feeling very poorly over the last two weeks and followed up with his PCP today again.  He has been constipated and has had a recurrent UTI for which his is currently taking Nitrofurantoin.     7/22/24 - The returns today for followup on several pressure injuries.  A callus paring was performed on the sacral area.  That wound does remain closed.  The wound to the left ischium was reopened at the last visit and today that wound is nearly closed.  We will continue to monitor.  The right hip remains stable with a depth of 3.2 cm.  It has not changed in the last couple of weeks.  We will continue to pack this wound with packing strips.  Most signficant wound is the left upper posterior thigh.  A selective debridement was performed on this wound and it is improving slightly.  None of the wounds have any signs and  symptoms of infection.  Of note, he is no longer taking Flagyl, however, he continues to take Macrobid as prescribed by his PCP, Dr. Mcdonough for a UTI.  Additionally, he did receive a total of 3 Rocephin injections for the same.  We will continue to use Polymem on all wounds with the exception the right hip which does require packing.    7/29/24 - Mr. Martinez is seen for f/up on the the pressure injuries to the lower extremities.  Today the left ischium is again closed but will be monitored.  The right hip still has considerable depth and undermining.  We will begin packing with Iodoform packing in an effort to clean the tissue.  The left upper posterior thigh looks slightly improved.  The granular tissue is very nice and there is some small amounts of epithelial tissue beginning to migrate from the wound margins.  The wound was chemically cauterized using silver nitrate.  We will continue with the use of Polymem on this wound.     9/4/24 - The patient returned today for the 1st time since July 29, 2024.  Today, all wounds have deteriorated substantially.  The right hip was assessed and there is an increase in both depth and undermining.  A culture was obtained from this wound.  We will continue using Iodoform packing in this wound bed.  At his last visit the left upper thigh had stabilized.  Today, that wound is much deeper and now open straight to bone.  It was malodorous and a culture was obtained from this wound site as well.  Previously, the sacral wound has closed.  Today, upon assessment that wound is quite large again.  The left ischial wound does remain closed at this time.  It is very concerning that both the hip wounds were deteriorated substantially.  The patient reports that he was not feeling well today and has had multiple issues with transportation.  He was recently hospitalized (08/14/2024) due to an impaction.  He has been unable to come to wound clinic due to those transportation issues and  now will be switching to an at home wound care service, [facility].  They will be sent this note as well as has culture results once they are obtained.  Because this patient is feeling poorly today and because of the current concern over the wounds he has been prescribed doxycycline and ciprofloxacin.  That will be reviewed once his culture is received.    Review of Systems   Constitutional: Negative.    Respiratory: Negative.     Cardiovascular: Negative.    Musculoskeletal:  Positive for gait problem.   Skin:  Positive for wound.        As documented in the HPI.   All other systems reviewed and are negative.        Objective:      Vitals:    09/04/24 1014   BP: 120/72   Pulse: 70   Resp: 18   Temp: 98 °F (36.7 °C)     Physical Exam  Vitals reviewed.   Constitutional:       Appearance: Normal appearance. He is normal weight.   HENT:      Head: Normocephalic.   Cardiovascular:      Rate and Rhythm: Normal rate.      Pulses: Normal pulses.   Pulmonary:      Effort: Pulmonary effort is normal.   Musculoskeletal:      Comments: quadraplegia   Skin:     General: Skin is warm and dry.      Comments: Multiple pressure injuries   Neurological:      General: No focal deficit present.      Mental Status: He is alert and oriented to person, place, and time.   Psychiatric:         Mood and Affect: Mood normal.            Altered Skin Integrity 05/23/22 1337 Right posterior Hip #1 (Active)   05/23/22 1337   Altered Skin Integrity Present on Admission - Did Patient arrive to the hospital with altered skin?: yes   Side: Right   Orientation: posterior   Location: Hip   Wound Number: #1   Is this injury device related?: No   Primary Wound Type:    Description of Altered Skin Integrity:    Ankle-Brachial Index:    Pulses:    Removal Indication and Assessment:    Wound Outcome:    (Retired) Wound Length (cm):    (Retired) Wound Width (cm):    (Retired) Depth (cm):    Wound Description (Comments):    Removal Indications:     Dressing Appearance Moist drainage 09/04/24 1159   Drainage Amount Moderate 09/04/24 1159   Drainage Characteristics/Odor Green;Creamy;Malodorous 09/04/24 1159   Appearance Slough;Moist 09/04/24 1159   Tissue loss description Full thickness 09/04/24 1159   Periwound Area Intact 09/04/24 1159   Wound Edges Open 09/04/24 1159   Cunningham Classification (diabetic foot ulcers only) Grade 4 09/04/24 1159   Wound Length (cm) 1 cm 09/04/24 1159   Wound Width (cm) 1.3 cm 09/04/24 1159   Wound Depth (cm) 1.2 cm 09/04/24 1159   Wound Volume (cm^3) 1.56 cm^3 09/04/24 1159   Wound Surface Area (cm^2) 1.3 cm^2 09/04/24 1159   Undermining (depth (cm)/location) 2.3cm @1-2 o'clock 09/04/24 1159   Care Wound cleanser;Cleansed with: 09/04/24 1159   Dressing Applied 09/04/24 1159   Dressing Change Due 09/05/24 09/04/24 1159            Altered Skin Integrity 01/05/23 1351 Left upper;medial Thigh #4  (Active)   01/05/23 1351   Altered Skin Integrity Present on Admission - Did Patient arrive to the hospital with altered skin?: yes   Side: Left   Orientation: upper;medial   Location: Thigh   Wound Number: #4   Is this injury device related?: No   Primary Wound Type:    Description of Altered Skin Integrity:    Ankle-Brachial Index:    Pulses:    Removal Indication and Assessment: not present upon hospital arrival   Wound Outcome: Healed   (Retired) Wound Length (cm):    (Retired) Wound Width (cm):    (Retired) Depth (cm):    Wound Description (Comments):    Removal Indications:    Dressing Appearance Moist drainage 09/04/24 1159   Drainage Amount Moderate 09/04/24 1159   Drainage Characteristics/Odor Serosanguineous;No odor 09/04/24 1159   Appearance Moist;Red;Bone 09/04/24 1159   Tissue loss description Full thickness 09/04/24 1159   Periwound Area Macerated 09/04/24 1159   Wound Edges Open 09/04/24 1159   Cunningham Classification (diabetic foot ulcers only) Grade 4 09/04/24 1159   Wound Length (cm) 3 cm 09/04/24 1159   Wound Width (cm) 1.5  cm 09/04/24 1159   Wound Depth (cm) 1 cm 09/04/24 1159   Wound Volume (cm^3) 4.5 cm^3 09/04/24 1159   Wound Surface Area (cm^2) 4.5 cm^2 09/04/24 1159   Care Cleansed with:;Wound cleanser 09/04/24 1159   Dressing Applied 09/04/24 1159   Dressing Change Due 09/05/24 09/04/24 1159            Wound 09/04/24 1200 Pressure Injury Sacral spine #2 (Active)   09/04/24 1200   Present on Original Admission: Y   Primary Wound Type: Pressure inj   Side:    Orientation:    Location: Sacral spine   Wound Approximate Age at First Assessment (Weeks):    Wound Number: #2   Is this injury device related?: No   Incision Type:    Closure Method:    Wound Description (Comments):    Type:    Additional Comments:    Ankle-Brachial Index:    Pulses:    Removal Indication and Assessment:    Wound Outcome:    Pressure Injury Stage 4 09/04/24 1159   Dressing Appearance Moist drainage 09/04/24 1159   Drainage Amount Moderate 09/04/24 1159   Drainage Characteristics/Odor Malodorous 09/04/24 1159   Appearance Pink;Moist;Slough 09/04/24 1159   Tissue loss description Full thickness 09/04/24 1159   Periwound Area Redness 09/04/24 1159   Wound Edges Open 09/04/24 1159   Wound Length (cm) 1 cm 09/04/24 1159   Wound Width (cm) 2.5 cm 09/04/24 1159   Wound Depth (cm) 0.2 cm 09/04/24 1159   Wound Volume (cm^3) 0.5 cm^3 09/04/24 1159   Wound Surface Area (cm^2) 2.5 cm^2 09/04/24 1159   Care Cleansed with:;Wound cleanser 09/04/24 1159   Dressing Applied 09/04/24 1159   Dressing Change Due 09/05/24 09/04/24 1159       09/04/24        Right hip                                                           Left thigh                                                           Sacrum   ML  Assessment:           ICD-10-CM ICD-9-CM   1. Stage IV pressure ulcer of right hip  L89.214 707.04     707.24   2. Pressure injury of left thigh, stage 3  L89.223 707.09     707.23   3. Pressure ulcer of left buttock, stage 4  L89.324 707.05     707.24   4. Quadriplegia,  "unspecified  G82.50 344.00         Procedures:     Mechanical debridement only    Excisional debridement performed:  [] Yes [] No   Selective debridement performed:  [] Yes [] No   Mechanical debridement performed:  [x] Yes [] No   Silver nitrate applied:    [] Yes [] No   Labs ordered this visit:   [] Yes [] No   Imaging ordered this visit:   [] Yes [] No   Tissue pathology and/or culture taken:  [] Yes [] No       MEDICATIONS    Current Outpatient Medications:     apixaban (ELIQUIS) 5 mg Tab, Take 5 mg by mouth 2 (two) times daily., Disp: , Rfl:     cholecalciferol, vitamin D3, 125 mcg (5,000 unit) capsule, Take 5,000 Units by mouth., Disp: , Rfl:     lactulose (CHRONULAC) 10 gram/15 mL solution, Take 10 g by mouth 2 (two) times daily., Disp: , Rfl:     LINZESS 290 mcg Cap capsule, Take 290 mcg by mouth., Disp: , Rfl:     ciprofloxacin HCl (CIPRO) 500 MG tablet, Take 1 tablet (500 mg total) by mouth 2 (two) times daily. for 10 days, Disp: 20 tablet, Rfl: 0    doxycycline (VIBRA-TABS) 100 MG tablet, Take 1 tablet (100 mg total) by mouth 2 (two) times daily. for 10 days, Disp: 20 tablet, Rfl: 0    mupirocin (BACTROBAN) 2 % ointment, 2 (two) times daily. Apply to affected area for five days (Patient not taking: Reported on 8/16/2024), Disp: , Rfl:    Review of patient's allergies indicates:   Allergen Reactions    Meropenem Anaphylaxis    Penicillins          [facility]   TIMES PER WEEK/DAYS:  daily   ORDERS:  Right hip wound: Cleanse with wound cleanser and pack with 1/2" iodoform packing strip, cover with silver alginate/4x4 gauze and a gentle border dressing - to be changed daily.   Left upper posterior thigh wound: Cleanse with wound cleanser and apply mesalt to the wound bed, cover with silver alginate/4x4 gauze and a gentle border dressing - to be changed daily.   Sacrum: Cleanse with wound cleanser and apply mesalt to the wound bed, cover with silver alginate/4x4 gauze and a " gentle border dressing - to be changed daily.       No follow-ups on file.

## 2025-03-12 DIAGNOSIS — Z93.3 COLOSTOMY STATUS: Primary | ICD-10-CM

## 2025-04-21 ENCOUNTER — HOSPITAL ENCOUNTER (INPATIENT)
Facility: HOSPITAL | Age: 50
LOS: 3 days | Discharge: HOME HEALTH | DRG: 698 | End: 2025-04-24
Attending: EMERGENCY MEDICINE | Admitting: INTERNAL MEDICINE
Payer: MEDICARE

## 2025-04-21 DIAGNOSIS — T83.510A URINARY TRACT INFECTION ASSOCIATED WITH CYSTOSTOMY CATHETER, INITIAL ENCOUNTER: Primary | ICD-10-CM

## 2025-04-21 DIAGNOSIS — G82.50 QUADRIPLEGIA: ICD-10-CM

## 2025-04-21 DIAGNOSIS — A41.9 SEPTIC SHOCK: ICD-10-CM

## 2025-04-21 DIAGNOSIS — R65.21 SEPTIC SHOCK: ICD-10-CM

## 2025-04-21 DIAGNOSIS — N39.0 URINARY TRACT INFECTION ASSOCIATED WITH CYSTOSTOMY CATHETER, INITIAL ENCOUNTER: Primary | ICD-10-CM

## 2025-04-21 DIAGNOSIS — L89.159 PRESSURE INJURY OF SKIN OF SACRAL REGION, UNSPECIFIED INJURY STAGE: ICD-10-CM

## 2025-04-21 DIAGNOSIS — R53.1 WEAKNESS: ICD-10-CM

## 2025-04-21 LAB
ALBUMIN SERPL-MCNC: 4.2 G/DL (ref 3.5–5)
ALBUMIN/GLOB SERPL: 0.9 RATIO (ref 1.1–2)
ALP SERPL-CCNC: 112 UNIT/L (ref 40–150)
ALT SERPL-CCNC: 12 UNIT/L (ref 0–55)
AMORPH PHOS CRY URNS QL MICRO: ABNORMAL /HPF
ANION GAP SERPL CALC-SCNC: 17 MEQ/L
AST SERPL-CCNC: 21 UNIT/L (ref 11–45)
BACTERIA #/AREA URNS AUTO: ABNORMAL /HPF
BASOPHILS # BLD AUTO: 0.03 X10(3)/MCL
BASOPHILS NFR BLD AUTO: 0.3 %
BILIRUB SERPL-MCNC: 0.8 MG/DL
BILIRUB UR QL STRIP.AUTO: ABNORMAL
BUN SERPL-MCNC: 14 MG/DL (ref 8.4–25.7)
CALCIUM SERPL-MCNC: 9.7 MG/DL (ref 8.4–10.2)
CHLORIDE SERPL-SCNC: 105 MMOL/L (ref 98–107)
CLARITY UR: CLEAR
CO2 SERPL-SCNC: 22 MMOL/L (ref 22–29)
COLOR UR AUTO: YELLOW
CREAT SERPL-MCNC: 0.82 MG/DL (ref 0.72–1.25)
CREAT/UREA NIT SERPL: 17
EOSINOPHIL # BLD AUTO: 0.02 X10(3)/MCL (ref 0–0.9)
EOSINOPHIL NFR BLD AUTO: 0.2 %
ERYTHROCYTE [DISTWIDTH] IN BLOOD BY AUTOMATED COUNT: 12.5 % (ref 11.5–17)
GFR SERPLBLD CREATININE-BSD FMLA CKD-EPI: >60 ML/MIN/1.73/M2
GLOBULIN SER-MCNC: 4.9 GM/DL (ref 2.4–3.5)
GLUCOSE SERPL-MCNC: 98 MG/DL (ref 74–100)
GLUCOSE UR QL STRIP: NEGATIVE
HCT VFR BLD AUTO: 46.5 % (ref 42–52)
HGB BLD-MCNC: 15.3 G/DL (ref 14–18)
HGB UR QL STRIP: ABNORMAL
IMM GRANULOCYTES # BLD AUTO: 0.03 X10(3)/MCL (ref 0–0.04)
IMM GRANULOCYTES NFR BLD AUTO: 0.3 %
KETONES UR QL STRIP: ABNORMAL
LACTATE SERPL-SCNC: 0.8 MMOL/L (ref 0.5–2.2)
LACTATE SERPL-SCNC: 2.6 MMOL/L (ref 0.5–2.2)
LEUKOCYTE ESTERASE UR QL STRIP: ABNORMAL
LIPASE SERPL-CCNC: 17 U/L
LYMPHOCYTES # BLD AUTO: 1.11 X10(3)/MCL (ref 0.6–4.6)
LYMPHOCYTES NFR BLD AUTO: 10.6 %
MCH RBC QN AUTO: 30.7 PG (ref 27–31)
MCHC RBC AUTO-ENTMCNC: 32.9 G/DL (ref 33–36)
MCV RBC AUTO: 93.4 FL (ref 80–94)
MONOCYTES # BLD AUTO: 0.55 X10(3)/MCL (ref 0.1–1.3)
MONOCYTES NFR BLD AUTO: 5.2 %
NEUTROPHILS # BLD AUTO: 8.78 X10(3)/MCL (ref 2.1–9.2)
NEUTROPHILS NFR BLD AUTO: 83.4 %
NITRITE UR QL STRIP: NEGATIVE
NRBC BLD AUTO-RTO: 0 %
OHS QRS DURATION: 128 MS
OHS QTC CALCULATION: 477 MS
PH UR STRIP: 8.5 [PH]
PLATELET # BLD AUTO: 288 X10(3)/MCL (ref 130–400)
PMV BLD AUTO: 9.7 FL (ref 7.4–10.4)
POTASSIUM SERPL-SCNC: 3.7 MMOL/L (ref 3.5–5.1)
PROT SERPL-MCNC: 9.1 GM/DL (ref 6.4–8.3)
PROT UR QL STRIP: ABNORMAL
RBC # BLD AUTO: 4.98 X10(6)/MCL (ref 4.7–6.1)
RBC #/AREA URNS AUTO: ABNORMAL /HPF
SODIUM SERPL-SCNC: 144 MMOL/L (ref 136–145)
SP GR UR STRIP.AUTO: 1.01 (ref 1–1.03)
SQUAMOUS #/AREA URNS AUTO: ABNORMAL /HPF
TRI-PHOS CRY UR QL COMP ASSIST: ABNORMAL
UROBILINOGEN UR STRIP-ACNC: 4
WBC # BLD AUTO: 10.52 X10(3)/MCL (ref 4.5–11.5)
WBC #/AREA URNS AUTO: >=100 /HPF

## 2025-04-21 PROCEDURE — 25000003 PHARM REV CODE 250: Performed by: EMERGENCY MEDICINE

## 2025-04-21 PROCEDURE — 83605 ASSAY OF LACTIC ACID: CPT | Performed by: EMERGENCY MEDICINE

## 2025-04-21 PROCEDURE — 25000003 PHARM REV CODE 250: Performed by: INTERNAL MEDICINE

## 2025-04-21 PROCEDURE — 87040 BLOOD CULTURE FOR BACTERIA: CPT | Performed by: EMERGENCY MEDICINE

## 2025-04-21 PROCEDURE — 83690 ASSAY OF LIPASE: CPT | Performed by: EMERGENCY MEDICINE

## 2025-04-21 PROCEDURE — 94761 N-INVAS EAR/PLS OXIMETRY MLT: CPT

## 2025-04-21 PROCEDURE — 96365 THER/PROPH/DIAG IV INF INIT: CPT

## 2025-04-21 PROCEDURE — 96375 TX/PRO/DX INJ NEW DRUG ADDON: CPT

## 2025-04-21 PROCEDURE — 63600175 PHARM REV CODE 636 W HCPCS: Performed by: EMERGENCY MEDICINE

## 2025-04-21 PROCEDURE — 81003 URINALYSIS AUTO W/O SCOPE: CPT | Performed by: EMERGENCY MEDICINE

## 2025-04-21 PROCEDURE — 99285 EMERGENCY DEPT VISIT HI MDM: CPT | Mod: 25

## 2025-04-21 PROCEDURE — 87077 CULTURE AEROBIC IDENTIFY: CPT | Performed by: EMERGENCY MEDICINE

## 2025-04-21 PROCEDURE — 93005 ELECTROCARDIOGRAM TRACING: CPT

## 2025-04-21 PROCEDURE — 80053 COMPREHEN METABOLIC PANEL: CPT | Performed by: EMERGENCY MEDICINE

## 2025-04-21 PROCEDURE — 93010 ELECTROCARDIOGRAM REPORT: CPT | Mod: ,,, | Performed by: INTERNAL MEDICINE

## 2025-04-21 PROCEDURE — 20000000 HC ICU ROOM

## 2025-04-21 PROCEDURE — 85025 COMPLETE CBC W/AUTO DIFF WBC: CPT | Performed by: EMERGENCY MEDICINE

## 2025-04-21 RX ORDER — SODIUM CHLORIDE 9 MG/ML
1000 INJECTION, SOLUTION INTRAVENOUS
Status: COMPLETED | OUTPATIENT
Start: 2025-04-21 | End: 2025-04-21

## 2025-04-21 RX ORDER — SODIUM CHLORIDE 9 MG/ML
INJECTION, SOLUTION INTRAVENOUS CONTINUOUS
Status: DISCONTINUED | OUTPATIENT
Start: 2025-04-21 | End: 2025-04-22

## 2025-04-21 RX ORDER — NOREPINEPHRINE BITARTRATE/D5W 4MG/250ML
0-3 PLASTIC BAG, INJECTION (ML) INTRAVENOUS CONTINUOUS
Status: DISCONTINUED | OUTPATIENT
Start: 2025-04-21 | End: 2025-04-23

## 2025-04-21 RX ORDER — LEVOFLOXACIN 5 MG/ML
500 INJECTION, SOLUTION INTRAVENOUS
Status: DISCONTINUED | OUTPATIENT
Start: 2025-04-21 | End: 2025-04-21

## 2025-04-21 RX ORDER — SODIUM CHLORIDE 9 MG/ML
500 INJECTION, SOLUTION INTRAVENOUS
Status: COMPLETED | OUTPATIENT
Start: 2025-04-21 | End: 2025-04-21

## 2025-04-21 RX ORDER — ONDANSETRON HYDROCHLORIDE 2 MG/ML
4 INJECTION, SOLUTION INTRAVENOUS
Status: COMPLETED | OUTPATIENT
Start: 2025-04-21 | End: 2025-04-21

## 2025-04-21 RX ORDER — SODIUM CHLORIDE 0.9 % (FLUSH) 0.9 %
10 SYRINGE (ML) INJECTION
Status: DISCONTINUED | OUTPATIENT
Start: 2025-04-21 | End: 2025-04-24 | Stop reason: HOSPADM

## 2025-04-21 RX ADMIN — LACTULOSE 10 G: 20 SOLUTION ORAL at 09:04

## 2025-04-21 RX ADMIN — LEVOFLOXACIN 500 MG: 500 INJECTION, SOLUTION INTRAVENOUS at 11:04

## 2025-04-21 RX ADMIN — ONDANSETRON 4 MG: 2 INJECTION INTRAMUSCULAR; INTRAVENOUS at 09:04

## 2025-04-21 RX ADMIN — SODIUM CHLORIDE 500 ML: 9 INJECTION, SOLUTION INTRAVENOUS at 11:04

## 2025-04-21 RX ADMIN — SODIUM CHLORIDE 1000 ML: 9 INJECTION, SOLUTION INTRAVENOUS at 09:04

## 2025-04-21 RX ADMIN — SODIUM CHLORIDE: 9 INJECTION, SOLUTION INTRAVENOUS at 09:04

## 2025-04-21 RX ADMIN — SODIUM CHLORIDE: 9 INJECTION, SOLUTION INTRAVENOUS at 03:04

## 2025-04-21 RX ADMIN — APIXABAN 5 MG: 5 TABLET, FILM COATED ORAL at 09:04

## 2025-04-21 RX ADMIN — NOREPINEPHRINE BITARTRATE 0.05 MCG/KG/MIN: 4 INJECTION, SOLUTION INTRAVENOUS at 12:04

## 2025-04-21 RX ADMIN — GENTAMICIN SULFATE 494.8 MG: 40 INJECTION, SOLUTION INTRAMUSCULAR; INTRAVENOUS at 03:04

## 2025-04-21 NOTE — ED PROVIDER NOTES
Encounter Date: 4/21/2025       History     Chief Complaint   Patient presents with    Emesis     Arrived by EMS with c/o n/v that started yesterday. EMS reports hypotension in route. 250 L LR bolus given in route.     HPI  49-year-old male history of quadriplegia, chronic sacral decubitus, chronic UTIs, previous cholecystectomy and appendectomy, suprapubic catheter brought in by ambulance for 6 hour history of increasing generalized weakness, body aches, diaphoresis and nausea vomiting.  Patient denies associated headache, neck stiffness, photophobia, chest pain, shortness of breath, abdominal pain, diarrhea, focal motor or sensory changes.  No history of trauma.  Patient states he has had similar symptoms in the past which was related to a infected decubitus and frequent UTIs.  Review of patient's allergies indicates:   Allergen Reactions    Meropenem Anaphylaxis    Penicillins      Past Medical History:   Diagnosis Date    Chronic UTI     Decubitus ulcer of left ischium, stage 4     Depression     Osteoarthritis     Quadriplegia      Past Surgical History:   Procedure Laterality Date    CHOLECYSTECTOMY      COLOSTOMY      LYSIS OF ADHESIONS      SPINE SURGERY      SUPRAPUBIC CYSTOSTOMY       Family History   Problem Relation Name Age of Onset    Heart disease Mother      Diabetes Mother      Diabetes Father       Social History[1]  Review of Systems   All other systems reviewed and are negative.      Physical Exam     Initial Vitals [04/21/25 0924]   BP Pulse Resp Temp SpO2   (!) 108/93 90 18 98.4 °F (36.9 °C) 99 %      MAP       --         Physical Exam    Nursing note and vitals reviewed.  Constitutional: He appears well-developed and well-nourished. He is diaphoretic. He is cooperative. No distress.   HENT:   Head: Normocephalic and atraumatic.   Nose: Nose normal. Mouth/Throat: Oropharynx is clear and moist.   Eyes: Conjunctivae and lids are normal.   Neck: Trachea normal. Neck supple.   Normal range of  motion.  Cardiovascular:  Normal rate, regular rhythm, normal heart sounds and intact distal pulses.           Pulmonary/Chest: Breath sounds normal.   Abdominal: Abdomen is soft. Bowel sounds are normal. There is no abdominal tenderness.   Musculoskeletal:         General: Normal range of motion.      Cervical back: Normal range of motion and neck supple.     Neurological: He is alert and oriented to person, place, and time. He displays normal reflexes. No cranial nerve deficit. GCS score is 15. GCS eye subscore is 4. GCS verbal subscore is 5. GCS motor subscore is 6.   Bilateral upper and lower extremity weakness unchanged secondary to previous injury   Skin: Skin is warm. No rash noted.   Multiple sacral decubitus x3, 1 to each buttock and 1 sacral that appear to be healing well with no obvious signs of infection or overlying erythema or fluctuance.   Psychiatric: He has a normal mood and affect. His speech is normal and behavior is normal.         ED Course   Procedures  Labs Reviewed   COMPREHENSIVE METABOLIC PANEL - Abnormal       Result Value    Sodium 144      Potassium 3.7      Chloride 105      CO2 22      Glucose 98      Blood Urea Nitrogen 14.0      Creatinine 0.82      Calcium 9.7      Protein Total 9.1 (*)     Albumin 4.2      Globulin 4.9 (*)     Albumin/Globulin Ratio 0.9 (*)     Bilirubin Total 0.8            ALT 12      AST 21      eGFR >60      Anion Gap 17.0      BUN/Creatinine Ratio 17     CBC WITH DIFFERENTIAL - Abnormal    WBC 10.52      RBC 4.98      Hgb 15.3      Hct 46.5      MCV 93.4      MCH 30.7      MCHC 32.9 (*)     RDW 12.5      Platelet 288      MPV 9.7      Neut % 83.4      Lymph % 10.6      Mono % 5.2      Eos % 0.2      Basophil % 0.3      Imm Grans % 0.3      Neut # 8.78      Lymph # 1.11      Mono # 0.55      Eos # 0.02      Baso # 0.03      Imm Gran # 0.03      NRBC% 0.0     LACTIC ACID, PLASMA - Abnormal    Lactic Acid Level 2.6 (*)    URINALYSIS, REFLEX TO URINE  CULTURE - Abnormal    Color, UA Yellow      Appearance, UA Clear      Specific Gravity, UA 1.015      pH, UA 8.5      Protein, UA 3+ (*)     Glucose, UA Negative      Ketones, UA Trace (*)     Blood, UA Trace-Intact (*)     Bilirubin, UA 2+ (*)     Urobilinogen, UA 4.0 (*)     Nitrites, UA Negative      Leukocyte Esterase, UA 3+ (*)    URINALYSIS, MICROSCOPIC - Abnormal    Bacteria, UA Few (*)     Amorphous Phosphate Crystals, UA Many (*)     Triple Phosphate Crystals, UA Moderate (*)     RBC, UA 3-5      WBC, UA >=100 (*)     Squamous Epithelial Cells, UA Few (*)    LIPASE - Normal    Lipase Level 17     LACTIC ACID, PLASMA - Normal    Lactic Acid Level 0.8     BLOOD CULTURE [facility]   BLOOD CULTURE [facility]   CBC W/ AUTO DIFFERENTIAL    Narrative:     The following orders were created for panel order CBC auto differential.  Procedure                               Abnormality         Status                     ---------                               -----------         ------                     CBC with Differential[[id number]]       Abnormal            Final result                 Please view results for these tests on the individual orders.     EKG Readings: (Independently Interpreted)   Normal sinus rhythm, biatrial enlargement, left axis deviation, LVH with nonspecific ST changes, no acute ST elevation or ST depressions, left axis     ECG Results              EKG 12-lead (Final result)        Collection Time Result Time QRS Duration OHS QTC Calculation    04/21/25 09:35:11 04/21/25 11:37:12 128 477                     Final result by Interface, Lab In [facility] (04/21/25 11:37:21)                   Narrative:    Test Reason : R53.1,    Vent. Rate :  98 BPM     Atrial Rate :  98 BPM     P-R Int : 162 ms          QRS Dur : 128 ms      QT Int : 374 ms       P-R-T Axes :  82 -59  99 degrees    QTcB Int : 477 ms    Program found technically poor ECG  Normal sinus rhythm  Biatrial enlargement  Left axis deviation  LVH with QRS  widening and repolarization abnormality ( R in aVL , Abhilash  product , Romhilt-Hall )  Abnormal ECG  No previous ECGs available  Confirmed by Maverick Chairez ([id number]) on 4/21/2025 11:37:10 AM    Referred By: MALENAREFERRAL SELF           Confirmed By: Maverick Chairez                                  Imaging Results              X-Ray Chest AP Portable (Final result)  Result time 04/21/25 11:10:51      Final result by Ulisses Hein MD (04/21/25 11:10:51)                   Impression:      1. Gas distended loops of bowel below a elevated right hemidiaphragm  2. Mild thoracic spondylosis and scoliosis      Electronically signed by: Ulisses Hein  Date:    04/21/2025  Time:    11:10               Narrative:    EXAMINATION:  XR CHEST AP PORTABLE    CLINICAL HISTORY:  , Weakness.    COMPARISON:  06/03/2021    FINDINGS:  An AP view or more reveals the heart to be normal in size.  The trachea is midline.  There is interim removal of the left PICC line.  Gas distended loops of bowel are evident below the elevated right hemidiaphragm.  No infiltrate or effusion is seen.  Mild degenerative changes and curvature noted to the thoracic spine.                                       Medications   NORepinephrine 4 mg in dextrose 5% 250 mL infusion (premix) (0.08 mcg/kg/min × 74.8 kg Intravenous Verify Only 4/22/25 0633)   sodium chloride 0.9% flush 10 mL (has no administration in time range)   gentamicin - pharmacy to dose (has no administration in time range)   0.9% NaCl infusion ( Intravenous Verify Only 4/22/25 0633)   apixaban tablet 5 mg (5 mg Oral Given 4/21/25 2128)   lactulose 20 gram/30 mL solution Soln 10 g (10 g Oral Given 4/21/25 2129)   linaCLOtide capsule 290 mcg (290 mcg Oral Not Given 4/22/25 0600)   gentamicin (GARAMYCIN) 494.8 mg in 0.9% NaCl 100 mL IVPB (0 mg Intravenous Stopped 4/21/25 1631)   0.9% NaCl infusion (0 mLs Intravenous Stopped 4/21/25 1000)   ondansetron injection 4 mg (4 mg Intravenous Given 4/21/25 0948)    0.9% NaCl infusion (0 mLs Intravenous Stopped 4/21/25 1212)     Medical Decision Making  Amount and/or Complexity of Data Reviewed  Labs: ordered.  Radiology: ordered.    Risk  Prescription drug management.  Decision regarding hospitalization.    49-year-old male history of quadriplegia frequent UTIs, sacral decubiti brought in by EMS for generalized weakness, nausea and diaphoresis since this a.m. patient hypotensive on arrival to ED and has remained intermittently hypotensive even with 2 L normal saline.  On exam patient has 3 decubitus to both buttocks and sacral area which do not appear infected.  Labs remarkable for lactic acid of 2.6.  Chest x-ray with no acute findings.  UA with evidence of infection with greater than 100 WBCs per high-power  EKG with no acute ischemic changes.  After blood cultures patient was given Levaquin given the patient's penicillin allergy and given that his blood pressure has remained low patient will be started on Levophed and admitted to ICU for further medical management.                                  Clinical Impression:  Final diagnoses:  [R53.1] Weakness  [T83.510A, N39.0] Urinary tract infection associated with cystostomy catheter, initial encounter (Primary)  [A41.9, R65.21] Septic shock  [G82.50] Quadriplegia  [L89.159] Pressure injury of skin of sacral region, unspecified injury stage          ED Disposition Condition    Admit                     [1]   Social History  Tobacco Use    Smoking status: Never    Smokeless tobacco: Never   Substance Use Topics    Alcohol use: Yes     Comment: occasion    Drug use: Not Currently        Manuel Holguin MD  04/22/25 0643
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
## 2025-04-21 NOTE — PROGRESS NOTES
Pharmacokinetic Initial Assessment: Gentamicin    Assessment:  Weight utilized for dose calculation: Ideal Body Weight  Dosing method utilized: extended interval dosing    Plan: Extended interval dosing regimen: Gentamicin 494.8 mg mg IV once, followed by a random level to be drawn on 04/22/2025 at 0500, 8-12 hours after the first dose.    Pharmacy will continue to monitor.    Please contact pharmacy at extension [phone] with any questions regarding this assessment.    Thank you for the consult,    Tejinder Hernandez       Patient brief summary:  Diego Martinez Jr. is a 49 y.o. male initiated on aminoglycoside therapy for treatment of suspected sepsis / UTI    Drug Allergies:   Review of patient's allergies indicates:   Allergen Reactions    Meropenem Anaphylaxis    Penicillins        Actual Body Weight:   74.8 kg    Adjust Body Weight:   72.4 kg    Ideal Body Weight:  70.7 kg    Renal Function:   Estimated Creatinine Clearance: 107.8 mL/min (based on SCr of 0.82 mg/dL).,     Dialysis Method (if applicable):  N/A    CBC (last 72 hours):  Recent Labs   Lab Result Units 04/21/25  0943   WBC x10(3)/mcL 10.52   Hgb g/dL 15.3   Hct % 46.5   Platelet x10(3)/mcL 288   Mono % % 5.2   Eos % % 0.2   Basophil % % 0.3       Metabolic Panel (last 72 hours):  Recent Labs   Lab Result Units 04/21/25 0943 04/21/25 0946   Sodium mmol/L 144  --    Potassium mmol/L 3.7  --    Chloride mmol/L 105  --    CO2 mmol/L 22  --    Glucose mg/dL 98  --    Glucose, UA   --  Negative   Blood Urea Nitrogen mg/dL 14.0  --    Creatinine mg/dL 0.82  --    Albumin g/dL 4.2  --    Bilirubin Total mg/dL 0.8  --    ALP unit/L 112  --    AST unit/L 21  --    ALT unit/L 12  --        Microbiologic Results:  Microbiology Results (last 7 days)       Procedure Component Value Units Date/Time    Urine culture [[id number]] Collected: 04/21/25 0946    Order Status: Sent Specimen: Urine Updated: 04/21/25 1006    Blood Culture #2 **CANNOT BE ORDERED STAT**  [[id number]] Collected: 04/21/25 0943    Order Status: Resulted Specimen: Blood from Forearm, Right Updated: 04/21/25 0948    Blood Culture #1 **CANNOT BE ORDERED STAT** [[id number]] Collected: 04/21/25 0937    Order Status: Resulted Specimen: Blood from Arm, Right Updated: 04/21/25 0948

## 2025-04-21 NOTE — PLAN OF CARE
Problem: Adult Inpatient Plan of Care  Goal: Plan of Care Review  Outcome: Progressing  Goal: Patient-Specific Goal (Individualized)  Outcome: Progressing  Goal: Absence of Hospital-Acquired Illness or Injury  Outcome: Progressing  Goal: Optimal Comfort and Wellbeing  Outcome: Progressing  Goal: Readiness for Transition of Care  Outcome: Progressing     Problem: Skin Injury Risk Increased  Goal: Skin Health and Integrity  Outcome: Progressing     Problem: Wound  Goal: Optimal Coping  Outcome: Progressing  Goal: Optimal Functional Ability  Outcome: Progressing  Goal: Absence of Infection Signs and Symptoms  Outcome: Progressing  Goal: Improved Oral Intake  Outcome: Progressing  Goal: Optimal Pain Control and Function  Outcome: Progressing  Goal: Skin Health and Integrity  Outcome: Progressing  Goal: Optimal Wound Healing  Outcome: Progressing     Problem: Comorbidity Management  Goal: Blood Pressure in Desired Range  Outcome: Progressing  Goal: Maintenance of Osteoarthritis Symptom Control  Outcome: Progressing     Problem: Spinal Cord Injury Paraplegia  Goal: Optimal Adjustment to Spinal Cord Injury  Outcome: Progressing  Goal: Effective Airway Clearance  Outcome: Progressing  Goal: Effective Dysreflexia Prevention  Outcome: Progressing  Goal: Optimal Safe BADL Performance  Outcome: Progressing  Goal: Bowel Management [place]  Outcome: Progressing  Goal: Optimal Safe IADL Performance  Outcome: Progressing  Goal: Optimal Mobility [place] and Safety  Outcome: Progressing  Goal: Optimal Movement and Motor Control  Outcome: Progressing  Goal: Blood Pressure in Desired Range  Outcome: Progressing  Goal: Maintains Intimacy/Sexual Expression  Outcome: Progressing  Goal: Effective Spasticity Management  Outcome: Progressing  Goal: Urinary Management [place]  Outcome: Progressing  Goal: Optimal Wheelchair Use  Outcome: Progressing

## 2025-04-22 PROBLEM — E44.0 MODERATE MALNUTRITION: Status: ACTIVE | Noted: 2025-04-22

## 2025-04-22 LAB — GENTAMICIN SERPL-MCNC: 4.2 UG/ML

## 2025-04-22 PROCEDURE — 25000003 PHARM REV CODE 250: Performed by: EMERGENCY MEDICINE

## 2025-04-22 PROCEDURE — 25000003 PHARM REV CODE 250: Performed by: INTERNAL MEDICINE

## 2025-04-22 PROCEDURE — 20000000 HC ICU ROOM

## 2025-04-22 PROCEDURE — 36415 COLL VENOUS BLD VENIPUNCTURE: CPT | Performed by: INTERNAL MEDICINE

## 2025-04-22 PROCEDURE — 63600175 PHARM REV CODE 636 W HCPCS: Performed by: EMERGENCY MEDICINE

## 2025-04-22 PROCEDURE — 80170 ASSAY OF GENTAMICIN: CPT | Performed by: INTERNAL MEDICINE

## 2025-04-22 PROCEDURE — 94761 N-INVAS EAR/PLS OXIMETRY MLT: CPT

## 2025-04-22 RX ORDER — SODIUM CHLORIDE, SODIUM LACTATE, POTASSIUM CHLORIDE, CALCIUM CHLORIDE 600; 310; 30; 20 MG/100ML; MG/100ML; MG/100ML; MG/100ML
INJECTION, SOLUTION INTRAVENOUS CONTINUOUS
Status: DISCONTINUED | OUTPATIENT
Start: 2025-04-22 | End: 2025-04-23

## 2025-04-22 RX ORDER — MUPIROCIN 20 MG/G
OINTMENT TOPICAL 2 TIMES DAILY
Status: DISCONTINUED | OUTPATIENT
Start: 2025-04-22 | End: 2025-04-24 | Stop reason: HOSPADM

## 2025-04-22 RX ADMIN — APIXABAN 5 MG: 5 TABLET, FILM COATED ORAL at 09:04

## 2025-04-22 RX ADMIN — MUPIROCIN 1 G: 20 OINTMENT TOPICAL at 09:04

## 2025-04-22 RX ADMIN — NOREPINEPHRINE BITARTRATE 0.08 MCG/KG/MIN: 4 INJECTION, SOLUTION INTRAVENOUS at 04:04

## 2025-04-22 RX ADMIN — SODIUM CHLORIDE: 9 INJECTION, SOLUTION INTRAVENOUS at 07:04

## 2025-04-22 RX ADMIN — SODIUM CHLORIDE, POTASSIUM CHLORIDE, SODIUM LACTATE AND CALCIUM CHLORIDE: 600; 310; 30; 20 INJECTION, SOLUTION INTRAVENOUS at 01:04

## 2025-04-22 RX ADMIN — LACTULOSE 10 G: 20 SOLUTION ORAL at 09:04

## 2025-04-22 NOTE — PLAN OF CARE
Problem: Adult Inpatient Plan of Care  Goal: Plan of Care Review  Outcome: Progressing  Goal: Patient-Specific Goal (Individualized)  Outcome: Progressing  Goal: Absence of Hospital-Acquired Illness or Injury  Outcome: Progressing  Goal: Optimal Comfort and Wellbeing  Outcome: Progressing  Goal: Readiness for Transition of Care  Outcome: Progressing     Problem: Skin Injury Risk Increased  Goal: Skin Health and Integrity  Outcome: Progressing     Problem: Wound  Goal: Optimal Coping  Outcome: Progressing  Goal: Optimal Functional Ability  Outcome: Progressing  Goal: Absence of Infection Signs and Symptoms  Outcome: Progressing  Goal: Improved Oral Intake  Outcome: Progressing  Goal: Optimal Pain Control and Function  Outcome: Progressing  Goal: Skin Health and Integrity  Outcome: Progressing  Goal: Optimal Wound Healing  Outcome: Progressing     Problem: Comorbidity Management  Goal: Blood Pressure in Desired Range  Outcome: Progressing  Goal: Maintenance of Osteoarthritis Symptom Control  Outcome: Progressing     Problem: Spinal Cord Injury Paraplegia  Goal: Optimal Adjustment to Spinal Cord Injury  Outcome: Progressing  Goal: Effective Airway Clearance  Outcome: Progressing  Goal: Effective Dysreflexia Prevention  Outcome: Progressing  Goal: Optimal Safe BADL Performance  Outcome: Progressing  Goal: Bowel Management [place]  Outcome: Progressing  Goal: Optimal Safe IADL Performance  Outcome: Progressing  Goal: Optimal Mobility [place] and Safety  Outcome: Progressing  Goal: Optimal Movement and Motor Control  Outcome: Progressing  Goal: Blood Pressure in Desired Range  Outcome: Progressing  Goal: Maintains Intimacy/Sexual Expression  Outcome: Progressing  Goal: Effective Spasticity Management  Outcome: Progressing  Goal: Urinary Management [place]  Outcome: Progressing  Goal: Optimal Wheelchair Use  Outcome: Progressing

## 2025-04-22 NOTE — CONSULTS
[facility] - ICU  Wound Care    Patient Name: Diego Maritnez Jr.  MRN: [id number]  Date: 4/22/2025  Diagnosis: Weakness      Subjective:           Patient ID: Diego Martinez Jr. is a 49 y.o. male.    Chief Complaint: Emesis (Arrived by EMS with c/o n/v that started yesterday. EMS reports hypotension in route. 250 L LR bolus given in route.)      HPI      Past Medical History:     1. Urinary tract infection associated with cystostomy catheter, initial encounter    2. Weakness    3. Septic shock    4. Quadriplegia    5. Pressure injury of skin of sacral region, unspecified injury stage      Wound Assessment:           Altered Skin Integrity 05/23/22 1337 Right posterior Hip #1 (Active)   05/23/22 1337   Altered Skin Integrity Present on Admission - Did Patient arrive to the hospital with altered skin?: yes   Side: Right   Orientation: posterior   Location: Hip   Wound Number: #1   Is this injury device related?: No   Primary Wound Type:    Description of Altered Skin Integrity:    Ankle-Brachial Index:    Pulses:    Removal Indication and Assessment:    Wound Outcome:    (Retired) Wound Length (cm):    (Retired) Wound Width (cm):    (Retired) Depth (cm):    Wound Description (Comments):    Removal Indications:    Wound Image   04/22/25 0730   Description of Altered Skin Integrity Full thickness tissue loss with exposed bone, tendon, or muscle. Often includes undermining and tunneling. May extend into muscle and/or supporting structures. 04/21/25 1215   Dressing Appearance Moist drainage 04/22/25 0730   Drainage Amount Moderate 04/22/25 0730   Drainage Characteristics/Odor Serosanguineous 04/22/25 0730   Appearance Pink;Moist 04/22/25 0730   Tissue loss description Full thickness 04/21/25 1215   Periwound Area Dry 04/22/25 0730   Wound Edges Open 04/22/25 0730   Wound Length (cm) 1.1 cm 04/22/25 0730   Wound Width (cm) 0.8 cm 04/22/25 0730   Wound Depth (cm) 0.9 cm 04/22/25 0730   Wound Volume (cm^3) 0.415  cm^3 04/22/25 0730   Wound Surface Area (cm^2) 0.69 cm^2 04/22/25 0730   Care Cleansed with:;Sterile normal saline 04/21/25 1215   Dressing Applied;Gauze, wet to dry 04/21/25 1215            Altered Skin Integrity 01/05/23 1351 Left upper;medial Thigh #4  (Active)   01/05/23 1351   Altered Skin Integrity Present on Admission - Did Patient arrive to the hospital with altered skin?: yes   Side: Left   Orientation: upper;medial   Location: Thigh   Wound Number: #4   Is this injury device related?: No   Primary Wound Type:    Description of Altered Skin Integrity:    Ankle-Brachial Index:    Pulses:    Removal Indication and Assessment: not present upon hospital arrival   Wound Outcome: Healed   (Retired) Wound Length (cm):    (Retired) Wound Width (cm):    (Retired) Depth (cm):    Wound Description (Comments):    Removal Indications:    Wound Image   04/22/25 0730   Description of Altered Skin Integrity Full thickness tissue loss with exposed bone, tendon, or muscle. Often includes undermining and tunneling. May extend into muscle and/or supporting structures. 04/21/25 1217   Dressing Appearance Moist drainage 04/22/25 0730   Drainage Amount Small 04/22/25 0730   Drainage Characteristics/Odor Serosanguineous 04/22/25 0730   Appearance Red;Moist 04/22/25 0730   Tissue loss description Full thickness 04/21/25 1217   Periwound Area Dry 04/22/25 0730   Wound Edges Open 04/22/25 0730   Wound Length (cm) 3.5 cm 04/22/25 0730   Wound Width (cm) 2 cm 04/22/25 0730   Wound Depth (cm) 1.5 cm 04/22/25 0730   Wound Volume (cm^3) 5.498 cm^3 04/22/25 0730   Wound Surface Area (cm^2) 5.5 cm^2 04/22/25 0730   Care Cleansed with:;Sterile normal saline 04/21/25 1217   Dressing Applied;Gauze, wet to dry 04/21/25 1217            Wound 09/04/24 1200 Pressure Injury Sacral spine #2 (Active)   09/04/24 1200 Sacral spine   Present on Original Admission: Y   Primary Wound Type: Pressure inj   Side:    Orientation:    Wound Approximate Age at  First Assessment (Weeks):    Wound Number: #2   Is this injury device related?: No   Incision Type:    Closure Method:    Wound Description (Comments):    Type:    Additional Comments:    Ankle-Brachial Index:    Pulses:    Removal Indication and Assessment:    Wound Outcome:    Wound Image   04/22/25 0730   Pressure Injury Stage 4 04/22/25 0730   Dressing Appearance Dry;Intact;Clean 04/21/25 1213   Drainage Amount Small 04/22/25 0730   Drainage Characteristics/Odor Serosanguineous 04/22/25 0730   Appearance Red;Moist 04/22/25 0730   Tissue loss description Full thickness 04/21/25 1213   Periwound Area Dry 04/22/25 0730   Wound Edges Open 04/22/25 0730   Wound Length (cm) 1.8 cm 04/22/25 0730   Wound Width (cm) 1.5 cm 04/22/25 0730   Wound Depth (cm) 1 cm 04/22/25 0730   Wound Volume (cm^3) 1.414 cm^3 04/22/25 0730   Wound Surface Area (cm^2) 2.12 cm^2 04/22/25 0730   Care Cleansed with:;Sterile normal saline 04/21/25 1213   Dressing Applied;Gauze, wet to dry 04/21/25 1213           Plan:     Daily dressing changes per nursing staff, re-evaluation per wound care in 5-7 days. Patient was previously seen in our outpatient clinic, though he has not been seen since 09/2024. He has been using [facility] since then due to transportation issues. His wounds are stable today, wound beds are clean, there have been no major changes to measurements since his last visit with us.         Recommendations:   Sacrum/Right hip/Left upper thigh: Cleanse with wound cleanser, apply silver alginate to wound beds, cover with bordered foam, change daily         Time spent in room:     Dipika Gonzales RN
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
## 2025-04-22 NOTE — PLAN OF CARE
Problem: Adult Inpatient Plan of Care  Goal: Plan of Care Review  Outcome: Progressing  Goal: Patient-Specific Goal (Individualized)  Outcome: Progressing  Goal: Absence of Hospital-Acquired Illness or Injury  Outcome: Progressing  Goal: Optimal Comfort and Wellbeing  Outcome: Progressing  Goal: Readiness for Transition of Care  Outcome: Progressing     Problem: Skin Injury Risk Increased  Goal: Skin Health and Integrity  Outcome: Progressing     Problem: Wound  Goal: Optimal Coping  Outcome: Progressing  Goal: Optimal Functional Ability  Outcome: Progressing  Goal: Absence of Infection Signs and Symptoms  Outcome: Progressing  Goal: Improved Oral Intake  Outcome: Progressing  Goal: Optimal Pain Control and Function  Outcome: Progressing  Goal: Skin Health and Integrity  Outcome: Progressing  Goal: Optimal Wound Healing  Outcome: Progressing     Problem: Comorbidity Management  Goal: Blood Pressure in Desired Range  Outcome: Progressing  Goal: Maintenance of Osteoarthritis Symptom Control  Outcome: Progressing     Problem: Spinal Cord Injury Paraplegia  Goal: Optimal Adjustment to Spinal Cord Injury  Outcome: Progressing  Goal: Effective Airway Clearance  Outcome: Progressing  Goal: Effective Dysreflexia Prevention  Outcome: Progressing  Goal: Optimal Safe BADL Performance  Outcome: Progressing  Goal: Bowel Management [place]  Outcome: Progressing  Goal: Optimal Safe IADL Performance  Outcome: Progressing  Goal: Optimal Mobility [place] and Safety  Outcome: Progressing  Goal: Optimal Movement and Motor Control  Outcome: Progressing  Goal: Blood Pressure in Desired Range  Outcome: Progressing  Goal: Maintains Intimacy/Sexual Expression  Outcome: Progressing  Goal: Effective Spasticity Management  Outcome: Progressing  Goal: Urinary Management [place]  Outcome: Progressing  Goal: Optimal Wheelchair Use  Outcome: Progressing

## 2025-04-23 LAB
ANION GAP SERPL CALC-SCNC: 5 MEQ/L
BASOPHILS # BLD AUTO: 0.04 X10(3)/MCL
BASOPHILS NFR BLD AUTO: 0.6 %
BUN SERPL-MCNC: 17 MG/DL (ref 8.4–25.7)
CALCIUM SERPL-MCNC: 8.5 MG/DL (ref 8.4–10.2)
CHLORIDE SERPL-SCNC: 110 MMOL/L (ref 98–107)
CO2 SERPL-SCNC: 21 MMOL/L (ref 22–29)
CREAT SERPL-MCNC: 0.64 MG/DL (ref 0.72–1.25)
CREAT/UREA NIT SERPL: 27
EOSINOPHIL # BLD AUTO: 0.16 X10(3)/MCL (ref 0–0.9)
EOSINOPHIL NFR BLD AUTO: 2.5 %
ERYTHROCYTE [DISTWIDTH] IN BLOOD BY AUTOMATED COUNT: 12.4 % (ref 11.5–17)
GENTAMICIN TROUGH SERPL-MCNC: <0.5 UG/ML (ref 0–2)
GFR SERPLBLD CREATININE-BSD FMLA CKD-EPI: >60 ML/MIN/1.73/M2
GLUCOSE SERPL-MCNC: 76 MG/DL (ref 74–100)
HCT VFR BLD AUTO: 33.7 % (ref 42–52)
HGB BLD-MCNC: 11 G/DL (ref 14–18)
IMM GRANULOCYTES # BLD AUTO: 0.01 X10(3)/MCL (ref 0–0.04)
IMM GRANULOCYTES NFR BLD AUTO: 0.2 %
LYMPHOCYTES # BLD AUTO: 2.56 X10(3)/MCL (ref 0.6–4.6)
LYMPHOCYTES NFR BLD AUTO: 39.4 %
MAGNESIUM SERPL-MCNC: 1.8 MG/DL (ref 1.6–2.6)
MCH RBC QN AUTO: 30.5 PG (ref 27–31)
MCHC RBC AUTO-ENTMCNC: 32.6 G/DL (ref 33–36)
MCV RBC AUTO: 93.4 FL (ref 80–94)
MONOCYTES # BLD AUTO: 0.58 X10(3)/MCL (ref 0.1–1.3)
MONOCYTES NFR BLD AUTO: 8.9 %
NEUTROPHILS # BLD AUTO: 3.14 X10(3)/MCL (ref 2.1–9.2)
NEUTROPHILS NFR BLD AUTO: 48.4 %
NRBC BLD AUTO-RTO: 0 %
PLATELET # BLD AUTO: 218 X10(3)/MCL (ref 130–400)
PMV BLD AUTO: 9.9 FL (ref 7.4–10.4)
POTASSIUM SERPL-SCNC: 4.3 MMOL/L (ref 3.5–5.1)
RBC # BLD AUTO: 3.61 X10(6)/MCL (ref 4.7–6.1)
SODIUM SERPL-SCNC: 136 MMOL/L (ref 136–145)
WBC # BLD AUTO: 6.49 X10(3)/MCL (ref 4.5–11.5)

## 2025-04-23 PROCEDURE — 25000003 PHARM REV CODE 250: Performed by: INTERNAL MEDICINE

## 2025-04-23 PROCEDURE — 94761 N-INVAS EAR/PLS OXIMETRY MLT: CPT

## 2025-04-23 PROCEDURE — 63600175 PHARM REV CODE 636 W HCPCS: Performed by: EMERGENCY MEDICINE

## 2025-04-23 PROCEDURE — 36415 COLL VENOUS BLD VENIPUNCTURE: CPT | Performed by: EMERGENCY MEDICINE

## 2025-04-23 PROCEDURE — 11000001 HC ACUTE MED/SURG PRIVATE ROOM

## 2025-04-23 PROCEDURE — 80170 ASSAY OF GENTAMICIN: CPT | Performed by: EMERGENCY MEDICINE

## 2025-04-23 PROCEDURE — 25000003 PHARM REV CODE 250: Performed by: EMERGENCY MEDICINE

## 2025-04-23 PROCEDURE — 85025 COMPLETE CBC W/AUTO DIFF WBC: CPT | Performed by: EMERGENCY MEDICINE

## 2025-04-23 PROCEDURE — 80048 BASIC METABOLIC PNL TOTAL CA: CPT | Performed by: EMERGENCY MEDICINE

## 2025-04-23 PROCEDURE — 83735 ASSAY OF MAGNESIUM: CPT | Performed by: EMERGENCY MEDICINE

## 2025-04-23 RX ORDER — FAMOTIDINE 20 MG/1
20 TABLET, FILM COATED ORAL 2 TIMES DAILY
Status: DISCONTINUED | OUTPATIENT
Start: 2025-04-23 | End: 2025-04-24 | Stop reason: HOSPADM

## 2025-04-23 RX ADMIN — MUPIROCIN 1 G: 20 OINTMENT TOPICAL at 08:04

## 2025-04-23 RX ADMIN — GENTAMICIN SULFATE 494.8 MG: 40 INJECTION, SOLUTION INTRAMUSCULAR; INTRAVENOUS at 05:04

## 2025-04-23 RX ADMIN — LACTULOSE 10 G: 20 SOLUTION ORAL at 08:04

## 2025-04-23 RX ADMIN — APIXABAN 5 MG: 5 TABLET, FILM COATED ORAL at 08:04

## 2025-04-23 RX ADMIN — SODIUM CHLORIDE, POTASSIUM CHLORIDE, SODIUM LACTATE AND CALCIUM CHLORIDE: 600; 310; 30; 20 INJECTION, SOLUTION INTRAVENOUS at 03:04

## 2025-04-23 RX ADMIN — FAMOTIDINE 20 MG: 20 TABLET, FILM COATED ORAL at 10:04

## 2025-04-23 RX ADMIN — MUPIROCIN 1 G: 20 OINTMENT TOPICAL at 10:04

## 2025-04-23 RX ADMIN — FAMOTIDINE 20 MG: 20 TABLET, FILM COATED ORAL at 08:04

## 2025-04-23 RX ADMIN — APIXABAN 5 MG: 5 TABLET, FILM COATED ORAL at 10:04

## 2025-04-23 RX ADMIN — LACTULOSE 10 G: 20 SOLUTION ORAL at 10:04
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
## 2025-04-23 NOTE — PLAN OF CARE
Problem: Adult Inpatient Plan of Care  Goal: Plan of Care Review  Outcome: Progressing  Goal: Patient-Specific Goal (Individualized)  Outcome: Progressing  Goal: Absence of Hospital-Acquired Illness or Injury  Outcome: Progressing  Goal: Optimal Comfort and Wellbeing  Outcome: Progressing  Goal: Readiness for Transition of Care  Outcome: Progressing     Problem: Skin Injury Risk Increased  Goal: Skin Health and Integrity  Outcome: Progressing     Problem: Wound  Goal: Optimal Coping  Outcome: Progressing  Goal: Optimal Functional Ability  Outcome: Progressing  Goal: Absence of Infection Signs and Symptoms  Outcome: Progressing  Goal: Improved Oral Intake  Outcome: Progressing  Goal: Optimal Pain Control and Function  Outcome: Progressing  Goal: Skin Health and Integrity  Outcome: Progressing  Goal: Optimal Wound Healing  Outcome: Progressing     Problem: Comorbidity Management  Goal: Blood Pressure in Desired Range  Outcome: Progressing  Goal: Maintenance of Osteoarthritis Symptom Control  Outcome: Progressing     Problem: Spinal Cord Injury Paraplegia  Goal: Optimal Adjustment to Spinal Cord Injury  Outcome: Progressing  Goal: Effective Airway Clearance  Outcome: Progressing  Goal: Effective Dysreflexia Prevention  Outcome: Progressing  Goal: Optimal Safe BADL Performance  Outcome: Progressing  Goal: Bowel Management [place]  Outcome: Progressing  Goal: Optimal Safe IADL Performance  Outcome: Progressing  Goal: Optimal Mobility [place] and Safety  Outcome: Progressing  Goal: Optimal Movement and Motor Control  Outcome: Progressing  Goal: Blood Pressure in Desired Range  Outcome: Progressing  Goal: Maintains Intimacy/Sexual Expression  Outcome: Progressing  Goal: Effective Spasticity Management  Outcome: Progressing  Goal: Urinary Management [place]  Outcome: Progressing  Goal: Optimal Wheelchair Use  Outcome: Progressing

## 2025-04-23 NOTE — PLAN OF CARE
Problem: Adult Inpatient Plan of Care  Goal: Plan of Care Review  Outcome: Progressing  Goal: Patient-Specific Goal (Individualized)  Outcome: Progressing  Goal: Absence of Hospital-Acquired Illness or Injury  Outcome: Progressing  Goal: Optimal Comfort and Wellbeing  Outcome: Progressing  Goal: Readiness for Transition of Care  Outcome: Progressing     Problem: Skin Injury Risk Increased  Goal: Skin Health and Integrity  Outcome: Progressing     Problem: Wound  Goal: Optimal Coping  Outcome: Progressing  Goal: Optimal Functional Ability  Outcome: Progressing  Goal: Absence of Infection Signs and Symptoms  Outcome: Progressing  Goal: Improved Oral Intake  Outcome: Progressing  Goal: Optimal Pain Control and Function  Outcome: Progressing  Goal: Skin Health and Integrity  Outcome: Progressing  Goal: Optimal Wound Healing  Outcome: Progressing     Problem: Comorbidity Management  Goal: Blood Pressure in Desired Range  Outcome: Progressing  Goal: Maintenance of Osteoarthritis Symptom Control  Outcome: Progressing     Problem: Spinal Cord Injury Paraplegia  Goal: Optimal Adjustment to Spinal Cord Injury  Outcome: Progressing  Goal: Effective Airway Clearance  Outcome: Progressing  Goal: Effective Dysreflexia Prevention  Outcome: Progressing  Goal: Optimal Safe BADL Performance  Outcome: Progressing  Goal: Bowel Management [place]  Outcome: Progressing  Goal: Optimal Safe IADL Performance  Outcome: Progressing  Goal: Optimal Mobility [place] and Safety  Outcome: Progressing  Goal: Optimal Movement and Motor Control  Outcome: Progressing  Goal: Blood Pressure in Desired Range  Outcome: Progressing  Goal: Maintains Intimacy/Sexual Expression  Outcome: Progressing  Goal: Effective Spasticity Management  Outcome: Progressing  Goal: Urinary Management [place]  Outcome: Progressing  Goal: Optimal Wheelchair Use  Outcome: Progressing

## 2025-04-24 VITALS
TEMPERATURE: 98 F | RESPIRATION RATE: 16 BRPM | BODY MASS INDEX: 18.77 KG/M2 | WEIGHT: 126.75 LBS | HEIGHT: 69 IN | SYSTOLIC BLOOD PRESSURE: 119 MMHG | OXYGEN SATURATION: 100 % | DIASTOLIC BLOOD PRESSURE: 62 MMHG | HEART RATE: 69 BPM

## 2025-04-24 LAB
ANION GAP SERPL CALC-SCNC: 8 MEQ/L
BASOPHILS # BLD AUTO: 0.04 X10(3)/MCL
BASOPHILS NFR BLD AUTO: 0.6 %
BUN SERPL-MCNC: 20 MG/DL (ref 8.4–25.7)
CALCIUM SERPL-MCNC: 8 MG/DL (ref 8.4–10.2)
CHLORIDE SERPL-SCNC: 108 MMOL/L (ref 98–107)
CO2 SERPL-SCNC: 22 MMOL/L (ref 22–29)
CREAT SERPL-MCNC: 0.63 MG/DL (ref 0.72–1.25)
CREAT/UREA NIT SERPL: 32
EOSINOPHIL # BLD AUTO: 0.28 X10(3)/MCL (ref 0–0.9)
EOSINOPHIL NFR BLD AUTO: 3.9 %
ERYTHROCYTE [DISTWIDTH] IN BLOOD BY AUTOMATED COUNT: 12.6 % (ref 11.5–17)
GFR SERPLBLD CREATININE-BSD FMLA CKD-EPI: >60 ML/MIN/1.73/M2
GLUCOSE SERPL-MCNC: 79 MG/DL (ref 74–100)
HCT VFR BLD AUTO: 32.2 % (ref 42–52)
HGB BLD-MCNC: 10.7 G/DL (ref 14–18)
IMM GRANULOCYTES # BLD AUTO: 0.02 X10(3)/MCL (ref 0–0.04)
IMM GRANULOCYTES NFR BLD AUTO: 0.3 %
LYMPHOCYTES # BLD AUTO: 2.98 X10(3)/MCL (ref 0.6–4.6)
LYMPHOCYTES NFR BLD AUTO: 41.9 %
MAGNESIUM SERPL-MCNC: 1.8 MG/DL (ref 1.6–2.6)
MCH RBC QN AUTO: 30.7 PG (ref 27–31)
MCHC RBC AUTO-ENTMCNC: 33.2 G/DL (ref 33–36)
MCV RBC AUTO: 92.5 FL (ref 80–94)
MONOCYTES # BLD AUTO: 0.5 X10(3)/MCL (ref 0.1–1.3)
MONOCYTES NFR BLD AUTO: 7 %
NEUTROPHILS # BLD AUTO: 3.29 X10(3)/MCL (ref 2.1–9.2)
NEUTROPHILS NFR BLD AUTO: 46.3 %
NRBC BLD AUTO-RTO: 0 %
PLATELET # BLD AUTO: 226 X10(3)/MCL (ref 130–400)
PMV BLD AUTO: 10.1 FL (ref 7.4–10.4)
POTASSIUM SERPL-SCNC: 3.9 MMOL/L (ref 3.5–5.1)
RBC # BLD AUTO: 3.48 X10(6)/MCL (ref 4.7–6.1)
SODIUM SERPL-SCNC: 138 MMOL/L (ref 136–145)
WBC # BLD AUTO: 7.11 X10(3)/MCL (ref 4.5–11.5)

## 2025-04-24 PROCEDURE — 85025 COMPLETE CBC W/AUTO DIFF WBC: CPT | Performed by: EMERGENCY MEDICINE

## 2025-04-24 PROCEDURE — 25000003 PHARM REV CODE 250: Performed by: EMERGENCY MEDICINE

## 2025-04-24 PROCEDURE — 25000003 PHARM REV CODE 250: Performed by: INTERNAL MEDICINE

## 2025-04-24 PROCEDURE — 83735 ASSAY OF MAGNESIUM: CPT | Performed by: EMERGENCY MEDICINE

## 2025-04-24 PROCEDURE — 80048 BASIC METABOLIC PNL TOTAL CA: CPT | Performed by: EMERGENCY MEDICINE

## 2025-04-24 PROCEDURE — 36415 COLL VENOUS BLD VENIPUNCTURE: CPT | Performed by: EMERGENCY MEDICINE

## 2025-04-24 PROCEDURE — 63600175 PHARM REV CODE 636 W HCPCS: Performed by: EMERGENCY MEDICINE

## 2025-04-24 PROCEDURE — 94761 N-INVAS EAR/PLS OXIMETRY MLT: CPT

## 2025-04-24 RX ORDER — CIPROFLOXACIN 500 MG/1
500 TABLET ORAL 2 TIMES DAILY
Qty: 10 TABLET | Refills: 0 | Status: SHIPPED | OUTPATIENT
Start: 2025-04-24 | End: 2025-04-29

## 2025-04-24 RX ADMIN — FAMOTIDINE 20 MG: 20 TABLET, FILM COATED ORAL at 09:04

## 2025-04-24 RX ADMIN — APIXABAN 5 MG: 5 TABLET, FILM COATED ORAL at 09:04

## 2025-04-24 RX ADMIN — GENTAMICIN SULFATE 287.6 MG: 40 INJECTION, SOLUTION INTRAMUSCULAR; INTRAVENOUS at 10:04

## 2025-04-24 RX ADMIN — MUPIROCIN 1 G: 20 OINTMENT TOPICAL at 09:04

## 2025-04-24 NOTE — PLAN OF CARE
04/24/25 0955   Final Note   Assessment Type Final Discharge Note   Anticipated Discharge Disposition Home-Health   Hospital Resources/Appts/Education Provided Post-Acute resouces added to AVS   Post-Acute Status   Post-Acute Authorization Home Health;Other  ([facility] to see pt at home.)   Home Health Status Set-up Complete/Auth obtained   Other Status   ([facility] to see patient at home)   Discharge Delays None known at this time     Called and left VM with patient's daughter regarding home health. Unable to speak to her.  Left my contact number to call me back.  Referral sent to [facility] [place] and also faxed referral to [facility] for wound care at the home.  Patient's nurse will arrange [facility] transport to home.

## 2025-04-24 NOTE — PLAN OF CARE
Problem: Adult Inpatient Plan of Care  Goal: Plan of Care Review  Outcome: Progressing  Goal: Patient-Specific Goal (Individualized)  Outcome: Progressing  Goal: Absence of Hospital-Acquired Illness or Injury  Outcome: Progressing  Goal: Optimal Comfort and Wellbeing  Outcome: Progressing  Goal: Readiness for Transition of Care  Outcome: Progressing     Problem: Wound  Goal: Optimal Coping  Outcome: Progressing  Goal: Optimal Functional Ability  Outcome: Progressing  Goal: Absence of Infection Signs and Symptoms  Outcome: Progressing  Goal: Improved Oral Intake  Outcome: Progressing  Goal: Optimal Pain Control and Function  Outcome: Progressing  Goal: Skin Health and Integrity  Outcome: Progressing  Goal: Optimal Wound Healing  Outcome: Progressing     Problem: Skin Injury Risk Increased  Goal: Skin Health and Integrity  Outcome: Progressing     Problem: Comorbidity Management  Goal: Blood Pressure in Desired Range  Outcome: Progressing  Goal: Maintenance of Osteoarthritis Symptom Control  Outcome: Progressing
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
## 2025-04-24 NOTE — NURSING
Pt. D/c'd home, d/c instruction given, pt. Stated he call daughter Jordi, an attempt was made by nurse, message was placed.

## 2025-04-25 ENCOUNTER — PATIENT OUTREACH (OUTPATIENT)
Dept: ADMINISTRATIVE | Facility: CLINIC | Age: 50
End: 2025-04-25
Payer: MEDICARE

## 2025-04-25 NOTE — PROGRESS NOTES
C3 nurse spoke with Diego Martinez Jr. for a TCC post hospital discharge follow up call. The patient has a scheduled HOSFU appointment with Ciaran Mcdonough MD on 4/29 @ 9337.

## 2025-04-26 LAB
BACTERIA BLD CULT: NORMAL
BACTERIA BLD CULT: NORMAL

## 2025-05-01 ENCOUNTER — OFFICE VISIT (OUTPATIENT)
Dept: SURGICAL ONCOLOGY | Facility: CLINIC | Age: 50
End: 2025-05-01
Payer: MEDICARE

## 2025-05-01 VITALS
OXYGEN SATURATION: 99 % | HEIGHT: 69 IN | HEART RATE: 87 BPM | SYSTOLIC BLOOD PRESSURE: 64 MMHG | BODY MASS INDEX: 18.51 KG/M2 | WEIGHT: 125 LBS | DIASTOLIC BLOOD PRESSURE: 62 MMHG

## 2025-05-01 DIAGNOSIS — G82.50 QUADRIPLEGIA, UNSPECIFIED: Primary | ICD-10-CM

## 2025-05-01 DIAGNOSIS — K94.00 COLOSTOMY COMPLICATION: ICD-10-CM

## 2025-05-01 DIAGNOSIS — L89.214 STAGE IV PRESSURE ULCER OF RIGHT HIP: ICD-10-CM

## 2025-05-01 LAB — MAYO GENERIC ORDERABLE RESULT: ABNORMAL

## 2025-05-01 PROCEDURE — 99999 PR PBB SHADOW E&M-EST. PATIENT-LVL III: CPT | Mod: PBBFAC,,, | Performed by: COLON & RECTAL SURGERY

## 2025-05-01 PROCEDURE — 99213 OFFICE O/P EST LOW 20 MIN: CPT | Mod: PBBFAC | Performed by: COLON & RECTAL SURGERY
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
## 2025-05-02 DIAGNOSIS — K94.00 COLOSTOMY COMPLICATION: Primary | ICD-10-CM

## 2025-05-02 DIAGNOSIS — Z93.3 COLOSTOMY STATUS: ICD-10-CM

## 2025-05-02 LAB
BACTERIA UR CULT: ABNORMAL
BACTERIA UR CULT: ABNORMAL

## 2025-05-02 RX ORDER — CELECOXIB 200 MG/1
400 CAPSULE ORAL
OUTPATIENT
Start: 2025-05-02 | End: 2025-05-02

## 2025-05-02 RX ORDER — ENOXAPARIN SODIUM 100 MG/ML
40 INJECTION SUBCUTANEOUS ONCE
OUTPATIENT
Start: 2025-05-02 | End: 2025-05-02

## 2025-05-02 RX ORDER — LIDOCAINE HYDROCHLORIDE 10 MG/ML
1 INJECTION, SOLUTION EPIDURAL; INFILTRATION; INTRACAUDAL; PERINEURAL ONCE
OUTPATIENT
Start: 2025-05-02 | End: 2025-05-02

## 2025-05-02 RX ORDER — GABAPENTIN 100 MG/1
200 CAPSULE ORAL
OUTPATIENT
Start: 2025-05-02

## 2025-05-02 RX ORDER — LEVOFLOXACIN 5 MG/ML
500 INJECTION, SOLUTION INTRAVENOUS
OUTPATIENT
Start: 2025-05-07

## 2025-05-02 RX ORDER — ACETAMINOPHEN 500 MG
1000 TABLET ORAL
OUTPATIENT
Start: 2025-05-02 | End: 2025-05-02

## 2025-05-02 RX ORDER — METRONIDAZOLE 500 MG/100ML
500 INJECTION, SOLUTION INTRAVENOUS
OUTPATIENT
Start: 2025-05-07

## 2025-05-06 ENCOUNTER — TELEPHONE (OUTPATIENT)
Dept: SURGICAL ONCOLOGY | Facility: CLINIC | Age: 50
End: 2025-05-06
Payer: MEDICARE

## 2025-05-09 DIAGNOSIS — Z93.3 COLOSTOMY STATUS: ICD-10-CM

## 2025-05-09 DIAGNOSIS — K94.00 COLOSTOMY COMPLICATION: Primary | ICD-10-CM

## 2025-05-09 RX ORDER — METRONIDAZOLE 500 MG/100ML
500 INJECTION, SOLUTION INTRAVENOUS
OUTPATIENT
Start: 2025-05-13

## 2025-05-09 RX ORDER — LEVOFLOXACIN 5 MG/ML
500 INJECTION, SOLUTION INTRAVENOUS
OUTPATIENT
Start: 2025-05-13

## 2025-05-09 RX ORDER — ACETAMINOPHEN 500 MG
1000 TABLET ORAL
OUTPATIENT
Start: 2025-05-09 | End: 2025-05-09

## 2025-05-09 RX ORDER — ENOXAPARIN SODIUM 100 MG/ML
40 INJECTION SUBCUTANEOUS ONCE
OUTPATIENT
Start: 2025-05-09 | End: 2025-05-09

## 2025-05-09 RX ORDER — LIDOCAINE HYDROCHLORIDE 10 MG/ML
1 INJECTION, SOLUTION EPIDURAL; INFILTRATION; INTRACAUDAL; PERINEURAL ONCE
OUTPATIENT
Start: 2025-05-09 | End: 2025-05-09

## 2025-05-09 RX ORDER — CELECOXIB 200 MG/1
400 CAPSULE ORAL
OUTPATIENT
Start: 2025-05-09 | End: 2025-05-09

## 2025-05-09 RX ORDER — GABAPENTIN 100 MG/1
200 CAPSULE ORAL
OUTPATIENT
Start: 2025-05-09

## 2025-05-13 ENCOUNTER — ANESTHESIA EVENT (OUTPATIENT)
Dept: SURGERY | Facility: HOSPITAL | Age: 50
DRG: 329 | End: 2025-05-13
Payer: MEDICARE

## 2025-05-13 ENCOUNTER — ANESTHESIA (OUTPATIENT)
Dept: SURGERY | Facility: HOSPITAL | Age: 50
DRG: 329 | End: 2025-05-13
Payer: MEDICARE

## 2025-05-13 ENCOUNTER — HOSPITAL ENCOUNTER (INPATIENT)
Facility: HOSPITAL | Age: 50
LOS: 1 days | Discharge: HOME HEALTH | DRG: 329 | End: 2025-05-14
Attending: COLON & RECTAL SURGERY | Admitting: COLON & RECTAL SURGERY
Payer: MEDICARE

## 2025-05-13 DIAGNOSIS — K94.00 COLOSTOMY COMPLICATION: Primary | ICD-10-CM

## 2025-05-13 DIAGNOSIS — E44.0 MODERATE MALNUTRITION: ICD-10-CM

## 2025-05-13 DIAGNOSIS — G82.50 QUADRIPLEGIA, UNSPECIFIED: ICD-10-CM

## 2025-05-13 DIAGNOSIS — Z93.3 COLOSTOMY STATUS: ICD-10-CM

## 2025-05-13 DIAGNOSIS — L89.214 STAGE IV PRESSURE ULCER OF RIGHT HIP: ICD-10-CM

## 2025-05-13 LAB
GROUP & RH: NORMAL
INDIRECT COOMBS: NORMAL
POCT GLUCOSE: 106 MG/DL (ref 70–110)
POCT GLUCOSE: 54 MG/DL (ref 70–110)
POCT GLUCOSE: 59 MG/DL (ref 70–110)
POCT GLUCOSE: 97 MG/DL (ref 70–110)
SPECIMEN OUTDATE: NORMAL

## 2025-05-13 PROCEDURE — 25000003 PHARM REV CODE 250: Performed by: COLON & RECTAL SURGERY

## 2025-05-13 PROCEDURE — 0D1E0Z4 BYPASS LARGE INTESTINE TO CUTANEOUS, OPEN APPROACH: ICD-10-PCS | Performed by: COLON & RECTAL SURGERY

## 2025-05-13 PROCEDURE — 25000003 PHARM REV CODE 250: Performed by: ANESTHESIOLOGY

## 2025-05-13 PROCEDURE — 86850 RBC ANTIBODY SCREEN: CPT | Performed by: COLON & RECTAL SURGERY

## 2025-05-13 PROCEDURE — 36000708 HC OR TIME LEV III 1ST 15 MIN: Performed by: COLON & RECTAL SURGERY

## 2025-05-13 PROCEDURE — 71000015 HC POSTOP RECOV 1ST HR: Performed by: COLON & RECTAL SURGERY

## 2025-05-13 PROCEDURE — 27201423 OPTIME MED/SURG SUP & DEVICES STERILE SUPPLY: Performed by: COLON & RECTAL SURGERY

## 2025-05-13 PROCEDURE — 37000008 HC ANESTHESIA 1ST 15 MINUTES: Performed by: COLON & RECTAL SURGERY

## 2025-05-13 PROCEDURE — 71000039 HC RECOVERY, EACH ADD'L HOUR: Performed by: COLON & RECTAL SURGERY

## 2025-05-13 PROCEDURE — 36000709 HC OR TIME LEV III EA ADD 15 MIN: Performed by: COLON & RECTAL SURGERY

## 2025-05-13 PROCEDURE — 63600175 PHARM REV CODE 636 W HCPCS: Performed by: COLON & RECTAL SURGERY

## 2025-05-13 PROCEDURE — 88304 TISSUE EXAM BY PATHOLOGIST: CPT | Performed by: COLON & RECTAL SURGERY

## 2025-05-13 PROCEDURE — 11000001 HC ACUTE MED/SURG PRIVATE ROOM

## 2025-05-13 PROCEDURE — 63600175 PHARM REV CODE 636 W HCPCS: Performed by: NURSE ANESTHETIST, CERTIFIED REGISTERED

## 2025-05-13 PROCEDURE — 71000033 HC RECOVERY, INTIAL HOUR: Performed by: COLON & RECTAL SURGERY

## 2025-05-13 PROCEDURE — 25000003 PHARM REV CODE 250: Performed by: NURSE ANESTHETIST, CERTIFIED REGISTERED

## 2025-05-13 PROCEDURE — 37000009 HC ANESTHESIA EA ADD 15 MINS: Performed by: COLON & RECTAL SURGERY

## 2025-05-13 PROCEDURE — 63600175 PHARM REV CODE 636 W HCPCS: Mod: JZ,TB | Performed by: ANESTHESIOLOGY

## 2025-05-13 PROCEDURE — 36415 COLL VENOUS BLD VENIPUNCTURE: CPT | Performed by: COLON & RECTAL SURGERY

## 2025-05-13 PROCEDURE — 63600175 PHARM REV CODE 636 W HCPCS: Performed by: ANESTHESIOLOGY

## 2025-05-13 PROCEDURE — 44345 REVISION OF COLOSTOMY: CPT | Mod: ,,, | Performed by: COLON & RECTAL SURGERY

## 2025-05-13 RX ORDER — ENOXAPARIN SODIUM 100 MG/ML
40 INJECTION SUBCUTANEOUS ONCE
Status: COMPLETED | OUTPATIENT
Start: 2025-05-13 | End: 2025-05-13

## 2025-05-13 RX ORDER — FENTANYL CITRATE 50 UG/ML
INJECTION, SOLUTION INTRAMUSCULAR; INTRAVENOUS
Status: DISCONTINUED | OUTPATIENT
Start: 2025-05-13 | End: 2025-05-13

## 2025-05-13 RX ORDER — ACETAMINOPHEN 10 MG/ML
1000 INJECTION, SOLUTION INTRAVENOUS ONCE
Status: DISCONTINUED | OUTPATIENT
Start: 2025-05-13 | End: 2025-05-13 | Stop reason: HOSPADM

## 2025-05-13 RX ORDER — GLUCAGON 1 MG
1 KIT INJECTION
Status: DISCONTINUED | OUTPATIENT
Start: 2025-05-13 | End: 2025-05-13 | Stop reason: HOSPADM

## 2025-05-13 RX ORDER — EPHEDRINE SULFATE 50 MG/ML
INJECTION, SOLUTION INTRAVENOUS
Status: DISCONTINUED | OUTPATIENT
Start: 2025-05-13 | End: 2025-05-13

## 2025-05-13 RX ORDER — DIPHENHYDRAMINE HYDROCHLORIDE 50 MG/ML
25 INJECTION, SOLUTION INTRAMUSCULAR; INTRAVENOUS EVERY 6 HOURS PRN
Status: DISCONTINUED | OUTPATIENT
Start: 2025-05-13 | End: 2025-05-13 | Stop reason: HOSPADM

## 2025-05-13 RX ORDER — LIDOCAINE HYDROCHLORIDE 10 MG/ML
1 INJECTION, SOLUTION EPIDURAL; INFILTRATION; INTRACAUDAL; PERINEURAL ONCE
Status: DISCONTINUED | OUTPATIENT
Start: 2025-05-13 | End: 2025-05-13 | Stop reason: HOSPADM

## 2025-05-13 RX ORDER — PHENYLEPHRINE HCL IN 0.9% NACL 1 MG/10 ML
SYRINGE (ML) INTRAVENOUS
Status: DISCONTINUED | OUTPATIENT
Start: 2025-05-13 | End: 2025-05-13

## 2025-05-13 RX ORDER — GABAPENTIN 100 MG/1
200 CAPSULE ORAL
Status: COMPLETED | OUTPATIENT
Start: 2025-05-13 | End: 2025-05-13

## 2025-05-13 RX ORDER — LIDOCAINE HYDROCHLORIDE 20 MG/ML
INJECTION, SOLUTION EPIDURAL; INFILTRATION; INTRACAUDAL; PERINEURAL
Status: DISCONTINUED | OUTPATIENT
Start: 2025-05-13 | End: 2025-05-13

## 2025-05-13 RX ORDER — LIDOCAINE HYDROCHLORIDE 10 MG/ML
1 INJECTION, SOLUTION EPIDURAL; INFILTRATION; INTRACAUDAL; PERINEURAL ONCE
OUTPATIENT
Start: 2025-05-13 | End: 2025-05-13

## 2025-05-13 RX ORDER — PROCHLORPERAZINE EDISYLATE 5 MG/ML
5 INJECTION INTRAMUSCULAR; INTRAVENOUS EVERY 6 HOURS PRN
Status: DISCONTINUED | OUTPATIENT
Start: 2025-05-13 | End: 2025-05-14 | Stop reason: HOSPADM

## 2025-05-13 RX ORDER — SODIUM CHLORIDE 9 MG/ML
0-999 INJECTION, SOLUTION INTRAVENOUS CONTINUOUS
Status: DISCONTINUED | OUTPATIENT
Start: 2025-05-13 | End: 2025-05-14 | Stop reason: HOSPADM

## 2025-05-13 RX ORDER — ONDANSETRON HYDROCHLORIDE 2 MG/ML
INJECTION, SOLUTION INTRAMUSCULAR; INTRAVENOUS
Status: DISCONTINUED | OUTPATIENT
Start: 2025-05-13 | End: 2025-05-13

## 2025-05-13 RX ORDER — KETOROLAC TROMETHAMINE 30 MG/ML
15 INJECTION, SOLUTION INTRAMUSCULAR; INTRAVENOUS EVERY 6 HOURS PRN
Status: DISCONTINUED | OUTPATIENT
Start: 2025-05-13 | End: 2025-05-14 | Stop reason: HOSPADM

## 2025-05-13 RX ORDER — PROPOFOL 10 MG/ML
VIAL (ML) INTRAVENOUS
Status: DISCONTINUED | OUTPATIENT
Start: 2025-05-13 | End: 2025-05-13

## 2025-05-13 RX ORDER — ACETAMINOPHEN 500 MG
1000 TABLET ORAL
Status: COMPLETED | OUTPATIENT
Start: 2025-05-13 | End: 2025-05-13

## 2025-05-13 RX ORDER — ROCURONIUM BROMIDE 10 MG/ML
INJECTION, SOLUTION INTRAVENOUS
Status: DISCONTINUED | OUTPATIENT
Start: 2025-05-13 | End: 2025-05-13

## 2025-05-13 RX ORDER — LEVOFLOXACIN 5 MG/ML
500 INJECTION, SOLUTION INTRAVENOUS
Status: DISCONTINUED | OUTPATIENT
Start: 2025-05-13 | End: 2025-05-13 | Stop reason: HOSPADM

## 2025-05-13 RX ORDER — GLYCOPYRROLATE 0.2 MG/ML
INJECTION INTRAMUSCULAR; INTRAVENOUS
Status: DISCONTINUED | OUTPATIENT
Start: 2025-05-13 | End: 2025-05-13

## 2025-05-13 RX ORDER — MORPHINE SULFATE 4 MG/ML
2 INJECTION, SOLUTION INTRAMUSCULAR; INTRAVENOUS EVERY 4 HOURS PRN
Refills: 0 | Status: DISCONTINUED | OUTPATIENT
Start: 2025-05-13 | End: 2025-05-14 | Stop reason: HOSPADM

## 2025-05-13 RX ORDER — DEXTROSE MONOHYDRATE 50 MG/ML
INJECTION, SOLUTION INTRAVENOUS ONCE
Status: COMPLETED | OUTPATIENT
Start: 2025-05-13 | End: 2025-05-13

## 2025-05-13 RX ORDER — SODIUM CHLORIDE 9 MG/ML
INJECTION, SOLUTION INTRAVENOUS CONTINUOUS
Status: DISCONTINUED | OUTPATIENT
Start: 2025-05-13 | End: 2025-05-14 | Stop reason: HOSPADM

## 2025-05-13 RX ORDER — CELECOXIB 200 MG/1
400 CAPSULE ORAL
Status: COMPLETED | OUTPATIENT
Start: 2025-05-13 | End: 2025-05-13

## 2025-05-13 RX ORDER — DEXAMETHASONE SODIUM PHOSPHATE 4 MG/ML
INJECTION, SOLUTION INTRA-ARTICULAR; INTRALESIONAL; INTRAMUSCULAR; INTRAVENOUS; SOFT TISSUE
Status: DISCONTINUED | OUTPATIENT
Start: 2025-05-13 | End: 2025-05-13

## 2025-05-13 RX ORDER — ONDANSETRON HYDROCHLORIDE 2 MG/ML
4 INJECTION, SOLUTION INTRAVENOUS EVERY 8 HOURS PRN
Status: DISCONTINUED | OUTPATIENT
Start: 2025-05-13 | End: 2025-05-14 | Stop reason: HOSPADM

## 2025-05-13 RX ORDER — DEXTROSE MONOHYDRATE 50 MG/ML
INJECTION, SOLUTION INTRAVENOUS CONTINUOUS
Status: DISCONTINUED | OUTPATIENT
Start: 2025-05-13 | End: 2025-05-14 | Stop reason: HOSPADM

## 2025-05-13 RX ORDER — SODIUM CHLORIDE, SODIUM GLUCONATE, SODIUM ACETATE, POTASSIUM CHLORIDE AND MAGNESIUM CHLORIDE 30; 37; 368; 526; 502 MG/100ML; MG/100ML; MG/100ML; MG/100ML; MG/100ML
INJECTION, SOLUTION INTRAVENOUS CONTINUOUS
OUTPATIENT
Start: 2025-05-13 | End: 2025-06-12

## 2025-05-13 RX ORDER — ONDANSETRON HYDROCHLORIDE 2 MG/ML
4 INJECTION, SOLUTION INTRAVENOUS DAILY PRN
Status: DISCONTINUED | OUTPATIENT
Start: 2025-05-13 | End: 2025-05-13 | Stop reason: HOSPADM

## 2025-05-13 RX ORDER — METRONIDAZOLE 500 MG/100ML
500 INJECTION, SOLUTION INTRAVENOUS
Status: DISCONTINUED | OUTPATIENT
Start: 2025-05-13 | End: 2025-05-13 | Stop reason: HOSPADM

## 2025-05-13 RX ORDER — HYDROMORPHONE HYDROCHLORIDE 2 MG/ML
0.2 INJECTION, SOLUTION INTRAMUSCULAR; INTRAVENOUS; SUBCUTANEOUS EVERY 5 MIN PRN
Status: DISCONTINUED | OUTPATIENT
Start: 2025-05-13 | End: 2025-05-13 | Stop reason: HOSPADM

## 2025-05-13 RX ORDER — MIDAZOLAM HYDROCHLORIDE 1 MG/ML
INJECTION INTRAMUSCULAR; INTRAVENOUS
Status: DISCONTINUED | OUTPATIENT
Start: 2025-05-13 | End: 2025-05-13

## 2025-05-13 RX ORDER — MIDAZOLAM HYDROCHLORIDE 2 MG/2ML
2 INJECTION, SOLUTION INTRAMUSCULAR; INTRAVENOUS ONCE AS NEEDED
OUTPATIENT
Start: 2025-05-13 | End: 2036-10-09

## 2025-05-13 RX ORDER — HYDROCODONE BITARTRATE AND ACETAMINOPHEN 5; 325 MG/1; MG/1
1 TABLET ORAL EVERY 6 HOURS PRN
Refills: 0 | Status: DISCONTINUED | OUTPATIENT
Start: 2025-05-13 | End: 2025-05-14 | Stop reason: HOSPADM

## 2025-05-13 RX ADMIN — EPHEDRINE SULFATE 5 MG: 50 INJECTION INTRAVENOUS at 06:05

## 2025-05-13 RX ADMIN — EPHEDRINE SULFATE 15 MG: 50 INJECTION INTRAVENOUS at 05:05

## 2025-05-13 RX ADMIN — CELECOXIB 400 MG: 200 CAPSULE ORAL at 03:05

## 2025-05-13 RX ADMIN — DEXTROSE 250 ML: 5 SOLUTION INTRAVENOUS at 12:05

## 2025-05-13 RX ADMIN — METRONIDAZOLE 500 MG: 5 INJECTION, SOLUTION INTRAVENOUS at 05:05

## 2025-05-13 RX ADMIN — Medication 100 MCG: at 05:05

## 2025-05-13 RX ADMIN — Medication 200 MCG: at 05:05

## 2025-05-13 RX ADMIN — ONDANSETRON 4 MG: 2 INJECTION INTRAMUSCULAR; INTRAVENOUS at 06:05

## 2025-05-13 RX ADMIN — PROPOFOL 140 MG: 10 INJECTION, EMULSION INTRAVENOUS at 05:05

## 2025-05-13 RX ADMIN — FENTANYL CITRATE 50 MCG: 50 INJECTION, SOLUTION INTRAMUSCULAR; INTRAVENOUS at 05:05

## 2025-05-13 RX ADMIN — ROCURONIUM BROMIDE 10 MG: 10 SOLUTION INTRAVENOUS at 05:05

## 2025-05-13 RX ADMIN — LEVOFLOXACIN 500 MG: 5 INJECTION, SOLUTION INTRAVENOUS at 05:05

## 2025-05-13 RX ADMIN — GLYCOPYRROLATE 0.2 MG: 0.2 INJECTION INTRAMUSCULAR; INTRAVENOUS at 05:05

## 2025-05-13 RX ADMIN — LIDOCAINE HYDROCHLORIDE 80 MG: 20 INJECTION, SOLUTION EPIDURAL; INFILTRATION; INTRACAUDAL; PERINEURAL at 05:05

## 2025-05-13 RX ADMIN — ENOXAPARIN SODIUM 40 MG: 40 INJECTION SUBCUTANEOUS at 03:05

## 2025-05-13 RX ADMIN — FENTANYL CITRATE 50 MCG: 50 INJECTION, SOLUTION INTRAMUSCULAR; INTRAVENOUS at 06:05

## 2025-05-13 RX ADMIN — DEXTROSE MONOHYDRATE: 50 INJECTION, SOLUTION INTRAVENOUS at 04:05

## 2025-05-13 RX ADMIN — SODIUM CHLORIDE 999 ML/HR: 9 INJECTION, SOLUTION INTRAVENOUS at 11:05

## 2025-05-13 RX ADMIN — SUGAMMADEX 200 MG: 100 INJECTION, SOLUTION INTRAVENOUS at 06:05

## 2025-05-13 RX ADMIN — SODIUM CHLORIDE: 9 INJECTION, SOLUTION INTRAVENOUS at 11:05

## 2025-05-13 RX ADMIN — ROCURONIUM BROMIDE 40 MG: 10 SOLUTION INTRAVENOUS at 05:05

## 2025-05-13 RX ADMIN — DEXAMETHASONE SODIUM PHOSPHATE 4 MG: 4 INJECTION, SOLUTION INTRA-ARTICULAR; INTRALESIONAL; INTRAMUSCULAR; INTRAVENOUS; SOFT TISSUE at 05:05

## 2025-05-13 RX ADMIN — SODIUM CHLORIDE, SODIUM GLUCONATE, SODIUM ACETATE, POTASSIUM CHLORIDE AND MAGNESIUM CHLORIDE: 526; 502; 368; 37; 30 INJECTION, SOLUTION INTRAVENOUS at 05:05

## 2025-05-13 RX ADMIN — EPHEDRINE SULFATE 5 MG: 50 INJECTION INTRAVENOUS at 05:05

## 2025-05-13 RX ADMIN — ACETAMINOPHEN 1000 MG: 500 TABLET ORAL at 03:05

## 2025-05-13 RX ADMIN — EPHEDRINE SULFATE 10 MG: 50 INJECTION INTRAVENOUS at 05:05

## 2025-05-13 RX ADMIN — MIDAZOLAM HYDROCHLORIDE 2 MG: 1 INJECTION, SOLUTION INTRAMUSCULAR; INTRAVENOUS at 05:05

## 2025-05-13 RX ADMIN — GABAPENTIN 200 MG: 100 CAPSULE ORAL at 03:05

## 2025-05-13 NOTE — INTERVAL H&P NOTE
The patient has been examined and the H&P has been reviewed:    I concur with the findings and no changes have occurred since H&P was written.    Surgery risks, benefits and alternative options discussed and understood by patient/family.          Active Hospital Problems    Diagnosis  POA    *Colostomy complication [K94.00]  Yes      Resolved Hospital Problems   No resolved problems to display.

## 2025-05-13 NOTE — ANESTHESIA PROCEDURE NOTES
Intubation    Date/Time: 5/13/2025 5:17 PM    Performed by: Dina Hurtado CRNA  Authorized by: Huang Romano MD    Intubation:     Induction:  Intravenous    Intubated:  Postinduction    Mask Ventilation:  Easy with oral airway    Attempts:  1    Attempted By:  CRNA    Method of Intubation:  Direct    Blade:  Resendez 2    Laryngeal View Grade: Grade IIA - cords partially seen      Difficult Airway Encountered?: No      Complications:  None    Airway Device:  Oral endotracheal tube    Airway Device Size:  7.5    Style/Cuff Inflation:  Cuffed (inflated to minimal occlusive pressure)    Inflation Amount (mL):  6    Tube secured:  21    Secured at:  The lips    Placement Verified By:  Capnometry    Complicating Factors:  None    Findings Post-Intubation:  BS equal bilateral

## 2025-05-13 NOTE — OP NOTE
[facility] - Periop Services  Operative Note      Date of Procedure: 5/13/2025     Procedure: Procedure(s) (LRB):  REVISION, COLOSTOMY (N/A)     Surgeons and Role:     * Stanton Yeh MD - Primary    Assisting Surgeon: None    Pre-Operative Diagnosis: Colostomy complication [K94.00]    Post-Operative Diagnosis:  Name    Anesthesia: General    Estimated Blood Loss (EBL):  10 mL           Specimens:  Colostomy     Description of Technical Procedures:  After informed consent was obtained, patient was brought to the operating room and placed in the supine position.  Next general endotracheal anesthesia was administered by member of the anesthesia team.  The abdomen was prepped and draped in sterile surgical fashion.  He reportedly had a diverting loop colostomy which had scarred down to a pinhole.  A circular skin incision was made with a 15 blade around the pinhole sized colostomy.  The incision was deepened with electrocautery.  The loop colostomy was circumferentially dissected free from the surrounding subcutaneous tissue with the electrocautery.  A small window was made along the mesenteric border of the colon and a red rubber catheter was placed as an ostomy bar.  A transverse elliptical colotomy was made with the electrocautery encompassing the scarred colostomy and skin.  This resected colostomy was sent for permanent pathology.  The red rubber catheter was secured to the skin with 2-0 silk suture.  Excess catheter was cut away and discarded.  A new loop colostomy was matured with interrupted full-thickness 2-0 Vicryl sutures.  Both limbs were widely patent.  An ostomy appliance was placed around the stoma.  Patient tolerated the procedure well and there were no complications.  He was awakened and extubated in the operating room then subsequently transferred to recovery in satisfactory condition.

## 2025-05-13 NOTE — TRANSFER OF CARE
"Anesthesia Transfer of Care Note    Patient: Diego Martinez Jr.    Procedure(s) Performed: Procedure(s) (LRB):  REVISION, COLOSTOMY (N/A)    Patient location: PACU    Anesthesia Type: general    Transport from OR: Transported from OR on room air with adequate spontaneous ventilation    Post pain: adequate analgesia    Post assessment: no apparent anesthetic complications    Post vital signs: stable    Level of consciousness: alert and awake    Nausea/Vomiting: no nausea/vomiting    Complications: none    Transfer of care protocol was followed    Last vitals: Visit Vitals  BP (!) 146/86   Pulse 76   Temp 36.8 °C (98.2 °F)   Resp 14   Ht 5' 9" (1.753 m)   Wt 53 kg (116 lb 13.5 oz)   SpO2 97%   BMI 17.25 kg/m²     "
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
## 2025-05-14 VITALS
OXYGEN SATURATION: 99 % | WEIGHT: 116.88 LBS | BODY MASS INDEX: 17.31 KG/M2 | HEIGHT: 69 IN | RESPIRATION RATE: 18 BRPM | HEART RATE: 56 BPM | DIASTOLIC BLOOD PRESSURE: 54 MMHG | SYSTOLIC BLOOD PRESSURE: 100 MMHG | TEMPERATURE: 98 F

## 2025-05-14 PROBLEM — K94.00 COLOSTOMY COMPLICATION: Status: RESOLVED | Noted: 2025-05-13 | Resolved: 2025-05-14

## 2025-05-14 LAB
ALBUMIN SERPL-MCNC: 3.1 G/DL (ref 3.5–5)
ALBUMIN/GLOB SERPL: 1 RATIO (ref 1.1–2)
ALP SERPL-CCNC: 75 UNIT/L (ref 40–150)
ALT SERPL-CCNC: 12 UNIT/L (ref 0–55)
ANION GAP SERPL CALC-SCNC: 7 MEQ/L
AST SERPL-CCNC: 17 UNIT/L (ref 11–45)
BASOPHILS # BLD AUTO: 0.01 X10(3)/MCL
BASOPHILS NFR BLD AUTO: 0.1 %
BILIRUB SERPL-MCNC: 0.4 MG/DL
BUN SERPL-MCNC: 17.5 MG/DL (ref 8.4–25.7)
CALCIUM SERPL-MCNC: 8.1 MG/DL (ref 8.4–10.2)
CHLORIDE SERPL-SCNC: 109 MMOL/L (ref 98–107)
CO2 SERPL-SCNC: 22 MMOL/L (ref 22–29)
CREAT SERPL-MCNC: 0.71 MG/DL (ref 0.72–1.25)
CREAT/UREA NIT SERPL: 25
EOSINOPHIL # BLD AUTO: 0 X10(3)/MCL (ref 0–0.9)
EOSINOPHIL NFR BLD AUTO: 0 %
ERYTHROCYTE [DISTWIDTH] IN BLOOD BY AUTOMATED COUNT: 12.7 % (ref 11.5–17)
GFR SERPLBLD CREATININE-BSD FMLA CKD-EPI: >60 ML/MIN/1.73/M2
GLOBULIN SER-MCNC: 3.1 GM/DL (ref 2.4–3.5)
GLUCOSE SERPL-MCNC: 167 MG/DL (ref 74–100)
HCT VFR BLD AUTO: 32.3 % (ref 42–52)
HGB BLD-MCNC: 10.6 G/DL (ref 14–18)
IMM GRANULOCYTES # BLD AUTO: 0.03 X10(3)/MCL (ref 0–0.04)
IMM GRANULOCYTES NFR BLD AUTO: 0.4 %
LYMPHOCYTES # BLD AUTO: 0.66 X10(3)/MCL (ref 0.6–4.6)
LYMPHOCYTES NFR BLD AUTO: 9.5 %
MCH RBC QN AUTO: 30.5 PG (ref 27–31)
MCHC RBC AUTO-ENTMCNC: 32.8 G/DL (ref 33–36)
MCV RBC AUTO: 93.1 FL (ref 80–94)
MONOCYTES # BLD AUTO: 0.21 X10(3)/MCL (ref 0.1–1.3)
MONOCYTES NFR BLD AUTO: 3 %
NEUTROPHILS # BLD AUTO: 6.04 X10(3)/MCL (ref 2.1–9.2)
NEUTROPHILS NFR BLD AUTO: 87 %
NRBC BLD AUTO-RTO: 0 %
PLATELET # BLD AUTO: 209 X10(3)/MCL (ref 130–400)
PMV BLD AUTO: 9.9 FL (ref 7.4–10.4)
POTASSIUM SERPL-SCNC: 4.6 MMOL/L (ref 3.5–5.1)
PROT SERPL-MCNC: 6.2 GM/DL (ref 6.4–8.3)
RBC # BLD AUTO: 3.47 X10(6)/MCL (ref 4.7–6.1)
SODIUM SERPL-SCNC: 138 MMOL/L (ref 136–145)
WBC # BLD AUTO: 6.95 X10(3)/MCL (ref 4.5–11.5)

## 2025-05-14 PROCEDURE — 25000003 PHARM REV CODE 250: Performed by: COLON & RECTAL SURGERY

## 2025-05-14 PROCEDURE — 85025 COMPLETE CBC W/AUTO DIFF WBC: CPT | Performed by: COLON & RECTAL SURGERY

## 2025-05-14 PROCEDURE — 80053 COMPREHEN METABOLIC PANEL: CPT | Performed by: COLON & RECTAL SURGERY

## 2025-05-14 PROCEDURE — 36415 COLL VENOUS BLD VENIPUNCTURE: CPT | Performed by: COLON & RECTAL SURGERY

## 2025-05-14 RX ADMIN — SODIUM CHLORIDE: 9 INJECTION, SOLUTION INTRAVENOUS at 08:05

## 2025-05-14 RX ADMIN — LINACLOTIDE 290 MCG: 145 CAPSULE, GELATIN COATED ORAL at 06:05

## 2025-05-14 NOTE — ANESTHESIA POSTPROCEDURE EVALUATION
Anesthesia Post Evaluation    Patient: Diego Martinez Jr.    Procedure(s) Performed: Procedure(s) (LRB):  REVISION, COLOSTOMY (N/A)    Final Anesthesia Type: general      Patient location during evaluation: PACU  Patient participation: Yes- Able to Participate  Level of consciousness: awake and alert  Post-procedure vital signs: reviewed and stable  Pain management: adequate  Airway patency: patent    PONV status at discharge: No PONV  Anesthetic complications: no      Cardiovascular status: blood pressure returned to baseline  Respiratory status: unassisted and spontaneous ventilation  Hydration status: euvolemic  Follow-up needed               Vitals Value Taken Time   /64 05/13/25 19:02   Temp 36.8 °C (98.2 °F) 05/13/25 18:19   Pulse 56 05/13/25 19:06   Resp 14 05/13/25 19:01   SpO2 100 % 05/13/25 19:06   Vitals shown include unfiled device data.      No case tracking events are documented in the log.      Pain/Lisset Score: Pain Rating Prior to Med Admin: 2 (5/13/2025  3:56 PM)  Lisset Score: 9 (5/13/2025  6:16 PM)

## 2025-05-14 NOTE — NURSING
Pt d/c'd in stable condition. BP low, but per pt that is his normal, nonsymptomatic. Iv taken out. Colostomy is putting out brown stool, some liquid, some is formed. All f/u appointments, home meds, and discharge instructions discussed with patient and caregivers. All questions answered.

## 2025-05-14 NOTE — DISCHARGE INSTRUCTIONS
Resume Eliquis Friday 5/16.    Ok shower/bathe.      No dietary restrictions.    Notify MD for temperature >100, worsening abdominal pain, wound redness or drainage.

## 2025-05-14 NOTE — PROGRESS NOTES
Colon & Rectal Surgery Progress Note    Post Op Day 1     Subjective:  Awake and alert  No complaints  Tolerating diet    Objective:  Temp:  [97.4 °F (36.3 °C)-98.2 °F (36.8 °C)]   Pulse:  []   Resp:  [14-19]   BP: ()/(44-86)   SpO2:  [88 %-100 %]     Physical Exam:  NAD  Regular rate and rhythm  Non-labored respirations  Abdomen soft, NT, stoma pink, suprapubic catheter in place  SCDs in place      Intake/Output Summary (Last 24 hours) at 5/14/2025 0920  Last data filed at 5/14/2025 0728  Gross per 24 hour   Intake 400 ml   Output 450 ml   Net -50 ml       Recent Labs     05/14/25  0452   WBC 6.95   HGB 10.6*   HCT 32.3*         K 4.6   *   CO2 22   BUN 17.5   CREATININE 0.71*   BILITOT 0.4   AST 17   ALT 12   ALKPHOS 75   CALCIUM 8.1*   ALBUMIN 3.1*   PROT 6.2*       Assessment/Plan:  Home today      Stanton Yeh MD  Colon and Rectal Surgery

## 2025-05-14 NOTE — PLAN OF CARE
05/14/25 1006   Final Note   Assessment Type Final Discharge Note   Anticipated Discharge Disposition Home-Health   What phone number can be called within the next 1-3 days to see how you are doing after discharge? [phone]   Hospital Resources/Appts/Education Provided Appointments scheduled and added to AVS;Post-Acute resouces added to AVS   Post-Acute Status   Post-Acute Authorization Home Health   Discharge Delays None known at this time     Pt being discharged home today. His sitter will provide transport. Pt is current with [facility]. DC summary/AVS sent via Epic. No further dc needs at this time.

## 2025-05-14 NOTE — PLAN OF CARE
05/14/25 0958   Discharge Assessment   Assessment Type Discharge Planning Assessment   Confirmed/corrected address, phone number and insurance Yes   Confirmed Demographics Correct on Facesheet   Source of Information patient   When was your last doctors appointment? 05/02/25   Communicated ELIZABETH with patient/caregiver Yes   Reason For Admission colostomy complication   People in Home alone   Do you expect to return to your current living situation? Yes   Do you have help at home or someone to help you manage your care at home? Yes   Who are your caregiver(s) and their phone number(s)? Pt has sitters 7 days a week from 7am-5pm   Prior to hospitilization cognitive status: Alert/Oriented   Current cognitive status: Alert/Oriented   Walking or Climbing Stairs Difficulty yes   Walking or Climbing Stairs ambulation difficulty, requires equipment;ambulation difficulty, assistance 1 person   Mobility Management electric WC   Dressing/Bathing Difficulty yes   Dressing/Bathing bathing difficulty, assistance 1 person   Home Accessibility stairs to enter home   Number of Stairs, Main Entrance none   Stair Railings, Main Entrance none   Home Layout Able to live on 1st floor   Equipment Currently Used at Home hospital bed;other (see comments)  (electric wheelchair)   Patient currently being followed by outpatient case management? No   Do you currently have service(s) that help you manage your care at home? No   Do you take prescription medications? Yes   Do you have prescription coverage? Yes   Do you have any problems affording any of your prescribed medications? No   Is the patient taking medications as prescribed? yes   Who is going to help you get home at discharge? sitters   How do you get to doctors appointments? family or friend will provide   Are you on dialysis? No   Do you take coumadin? No   Discharge Plan A Home Health   Discharge Plan B Home Health   DME Needed Upon Discharge  other (see comments)  (tbd)   Discharge  Plan discussed with: Patient   Transition of Care Barriers None     Assessment done with pt in bed. Pt lives alone in a 1st floor handicapped apartment. He has sitters 7 days a week from 7am-5pm. Pt is current with [facility]. Pt uses [facility] in [place].

## 2025-05-15 LAB — PSYCHE PATHOLOGY RESULT: NORMAL

## 2025-05-19 NOTE — HOSPITAL COURSE
Patient was admitted through day surgery on 05/13/2025 and underwent a colostomy revision without complication.  Please see operative note for full details.  Postoperatively he was admitted to the general surgery floor for observation.  Regular diet was resumed and tolerated well.  The following morning he was doing well and had no complaints.  He had no signs of bleeding and his colostomy was pink.  Therefore he was deemed stable for discharge home.  Final pathology showed chronic inflammation and fibrosis.

## 2025-05-19 NOTE — DISCHARGE SUMMARY
[facility] 8th Floor Med Surg  Colorectal Surgery  Discharge Summary      Patient Name: Diego Martinez Jr.  MRN: [id number]  Admission Date: 5/13/2025  Hospital Length of Stay: 1 days  Discharge Date and Time: 5/14/2025  1:50 PM  Attending Physician: No att. providers found   Discharging Provider: Stanton Yeh MD  Primary Care Provider: Ciaran Mcdonough MD     HPI:  No notes on file    Procedure(s) (LRB):  REVISION, COLOSTOMY (N/A)     Hospital Course:  Patient was admitted through day surgery on 05/13/2025 and underwent a colostomy revision without complication.  Please see operative note for full details.  Postoperatively he was admitted to the general surgery floor for observation.  Regular diet was resumed and tolerated well.  The following morning he was doing well and had no complaints.  He had no signs of bleeding and his colostomy was pink.  Therefore he was deemed stable for discharge home.  Final pathology showed chronic inflammation and fibrosis.    Goals of Care Treatment Preferences:  Code Status: Full Code          Significant Diagnostic Studies: N/A    Pending Diagnostic Studies:       None          Final Active Diagnoses:      Problems Resolved During this Admission:    Diagnosis Date Noted Date Resolved POA    PRINCIPAL PROBLEM:  Colostomy complication [K94.00] 05/13/2025 05/14/2025 Yes      Discharged Condition: stable    Disposition: Home or Self Care    Follow Up:   Follow-up Information       Stanton Yeh MD. Call on 5/29/2025.    Specialty: Colon and Rectal Surgery  Why: nanette Ms Becker   @08:45 am  Contact information:  [address] [address]  [phone]               [facility] Follow up.    Specialty: Home Health Services  Why: This is your home health provider. You can contact your provider with any questions or concerns.  Contact information:  [address] [facility] [address] [phone]                            Patient Instructions:      Diet Adult Regular     Notify your health care provider if you experience any of the following:  temperature >100.4     Notify your health care provider if you experience any of the following:  severe uncontrolled pain     Notify your health care provider if you experience any of the following:  redness, tenderness, or signs of infection (pain, swelling, redness, odor or green/yellow discharge around incision site)     Other restrictions (specify):   Order Comments: Resume Eliquis Friday 5/16     Shower on day dressing removed (No bath)     Medications:  Reconciled Home Medications:      Medication List        CONTINUE taking these medications      apixaban 5 mg Tab  Commonly known as: ELIQUIS  Take 5 mg by mouth 2 (two) times daily.     lactulose 10 gram/15 mL solution  Commonly known as: CHRONULAC  Take 10 g by mouth 2 (two) times daily as needed (constipation).     LINZESS 290 mcg Cap capsule  Generic drug: linaCLOtide  Take 290 mcg by mouth before breakfast.              Stanton Yeh MD  Colorectal Surgery  [facility] - 8th Floor Med Surg

## 2025-05-21 ENCOUNTER — TELEPHONE (OUTPATIENT)
Dept: ADMINISTRATIVE | Facility: HOSPITAL | Age: 50
End: 2025-05-21
Payer: MEDICARE

## 2025-05-29 ENCOUNTER — OFFICE VISIT (OUTPATIENT)
Dept: SURGICAL ONCOLOGY | Facility: CLINIC | Age: 50
End: 2025-05-29
Payer: MEDICARE

## 2025-05-29 VITALS
HEIGHT: 69 IN | SYSTOLIC BLOOD PRESSURE: 91 MMHG | HEART RATE: 93 BPM | DIASTOLIC BLOOD PRESSURE: 60 MMHG | BODY MASS INDEX: 18.51 KG/M2 | WEIGHT: 125 LBS | OXYGEN SATURATION: 100 %

## 2025-05-29 DIAGNOSIS — Z93.3 COLOSTOMY STATUS: Primary | ICD-10-CM

## 2025-05-29 PROCEDURE — 99213 OFFICE O/P EST LOW 20 MIN: CPT | Mod: PBBFAC

## 2025-05-29 PROCEDURE — 99999 PR PBB SHADOW E&M-EST. PATIENT-LVL III: CPT | Mod: PBBFAC,,,

## 2025-05-29 NOTE — PROGRESS NOTES
"   Colorectal Surgery  Patient ID: [id number]     HPI:     Diego Martinez Jr. is a 49 y.o. male s/p colostomy revision 5/13/25 presents here today for a post op visit.      Denies fever, nausea, vomiting, abdominal pain, diarrhea, constipation, decreased appetite, bleeding, wound drainage.     Reports stools have been slowing down from his rectum.     Current Outpatient Medications   Medication Instructions    apixaban (ELIQUIS) 5 mg, 2 times daily    lactulose (CHRONULAC) 10 g, 2 times daily PRN    LINZESS 290 mcg, Before breakfast       Patient is allergic to meropenem and penicillins.     Patient Care Team:  Ciaran Mcdonough MD as PCP - [facility] (Family Medicine)  [facility], [facility] ([facility])     Objective:     Visit Vitals  BP 91/60 (BP Location: Left arm, Patient Position: Sitting)   Pulse 93   Ht 5' 9" (1.753 m)   Wt 56.7 kg (125 lb)   SpO2 100%   BMI 18.46 kg/m²       Physical Exam    General: Alert and oriented, No acute distress.  Head: Normocephalic, Atraumatic.  Eye: Sclera non-icteric.  Respiratory: Non-labored respirations, Symmetrical chest wall expansion.  Cardiac: Regular rate.  Gastrointestinal: Soft, Non-distended. Stoma bar in place, stoma edematous and pink.   Extremities: No lower extremity edema.  Integumentary: Warm, Dry, Intact.  Neurologic: No focal deficits.    Assessment:       ICD-10-CM ICD-9-CM   1. Colostomy status  Z93.3 V44.3        Plan:     1. Colostomy status    - Stoma bar removed  - RTC PRN    No follow-ups on file. In addition to their scheduled follow up, the patient has also been instructed to follow up on as needed basis.     No future appointments.     CHANCE Harman    "

## 2025-06-10 ENCOUNTER — TELEPHONE (OUTPATIENT)
Dept: ADMINISTRATIVE | Facility: CLINIC | Age: 50
End: 2025-06-10
Payer: MEDICARE

## 2025-08-11 ENCOUNTER — ANESTHESIA (OUTPATIENT)
Dept: SURGERY | Facility: HOSPITAL | Age: 50
End: 2025-08-11
Payer: MEDICARE

## 2025-08-11 ENCOUNTER — HOSPITAL ENCOUNTER (INPATIENT)
Facility: HOSPITAL | Age: 50
LOS: 16 days | Discharge: HOME HEALTH | DRG: 335 | End: 2025-08-27
Attending: EMERGENCY MEDICINE | Admitting: INTERNAL MEDICINE
Payer: MEDICARE

## 2025-08-11 ENCOUNTER — ANESTHESIA EVENT (OUTPATIENT)
Dept: SURGERY | Facility: HOSPITAL | Age: 50
End: 2025-08-11
Payer: MEDICARE

## 2025-08-11 DIAGNOSIS — Z93.3 COLOSTOMY STATUS: ICD-10-CM

## 2025-08-11 DIAGNOSIS — R00.0 TACHYCARDIA: ICD-10-CM

## 2025-08-11 DIAGNOSIS — R11.2 NAUSEA AND VOMITING: Primary | ICD-10-CM

## 2025-08-11 DIAGNOSIS — L89.90 PRESSURE INJURY OF SKIN, UNSPECIFIED INJURY STAGE, UNSPECIFIED LOCATION: ICD-10-CM

## 2025-08-11 DIAGNOSIS — Z97.8 CHRONIC INDWELLING FOLEY CATHETER: ICD-10-CM

## 2025-08-11 DIAGNOSIS — G82.50 QUADRIPLEGIA: ICD-10-CM

## 2025-08-11 DIAGNOSIS — K56.609 SBO (SMALL BOWEL OBSTRUCTION): ICD-10-CM

## 2025-08-11 DIAGNOSIS — R07.9 CHEST PAIN: ICD-10-CM

## 2025-08-11 DIAGNOSIS — K56.609 SMALL BOWEL OBSTRUCTION: ICD-10-CM

## 2025-08-11 LAB
ALBUMIN SERPL-MCNC: 4 G/DL (ref 3.5–5)
ALBUMIN/GLOB SERPL: 0.8 RATIO (ref 1.1–2)
ALP SERPL-CCNC: 90 UNIT/L (ref 40–150)
ALT SERPL-CCNC: 13 UNIT/L (ref 0–55)
ANION GAP SERPL CALC-SCNC: 14 MEQ/L
AST SERPL-CCNC: 24 UNIT/L (ref 11–45)
BASOPHILS # BLD AUTO: 0.03 X10(3)/MCL
BASOPHILS NFR BLD AUTO: 0.3 %
BILIRUB SERPL-MCNC: 0.9 MG/DL
BUN SERPL-MCNC: 24 MG/DL (ref 8.4–25.7)
CALCIUM SERPL-MCNC: 9.6 MG/DL (ref 8.4–10.2)
CHLORIDE SERPL-SCNC: 103 MMOL/L (ref 98–107)
CO2 SERPL-SCNC: 23 MMOL/L (ref 22–29)
CREAT SERPL-MCNC: 0.75 MG/DL (ref 0.72–1.25)
CREAT/UREA NIT SERPL: 32
EOSINOPHIL # BLD AUTO: 0.01 X10(3)/MCL (ref 0–0.9)
EOSINOPHIL NFR BLD AUTO: 0.1 %
ERYTHROCYTE [DISTWIDTH] IN BLOOD BY AUTOMATED COUNT: 12.8 % (ref 11.5–17)
GFR SERPLBLD CREATININE-BSD FMLA CKD-EPI: >60 ML/MIN/1.73/M2
GLOBULIN SER-MCNC: 4.9 GM/DL (ref 2.4–3.5)
GLUCOSE SERPL-MCNC: 91 MG/DL (ref 74–100)
HCT VFR BLD AUTO: 43 % (ref 42–52)
HGB BLD-MCNC: 14.2 G/DL (ref 14–18)
IMM GRANULOCYTES # BLD AUTO: 0.03 X10(3)/MCL (ref 0–0.04)
IMM GRANULOCYTES NFR BLD AUTO: 0.3 %
LACTATE SERPL-SCNC: 1.6 MMOL/L (ref 0.5–2.2)
LIPASE SERPL-CCNC: 11 U/L
LYMPHOCYTES # BLD AUTO: 1.31 X10(3)/MCL (ref 0.6–4.6)
LYMPHOCYTES NFR BLD AUTO: 13 %
MCH RBC QN AUTO: 30.3 PG (ref 27–31)
MCHC RBC AUTO-ENTMCNC: 33 G/DL (ref 33–36)
MCV RBC AUTO: 91.9 FL (ref 80–94)
MONOCYTES # BLD AUTO: 0.58 X10(3)/MCL (ref 0.1–1.3)
MONOCYTES NFR BLD AUTO: 5.7 %
NEUTROPHILS # BLD AUTO: 8.15 X10(3)/MCL (ref 2.1–9.2)
NEUTROPHILS NFR BLD AUTO: 80.6 %
NRBC BLD AUTO-RTO: 0 %
OHS QRS DURATION: 130 MS
OHS QRS DURATION: 142 MS
OHS QTC CALCULATION: 438 MS
OHS QTC CALCULATION: 487 MS
PLATELET # BLD AUTO: 305 X10(3)/MCL (ref 130–400)
PMV BLD AUTO: 10.3 FL (ref 7.4–10.4)
POTASSIUM SERPL-SCNC: 4.4 MMOL/L (ref 3.5–5.1)
PROT SERPL-MCNC: 8.9 GM/DL (ref 6.4–8.3)
RBC # BLD AUTO: 4.68 X10(6)/MCL (ref 4.7–6.1)
SODIUM SERPL-SCNC: 140 MMOL/L (ref 136–145)
TROPONIN I SERPL HS-MCNC: <3 NG/L
WBC # BLD AUTO: 10.11 X10(3)/MCL (ref 4.5–11.5)

## 2025-08-11 PROCEDURE — 87040 BLOOD CULTURE FOR BACTERIA: CPT | Performed by: EMERGENCY MEDICINE

## 2025-08-11 PROCEDURE — 25000003 PHARM REV CODE 250: Performed by: COLON & RECTAL SURGERY

## 2025-08-11 PROCEDURE — 71000015 HC POSTOP RECOV 1ST HR: Performed by: COLON & RECTAL SURGERY

## 2025-08-11 PROCEDURE — 63600175 PHARM REV CODE 636 W HCPCS: Performed by: EMERGENCY MEDICINE

## 2025-08-11 PROCEDURE — 99222 1ST HOSP IP/OBS MODERATE 55: CPT | Mod: 57,,, | Performed by: COLON & RECTAL SURGERY

## 2025-08-11 PROCEDURE — 63600175 PHARM REV CODE 636 W HCPCS: Performed by: NURSE ANESTHETIST, CERTIFIED REGISTERED

## 2025-08-11 PROCEDURE — 99285 EMERGENCY DEPT VISIT HI MDM: CPT | Mod: 25

## 2025-08-11 PROCEDURE — 85025 COMPLETE CBC W/AUTO DIFF WBC: CPT | Performed by: EMERGENCY MEDICINE

## 2025-08-11 PROCEDURE — 80053 COMPREHEN METABOLIC PANEL: CPT | Performed by: EMERGENCY MEDICINE

## 2025-08-11 PROCEDURE — 84484 ASSAY OF TROPONIN QUANT: CPT | Performed by: EMERGENCY MEDICINE

## 2025-08-11 PROCEDURE — 0DN80ZZ RELEASE SMALL INTESTINE, OPEN APPROACH: ICD-10-PCS | Performed by: COLON & RECTAL SURGERY

## 2025-08-11 PROCEDURE — 83605 ASSAY OF LACTIC ACID: CPT | Performed by: EMERGENCY MEDICINE

## 2025-08-11 PROCEDURE — 37000009 HC ANESTHESIA EA ADD 15 MINS: Performed by: COLON & RECTAL SURGERY

## 2025-08-11 PROCEDURE — 63600175 PHARM REV CODE 636 W HCPCS: Performed by: COLON & RECTAL SURGERY

## 2025-08-11 PROCEDURE — 71000016 HC POSTOP RECOV ADDL HR: Performed by: COLON & RECTAL SURGERY

## 2025-08-11 PROCEDURE — 63600175 PHARM REV CODE 636 W HCPCS: Mod: JZ,TB

## 2025-08-11 PROCEDURE — 44005 FREEING OF BOWEL ADHESION: CPT | Mod: 79,,, | Performed by: COLON & RECTAL SURGERY

## 2025-08-11 PROCEDURE — 25000003 PHARM REV CODE 250: Performed by: NURSE ANESTHETIST, CERTIFIED REGISTERED

## 2025-08-11 PROCEDURE — 36000706: Performed by: COLON & RECTAL SURGERY

## 2025-08-11 PROCEDURE — 27201423 OPTIME MED/SURG SUP & DEVICES STERILE SUPPLY: Performed by: COLON & RECTAL SURGERY

## 2025-08-11 PROCEDURE — 0DCP0ZZ EXTIRPATION OF MATTER FROM RECTUM, OPEN APPROACH: ICD-10-PCS | Performed by: COLON & RECTAL SURGERY

## 2025-08-11 PROCEDURE — 96361 HYDRATE IV INFUSION ADD-ON: CPT

## 2025-08-11 PROCEDURE — 93005 ELECTROCARDIOGRAM TRACING: CPT

## 2025-08-11 PROCEDURE — 21400001 HC TELEMETRY ROOM

## 2025-08-11 PROCEDURE — 36000707: Performed by: COLON & RECTAL SURGERY

## 2025-08-11 PROCEDURE — 96374 THER/PROPH/DIAG INJ IV PUSH: CPT

## 2025-08-11 PROCEDURE — 96375 TX/PRO/DX INJ NEW DRUG ADDON: CPT

## 2025-08-11 PROCEDURE — 37000008 HC ANESTHESIA 1ST 15 MINUTES: Performed by: COLON & RECTAL SURGERY

## 2025-08-11 PROCEDURE — 11000001 HC ACUTE MED/SURG PRIVATE ROOM

## 2025-08-11 PROCEDURE — 93010 ELECTROCARDIOGRAM REPORT: CPT | Mod: 76,,, | Performed by: INTERNAL MEDICINE

## 2025-08-11 PROCEDURE — 25500020 PHARM REV CODE 255: Performed by: EMERGENCY MEDICINE

## 2025-08-11 PROCEDURE — 71000033 HC RECOVERY, INTIAL HOUR: Performed by: COLON & RECTAL SURGERY

## 2025-08-11 PROCEDURE — 45915 REMOVE RECTAL OBSTRUCTION: CPT | Mod: 51,79,, | Performed by: COLON & RECTAL SURGERY

## 2025-08-11 PROCEDURE — 25000003 PHARM REV CODE 250: Performed by: EMERGENCY MEDICINE

## 2025-08-11 PROCEDURE — 25000003 PHARM REV CODE 250

## 2025-08-11 PROCEDURE — 83690 ASSAY OF LIPASE: CPT | Performed by: EMERGENCY MEDICINE

## 2025-08-11 RX ORDER — ONDANSETRON HYDROCHLORIDE 2 MG/ML
4 INJECTION, SOLUTION INTRAVENOUS EVERY 8 HOURS PRN
Status: DISCONTINUED | OUTPATIENT
Start: 2025-08-11 | End: 2025-08-27 | Stop reason: HOSPADM

## 2025-08-11 RX ORDER — ROCURONIUM BROMIDE 10 MG/ML
INJECTION, SOLUTION INTRAVENOUS
Status: DISCONTINUED | OUTPATIENT
Start: 2025-08-11 | End: 2025-08-11

## 2025-08-11 RX ORDER — ACETAMINOPHEN 10 MG/ML
INJECTION, SOLUTION INTRAVENOUS
Status: DISCONTINUED | OUTPATIENT
Start: 2025-08-11 | End: 2025-08-11

## 2025-08-11 RX ORDER — PROPOFOL 10 MG/ML
VIAL (ML) INTRAVENOUS
Status: DISCONTINUED | OUTPATIENT
Start: 2025-08-11 | End: 2025-08-11

## 2025-08-11 RX ORDER — LIDOCAINE HYDROCHLORIDE 20 MG/ML
5 SOLUTION OROPHARYNGEAL
Status: DISPENSED | OUTPATIENT
Start: 2025-08-11 | End: 2025-08-11

## 2025-08-11 RX ORDER — NALOXONE HCL 0.4 MG/ML
0.02 VIAL (ML) INJECTION
Status: DISCONTINUED | OUTPATIENT
Start: 2025-08-11 | End: 2025-08-27 | Stop reason: HOSPADM

## 2025-08-11 RX ORDER — PROCHLORPERAZINE EDISYLATE 5 MG/ML
5 INJECTION INTRAMUSCULAR; INTRAVENOUS EVERY 6 HOURS PRN
Status: DISCONTINUED | OUTPATIENT
Start: 2025-08-11 | End: 2025-08-27 | Stop reason: HOSPADM

## 2025-08-11 RX ORDER — SODIUM CHLORIDE 9 MG/ML
125 INJECTION, SOLUTION INTRAVENOUS CONTINUOUS
Status: DISCONTINUED | OUTPATIENT
Start: 2025-08-11 | End: 2025-08-13

## 2025-08-11 RX ORDER — HALOPERIDOL LACTATE 5 MG/ML
0.5 INJECTION, SOLUTION INTRAMUSCULAR EVERY 10 MIN PRN
Status: DISCONTINUED | OUTPATIENT
Start: 2025-08-11 | End: 2025-08-11 | Stop reason: HOSPADM

## 2025-08-11 RX ORDER — DIPHENHYDRAMINE HYDROCHLORIDE 50 MG/ML
25 INJECTION, SOLUTION INTRAMUSCULAR; INTRAVENOUS EVERY 6 HOURS PRN
Status: DISCONTINUED | OUTPATIENT
Start: 2025-08-11 | End: 2025-08-11 | Stop reason: HOSPADM

## 2025-08-11 RX ORDER — BUPIVACAINE HYDROCHLORIDE 5 MG/ML
INJECTION, SOLUTION EPIDURAL; INTRACAUDAL; PERINEURAL
Status: DISCONTINUED | OUTPATIENT
Start: 2025-08-11 | End: 2025-08-11 | Stop reason: HOSPADM

## 2025-08-11 RX ORDER — DEXAMETHASONE SODIUM PHOSPHATE 4 MG/ML
INJECTION, SOLUTION INTRA-ARTICULAR; INTRALESIONAL; INTRAMUSCULAR; INTRAVENOUS; SOFT TISSUE
Status: DISCONTINUED | OUTPATIENT
Start: 2025-08-11 | End: 2025-08-11

## 2025-08-11 RX ORDER — IBUPROFEN 200 MG
16 TABLET ORAL
Status: DISCONTINUED | OUTPATIENT
Start: 2025-08-11 | End: 2025-08-27 | Stop reason: HOSPADM

## 2025-08-11 RX ORDER — OXYCODONE HYDROCHLORIDE 5 MG/1
5 TABLET ORAL
Status: DISCONTINUED | OUTPATIENT
Start: 2025-08-11 | End: 2025-08-11 | Stop reason: HOSPADM

## 2025-08-11 RX ORDER — MIDAZOLAM HYDROCHLORIDE 1 MG/ML
INJECTION INTRAMUSCULAR; INTRAVENOUS
Status: DISCONTINUED | OUTPATIENT
Start: 2025-08-11 | End: 2025-08-11

## 2025-08-11 RX ORDER — PHENYLEPHRINE HYDROCHLORIDE 10 MG/ML
INJECTION INTRAVENOUS
Status: DISCONTINUED | OUTPATIENT
Start: 2025-08-11 | End: 2025-08-11

## 2025-08-11 RX ORDER — HYDROMORPHONE HYDROCHLORIDE 2 MG/ML
0.2 INJECTION, SOLUTION INTRAMUSCULAR; INTRAVENOUS; SUBCUTANEOUS EVERY 5 MIN PRN
Status: DISCONTINUED | OUTPATIENT
Start: 2025-08-11 | End: 2025-08-11 | Stop reason: HOSPADM

## 2025-08-11 RX ORDER — SODIUM CHLORIDE 9 MG/ML
INJECTION, SOLUTION INTRAVENOUS CONTINUOUS
Status: ACTIVE | OUTPATIENT
Start: 2025-08-11 | End: 2025-08-11

## 2025-08-11 RX ORDER — SODIUM CHLORIDE 0.9 % (FLUSH) 0.9 %
10 SYRINGE (ML) INJECTION
Status: DISCONTINUED | OUTPATIENT
Start: 2025-08-11 | End: 2025-08-27 | Stop reason: HOSPADM

## 2025-08-11 RX ORDER — CLINDAMYCIN PHOSPHATE 900 MG/50ML
900 INJECTION, SOLUTION INTRAVENOUS
Status: DISCONTINUED | OUTPATIENT
Start: 2025-08-11 | End: 2025-08-15

## 2025-08-11 RX ORDER — PANTOPRAZOLE SODIUM 40 MG/10ML
40 INJECTION, POWDER, LYOPHILIZED, FOR SOLUTION INTRAVENOUS
Status: COMPLETED | OUTPATIENT
Start: 2025-08-11 | End: 2025-08-11

## 2025-08-11 RX ORDER — IBUPROFEN 200 MG
24 TABLET ORAL
Status: DISCONTINUED | OUTPATIENT
Start: 2025-08-11 | End: 2025-08-27 | Stop reason: HOSPADM

## 2025-08-11 RX ORDER — GLUCAGON 1 MG
1 KIT INJECTION
Status: DISCONTINUED | OUTPATIENT
Start: 2025-08-11 | End: 2025-08-27 | Stop reason: HOSPADM

## 2025-08-11 RX ORDER — LIDOCAINE HYDROCHLORIDE 20 MG/ML
INJECTION, SOLUTION EPIDURAL; INFILTRATION; INTRACAUDAL; PERINEURAL
Status: DISCONTINUED | OUTPATIENT
Start: 2025-08-11 | End: 2025-08-11

## 2025-08-11 RX ORDER — FENTANYL CITRATE 50 UG/ML
INJECTION, SOLUTION INTRAMUSCULAR; INTRAVENOUS
Status: DISCONTINUED | OUTPATIENT
Start: 2025-08-11 | End: 2025-08-11

## 2025-08-11 RX ORDER — MORPHINE SULFATE 4 MG/ML
2 INJECTION, SOLUTION INTRAMUSCULAR; INTRAVENOUS EVERY 4 HOURS PRN
Refills: 0 | Status: DISCONTINUED | OUTPATIENT
Start: 2025-08-11 | End: 2025-08-27 | Stop reason: HOSPADM

## 2025-08-11 RX ORDER — ONDANSETRON HYDROCHLORIDE 2 MG/ML
4 INJECTION, SOLUTION INTRAVENOUS
Status: COMPLETED | OUTPATIENT
Start: 2025-08-11 | End: 2025-08-11

## 2025-08-11 RX ORDER — KETOROLAC TROMETHAMINE 30 MG/ML
15 INJECTION, SOLUTION INTRAMUSCULAR; INTRAVENOUS EVERY 6 HOURS
Status: DISPENSED | OUTPATIENT
Start: 2025-08-11 | End: 2025-08-14

## 2025-08-11 RX ORDER — EPHEDRINE SULFATE 50 MG/ML
INJECTION, SOLUTION INTRAVENOUS
Status: DISCONTINUED | OUTPATIENT
Start: 2025-08-11 | End: 2025-08-11

## 2025-08-11 RX ORDER — ONDANSETRON HYDROCHLORIDE 2 MG/ML
4 INJECTION, SOLUTION INTRAVENOUS DAILY PRN
Status: DISCONTINUED | OUTPATIENT
Start: 2025-08-11 | End: 2025-08-11 | Stop reason: HOSPADM

## 2025-08-11 RX ORDER — ONDANSETRON HYDROCHLORIDE 2 MG/ML
INJECTION, SOLUTION INTRAVENOUS
Status: DISCONTINUED | OUTPATIENT
Start: 2025-08-11 | End: 2025-08-11

## 2025-08-11 RX ORDER — GLUCAGON 1 MG
1 KIT INJECTION
Status: DISCONTINUED | OUTPATIENT
Start: 2025-08-11 | End: 2025-08-11 | Stop reason: HOSPADM

## 2025-08-11 RX ADMIN — SODIUM CHLORIDE, POTASSIUM CHLORIDE, SODIUM LACTATE AND CALCIUM CHLORIDE 1000 ML: 600; 310; 30; 20 INJECTION, SOLUTION INTRAVENOUS at 05:08

## 2025-08-11 RX ADMIN — FENTANYL CITRATE 50 MCG: 50 INJECTION, SOLUTION INTRAMUSCULAR; INTRAVENOUS at 01:08

## 2025-08-11 RX ADMIN — PANTOPRAZOLE SODIUM 40 MG: 40 INJECTION, POWDER, FOR SOLUTION INTRAVENOUS at 04:08

## 2025-08-11 RX ADMIN — PHENYLEPHRINE HYDROCHLORIDE 100 MCG: 10 INJECTION INTRAVENOUS at 02:08

## 2025-08-11 RX ADMIN — DEXAMETHASONE SODIUM PHOSPHATE 4 MG: 4 INJECTION, SOLUTION INTRA-ARTICULAR; INTRALESIONAL; INTRAMUSCULAR; INTRAVENOUS; SOFT TISSUE at 01:08

## 2025-08-11 RX ADMIN — PHENYLEPHRINE HYDROCHLORIDE 100 MCG: 10 INJECTION INTRAVENOUS at 01:08

## 2025-08-11 RX ADMIN — SODIUM CHLORIDE 1000 ML: 9 INJECTION, SOLUTION INTRAVENOUS at 06:08

## 2025-08-11 RX ADMIN — EPHEDRINE SULFATE 25 MG: 50 INJECTION INTRAVENOUS at 02:08

## 2025-08-11 RX ADMIN — ONDANSETRON 4 MG: 2 INJECTION INTRAMUSCULAR; INTRAVENOUS at 02:08

## 2025-08-11 RX ADMIN — MIDAZOLAM HYDROCHLORIDE 2 MG: 1 INJECTION, SOLUTION INTRAMUSCULAR; INTRAVENOUS at 01:08

## 2025-08-11 RX ADMIN — SODIUM CHLORIDE, SODIUM GLUCONATE, SODIUM ACETATE, POTASSIUM CHLORIDE AND MAGNESIUM CHLORIDE: 526; 502; 368; 37; 30 INJECTION, SOLUTION INTRAVENOUS at 01:08

## 2025-08-11 RX ADMIN — SODIUM CHLORIDE 125 ML/HR: 9 INJECTION, SOLUTION INTRAVENOUS at 10:08

## 2025-08-11 RX ADMIN — LIDOCAINE HYDROCHLORIDE 4 ML: 20 INJECTION, SOLUTION EPIDURAL; INFILTRATION; INTRACAUDAL; PERINEURAL at 01:08

## 2025-08-11 RX ADMIN — FENTANYL CITRATE 25 MCG: 50 INJECTION, SOLUTION INTRAMUSCULAR; INTRAVENOUS at 03:08

## 2025-08-11 RX ADMIN — PROPOFOL 150 MG: 10 INJECTION, EMULSION INTRAVENOUS at 01:08

## 2025-08-11 RX ADMIN — SUGAMMADEX 200 MG: 100 INJECTION, SOLUTION INTRAVENOUS at 03:08

## 2025-08-11 RX ADMIN — KETOROLAC TROMETHAMINE 15 MG: 30 INJECTION, SOLUTION INTRAMUSCULAR; INTRAVENOUS at 11:08

## 2025-08-11 RX ADMIN — IOHEXOL 90 ML: 350 INJECTION, SOLUTION INTRAVENOUS at 04:08

## 2025-08-11 RX ADMIN — ROCURONIUM BROMIDE 100 MG: 10 SOLUTION INTRAVENOUS at 01:08

## 2025-08-11 RX ADMIN — BENZOCAINE: 220 SPRAY, METERED PERIODONTAL at 06:08

## 2025-08-11 RX ADMIN — KETOROLAC TROMETHAMINE 15 MG: 30 INJECTION, SOLUTION INTRAMUSCULAR; INTRAVENOUS at 07:08

## 2025-08-11 RX ADMIN — SODIUM CHLORIDE, SODIUM GLUCONATE, SODIUM ACETATE, POTASSIUM CHLORIDE AND MAGNESIUM CHLORIDE: 526; 502; 368; 37; 30 INJECTION, SOLUTION INTRAVENOUS at 02:08

## 2025-08-11 RX ADMIN — ONDANSETRON 4 MG: 2 INJECTION INTRAMUSCULAR; INTRAVENOUS at 04:08

## 2025-08-11 RX ADMIN — FENTANYL CITRATE 25 MCG: 50 INJECTION, SOLUTION INTRAMUSCULAR; INTRAVENOUS at 02:08

## 2025-08-11 RX ADMIN — ACETAMINOPHEN 1000 MG: 10 INJECTION, SOLUTION INTRAVENOUS at 02:08

## 2025-08-11 RX ADMIN — CLINDAMYCIN PHOSPHATE 900 MG: 900 INJECTION, SOLUTION INTRAVENOUS at 02:08

## 2025-08-12 LAB
ALBUMIN SERPL-MCNC: 3 G/DL (ref 3.5–5)
ALBUMIN/GLOB SERPL: 0.9 RATIO (ref 1.1–2)
ALP SERPL-CCNC: 69 UNIT/L (ref 40–150)
ALT SERPL-CCNC: 11 UNIT/L (ref 0–55)
ANION GAP SERPL CALC-SCNC: 12 MEQ/L
AST SERPL-CCNC: 20 UNIT/L (ref 11–45)
BASOPHILS # BLD AUTO: 0.01 X10(3)/MCL
BASOPHILS NFR BLD AUTO: 0.1 %
BILIRUB SERPL-MCNC: 1 MG/DL
BUN SERPL-MCNC: 21.7 MG/DL (ref 8.4–25.7)
CALCIUM SERPL-MCNC: 8.1 MG/DL (ref 8.4–10.2)
CHLORIDE SERPL-SCNC: 109 MMOL/L (ref 98–107)
CO2 SERPL-SCNC: 23 MMOL/L (ref 22–29)
CREAT SERPL-MCNC: 0.65 MG/DL (ref 0.72–1.25)
CREAT/UREA NIT SERPL: 33
EOSINOPHIL # BLD AUTO: 0 X10(3)/MCL (ref 0–0.9)
EOSINOPHIL NFR BLD AUTO: 0 %
ERYTHROCYTE [DISTWIDTH] IN BLOOD BY AUTOMATED COUNT: 13.1 % (ref 11.5–17)
GFR SERPLBLD CREATININE-BSD FMLA CKD-EPI: >60 ML/MIN/1.73/M2
GLOBULIN SER-MCNC: 3.4 GM/DL (ref 2.4–3.5)
GLUCOSE SERPL-MCNC: 76 MG/DL (ref 74–100)
HCT VFR BLD AUTO: 33.6 % (ref 42–52)
HGB BLD-MCNC: 11.3 G/DL (ref 14–18)
IMM GRANULOCYTES # BLD AUTO: 0.05 X10(3)/MCL (ref 0–0.04)
IMM GRANULOCYTES NFR BLD AUTO: 0.4 %
LYMPHOCYTES # BLD AUTO: 1.67 X10(3)/MCL (ref 0.6–4.6)
LYMPHOCYTES NFR BLD AUTO: 14.2 %
MAGNESIUM SERPL-MCNC: 2.3 MG/DL (ref 1.6–2.6)
MCH RBC QN AUTO: 30.8 PG (ref 27–31)
MCHC RBC AUTO-ENTMCNC: 33.6 G/DL (ref 33–36)
MCV RBC AUTO: 91.6 FL (ref 80–94)
MONOCYTES # BLD AUTO: 0.95 X10(3)/MCL (ref 0.1–1.3)
MONOCYTES NFR BLD AUTO: 8.1 %
NEUTROPHILS # BLD AUTO: 9.05 X10(3)/MCL (ref 2.1–9.2)
NEUTROPHILS NFR BLD AUTO: 77.2 %
NRBC BLD AUTO-RTO: 0 %
PHOSPHATE SERPL-MCNC: 3.3 MG/DL (ref 2.3–4.7)
PLATELET # BLD AUTO: 231 X10(3)/MCL (ref 130–400)
PMV BLD AUTO: 9.7 FL (ref 7.4–10.4)
POTASSIUM SERPL-SCNC: 3.8 MMOL/L (ref 3.5–5.1)
PROT SERPL-MCNC: 6.4 GM/DL (ref 6.4–8.3)
RBC # BLD AUTO: 3.67 X10(6)/MCL (ref 4.7–6.1)
SODIUM SERPL-SCNC: 144 MMOL/L (ref 136–145)
WBC # BLD AUTO: 11.73 X10(3)/MCL (ref 4.5–11.5)

## 2025-08-12 PROCEDURE — 84100 ASSAY OF PHOSPHORUS: CPT | Performed by: INTERNAL MEDICINE

## 2025-08-12 PROCEDURE — 94799 UNLISTED PULMONARY SVC/PX: CPT

## 2025-08-12 PROCEDURE — 11000001 HC ACUTE MED/SURG PRIVATE ROOM

## 2025-08-12 PROCEDURE — 25000003 PHARM REV CODE 250: Performed by: COLON & RECTAL SURGERY

## 2025-08-12 PROCEDURE — 25000003 PHARM REV CODE 250: Performed by: STUDENT IN AN ORGANIZED HEALTH CARE EDUCATION/TRAINING PROGRAM

## 2025-08-12 PROCEDURE — 94760 N-INVAS EAR/PLS OXIMETRY 1: CPT

## 2025-08-12 PROCEDURE — 80053 COMPREHEN METABOLIC PANEL: CPT

## 2025-08-12 PROCEDURE — 99900031 HC PATIENT EDUCATION (STAT)

## 2025-08-12 PROCEDURE — 85025 COMPLETE CBC W/AUTO DIFF WBC: CPT

## 2025-08-12 PROCEDURE — 83735 ASSAY OF MAGNESIUM: CPT

## 2025-08-12 PROCEDURE — 21400001 HC TELEMETRY ROOM

## 2025-08-12 PROCEDURE — 99900035 HC TECH TIME PER 15 MIN (STAT)

## 2025-08-12 PROCEDURE — 63600175 PHARM REV CODE 636 W HCPCS

## 2025-08-12 PROCEDURE — 36415 COLL VENOUS BLD VENIPUNCTURE: CPT

## 2025-08-12 RX ORDER — PANTOPRAZOLE SODIUM 40 MG/1
40 TABLET, DELAYED RELEASE ORAL DAILY
Status: DISCONTINUED | OUTPATIENT
Start: 2025-08-13 | End: 2025-08-14

## 2025-08-12 RX ADMIN — KETOROLAC TROMETHAMINE 15 MG: 30 INJECTION, SOLUTION INTRAMUSCULAR; INTRAVENOUS at 11:08

## 2025-08-12 RX ADMIN — SODIUM CHLORIDE 125 ML/HR: 9 INJECTION, SOLUTION INTRAVENOUS at 11:08

## 2025-08-12 RX ADMIN — MORPHINE SULFATE 2 MG: 4 INJECTION, SOLUTION INTRAMUSCULAR; INTRAVENOUS at 10:08

## 2025-08-12 RX ADMIN — KETOROLAC TROMETHAMINE 15 MG: 30 INJECTION, SOLUTION INTRAMUSCULAR; INTRAVENOUS at 05:08

## 2025-08-12 RX ADMIN — SODIUM CHLORIDE 125 ML/HR: 9 INJECTION, SOLUTION INTRAVENOUS at 03:08

## 2025-08-12 RX ADMIN — ONDANSETRON 4 MG: 2 INJECTION INTRAMUSCULAR; INTRAVENOUS at 07:08

## 2025-08-12 RX ADMIN — APIXABAN 5 MG: 5 TABLET, FILM COATED ORAL at 09:08

## 2025-08-13 LAB
ANION GAP SERPL CALC-SCNC: 15 MEQ/L
BASOPHILS # BLD AUTO: 0.02 X10(3)/MCL
BASOPHILS NFR BLD AUTO: 0.2 %
BUN SERPL-MCNC: 24.2 MG/DL (ref 8.4–25.7)
CALCIUM SERPL-MCNC: 8.4 MG/DL (ref 8.4–10.2)
CHLORIDE SERPL-SCNC: 114 MMOL/L (ref 98–107)
CO2 SERPL-SCNC: 17 MMOL/L (ref 22–29)
CREAT SERPL-MCNC: 0.67 MG/DL (ref 0.72–1.25)
CREAT/UREA NIT SERPL: 36
EOSINOPHIL # BLD AUTO: 0.04 X10(3)/MCL (ref 0–0.9)
EOSINOPHIL NFR BLD AUTO: 0.5 %
ERYTHROCYTE [DISTWIDTH] IN BLOOD BY AUTOMATED COUNT: 13.2 % (ref 11.5–17)
GFR SERPLBLD CREATININE-BSD FMLA CKD-EPI: >60 ML/MIN/1.73/M2
GLUCOSE SERPL-MCNC: 52 MG/DL (ref 74–100)
HCT VFR BLD AUTO: 32.8 % (ref 42–52)
HGB BLD-MCNC: 10.4 G/DL (ref 14–18)
IMM GRANULOCYTES # BLD AUTO: 0.05 X10(3)/MCL (ref 0–0.04)
IMM GRANULOCYTES NFR BLD AUTO: 0.6 %
LYMPHOCYTES # BLD AUTO: 0.95 X10(3)/MCL (ref 0.6–4.6)
LYMPHOCYTES NFR BLD AUTO: 11.1 %
MAGNESIUM SERPL-MCNC: 2.3 MG/DL (ref 1.6–2.6)
MCH RBC QN AUTO: 30.8 PG (ref 27–31)
MCHC RBC AUTO-ENTMCNC: 31.7 G/DL (ref 33–36)
MCV RBC AUTO: 97 FL (ref 80–94)
MONOCYTES # BLD AUTO: 0.6 X10(3)/MCL (ref 0.1–1.3)
MONOCYTES NFR BLD AUTO: 7 %
NEUTROPHILS # BLD AUTO: 6.89 X10(3)/MCL (ref 2.1–9.2)
NEUTROPHILS NFR BLD AUTO: 80.6 %
NRBC BLD AUTO-RTO: 0 %
PHOSPHATE SERPL-MCNC: 2.4 MG/DL (ref 2.3–4.7)
PLATELET # BLD AUTO: 196 X10(3)/MCL (ref 130–400)
PMV BLD AUTO: 9.6 FL (ref 7.4–10.4)
POTASSIUM SERPL-SCNC: 4 MMOL/L (ref 3.5–5.1)
RBC # BLD AUTO: 3.38 X10(6)/MCL (ref 4.7–6.1)
SODIUM SERPL-SCNC: 146 MMOL/L (ref 136–145)
WBC # BLD AUTO: 8.55 X10(3)/MCL (ref 4.5–11.5)

## 2025-08-13 PROCEDURE — 94799 UNLISTED PULMONARY SVC/PX: CPT

## 2025-08-13 PROCEDURE — 11000001 HC ACUTE MED/SURG PRIVATE ROOM

## 2025-08-13 PROCEDURE — 36415 COLL VENOUS BLD VENIPUNCTURE: CPT | Performed by: INTERNAL MEDICINE

## 2025-08-13 PROCEDURE — 80048 BASIC METABOLIC PNL TOTAL CA: CPT | Performed by: INTERNAL MEDICINE

## 2025-08-13 PROCEDURE — 84100 ASSAY OF PHOSPHORUS: CPT | Performed by: INTERNAL MEDICINE

## 2025-08-13 PROCEDURE — 85025 COMPLETE CBC W/AUTO DIFF WBC: CPT | Performed by: INTERNAL MEDICINE

## 2025-08-13 PROCEDURE — 25000003 PHARM REV CODE 250: Performed by: INTERNAL MEDICINE

## 2025-08-13 PROCEDURE — 63600175 PHARM REV CODE 636 W HCPCS

## 2025-08-13 PROCEDURE — 83735 ASSAY OF MAGNESIUM: CPT | Performed by: INTERNAL MEDICINE

## 2025-08-13 PROCEDURE — 25000003 PHARM REV CODE 250: Performed by: STUDENT IN AN ORGANIZED HEALTH CARE EDUCATION/TRAINING PROGRAM

## 2025-08-13 PROCEDURE — 25000003 PHARM REV CODE 250: Performed by: COLON & RECTAL SURGERY

## 2025-08-13 PROCEDURE — 21400001 HC TELEMETRY ROOM

## 2025-08-13 RX ORDER — SODIUM CHLORIDE 9 MG/ML
INJECTION, SOLUTION INTRAVENOUS CONTINUOUS
Status: DISCONTINUED | OUTPATIENT
Start: 2025-08-13 | End: 2025-08-13

## 2025-08-13 RX ORDER — DEXTROSE MONOHYDRATE 50 MG/ML
INJECTION, SOLUTION INTRAVENOUS CONTINUOUS
Status: ACTIVE | OUTPATIENT
Start: 2025-08-13 | End: 2025-08-14

## 2025-08-13 RX ADMIN — KETOROLAC TROMETHAMINE 15 MG: 30 INJECTION, SOLUTION INTRAMUSCULAR; INTRAVENOUS at 10:08

## 2025-08-13 RX ADMIN — PROCHLORPERAZINE EDISYLATE 5 MG: 5 INJECTION INTRAMUSCULAR; INTRAVENOUS at 10:08

## 2025-08-13 RX ADMIN — KETOROLAC TROMETHAMINE 15 MG: 30 INJECTION, SOLUTION INTRAMUSCULAR; INTRAVENOUS at 05:08

## 2025-08-13 RX ADMIN — ONDANSETRON 4 MG: 2 INJECTION INTRAMUSCULAR; INTRAVENOUS at 06:08

## 2025-08-13 RX ADMIN — APIXABAN 5 MG: 5 TABLET, FILM COATED ORAL at 08:08

## 2025-08-13 RX ADMIN — DEXTROSE MONOHYDRATE: 50 INJECTION, SOLUTION INTRAVENOUS at 05:08

## 2025-08-13 RX ADMIN — KETOROLAC TROMETHAMINE 15 MG: 30 INJECTION, SOLUTION INTRAMUSCULAR; INTRAVENOUS at 11:08

## 2025-08-13 RX ADMIN — SODIUM CHLORIDE: 9 INJECTION, SOLUTION INTRAVENOUS at 11:08

## 2025-08-13 RX ADMIN — APIXABAN 5 MG: 5 TABLET, FILM COATED ORAL at 09:08

## 2025-08-13 RX ADMIN — PANTOPRAZOLE SODIUM 40 MG: 40 TABLET, DELAYED RELEASE ORAL at 09:08

## 2025-08-14 LAB
ANION GAP SERPL CALC-SCNC: 13 MEQ/L
BASOPHILS # BLD AUTO: 0.02 X10(3)/MCL
BASOPHILS NFR BLD AUTO: 0.3 %
BUN SERPL-MCNC: 21.5 MG/DL (ref 8.4–25.7)
CALCIUM SERPL-MCNC: 8.7 MG/DL (ref 8.4–10.2)
CHLORIDE SERPL-SCNC: 110 MMOL/L (ref 98–107)
CO2 SERPL-SCNC: 20 MMOL/L (ref 22–29)
CREAT SERPL-MCNC: 0.7 MG/DL (ref 0.72–1.25)
CREAT/UREA NIT SERPL: 31
EOSINOPHIL # BLD AUTO: 0.04 X10(3)/MCL (ref 0–0.9)
EOSINOPHIL NFR BLD AUTO: 0.7 %
ERYTHROCYTE [DISTWIDTH] IN BLOOD BY AUTOMATED COUNT: 13.1 % (ref 11.5–17)
GFR SERPLBLD CREATININE-BSD FMLA CKD-EPI: >60 ML/MIN/1.73/M2
GLUCOSE SERPL-MCNC: 98 MG/DL (ref 74–100)
HCT VFR BLD AUTO: 33.2 % (ref 42–52)
HGB BLD-MCNC: 10.7 G/DL (ref 14–18)
IMM GRANULOCYTES # BLD AUTO: 0.02 X10(3)/MCL (ref 0–0.04)
IMM GRANULOCYTES NFR BLD AUTO: 0.3 %
LYMPHOCYTES # BLD AUTO: 0.73 X10(3)/MCL (ref 0.6–4.6)
LYMPHOCYTES NFR BLD AUTO: 12.4 %
MAGNESIUM SERPL-MCNC: 2.1 MG/DL (ref 1.6–2.6)
MCH RBC QN AUTO: 30.6 PG (ref 27–31)
MCHC RBC AUTO-ENTMCNC: 32.2 G/DL (ref 33–36)
MCV RBC AUTO: 94.9 FL (ref 80–94)
MONOCYTES # BLD AUTO: 0.62 X10(3)/MCL (ref 0.1–1.3)
MONOCYTES NFR BLD AUTO: 10.6 %
NEUTROPHILS # BLD AUTO: 4.44 X10(3)/MCL (ref 2.1–9.2)
NEUTROPHILS NFR BLD AUTO: 75.7 %
NRBC BLD AUTO-RTO: 0 %
OHS QRS DURATION: 142 MS
OHS QTC CALCULATION: 467 MS
PHOSPHATE SERPL-MCNC: 1.8 MG/DL (ref 2.3–4.7)
PLATELET # BLD AUTO: 231 X10(3)/MCL (ref 130–400)
PMV BLD AUTO: 9.9 FL (ref 7.4–10.4)
POTASSIUM SERPL-SCNC: 3.3 MMOL/L (ref 3.5–5.1)
RBC # BLD AUTO: 3.5 X10(6)/MCL (ref 4.7–6.1)
SODIUM SERPL-SCNC: 143 MMOL/L (ref 136–145)
WBC # BLD AUTO: 5.87 X10(3)/MCL (ref 4.5–11.5)

## 2025-08-14 PROCEDURE — 25000003 PHARM REV CODE 250: Performed by: STUDENT IN AN ORGANIZED HEALTH CARE EDUCATION/TRAINING PROGRAM

## 2025-08-14 PROCEDURE — 21400001 HC TELEMETRY ROOM

## 2025-08-14 PROCEDURE — 63600175 PHARM REV CODE 636 W HCPCS: Mod: JZ,TB

## 2025-08-14 PROCEDURE — 99900035 HC TECH TIME PER 15 MIN (STAT)

## 2025-08-14 PROCEDURE — 99900031 HC PATIENT EDUCATION (STAT)

## 2025-08-14 PROCEDURE — 93010 ELECTROCARDIOGRAM REPORT: CPT | Mod: ,,, | Performed by: INTERNAL MEDICINE

## 2025-08-14 PROCEDURE — 63600175 PHARM REV CODE 636 W HCPCS: Performed by: INTERNAL MEDICINE

## 2025-08-14 PROCEDURE — 63600175 PHARM REV CODE 636 W HCPCS

## 2025-08-14 PROCEDURE — 94799 UNLISTED PULMONARY SVC/PX: CPT

## 2025-08-14 PROCEDURE — 80048 BASIC METABOLIC PNL TOTAL CA: CPT | Performed by: INTERNAL MEDICINE

## 2025-08-14 PROCEDURE — 85025 COMPLETE CBC W/AUTO DIFF WBC: CPT | Performed by: INTERNAL MEDICINE

## 2025-08-14 PROCEDURE — 84100 ASSAY OF PHOSPHORUS: CPT | Performed by: INTERNAL MEDICINE

## 2025-08-14 PROCEDURE — 83735 ASSAY OF MAGNESIUM: CPT | Performed by: INTERNAL MEDICINE

## 2025-08-14 PROCEDURE — 36415 COLL VENOUS BLD VENIPUNCTURE: CPT | Performed by: INTERNAL MEDICINE

## 2025-08-14 RX ORDER — PANTOPRAZOLE SODIUM 40 MG/10ML
40 INJECTION, POWDER, LYOPHILIZED, FOR SOLUTION INTRAVENOUS DAILY
Status: DISCONTINUED | OUTPATIENT
Start: 2025-08-14 | End: 2025-08-21

## 2025-08-14 RX ADMIN — ONDANSETRON 4 MG: 2 INJECTION INTRAMUSCULAR; INTRAVENOUS at 02:08

## 2025-08-14 RX ADMIN — PANTOPRAZOLE SODIUM 40 MG: 40 TABLET, DELAYED RELEASE ORAL at 11:08

## 2025-08-14 RX ADMIN — ONDANSETRON 4 MG: 2 INJECTION INTRAMUSCULAR; INTRAVENOUS at 11:08

## 2025-08-14 RX ADMIN — PANTOPRAZOLE SODIUM 40 MG: 40 INJECTION, POWDER, FOR SOLUTION INTRAVENOUS at 04:08

## 2025-08-14 RX ADMIN — KETOROLAC TROMETHAMINE 15 MG: 30 INJECTION, SOLUTION INTRAMUSCULAR; INTRAVENOUS at 11:08

## 2025-08-14 RX ADMIN — APIXABAN 5 MG: 5 TABLET, FILM COATED ORAL at 11:08

## 2025-08-14 RX ADMIN — KETOROLAC TROMETHAMINE 15 MG: 30 INJECTION, SOLUTION INTRAMUSCULAR; INTRAVENOUS at 05:08

## 2025-08-15 LAB
ALBUMIN SERPL-MCNC: 3 G/DL (ref 3.5–5)
ALBUMIN/GLOB SERPL: 0.7 RATIO (ref 1.1–2)
ALP SERPL-CCNC: 62 UNIT/L (ref 40–150)
ALT SERPL-CCNC: 8 UNIT/L (ref 0–55)
ANION GAP SERPL CALC-SCNC: 14 MEQ/L
AST SERPL-CCNC: 14 UNIT/L (ref 11–45)
BASOPHILS # BLD AUTO: 0.01 X10(3)/MCL
BASOPHILS NFR BLD AUTO: 0.2 %
BILIRUB SERPL-MCNC: 1.7 MG/DL
BUN SERPL-MCNC: 23.2 MG/DL (ref 8.4–25.7)
CALCIUM SERPL-MCNC: 9 MG/DL (ref 8.4–10.2)
CHLORIDE SERPL-SCNC: 105 MMOL/L (ref 98–107)
CO2 SERPL-SCNC: 24 MMOL/L (ref 22–29)
CREAT SERPL-MCNC: 0.6 MG/DL (ref 0.72–1.25)
CREAT/UREA NIT SERPL: 39
EOSINOPHIL # BLD AUTO: 0.06 X10(3)/MCL (ref 0–0.9)
EOSINOPHIL NFR BLD AUTO: 1.1 %
ERYTHROCYTE [DISTWIDTH] IN BLOOD BY AUTOMATED COUNT: 12.7 % (ref 11.5–17)
GFR SERPLBLD CREATININE-BSD FMLA CKD-EPI: >60 ML/MIN/1.73/M2
GLOBULIN SER-MCNC: 4.1 GM/DL (ref 2.4–3.5)
GLUCOSE SERPL-MCNC: 75 MG/DL (ref 74–100)
HCT VFR BLD AUTO: 34.5 % (ref 42–52)
HGB BLD-MCNC: 11.2 G/DL (ref 14–18)
IMM GRANULOCYTES # BLD AUTO: 0.01 X10(3)/MCL (ref 0–0.04)
IMM GRANULOCYTES NFR BLD AUTO: 0.2 %
LYMPHOCYTES # BLD AUTO: 0.91 X10(3)/MCL (ref 0.6–4.6)
LYMPHOCYTES NFR BLD AUTO: 17.4 %
MCH RBC QN AUTO: 30.5 PG (ref 27–31)
MCHC RBC AUTO-ENTMCNC: 32.5 G/DL (ref 33–36)
MCV RBC AUTO: 94 FL (ref 80–94)
MONOCYTES # BLD AUTO: 0.58 X10(3)/MCL (ref 0.1–1.3)
MONOCYTES NFR BLD AUTO: 11.1 %
NEUTROPHILS # BLD AUTO: 3.66 X10(3)/MCL (ref 2.1–9.2)
NEUTROPHILS NFR BLD AUTO: 70 %
NRBC BLD AUTO-RTO: 0 %
PLATELET # BLD AUTO: 222 X10(3)/MCL (ref 130–400)
PMV BLD AUTO: 10 FL (ref 7.4–10.4)
POTASSIUM SERPL-SCNC: 3.2 MMOL/L (ref 3.5–5.1)
PROT SERPL-MCNC: 7.1 GM/DL (ref 6.4–8.3)
RBC # BLD AUTO: 3.67 X10(6)/MCL (ref 4.7–6.1)
SODIUM SERPL-SCNC: 143 MMOL/L (ref 136–145)
WBC # BLD AUTO: 5.23 X10(3)/MCL (ref 4.5–11.5)

## 2025-08-15 PROCEDURE — 36415 COLL VENOUS BLD VENIPUNCTURE: CPT

## 2025-08-15 PROCEDURE — 85025 COMPLETE CBC W/AUTO DIFF WBC: CPT

## 2025-08-15 PROCEDURE — 25000003 PHARM REV CODE 250

## 2025-08-15 PROCEDURE — 21400001 HC TELEMETRY ROOM

## 2025-08-15 PROCEDURE — 63600175 PHARM REV CODE 636 W HCPCS: Performed by: INTERNAL MEDICINE

## 2025-08-15 PROCEDURE — 80053 COMPREHEN METABOLIC PANEL: CPT

## 2025-08-15 RX ORDER — DEXTROSE MONOHYDRATE 50 MG/ML
INJECTION, SOLUTION INTRAVENOUS CONTINUOUS
Status: DISCONTINUED | OUTPATIENT
Start: 2025-08-15 | End: 2025-08-16

## 2025-08-15 RX ADMIN — PANTOPRAZOLE SODIUM 40 MG: 40 INJECTION, POWDER, FOR SOLUTION INTRAVENOUS at 10:08

## 2025-08-15 RX ADMIN — DEXTROSE MONOHYDRATE: 50 INJECTION, SOLUTION INTRAVENOUS at 10:08

## 2025-08-16 LAB
ANION GAP SERPL CALC-SCNC: 10 MEQ/L
BACTERIA BLD CULT: NORMAL
BACTERIA BLD CULT: NORMAL
BUN SERPL-MCNC: 16.9 MG/DL (ref 8.4–25.7)
CALCIUM SERPL-MCNC: 8.3 MG/DL (ref 8.4–10.2)
CHLORIDE SERPL-SCNC: 103 MMOL/L (ref 98–107)
CO2 SERPL-SCNC: 29 MMOL/L (ref 22–29)
CREAT SERPL-MCNC: 0.54 MG/DL (ref 0.72–1.25)
CREAT/UREA NIT SERPL: 31
ERYTHROCYTE [DISTWIDTH] IN BLOOD BY AUTOMATED COUNT: 12.8 % (ref 11.5–17)
GFR SERPLBLD CREATININE-BSD FMLA CKD-EPI: >60 ML/MIN/1.73/M2
GLUCOSE SERPL-MCNC: 104 MG/DL (ref 74–100)
HCT VFR BLD AUTO: 31.3 % (ref 42–52)
HGB BLD-MCNC: 10.4 G/DL (ref 14–18)
MCH RBC QN AUTO: 30.6 PG (ref 27–31)
MCHC RBC AUTO-ENTMCNC: 33.2 G/DL (ref 33–36)
MCV RBC AUTO: 92.1 FL (ref 80–94)
NRBC BLD AUTO-RTO: 0 %
PLATELET # BLD AUTO: 243 X10(3)/MCL (ref 130–400)
PMV BLD AUTO: 10 FL (ref 7.4–10.4)
POCT GLUCOSE: 60 MG/DL (ref 70–110)
POTASSIUM SERPL-SCNC: 2.6 MMOL/L (ref 3.5–5.1)
RBC # BLD AUTO: 3.4 X10(6)/MCL (ref 4.7–6.1)
SODIUM SERPL-SCNC: 142 MMOL/L (ref 136–145)
WBC # BLD AUTO: 7.1 X10(3)/MCL (ref 4.5–11.5)

## 2025-08-16 PROCEDURE — 85027 COMPLETE CBC AUTOMATED: CPT | Performed by: INTERNAL MEDICINE

## 2025-08-16 PROCEDURE — 63600175 PHARM REV CODE 636 W HCPCS: Performed by: INTERNAL MEDICINE

## 2025-08-16 PROCEDURE — 21400001 HC TELEMETRY ROOM

## 2025-08-16 PROCEDURE — 99024 POSTOP FOLLOW-UP VISIT: CPT | Mod: POP,,, | Performed by: SURGERY

## 2025-08-16 PROCEDURE — 36415 COLL VENOUS BLD VENIPUNCTURE: CPT | Performed by: INTERNAL MEDICINE

## 2025-08-16 PROCEDURE — 94799 UNLISTED PULMONARY SVC/PX: CPT

## 2025-08-16 PROCEDURE — 80048 BASIC METABOLIC PNL TOTAL CA: CPT | Performed by: INTERNAL MEDICINE

## 2025-08-16 PROCEDURE — 25000003 PHARM REV CODE 250: Performed by: INTERNAL MEDICINE

## 2025-08-16 RX ORDER — POTASSIUM CHLORIDE 20 MEQ/1
40 TABLET, EXTENDED RELEASE ORAL 2 TIMES DAILY
Status: DISCONTINUED | OUTPATIENT
Start: 2025-08-16 | End: 2025-08-16

## 2025-08-16 RX ORDER — SODIUM CHLORIDE 450 MG/100ML
INJECTION, SOLUTION INTRAVENOUS CONTINUOUS
Status: DISCONTINUED | OUTPATIENT
Start: 2025-08-16 | End: 2025-08-17

## 2025-08-16 RX ORDER — POTASSIUM CHLORIDE 7.45 MG/ML
40 INJECTION INTRAVENOUS ONCE
Status: COMPLETED | OUTPATIENT
Start: 2025-08-16 | End: 2025-08-16

## 2025-08-16 RX ADMIN — PANTOPRAZOLE SODIUM 40 MG: 40 INJECTION, POWDER, FOR SOLUTION INTRAVENOUS at 08:08

## 2025-08-16 RX ADMIN — POTASSIUM CHLORIDE 10 MEQ: 7.46 INJECTION, SOLUTION INTRAVENOUS at 08:08

## 2025-08-16 RX ADMIN — SODIUM CHLORIDE: 4.5 INJECTION, SOLUTION INTRAVENOUS at 10:08

## 2025-08-16 RX ADMIN — SODIUM CHLORIDE 1000 ML: 9 INJECTION, SOLUTION INTRAVENOUS at 12:08

## 2025-08-16 RX ADMIN — SODIUM CHLORIDE: 4.5 INJECTION, SOLUTION INTRAVENOUS at 02:08

## 2025-08-17 LAB
ANION GAP SERPL CALC-SCNC: 10 MEQ/L
ANION GAP SERPL CALC-SCNC: 16 MEQ/L
BUN SERPL-MCNC: 12.8 MG/DL (ref 8.4–25.7)
BUN SERPL-MCNC: 12.9 MG/DL (ref 8.4–25.7)
CALCIUM SERPL-MCNC: 8.3 MG/DL (ref 8.4–10.2)
CALCIUM SERPL-MCNC: 8.4 MG/DL (ref 8.4–10.2)
CHLORIDE SERPL-SCNC: 102 MMOL/L (ref 98–107)
CHLORIDE SERPL-SCNC: 103 MMOL/L (ref 98–107)
CO2 SERPL-SCNC: 21 MMOL/L (ref 22–29)
CO2 SERPL-SCNC: 25 MMOL/L (ref 22–29)
CREAT SERPL-MCNC: 0.51 MG/DL (ref 0.72–1.25)
CREAT SERPL-MCNC: 0.53 MG/DL (ref 0.72–1.25)
CREAT/UREA NIT SERPL: 24
CREAT/UREA NIT SERPL: 25
GFR SERPLBLD CREATININE-BSD FMLA CKD-EPI: >60 ML/MIN/1.73/M2
GFR SERPLBLD CREATININE-BSD FMLA CKD-EPI: >60 ML/MIN/1.73/M2
GLUCOSE SERPL-MCNC: 130 MG/DL (ref 74–100)
GLUCOSE SERPL-MCNC: 44 MG/DL (ref 74–100)
MAGNESIUM SERPL-MCNC: 1.6 MG/DL (ref 1.6–2.6)
PHOSPHATE SERPL-MCNC: 2.5 MG/DL (ref 2.3–4.7)
POCT GLUCOSE: 117 MG/DL (ref 70–110)
POCT GLUCOSE: 130 MG/DL (ref 70–110)
POCT GLUCOSE: 54 MG/DL (ref 70–110)
POTASSIUM SERPL-SCNC: 2.8 MMOL/L (ref 3.5–5.1)
POTASSIUM SERPL-SCNC: 3.3 MMOL/L (ref 3.5–5.1)
SODIUM SERPL-SCNC: 138 MMOL/L (ref 136–145)
SODIUM SERPL-SCNC: 139 MMOL/L (ref 136–145)

## 2025-08-17 PROCEDURE — 99024 POSTOP FOLLOW-UP VISIT: CPT | Mod: POP,,, | Performed by: SURGERY

## 2025-08-17 PROCEDURE — 25000003 PHARM REV CODE 250: Performed by: INTERNAL MEDICINE

## 2025-08-17 PROCEDURE — 84100 ASSAY OF PHOSPHORUS: CPT | Performed by: INTERNAL MEDICINE

## 2025-08-17 PROCEDURE — 36415 COLL VENOUS BLD VENIPUNCTURE: CPT | Performed by: INTERNAL MEDICINE

## 2025-08-17 PROCEDURE — 25000003 PHARM REV CODE 250

## 2025-08-17 PROCEDURE — 25000003 PHARM REV CODE 250: Performed by: SURGERY

## 2025-08-17 PROCEDURE — 80048 BASIC METABOLIC PNL TOTAL CA: CPT | Performed by: INTERNAL MEDICINE

## 2025-08-17 PROCEDURE — 94760 N-INVAS EAR/PLS OXIMETRY 1: CPT

## 2025-08-17 PROCEDURE — 83735 ASSAY OF MAGNESIUM: CPT | Performed by: INTERNAL MEDICINE

## 2025-08-17 PROCEDURE — 94799 UNLISTED PULMONARY SVC/PX: CPT

## 2025-08-17 PROCEDURE — 21400001 HC TELEMETRY ROOM

## 2025-08-17 PROCEDURE — 99900031 HC PATIENT EDUCATION (STAT)

## 2025-08-17 PROCEDURE — 99900035 HC TECH TIME PER 15 MIN (STAT)

## 2025-08-17 PROCEDURE — 63600175 PHARM REV CODE 636 W HCPCS: Performed by: INTERNAL MEDICINE

## 2025-08-17 RX ORDER — POTASSIUM CHLORIDE 7.45 MG/ML
10 INJECTION INTRAVENOUS
Status: DISPENSED | OUTPATIENT
Start: 2025-08-17 | End: 2025-08-17

## 2025-08-17 RX ORDER — SYRING-NEEDL,DISP,INSUL,0.3 ML 29 G X1/2"
148 SYRINGE, EMPTY DISPOSABLE MISCELLANEOUS ONCE
Status: COMPLETED | OUTPATIENT
Start: 2025-08-17 | End: 2025-08-17

## 2025-08-17 RX ORDER — ENOXAPARIN SODIUM 100 MG/ML
40 INJECTION SUBCUTANEOUS EVERY 24 HOURS
Status: DISCONTINUED | OUTPATIENT
Start: 2025-08-17 | End: 2025-08-21

## 2025-08-17 RX ADMIN — POTASSIUM CHLORIDE 10 MEQ: 7.46 INJECTION, SOLUTION INTRAVENOUS at 11:08

## 2025-08-17 RX ADMIN — SODIUM CHLORIDE: 4.5 INJECTION, SOLUTION INTRAVENOUS at 05:08

## 2025-08-17 RX ADMIN — SODIUM CHLORIDE, POTASSIUM CHLORIDE, SODIUM LACTATE AND CALCIUM CHLORIDE 500 ML: 600; 310; 30; 20 INJECTION, SOLUTION INTRAVENOUS at 11:08

## 2025-08-17 RX ADMIN — DEXTROSE MONOHYDRATE 25 G: 25 INJECTION, SOLUTION INTRAVENOUS at 05:08

## 2025-08-17 RX ADMIN — ENOXAPARIN SODIUM 40 MG: 40 INJECTION SUBCUTANEOUS at 05:08

## 2025-08-17 RX ADMIN — DEXTROSE MONOHYDRATE 12.5 G: 25 INJECTION, SOLUTION INTRAVENOUS at 12:08

## 2025-08-17 RX ADMIN — POTASSIUM CHLORIDE 10 MEQ: 7.46 INJECTION, SOLUTION INTRAVENOUS at 01:08

## 2025-08-17 RX ADMIN — PANTOPRAZOLE SODIUM 40 MG: 40 INJECTION, POWDER, FOR SOLUTION INTRAVENOUS at 11:08

## 2025-08-17 RX ADMIN — MAGNESIUM CITRATE 148 ML: 1.75 LIQUID ORAL at 01:08

## 2025-08-17 RX ADMIN — LEUCINE, PHENYLALANINE, LYSINE, METHIONINE, ISOLEUCINE, VALINE, HISTIDINE, THREONINE, TRYPTOPHAN, ALANINE, GLYCINE, ARGININE, PROLINE, SERINE, TYROSINE, SODIUM ACETATE, DIBASIC POTASSIUM PHOSPHATE, MAGNESIUM CHLORIDE, SODIUM CHLORIDE, CALCIUM CHLORIDE, DEXTROSE
880; 489; 33; 5; 438; 204; 255; 311; 247; 51; 170; 238; 261; 289; 213; 297; 77; 179; 77; 17; 247 INJECTION INTRAVENOUS at 01:08

## 2025-08-17 RX ADMIN — POTASSIUM CHLORIDE 10 MEQ: 7.46 INJECTION, SOLUTION INTRAVENOUS at 12:08

## 2025-08-18 LAB
ANION GAP SERPL CALC-SCNC: 7 MEQ/L
BASOPHILS # BLD AUTO: 0.03 X10(3)/MCL
BASOPHILS NFR BLD AUTO: 0.3 %
BUN SERPL-MCNC: 14.5 MG/DL (ref 8.4–25.7)
CALCIUM SERPL-MCNC: 8.2 MG/DL (ref 8.4–10.2)
CHLORIDE SERPL-SCNC: 101 MMOL/L (ref 98–107)
CO2 SERPL-SCNC: 28 MMOL/L (ref 22–29)
CREAT SERPL-MCNC: 0.54 MG/DL (ref 0.72–1.25)
CREAT/UREA NIT SERPL: 27
EOSINOPHIL # BLD AUTO: 0.14 X10(3)/MCL (ref 0–0.9)
EOSINOPHIL NFR BLD AUTO: 1.2 %
ERYTHROCYTE [DISTWIDTH] IN BLOOD BY AUTOMATED COUNT: 13 % (ref 11.5–17)
GFR SERPLBLD CREATININE-BSD FMLA CKD-EPI: >60 ML/MIN/1.73/M2
GLUCOSE SERPL-MCNC: 149 MG/DL (ref 74–100)
HCT VFR BLD AUTO: 28 % (ref 42–52)
HGB BLD-MCNC: 9.5 G/DL (ref 14–18)
IMM GRANULOCYTES # BLD AUTO: 0.14 X10(3)/MCL (ref 0–0.04)
IMM GRANULOCYTES NFR BLD AUTO: 1.2 %
LYMPHOCYTES # BLD AUTO: 1.65 X10(3)/MCL (ref 0.6–4.6)
LYMPHOCYTES NFR BLD AUTO: 14.1 %
MAGNESIUM SERPL-MCNC: 1.8 MG/DL (ref 1.6–2.6)
MCH RBC QN AUTO: 31.3 PG (ref 27–31)
MCHC RBC AUTO-ENTMCNC: 33.9 G/DL (ref 33–36)
MCV RBC AUTO: 92.1 FL (ref 80–94)
MONOCYTES # BLD AUTO: 1.06 X10(3)/MCL (ref 0.1–1.3)
MONOCYTES NFR BLD AUTO: 9.1 %
NEUTROPHILS # BLD AUTO: 8.66 X10(3)/MCL (ref 2.1–9.2)
NEUTROPHILS NFR BLD AUTO: 74.1 %
NRBC BLD AUTO-RTO: 0 %
PHOSPHATE SERPL-MCNC: 3 MG/DL (ref 2.3–4.7)
PLATELET # BLD AUTO: 242 X10(3)/MCL (ref 130–400)
PMV BLD AUTO: 9.9 FL (ref 7.4–10.4)
POTASSIUM SERPL-SCNC: 3.1 MMOL/L (ref 3.5–5.1)
RBC # BLD AUTO: 3.04 X10(6)/MCL (ref 4.7–6.1)
SODIUM SERPL-SCNC: 136 MMOL/L (ref 136–145)
WBC # BLD AUTO: 11.68 X10(3)/MCL (ref 4.5–11.5)

## 2025-08-18 PROCEDURE — 36415 COLL VENOUS BLD VENIPUNCTURE: CPT | Performed by: INTERNAL MEDICINE

## 2025-08-18 PROCEDURE — 21400001 HC TELEMETRY ROOM

## 2025-08-18 PROCEDURE — 83735 ASSAY OF MAGNESIUM: CPT | Performed by: INTERNAL MEDICINE

## 2025-08-18 PROCEDURE — 63600175 PHARM REV CODE 636 W HCPCS: Performed by: INTERNAL MEDICINE

## 2025-08-18 PROCEDURE — 38221 DX BONE MARROW BIOPSIES: CPT

## 2025-08-18 PROCEDURE — 25000003 PHARM REV CODE 250: Performed by: INTERNAL MEDICINE

## 2025-08-18 PROCEDURE — 85025 COMPLETE CBC W/AUTO DIFF WBC: CPT | Performed by: INTERNAL MEDICINE

## 2025-08-18 PROCEDURE — 80048 BASIC METABOLIC PNL TOTAL CA: CPT | Performed by: INTERNAL MEDICINE

## 2025-08-18 PROCEDURE — 84100 ASSAY OF PHOSPHORUS: CPT | Performed by: INTERNAL MEDICINE

## 2025-08-18 PROCEDURE — 25000003 PHARM REV CODE 250

## 2025-08-18 RX ORDER — SYRING-NEEDL,DISP,INSUL,0.3 ML 29 G X1/2"
148 SYRINGE, EMPTY DISPOSABLE MISCELLANEOUS ONCE
Status: COMPLETED | OUTPATIENT
Start: 2025-08-18 | End: 2025-08-18

## 2025-08-18 RX ORDER — POTASSIUM CHLORIDE 7.45 MG/ML
10 INJECTION INTRAVENOUS
Status: COMPLETED | OUTPATIENT
Start: 2025-08-18 | End: 2025-08-18

## 2025-08-18 RX ADMIN — MAGNESIUM CITRATE 148 ML: 1.75 LIQUID ORAL at 11:08

## 2025-08-18 RX ADMIN — LEUCINE, PHENYLALANINE, LYSINE, METHIONINE, ISOLEUCINE, VALINE, HISTIDINE, THREONINE, TRYPTOPHAN, ALANINE, GLYCINE, ARGININE, PROLINE, SERINE, TYROSINE, SODIUM ACETATE, DIBASIC POTASSIUM PHOSPHATE, MAGNESIUM CHLORIDE, SODIUM CHLORIDE, CALCIUM CHLORIDE, DEXTROSE
880; 489; 33; 5; 438; 204; 255; 311; 247; 51; 170; 238; 261; 289; 213; 297; 77; 179; 77; 17; 247 INJECTION INTRAVENOUS at 12:08

## 2025-08-18 RX ADMIN — POTASSIUM CHLORIDE 10 MEQ: 7.46 INJECTION, SOLUTION INTRAVENOUS at 02:08

## 2025-08-18 RX ADMIN — PANTOPRAZOLE SODIUM 40 MG: 40 INJECTION, POWDER, FOR SOLUTION INTRAVENOUS at 09:08

## 2025-08-18 RX ADMIN — POTASSIUM CHLORIDE 10 MEQ: 7.46 INJECTION, SOLUTION INTRAVENOUS at 11:08

## 2025-08-18 RX ADMIN — POTASSIUM CHLORIDE 10 MEQ: 7.46 INJECTION, SOLUTION INTRAVENOUS at 10:08

## 2025-08-18 RX ADMIN — LEUCINE, PHENYLALANINE, LYSINE, METHIONINE, ISOLEUCINE, VALINE, HISTIDINE, THREONINE, TRYPTOPHAN, ALANINE, GLYCINE, ARGININE, PROLINE, SERINE, TYROSINE, SODIUM ACETATE, DIBASIC POTASSIUM PHOSPHATE, MAGNESIUM CHLORIDE, SODIUM CHLORIDE, CALCIUM CHLORIDE, DEXTROSE
880; 489; 33; 5; 438; 204; 255; 311; 247; 51; 170; 238; 261; 289; 213; 297; 77; 179; 77; 17; 247 INJECTION INTRAVENOUS at 09:08

## 2025-08-18 RX ADMIN — ENOXAPARIN SODIUM 40 MG: 40 INJECTION SUBCUTANEOUS at 08:08

## 2025-08-18 RX ADMIN — POTASSIUM CHLORIDE 10 MEQ: 7.46 INJECTION, SOLUTION INTRAVENOUS at 09:08

## 2025-08-19 LAB
ANION GAP SERPL CALC-SCNC: 8 MEQ/L
BUN SERPL-MCNC: 15 MG/DL (ref 8.4–25.7)
CALCIUM SERPL-MCNC: 8.4 MG/DL (ref 8.4–10.2)
CHLORIDE SERPL-SCNC: 99 MMOL/L (ref 98–107)
CO2 SERPL-SCNC: 28 MMOL/L (ref 22–29)
CREAT SERPL-MCNC: 0.46 MG/DL (ref 0.72–1.25)
CREAT/UREA NIT SERPL: 33
GFR SERPLBLD CREATININE-BSD FMLA CKD-EPI: >60 ML/MIN/1.73/M2
GLUCOSE SERPL-MCNC: 89 MG/DL (ref 74–100)
MAGNESIUM SERPL-MCNC: 1.9 MG/DL (ref 1.6–2.6)
POCT GLUCOSE: 119 MG/DL (ref 70–110)
POTASSIUM SERPL-SCNC: 3.2 MMOL/L (ref 3.5–5.1)
SODIUM SERPL-SCNC: 135 MMOL/L (ref 136–145)

## 2025-08-19 PROCEDURE — 80048 BASIC METABOLIC PNL TOTAL CA: CPT | Performed by: INTERNAL MEDICINE

## 2025-08-19 PROCEDURE — 21400001 HC TELEMETRY ROOM

## 2025-08-19 PROCEDURE — 63600175 PHARM REV CODE 636 W HCPCS: Performed by: INTERNAL MEDICINE

## 2025-08-19 PROCEDURE — 63600175 PHARM REV CODE 636 W HCPCS

## 2025-08-19 PROCEDURE — 36415 COLL VENOUS BLD VENIPUNCTURE: CPT | Performed by: INTERNAL MEDICINE

## 2025-08-19 PROCEDURE — 25000003 PHARM REV CODE 250: Performed by: INTERNAL MEDICINE

## 2025-08-19 PROCEDURE — 83735 ASSAY OF MAGNESIUM: CPT | Performed by: INTERNAL MEDICINE

## 2025-08-19 RX ORDER — POTASSIUM CHLORIDE 7.45 MG/ML
40 INJECTION INTRAVENOUS ONCE
Status: COMPLETED | OUTPATIENT
Start: 2025-08-19 | End: 2025-08-19

## 2025-08-19 RX ADMIN — ONDANSETRON 4 MG: 2 INJECTION INTRAMUSCULAR; INTRAVENOUS at 01:08

## 2025-08-19 RX ADMIN — LEUCINE, PHENYLALANINE, LYSINE, METHIONINE, ISOLEUCINE, VALINE, HISTIDINE, THREONINE, TRYPTOPHAN, ALANINE, GLYCINE, ARGININE, PROLINE, SERINE, TYROSINE, SODIUM ACETATE, DIBASIC POTASSIUM PHOSPHATE, MAGNESIUM CHLORIDE, SODIUM CHLORIDE, CALCIUM CHLORIDE, DEXTROSE
880; 489; 33; 5; 438; 204; 255; 311; 247; 51; 170; 238; 261; 289; 213; 297; 77; 179; 77; 17; 247 INJECTION INTRAVENOUS at 06:08

## 2025-08-19 RX ADMIN — ENOXAPARIN SODIUM 40 MG: 40 INJECTION SUBCUTANEOUS at 06:08

## 2025-08-19 RX ADMIN — PANTOPRAZOLE SODIUM 40 MG: 40 INJECTION, POWDER, FOR SOLUTION INTRAVENOUS at 09:08

## 2025-08-19 RX ADMIN — MORPHINE SULFATE 2 MG: 4 INJECTION, SOLUTION INTRAMUSCULAR; INTRAVENOUS at 06:08

## 2025-08-19 RX ADMIN — POTASSIUM CHLORIDE 40 MEQ: 7.46 INJECTION, SOLUTION INTRAVENOUS at 10:08

## 2025-08-20 LAB
ALBUMIN SERPL-MCNC: 2.5 G/DL (ref 3.5–5)
ALBUMIN/GLOB SERPL: 0.6 RATIO (ref 1.1–2)
ALP SERPL-CCNC: 71 UNIT/L (ref 40–150)
ALT SERPL-CCNC: 7 UNIT/L (ref 0–55)
ANION GAP SERPL CALC-SCNC: 9 MEQ/L
AST SERPL-CCNC: 16 UNIT/L (ref 11–45)
BASOPHILS # BLD AUTO: 0.03 X10(3)/MCL
BASOPHILS NFR BLD AUTO: 0.3 %
BILIRUB SERPL-MCNC: 1.2 MG/DL
BUN SERPL-MCNC: 20 MG/DL (ref 8.4–25.7)
CALCIUM SERPL-MCNC: 8.2 MG/DL (ref 8.4–10.2)
CHLORIDE SERPL-SCNC: 98 MMOL/L (ref 98–107)
CO2 SERPL-SCNC: 27 MMOL/L (ref 22–29)
CREAT SERPL-MCNC: 0.51 MG/DL (ref 0.72–1.25)
CREAT/UREA NIT SERPL: 39
EOSINOPHIL # BLD AUTO: 0.18 X10(3)/MCL (ref 0–0.9)
EOSINOPHIL NFR BLD AUTO: 1.7 %
ERYTHROCYTE [DISTWIDTH] IN BLOOD BY AUTOMATED COUNT: 13.1 % (ref 11.5–17)
GFR SERPLBLD CREATININE-BSD FMLA CKD-EPI: >60 ML/MIN/1.73/M2
GLOBULIN SER-MCNC: 4.1 GM/DL (ref 2.4–3.5)
GLUCOSE SERPL-MCNC: 110 MG/DL (ref 74–100)
HCT VFR BLD AUTO: 27.2 % (ref 42–52)
HGB BLD-MCNC: 9.3 G/DL (ref 14–18)
IMM GRANULOCYTES # BLD AUTO: 0.14 X10(3)/MCL (ref 0–0.04)
IMM GRANULOCYTES NFR BLD AUTO: 1.3 %
LYMPHOCYTES # BLD AUTO: 1.71 X10(3)/MCL (ref 0.6–4.6)
LYMPHOCYTES NFR BLD AUTO: 16.1 %
MAGNESIUM SERPL-MCNC: 2.3 MG/DL (ref 1.6–2.6)
MCH RBC QN AUTO: 31.3 PG (ref 27–31)
MCHC RBC AUTO-ENTMCNC: 34.2 G/DL (ref 33–36)
MCV RBC AUTO: 91.6 FL (ref 80–94)
MONOCYTES # BLD AUTO: 1.38 X10(3)/MCL (ref 0.1–1.3)
MONOCYTES NFR BLD AUTO: 13 %
NEUTROPHILS # BLD AUTO: 7.18 X10(3)/MCL (ref 2.1–9.2)
NEUTROPHILS NFR BLD AUTO: 67.6 %
NRBC BLD AUTO-RTO: 0 %
PLATELET # BLD AUTO: 254 X10(3)/MCL (ref 130–400)
PMV BLD AUTO: 10.3 FL (ref 7.4–10.4)
POTASSIUM SERPL-SCNC: 4.2 MMOL/L (ref 3.5–5.1)
PROT SERPL-MCNC: 6.6 GM/DL (ref 6.4–8.3)
RBC # BLD AUTO: 2.97 X10(6)/MCL (ref 4.7–6.1)
SODIUM SERPL-SCNC: 134 MMOL/L (ref 136–145)
WBC # BLD AUTO: 10.62 X10(3)/MCL (ref 4.5–11.5)

## 2025-08-20 PROCEDURE — 63600175 PHARM REV CODE 636 W HCPCS: Performed by: INTERNAL MEDICINE

## 2025-08-20 PROCEDURE — 80053 COMPREHEN METABOLIC PANEL: CPT

## 2025-08-20 PROCEDURE — 25000003 PHARM REV CODE 250: Performed by: INTERNAL MEDICINE

## 2025-08-20 PROCEDURE — 85025 COMPLETE CBC W/AUTO DIFF WBC: CPT

## 2025-08-20 PROCEDURE — 99900035 HC TECH TIME PER 15 MIN (STAT)

## 2025-08-20 PROCEDURE — 21400001 HC TELEMETRY ROOM

## 2025-08-20 PROCEDURE — 94799 UNLISTED PULMONARY SVC/PX: CPT

## 2025-08-20 PROCEDURE — 25500020 PHARM REV CODE 255: Performed by: INTERNAL MEDICINE

## 2025-08-20 PROCEDURE — 36415 COLL VENOUS BLD VENIPUNCTURE: CPT

## 2025-08-20 PROCEDURE — 83735 ASSAY OF MAGNESIUM: CPT | Performed by: INTERNAL MEDICINE

## 2025-08-20 PROCEDURE — 99900031 HC PATIENT EDUCATION (STAT)

## 2025-08-20 RX ORDER — DIATRIZOATE MEGLUMINE AND DIATRIZOATE SODIUM 660; 100 MG/ML; MG/ML
120 SOLUTION ORAL; RECTAL
Status: COMPLETED | OUTPATIENT
Start: 2025-08-20 | End: 2025-08-20

## 2025-08-20 RX ADMIN — PANTOPRAZOLE SODIUM 40 MG: 40 INJECTION, POWDER, FOR SOLUTION INTRAVENOUS at 09:08

## 2025-08-20 RX ADMIN — LEUCINE, PHENYLALANINE, LYSINE, METHIONINE, ISOLEUCINE, VALINE, HISTIDINE, THREONINE, TRYPTOPHAN, ALANINE, GLYCINE, ARGININE, PROLINE, SERINE, TYROSINE, SODIUM ACETATE, DIBASIC POTASSIUM PHOSPHATE, MAGNESIUM CHLORIDE, SODIUM CHLORIDE, CALCIUM CHLORIDE, DEXTROSE
880; 489; 33; 5; 438; 204; 255; 311; 247; 51; 170; 238; 261; 289; 213; 297; 77; 179; 77; 17; 247 INJECTION INTRAVENOUS at 07:08

## 2025-08-20 RX ADMIN — ENOXAPARIN SODIUM 40 MG: 40 INJECTION SUBCUTANEOUS at 05:08

## 2025-08-20 RX ADMIN — LEUCINE, PHENYLALANINE, LYSINE, METHIONINE, ISOLEUCINE, VALINE, HISTIDINE, THREONINE, TRYPTOPHAN, ALANINE, GLYCINE, ARGININE, PROLINE, SERINE, TYROSINE, SODIUM ACETATE, DIBASIC POTASSIUM PHOSPHATE, MAGNESIUM CHLORIDE, SODIUM CHLORIDE, CALCIUM CHLORIDE, DEXTROSE
880; 489; 33; 5; 438; 204; 255; 311; 247; 51; 170; 238; 261; 289; 213; 297; 77; 179; 77; 17; 247 INJECTION INTRAVENOUS at 08:08

## 2025-08-20 RX ADMIN — DIATRIZOATE MEGLUMINE AND DIATRIZOATE SODIUM 120 ML: 660; 100 LIQUID ORAL; RECTAL at 02:08

## 2025-08-21 LAB
MAGNESIUM SERPL-MCNC: 2.3 MG/DL (ref 1.6–2.6)
PHOSPHATE SERPL-MCNC: 3.3 MG/DL (ref 2.3–4.7)
POCT GLUCOSE: 117 MG/DL (ref 70–110)
POTASSIUM SERPL-SCNC: 4.4 MMOL/L (ref 3.5–5.1)

## 2025-08-21 PROCEDURE — 36415 COLL VENOUS BLD VENIPUNCTURE: CPT | Performed by: INTERNAL MEDICINE

## 2025-08-21 PROCEDURE — 25000003 PHARM REV CODE 250

## 2025-08-21 PROCEDURE — 25000003 PHARM REV CODE 250: Performed by: INTERNAL MEDICINE

## 2025-08-21 PROCEDURE — 84100 ASSAY OF PHOSPHORUS: CPT | Performed by: INTERNAL MEDICINE

## 2025-08-21 PROCEDURE — 83735 ASSAY OF MAGNESIUM: CPT | Performed by: INTERNAL MEDICINE

## 2025-08-21 PROCEDURE — 94799 UNLISTED PULMONARY SVC/PX: CPT

## 2025-08-21 PROCEDURE — 21400001 HC TELEMETRY ROOM

## 2025-08-21 PROCEDURE — 36410 VNPNXR 3YR/> PHY/QHP DX/THER: CPT

## 2025-08-21 PROCEDURE — 25000003 PHARM REV CODE 250: Performed by: COLON & RECTAL SURGERY

## 2025-08-21 PROCEDURE — 84132 ASSAY OF SERUM POTASSIUM: CPT | Performed by: INTERNAL MEDICINE

## 2025-08-21 PROCEDURE — C1751 CATH, INF, PER/CENT/MIDLINE: HCPCS

## 2025-08-21 PROCEDURE — 63600175 PHARM REV CODE 636 W HCPCS: Performed by: INTERNAL MEDICINE

## 2025-08-21 RX ORDER — ENOXAPARIN SODIUM 100 MG/ML
1 INJECTION SUBCUTANEOUS EVERY 12 HOURS
Status: DISCONTINUED | OUTPATIENT
Start: 2025-08-21 | End: 2025-08-27 | Stop reason: HOSPADM

## 2025-08-21 RX ORDER — METOCLOPRAMIDE 10 MG/1
10 TABLET ORAL
Status: DISCONTINUED | OUTPATIENT
Start: 2025-08-21 | End: 2025-08-27 | Stop reason: HOSPADM

## 2025-08-21 RX ORDER — PANTOPRAZOLE SODIUM 40 MG/1
40 TABLET, DELAYED RELEASE ORAL DAILY
Status: DISCONTINUED | OUTPATIENT
Start: 2025-08-21 | End: 2025-08-27 | Stop reason: HOSPADM

## 2025-08-21 RX ORDER — SODIUM CHLORIDE 0.9 % (FLUSH) 0.9 %
10 SYRINGE (ML) INJECTION EVERY 12 HOURS PRN
Status: DISCONTINUED | OUTPATIENT
Start: 2025-08-21 | End: 2025-08-27 | Stop reason: HOSPADM

## 2025-08-21 RX ADMIN — SODIUM CHLORIDE 500 ML: 9 INJECTION, SOLUTION INTRAVENOUS at 06:08

## 2025-08-21 RX ADMIN — ENOXAPARIN SODIUM 60 MG: 60 INJECTION SUBCUTANEOUS at 04:08

## 2025-08-21 RX ADMIN — METOCLOPRAMIDE 10 MG: 10 TABLET ORAL at 04:08

## 2025-08-21 RX ADMIN — PANTOPRAZOLE SODIUM 40 MG: 40 TABLET, DELAYED RELEASE ORAL at 08:08

## 2025-08-21 RX ADMIN — METOCLOPRAMIDE 10 MG: 10 TABLET ORAL at 11:08

## 2025-08-21 RX ADMIN — LEUCINE, PHENYLALANINE, LYSINE, METHIONINE, ISOLEUCINE, VALINE, HISTIDINE, THREONINE, TRYPTOPHAN, ALANINE, GLYCINE, ARGININE, PROLINE, SERINE, TYROSINE, SODIUM ACETATE, DIBASIC POTASSIUM PHOSPHATE, MAGNESIUM CHLORIDE, SODIUM CHLORIDE, CALCIUM CHLORIDE, DEXTROSE
880; 489; 33; 5; 438; 204; 255; 311; 247; 51; 170; 238; 261; 289; 213; 297; 77; 179; 77; 17; 247 INJECTION INTRAVENOUS at 06:08

## 2025-08-21 RX ADMIN — LEUCINE, PHENYLALANINE, LYSINE, METHIONINE, ISOLEUCINE, VALINE, HISTIDINE, THREONINE, TRYPTOPHAN, ALANINE, GLYCINE, ARGININE, PROLINE, SERINE, TYROSINE, SODIUM ACETATE, DIBASIC POTASSIUM PHOSPHATE, MAGNESIUM CHLORIDE, SODIUM CHLORIDE, CALCIUM CHLORIDE, DEXTROSE
880; 489; 33; 5; 438; 204; 255; 311; 247; 51; 170; 238; 261; 289; 213; 297; 77; 179; 77; 17; 247 INJECTION INTRAVENOUS at 04:08

## 2025-08-22 LAB
ANION GAP SERPL CALC-SCNC: 8 MEQ/L
BUN SERPL-MCNC: 24.6 MG/DL (ref 8.4–25.7)
CALCIUM SERPL-MCNC: 8.9 MG/DL (ref 8.4–10.2)
CHLORIDE SERPL-SCNC: 103 MMOL/L (ref 98–107)
CO2 SERPL-SCNC: 23 MMOL/L (ref 22–29)
CREAT SERPL-MCNC: 0.6 MG/DL (ref 0.72–1.25)
CREAT/UREA NIT SERPL: 41
GFR SERPLBLD CREATININE-BSD FMLA CKD-EPI: >60 ML/MIN/1.73/M2
GLUCOSE SERPL-MCNC: 101 MG/DL (ref 74–100)
HCT VFR BLD AUTO: 32.3 % (ref 42–52)
HGB BLD-MCNC: 10.5 G/DL (ref 14–18)
MAGNESIUM SERPL-MCNC: 2.3 MG/DL (ref 1.6–2.6)
PHOSPHATE SERPL-MCNC: 3.7 MG/DL (ref 2.3–4.7)
POTASSIUM SERPL-SCNC: 5.5 MMOL/L (ref 3.5–5.1)
SODIUM SERPL-SCNC: 134 MMOL/L (ref 136–145)

## 2025-08-22 PROCEDURE — 83735 ASSAY OF MAGNESIUM: CPT | Performed by: INTERNAL MEDICINE

## 2025-08-22 PROCEDURE — 25000003 PHARM REV CODE 250: Performed by: COLON & RECTAL SURGERY

## 2025-08-22 PROCEDURE — 99900035 HC TECH TIME PER 15 MIN (STAT)

## 2025-08-22 PROCEDURE — 21400001 HC TELEMETRY ROOM

## 2025-08-22 PROCEDURE — 99900031 HC PATIENT EDUCATION (STAT)

## 2025-08-22 PROCEDURE — 36415 COLL VENOUS BLD VENIPUNCTURE: CPT | Performed by: INTERNAL MEDICINE

## 2025-08-22 PROCEDURE — 85018 HEMOGLOBIN: CPT | Performed by: INTERNAL MEDICINE

## 2025-08-22 PROCEDURE — 97166 OT EVAL MOD COMPLEX 45 MIN: CPT

## 2025-08-22 PROCEDURE — 97161 PT EVAL LOW COMPLEX 20 MIN: CPT

## 2025-08-22 PROCEDURE — 25000003 PHARM REV CODE 250: Performed by: INTERNAL MEDICINE

## 2025-08-22 PROCEDURE — 84100 ASSAY OF PHOSPHORUS: CPT | Performed by: INTERNAL MEDICINE

## 2025-08-22 PROCEDURE — 94799 UNLISTED PULMONARY SVC/PX: CPT

## 2025-08-22 PROCEDURE — 63600175 PHARM REV CODE 636 W HCPCS: Performed by: INTERNAL MEDICINE

## 2025-08-22 PROCEDURE — 80048 BASIC METABOLIC PNL TOTAL CA: CPT | Performed by: INTERNAL MEDICINE

## 2025-08-22 PROCEDURE — 25000003 PHARM REV CODE 250

## 2025-08-22 RX ORDER — SULFAMETHOXAZOLE AND TRIMETHOPRIM 800; 160 MG/1; MG/1
1 TABLET ORAL 2 TIMES DAILY
Status: DISCONTINUED | OUTPATIENT
Start: 2025-08-22 | End: 2025-08-25

## 2025-08-22 RX ORDER — CALCIUM GLUCONATE 20 MG/ML
1 INJECTION, SOLUTION INTRAVENOUS ONCE
Status: COMPLETED | OUTPATIENT
Start: 2025-08-22 | End: 2025-08-22

## 2025-08-22 RX ADMIN — METOCLOPRAMIDE 10 MG: 10 TABLET ORAL at 05:08

## 2025-08-22 RX ADMIN — ENOXAPARIN SODIUM 60 MG: 60 INJECTION SUBCUTANEOUS at 10:08

## 2025-08-22 RX ADMIN — SULFAMETHOXAZOLE AND TRIMETHOPRIM 1 TABLET: 800; 160 TABLET ORAL at 08:08

## 2025-08-22 RX ADMIN — LEUCINE, PHENYLALANINE, LYSINE, METHIONINE, ISOLEUCINE, VALINE, HISTIDINE, THREONINE, TRYPTOPHAN, ALANINE, GLYCINE, ARGININE, PROLINE, SERINE, TYROSINE, SODIUM ACETATE, DIBASIC POTASSIUM PHOSPHATE, MAGNESIUM CHLORIDE, SODIUM CHLORIDE, CALCIUM CHLORIDE, DEXTROSE
880; 489; 33; 5; 438; 204; 255; 311; 247; 51; 170; 238; 261; 289; 213; 297; 77; 179; 77; 17; 247 INJECTION INTRAVENOUS at 04:08

## 2025-08-22 RX ADMIN — METOCLOPRAMIDE 10 MG: 10 TABLET ORAL at 04:08

## 2025-08-22 RX ADMIN — PANTOPRAZOLE SODIUM 40 MG: 40 TABLET, DELAYED RELEASE ORAL at 10:08

## 2025-08-22 RX ADMIN — LEUCINE, PHENYLALANINE, LYSINE, METHIONINE, ISOLEUCINE, VALINE, HISTIDINE, THREONINE, TRYPTOPHAN, ALANINE, GLYCINE, ARGININE, PROLINE, SERINE, TYROSINE, SODIUM ACETATE, DIBASIC POTASSIUM PHOSPHATE, MAGNESIUM CHLORIDE, SODIUM CHLORIDE, CALCIUM CHLORIDE, DEXTROSE
880; 489; 33; 5; 438; 204; 255; 311; 247; 51; 170; 238; 261; 289; 213; 297; 77; 179; 77; 17; 247 INJECTION INTRAVENOUS at 06:08

## 2025-08-22 RX ADMIN — METOCLOPRAMIDE 10 MG: 10 TABLET ORAL at 10:08

## 2025-08-22 RX ADMIN — CALCIUM GLUCONATE 1 G: 20 INJECTION, SOLUTION INTRAVENOUS at 04:08

## 2025-08-22 RX ADMIN — SULFAMETHOXAZOLE AND TRIMETHOPRIM 1 TABLET: 800; 160 TABLET ORAL at 10:08

## 2025-08-22 RX ADMIN — ENOXAPARIN SODIUM 60 MG: 60 INJECTION SUBCUTANEOUS at 08:08

## 2025-08-23 LAB
AMORPH URATE CRY URNS QL MICRO: ABNORMAL /UL
BACTERIA #/AREA URNS AUTO: ABNORMAL /HPF
BILIRUB UR QL STRIP.AUTO: NEGATIVE
CLARITY UR: ABNORMAL
COLOR UR AUTO: ABNORMAL
GLUCOSE UR QL STRIP: NORMAL
HGB UR QL STRIP: ABNORMAL
KETONES UR QL STRIP: NEGATIVE
LACTATE SERPL-SCNC: 1 MMOL/L (ref 0.5–2.2)
LEUKOCYTE ESTERASE UR QL STRIP: 500
MAGNESIUM SERPL-MCNC: 2.2 MG/DL (ref 1.6–2.6)
MUCOUS THREADS URNS QL MICRO: ABNORMAL /LPF
NITRITE UR QL STRIP: ABNORMAL
OHS QRS DURATION: 114 MS
OHS QTC CALCULATION: 464 MS
PH UR STRIP: 8.5 [PH]
PHOSPHATE SERPL-MCNC: 3.9 MG/DL (ref 2.3–4.7)
POTASSIUM SERPL-SCNC: 5.5 MMOL/L (ref 3.5–5.1)
PROT UR QL STRIP: ABNORMAL
RBC #/AREA URNS AUTO: >100 /HPF
SP GR UR STRIP.AUTO: 1.02 (ref 1–1.03)
SQUAMOUS #/AREA URNS LPF: ABNORMAL /HPF
TRI-PHOS CRY UR QL COMP ASSIST: ABNORMAL
UROBILINOGEN UR STRIP-ACNC: NORMAL
WBC #/AREA URNS AUTO: ABNORMAL /HPF

## 2025-08-23 PROCEDURE — 25000003 PHARM REV CODE 250

## 2025-08-23 PROCEDURE — 87040 BLOOD CULTURE FOR BACTERIA: CPT | Performed by: INTERNAL MEDICINE

## 2025-08-23 PROCEDURE — 84132 ASSAY OF SERUM POTASSIUM: CPT | Performed by: INTERNAL MEDICINE

## 2025-08-23 PROCEDURE — 84100 ASSAY OF PHOSPHORUS: CPT | Performed by: INTERNAL MEDICINE

## 2025-08-23 PROCEDURE — 83605 ASSAY OF LACTIC ACID: CPT | Performed by: INTERNAL MEDICINE

## 2025-08-23 PROCEDURE — 93005 ELECTROCARDIOGRAM TRACING: CPT

## 2025-08-23 PROCEDURE — 36410 VNPNXR 3YR/> PHY/QHP DX/THER: CPT

## 2025-08-23 PROCEDURE — 63600175 PHARM REV CODE 636 W HCPCS: Performed by: INTERNAL MEDICINE

## 2025-08-23 PROCEDURE — 99900031 HC PATIENT EDUCATION (STAT)

## 2025-08-23 PROCEDURE — 76937 US GUIDE VASCULAR ACCESS: CPT

## 2025-08-23 PROCEDURE — 36415 COLL VENOUS BLD VENIPUNCTURE: CPT | Performed by: INTERNAL MEDICINE

## 2025-08-23 PROCEDURE — 21400001 HC TELEMETRY ROOM

## 2025-08-23 PROCEDURE — 93010 ELECTROCARDIOGRAM REPORT: CPT | Mod: ,,, | Performed by: INTERNAL MEDICINE

## 2025-08-23 PROCEDURE — 83735 ASSAY OF MAGNESIUM: CPT | Performed by: INTERNAL MEDICINE

## 2025-08-23 PROCEDURE — C1751 CATH, INF, PER/CENT/MIDLINE: HCPCS

## 2025-08-23 PROCEDURE — 25000003 PHARM REV CODE 250: Performed by: INTERNAL MEDICINE

## 2025-08-23 PROCEDURE — 25000003 PHARM REV CODE 250: Performed by: COLON & RECTAL SURGERY

## 2025-08-23 PROCEDURE — 81001 URINALYSIS AUTO W/SCOPE: CPT | Performed by: INTERNAL MEDICINE

## 2025-08-23 PROCEDURE — 94799 UNLISTED PULMONARY SVC/PX: CPT

## 2025-08-23 RX ORDER — METRONIDAZOLE 500 MG/100ML
500 INJECTION, SOLUTION INTRAVENOUS
Status: DISCONTINUED | OUTPATIENT
Start: 2025-08-23 | End: 2025-08-27 | Stop reason: HOSPADM

## 2025-08-23 RX ORDER — AZTREONAM 2 G/1
2 INJECTION, POWDER, LYOPHILIZED, FOR SOLUTION INTRAMUSCULAR; INTRAVENOUS
Status: DISCONTINUED | OUTPATIENT
Start: 2025-08-23 | End: 2025-08-26

## 2025-08-23 RX ORDER — SODIUM CHLORIDE, SODIUM LACTATE, POTASSIUM CHLORIDE, CALCIUM CHLORIDE 600; 310; 30; 20 MG/100ML; MG/100ML; MG/100ML; MG/100ML
INJECTION, SOLUTION INTRAVENOUS CONTINUOUS
Status: DISCONTINUED | OUTPATIENT
Start: 2025-08-23 | End: 2025-08-24

## 2025-08-23 RX ORDER — ONDANSETRON 4 MG/1
4 TABLET, ORALLY DISINTEGRATING ORAL ONCE
Status: COMPLETED | OUTPATIENT
Start: 2025-08-23 | End: 2025-08-23

## 2025-08-23 RX ADMIN — METRONIDAZOLE 500 MG: 5 INJECTION, SOLUTION INTRAVENOUS at 07:08

## 2025-08-23 RX ADMIN — LEUCINE, PHENYLALANINE, LYSINE, METHIONINE, ISOLEUCINE, VALINE, HISTIDINE, THREONINE, TRYPTOPHAN, ALANINE, GLYCINE, ARGININE, PROLINE, SERINE, TYROSINE, SODIUM ACETATE, DIBASIC POTASSIUM PHOSPHATE, MAGNESIUM CHLORIDE, SODIUM CHLORIDE, CALCIUM CHLORIDE, DEXTROSE
880; 489; 33; 5; 438; 204; 255; 311; 247; 51; 170; 238; 261; 289; 213; 297; 77; 179; 77; 17; 247 INJECTION INTRAVENOUS at 09:08

## 2025-08-23 RX ADMIN — METOCLOPRAMIDE 10 MG: 10 TABLET ORAL at 05:08

## 2025-08-23 RX ADMIN — ENOXAPARIN SODIUM 60 MG: 60 INJECTION SUBCUTANEOUS at 09:08

## 2025-08-23 RX ADMIN — SULFAMETHOXAZOLE AND TRIMETHOPRIM 1 TABLET: 800; 160 TABLET ORAL at 09:08

## 2025-08-23 RX ADMIN — SODIUM CHLORIDE 500 ML: 9 INJECTION, SOLUTION INTRAVENOUS at 03:08

## 2025-08-23 RX ADMIN — ONDANSETRON 4 MG: 4 TABLET, ORALLY DISINTEGRATING ORAL at 11:08

## 2025-08-23 RX ADMIN — LEUCINE, PHENYLALANINE, LYSINE, METHIONINE, ISOLEUCINE, VALINE, HISTIDINE, THREONINE, TRYPTOPHAN, ALANINE, GLYCINE, ARGININE, PROLINE, SERINE, TYROSINE, SODIUM ACETATE, DIBASIC POTASSIUM PHOSPHATE, MAGNESIUM CHLORIDE, SODIUM CHLORIDE, CALCIUM CHLORIDE, DEXTROSE
880; 489; 33; 5; 438; 204; 255; 311; 247; 51; 170; 238; 261; 289; 213; 297; 77; 179; 77; 17; 247 INJECTION INTRAVENOUS at 03:08

## 2025-08-23 RX ADMIN — SODIUM CHLORIDE, POTASSIUM CHLORIDE, SODIUM LACTATE AND CALCIUM CHLORIDE 500 ML: 600; 310; 30; 20 INJECTION, SOLUTION INTRAVENOUS at 03:08

## 2025-08-23 RX ADMIN — METOCLOPRAMIDE 10 MG: 10 TABLET ORAL at 11:08

## 2025-08-23 RX ADMIN — PANTOPRAZOLE SODIUM 40 MG: 40 TABLET, DELAYED RELEASE ORAL at 09:08

## 2025-08-23 RX ADMIN — AZTREONAM 2 G: 2 INJECTION, POWDER, LYOPHILIZED, FOR SOLUTION INTRAMUSCULAR; INTRAVENOUS at 07:08

## 2025-08-23 RX ADMIN — SODIUM CHLORIDE, POTASSIUM CHLORIDE, SODIUM LACTATE AND CALCIUM CHLORIDE: 600; 310; 30; 20 INJECTION, SOLUTION INTRAVENOUS at 02:08

## 2025-08-24 LAB
ANION GAP SERPL CALC-SCNC: 8 MEQ/L
BASOPHILS # BLD AUTO: 0.04 X10(3)/MCL
BASOPHILS NFR BLD AUTO: 0.4 %
BUN SERPL-MCNC: 27.2 MG/DL (ref 8.4–25.7)
CALCIUM SERPL-MCNC: 8.3 MG/DL (ref 8.4–10.2)
CHLORIDE SERPL-SCNC: 102 MMOL/L (ref 98–107)
CO2 SERPL-SCNC: 19 MMOL/L (ref 22–29)
CREAT SERPL-MCNC: 0.83 MG/DL (ref 0.72–1.25)
CREAT/UREA NIT SERPL: 33
EOSINOPHIL # BLD AUTO: 0.17 X10(3)/MCL (ref 0–0.9)
EOSINOPHIL NFR BLD AUTO: 1.6 %
ERYTHROCYTE [DISTWIDTH] IN BLOOD BY AUTOMATED COUNT: 13.6 % (ref 11.5–17)
GFR SERPLBLD CREATININE-BSD FMLA CKD-EPI: >60 ML/MIN/1.73/M2
GLUCOSE SERPL-MCNC: 102 MG/DL (ref 74–100)
HCT VFR BLD AUTO: 29.3 % (ref 42–52)
HGB BLD-MCNC: 9.5 G/DL (ref 14–18)
IMM GRANULOCYTES # BLD AUTO: 0.13 X10(3)/MCL (ref 0–0.04)
IMM GRANULOCYTES NFR BLD AUTO: 1.2 %
LACTATE SERPL-SCNC: 1.2 MMOL/L (ref 0.5–2.2)
LYMPHOCYTES # BLD AUTO: 2.06 X10(3)/MCL (ref 0.6–4.6)
LYMPHOCYTES NFR BLD AUTO: 19.4 %
MAGNESIUM SERPL-MCNC: 2.1 MG/DL (ref 1.6–2.6)
MCH RBC QN AUTO: 30.5 PG (ref 27–31)
MCHC RBC AUTO-ENTMCNC: 32.4 G/DL (ref 33–36)
MCV RBC AUTO: 94.2 FL (ref 80–94)
MONOCYTES # BLD AUTO: 1.05 X10(3)/MCL (ref 0.1–1.3)
MONOCYTES NFR BLD AUTO: 9.9 %
NEUTROPHILS # BLD AUTO: 7.19 X10(3)/MCL (ref 2.1–9.2)
NEUTROPHILS NFR BLD AUTO: 67.5 %
NRBC BLD AUTO-RTO: 0 %
PHOSPHATE SERPL-MCNC: 3.3 MG/DL (ref 2.3–4.7)
PLATELET # BLD AUTO: 404 X10(3)/MCL (ref 130–400)
PMV BLD AUTO: 9.7 FL (ref 7.4–10.4)
POCT GLUCOSE: 113 MG/DL (ref 70–110)
POTASSIUM SERPL-SCNC: 4.9 MMOL/L (ref 3.5–5.1)
RBC # BLD AUTO: 3.11 X10(6)/MCL (ref 4.7–6.1)
SODIUM SERPL-SCNC: 129 MMOL/L (ref 136–145)
WBC # BLD AUTO: 10.64 X10(3)/MCL (ref 4.5–11.5)

## 2025-08-24 PROCEDURE — 83735 ASSAY OF MAGNESIUM: CPT | Performed by: INTERNAL MEDICINE

## 2025-08-24 PROCEDURE — 36415 COLL VENOUS BLD VENIPUNCTURE: CPT | Performed by: INTERNAL MEDICINE

## 2025-08-24 PROCEDURE — 25000003 PHARM REV CODE 250

## 2025-08-24 PROCEDURE — 25000003 PHARM REV CODE 250: Performed by: COLON & RECTAL SURGERY

## 2025-08-24 PROCEDURE — 63600175 PHARM REV CODE 636 W HCPCS: Performed by: INTERNAL MEDICINE

## 2025-08-24 PROCEDURE — 80048 BASIC METABOLIC PNL TOTAL CA: CPT | Performed by: INTERNAL MEDICINE

## 2025-08-24 PROCEDURE — P9047 ALBUMIN (HUMAN), 25%, 50ML: HCPCS | Performed by: INTERNAL MEDICINE

## 2025-08-24 PROCEDURE — 25000003 PHARM REV CODE 250: Performed by: INTERNAL MEDICINE

## 2025-08-24 PROCEDURE — 85025 COMPLETE CBC W/AUTO DIFF WBC: CPT | Performed by: INTERNAL MEDICINE

## 2025-08-24 PROCEDURE — 83605 ASSAY OF LACTIC ACID: CPT | Performed by: INTERNAL MEDICINE

## 2025-08-24 PROCEDURE — 21400001 HC TELEMETRY ROOM

## 2025-08-24 PROCEDURE — 84100 ASSAY OF PHOSPHORUS: CPT | Performed by: INTERNAL MEDICINE

## 2025-08-24 RX ORDER — ALBUMIN HUMAN 250 G/1000ML
25 SOLUTION INTRAVENOUS ONCE
Status: COMPLETED | OUTPATIENT
Start: 2025-08-24 | End: 2025-08-24

## 2025-08-24 RX ORDER — MIDODRINE HYDROCHLORIDE 5 MG/1
10 TABLET ORAL
Status: DISCONTINUED | OUTPATIENT
Start: 2025-08-24 | End: 2025-08-26

## 2025-08-24 RX ADMIN — ENOXAPARIN SODIUM 60 MG: 60 INJECTION SUBCUTANEOUS at 08:08

## 2025-08-24 RX ADMIN — PANTOPRAZOLE SODIUM 40 MG: 40 TABLET, DELAYED RELEASE ORAL at 08:08

## 2025-08-24 RX ADMIN — METRONIDAZOLE 500 MG: 5 INJECTION, SOLUTION INTRAVENOUS at 10:08

## 2025-08-24 RX ADMIN — METRONIDAZOLE 500 MG: 5 INJECTION, SOLUTION INTRAVENOUS at 08:08

## 2025-08-24 RX ADMIN — AZTREONAM 2 G: 2 INJECTION, POWDER, LYOPHILIZED, FOR SOLUTION INTRAMUSCULAR; INTRAVENOUS at 12:08

## 2025-08-24 RX ADMIN — METOCLOPRAMIDE 10 MG: 10 TABLET ORAL at 06:08

## 2025-08-24 RX ADMIN — METOCLOPRAMIDE 10 MG: 10 TABLET ORAL at 10:08

## 2025-08-24 RX ADMIN — METRONIDAZOLE 500 MG: 5 INJECTION, SOLUTION INTRAVENOUS at 04:08

## 2025-08-24 RX ADMIN — SODIUM CHLORIDE 500 ML: 9 INJECTION, SOLUTION INTRAVENOUS at 04:08

## 2025-08-24 RX ADMIN — SODIUM CHLORIDE, POTASSIUM CHLORIDE, SODIUM LACTATE AND CALCIUM CHLORIDE: 600; 310; 30; 20 INJECTION, SOLUTION INTRAVENOUS at 03:08

## 2025-08-24 RX ADMIN — ALBUMIN (HUMAN) 25 G: 12.5 SOLUTION INTRAVENOUS at 05:08

## 2025-08-24 RX ADMIN — SULFAMETHOXAZOLE AND TRIMETHOPRIM 1 TABLET: 800; 160 TABLET ORAL at 08:08

## 2025-08-24 RX ADMIN — MIDODRINE HYDROCHLORIDE 10 MG: 5 TABLET ORAL at 04:08

## 2025-08-24 RX ADMIN — AZTREONAM 2 G: 2 INJECTION, POWDER, LYOPHILIZED, FOR SOLUTION INTRAMUSCULAR; INTRAVENOUS at 03:08

## 2025-08-24 RX ADMIN — AZTREONAM 2 G: 2 INJECTION, POWDER, LYOPHILIZED, FOR SOLUTION INTRAMUSCULAR; INTRAVENOUS at 08:08

## 2025-08-25 LAB
OSMOLALITY SERPL: 282 MOSM/KG (ref 280–300)
OSMOLALITY UR: 701 MOSM/KG (ref 300–1300)
POCT GLUCOSE: 94 MG/DL (ref 70–110)
SODIUM UR-SCNC: 168 MMOL/L

## 2025-08-25 PROCEDURE — 94799 UNLISTED PULMONARY SVC/PX: CPT

## 2025-08-25 PROCEDURE — 84300 ASSAY OF URINE SODIUM: CPT | Performed by: INTERNAL MEDICINE

## 2025-08-25 PROCEDURE — 63600175 PHARM REV CODE 636 W HCPCS: Performed by: INTERNAL MEDICINE

## 2025-08-25 PROCEDURE — 25000003 PHARM REV CODE 250: Performed by: INTERNAL MEDICINE

## 2025-08-25 PROCEDURE — 83935 ASSAY OF URINE OSMOLALITY: CPT | Performed by: INTERNAL MEDICINE

## 2025-08-25 PROCEDURE — 99900031 HC PATIENT EDUCATION (STAT)

## 2025-08-25 PROCEDURE — 36415 COLL VENOUS BLD VENIPUNCTURE: CPT | Performed by: INTERNAL MEDICINE

## 2025-08-25 PROCEDURE — 25000003 PHARM REV CODE 250: Performed by: COLON & RECTAL SURGERY

## 2025-08-25 PROCEDURE — 25500020 PHARM REV CODE 255: Performed by: INTERNAL MEDICINE

## 2025-08-25 PROCEDURE — 87086 URINE CULTURE/COLONY COUNT: CPT | Performed by: INTERNAL MEDICINE

## 2025-08-25 PROCEDURE — 25000003 PHARM REV CODE 250

## 2025-08-25 PROCEDURE — 83930 ASSAY OF BLOOD OSMOLALITY: CPT | Performed by: INTERNAL MEDICINE

## 2025-08-25 PROCEDURE — 21400001 HC TELEMETRY ROOM

## 2025-08-25 PROCEDURE — 99900035 HC TECH TIME PER 15 MIN (STAT)

## 2025-08-25 RX ADMIN — AZTREONAM 2 G: 2 INJECTION, POWDER, LYOPHILIZED, FOR SOLUTION INTRAMUSCULAR; INTRAVENOUS at 06:08

## 2025-08-25 RX ADMIN — MIDODRINE HYDROCHLORIDE 10 MG: 5 TABLET ORAL at 09:08

## 2025-08-25 RX ADMIN — IOHEXOL 100 ML: 350 INJECTION, SOLUTION INTRAVENOUS at 02:08

## 2025-08-25 RX ADMIN — AZTREONAM 2 G: 2 INJECTION, POWDER, LYOPHILIZED, FOR SOLUTION INTRAMUSCULAR; INTRAVENOUS at 04:08

## 2025-08-25 RX ADMIN — MIDODRINE HYDROCHLORIDE 10 MG: 5 TABLET ORAL at 11:08

## 2025-08-25 RX ADMIN — METOCLOPRAMIDE 10 MG: 10 TABLET ORAL at 04:08

## 2025-08-25 RX ADMIN — ENOXAPARIN SODIUM 60 MG: 60 INJECTION SUBCUTANEOUS at 09:08

## 2025-08-25 RX ADMIN — METOCLOPRAMIDE 10 MG: 10 TABLET ORAL at 05:08

## 2025-08-25 RX ADMIN — AZTREONAM 2 G: 2 INJECTION, POWDER, LYOPHILIZED, FOR SOLUTION INTRAMUSCULAR; INTRAVENOUS at 11:08

## 2025-08-25 RX ADMIN — PANTOPRAZOLE SODIUM 40 MG: 40 TABLET, DELAYED RELEASE ORAL at 09:08

## 2025-08-25 RX ADMIN — METRONIDAZOLE 500 MG: 5 INJECTION, SOLUTION INTRAVENOUS at 04:08

## 2025-08-25 RX ADMIN — METRONIDAZOLE 500 MG: 5 INJECTION, SOLUTION INTRAVENOUS at 06:08

## 2025-08-25 RX ADMIN — METRONIDAZOLE 500 MG: 5 INJECTION, SOLUTION INTRAVENOUS at 11:08

## 2025-08-25 RX ADMIN — MIDODRINE HYDROCHLORIDE 10 MG: 5 TABLET ORAL at 04:08

## 2025-08-25 RX ADMIN — METOCLOPRAMIDE 10 MG: 10 TABLET ORAL at 09:08

## 2025-08-25 RX ADMIN — ENOXAPARIN SODIUM 60 MG: 60 INJECTION SUBCUTANEOUS at 11:08

## 2025-08-26 PROCEDURE — 25000003 PHARM REV CODE 250

## 2025-08-26 PROCEDURE — 93005 ELECTROCARDIOGRAM TRACING: CPT

## 2025-08-26 PROCEDURE — 93010 ELECTROCARDIOGRAM REPORT: CPT | Mod: ,,, | Performed by: STUDENT IN AN ORGANIZED HEALTH CARE EDUCATION/TRAINING PROGRAM

## 2025-08-26 PROCEDURE — 94799 UNLISTED PULMONARY SVC/PX: CPT

## 2025-08-26 PROCEDURE — 63600175 PHARM REV CODE 636 W HCPCS: Performed by: INTERNAL MEDICINE

## 2025-08-26 PROCEDURE — 21400001 HC TELEMETRY ROOM

## 2025-08-26 PROCEDURE — 25000003 PHARM REV CODE 250: Performed by: COLON & RECTAL SURGERY

## 2025-08-26 PROCEDURE — 25000003 PHARM REV CODE 250: Performed by: INTERNAL MEDICINE

## 2025-08-26 RX ORDER — AZTREONAM 2 G/1
2 INJECTION, POWDER, LYOPHILIZED, FOR SOLUTION INTRAMUSCULAR; INTRAVENOUS
Status: DISCONTINUED | OUTPATIENT
Start: 2025-08-27 | End: 2025-08-27 | Stop reason: HOSPADM

## 2025-08-26 RX ORDER — SODIUM CHLORIDE 1 G/1
1000 TABLET ORAL DAILY
Status: DISCONTINUED | OUTPATIENT
Start: 2025-08-26 | End: 2025-08-27 | Stop reason: HOSPADM

## 2025-08-26 RX ORDER — MIDODRINE HYDROCHLORIDE 10 MG/1
10 TABLET ORAL 3 TIMES DAILY PRN
Qty: 90 TABLET | Refills: 0 | Status: CANCELLED | OUTPATIENT
Start: 2025-08-26

## 2025-08-26 RX ADMIN — AZTREONAM 2 G: 2 INJECTION, POWDER, LYOPHILIZED, FOR SOLUTION INTRAMUSCULAR; INTRAVENOUS at 04:08

## 2025-08-26 RX ADMIN — METOCLOPRAMIDE 10 MG: 10 TABLET ORAL at 12:08

## 2025-08-26 RX ADMIN — ENOXAPARIN SODIUM 60 MG: 60 INJECTION SUBCUTANEOUS at 09:08

## 2025-08-26 RX ADMIN — SODIUM CHLORIDE 1000 MG: 1 TABLET ORAL at 09:08

## 2025-08-26 RX ADMIN — MIDODRINE HYDROCHLORIDE 10 MG: 5 TABLET ORAL at 08:08

## 2025-08-26 RX ADMIN — ENOXAPARIN SODIUM 60 MG: 60 INJECTION SUBCUTANEOUS at 08:08

## 2025-08-26 RX ADMIN — AZTREONAM 2 G: 2 INJECTION, POWDER, LYOPHILIZED, FOR SOLUTION INTRAMUSCULAR; INTRAVENOUS at 12:08

## 2025-08-26 RX ADMIN — PANTOPRAZOLE SODIUM 40 MG: 40 TABLET, DELAYED RELEASE ORAL at 08:08

## 2025-08-26 RX ADMIN — METRONIDAZOLE 500 MG: 5 INJECTION, SOLUTION INTRAVENOUS at 12:08

## 2025-08-26 RX ADMIN — AZTREONAM 2 G: 2 INJECTION, POWDER, LYOPHILIZED, FOR SOLUTION INTRAMUSCULAR; INTRAVENOUS at 10:08

## 2025-08-26 RX ADMIN — METRONIDAZOLE 500 MG: 5 INJECTION, SOLUTION INTRAVENOUS at 09:08

## 2025-08-26 RX ADMIN — METRONIDAZOLE 500 MG: 5 INJECTION, SOLUTION INTRAVENOUS at 04:08

## 2025-08-26 RX ADMIN — METOCLOPRAMIDE 10 MG: 10 TABLET ORAL at 05:08

## 2025-08-27 VITALS
OXYGEN SATURATION: 100 % | HEIGHT: 69 IN | BODY MASS INDEX: 18.51 KG/M2 | WEIGHT: 125 LBS | DIASTOLIC BLOOD PRESSURE: 67 MMHG | RESPIRATION RATE: 18 BRPM | TEMPERATURE: 98 F | HEART RATE: 102 BPM | SYSTOLIC BLOOD PRESSURE: 103 MMHG

## 2025-08-27 LAB
BACTERIA UR CULT: NORMAL
OHS QRS DURATION: 126 MS
OHS QTC CALCULATION: 472 MS

## 2025-08-27 PROCEDURE — 94760 N-INVAS EAR/PLS OXIMETRY 1: CPT

## 2025-08-27 PROCEDURE — 25000003 PHARM REV CODE 250

## 2025-08-27 PROCEDURE — 25000003 PHARM REV CODE 250: Performed by: INTERNAL MEDICINE

## 2025-08-27 PROCEDURE — 25000003 PHARM REV CODE 250: Performed by: COLON & RECTAL SURGERY

## 2025-08-27 PROCEDURE — 99900035 HC TECH TIME PER 15 MIN (STAT)

## 2025-08-27 PROCEDURE — 99900031 HC PATIENT EDUCATION (STAT)

## 2025-08-27 PROCEDURE — 63600175 PHARM REV CODE 636 W HCPCS: Performed by: INTERNAL MEDICINE

## 2025-08-27 PROCEDURE — 94799 UNLISTED PULMONARY SVC/PX: CPT

## 2025-08-27 RX ORDER — PANTOPRAZOLE SODIUM 40 MG/1
40 TABLET, DELAYED RELEASE ORAL DAILY
Qty: 30 TABLET | Refills: 1 | Status: SHIPPED | OUTPATIENT
Start: 2025-08-27

## 2025-08-27 RX ORDER — SODIUM CHLORIDE 1 G/1
1000 TABLET ORAL DAILY
Qty: 3 TABLET | Refills: 0 | Status: SHIPPED | OUTPATIENT
Start: 2025-08-28 | End: 2025-08-31

## 2025-08-27 RX ORDER — ONDANSETRON 4 MG/1
4 TABLET, ORALLY DISINTEGRATING ORAL EVERY 12 HOURS PRN
Qty: 20 TABLET | Refills: 0 | Status: SHIPPED | OUTPATIENT
Start: 2025-08-27

## 2025-08-27 RX ORDER — METOCLOPRAMIDE 10 MG/1
5 TABLET ORAL
Qty: 30 TABLET | Refills: 0 | Status: SHIPPED | OUTPATIENT
Start: 2025-08-27

## 2025-08-27 RX ORDER — DEXTROMETHORPHAN HYDROBROMIDE, GUAIFENESIN 5; 100 MG/5ML; MG/5ML
650 LIQUID ORAL EVERY 8 HOURS PRN
Qty: 30 TABLET | Refills: 0 | Status: SHIPPED | OUTPATIENT
Start: 2025-08-27

## 2025-08-27 RX ORDER — MIDODRINE HYDROCHLORIDE 5 MG/1
5 TABLET ORAL 3 TIMES DAILY PRN
Qty: 90 TABLET | Refills: 0 | Status: SHIPPED | OUTPATIENT
Start: 2025-08-27

## 2025-08-27 RX ORDER — SULFAMETHOXAZOLE AND TRIMETHOPRIM 800; 160 MG/1; MG/1
1 TABLET ORAL 2 TIMES DAILY
Qty: 14 TABLET | Refills: 0 | Status: SHIPPED | OUTPATIENT
Start: 2025-08-27 | End: 2025-09-03

## 2025-08-27 RX ADMIN — SODIUM CHLORIDE 1000 MG: 1 TABLET ORAL at 09:08

## 2025-08-27 RX ADMIN — PANTOPRAZOLE SODIUM 40 MG: 40 TABLET, DELAYED RELEASE ORAL at 09:08

## 2025-08-27 RX ADMIN — METOCLOPRAMIDE 10 MG: 10 TABLET ORAL at 05:08

## 2025-08-27 RX ADMIN — METOCLOPRAMIDE 10 MG: 10 TABLET ORAL at 01:08

## 2025-08-27 RX ADMIN — AZTREONAM 2 G: 2 INJECTION, POWDER, LYOPHILIZED, FOR SOLUTION INTRAMUSCULAR; INTRAVENOUS at 05:08

## 2025-08-27 RX ADMIN — METRONIDAZOLE 500 MG: 5 INJECTION, SOLUTION INTRAVENOUS at 02:08

## 2025-08-27 RX ADMIN — ENOXAPARIN SODIUM 60 MG: 60 INJECTION SUBCUTANEOUS at 09:08

## 2025-08-28 ENCOUNTER — PATIENT OUTREACH (OUTPATIENT)
Dept: ADMINISTRATIVE | Facility: CLINIC | Age: 50
End: 2025-08-28
Payer: MEDICARE

## 2025-08-28 LAB
BACTERIA BLD CULT: NORMAL
BACTERIA BLD CULT: NORMAL

## (undated) DEVICE — COVER CAMERA/LASER 9X96IN

## (undated) DEVICE — TOWEL OR BLUE STRL 16X26 8/PK

## (undated) DEVICE — ELECTRODE BLD EXT 6.50 ST DISP

## (undated) DEVICE — KIT SURGICAL TURNOVER

## (undated) DEVICE — GLOVE PROTEXIS BLUE LATEX 8

## (undated) DEVICE — SUT 2/0 30IN SILK BLK BRAI

## (undated) DEVICE — SUT MCRYL PLUS 3-0 PS2 27IN

## (undated) DEVICE — POUCH OST BLUE 2 PC 12 2.75IN

## (undated) DEVICE — DRAPE FLUID WARMER ORS 44X44IN

## (undated) DEVICE — DRESSING TELFA N ADH 3X8

## (undated) DEVICE — SUT CTD VICRYL BR CR/SH VIL

## (undated) DEVICE — CONTAINER SPECIMEN SCREW 4OZ

## (undated) DEVICE — Device

## (undated) DEVICE — CATH ALL PUR URTHL RR INT 16FR

## (undated) DEVICE — SOL NACL IRR 1000ML BTL

## (undated) DEVICE — SYR 10CC LUER LOCK

## (undated) DEVICE — SUT VICRYL PLUS 2-0 SH 27IN

## (undated) DEVICE — SUT VICRYL+ 2-0 SH 18IN

## (undated) DEVICE — TRAY CATH 1-LYR URIMTR 16FR

## (undated) DEVICE — GLOVE PROTEXIS BLUE LATEX 7

## (undated) DEVICE — DRAPE FLUID WARMER 44X44IN

## (undated) DEVICE — STAPLER SKIN PROXIMATE WIDE

## (undated) DEVICE — SUT VICRYL PLUS 1 CTX 27IN

## (undated) DEVICE — PENCIL ELECSURG ROCKER 15FT

## (undated) DEVICE — APPLICATOR CHLORAPREP ORN 26ML

## (undated) DEVICE — TOWEL OR WHT XRAY 16X26 2/PK

## (undated) DEVICE — DRAPE INCISE IOBAN 2 13X13IN

## (undated) DEVICE — GLOVE PROTEXIS LTX MICRO  7.5

## (undated) DEVICE — SOL BETADINE 5%

## (undated) DEVICE — BARRIER COLOSTOMY 2 3/4IN

## (undated) DEVICE — ELECTRODE PATIENT RETURN DISP

## (undated) DEVICE — GLOVE PROTEXIS LTX MICRO 6.5

## (undated) DEVICE — COVER TABLE HVY DTY 60X90IN

## (undated) DEVICE — ADHESIVE DERMABOND ADVANCED

## (undated) DEVICE — RELOAD ECHELON LIN BLU 80MM

## (undated) DEVICE — CUTTER ECHELON LINEAR 80MM